# Patient Record
Sex: MALE | Race: WHITE | NOT HISPANIC OR LATINO
[De-identification: names, ages, dates, MRNs, and addresses within clinical notes are randomized per-mention and may not be internally consistent; named-entity substitution may affect disease eponyms.]

---

## 2017-01-12 ENCOUNTER — APPOINTMENT (OUTPATIENT)
Dept: ENDOCRINOLOGY | Facility: CLINIC | Age: 57
End: 2017-01-12

## 2017-02-06 ENCOUNTER — APPOINTMENT (OUTPATIENT)
Dept: PULMONOLOGY | Facility: CLINIC | Age: 57
End: 2017-02-06

## 2017-02-15 ENCOUNTER — APPOINTMENT (OUTPATIENT)
Dept: ENDOCRINOLOGY | Facility: CLINIC | Age: 57
End: 2017-02-15

## 2017-03-17 ENCOUNTER — APPOINTMENT (OUTPATIENT)
Dept: PULMONOLOGY | Facility: CLINIC | Age: 57
End: 2017-03-17

## 2017-03-30 ENCOUNTER — OUTPATIENT (OUTPATIENT)
Dept: OUTPATIENT SERVICES | Facility: HOSPITAL | Age: 57
LOS: 1 days | End: 2017-03-30
Payer: MEDICARE

## 2017-03-30 DIAGNOSIS — K08.9 DISORDER OF TEETH AND SUPPORTING STRUCTURES, UNSPECIFIED: ICD-10-CM

## 2017-03-30 PROCEDURE — D0273: CPT

## 2017-03-30 PROCEDURE — D1110: CPT

## 2017-03-30 PROCEDURE — D0120: CPT

## 2017-03-31 DIAGNOSIS — Z01.20 ENCOUNTER FOR DENTAL EXAMINATION AND CLEANING WITHOUT ABNORMAL FINDINGS: ICD-10-CM

## 2017-04-07 ENCOUNTER — APPOINTMENT (OUTPATIENT)
Dept: PULMONOLOGY | Facility: CLINIC | Age: 57
End: 2017-04-07

## 2017-04-07 VITALS
TEMPERATURE: 98.4 F | HEART RATE: 74 BPM | SYSTOLIC BLOOD PRESSURE: 120 MMHG | DIASTOLIC BLOOD PRESSURE: 84 MMHG | RESPIRATION RATE: 16 BRPM | HEIGHT: 71 IN | WEIGHT: 302 LBS | BODY MASS INDEX: 42.28 KG/M2

## 2017-04-14 ENCOUNTER — OUTPATIENT (OUTPATIENT)
Dept: OUTPATIENT SERVICES | Facility: HOSPITAL | Age: 57
LOS: 1 days | End: 2017-04-14
Payer: MEDICARE

## 2017-04-14 DIAGNOSIS — K08.9 DISORDER OF TEETH AND SUPPORTING STRUCTURES, UNSPECIFIED: ICD-10-CM

## 2017-04-14 PROCEDURE — D0140: CPT

## 2017-04-14 PROCEDURE — D0230: CPT

## 2017-04-14 PROCEDURE — D0220: CPT

## 2017-04-14 PROCEDURE — D0330: CPT

## 2017-04-18 DIAGNOSIS — Z01.20 ENCOUNTER FOR DENTAL EXAMINATION AND CLEANING WITHOUT ABNORMAL FINDINGS: ICD-10-CM

## 2017-04-19 ENCOUNTER — APPOINTMENT (OUTPATIENT)
Dept: CARDIOLOGY | Facility: CLINIC | Age: 57
End: 2017-04-19

## 2017-04-21 ENCOUNTER — APPOINTMENT (OUTPATIENT)
Dept: NEUROLOGY | Facility: CLINIC | Age: 57
End: 2017-04-21

## 2017-04-24 ENCOUNTER — APPOINTMENT (OUTPATIENT)
Dept: CARDIOLOGY | Facility: CLINIC | Age: 57
End: 2017-04-24

## 2017-05-03 ENCOUNTER — APPOINTMENT (OUTPATIENT)
Dept: PULMONOLOGY | Facility: CLINIC | Age: 57
End: 2017-05-03

## 2017-05-08 ENCOUNTER — APPOINTMENT (OUTPATIENT)
Dept: ENDOCRINOLOGY | Facility: CLINIC | Age: 57
End: 2017-05-08

## 2017-05-12 ENCOUNTER — APPOINTMENT (OUTPATIENT)
Dept: ENDOCRINOLOGY | Facility: CLINIC | Age: 57
End: 2017-05-12

## 2017-05-12 VITALS
BODY MASS INDEX: 42 KG/M2 | HEART RATE: 71 BPM | WEIGHT: 300 LBS | HEIGHT: 71 IN | OXYGEN SATURATION: 97 % | DIASTOLIC BLOOD PRESSURE: 84 MMHG | SYSTOLIC BLOOD PRESSURE: 120 MMHG

## 2017-05-12 LAB
GLUCOSE BLDC GLUCOMTR-MCNC: 123
HBA1C MFR BLD HPLC: 6.1

## 2017-05-16 LAB
ALBUMIN SERPL ELPH-MCNC: 4.6 G/DL
ALP BLD-CCNC: 73 U/L
ALT SERPL-CCNC: 32 U/L
ANION GAP SERPL CALC-SCNC: 15 MMOL/L
AST SERPL-CCNC: 21 U/L
BILIRUB SERPL-MCNC: 0.6 MG/DL
BUN SERPL-MCNC: 22 MG/DL
CALCIUM SERPL-MCNC: 10.1 MG/DL
CHLORIDE SERPL-SCNC: 104 MMOL/L
CHOLEST SERPL-MCNC: 132 MG/DL
CHOLEST/HDLC SERPL: 3 RATIO
CO2 SERPL-SCNC: 26 MMOL/L
CREAT SERPL-MCNC: 1.48 MG/DL
CREAT SPEC-SCNC: 15 MG/DL
GLUCOSE SERPL-MCNC: 43 MG/DL
HDLC SERPL-MCNC: 39 MG/DL
LDLC SERPL CALC-MCNC: 62 MG/DL
MICROALBUMIN 24H UR DL<=1MG/L-MCNC: 0.8 MG/DL
MICROALBUMIN/CREAT 24H UR-RTO: 53
POTASSIUM SERPL-SCNC: 4.2 MMOL/L
PROT SERPL-MCNC: 7.3 G/DL
SODIUM SERPL-SCNC: 145 MMOL/L
T4 FREE SERPL-MCNC: 1.3 NG/DL
TRIGL SERPL-MCNC: 157 MG/DL
TSH SERPL-ACNC: 2.11 UIU/ML

## 2017-05-22 ENCOUNTER — APPOINTMENT (OUTPATIENT)
Dept: CARDIOLOGY | Facility: CLINIC | Age: 57
End: 2017-05-22

## 2017-05-22 ENCOUNTER — NON-APPOINTMENT (OUTPATIENT)
Age: 57
End: 2017-05-22

## 2017-05-22 VITALS
SYSTOLIC BLOOD PRESSURE: 116 MMHG | WEIGHT: 303 LBS | DIASTOLIC BLOOD PRESSURE: 80 MMHG | HEART RATE: 76 BPM | BODY MASS INDEX: 42.42 KG/M2 | HEIGHT: 71 IN

## 2017-05-22 RX ORDER — GLIMEPIRIDE 1 MG/1
1 TABLET ORAL DAILY
Qty: 90 | Refills: 3 | Status: DISCONTINUED | COMMUNITY
Start: 2017-03-30 | End: 2017-05-22

## 2017-05-23 ENCOUNTER — APPOINTMENT (OUTPATIENT)
Dept: SLEEP CENTER | Facility: CLINIC | Age: 57
End: 2017-05-23

## 2017-05-23 ENCOUNTER — OUTPATIENT (OUTPATIENT)
Dept: OUTPATIENT SERVICES | Facility: HOSPITAL | Age: 57
LOS: 1 days | End: 2017-05-23
Payer: MEDICARE

## 2017-05-23 PROCEDURE — 95810 POLYSOM 6/> YRS 4/> PARAM: CPT

## 2017-05-24 DIAGNOSIS — G47.33 OBSTRUCTIVE SLEEP APNEA (ADULT) (PEDIATRIC): ICD-10-CM

## 2017-05-30 ENCOUNTER — RESULT REVIEW (OUTPATIENT)
Age: 57
End: 2017-05-30

## 2017-05-31 ENCOUNTER — OUTPATIENT (OUTPATIENT)
Dept: OUTPATIENT SERVICES | Facility: HOSPITAL | Age: 57
LOS: 1 days | End: 2017-05-31

## 2017-05-31 DIAGNOSIS — K08.9 DISORDER OF TEETH AND SUPPORTING STRUCTURES, UNSPECIFIED: ICD-10-CM

## 2017-06-07 ENCOUNTER — RESULT REVIEW (OUTPATIENT)
Age: 57
End: 2017-06-07

## 2017-06-15 ENCOUNTER — APPOINTMENT (OUTPATIENT)
Dept: NEUROLOGY | Facility: CLINIC | Age: 57
End: 2017-06-15

## 2017-06-15 VITALS
HEIGHT: 71 IN | WEIGHT: 303 LBS | OXYGEN SATURATION: 97 % | DIASTOLIC BLOOD PRESSURE: 80 MMHG | SYSTOLIC BLOOD PRESSURE: 140 MMHG | BODY MASS INDEX: 42.42 KG/M2 | HEART RATE: 77 BPM

## 2017-06-21 ENCOUNTER — CLINICAL ADVICE (OUTPATIENT)
Age: 57
End: 2017-06-21

## 2017-06-22 ENCOUNTER — APPOINTMENT (OUTPATIENT)
Dept: CARDIOLOGY | Facility: CLINIC | Age: 57
End: 2017-06-22
Payer: MEDICARE

## 2017-06-22 PROCEDURE — 93306 TTE W/DOPPLER COMPLETE: CPT

## 2017-06-23 ENCOUNTER — OUTPATIENT (OUTPATIENT)
Dept: OUTPATIENT SERVICES | Facility: HOSPITAL | Age: 57
LOS: 1 days | End: 2017-06-23
Payer: MEDICARE

## 2017-06-28 ENCOUNTER — RX RENEWAL (OUTPATIENT)
Age: 57
End: 2017-06-28

## 2017-06-28 ENCOUNTER — CLINICAL ADVICE (OUTPATIENT)
Age: 57
End: 2017-06-28

## 2017-07-11 ENCOUNTER — OUTPATIENT (OUTPATIENT)
Dept: OUTPATIENT SERVICES | Facility: HOSPITAL | Age: 57
LOS: 1 days | End: 2017-07-11
Payer: MEDICARE

## 2017-07-11 PROCEDURE — 93018 CV STRESS TEST I&R ONLY: CPT

## 2017-07-11 PROCEDURE — 78452 HT MUSCLE IMAGE SPECT MULT: CPT | Mod: 26

## 2017-07-11 PROCEDURE — 93016 CV STRESS TEST SUPVJ ONLY: CPT

## 2017-07-12 PROCEDURE — A9500: CPT

## 2017-07-12 PROCEDURE — 78452 HT MUSCLE IMAGE SPECT MULT: CPT

## 2017-07-12 PROCEDURE — 93017 CV STRESS TEST TRACING ONLY: CPT

## 2017-08-01 PROCEDURE — G8980: CPT | Mod: CJ

## 2017-08-01 PROCEDURE — G8979: CPT | Mod: CI

## 2017-08-01 PROCEDURE — G8978: CPT | Mod: CK

## 2017-08-01 PROCEDURE — 97140 MANUAL THERAPY 1/> REGIONS: CPT

## 2017-08-01 PROCEDURE — 97161 PT EVAL LOW COMPLEX 20 MIN: CPT

## 2017-08-01 PROCEDURE — 97110 THERAPEUTIC EXERCISES: CPT

## 2017-08-16 ENCOUNTER — RX RENEWAL (OUTPATIENT)
Age: 57
End: 2017-08-16

## 2017-08-29 LAB
AFP-TM SERPL-MCNC: 2.3 NG/ML
ALBUMIN SERPL ELPH-MCNC: 4 G/DL
ALP BLD-CCNC: 76 U/L
ALT SERPL-CCNC: 26 U/L
ANION GAP SERPL CALC-SCNC: 12 MMOL/L
AST SERPL-CCNC: 21 U/L
BASOPHILS # BLD AUTO: 0.02 K/UL
BASOPHILS NFR BLD AUTO: 0.3 %
BILIRUB SERPL-MCNC: 0.5 MG/DL
BUN SERPL-MCNC: 22 MG/DL
CALCIUM SERPL-MCNC: 9.5 MG/DL
CHLORIDE SERPL-SCNC: 102 MMOL/L
CO2 SERPL-SCNC: 24 MMOL/L
CREAT SERPL-MCNC: 1.33 MG/DL
EOSINOPHIL # BLD AUTO: 0.26 K/UL
EOSINOPHIL NFR BLD AUTO: 3.4 %
GLUCOSE SERPL-MCNC: 192 MG/DL
HCG SERPL-MCNC: <1 MIU/ML
HCT VFR BLD CALC: 45.6 %
HGB BLD-MCNC: 14.7 G/DL
IMM GRANULOCYTES NFR BLD AUTO: 0.8 %
LDH SERPL-CCNC: 170 U/L
LYMPHOCYTES # BLD AUTO: 2.13 K/UL
LYMPHOCYTES NFR BLD AUTO: 27.5 %
MAN DIFF?: NORMAL
MCHC RBC-ENTMCNC: 28.5 PG
MCHC RBC-ENTMCNC: 32.2 GM/DL
MCV RBC AUTO: 88.4 FL
MONOCYTES # BLD AUTO: 0.67 K/UL
MONOCYTES NFR BLD AUTO: 8.6 %
NEUTROPHILS # BLD AUTO: 4.61 K/UL
NEUTROPHILS NFR BLD AUTO: 59.4 %
PLATELET # BLD AUTO: 203 K/UL
POTASSIUM SERPL-SCNC: 3.9 MMOL/L
PROT SERPL-MCNC: 7.8 G/DL
RBC # BLD: 5.16 M/UL
RBC # FLD: 13.6 %
SODIUM SERPL-SCNC: 138 MMOL/L
WBC # FLD AUTO: 7.75 K/UL

## 2017-09-14 ENCOUNTER — APPOINTMENT (OUTPATIENT)
Dept: HEMATOLOGY ONCOLOGY | Facility: CLINIC | Age: 57
End: 2017-09-14
Payer: MEDICARE

## 2017-09-14 VITALS
WEIGHT: 302 LBS | RESPIRATION RATE: 16 BRPM | BODY MASS INDEX: 42.28 KG/M2 | HEIGHT: 71 IN | DIASTOLIC BLOOD PRESSURE: 64 MMHG | SYSTOLIC BLOOD PRESSURE: 120 MMHG | TEMPERATURE: 98.4 F | HEART RATE: 68 BPM

## 2017-09-14 PROCEDURE — 99214 OFFICE O/P EST MOD 30 MIN: CPT

## 2017-09-24 ENCOUNTER — RX RENEWAL (OUTPATIENT)
Age: 57
End: 2017-09-24

## 2017-10-06 ENCOUNTER — APPOINTMENT (OUTPATIENT)
Dept: UROLOGY | Facility: CLINIC | Age: 57
End: 2017-10-06

## 2017-10-16 ENCOUNTER — APPOINTMENT (OUTPATIENT)
Dept: ENDOCRINOLOGY | Facility: CLINIC | Age: 57
End: 2017-10-16

## 2017-11-01 ENCOUNTER — APPOINTMENT (OUTPATIENT)
Dept: ORTHOPEDIC SURGERY | Facility: CLINIC | Age: 57
End: 2017-11-01

## 2017-11-11 ENCOUNTER — MOBILE ON CALL (OUTPATIENT)
Age: 57
End: 2017-11-11

## 2017-11-30 ENCOUNTER — APPOINTMENT (OUTPATIENT)
Dept: ENDOCRINOLOGY | Facility: CLINIC | Age: 57
End: 2017-11-30
Payer: MEDICARE

## 2017-11-30 VITALS
DIASTOLIC BLOOD PRESSURE: 80 MMHG | HEIGHT: 69 IN | BODY MASS INDEX: 44.43 KG/M2 | WEIGHT: 300 LBS | SYSTOLIC BLOOD PRESSURE: 120 MMHG | OXYGEN SATURATION: 98 % | HEART RATE: 81 BPM

## 2017-11-30 LAB
GLUCOSE BLDC GLUCOMTR-MCNC: 68
GLUCOSE BLDC GLUCOMTR-MCNC: 87
HBA1C MFR BLD HPLC: 6.3

## 2017-11-30 PROCEDURE — 82962 GLUCOSE BLOOD TEST: CPT

## 2017-11-30 PROCEDURE — 83036 HEMOGLOBIN GLYCOSYLATED A1C: CPT | Mod: QW

## 2017-11-30 PROCEDURE — 99214 OFFICE O/P EST MOD 30 MIN: CPT

## 2017-12-01 LAB
CREAT SPEC-SCNC: 18 MG/DL
MICROALBUMIN 24H UR DL<=1MG/L-MCNC: 0.4 MG/DL
MICROALBUMIN/CREAT 24H UR-RTO: 22 MG/G

## 2017-12-06 ENCOUNTER — APPOINTMENT (OUTPATIENT)
Dept: ORTHOPEDIC SURGERY | Facility: CLINIC | Age: 57
End: 2017-12-06
Payer: MEDICARE

## 2017-12-06 VITALS — WEIGHT: 300 LBS | BODY MASS INDEX: 44.43 KG/M2 | HEIGHT: 69 IN

## 2017-12-06 DIAGNOSIS — M25.532 PAIN IN RIGHT WRIST: ICD-10-CM

## 2017-12-06 DIAGNOSIS — M25.531 PAIN IN RIGHT WRIST: ICD-10-CM

## 2017-12-06 PROCEDURE — 99215 OFFICE O/P EST HI 40 MIN: CPT

## 2017-12-06 PROCEDURE — 73100 X-RAY EXAM OF WRIST: CPT | Mod: 50

## 2017-12-06 PROCEDURE — 73562 X-RAY EXAM OF KNEE 3: CPT | Mod: LT

## 2017-12-07 PROBLEM — M25.531 ACUTE PAIN OF BOTH WRISTS: Status: ACTIVE | Noted: 2017-12-07

## 2017-12-08 ENCOUNTER — APPOINTMENT (OUTPATIENT)
Dept: UROLOGY | Facility: CLINIC | Age: 57
End: 2017-12-08
Payer: MEDICARE

## 2017-12-08 VITALS
HEIGHT: 69 IN | HEART RATE: 87 BPM | TEMPERATURE: 98.4 F | DIASTOLIC BLOOD PRESSURE: 84 MMHG | WEIGHT: 306 LBS | SYSTOLIC BLOOD PRESSURE: 129 MMHG | RESPIRATION RATE: 16 BRPM | BODY MASS INDEX: 45.32 KG/M2

## 2017-12-08 DIAGNOSIS — Z12.5 ENCOUNTER FOR SCREENING FOR MALIGNANT NEOPLASM OF PROSTATE: ICD-10-CM

## 2017-12-08 DIAGNOSIS — Z82.49 FAMILY HISTORY OF ISCHEMIC HEART DISEASE AND OTHER DISEASES OF THE CIRCULATORY SYSTEM: ICD-10-CM

## 2017-12-08 DIAGNOSIS — Z80.1 FAMILY HISTORY OF MALIGNANT NEOPLASM OF TRACHEA, BRONCHUS AND LUNG: ICD-10-CM

## 2017-12-08 PROCEDURE — 99203 OFFICE O/P NEW LOW 30 MIN: CPT

## 2017-12-12 ENCOUNTER — APPOINTMENT (OUTPATIENT)
Age: 57
End: 2017-12-12

## 2017-12-12 LAB
BILIRUB UR QL STRIP: NEGATIVE
CLARITY UR: CLEAR
COLLECTION METHOD: NORMAL
GLUCOSE UR-MCNC: NEGATIVE
HCG UR QL: 0.2 EU/DL
HGB UR QL STRIP.AUTO: NEGATIVE
KETONES UR-MCNC: NEGATIVE
LEUKOCYTE ESTERASE UR QL STRIP: NEGATIVE
NITRITE UR QL STRIP: NEGATIVE
PH UR STRIP: 6.5
PROT UR STRIP-MCNC: NEGATIVE
PSA SERPL-MCNC: 0.51 NG/ML
SP GR UR STRIP: 1.01

## 2017-12-22 ENCOUNTER — RX RENEWAL (OUTPATIENT)
Age: 57
End: 2017-12-22

## 2018-01-02 ENCOUNTER — RX RENEWAL (OUTPATIENT)
Age: 58
End: 2018-01-02

## 2018-01-16 ENCOUNTER — APPOINTMENT (OUTPATIENT)
Dept: PULMONOLOGY | Facility: CLINIC | Age: 58
End: 2018-01-16

## 2018-01-17 ENCOUNTER — CLINICAL ADVICE (OUTPATIENT)
Age: 58
End: 2018-01-17

## 2018-01-22 ENCOUNTER — RX RENEWAL (OUTPATIENT)
Age: 58
End: 2018-01-22

## 2018-01-24 ENCOUNTER — RX RENEWAL (OUTPATIENT)
Age: 58
End: 2018-01-24

## 2018-01-26 ENCOUNTER — EMERGENCY (EMERGENCY)
Facility: HOSPITAL | Age: 58
LOS: 1 days | Discharge: HOME | End: 2018-01-26
Admitting: EMERGENCY MEDICINE
Payer: MEDICARE

## 2018-01-26 PROCEDURE — 99282 EMERGENCY DEPT VISIT SF MDM: CPT | Mod: 25

## 2018-01-26 PROCEDURE — 99283 EMERGENCY DEPT VISIT LOW MDM: CPT

## 2018-01-26 PROCEDURE — 82962 GLUCOSE BLOOD TEST: CPT

## 2018-02-13 ENCOUNTER — APPOINTMENT (OUTPATIENT)
Dept: PULMONOLOGY | Facility: CLINIC | Age: 58
End: 2018-02-13

## 2018-02-21 ENCOUNTER — OUTPATIENT (OUTPATIENT)
Dept: OUTPATIENT SERVICES | Facility: HOSPITAL | Age: 58
LOS: 1 days | End: 2018-02-21
Payer: MEDICARE

## 2018-02-21 DIAGNOSIS — K08.9 DISORDER OF TEETH AND SUPPORTING STRUCTURES, UNSPECIFIED: ICD-10-CM

## 2018-02-21 PROCEDURE — D0120: CPT

## 2018-02-21 PROCEDURE — D1110: CPT

## 2018-02-25 ENCOUNTER — EMERGENCY (EMERGENCY)
Facility: HOSPITAL | Age: 58
LOS: 1 days | Discharge: HOME | End: 2018-02-25
Admitting: INTERNAL MEDICINE
Payer: MEDICARE

## 2018-02-25 PROCEDURE — 93005 ELECTROCARDIOGRAM TRACING: CPT

## 2018-02-25 PROCEDURE — 36415 COLL VENOUS BLD VENIPUNCTURE: CPT

## 2018-02-25 PROCEDURE — 80178 ASSAY OF LITHIUM: CPT

## 2018-02-25 PROCEDURE — 80048 BASIC METABOLIC PNL TOTAL CA: CPT

## 2018-02-25 PROCEDURE — 93010 ELECTROCARDIOGRAM REPORT: CPT

## 2018-02-25 PROCEDURE — 99283 EMERGENCY DEPT VISIT LOW MDM: CPT | Mod: 25

## 2018-02-25 PROCEDURE — 85027 COMPLETE CBC AUTOMATED: CPT

## 2018-02-25 PROCEDURE — 99284 EMERGENCY DEPT VISIT MOD MDM: CPT

## 2018-02-26 ENCOUNTER — APPOINTMENT (OUTPATIENT)
Dept: ENDOCRINOLOGY | Facility: CLINIC | Age: 58
End: 2018-02-26
Payer: MEDICARE

## 2018-02-26 VITALS
HEIGHT: 69 IN | WEIGHT: 300 LBS | DIASTOLIC BLOOD PRESSURE: 80 MMHG | HEART RATE: 83 BPM | BODY MASS INDEX: 44.43 KG/M2 | SYSTOLIC BLOOD PRESSURE: 124 MMHG | OXYGEN SATURATION: 97 %

## 2018-02-26 LAB
GLUCOSE BLDC GLUCOMTR-MCNC: 169
HBA1C MFR BLD HPLC: 6.9

## 2018-02-26 PROCEDURE — 82962 GLUCOSE BLOOD TEST: CPT

## 2018-02-26 PROCEDURE — 83036 HEMOGLOBIN GLYCOSYLATED A1C: CPT | Mod: QW

## 2018-02-26 PROCEDURE — 99214 OFFICE O/P EST MOD 30 MIN: CPT | Mod: 25,GC

## 2018-02-28 DIAGNOSIS — Z01.20 ENCOUNTER FOR DENTAL EXAMINATION AND CLEANING WITHOUT ABNORMAL FINDINGS: ICD-10-CM

## 2018-03-02 LAB
25(OH)D3 SERPL-MCNC: 44.3 NG/ML
ALBUMIN SERPL ELPH-MCNC: 4.5 G/DL
ALP BLD-CCNC: 80 U/L
ALT SERPL-CCNC: 26 U/L
ANION GAP SERPL CALC-SCNC: 13 MMOL/L
AST SERPL-CCNC: 17 U/L
BILIRUB SERPL-MCNC: 0.7 MG/DL
BUN SERPL-MCNC: 22 MG/DL
CALCIUM SERPL-MCNC: 10.2 MG/DL
CHLORIDE SERPL-SCNC: 100 MMOL/L
CHOLEST SERPL-MCNC: 143 MG/DL
CHOLEST/HDLC SERPL: 3.4 RATIO
CO2 SERPL-SCNC: 27 MMOL/L
CREAT SERPL-MCNC: 1.42 MG/DL
GLUCOSE SERPL-MCNC: 148 MG/DL
HDLC SERPL-MCNC: 42 MG/DL
LDLC SERPL CALC-MCNC: 59 MG/DL
POTASSIUM SERPL-SCNC: 4.1 MMOL/L
PROT SERPL-MCNC: 7.6 G/DL
SODIUM SERPL-SCNC: 140 MMOL/L
T4 FREE SERPL-MCNC: 1.4 NG/DL
TRIGL SERPL-MCNC: 208 MG/DL
TSH SERPL-ACNC: 1.37 UIU/ML

## 2018-03-22 ENCOUNTER — APPOINTMENT (OUTPATIENT)
Dept: PULMONOLOGY | Facility: CLINIC | Age: 58
End: 2018-03-22
Payer: MEDICARE

## 2018-03-22 VITALS
WEIGHT: 303 LBS | SYSTOLIC BLOOD PRESSURE: 130 MMHG | HEART RATE: 87 BPM | OXYGEN SATURATION: 96 % | RESPIRATION RATE: 18 BRPM | TEMPERATURE: 98.7 F | BODY MASS INDEX: 44.88 KG/M2 | HEIGHT: 69 IN | DIASTOLIC BLOOD PRESSURE: 70 MMHG

## 2018-03-22 PROCEDURE — 99215 OFFICE O/P EST HI 40 MIN: CPT | Mod: GC

## 2018-03-22 RX ORDER — IBUPROFEN 600 MG/1
600 TABLET, FILM COATED ORAL
Qty: 20 | Refills: 0 | Status: DISCONTINUED | COMMUNITY
Start: 2018-03-14

## 2018-03-22 RX ORDER — PREDNISONE 20 MG/1
20 TABLET ORAL
Qty: 3 | Refills: 0 | Status: DISCONTINUED | COMMUNITY
Start: 2017-11-01

## 2018-03-22 RX ORDER — POLYETHYLENE GLYCOL-3350 AND ELECTROLYTES WITH FLAVOR PACK 240; 5.84; 2.98; 6.72; 22.72 G/278.26G; G/278.26G; G/278.26G; G/278.26G; G/278.26G
240 POWDER, FOR SOLUTION ORAL
Qty: 4000 | Refills: 0 | Status: DISCONTINUED | COMMUNITY
Start: 2017-10-20

## 2018-03-22 RX ORDER — AZITHROMYCIN 250 MG/1
250 TABLET, FILM COATED ORAL
Qty: 6 | Refills: 0 | Status: DISCONTINUED | COMMUNITY
Start: 2017-11-01

## 2018-04-28 ENCOUNTER — OUTPATIENT (OUTPATIENT)
Dept: OUTPATIENT SERVICES | Facility: HOSPITAL | Age: 58
LOS: 1 days | End: 2018-04-28
Payer: MEDICARE

## 2018-04-28 ENCOUNTER — APPOINTMENT (OUTPATIENT)
Dept: SLEEP CENTER | Facility: CLINIC | Age: 58
End: 2018-04-28
Payer: MEDICARE

## 2018-04-28 PROCEDURE — 95811 POLYSOM 6/>YRS CPAP 4/> PARM: CPT | Mod: 26

## 2018-04-28 PROCEDURE — 95811 POLYSOM 6/>YRS CPAP 4/> PARM: CPT

## 2018-04-30 DIAGNOSIS — G47.33 OBSTRUCTIVE SLEEP APNEA (ADULT) (PEDIATRIC): ICD-10-CM

## 2018-05-21 ENCOUNTER — RESULT REVIEW (OUTPATIENT)
Age: 58
End: 2018-05-21

## 2018-05-22 ENCOUNTER — MESSAGE (OUTPATIENT)
Age: 58
End: 2018-05-22

## 2018-05-29 ENCOUNTER — APPOINTMENT (OUTPATIENT)
Dept: ENDOCRINOLOGY | Facility: CLINIC | Age: 58
End: 2018-05-29

## 2018-06-10 ENCOUNTER — MOBILE ON CALL (OUTPATIENT)
Age: 58
End: 2018-06-10

## 2018-06-16 ENCOUNTER — APPOINTMENT (OUTPATIENT)
Dept: NEUROLOGY | Facility: CLINIC | Age: 58
End: 2018-06-16

## 2018-06-21 ENCOUNTER — APPOINTMENT (OUTPATIENT)
Dept: ENDOCRINOLOGY | Facility: CLINIC | Age: 58
End: 2018-06-21

## 2018-06-22 ENCOUNTER — RX RENEWAL (OUTPATIENT)
Age: 58
End: 2018-06-22

## 2018-06-29 ENCOUNTER — APPOINTMENT (OUTPATIENT)
Dept: ENDOCRINOLOGY | Facility: CLINIC | Age: 58
End: 2018-06-29
Payer: MEDICARE

## 2018-06-29 VITALS — WEIGHT: 307 LBS | HEIGHT: 69 IN | BODY MASS INDEX: 45.47 KG/M2

## 2018-06-29 LAB
GLUCOSE BLDC GLUCOMTR-MCNC: 180
HBA1C MFR BLD HPLC: 6.6

## 2018-06-29 PROCEDURE — G0108 DIAB MANAGE TRN  PER INDIV: CPT

## 2018-06-29 PROCEDURE — 83036 HEMOGLOBIN GLYCOSYLATED A1C: CPT | Mod: QW

## 2018-06-29 PROCEDURE — 82962 GLUCOSE BLOOD TEST: CPT

## 2018-07-18 ENCOUNTER — APPOINTMENT (OUTPATIENT)
Dept: SLEEP CENTER | Facility: CLINIC | Age: 58
End: 2018-07-18

## 2018-07-26 ENCOUNTER — APPOINTMENT (OUTPATIENT)
Dept: ENDOCRINOLOGY | Facility: CLINIC | Age: 58
End: 2018-07-26
Payer: MEDICARE

## 2018-07-26 VITALS
WEIGHT: 303 LBS | DIASTOLIC BLOOD PRESSURE: 76 MMHG | RESPIRATION RATE: 16 BRPM | HEIGHT: 69 IN | SYSTOLIC BLOOD PRESSURE: 114 MMHG | BODY MASS INDEX: 44.88 KG/M2 | OXYGEN SATURATION: 97 % | HEART RATE: 87 BPM

## 2018-07-26 LAB — HBA1C MFR BLD HPLC: 6.7 %

## 2018-07-26 PROCEDURE — 99214 OFFICE O/P EST MOD 30 MIN: CPT

## 2018-07-26 RX ORDER — FEXOFENADINE HYDROCHLORIDE 180 MG/1
180 TABLET ORAL
Qty: 30 | Refills: 0 | Status: DISCONTINUED | COMMUNITY
Start: 2018-03-22 | End: 2018-07-26

## 2018-07-26 RX ORDER — SENNA 8.6 MG/1
8.6 TABLET, COATED ORAL
Qty: 28 | Refills: 0 | Status: DISCONTINUED | COMMUNITY
Start: 2018-02-25 | End: 2018-07-26

## 2018-07-27 LAB
ANION GAP SERPL CALC-SCNC: 13 MMOL/L
BUN SERPL-MCNC: 26 MG/DL
CALCIUM SERPL-MCNC: 9.9 MG/DL
CHLORIDE SERPL-SCNC: 102 MMOL/L
CHOLEST SERPL-MCNC: 155 MG/DL
CHOLEST/HDLC SERPL: 3.9 RATIO
CO2 SERPL-SCNC: 25 MMOL/L
CREAT SERPL-MCNC: 1.37 MG/DL
CREAT SPEC-SCNC: 18 MG/DL
GLUCOSE SERPL-MCNC: 146 MG/DL
HDLC SERPL-MCNC: 40 MG/DL
LDLC SERPL CALC-MCNC: 60 MG/DL
MICROALBUMIN 24H UR DL<=1MG/L-MCNC: <1.2 MG/DL
MICROALBUMIN/CREAT 24H UR-RTO: NORMAL
POTASSIUM SERPL-SCNC: 4.1 MMOL/L
SODIUM SERPL-SCNC: 140 MMOL/L
TRIGL SERPL-MCNC: 277 MG/DL

## 2018-08-29 ENCOUNTER — APPOINTMENT (OUTPATIENT)
Dept: PULMONOLOGY | Facility: CLINIC | Age: 58
End: 2018-08-29
Payer: MEDICARE

## 2018-08-29 VITALS
RESPIRATION RATE: 16 BRPM | DIASTOLIC BLOOD PRESSURE: 80 MMHG | HEART RATE: 79 BPM | SYSTOLIC BLOOD PRESSURE: 120 MMHG | OXYGEN SATURATION: 96 % | BODY MASS INDEX: 45.03 KG/M2 | WEIGHT: 304 LBS | HEIGHT: 69 IN | TEMPERATURE: 97.7 F

## 2018-08-29 DIAGNOSIS — R06.83 SNORING: ICD-10-CM

## 2018-08-29 PROCEDURE — 99214 OFFICE O/P EST MOD 30 MIN: CPT

## 2018-09-14 ENCOUNTER — APPOINTMENT (OUTPATIENT)
Dept: RADIOLOGY | Facility: HOSPITAL | Age: 58
End: 2018-09-14
Payer: MEDICARE

## 2018-09-14 ENCOUNTER — OUTPATIENT (OUTPATIENT)
Dept: OUTPATIENT SERVICES | Facility: HOSPITAL | Age: 58
LOS: 1 days | End: 2018-09-14
Payer: MEDICARE

## 2018-09-14 DIAGNOSIS — Z00.8 ENCOUNTER FOR OTHER GENERAL EXAMINATION: ICD-10-CM

## 2018-09-14 PROCEDURE — 73590 X-RAY EXAM OF LOWER LEG: CPT | Mod: 26,RT

## 2018-09-14 PROCEDURE — 73590 X-RAY EXAM OF LOWER LEG: CPT

## 2018-09-19 ENCOUNTER — INBOUND DOCUMENT (OUTPATIENT)
Age: 58
End: 2018-09-19

## 2018-09-27 ENCOUNTER — OUTPATIENT (OUTPATIENT)
Dept: OUTPATIENT SERVICES | Facility: HOSPITAL | Age: 58
LOS: 1 days | End: 2018-09-27
Payer: MEDICARE

## 2018-09-27 DIAGNOSIS — K08.9 DISORDER OF TEETH AND SUPPORTING STRUCTURES, UNSPECIFIED: ICD-10-CM

## 2018-09-27 PROCEDURE — D0274: CPT

## 2018-09-27 PROCEDURE — D1110: CPT

## 2018-09-27 PROCEDURE — D0120: CPT

## 2018-09-28 DIAGNOSIS — Z01.20 ENCOUNTER FOR DENTAL EXAMINATION AND CLEANING WITHOUT ABNORMAL FINDINGS: ICD-10-CM

## 2018-10-01 ENCOUNTER — RX RENEWAL (OUTPATIENT)
Age: 58
End: 2018-10-01

## 2018-10-04 ENCOUNTER — OUTPATIENT (OUTPATIENT)
Dept: OUTPATIENT SERVICES | Facility: HOSPITAL | Age: 58
LOS: 1 days | End: 2018-10-04

## 2018-10-04 DIAGNOSIS — K08.9 DISORDER OF TEETH AND SUPPORTING STRUCTURES, UNSPECIFIED: ICD-10-CM

## 2018-10-09 ENCOUNTER — APPOINTMENT (OUTPATIENT)
Dept: BARIATRICS/WEIGHT MGMT | Facility: CLINIC | Age: 58
End: 2018-10-09
Payer: MEDICARE

## 2018-10-09 VITALS — WEIGHT: 307 LBS | HEIGHT: 69 IN | BODY MASS INDEX: 45.47 KG/M2

## 2018-10-09 PROCEDURE — 97802 MEDICAL NUTRITION INDIV IN: CPT

## 2018-10-11 ENCOUNTER — EMERGENCY (EMERGENCY)
Facility: HOSPITAL | Age: 58
LOS: 1 days | Discharge: HOME | End: 2018-10-11
Attending: EMERGENCY MEDICINE | Admitting: EMERGENCY MEDICINE
Payer: MEDICARE

## 2018-10-11 ENCOUNTER — OUTPATIENT (OUTPATIENT)
Dept: OUTPATIENT SERVICES | Facility: HOSPITAL | Age: 58
LOS: 1 days | End: 2018-10-11

## 2018-10-11 VITALS
OXYGEN SATURATION: 94 % | DIASTOLIC BLOOD PRESSURE: 88 MMHG | HEART RATE: 98 BPM | RESPIRATION RATE: 17 BRPM | SYSTOLIC BLOOD PRESSURE: 136 MMHG

## 2018-10-11 VITALS — RESPIRATION RATE: 18 BRPM | HEART RATE: 88 BPM | OXYGEN SATURATION: 94 %

## 2018-10-11 DIAGNOSIS — K08.9 DISORDER OF TEETH AND SUPPORTING STRUCTURES, UNSPECIFIED: ICD-10-CM

## 2018-10-11 PROCEDURE — 99283 EMERGENCY DEPT VISIT LOW MDM: CPT

## 2018-10-11 PROCEDURE — 73630 X-RAY EXAM OF FOOT: CPT | Mod: 26,RT

## 2018-10-11 PROCEDURE — 73630 X-RAY EXAM OF FOOT: CPT

## 2018-10-11 PROCEDURE — 82962 GLUCOSE BLOOD TEST: CPT

## 2018-10-11 NOTE — ED PROVIDER NOTE - PLAN OF CARE
Rest. Apply cold pack for 20 minutes multiple times daily. Elevate.  Follow up with your Primary Care Physician within the next 2-3 days  Bring a copy of your test results with you to your appointment  Continue your current medication regimen  Return to the Emergency Room if you experience new or worsening symptoms  Take Motrin 400-600mg every 6 hrs as needed for pain. Take with food   Take Tylenol 650mg every 6 hrs as needed for pain.

## 2018-10-11 NOTE — ED ADULT NURSE NOTE - OBJECTIVE STATEMENT
58 y.o male brought in by ambulance for right great toe pain with no injury that began last night. hx of dm, htn, bipolar, took all of his daily medication today, no Si/HI. toe is warm, no pain on palpation no tenderness, no fevers/chills. patient was concerned about high sugar, finger stick repeated and within normal limits. vital signs stable, patient fully undressed and into gown, side rails up for safety. patient is a&Ox4. Patient undressed and placed into gown, call bell in hand and side rails up for safety. warm blanket provided, vital signs stable, pt in no acute distress.

## 2018-10-11 NOTE — ED PROVIDER NOTE - CARE PLAN
Principal Discharge DX:	Toe pain, right  Assessment and plan of treatment:	Rest. Apply cold pack for 20 minutes multiple times daily. Elevate.  Follow up with your Primary Care Physician within the next 2-3 days  Bring a copy of your test results with you to your appointment  Continue your current medication regimen  Return to the Emergency Room if you experience new or worsening symptoms  Take Motrin 400-600mg every 6 hrs as needed for pain. Take with food   Take Tylenol 650mg every 6 hrs as needed for pain.

## 2018-10-11 NOTE — ED PROVIDER NOTE - PMH
Bipolar 1 disorder    BPH (benign prostatic hyperplasia)    Diabetes    Hypertension    Kidney stone

## 2018-10-11 NOTE — ED ADULT NURSE NOTE - NSIMPLEMENTINTERV_GEN_ALL_ED
Implemented All Universal Safety Interventions:  [place] to call system. Call bell, personal items and telephone within reach. Instruct patient to call for assistance. Room bathroom lighting operational. Non-slip footwear when patient is off stretcher. Physically safe environment: no spills, clutter or unnecessary equipment. Stretcher in lowest position, wheels locked, appropriate side rails in place.

## 2018-10-11 NOTE — ED PROVIDER NOTE - MEDICAL DECISION MAKING DETAILS
Attending Neftali Saldivar DO: 57 yo male hx of bipolar, DM presents with right, 1st toe pain today. Pain improved now. Recently treated for cellulitis of this toe. Denies trauma. Follows with podiatrist and has follow up appointment. Also endorses elevated glucose 220 at home. No chest pain, sob, fevers, chills, ab pain. PE: well appearing, nad, MMM, Right toe, nontender, full rom, not red, no abrasions, ulcers or lacerations. A/P: unlikely infection or gout. unlikely fx. FS normal here. Will obtain xray and likely dc home.

## 2018-10-11 NOTE — ED PROVIDER NOTE - OBJECTIVE STATEMENT
59 y/o male hx of bipolar, htn. dm, recurrent cellulitis of the right leg who presents today for complaint of right great toe pain that he woke up with. states since waking the pain has resolved. no trauma or injury to the foot. no fevers. no redness extending up the foot. also notes his sugar was a bit higher than usual this morning at 220 and it is usually 190. has appt with endocrine in 2 weeks. takes oral agents only.

## 2018-10-12 ENCOUNTER — APPOINTMENT (OUTPATIENT)
Dept: NEUROLOGY | Facility: CLINIC | Age: 58
End: 2018-10-12
Payer: MEDICARE

## 2018-10-12 VITALS
BODY MASS INDEX: 45.47 KG/M2 | DIASTOLIC BLOOD PRESSURE: 69 MMHG | WEIGHT: 307 LBS | HEIGHT: 69 IN | HEART RATE: 73 BPM | SYSTOLIC BLOOD PRESSURE: 101 MMHG

## 2018-10-12 PROCEDURE — 99213 OFFICE O/P EST LOW 20 MIN: CPT

## 2018-10-15 ENCOUNTER — RX RENEWAL (OUTPATIENT)
Age: 58
End: 2018-10-15

## 2018-10-16 ENCOUNTER — OUTPATIENT (OUTPATIENT)
Dept: OUTPATIENT SERVICES | Facility: HOSPITAL | Age: 58
LOS: 1 days | End: 2018-10-16
Payer: MEDICARE

## 2018-10-16 PROCEDURE — D2394: CPT

## 2018-10-17 DIAGNOSIS — K02.9 DENTAL CARIES, UNSPECIFIED: ICD-10-CM

## 2018-10-19 ENCOUNTER — APPOINTMENT (OUTPATIENT)
Dept: SLEEP CENTER | Facility: CLINIC | Age: 58
End: 2018-10-19

## 2018-10-20 ENCOUNTER — MEDICATION RENEWAL (OUTPATIENT)
Age: 58
End: 2018-10-20

## 2018-10-21 ENCOUNTER — EMERGENCY (EMERGENCY)
Facility: HOSPITAL | Age: 58
LOS: 1 days | Discharge: HOME | End: 2018-10-21
Attending: EMERGENCY MEDICINE | Admitting: EMERGENCY MEDICINE
Payer: MEDICARE

## 2018-10-21 VITALS
TEMPERATURE: 99 F | WEIGHT: 307.1 LBS | SYSTOLIC BLOOD PRESSURE: 135 MMHG | DIASTOLIC BLOOD PRESSURE: 89 MMHG | RESPIRATION RATE: 18 BRPM | HEART RATE: 90 BPM | OXYGEN SATURATION: 100 %

## 2018-10-21 VITALS
OXYGEN SATURATION: 98 % | SYSTOLIC BLOOD PRESSURE: 141 MMHG | TEMPERATURE: 99 F | DIASTOLIC BLOOD PRESSURE: 85 MMHG | RESPIRATION RATE: 18 BRPM | HEART RATE: 86 BPM

## 2018-10-21 DIAGNOSIS — E11.65 TYPE 2 DIABETES MELLITUS WITH HYPERGLYCEMIA: ICD-10-CM

## 2018-10-21 LAB
ACETONE SERPL-MCNC: NEGATIVE — SIGNIFICANT CHANGE UP
ALBUMIN SERPL ELPH-MCNC: 3.4 G/DL — SIGNIFICANT CHANGE UP (ref 3.3–5)
ALP SERPL-CCNC: 88 U/L — SIGNIFICANT CHANGE UP (ref 40–120)
ALT FLD-CCNC: 40 U/L DA — SIGNIFICANT CHANGE UP (ref 10–45)
ANION GAP SERPL CALC-SCNC: 10 MMOL/L — SIGNIFICANT CHANGE UP (ref 5–17)
APPEARANCE UR: CLEAR — SIGNIFICANT CHANGE UP
AST SERPL-CCNC: 16 U/L — SIGNIFICANT CHANGE UP (ref 10–40)
B-OH-BUTYR SERPL-SCNC: 0.1 MMOL/L — SIGNIFICANT CHANGE UP
BACTERIA # UR AUTO: NEGATIVE /HPF — SIGNIFICANT CHANGE UP
BASOPHILS # BLD AUTO: 0 K/UL — SIGNIFICANT CHANGE UP (ref 0–0.2)
BASOPHILS NFR BLD AUTO: 0.6 % — SIGNIFICANT CHANGE UP (ref 0–2)
BILIRUB SERPL-MCNC: 0.2 MG/DL — SIGNIFICANT CHANGE UP (ref 0.2–1.2)
BILIRUB UR-MCNC: NEGATIVE — SIGNIFICANT CHANGE UP
BUN SERPL-MCNC: 25 MG/DL — HIGH (ref 7–23)
CALCIUM SERPL-MCNC: 8.9 MG/DL — SIGNIFICANT CHANGE UP (ref 8.4–10.5)
CHLORIDE SERPL-SCNC: 106 MMOL/L — SIGNIFICANT CHANGE UP (ref 96–108)
CO2 SERPL-SCNC: 24 MMOL/L — SIGNIFICANT CHANGE UP (ref 22–31)
COLOR SPEC: YELLOW — SIGNIFICANT CHANGE UP
CREAT SERPL-MCNC: 1.48 MG/DL — HIGH (ref 0.5–1.3)
DIFF PNL FLD: NEGATIVE — SIGNIFICANT CHANGE UP
EOSINOPHIL # BLD AUTO: 0.3 K/UL — SIGNIFICANT CHANGE UP (ref 0–0.5)
EOSINOPHIL NFR BLD AUTO: 3.7 % — SIGNIFICANT CHANGE UP (ref 0–6)
EPI CELLS # UR: SIGNIFICANT CHANGE UP
GLUCOSE BLDC GLUCOMTR-MCNC: 225 MG/DL — HIGH (ref 70–99)
GLUCOSE BLDC GLUCOMTR-MCNC: 267 MG/DL — HIGH (ref 70–99)
GLUCOSE BLDC GLUCOMTR-MCNC: 320 MG/DL — HIGH (ref 70–99)
GLUCOSE SERPL-MCNC: 341 MG/DL — HIGH (ref 70–99)
GLUCOSE UR QL: 1000 MG/DL
HCT VFR BLD CALC: 46.9 % — SIGNIFICANT CHANGE UP (ref 39–50)
HGB BLD-MCNC: 15 G/DL — SIGNIFICANT CHANGE UP (ref 13–17)
KETONES UR-MCNC: NEGATIVE — SIGNIFICANT CHANGE UP
LEUKOCYTE ESTERASE UR-ACNC: NEGATIVE — SIGNIFICANT CHANGE UP
LYMPHOCYTES # BLD AUTO: 2.1 K/UL — SIGNIFICANT CHANGE UP (ref 1–3.3)
LYMPHOCYTES # BLD AUTO: 28 % — SIGNIFICANT CHANGE UP (ref 13–44)
MCHC RBC-ENTMCNC: 28.8 PG — SIGNIFICANT CHANGE UP (ref 27–34)
MCHC RBC-ENTMCNC: 31.9 GM/DL — LOW (ref 32–36)
MCV RBC AUTO: 90.1 FL — SIGNIFICANT CHANGE UP (ref 80–100)
MONOCYTES # BLD AUTO: 0.7 K/UL — SIGNIFICANT CHANGE UP (ref 0–0.9)
MONOCYTES NFR BLD AUTO: 9.7 % — HIGH (ref 1–9)
NEUTROPHILS # BLD AUTO: 4.3 K/UL — SIGNIFICANT CHANGE UP (ref 1.8–7.4)
NEUTROPHILS NFR BLD AUTO: 57.9 % — SIGNIFICANT CHANGE UP (ref 43–77)
NITRITE UR-MCNC: NEGATIVE — SIGNIFICANT CHANGE UP
PH UR: 7 — SIGNIFICANT CHANGE UP (ref 5–8)
PLATELET # BLD AUTO: 172 K/UL — SIGNIFICANT CHANGE UP (ref 150–400)
POTASSIUM SERPL-MCNC: 4.2 MMOL/L — SIGNIFICANT CHANGE UP (ref 3.5–5.3)
POTASSIUM SERPL-SCNC: 4.2 MMOL/L — SIGNIFICANT CHANGE UP (ref 3.5–5.3)
PROT SERPL-MCNC: 7.2 G/DL — SIGNIFICANT CHANGE UP (ref 6–8.3)
PROT UR-MCNC: 15
RBC # BLD: 5.2 M/UL — SIGNIFICANT CHANGE UP (ref 4.2–5.8)
RBC # FLD: 13 % — SIGNIFICANT CHANGE UP (ref 10.3–14.5)
RBC CASTS # UR COMP ASSIST: NEGATIVE /HPF — SIGNIFICANT CHANGE UP (ref 0–4)
SODIUM SERPL-SCNC: 140 MMOL/L — SIGNIFICANT CHANGE UP (ref 135–145)
SP GR SPEC: 1 — LOW (ref 1.01–1.02)
UROBILINOGEN FLD QL: NEGATIVE — SIGNIFICANT CHANGE UP
WBC # BLD: 7.5 K/UL — SIGNIFICANT CHANGE UP (ref 3.8–10.5)
WBC # FLD AUTO: 7.5 K/UL — SIGNIFICANT CHANGE UP (ref 3.8–10.5)
WBC UR QL: NEGATIVE /HPF — SIGNIFICANT CHANGE UP (ref 0–5)

## 2018-10-21 PROCEDURE — 96360 HYDRATION IV INFUSION INIT: CPT

## 2018-10-21 PROCEDURE — 82009 KETONE BODYS QUAL: CPT

## 2018-10-21 PROCEDURE — 82962 GLUCOSE BLOOD TEST: CPT

## 2018-10-21 PROCEDURE — 80053 COMPREHEN METABOLIC PANEL: CPT

## 2018-10-21 PROCEDURE — 99283 EMERGENCY DEPT VISIT LOW MDM: CPT | Mod: 25

## 2018-10-21 PROCEDURE — 99284 EMERGENCY DEPT VISIT MOD MDM: CPT

## 2018-10-21 PROCEDURE — 82010 KETONE BODYS QUAN: CPT

## 2018-10-21 PROCEDURE — 85027 COMPLETE CBC AUTOMATED: CPT

## 2018-10-21 PROCEDURE — 81001 URINALYSIS AUTO W/SCOPE: CPT

## 2018-10-21 RX ORDER — SODIUM CHLORIDE 9 MG/ML
1000 INJECTION INTRAMUSCULAR; INTRAVENOUS; SUBCUTANEOUS ONCE
Qty: 0 | Refills: 0 | Status: COMPLETED | OUTPATIENT
Start: 2018-10-21 | End: 2018-10-21

## 2018-10-21 RX ORDER — KETOROLAC TROMETHAMINE 30 MG/ML
30 SYRINGE (ML) INJECTION ONCE
Qty: 0 | Refills: 0 | Status: DISCONTINUED | OUTPATIENT
Start: 2018-10-21 | End: 2018-10-21

## 2018-10-21 RX ADMIN — SODIUM CHLORIDE 1000 MILLILITER(S): 9 INJECTION INTRAMUSCULAR; INTRAVENOUS; SUBCUTANEOUS at 16:38

## 2018-10-21 RX ADMIN — SODIUM CHLORIDE 2000 MILLILITER(S): 9 INJECTION INTRAMUSCULAR; INTRAVENOUS; SUBCUTANEOUS at 16:38

## 2018-10-21 RX ADMIN — SODIUM CHLORIDE 1000 MILLILITER(S): 9 INJECTION INTRAMUSCULAR; INTRAVENOUS; SUBCUTANEOUS at 15:44

## 2018-10-21 NOTE — ED ADULT NURSE NOTE - NSIMPLEMENTINTERV_GEN_ALL_ED
Implemented All Universal Safety Interventions:  [place] to call system. Call bell, personal items and telephone within reach. Instruct patient to call for assistance. Room bathroom lighting operational. Non-slip footwear when patient is off stretcher. Physically safe environment: no spills, clutter or unnecessary equipment. Stretcher in lowest position, wheels locked, appropriate side rails in place.

## 2018-10-21 NOTE — ED PROVIDER NOTE - PROGRESS NOTE DETAILS
FS improved after 2liters of NS. Labs reviewed. Pt stable for discharge and to  medications at pharmacy and to follow up with pmd.

## 2018-10-21 NOTE — ED ADULT NURSE NOTE - OBJECTIVE STATEMENT
Patient reports his blood sugar was 350 so he came to the hospital. Patient reports no other symptoms. Patient can not afford diabetic medication at this time.

## 2018-10-21 NOTE — ED PROVIDER NOTE - NSFOLLOWUPINSTRUCTIONS_ED_ALL_ED_FT
Follow up with your pmd within 48 hours- show copies of ER results.   Drink plenty of fluids.   Continue taking previous prescribed medications.   Return to the ED if any worsening symptoms.

## 2018-10-21 NOTE — ED PROVIDER NOTE - OBJECTIVE STATEMENT
58 yr old male with hx of DM, HTN, bipolar presents c/o high blood sugar. Pt reports he has not been taking his DM medication ( Tragenta)  for 4 days because pt unable to afford it. Pt took FS at home which was 355. Pt reports increase urination  and thirst. Denies any abdominal pain, fever, chills, chest pain , sob, n/v/d or any other symptoms at this time.  Pt states medication is $3, pt unable to pay amount. Insurance ran out until 1st of the month.

## 2018-10-21 NOTE — ED PROVIDER NOTE - ATTENDING CONTRIBUTION TO CARE
Belinda with DESIREE Boyd. Patient with hyperglycemia; Exam normal. Checked feet. No findings. Ambulatory. States he cannot afford the Tradjenta (costs $3.70). He is accustomed to the pharmacy waiving it but they cannot do that this time. I called the Pharmacy [facility] on [address] and pharmacist said they are pending managerial/supervisor approval. However, they cannot provide free meds. He has been calling and doing this for years. Patient states his income is from Disability. He lives alone. He is eating well. He is able to get all of his other meds without issue. I performed a face to face bedside interview with patient regarding history of present illness, review of symptoms and past medical history. I completed an independent physical exam.  I have discussed the patient's plan of care with Physician Assistant (PA). I agree with note as stated above, having amended the EMR as needed to reflect my findings.   This includes History of Present Illness, HIV, Past Medical/Surgical/Family/Social History, Allergies and Home Medications, Review of Systems, Physical Exam, and any Progress Notes during the time I functioned as the attending physician for this patient.

## 2018-10-21 NOTE — ED ADULT NURSE REASSESSMENT NOTE - NS ED NURSE REASSESS COMMENT FT1
assumed pt care at this time from mechelle TORRES. pt resting comfortably in bed. made aware of plan. will continue to monitor.

## 2018-10-21 NOTE — ED PROVIDER NOTE - MEDICAL DECISION MAKING DETAILS
58 yr old male with hx of DM, HTN, bipolar presents c/o high blood sugar. Pt reports he has not been taking his DM medication ( Tragenta)  for 4 days because pt unable to afford it. Pt took FS at home which was 355. Pt reports increase urination  and thirst. Denies any abdominal pain, fever, chills, chest pain , sob, n/v/d or any other symptoms at this time.: will hydrate, check labs, call pharmacy- re jose eliasal

## 2018-10-21 NOTE — ED ADULT TRIAGE NOTE - CHIEF COMPLAINT QUOTE
Pt reports being a diabetic and without his medicine (tragenta) for 4 days because he cannot afford it. Pt c/o taking blood sugar at home and it was 355,  called 911 and now in ED.

## 2018-10-24 ENCOUNTER — APPOINTMENT (OUTPATIENT)
Dept: BARIATRICS/WEIGHT MGMT | Facility: CLINIC | Age: 58
End: 2018-10-24
Payer: MEDICARE

## 2018-10-24 VITALS
OXYGEN SATURATION: 97 % | DIASTOLIC BLOOD PRESSURE: 90 MMHG | SYSTOLIC BLOOD PRESSURE: 130 MMHG | HEART RATE: 97 BPM | BODY MASS INDEX: 45.47 KG/M2 | HEIGHT: 69 IN | WEIGHT: 307 LBS

## 2018-10-24 DIAGNOSIS — Z80.7 FAMILY HISTORY OF OTHER MALIGNANT NEOPLASMS OF LYMPHOID, HEMATOPOIETIC AND RELATED TISSUES: ICD-10-CM

## 2018-10-24 PROCEDURE — 94690 O2 UPTK REST INDIRECT: CPT

## 2018-10-24 PROCEDURE — 99205 OFFICE O/P NEW HI 60 MIN: CPT | Mod: 25

## 2018-10-26 ENCOUNTER — APPOINTMENT (OUTPATIENT)
Dept: ENDOCRINOLOGY | Facility: CLINIC | Age: 58
End: 2018-10-26
Payer: MEDICARE

## 2018-10-26 VITALS — WEIGHT: 307 LBS | HEIGHT: 69 IN | BODY MASS INDEX: 45.47 KG/M2

## 2018-10-26 VITALS — DIASTOLIC BLOOD PRESSURE: 80 MMHG | HEART RATE: 90 BPM | SYSTOLIC BLOOD PRESSURE: 130 MMHG | OXYGEN SATURATION: 95 %

## 2018-10-26 LAB
GLUCOSE BLDC GLUCOMTR-MCNC: 158
HBA1C MFR BLD HPLC: 6.7

## 2018-10-26 PROCEDURE — 99214 OFFICE O/P EST MOD 30 MIN: CPT | Mod: 25

## 2018-10-26 PROCEDURE — 82962 GLUCOSE BLOOD TEST: CPT

## 2018-10-26 PROCEDURE — 83036 HEMOGLOBIN GLYCOSYLATED A1C: CPT | Mod: QW

## 2018-10-29 ENCOUNTER — APPOINTMENT (OUTPATIENT)
Dept: ORTHOPEDIC SURGERY | Facility: CLINIC | Age: 58
End: 2018-10-29
Payer: MEDICARE

## 2018-10-29 VITALS — WEIGHT: 311 LBS | BODY MASS INDEX: 43.54 KG/M2 | HEIGHT: 71 IN

## 2018-10-29 PROCEDURE — 73562 X-RAY EXAM OF KNEE 3: CPT | Mod: LT

## 2018-10-29 PROCEDURE — 99214 OFFICE O/P EST MOD 30 MIN: CPT

## 2018-11-02 ENCOUNTER — NON-APPOINTMENT (OUTPATIENT)
Age: 58
End: 2018-11-02

## 2018-11-02 ENCOUNTER — APPOINTMENT (OUTPATIENT)
Dept: CARDIOLOGY | Facility: CLINIC | Age: 58
End: 2018-11-02
Payer: MEDICARE

## 2018-11-02 VITALS
BODY MASS INDEX: 42.98 KG/M2 | SYSTOLIC BLOOD PRESSURE: 124 MMHG | HEIGHT: 71 IN | HEART RATE: 95 BPM | DIASTOLIC BLOOD PRESSURE: 83 MMHG | OXYGEN SATURATION: 96 % | WEIGHT: 307 LBS

## 2018-11-02 DIAGNOSIS — I87.2 VENOUS INSUFFICIENCY (CHRONIC) (PERIPHERAL): ICD-10-CM

## 2018-11-02 PROCEDURE — 93000 ELECTROCARDIOGRAM COMPLETE: CPT

## 2018-11-02 PROCEDURE — 99214 OFFICE O/P EST MOD 30 MIN: CPT

## 2018-11-02 NOTE — HISTORY OF PRESENT ILLNESS
[FreeTextEntry1] : 58 year-old gentleman with known cardiovascular risk factors including hypertension, well controlled non-insulin dependent diabetes, dyslipidemia, obesity, CHAYITO, bipolar disorder on Lithium and Geodon, lives as a full time resident of the [facility]. He presents today in his usual state of health, having lost approximately 30 lbs over the past year. He had a repeat sleep study performed 8/12/2016 which showed severe CHAYITO.\par \par Psychiatrist at [facility] in [place] who manages his Lithium and his antipsychotic medication (second generation, Geodon).\par \par Sleep Medicine: Angelika Todd MD [phone]\par Endocrinologist: Rocío Bañuelos MD [phone]

## 2018-11-02 NOTE — DISCUSSION/SUMMARY
[Patient] : the patient [Risks] : risks [Benefits] : benefits [Alternatives] : alternatives [___ Month(s)] : [unfilled] month(s) [With Me] : with me [FreeTextEntry1] : Mr. Pruitt has multiple cardiac risk factors, as above, and presents today for follow-up. \par \par We discussed the importance of compliance with both psychiatric and cardiac medications, as well as compliance with the CPAP machine that he will be getting again after his recent sleep study demonstrated severe CHAYITO. Patient counseled regarding exercise, diet, and weight loss. No change in cardiac regimen today. Follow-up with PMD, endocrinologist, podiatrist, and sleep medicine. \par \par Issue of relationship between CHAYITO and hypertension, weight gain, and risk factors also reinforced and compliance in this regard discussed as well.\par \par As patient is hoping to start exercise regimen, will check echocardiogram to evaluate LV function and right heart pressures and will check exercise stress test to assess for ischemia.

## 2018-11-02 NOTE — REASON FOR VISIT
[Follow-Up - Clinic] : a clinic follow-up of [Medication Management] : Medication management [FreeTextEntry1] : He was formerly followed by a cardiologist in [place], Geovanny Elizabeth MD.

## 2018-11-02 NOTE — PHYSICAL EXAM
[General Appearance - Well Developed] : well developed [Normal Appearance] : normal appearance [Well Groomed] : well groomed [General Appearance - Well Nourished] : well nourished [No Deformities] : no deformities [General Appearance - In No Acute Distress] : no acute distress [Normal Oral Mucosa] : normal oral mucosa [No Oral Pallor] : no oral pallor [No Oral Cyanosis] : no oral cyanosis [Respiration, Rhythm And Depth] : normal respiratory rhythm and effort [Exaggerated Use Of Accessory Muscles For Inspiration] : no accessory muscle use [Auscultation Breath Sounds / Voice Sounds] : lungs were clear to auscultation bilaterally [Abdomen Soft] : soft [Abdomen Tenderness] : non-tender [Abdomen Mass (___ Cm)] : no abdominal mass palpated [FreeTextEntry1] : morbidly obese [Abnormal Walk] : normal gait [Gait - Sufficient For Exercise Testing] : the gait was sufficient for exercise testing [Nail Clubbing] : no clubbing of the fingernails [Cyanosis, Localized] : no localized cyanosis [Petechial Hemorrhages (___cm)] : no petechial hemorrhages [] : no ischemic changes [Skin Color & Pigmentation] : normal skin color and pigmentation [Oriented To Time, Place, And Person] : oriented to person, place, and time [Not Palpable] : not palpable [No Precordial Heave] : no precordial heave was noted [Normal Rate] : normal [Rhythm Regular] : regular [Normal S1] : normal S1 [Normal S2] : normal S2 [No Gallop] : no gallop heard [No Murmur] : no murmurs heard [2+] : left 2+ [Right Carotid Bruit] : no bruit heard over the right carotid [Left Carotid Bruit] : no bruit heard over the left carotid [1+] : left 1+ [No Pitting Edema] : no pitting edema present [Conjunctiva] : the conjunctiva were normal in both eyes [PERRL] : pupils were equal in size, round, and reactive to light [EOM Intact] : extraocular movements were intact [Strabismus] : strabismus was noted bilaterally [Yellow Sclera (Icteric)] : no scleral icterus was seen [Normal Mental Status] : the patient was oriented to person, place and time [Person] : oriented to person [Place] : oriented to place [Time] : oriented to time [Short Term Intact] : short term memory intact [Remote Intact] : remote memory intact [Span Intact] : the attention span was normal [Concentration Intact] : normal concentrating ability [Visual Intact] : visual attention was ~T not ~L decreased [Appropriate] : appropriate [Flat] : flat [Normal] : the cranial nerves were intact

## 2018-11-02 NOTE — REVIEW OF SYSTEMS
[Recent Weight Loss (___ Lbs)] : recent [unfilled] ~Ulb weight loss [Shortness Of Breath] : no shortness of breath [Dyspnea on exertion] : not dyspnea during exertion [Chest  Pressure] : no chest pressure [Chest Pain] : no chest pain [Lower Ext Edema] : lower extremity edema [Palpitations] : no palpitations [see HPI] : see HPI [Joint Pain] : no joint pain [Skin: A Rash] : rash: [Tremor] : a tremor was seen [Negative] : Heme/Lymph

## 2018-11-03 ENCOUNTER — RX RENEWAL (OUTPATIENT)
Age: 58
End: 2018-11-03

## 2018-11-05 ENCOUNTER — APPOINTMENT (OUTPATIENT)
Dept: PULMONOLOGY | Facility: CLINIC | Age: 58
End: 2018-11-05

## 2018-11-09 ENCOUNTER — OUTPATIENT (OUTPATIENT)
Dept: OUTPATIENT SERVICES | Facility: HOSPITAL | Age: 58
LOS: 1 days | End: 2018-11-09

## 2018-11-09 DIAGNOSIS — K08.9 DISORDER OF TEETH AND SUPPORTING STRUCTURES, UNSPECIFIED: ICD-10-CM

## 2018-11-11 ENCOUNTER — EMERGENCY (EMERGENCY)
Facility: HOSPITAL | Age: 58
LOS: 1 days | Discharge: HOME | End: 2018-11-11
Attending: EMERGENCY MEDICINE | Admitting: EMERGENCY MEDICINE
Payer: MEDICARE

## 2018-11-11 VITALS
DIASTOLIC BLOOD PRESSURE: 85 MMHG | HEIGHT: 71 IN | RESPIRATION RATE: 18 BRPM | WEIGHT: 311.07 LBS | SYSTOLIC BLOOD PRESSURE: 132 MMHG | HEART RATE: 85 BPM | OXYGEN SATURATION: 98 % | TEMPERATURE: 99 F

## 2018-11-11 VITALS
SYSTOLIC BLOOD PRESSURE: 120 MMHG | HEART RATE: 80 BPM | DIASTOLIC BLOOD PRESSURE: 75 MMHG | RESPIRATION RATE: 18 BRPM | TEMPERATURE: 98 F | OXYGEN SATURATION: 98 %

## 2018-11-11 PROCEDURE — 99283 EMERGENCY DEPT VISIT LOW MDM: CPT

## 2018-11-11 PROCEDURE — 82962 GLUCOSE BLOOD TEST: CPT

## 2018-11-11 PROCEDURE — 99285 EMERGENCY DEPT VISIT HI MDM: CPT

## 2018-11-11 NOTE — ED ADULT NURSE NOTE - NSIMPLEMENTINTERV_GEN_ALL_ED
Implemented All Universal Safety Interventions:  [place] to call system. Call bell, personal items and telephone within reach. Instruct patient to call for assistance. Room bathroom lighting operational. Non-slip footwear when patient is off stretcher. Physically safe environment: no spills, clutter or unnecessary equipment. Stretcher in lowest position, wheels locked, appropriate side rails in place.

## 2018-11-11 NOTE — ED PROVIDER NOTE - OBJECTIVE STATEMENT
57 yo male with ho bipolar and ho dm biba from home for nasal congestion and generally not feeling well for 1 week. no n/v/d, no chest pain or cough

## 2018-11-15 ENCOUNTER — EMERGENCY (EMERGENCY)
Facility: HOSPITAL | Age: 58
LOS: 1 days | Discharge: HOME | End: 2018-11-15
Attending: EMERGENCY MEDICINE | Admitting: EMERGENCY MEDICINE
Payer: MEDICARE

## 2018-11-15 VITALS
TEMPERATURE: 99 F | HEART RATE: 97 BPM | SYSTOLIC BLOOD PRESSURE: 160 MMHG | DIASTOLIC BLOOD PRESSURE: 90 MMHG | WEIGHT: 311.07 LBS | OXYGEN SATURATION: 99 % | RESPIRATION RATE: 18 BRPM | HEIGHT: 71 IN

## 2018-11-15 PROCEDURE — 99284 EMERGENCY DEPT VISIT MOD MDM: CPT

## 2018-11-15 RX ORDER — ZIPRASIDONE HYDROCHLORIDE 20 MG/1
20 CAPSULE ORAL ONCE
Qty: 0 | Refills: 0 | Status: COMPLETED | OUTPATIENT
Start: 2018-11-15 | End: 2018-11-15

## 2018-11-15 RX ORDER — ZIPRASIDONE HYDROCHLORIDE 20 MG/1
90 CAPSULE ORAL ONCE
Qty: 0 | Refills: 0 | Status: DISCONTINUED | OUTPATIENT
Start: 2018-11-15 | End: 2018-11-15

## 2018-11-15 NOTE — ED PROVIDER NOTE - OBJECTIVE STATEMENT
pt states that he realized tonight that he ran out of his Geodon 90mg that he takes daily.  he states that he can get his script refilled tomorrow but did not want to miss a dose because he has been doing so well and has not had any manic episodes for months now.  pt denies any other symptoms, here only for Geodon.

## 2018-11-16 PROCEDURE — 96372 THER/PROPH/DIAG INJ SC/IM: CPT

## 2018-11-16 PROCEDURE — 99283 EMERGENCY DEPT VISIT LOW MDM: CPT | Mod: 25

## 2018-11-16 RX ADMIN — ZIPRASIDONE HYDROCHLORIDE 20 MILLIGRAM(S): 20 CAPSULE ORAL at 00:13

## 2018-11-19 ENCOUNTER — APPOINTMENT (OUTPATIENT)
Dept: PULMONOLOGY | Facility: CLINIC | Age: 58
End: 2018-11-19
Payer: MEDICARE

## 2018-11-19 VITALS
BODY MASS INDEX: 43.26 KG/M2 | OXYGEN SATURATION: 96 % | TEMPERATURE: 98 F | RESPIRATION RATE: 16 BRPM | DIASTOLIC BLOOD PRESSURE: 80 MMHG | WEIGHT: 309 LBS | SYSTOLIC BLOOD PRESSURE: 130 MMHG | HEIGHT: 71 IN | HEART RATE: 96 BPM

## 2018-11-19 PROCEDURE — 99214 OFFICE O/P EST MOD 30 MIN: CPT

## 2018-11-19 RX ORDER — CHOLECALCIFEROL (VITAMIN D3) 125 MCG
TABLET ORAL
Refills: 0 | Status: ACTIVE | COMMUNITY

## 2018-11-19 RX ORDER — CHLORPHENIRAMINE MALEATE 4 MG/1
4 TABLET ORAL
Qty: 60 | Refills: 0 | Status: DISCONTINUED | COMMUNITY
Start: 2017-06-13 | End: 2018-11-19

## 2018-11-21 ENCOUNTER — OTHER (OUTPATIENT)
Age: 58
End: 2018-11-21

## 2018-11-21 ENCOUNTER — APPOINTMENT (OUTPATIENT)
Dept: BARIATRICS/WEIGHT MGMT | Facility: CLINIC | Age: 58
End: 2018-11-21

## 2018-11-26 ENCOUNTER — OUTPATIENT (OUTPATIENT)
Dept: OUTPATIENT SERVICES | Facility: HOSPITAL | Age: 58
LOS: 1 days | End: 2018-11-26
Payer: MEDICARE

## 2018-11-26 VITALS
HEIGHT: 71 IN | SYSTOLIC BLOOD PRESSURE: 122 MMHG | RESPIRATION RATE: 15 BRPM | TEMPERATURE: 98 F | OXYGEN SATURATION: 96 % | HEART RATE: 76 BPM | WEIGHT: 300.93 LBS | DIASTOLIC BLOOD PRESSURE: 83 MMHG

## 2018-11-26 DIAGNOSIS — I10 ESSENTIAL (PRIMARY) HYPERTENSION: ICD-10-CM

## 2018-11-26 DIAGNOSIS — Z98.890 OTHER SPECIFIED POSTPROCEDURAL STATES: Chronic | ICD-10-CM

## 2018-11-26 DIAGNOSIS — M17.12 UNILATERAL PRIMARY OSTEOARTHRITIS, LEFT KNEE: ICD-10-CM

## 2018-11-26 DIAGNOSIS — Z29.9 ENCOUNTER FOR PROPHYLACTIC MEASURES, UNSPECIFIED: ICD-10-CM

## 2018-11-26 DIAGNOSIS — G47.33 OBSTRUCTIVE SLEEP APNEA (ADULT) (PEDIATRIC): ICD-10-CM

## 2018-11-26 DIAGNOSIS — Z01.818 ENCOUNTER FOR OTHER PREPROCEDURAL EXAMINATION: ICD-10-CM

## 2018-11-26 DIAGNOSIS — E66.9 OBESITY, UNSPECIFIED: ICD-10-CM

## 2018-11-26 DIAGNOSIS — E11.9 TYPE 2 DIABETES MELLITUS WITHOUT COMPLICATIONS: ICD-10-CM

## 2018-11-26 LAB
ANION GAP SERPL CALC-SCNC: 13 MMOL/L — SIGNIFICANT CHANGE UP (ref 5–17)
BLD GP AB SCN SERPL QL: NEGATIVE — SIGNIFICANT CHANGE UP
BUN SERPL-MCNC: 27 MG/DL — HIGH (ref 7–23)
CALCIUM SERPL-MCNC: 9.7 MG/DL — SIGNIFICANT CHANGE UP (ref 8.4–10.5)
CHLORIDE SERPL-SCNC: 102 MMOL/L — SIGNIFICANT CHANGE UP (ref 96–108)
CO2 SERPL-SCNC: 21 MMOL/L — LOW (ref 22–31)
CREAT SERPL-MCNC: 1.28 MG/DL — SIGNIFICANT CHANGE UP (ref 0.5–1.3)
GLUCOSE SERPL-MCNC: 126 MG/DL — HIGH (ref 70–99)
HBA1C BLD-MCNC: 6.8 % — HIGH (ref 4–5.6)
HCT VFR BLD CALC: 44.2 % — SIGNIFICANT CHANGE UP (ref 39–50)
HGB BLD-MCNC: 15 G/DL — SIGNIFICANT CHANGE UP (ref 13–17)
MCHC RBC-ENTMCNC: 29.6 PG — SIGNIFICANT CHANGE UP (ref 27–34)
MCHC RBC-ENTMCNC: 33.9 GM/DL — SIGNIFICANT CHANGE UP (ref 32–36)
MCV RBC AUTO: 87.4 FL — SIGNIFICANT CHANGE UP (ref 80–100)
MRSA PCR RESULT.: SIGNIFICANT CHANGE UP
PLATELET # BLD AUTO: 184 K/UL — SIGNIFICANT CHANGE UP (ref 150–400)
POTASSIUM SERPL-MCNC: 3.9 MMOL/L — SIGNIFICANT CHANGE UP (ref 3.5–5.3)
POTASSIUM SERPL-SCNC: 3.9 MMOL/L — SIGNIFICANT CHANGE UP (ref 3.5–5.3)
RBC # BLD: 5.06 M/UL — SIGNIFICANT CHANGE UP (ref 4.2–5.8)
RBC # FLD: 13.9 % — SIGNIFICANT CHANGE UP (ref 10.3–14.5)
RH IG SCN BLD-IMP: POSITIVE — SIGNIFICANT CHANGE UP
S AUREUS DNA NOSE QL NAA+PROBE: SIGNIFICANT CHANGE UP
SODIUM SERPL-SCNC: 136 MMOL/L — SIGNIFICANT CHANGE UP (ref 135–145)
WBC # BLD: 8.98 K/UL — SIGNIFICANT CHANGE UP (ref 3.8–10.5)
WBC # FLD AUTO: 8.98 K/UL — SIGNIFICANT CHANGE UP (ref 3.8–10.5)

## 2018-11-26 PROCEDURE — 80048 BASIC METABOLIC PNL TOTAL CA: CPT

## 2018-11-26 PROCEDURE — 83036 HEMOGLOBIN GLYCOSYLATED A1C: CPT

## 2018-11-26 PROCEDURE — G0463: CPT

## 2018-11-26 PROCEDURE — 87640 STAPH A DNA AMP PROBE: CPT

## 2018-11-26 PROCEDURE — 85027 COMPLETE CBC AUTOMATED: CPT

## 2018-11-26 PROCEDURE — 86900 BLOOD TYPING SEROLOGIC ABO: CPT

## 2018-11-26 PROCEDURE — 86901 BLOOD TYPING SEROLOGIC RH(D): CPT

## 2018-11-26 PROCEDURE — 86850 RBC ANTIBODY SCREEN: CPT

## 2018-11-26 PROCEDURE — 87641 MR-STAPH DNA AMP PROBE: CPT

## 2018-11-26 RX ORDER — METOPROLOL TARTRATE 50 MG
1 TABLET ORAL
Qty: 0 | Refills: 0 | COMMUNITY

## 2018-11-26 NOTE — H&P PST ADULT - ACTIVITY
ADLs, climbs steps daily, grocery shopping, able to do moderate work around house, limited due to knee pain

## 2018-11-26 NOTE — H&P PST ADULT - CONTACT INFO FOR SLEEP STUDY
[facility] Hollie Ferguson

## 2018-11-26 NOTE — H&P PST ADULT - HISTORY OF PRESENT ILLNESS
57 y/o male with PMHx of HTN, HLD, CHAYITO (non-compliant with CPAP), T2DM, Bipolar disorder (on Lithium & Geodon) and obesity c/o worsening left knee pain. Reports trying physical therapy, medication and also had an injection with mild relief. s/p diagnostic imaging revealed advanced bone-on-bone arthritis of left knee. Evaluated by Dr. Kevin. Presents to [facility] for a scheduled LEFT total knee replacement on 12/11/2018.

## 2018-11-26 NOTE — H&P PST ADULT - PROBLEM SELECTOR PLAN 4
Instructed pt to hold Repaglinide and Tradjenta DOS, last dose 12/10 am.  HgA1c sent at [facility]  FS DOS

## 2018-11-26 NOTE — H&P PST ADULT - PROBLEM SELECTOR PLAN 6
The Caprini score indicates that this patient is at high risk for a VTE event (score = 6).    Surgical patients in this group will benefit from both pharmacologic prophylaxis and intermittent compression devices.  The surgical team will determine the balance between VTE risk and bleeding risk, and other clinical considerations.

## 2018-11-26 NOTE — H&P PST ADULT - PROBLEM SELECTOR PLAN 1
Scheduled LEFT total knee replacement on 12/11/2018  Lab work sent at PST  Pt to c/w low-dose aspirin  Pre-op instructions given to pt, including chlorhexidine soap

## 2018-11-26 NOTE — H&P PST ADULT - ASSESSMENT
CAPRINI SCORE [CLOT]    AGE RELATED RISK FACTORS                                                       MOBILITY RELATED FACTORS  [x] Age 41-60 years                                            (1 Point)                  [ ] Bed rest                                                        (1 Point)  [ ] Age: 61-74 years                                           (2 Points)                 [ ] Plaster cast                                                   (2 Points)  [ ] Age= 75 years                                              (3 Points)                 [ ] Bed bound for more than 72 hours                 (2 Points)    DISEASE RELATED RISK FACTORS                                               GENDER SPECIFIC FACTORS  [ ] Edema in the lower extremities                       (1 Point)                  [ ] Pregnancy                                                     (1 Point)  [ ] Varicose veins                                               (1 Point)                  [ ] Post-partum < 6 weeks                                   (1 Point)             [x] BMI > 25 Kg/m2                                            (1 Point)                  [ ] Hormonal therapy  or oral contraception          (1 Point)                 [ ] Sepsis (in the previous month)                        (1 Point)                  [ ] History of pregnancy complications                 (1 point)  [ ] Pneumonia or serious lung disease                                               [ ] Unexplained or recurrent                     (1 Point)           (in the previous month)                               (1 Point)  [ ] Abnormal pulmonary function test                     (1 Point)                 SURGERY RELATED RISK FACTORS  [ ] Acute myocardial infarction                              (1 Point)                 [ ]  Section                                             (1 Point)  [ ] Congestive heart failure (in the previous month)  (1 Point)               [ ] Minor surgery                                                  (1 Point)   [ ] Inflammatory bowel disease                             (1 Point)                 [ ] Arthroscopic surgery                                        (2 Points)  [ ] Central venous access                                      (2 Points)                [ ] General surgery lasting more than 45 minutes   (2 Points)       [ ] Stroke (in the previous month)                          (5 Points)               [x] Elective arthroplasty                                         (5 Points)                                                                                                                                               HEMATOLOGY RELATED FACTORS                                                 TRAUMA RELATED RISK FACTORS  [ ] Prior episodes of VTE                                     (3 Points)                 [ ] Fracture of the hip, pelvis, or leg                       (5 Points)  [ ] Positive family history for VTE                         (3 Points)                 [ ] Acute spinal cord injury (in the previous month)  (5 Points)  [ ] Prothrombin 70847 A                                     (3 Points)                 [ ] Paralysis  (less than 1 month)                             (5 Points)  [ ] Factor V Leiden                                             (3 Points)                  [ ] Multiple Trauma within 1 month                        (5 Points)  [ ] Lupus anticoagulants                                     (3 Points)                                                           [ ] Anticardiolipin antibodies                               (3 Points)                                                       [ ] High homocysteine in the blood                      (3 Points)                                             [ ] Other congenital or acquired thrombophilia      (3 Points)                                                [ ] Heparin induced thrombocytopenia                  (3 Points)                                          Total Score [    7      ]

## 2018-11-26 NOTE — H&P PST ADULT - PMH
Bipolar 1 disorder    BPH (benign prostatic hyperplasia)    Diabetic neuropathy  bilateral feet, ankles  HLD (hyperlipidemia)    Hypertension    CHAYITO (obstructive sleep apnea)  diagnosed >25 years ago, non-compliant with CPAP  T2DM (type 2 diabetes mellitus) Bipolar 1 disorder  on Lithium  Diabetic neuropathy  bilateral feet, ankles  HLD (hyperlipidemia)    Hypertension    Obesity    CHAYITO (obstructive sleep apnea)  diagnosed >25 years ago, non-compliant with CPAP  Primary osteoarthritis of left knee    T2DM (type 2 diabetes mellitus)

## 2018-11-28 PROBLEM — E11.40 TYPE 2 DIABETES MELLITUS WITH DIABETIC NEUROPATHY, UNSPECIFIED: Chronic | Status: ACTIVE | Noted: 2018-11-26

## 2018-11-28 PROBLEM — E78.5 HYPERLIPIDEMIA, UNSPECIFIED: Chronic | Status: ACTIVE | Noted: 2018-11-26

## 2018-11-28 PROBLEM — E66.9 OBESITY, UNSPECIFIED: Chronic | Status: ACTIVE | Noted: 2018-11-26

## 2018-11-28 PROBLEM — E11.9 TYPE 2 DIABETES MELLITUS WITHOUT COMPLICATIONS: Chronic | Status: ACTIVE | Noted: 2018-11-26

## 2018-11-28 PROBLEM — G47.33 OBSTRUCTIVE SLEEP APNEA (ADULT) (PEDIATRIC): Chronic | Status: ACTIVE | Noted: 2018-11-26

## 2018-12-10 ENCOUNTER — EMERGENCY (EMERGENCY)
Facility: HOSPITAL | Age: 58
LOS: 1 days | Discharge: HOME | End: 2018-12-10
Attending: EMERGENCY MEDICINE | Admitting: EMERGENCY MEDICINE
Payer: MEDICARE

## 2018-12-10 VITALS
OXYGEN SATURATION: 96 % | RESPIRATION RATE: 18 BRPM | SYSTOLIC BLOOD PRESSURE: 118 MMHG | HEART RATE: 98 BPM | DIASTOLIC BLOOD PRESSURE: 80 MMHG | TEMPERATURE: 100 F

## 2018-12-10 VITALS
SYSTOLIC BLOOD PRESSURE: 138 MMHG | HEART RATE: 111 BPM | RESPIRATION RATE: 18 BRPM | TEMPERATURE: 100 F | DIASTOLIC BLOOD PRESSURE: 96 MMHG | HEIGHT: 71 IN | OXYGEN SATURATION: 96 % | WEIGHT: 311.07 LBS

## 2018-12-10 DIAGNOSIS — Z98.890 OTHER SPECIFIED POSTPROCEDURAL STATES: Chronic | ICD-10-CM

## 2018-12-10 LAB
ALBUMIN SERPL ELPH-MCNC: 3.5 G/DL — SIGNIFICANT CHANGE UP (ref 3.3–5)
ALP SERPL-CCNC: 86 U/L — SIGNIFICANT CHANGE UP (ref 40–120)
ALT FLD-CCNC: 43 U/L DA — SIGNIFICANT CHANGE UP (ref 10–45)
ANION GAP SERPL CALC-SCNC: 12 MMOL/L — SIGNIFICANT CHANGE UP (ref 5–17)
APPEARANCE UR: CLEAR — SIGNIFICANT CHANGE UP
APTT BLD: 32.6 SEC — SIGNIFICANT CHANGE UP (ref 27.5–36.3)
AST SERPL-CCNC: 20 U/L — SIGNIFICANT CHANGE UP (ref 10–40)
BASOPHILS # BLD AUTO: 0 K/UL — SIGNIFICANT CHANGE UP (ref 0–0.2)
BASOPHILS NFR BLD AUTO: 0.5 % — SIGNIFICANT CHANGE UP (ref 0–2)
BILIRUB SERPL-MCNC: 0.5 MG/DL — SIGNIFICANT CHANGE UP (ref 0.2–1.2)
BILIRUB UR-MCNC: NEGATIVE — SIGNIFICANT CHANGE UP
BUN SERPL-MCNC: 31 MG/DL — HIGH (ref 7–23)
CALCIUM SERPL-MCNC: 9.2 MG/DL — SIGNIFICANT CHANGE UP (ref 8.4–10.5)
CHLORIDE SERPL-SCNC: 100 MMOL/L — SIGNIFICANT CHANGE UP (ref 96–108)
CO2 SERPL-SCNC: 23 MMOL/L — SIGNIFICANT CHANGE UP (ref 22–31)
COLOR SPEC: YELLOW — SIGNIFICANT CHANGE UP
CREAT SERPL-MCNC: 1.64 MG/DL — HIGH (ref 0.5–1.3)
DIFF PNL FLD: NEGATIVE — SIGNIFICANT CHANGE UP
EOSINOPHIL # BLD AUTO: 0.1 K/UL — SIGNIFICANT CHANGE UP (ref 0–0.5)
EOSINOPHIL NFR BLD AUTO: 1.6 % — SIGNIFICANT CHANGE UP (ref 0–6)
GLUCOSE SERPL-MCNC: 179 MG/DL — HIGH (ref 70–99)
GLUCOSE UR QL: NEGATIVE — SIGNIFICANT CHANGE UP
HCT VFR BLD CALC: 48.6 % — SIGNIFICANT CHANGE UP (ref 39–50)
HGB BLD-MCNC: 15.8 G/DL — SIGNIFICANT CHANGE UP (ref 13–17)
INR BLD: 1.16 RATIO — SIGNIFICANT CHANGE UP (ref 0.88–1.16)
KETONES UR-MCNC: NEGATIVE — SIGNIFICANT CHANGE UP
LACTATE SERPL-SCNC: 1.7 MMOL/L — SIGNIFICANT CHANGE UP (ref 0.7–2)
LEUKOCYTE ESTERASE UR-ACNC: NEGATIVE — SIGNIFICANT CHANGE UP
LYMPHOCYTES # BLD AUTO: 0.5 K/UL — LOW (ref 1–3.3)
LYMPHOCYTES # BLD AUTO: 6.8 % — LOW (ref 13–44)
MCHC RBC-ENTMCNC: 29 PG — SIGNIFICANT CHANGE UP (ref 27–34)
MCHC RBC-ENTMCNC: 32.5 GM/DL — SIGNIFICANT CHANGE UP (ref 32–36)
MCV RBC AUTO: 89.2 FL — SIGNIFICANT CHANGE UP (ref 80–100)
MONOCYTES # BLD AUTO: 0.6 K/UL — SIGNIFICANT CHANGE UP (ref 0–0.9)
MONOCYTES NFR BLD AUTO: 8.8 % — SIGNIFICANT CHANGE UP (ref 1–9)
NEUTROPHILS # BLD AUTO: 5.9 K/UL — SIGNIFICANT CHANGE UP (ref 1.8–7.4)
NEUTROPHILS NFR BLD AUTO: 82.2 % — HIGH (ref 43–77)
NITRITE UR-MCNC: NEGATIVE — SIGNIFICANT CHANGE UP
PH UR: 7 — SIGNIFICANT CHANGE UP (ref 5–8)
PLATELET # BLD AUTO: 160 K/UL — SIGNIFICANT CHANGE UP (ref 150–400)
POTASSIUM SERPL-MCNC: 4.1 MMOL/L — SIGNIFICANT CHANGE UP (ref 3.5–5.3)
POTASSIUM SERPL-SCNC: 4.1 MMOL/L — SIGNIFICANT CHANGE UP (ref 3.5–5.3)
PROT SERPL-MCNC: 7.7 G/DL — SIGNIFICANT CHANGE UP (ref 6–8.3)
PROT UR-MCNC: NEGATIVE — SIGNIFICANT CHANGE UP
PROTHROM AB SERPL-ACNC: 13.1 SEC — HIGH (ref 10–12.9)
RBC # BLD: 5.45 M/UL — SIGNIFICANT CHANGE UP (ref 4.2–5.8)
RBC # FLD: 12.5 % — SIGNIFICANT CHANGE UP (ref 10.3–14.5)
SODIUM SERPL-SCNC: 135 MMOL/L — SIGNIFICANT CHANGE UP (ref 135–145)
SP GR SPEC: 1 — LOW (ref 1.01–1.02)
UROBILINOGEN FLD QL: NEGATIVE — SIGNIFICANT CHANGE UP
WBC # BLD: 7.2 K/UL — SIGNIFICANT CHANGE UP (ref 3.8–10.5)
WBC # FLD AUTO: 7.2 K/UL — SIGNIFICANT CHANGE UP (ref 3.8–10.5)

## 2018-12-10 PROCEDURE — 87633 RESP VIRUS 12-25 TARGETS: CPT

## 2018-12-10 PROCEDURE — 99284 EMERGENCY DEPT VISIT MOD MDM: CPT | Mod: 25

## 2018-12-10 PROCEDURE — 96374 THER/PROPH/DIAG INJ IV PUSH: CPT

## 2018-12-10 PROCEDURE — 93010 ELECTROCARDIOGRAM REPORT: CPT

## 2018-12-10 PROCEDURE — 83605 ASSAY OF LACTIC ACID: CPT

## 2018-12-10 PROCEDURE — 87040 BLOOD CULTURE FOR BACTERIA: CPT

## 2018-12-10 PROCEDURE — 87581 M.PNEUMON DNA AMP PROBE: CPT

## 2018-12-10 PROCEDURE — 80053 COMPREHEN METABOLIC PANEL: CPT

## 2018-12-10 PROCEDURE — 85730 THROMBOPLASTIN TIME PARTIAL: CPT

## 2018-12-10 PROCEDURE — 93005 ELECTROCARDIOGRAM TRACING: CPT

## 2018-12-10 PROCEDURE — 85027 COMPLETE CBC AUTOMATED: CPT

## 2018-12-10 PROCEDURE — 87486 CHLMYD PNEUM DNA AMP PROBE: CPT

## 2018-12-10 PROCEDURE — 71045 X-RAY EXAM CHEST 1 VIEW: CPT | Mod: 26

## 2018-12-10 PROCEDURE — 87086 URINE CULTURE/COLONY COUNT: CPT

## 2018-12-10 PROCEDURE — 85610 PROTHROMBIN TIME: CPT

## 2018-12-10 PROCEDURE — 71045 X-RAY EXAM CHEST 1 VIEW: CPT

## 2018-12-10 PROCEDURE — 87400 INFLUENZA A/B EACH AG IA: CPT

## 2018-12-10 PROCEDURE — 99284 EMERGENCY DEPT VISIT MOD MDM: CPT

## 2018-12-10 RX ORDER — SODIUM CHLORIDE 9 MG/ML
2300 INJECTION INTRAMUSCULAR; INTRAVENOUS; SUBCUTANEOUS ONCE
Qty: 0 | Refills: 0 | Status: COMPLETED | OUTPATIENT
Start: 2018-12-10 | End: 2018-12-10

## 2018-12-10 RX ORDER — CIPROFLOXACIN LACTATE 400MG/40ML
1 VIAL (ML) INTRAVENOUS
Qty: 4 | Refills: 0
Start: 2018-12-10 | End: 2018-12-13

## 2018-12-10 RX ORDER — ACETAMINOPHEN 500 MG
650 TABLET ORAL ONCE
Qty: 0 | Refills: 0 | Status: COMPLETED | OUTPATIENT
Start: 2018-12-10 | End: 2018-12-10

## 2018-12-10 RX ADMIN — SODIUM CHLORIDE 2300 MILLILITER(S): 9 INJECTION INTRAMUSCULAR; INTRAVENOUS; SUBCUTANEOUS at 20:25

## 2018-12-10 RX ADMIN — Medication 650 MILLIGRAM(S): at 20:25

## 2018-12-10 NOTE — ED PROVIDER NOTE - PROGRESS NOTE DETAILS
pt appears well. nad.  w/u unremarkable. possible viral uri.  sxs persisting 10days. will give course of levaquin for possible bacterial bronchitis

## 2018-12-10 NOTE — ED PROVIDER NOTE - ATTENDING CONTRIBUTION TO CARE
pt c moderate cough for 10 days, nasal dc, chills, low grade fever today. also c myalgias/joint pains. had course of zpak last week. not better. no chest pain/sob. no n/v/d. no HA    exam: well appearing, NAD. HEENT: eyes perrl, nose normal, OP no erythema/exudate/swelling. cor: RRR, s1s2, 2+rad pulses. lungs: ctabl, no resp distress. abd: soft, ntnd. neuro: a&ox3, cn2-12 intact, PABON, 5/5 strength c nl sensation all extremities, nl coordination. extrem: no swelling. Skin: normal, no rash    AP: URI sxs c fever/chills. r/o pna, flu, sepsis. check labs, cxr. reassess

## 2018-12-10 NOTE — ED ADULT NURSE NOTE - NSIMPLEMENTINTERV_GEN_ALL_ED
Implemented All Universal Safety Interventions:  [place] to call system. Call bell, personal items and telephone within reach. Instruct patient to call for assistance. Room bathroom lighting operational. Non-slip footwear when patient is off stretcher. Physically safe environment: no spills, clutter or unnecessary equipment. Stretcher in lowest position, wheels locked, appropriate side rails in place.

## 2018-12-10 NOTE — ED ADULT NURSE NOTE - NS ED NOTE ABUSE SUSPICION NEGLECT YN
EXAM DESCRIPTION:

Abdomen radiography.



CLINICAL HISTORY:

Abdominal pain.



COMPARISON:

None.



TECHNIQUE:

Two views.



FINDINGS:

Bowel gas pattern is non-obstructed. There is no obvious free

intraperitoneal air. Visualized segments of the abdominal organs are

unremarkable. No suspicious bone lesion or fracture is seen.



IMPRESSION:

Mottled stool seen throughout the colon. This can be seen in setting of

constipation.



Electronically signed by: Arnoldo Robbins MD 01/18/2017 11:04 No

## 2018-12-10 NOTE — ED PROVIDER NOTE - OBJECTIVE STATEMENT
58 year old male, PMHx of bipolar d/o , DM, HTN, HLD; presents to the ED with fever. Patient states for the last ten days he has felt unwell. His symptoms began as nasal congestion, fatigue and malaise. He saw his PMD who put him on a Z pack without a diagnosis and told him to come back to be re-evaluated. He finished the Zpack Friday, but missed the appointment. Since then he states he has been feeling worse, was febrile to 102 today at home and lost urinary continence for the first time this afternoon. He endorses urinary frequency but no dysuria, hematuria. He endorses chronic non-productive cough but no worsening. He denies abdominal pain, chest pain, or other complaints.

## 2018-12-10 NOTE — ED ADULT NURSE REASSESSMENT NOTE - NS ED NURSE REASSESS COMMENT FT1
Received pt from MELISSA Braden. pt is A&Ox3 and is resting comfortably. Denies any pain at this time. Pt states that he is feeling better. Pending disposition.

## 2018-12-11 ENCOUNTER — APPOINTMENT (OUTPATIENT)
Dept: ORTHOPEDIC SURGERY | Facility: HOSPITAL | Age: 58
End: 2018-12-11

## 2018-12-12 LAB
CULTURE RESULTS: SIGNIFICANT CHANGE UP
SPECIMEN SOURCE: SIGNIFICANT CHANGE UP

## 2018-12-16 LAB
CULTURE RESULTS: SIGNIFICANT CHANGE UP
CULTURE RESULTS: SIGNIFICANT CHANGE UP
SPECIMEN SOURCE: SIGNIFICANT CHANGE UP
SPECIMEN SOURCE: SIGNIFICANT CHANGE UP

## 2018-12-30 ENCOUNTER — OUTPATIENT (OUTPATIENT)
Dept: OUTPATIENT SERVICES | Facility: HOSPITAL | Age: 58
LOS: 1 days | End: 2018-12-30
Payer: MEDICARE

## 2018-12-30 ENCOUNTER — APPOINTMENT (OUTPATIENT)
Dept: SLEEP CENTER | Facility: CLINIC | Age: 58
End: 2018-12-30
Payer: MEDICARE

## 2018-12-30 DIAGNOSIS — Z98.890 OTHER SPECIFIED POSTPROCEDURAL STATES: Chronic | ICD-10-CM

## 2018-12-30 PROCEDURE — 95811 POLYSOM 6/>YRS CPAP 4/> PARM: CPT

## 2018-12-30 PROCEDURE — 95811 POLYSOM 6/>YRS CPAP 4/> PARM: CPT | Mod: 26

## 2019-01-02 DIAGNOSIS — G47.33 OBSTRUCTIVE SLEEP APNEA (ADULT) (PEDIATRIC): ICD-10-CM

## 2019-01-04 ENCOUNTER — RX RENEWAL (OUTPATIENT)
Age: 59
End: 2019-01-04

## 2019-01-07 ENCOUNTER — RX RENEWAL (OUTPATIENT)
Age: 59
End: 2019-01-07

## 2019-01-11 ENCOUNTER — APPOINTMENT (OUTPATIENT)
Dept: UROLOGY | Facility: CLINIC | Age: 59
End: 2019-01-11
Payer: MEDICARE

## 2019-01-11 VITALS
BODY MASS INDEX: 42 KG/M2 | WEIGHT: 300 LBS | RESPIRATION RATE: 16 BRPM | HEIGHT: 71 IN | DIASTOLIC BLOOD PRESSURE: 83 MMHG | TEMPERATURE: 98.3 F | HEART RATE: 84 BPM | SYSTOLIC BLOOD PRESSURE: 130 MMHG

## 2019-01-11 PROCEDURE — 99214 OFFICE O/P EST MOD 30 MIN: CPT

## 2019-01-11 NOTE — ASSESSMENT
[FreeTextEntry1] : Patient is a 59 yo M who presents for BPH/LUTS.\par \par -Udip negative today\par -PVR today 0cc\par - Will give trial of flomax for symptoms. Counseled on proper use and SE profile, including LH, retrograde ejaculation. D/w pt that if he experiences LH to stop the medication and call.\par -F/u 2 mos\par -Plan for repeat PSA next year

## 2019-01-11 NOTE — PHYSICAL EXAM
[General Appearance - Well Developed] : well developed [General Appearance - Well Nourished] : well nourished [Normal Appearance] : normal appearance [Well Groomed] : well groomed [General Appearance - In No Acute Distress] : no acute distress [Urethral Meatus] : meatus normal [Urinary Bladder Findings] : the bladder was normal on palpation [Scrotum] : the scrotum was normal [Testes Tenderness] : no tenderness of the testes [Testes Mass (___cm)] : there were no testicular masses [Prostate Tenderness] : the prostate was not tender [No Prostate Nodules] : no prostate nodules [Prostate Size ___ gm] : prostate size [unfilled] gm [FreeTextEntry1] : L testis absent, R WNL.  MICHAEL difficult due to body habitus

## 2019-01-11 NOTE — HISTORY OF PRESENT ILLNESS
[FreeTextEntry1] : Patient is a 57 yo M who presents for urologic evaluation/check up.  He has a history of weak stream roughly ~12 yrs ago, was given flomax for 1 yr by a urologist.  He then self discontinued and has been voiding well with good stream since.  No sexual complaints.  No sexually active, but masturbates.\par \par He also had a history of testicular cancer s/p L orchiectomy in 2009.  He had surveillance of testis cancer for 5 yrs with PET, he gets annual testis markers with his Oncology.  He states he is AMANUEL.\par \par Fam hx of lung cancer in father, Hogdkins in mother.  No fam hx  malignancies.\par \par PSA 12/2017 was 0.51.\par \par Interval hx:\par He reports LUTS - spraying of urination, post void dribbling and weak stream for past 6 months.  He did have rare episodes UUI. [Urinary Urgency] : no urinary urgency [Urinary Frequency] : no urinary frequency [Dysuria] : no dysuria [Hematuria - Gross] : no gross hematuria

## 2019-01-14 ENCOUNTER — APPOINTMENT (OUTPATIENT)
Age: 59
End: 2019-01-14

## 2019-01-14 LAB
BILIRUB UR QL STRIP: NEGATIVE
CLARITY UR: CLEAR
COLLECTION METHOD: NORMAL
GLUCOSE UR-MCNC: NEGATIVE
HCG UR QL: 0.2 EU/DL
HGB UR QL STRIP.AUTO: NEGATIVE
KETONES UR-MCNC: NEGATIVE
LEUKOCYTE ESTERASE UR QL STRIP: NEGATIVE
NITRITE UR QL STRIP: NEGATIVE
PH UR STRIP: 7
PROT UR STRIP-MCNC: NEGATIVE
SP GR UR STRIP: 1.01

## 2019-01-15 RX ORDER — LANCETS
EACH MISCELLANEOUS
Qty: 2 | Refills: 3 | Status: DISCONTINUED | COMMUNITY
Start: 2019-01-07 | End: 2019-01-15

## 2019-01-17 ENCOUNTER — APPOINTMENT (OUTPATIENT)
Dept: PULMONOLOGY | Facility: CLINIC | Age: 59
End: 2019-01-17
Payer: MEDICARE

## 2019-01-17 VITALS
DIASTOLIC BLOOD PRESSURE: 87 MMHG | BODY MASS INDEX: 40.6 KG/M2 | TEMPERATURE: 98.2 F | WEIGHT: 290 LBS | RESPIRATION RATE: 15 BRPM | HEIGHT: 71 IN | HEART RATE: 77 BPM | SYSTOLIC BLOOD PRESSURE: 142 MMHG | OXYGEN SATURATION: 97 %

## 2019-01-17 PROCEDURE — 99214 OFFICE O/P EST MOD 30 MIN: CPT | Mod: GC

## 2019-01-17 NOTE — HISTORY OF PRESENT ILLNESS
[ESS: ___] : ESS score [unfilled] [AHI: ___ per hour] : Apnea-hypopnea index:  [unfilled] per hour [T90%: ___] : T90%: [unfilled]% [Rodney desatuation%: ___] : Rodney desaturation:  [unfilled]% [Date: ___] : the most recent therapeutic polysomnogram was completed [unfilled] [FreeTextEntry1] : This 58 year year old male with severe obstructive sleep apnea, currently untreated, presents for follow-up visit to restart CPAP therapy. He recently underwent an inlab CPAP titration study and did very well. His optimal pressure on the inlab titration was 6cm H20 while using a full face mask. He is encouraged and willing to start CPAP again. Has also lost some weight since his last visit. \par \par \par Of note: The patient states that he has been intermittently treated with CPAP therapy since he was diagnosed with severe CHAYITO approximately 20 years ago.  His CPAP was recently picked-up due to noncompliance.  He states that he suffers from chronic nasal congestion and was awakening with the mask off. \par \par He attends a mental health program 5-hours daily for 5-days weekly.  No tobacco, alcohol, or drug history.  No caffeinated beverages consumed.   [Nocturnal Oxygen] : The patient does not use nocturnal oxygen

## 2019-01-17 NOTE — PHYSICAL EXAM
[Normal Appearance] : normal appearance [Well Groomed] : well groomed [General Appearance - In No Acute Distress] : no acute distress [Normal Conjunctiva] : the conjunctiva exhibited no abnormalities [Low Lying Soft Palate] : low lying soft palate [Elongated Uvula] : elongated uvula [Enlarged Base of the Tongue] : enlargement of the base of the tongue [IV] : IV [Neck Appearance] : the appearance of the neck was normal [Heart Rate And Rhythm] : heart rate was normal and rhythm regular [Murmurs] : no murmurs [] : no respiratory distress [Auscultation Breath Sounds / Voice Sounds] : lungs were clear to auscultation bilaterally [Involuntary Movements] : no involuntary movements were seen [No Focal Deficits] : no focal deficits [Affect] : the affect was normal [Mood] : the mood was normal [FreeTextEntry2] : no edema

## 2019-01-17 NOTE — REVIEW OF SYSTEMS
[Fatigue] : fatigue [Nasal Congestion] : nasal congestion [Postnasal Drip] : postnasal drip [Obesity] : obesity [Diabetes] : diabetes  [Nocturia] : nocturia [Depression] : depression [Frequent Nocturnal Awakenings] : frequent nocturnal awakenings from sleep [Awakes Unrefreshed] : nonrestorative sleep [Difficulty Initiating Sleep] : difficulty falling asleep [Difficulty Maintaining Sleep] : difficulty maintaining sleep [Shortness Of Breath] : no shortness of breath [Orthopnea] : no orthopnea [Chest Pain] : no chest pain [Palpitations] : no palpitations [CHF] : no congestive heart failure [Thyroid Disease] : no thyroid disease [Anemia] : no anemia [History of Iron Deficiency] : no history of iron deficiency [Heartburn] : no heartburn [Daytime Somnolence: ESS=____] : no daytime somnolence [Awakes With Dry Mouth] : awakes without dry mouth [Lower Extremity Discomfort] : no lower extremity discomfort [Unusual Sleep Behavior] : no unusual sleep behavior [FreeTextEntry8] : Never smoked [de-identified] : peripheral neuropathy [de-identified] : Manic depression [de-identified] : since Dx with CHAYITO >=20 years ago.  No hypnotics history [FreeTextEntry1] : 9 PM [FreeTextEntry2] : 5 AM [FreeTextEntry3] : 1-hour watches TV in bed, or moves to recliner to sleep [FreeTextEntry4] : at least 4 for nocturia [FreeTextEntry5] : 2-4 hours [FreeTextEntry6] : 4-6 hours  [FreeTextEntry7] : 1-hour refreshing nap usually twice daily

## 2019-01-17 NOTE — ASSESSMENT
[FreeTextEntry1] : 58 year old MARGI ART with comorbid hypertension, hyperlipidemia, diabetes, neuropathy,  bipolar disorder, obesity, deviated nasal septum, allergic rhinitis; presents for follow-up to re-qualify for CPAP therapy to treat severe obstructive sleep apnea. His inlab sleep study showed an optimal pressure of 6cm H20 with a full face mask. He is ordered a new mask and machine and will follow up in clinic in 2-3 months for compliance data. The ramifications of obstructive sleep apnea and its potential therapeutic modalities were discussed with the patient.

## 2019-02-20 ENCOUNTER — INBOUND DOCUMENT (OUTPATIENT)
Age: 59
End: 2019-02-20

## 2019-02-25 ENCOUNTER — APPOINTMENT (OUTPATIENT)
Dept: ENDOCRINOLOGY | Facility: CLINIC | Age: 59
End: 2019-02-25
Payer: MEDICARE

## 2019-02-25 VITALS
SYSTOLIC BLOOD PRESSURE: 130 MMHG | WEIGHT: 296 LBS | OXYGEN SATURATION: 95 % | HEART RATE: 97 BPM | HEIGHT: 71 IN | BODY MASS INDEX: 41.44 KG/M2 | DIASTOLIC BLOOD PRESSURE: 80 MMHG

## 2019-02-25 PROCEDURE — 99214 OFFICE O/P EST MOD 30 MIN: CPT

## 2019-02-25 PROCEDURE — 83036 HEMOGLOBIN GLYCOSYLATED A1C: CPT | Mod: QW

## 2019-02-25 PROCEDURE — 82962 GLUCOSE BLOOD TEST: CPT

## 2019-02-27 LAB
25(OH)D3 SERPL-MCNC: 42.2 NG/ML
ALBUMIN SERPL ELPH-MCNC: 4.2 G/DL
ALP BLD-CCNC: 88 U/L
ALT SERPL-CCNC: 23 U/L
ANION GAP SERPL CALC-SCNC: 10 MMOL/L
AST SERPL-CCNC: 13 U/L
BASOPHILS # BLD AUTO: 0.04 K/UL
BASOPHILS NFR BLD AUTO: 0.4 %
BILIRUB SERPL-MCNC: 0.5 MG/DL
BUN SERPL-MCNC: 30 MG/DL
CALCIUM SERPL-MCNC: 10.3 MG/DL
CHLORIDE SERPL-SCNC: 104 MMOL/L
CO2 SERPL-SCNC: 25 MMOL/L
CREAT SERPL-MCNC: 1.49 MG/DL
EOSINOPHIL # BLD AUTO: 0.24 K/UL
EOSINOPHIL NFR BLD AUTO: 2.5 %
GLUCOSE BLDC GLUCOMTR-MCNC: 147
GLUCOSE SERPL-MCNC: 211 MG/DL
HBA1C MFR BLD HPLC: 6.7
HCT VFR BLD CALC: 47.9 %
HGB BLD-MCNC: 14.8 G/DL
IMM GRANULOCYTES NFR BLD AUTO: 1 %
LYMPHOCYTES # BLD AUTO: 2.3 K/UL
LYMPHOCYTES NFR BLD AUTO: 23.7 %
MAN DIFF?: NORMAL
MCHC RBC-ENTMCNC: 28.4 PG
MCHC RBC-ENTMCNC: 30.9 GM/DL
MCV RBC AUTO: 91.9 FL
MONOCYTES # BLD AUTO: 0.69 K/UL
MONOCYTES NFR BLD AUTO: 7.1 %
NEUTROPHILS # BLD AUTO: 6.33 K/UL
NEUTROPHILS NFR BLD AUTO: 65.3 %
PLATELET # BLD AUTO: 218 K/UL
POTASSIUM SERPL-SCNC: 4.7 MMOL/L
PROT SERPL-MCNC: 7.4 G/DL
RBC # BLD: 5.21 M/UL
RBC # FLD: 13.4 %
SODIUM SERPL-SCNC: 139 MMOL/L
T4 FREE SERPL-MCNC: 1.4 NG/DL
TSH SERPL-ACNC: 1.12 UIU/ML
WBC # FLD AUTO: 9.7 K/UL

## 2019-03-08 ENCOUNTER — MOBILE ON CALL (OUTPATIENT)
Age: 59
End: 2019-03-08

## 2019-03-11 ENCOUNTER — FORM ENCOUNTER (OUTPATIENT)
Age: 59
End: 2019-03-11

## 2019-03-15 ENCOUNTER — APPOINTMENT (OUTPATIENT)
Dept: UROLOGY | Facility: CLINIC | Age: 59
End: 2019-03-15

## 2019-03-18 ENCOUNTER — APPOINTMENT (OUTPATIENT)
Dept: PULMONOLOGY | Facility: CLINIC | Age: 59
End: 2019-03-18

## 2019-03-19 ENCOUNTER — APPOINTMENT (OUTPATIENT)
Age: 59
End: 2019-03-19
Payer: MEDICARE

## 2019-03-19 ENCOUNTER — OUTPATIENT (OUTPATIENT)
Dept: OUTPATIENT SERVICES | Facility: HOSPITAL | Age: 59
LOS: 1 days | End: 2019-03-19
Payer: MEDICARE

## 2019-03-19 DIAGNOSIS — Z00.8 ENCOUNTER FOR OTHER GENERAL EXAMINATION: ICD-10-CM

## 2019-03-19 DIAGNOSIS — Z98.890 OTHER SPECIFIED POSTPROCEDURAL STATES: Chronic | ICD-10-CM

## 2019-03-19 PROCEDURE — 73630 X-RAY EXAM OF FOOT: CPT | Mod: 26,50

## 2019-03-19 PROCEDURE — 73630 X-RAY EXAM OF FOOT: CPT

## 2019-03-27 ENCOUNTER — APPOINTMENT (OUTPATIENT)
Dept: PULMONOLOGY | Facility: CLINIC | Age: 59
End: 2019-03-27
Payer: MEDICARE

## 2019-03-27 VITALS
WEIGHT: 300 LBS | OXYGEN SATURATION: 95 % | HEIGHT: 71 IN | DIASTOLIC BLOOD PRESSURE: 73 MMHG | SYSTOLIC BLOOD PRESSURE: 118 MMHG | TEMPERATURE: 97.9 F | RESPIRATION RATE: 15 BRPM | BODY MASS INDEX: 42 KG/M2 | HEART RATE: 82 BPM

## 2019-03-27 DIAGNOSIS — R09.81 NASAL CONGESTION: ICD-10-CM

## 2019-03-27 DIAGNOSIS — R06.02 SHORTNESS OF BREATH: ICD-10-CM

## 2019-03-27 PROCEDURE — 99215 OFFICE O/P EST HI 40 MIN: CPT

## 2019-03-27 NOTE — HISTORY OF PRESENT ILLNESS
[FreeTextEntry1] : 58yo male patient with multiple comorbidities including atherosclerosis, HTN, HLD, DM2, BPH, CHAYITO (on CPAP), and bipolar d/o.  Patient presenting today for shortness of breath and questions regarding his CPAP machine.  Patient normally sees Dr. Angelika Todd with sleep clinic.\par \par Patient is having issues in regards to CPAP pressure.  It was recently adjusted on 3/13, as per Dr. Todd, to APAP 4 to [id number].  He states that the initial pressure when trying to sleep is "too much" and makes it uncomfortable for him.  However when he does fall asleep, the pressure is fine.  Patient also admits to chronic nasal congestion, which appears that he has been treated in the past with Flonase and Nasonex.  He states that once he stops these, the congestion comes back.  He has never had a sinus CT and is asking for recommendation for an ENT specialist.  Patient continues to lose weight through diet modification.  \par \par Today, he also admits to shortness of breath mainly at rest.  He states he is able to walk three miles before feeling winded, but when sitting down, he finds himself having trouble breathing.  He has not had lung functions.

## 2019-03-27 NOTE — DISCUSSION/SUMMARY
[FreeTextEntry1] : 60yo male patient with multiple comorbidities including atherosclerosis, HTN, HLD, DM2, BPH, CHAYITO (on CPAP), and bipolar d/o.  Patient presenting today for shortness of breath and questions regarding his CPAP machine.\par \par 1.  CHAYITO\par -Setting recently changed on 3/13.  APAP 4 to [id number].\par -Will d/w sleep team regarding modifying his current settings further for patient's comfort and to increase compliance.\par -C/w weight loss through diet and exercise as tolerated.\par \par 2.  Nasal congestion\par -Patient unsure if he was taking Nasonex or Flonase.  He will find out the name of the medication he was on and will call our office.\par -The nasal congestion may be r/t his CPAP mask.  However, because of the chronicity of his congestion unrelieved by nasal sprays, I have ordered a sinus CT.\par -Provided patient with the name of an ENT.\par \par 3.  Dyspnea\par -Patient walked 320 feet in office today and did not desaturate (sO2 maintained at 96% on room air).  Stopped d/t b/l knee joint pain.  Patient did appear breathless, post-walk.\par -I suspect that his dyspnea is r/t to his weight and body habitus, which I explained to the patient.  However there is no PFT on file.  Would opt to have spirometry done.\par \par Patient to f/u with Dr. Todd in 2 to 3 months, per last note.

## 2019-03-27 NOTE — REVIEW OF SYSTEMS
[Fatigue] : fatigue [Nasal Congestion] : nasal congestion [Sinus Problems] : sinus problems [Dyspnea] : dyspnea [Hypertension] : ~T hypertension [Arthralgias] : arthralgias [Diabetes] : diabetes mellitus [As Noted in HPI] : as noted in HPI [Fever] : no fever [Chills] : no chills [Postnasal Drip] : no postnasal drip [Cough] : no cough [Sputum] : not coughing up ~M sputum [Hemoptysis] : no hemoptysis [Chest Tightness] : no chest tightness [Pleuritic Pain] : no pleuritic pain [Wheezing] : no wheezing [Chest Discomfort] : no chest discomfort

## 2019-03-27 NOTE — PHYSICAL EXAM
[Normal Appearance] : normal appearance [General Appearance - In No Acute Distress] : no acute distress [Normal Conjunctiva] : the conjunctiva exhibited no abnormalities [Low Lying Soft Palate] : low lying soft palate [Elongated Uvula] : elongated uvula [Neck Appearance] : the appearance of the neck was normal [Heart Rate And Rhythm] : heart rate and rhythm were normal [Heart Sounds] : normal S1 and S2 [] : no respiratory distress [Respiration, Rhythm And Depth] : normal respiratory rhythm and effort [Auscultation Breath Sounds / Voice Sounds] : lungs were clear to auscultation bilaterally [Gait - Sufficient For Exercise Testing] : the gait was sufficient for exercise testing [Nail Clubbing] : no clubbing of the fingernails [Cyanosis, Localized] : no localized cyanosis [FreeTextEntry1] : flat affect

## 2019-04-09 ENCOUNTER — OUTPATIENT (OUTPATIENT)
Dept: OUTPATIENT SERVICES | Facility: HOSPITAL | Age: 59
LOS: 1 days | End: 2019-04-09
Payer: MEDICARE

## 2019-04-09 DIAGNOSIS — K08.9 DISORDER OF TEETH AND SUPPORTING STRUCTURES, UNSPECIFIED: ICD-10-CM

## 2019-04-09 DIAGNOSIS — Z01.20 ENCOUNTER FOR DENTAL EXAMINATION AND CLEANING WITHOUT ABNORMAL FINDINGS: ICD-10-CM

## 2019-04-09 DIAGNOSIS — Z98.890 OTHER SPECIFIED POSTPROCEDURAL STATES: Chronic | ICD-10-CM

## 2019-04-09 PROCEDURE — D1110: CPT

## 2019-04-09 PROCEDURE — D0120: CPT

## 2019-04-10 ENCOUNTER — EMERGENCY (EMERGENCY)
Facility: HOSPITAL | Age: 59
LOS: 1 days | Discharge: HOME | End: 2019-04-10
Attending: EMERGENCY MEDICINE | Admitting: EMERGENCY MEDICINE
Payer: MEDICARE

## 2019-04-10 VITALS
WEIGHT: 300.05 LBS | DIASTOLIC BLOOD PRESSURE: 92 MMHG | SYSTOLIC BLOOD PRESSURE: 173 MMHG | RESPIRATION RATE: 18 BRPM | HEIGHT: 71 IN | HEART RATE: 86 BPM | TEMPERATURE: 98 F

## 2019-04-10 VITALS
OXYGEN SATURATION: 97 % | DIASTOLIC BLOOD PRESSURE: 89 MMHG | RESPIRATION RATE: 18 BRPM | HEART RATE: 71 BPM | SYSTOLIC BLOOD PRESSURE: 126 MMHG

## 2019-04-10 DIAGNOSIS — R09.81 NASAL CONGESTION: ICD-10-CM

## 2019-04-10 DIAGNOSIS — Z98.890 OTHER SPECIFIED POSTPROCEDURAL STATES: Chronic | ICD-10-CM

## 2019-04-10 PROCEDURE — 99283 EMERGENCY DEPT VISIT LOW MDM: CPT | Mod: 25

## 2019-04-10 PROCEDURE — 93005 ELECTROCARDIOGRAM TRACING: CPT

## 2019-04-10 PROCEDURE — 93010 ELECTROCARDIOGRAM REPORT: CPT

## 2019-04-10 PROCEDURE — 99284 EMERGENCY DEPT VISIT MOD MDM: CPT

## 2019-04-10 PROCEDURE — 71046 X-RAY EXAM CHEST 2 VIEWS: CPT

## 2019-04-10 PROCEDURE — 71046 X-RAY EXAM CHEST 2 VIEWS: CPT | Mod: 26

## 2019-04-10 RX ORDER — CIPROFLOXACIN LACTATE 400MG/40ML
1 VIAL (ML) INTRAVENOUS
Qty: 5 | Refills: 0
Start: 2019-04-10 | End: 2019-04-14

## 2019-04-10 NOTE — ED ADULT NURSE NOTE - PAIN RATING/NUMBER SCALE (0-10): ACTIVITY
Received a call from mom after she spoke to April in our office, as per April mom was requesting an increase in medication but didn't really explain what was going on with Rebeka Bautista  When the call was transferred to this writer, I advised mom that Dr Signa Goldberg was not in the office to make any changes to medication but will be back on Monday 2/18/19 and asked if she was calling to report any side effects  Mom stated that she just needs an increase in his Guanfacine  I reviewed your last note regarding the conversation you had with mom  I started asking if she had given Melatonin, mom stated yes  When I asked her what was the dose she was giving she stated that she gave what the bottle indicated and couldn't give me an exact dosage  Mom then stated to forget it and that she rather not talk about it and will call back on Monday to speak to Dr Signa Goldberg  I asked mom if she was sure and that I could try and answer any questions she may have, mom stated she was sure and to forget about it 
0

## 2019-04-10 NOTE — ED PROVIDER NOTE - NSFOLLOWUPINSTRUCTIONS_ED_ALL_ED_FT
Sinusitis    AMBULATORY CARE:    Sinusitis is inflammation or infection of your sinuses. It is most often caused by a virus. Acute sinusitis may last up to 12 weeks. Chronic sinusitis lasts longer than 12 weeks. Recurrent sinusitis means you have 4 or more times in 1 year. Sinuses         Common symptoms include the following:     Fever      Pain, pressure, redness, or swelling around the forehead, cheeks, or eyes      Thick yellow or green discharge from your nose      Tenderness when you touch your face over your sinuses      Dry cough that happens mostly at night or when you lie down      Headache and face pain that is worse when you lean forward      Tooth pain, or pain when you chew    Seek care immediately if:     Your eye and eyelid are red, swollen, and painful.       You cannot open your eye.       You have vision changes, such as double vision.      Your eyeball bulges out or you cannot move your eye.       You are more sleepy than normal, or you notice changes in your ability to think, move, or talk.      You have a stiff neck, a fever, or a bad headache.       You have swelling of your forehead or scalp.    Contact your healthcare provider if:     Your symptoms do not improve after 3 days.      Your symptoms do not go away after 10 days.      You have nausea and are vomiting.      Your nose is bleeding.      You have questions or concerns about your condition or care.    Treatment for sinusitis: Your symptoms may go away on their own. Your healthcare provider may recommend watchful waiting for up to 10 days before starting antibiotics. You may need any of the following:     Acetaminophen decreases pain and fever. It is available without a doctor's order. Ask how much to take and how often to take it. Follow directions. Read the labels of all other medicines you are using to see if they also contain acetaminophen, or ask your doctor or pharmacist. Acetaminophen can cause liver damage if not taken correctly. Do not use more than 4 grams (4,000 milligrams) total of acetaminophen in one day.       NSAIDs, such as ibuprofen, help decrease swelling, pain, and fever. This medicine is available with or without a doctor's order. NSAIDs can cause stomach bleeding or kidney problems in certain people. If you take blood thinner medicine, always ask your healthcare provider if NSAIDs are safe for you. Always read the medicine label and follow directions.      Nasal steroid sprays may help decrease inflammation in your nose and sinuses.      Decongestants help reduce swelling and drain mucus in the nose and sinuses. They may help you breathe easier.       Antihistamines help dry mucus in the nose and relieve sneezing.       Antibiotics help treat or prevent a bacterial infection.      Take your medicine as directed. Contact your healthcare provider if you think your medicine is not helping or if you have side effects. Tell him or her if you are allergic to any medicine. Keep a list of the medicines, vitamins, and herbs you take. Include the amounts, and when and why you take them. Bring the list or the pill bottles to follow-up visits. Carry your medicine list with you in case of an emergency.    Self-care:     Rinse your sinuses. Use a sinus rinse device to rinse your nasal passages with a saline (salt water) solution or distilled water. Do not use tap water. This will help thin the mucus in your nose and rinse away pollen and dirt. It will also help reduce swelling so you can breathe normally. Ask your healthcare provider how often to do this.       Breathe in steam. Heat a bowl of water until you see steam. Lean over the bowl and make a tent over your head with a large towel. Breathe deeply for about 20 minutes. Be careful not to get too close to the steam or burn yourself. Do this 3 times a day. You can also breathe deeply when you take a hot shower.       Sleep with your head elevated. Place an extra pillow under your head before you go to sleep to help your sinuses drain.       Drink liquids as directed. Ask your healthcare provider how much liquid to drink each day and which liquids are best for you. Liquids will thin the mucus in your nose and help it drain. Avoid drinks that contain alcohol or caffeine.       Do not smoke, and avoid secondhand smoke. Nicotine and other chemicals in cigarettes and cigars can make your symptoms worse. Ask your healthcare provider for information if you currently smoke and need help to quit. E-cigarettes or smokeless tobacco still contain nicotine. Talk to your healthcare provider before you use these products.     Prevent the spread of germs that cause sinusitis: Wash your hands often with soap and water. Wash your hands after you use the bathroom, change a child's diaper, or sneeze. Wash your hands before you prepare or eat food.Handwashing         Follow up with your healthcare provider as directed: You may be referred to an ear, nose, and throat specialist. Write down your questions so you remember to ask them during your visits.

## 2019-04-10 NOTE — ED PROVIDER NOTE - OBJECTIVE STATEMENT
59M h/o DM, sinusitis tx'ed with levaquin in the past p/w 6 weeks of nasal congestion, 1 mo ago had fever Tm 102 none now, and dry cough x 4 days. Yesterday with nasal congestion felt transiently lightheaded but no chest pain or sob. No sick contacts. Allergic to PCN 59M h/o DM, sinusitis tx'ed with levaquin in the past p/w 6 weeks of nasal congestion, 1 mo ago had fever Tm 102 none now, and dry cough x 4 days. Yesterday with nasal congestion felt transiently lightheaded but no chest pain or sob. No sick contacts. Allergic to PCN  Has been using nasal spray with no relief.

## 2019-04-10 NOTE — ED PROVIDER NOTE - ENMT, MLM
Airway patent, Nasal mucosa clear. Mouth with normal mucosa. Throat has no vesicles, no oropharyngeal exudates and uvula is midline. Minimal maxillary sinus ttp

## 2019-04-10 NOTE — ED PROVIDER NOTE - PMH
Bipolar 1 disorder  on Lithium  Diabetic neuropathy  bilateral feet, ankles  HLD (hyperlipidemia)    Hypertension    Obesity    CHAYITO (obstructive sleep apnea)  diagnosed >25 years ago, non-compliant with CPAP  Primary osteoarthritis of left knee    T2DM (type 2 diabetes mellitus)

## 2019-05-01 ENCOUNTER — EMERGENCY (EMERGENCY)
Facility: HOSPITAL | Age: 59
LOS: 1 days | Discharge: HOME | End: 2019-05-01
Attending: EMERGENCY MEDICINE | Admitting: EMERGENCY MEDICINE
Payer: MEDICARE

## 2019-05-01 VITALS
WEIGHT: 300.05 LBS | HEIGHT: 71 IN | SYSTOLIC BLOOD PRESSURE: 152 MMHG | OXYGEN SATURATION: 95 % | TEMPERATURE: 99 F | RESPIRATION RATE: 18 BRPM | HEART RATE: 92 BPM | DIASTOLIC BLOOD PRESSURE: 94 MMHG

## 2019-05-01 DIAGNOSIS — G47.00 INSOMNIA, UNSPECIFIED: ICD-10-CM

## 2019-05-01 DIAGNOSIS — Z98.890 OTHER SPECIFIED POSTPROCEDURAL STATES: Chronic | ICD-10-CM

## 2019-05-01 PROCEDURE — 99283 EMERGENCY DEPT VISIT LOW MDM: CPT

## 2019-05-01 PROCEDURE — 99285 EMERGENCY DEPT VISIT HI MDM: CPT

## 2019-05-01 RX ORDER — ALPRAZOLAM 0.25 MG
0.5 TABLET ORAL ONCE
Qty: 0 | Refills: 0 | Status: DISCONTINUED | OUTPATIENT
Start: 2019-05-01 | End: 2019-05-01

## 2019-05-01 RX ORDER — ALPRAZOLAM 0.25 MG
1 TABLET ORAL
Qty: 7 | Refills: 0
Start: 2019-05-01 | End: 2019-05-07

## 2019-05-01 RX ADMIN — Medication 0.5 MILLIGRAM(S): at 02:21

## 2019-05-01 NOTE — ED ADULT NURSE NOTE - NSIMPLEMENTINTERV_GEN_ALL_ED
Implemented All Universal Safety Interventions:  [place] to call system. Call bell, personal items and telephone within reach. Instruct patient to call for assistance. Room bathroom lighting operational. Non-slip footwear when patient is off stretcher. Physically safe environment: no spills, clutter or unnecessary equipment. Stretcher in lowest position, wheels locked, appropriate side rails in place.

## 2019-05-01 NOTE — ED PROVIDER NOTE - OBJECTIVE STATEMENT
Pertinent PMH/PSH/FHx/SHx and Review of Systems contained within:  60 y/o male with h/o bipolar disorder presents to ed c/o he is unable sleep for past few days. he says because he has to use CPAP machine and no comfortable with it , he can not sleep.

## 2019-05-01 NOTE — ED PROVIDER NOTE - CLINICAL SUMMARY MEDICAL DECISION MAKING FREE TEXT BOX
pt has insomnia from anxiety about sleeping with CPAP , gave him xanax to be used at night, and asked to follow up with his psychiatrist.

## 2019-05-10 ENCOUNTER — NON-APPOINTMENT (OUTPATIENT)
Age: 59
End: 2019-05-10

## 2019-05-10 ENCOUNTER — APPOINTMENT (OUTPATIENT)
Dept: CARDIOLOGY | Facility: CLINIC | Age: 59
End: 2019-05-10
Payer: MEDICARE

## 2019-05-10 VITALS
WEIGHT: 292 LBS | OXYGEN SATURATION: 93 % | HEIGHT: 71 IN | SYSTOLIC BLOOD PRESSURE: 122 MMHG | BODY MASS INDEX: 40.88 KG/M2 | HEART RATE: 74 BPM | DIASTOLIC BLOOD PRESSURE: 84 MMHG

## 2019-05-10 PROCEDURE — 93000 ELECTROCARDIOGRAM COMPLETE: CPT

## 2019-05-10 PROCEDURE — 99214 OFFICE O/P EST MOD 30 MIN: CPT

## 2019-05-10 NOTE — PHYSICAL EXAM
[General Appearance - Well Developed] : well developed [Normal Appearance] : normal appearance [Well Groomed] : well groomed [General Appearance - Well Nourished] : well nourished [No Deformities] : no deformities [General Appearance - In No Acute Distress] : no acute distress [Normal Oral Mucosa] : normal oral mucosa [No Oral Pallor] : no oral pallor [No Oral Cyanosis] : no oral cyanosis [Respiration, Rhythm And Depth] : normal respiratory rhythm and effort [Exaggerated Use Of Accessory Muscles For Inspiration] : no accessory muscle use [Auscultation Breath Sounds / Voice Sounds] : lungs were clear to auscultation bilaterally [Abdomen Soft] : soft [Abdomen Tenderness] : non-tender [Abdomen Mass (___ Cm)] : no abdominal mass palpated [Abnormal Walk] : normal gait [Gait - Sufficient For Exercise Testing] : the gait was sufficient for exercise testing [Nail Clubbing] : no clubbing of the fingernails [Cyanosis, Localized] : no localized cyanosis [Petechial Hemorrhages (___cm)] : no petechial hemorrhages [] : no ischemic changes [Skin Color & Pigmentation] : normal skin color and pigmentation [Oriented To Time, Place, And Person] : oriented to person, place, and time [Not Palpable] : not palpable [No Precordial Heave] : no precordial heave was noted [Normal Rate] : normal [Rhythm Regular] : regular [Normal S1] : normal S1 [Normal S2] : normal S2 [No Gallop] : no gallop heard [No Murmur] : no murmurs heard [2+] : left 2+ [1+] : left 1+ [No Pitting Edema] : no pitting edema present [Conjunctiva] : the conjunctiva were normal in both eyes [PERRL] : pupils were equal in size, round, and reactive to light [EOM Intact] : extraocular movements were intact [Strabismus] : strabismus was noted bilaterally [Normal Mental Status] : the patient was oriented to person, place and time [Person] : oriented to person [Place] : oriented to place [Time] : oriented to time [Short Term Intact] : short term memory intact [Span Intact] : the attention span was normal [Remote Intact] : remote memory intact [Concentration Intact] : normal concentrating ability [Visual Intact] : visual attention was ~T not ~L decreased [Appropriate] : appropriate [Flat] : flat [Normal] : the cranial nerves were intact [FreeTextEntry1] : JVP normal. No carotid bruits. [Left Carotid Bruit] : no bruit heard over the left carotid [Right Carotid Bruit] : no bruit heard over the right carotid [Yellow Sclera (Icteric)] : no scleral icterus was seen

## 2019-05-10 NOTE — REASON FOR VISIT
[Follow-Up - Clinic] : a clinic follow-up of [Medication Management] : Medication management [FreeTextEntry1] : He was formerly followed by a cardiologist in [place], Geovanny Elizabeth MD.

## 2019-05-10 NOTE — HISTORY OF PRESENT ILLNESS
[FreeTextEntry1] : 59 year-old gentleman with known cardiovascular risk factors including hypertension, well controlled non-insulin dependent diabetes, dyslipidemia, obesity, CHAYITO, bipolar disorder on Lithium and Geodon, lives as a full time resident of the [facility]. He presents today in his usual state of health, having lost approximately 30 lbs over the past year. He had a repeat sleep study performed 8/12/2016 which showed severe CHAYITO.\par \par May 10, 2019: Patient presents with complain of sleep apnea. Has concerning sleep study two months ago. He has nasal congestion and cannot tolerate his CPAP. He is also concern about neuropathic pain in the insteps of his feet. He has lost several pounds through diet and exercise that are supervised through his program.\par \par Psychiatrist at [facility] in [place] who manages his Lithium and his antipsychotic medication (second generation, Geodon).\par \par PMD: Gwen Le MD [phone]\par Sleep Medicine: Angelika Todd MD [phone]\par Endocrinologist: Rocío Bañuelos MD [phone]

## 2019-05-10 NOTE — REVIEW OF SYSTEMS
[Recent Weight Loss (___ Lbs)] : recent [unfilled] ~Ulb weight loss [Lower Ext Edema] : lower extremity edema [see HPI] : see HPI [Skin: A Rash] : rash: [Tremor] : a tremor was seen [Negative] : Heme/Lymph [Shortness Of Breath] : no shortness of breath [Dyspnea on exertion] : not dyspnea during exertion [Chest Pain] : no chest pain [Chest  Pressure] : no chest pressure [Palpitations] : no palpitations [Joint Pain] : no joint pain

## 2019-05-14 ENCOUNTER — OUTPATIENT (OUTPATIENT)
Dept: OUTPATIENT SERVICES | Facility: HOSPITAL | Age: 59
LOS: 1 days | End: 2019-05-14
Payer: MEDICARE

## 2019-05-14 DIAGNOSIS — Z98.890 OTHER SPECIFIED POSTPROCEDURAL STATES: Chronic | ICD-10-CM

## 2019-05-14 DIAGNOSIS — K08.9 DISORDER OF TEETH AND SUPPORTING STRUCTURES, UNSPECIFIED: ICD-10-CM

## 2019-05-14 PROCEDURE — D2392: CPT

## 2019-05-16 DIAGNOSIS — K02.9 DENTAL CARIES, UNSPECIFIED: ICD-10-CM

## 2019-06-07 ENCOUNTER — CLINICAL ADVICE (OUTPATIENT)
Age: 59
End: 2019-06-07

## 2019-06-12 ENCOUNTER — MOBILE ON CALL (OUTPATIENT)
Age: 59
End: 2019-06-12

## 2019-06-20 ENCOUNTER — RX RENEWAL (OUTPATIENT)
Age: 59
End: 2019-06-20

## 2019-07-05 ENCOUNTER — APPOINTMENT (OUTPATIENT)
Dept: ENDOCRINOLOGY | Facility: CLINIC | Age: 59
End: 2019-07-05

## 2019-07-05 ENCOUNTER — APPOINTMENT (OUTPATIENT)
Dept: NEUROLOGY | Facility: CLINIC | Age: 59
End: 2019-07-05

## 2019-07-07 ENCOUNTER — RX RENEWAL (OUTPATIENT)
Age: 59
End: 2019-07-07

## 2019-07-14 ENCOUNTER — EMERGENCY (EMERGENCY)
Facility: HOSPITAL | Age: 59
LOS: 1 days | Discharge: HOME | End: 2019-07-14
Attending: EMERGENCY MEDICINE | Admitting: EMERGENCY MEDICINE
Payer: MEDICARE

## 2019-07-14 VITALS
OXYGEN SATURATION: 94 % | HEIGHT: 71 IN | RESPIRATION RATE: 20 BRPM | DIASTOLIC BLOOD PRESSURE: 95 MMHG | WEIGHT: 300.05 LBS | TEMPERATURE: 99 F | SYSTOLIC BLOOD PRESSURE: 151 MMHG | HEART RATE: 95 BPM

## 2019-07-14 DIAGNOSIS — G47.00 INSOMNIA, UNSPECIFIED: ICD-10-CM

## 2019-07-14 DIAGNOSIS — Z98.890 OTHER SPECIFIED POSTPROCEDURAL STATES: Chronic | ICD-10-CM

## 2019-07-14 LAB
APPEARANCE UR: CLEAR — SIGNIFICANT CHANGE UP
BILIRUB UR-MCNC: NEGATIVE — SIGNIFICANT CHANGE UP
COLOR SPEC: YELLOW — SIGNIFICANT CHANGE UP
DIFF PNL FLD: NEGATIVE — SIGNIFICANT CHANGE UP
GLUCOSE UR QL: NEGATIVE — SIGNIFICANT CHANGE UP
KETONES UR-MCNC: NEGATIVE — SIGNIFICANT CHANGE UP
LEUKOCYTE ESTERASE UR-ACNC: NEGATIVE — SIGNIFICANT CHANGE UP
NITRITE UR-MCNC: NEGATIVE — SIGNIFICANT CHANGE UP
PH UR: 6 — SIGNIFICANT CHANGE UP (ref 5–8)
PROT UR-MCNC: NEGATIVE — SIGNIFICANT CHANGE UP
SP GR SPEC: 1.01 — SIGNIFICANT CHANGE UP (ref 1.01–1.02)
UROBILINOGEN FLD QL: NEGATIVE — SIGNIFICANT CHANGE UP

## 2019-07-14 PROCEDURE — 82962 GLUCOSE BLOOD TEST: CPT

## 2019-07-14 PROCEDURE — 71046 X-RAY EXAM CHEST 2 VIEWS: CPT

## 2019-07-14 PROCEDURE — 71046 X-RAY EXAM CHEST 2 VIEWS: CPT | Mod: 26

## 2019-07-14 PROCEDURE — 99283 EMERGENCY DEPT VISIT LOW MDM: CPT

## 2019-07-14 PROCEDURE — 99283 EMERGENCY DEPT VISIT LOW MDM: CPT | Mod: 25

## 2019-07-14 NOTE — ED ADULT NURSE NOTE - CINV DISCH TEACH PARTICIP
H&P- Susie Glover 6/15/1921, 82 y o  female MRN: [id number]    Unit/Bed#: ED 09 Encounter: [id number]    Primary Care Provider: Fátima Asher MD   Date and time admitted to hospital: 1/18/2019 10:39 PM        * Acute cystitis with hematuria   Assessment & Plan    Will admit to Medicine, give Rocephin 1 g IV daily, cultures are currently pending as well as a lactate  Lightheadedness   Assessment & Plan    Likely secondary to urinary tract infection in a 82year-old will consult PT OT in a m  Obtain orthostatic vital signs Q shift and hydrate with normal saline at 75 cc/hour     Hyperlipidemia   Assessment & Plan    Substitute Pravachol 40 mg p o  Daily for pre-hospital Zocor     PVD (peripheral vascular disease) ([facility] [address] )   Assessment & Plan    Continue pre-hospital aspirin 81 mg p o  Daily       VTE Prophylaxis: Heparin  Code Status:  Level 1  POLST: There is no POLST form on file for this patient (pre-hospital)  Discussion with family:  None present at bedside at time of exam    Anticipated Length of Stay:  Patient will be admitted on an Inpatient basis with an anticipated length of stay of  > 2 midnights  Justification for Hospital Stay:  Acute cystitis with hematuria, generalized weakness, dizziness    Total Time for Visit, including Counseling / Coordination of Care: 45 minutes  Greater than 50% of this total time spent on direct patient counseling and coordination of care  Chief Complaint:   Lightheaded x2 days    History of Present Illness:    Susie Glover is a 82 y o  female who presents with lightheadedness x2 days  Patient is a somewhat limited historian but she states that over the past 2 days she has had multiple episodes of lightheadedness that seemed to resolved on their own after a short period of time  Patient states most recent episode was tonight while sitting watching television    Additionally patient complains of a cough which is nonproductive she states that several people live at the assisted living where she resides also have a cough with unknown diagnosis  Patient denies any fever chills she states that her cough is not productive she denies urinary complaints to include hematuria, dysuria or increased urinary frequency but denies history of recurrent urinary tract infections  Review of Systems:  Review of Systems   Constitutional: Positive for chills  Negative for fever  Respiratory: Positive for cough  Negative for chest tightness, shortness of breath and wheezing  Cardiovascular: Negative for chest pain, palpitations and leg swelling  Gastrointestinal: Negative for diarrhea, nausea and vomiting  Genitourinary: Negative for dysuria, frequency, hematuria and urgency  Neurological: Positive for weakness and light-headedness  Negative for dizziness, syncope and headaches  All other systems reviewed and are negative  Past Medical and Surgical History:   Past Medical History:   Diagnosis Date    Blind left eye     Edema     legs    Fx of fibula     Hypercholesterolemia     Hypertension     Hypotension        Past Surgical History:   Procedure Laterality Date    EYE SURGERY      HYSTERECTOMY      THYROID SURGERY         Meds/Allergies:  Prior to Admission medications    Medication Sig Start Date End Date Taking?  Authorizing Provider   Acetaminophen (APAP) 325 MG tablet Take 650 mg by mouth every 4 (four) hours as needed for mild pain    Historical Provider, MD   aspirin 81 MG tablet Take 81 mg by mouth daily    Historical Provider, MD   Coenzyme Q10 (CO Q 10) 100 MG CAPS Take by mouth    Historical Provider, MD   Difluprednate (DUREZOL) 0 05 % EMUL Apply to eye Install 1 drop into right eye every morning    Historical Provider, MD   docusate sodium (COLACE) 100 mg capsule Take 100 mg by mouth 2 (two) times a day    Historical Provider, MD   furosemide (LASIX) 40 mg tablet Take 40 mg by mouth daily    Historical Provider, MD   latanoprost (XALATAN) 0 005 % ophthalmic solution     Historical Provider, MD   Multiple Vitamin (MULTIVITAMINS PO) Take by mouth daily    Historical Provider, MD   Omega-3 Fatty Acids (OMEGA 3 PO) Take by mouth daily    Historical Provider, MD   simvastatin (ZOCOR) 20 mg tablet Take 20 mg by mouth daily    Historical Provider, MD   sodium chloride (IRISH 128) 5 % hypertonic ophthalmic ointment 1 drop daily    Historical Provider, MD     I have reveiwed home medications using records provided by [facility]  Allergies: No Known Allergies    Social History:  Marital Status:    Occupation:  Retired   Patient Pre-hospital Living Situation:  Resides at personal care home  Patient Pre-hospital Level of Mobility:  Ambulates with the assistance of a walker  Patient Pre-hospital Diet Restrictions:  None  Substance Use History:     History   Alcohol Use No     History   Smoking Status    Never Smoker   Smokeless Tobacco    Never Used     History   Drug Use No       Family History:  I have reviewed the patients family history    Physical Exam:   Vitals:   Blood Pressure: 106/58 (01/19/19 0229)  Pulse: 83 (01/19/19 0229)  Temperature: 97 8 °F (36 6 °C) (01/19/19 0229)  Temp Source: Temporal (01/19/19 0229)  Respirations: 22 (01/19/19 0229)  Height: 5' 4" (162 6 cm) (01/18/19 2248)  Weight - Scale: 77 1 kg (170 lb) (01/18/19 2248)  SpO2: 96 % (01/19/19 0229)    Physical Exam   Constitutional: She is oriented to person, place, and time  She appears well-developed and well-nourished  HENT:   Head: Normocephalic and atraumatic  Mouth/Throat: No oropharyngeal exudate  Eyes: Pupils are equal, round, and reactive to light  EOM are normal  No scleral icterus  Neck: Normal range of motion  Neck supple  No JVD present  Cardiovascular: Normal rate, regular rhythm and normal heart sounds  No murmur heard  Pulmonary/Chest: Effort normal and breath sounds normal  No respiratory distress  She has no wheezes  She has no rales     Abdominal: Soft  Bowel sounds are normal  There is no tenderness  There is no rebound and no guarding  Musculoskeletal: Normal range of motion  She exhibits no edema  Lymphadenopathy:     She has no cervical adenopathy  Neurological: She is alert and oriented to person, place, and time  Skin: Skin is warm and dry  No rash noted  No erythema  Psychiatric: She has a normal mood and affect  Her behavior is normal    Nursing note and vitals reviewed  Additional Data:   Lab Results: I have personally reviewed pertinent reports  Results from last 7 days  Lab Units 01/18/19  2348   WBC Thousand/uL 11 10*   HEMOGLOBIN g/dL 15 1   HEMATOCRIT % 46 4*   PLATELETS Thousands/uL 214   NEUTROS PCT % 82*   LYMPHS PCT % 8*   MONOS PCT % 10   EOS PCT % 0       Results from last 7 days  Lab Units 01/18/19  2347   POTASSIUM mmol/L 4 2   CHLORIDE mmol/L 100   CO2 mmol/L 31   BUN mg/dL 17   CREATININE mg/dL 0 85   CALCIUM mg/dL 9 5   ALK PHOS U/L 98   ALT U/L 9   AST U/L 14       Results from last 7 days  Lab Units 01/18/19  2347   INR  1 10       Results from last 7 days  Lab Units 01/18/19  2313   POC GLUCOSE mg/dl 91           Imaging: I have personally reviewed pertinent reports  XR chest 1 view portable    (Results Pending)       EKG, Pathology, and Other Studies Reviewed on Admission:   · EKG: N/A    NetAccess/Epic Records Reviewed: Yes     ** Please Note: This note has been constructed using a voice recognition system   ** Patient

## 2019-07-14 NOTE — ED PROVIDER NOTE - NSFOLLOWUPINSTRUCTIONS_ED_ALL_ED_FT
1. Follow up with your doctor in 1-2 days  2. Benadryl 25mg at night to help you sleep  3. Return to the ED for chest pain, shortness of breath, thoughts of hurting yourself or other people or any concerns  ***  Insomnia    WHAT YOU NEED TO KNOW:    Insomnia is a condition that makes it hard to fall or stay asleep. Lack of sleep can lead to attention or memory problems during the day. You may also be hidalgo, depressed, clumsy, or have headaches.    DISCHARGE INSTRUCTIONS:    Contact your healthcare provider if:     Your symptoms do not get better, or they get worse.      You begin to use drugs or alcohol to fall asleep.      You have questions or concerns about your condition or care.    Medicines:     Medicines may help you sleep more regularly or help you feel less anxious.      Take your medicine as directed. Contact your healthcare provider if you think your medicine is not helping or if you have side effects. Tell him or her if you are allergic to any medicine. Keep a list of the medicines, vitamins, and herbs you take. Include the amounts, and when and why you take them. Bring the list or the pill bottles to follow-up visits. Carry your medicine list with you in case of an emergency.    What you can do to improve your sleep:     Create a sleep schedule. Try to go to sleep and wake up at the same times every day. Keep a record of your sleep patterns, and any sleeping problems you have. Bring the record to follow-up visits with healthcare providers.      Do not take naps. Naps could make it hard for you to fall asleep at bedtime.      Keep your bedroom cool, quiet, and dark. Turn on white noise, such as a fan, to help you relax. Do not use your bed for any activity that will keep you awake. Do not read, exercise, eat, or watch TV in your bedroom.      Get up if you do not fall asleep within 20 minutes. Move to another room and do something relaxing until you become sleepy.      Limit caffeine, alcohol, and food to earlier in the day. Only drink caffeine in the morning. Do not drink alcohol within 6 hours of bedtime. Do not eat a heavy meal right before you go to bed.      Exercise regularly. Daily exercise may help you sleep better. Do not exercise within 4 hours of bedtime.    Follow up with your healthcare provider as directed: Your healthcare provider may refer you for cognitive behavioral therapy. A behavioral therapist may help you find ways to relax, decrease stress, and improve sleep. Write down your questions so you remember to ask them during your visits.

## 2019-07-14 NOTE — ED PROVIDER NOTE - CLINICAL SUMMARY MEDICAL DECISION MAKING FREE TEXT BOX
FS acceptable, CXR neg for PNA, UA neg, advised pt to take benadryl for sleep aid. Pt to f/u with PMD tomorrow and get machine fixed

## 2019-07-14 NOTE — ED PROVIDER NOTE - MUSCULOSKELETAL, MLM
Spine appears normal, range of motion is not limited, no muscle or joint tenderness. No clonus, rigidity, hyperreflexia or tremors.

## 2019-07-14 NOTE — ED PROVIDER NOTE - OBJECTIVE STATEMENT
59M h/o obesity, CHAYITO, uses CPAP at night, DM, HTN, bipolar d/o on lithium and ziprasidone, p/w inability to sleep x 1 day. Pt states that yesterday he felt that the CPAP mask wasn't fitting properly, he called the company and they stated that they will fix it tomorrow. Pt states he was told that sleep deprivation could exacerbate his bipolar d/o so came to get it checked out. Also c/o urinary frequency x 1 day and phlegm x 1 day. No chest pain or sob. No fevers, nausea, vomiting, abdo pain, dysuria, cough.  States due to lack of his sleep he feels weak all over. Has not tried any sleeping aids.   Denies racing thoughts, hallucinations, SI or HI.

## 2019-07-14 NOTE — ED ADULT TRIAGE NOTE - CHIEF COMPLAINT QUOTE
Pt BIB EMS from [facility] with c/o sleep deprivation x2 nights. Pt states his sleep apnea mask is ill fitting causing him to lose sleep. Pt states he is bipolar and is having no s/s of bipolar now. Pt denies SI/HI/hallucinations, or pain. Pt is compliant with all medications.

## 2019-07-15 ENCOUNTER — APPOINTMENT (OUTPATIENT)
Dept: PULMONOLOGY | Facility: CLINIC | Age: 59
End: 2019-07-15

## 2019-07-20 ENCOUNTER — EMERGENCY (EMERGENCY)
Facility: HOSPITAL | Age: 59
LOS: 1 days | Discharge: HOME | End: 2019-07-20
Attending: EMERGENCY MEDICINE | Admitting: EMERGENCY MEDICINE
Payer: MEDICARE

## 2019-07-20 VITALS
OXYGEN SATURATION: 98 % | DIASTOLIC BLOOD PRESSURE: 78 MMHG | HEART RATE: 80 BPM | RESPIRATION RATE: 18 BRPM | SYSTOLIC BLOOD PRESSURE: 140 MMHG

## 2019-07-20 VITALS
HEART RATE: 97 BPM | TEMPERATURE: 98 F | DIASTOLIC BLOOD PRESSURE: 95 MMHG | SYSTOLIC BLOOD PRESSURE: 153 MMHG | HEIGHT: 71 IN | WEIGHT: 300.05 LBS | RESPIRATION RATE: 20 BRPM | OXYGEN SATURATION: 96 %

## 2019-07-20 VITALS
TEMPERATURE: 99 F | DIASTOLIC BLOOD PRESSURE: 87 MMHG | SYSTOLIC BLOOD PRESSURE: 136 MMHG | OXYGEN SATURATION: 96 % | WEIGHT: 300.05 LBS | RESPIRATION RATE: 16 BRPM | HEART RATE: 95 BPM | HEIGHT: 71 IN

## 2019-07-20 VITALS
RESPIRATION RATE: 17 BRPM | SYSTOLIC BLOOD PRESSURE: 118 MMHG | TEMPERATURE: 98 F | OXYGEN SATURATION: 98 % | HEART RATE: 85 BPM | DIASTOLIC BLOOD PRESSURE: 75 MMHG

## 2019-07-20 DIAGNOSIS — F41.9 ANXIETY DISORDER, UNSPECIFIED: ICD-10-CM

## 2019-07-20 DIAGNOSIS — Z98.890 OTHER SPECIFIED POSTPROCEDURAL STATES: Chronic | ICD-10-CM

## 2019-07-20 DIAGNOSIS — F31.9 BIPOLAR DISORDER, UNSPECIFIED: ICD-10-CM

## 2019-07-20 LAB
ACETONE SERPL-MCNC: ABNORMAL
ANION GAP SERPL CALC-SCNC: 8 MMOL/L — SIGNIFICANT CHANGE UP (ref 5–17)
APPEARANCE UR: CLEAR — SIGNIFICANT CHANGE UP
BASOPHILS # BLD AUTO: 0.05 K/UL — SIGNIFICANT CHANGE UP (ref 0–0.2)
BASOPHILS NFR BLD AUTO: 0.5 % — SIGNIFICANT CHANGE UP (ref 0–2)
BILIRUB UR-MCNC: NEGATIVE — SIGNIFICANT CHANGE UP
BUN SERPL-MCNC: 31 MG/DL — HIGH (ref 7–23)
CALCIUM SERPL-MCNC: 9.9 MG/DL — SIGNIFICANT CHANGE UP (ref 8.4–10.5)
CHLORIDE SERPL-SCNC: 104 MMOL/L — SIGNIFICANT CHANGE UP (ref 96–108)
CO2 SERPL-SCNC: 24 MMOL/L — SIGNIFICANT CHANGE UP (ref 22–31)
COLOR SPEC: YELLOW — SIGNIFICANT CHANGE UP
CREAT SERPL-MCNC: 1.5 MG/DL — HIGH (ref 0.5–1.3)
DIFF PNL FLD: NEGATIVE — SIGNIFICANT CHANGE UP
EOSINOPHIL # BLD AUTO: 0.32 K/UL — SIGNIFICANT CHANGE UP (ref 0–0.5)
EOSINOPHIL NFR BLD AUTO: 3.5 % — SIGNIFICANT CHANGE UP (ref 0–6)
ETHANOL SERPL-MCNC: <3 MG/DL — SIGNIFICANT CHANGE UP (ref 0–3)
GLUCOSE BLDC GLUCOMTR-MCNC: 163 MG/DL — HIGH (ref 70–99)
GLUCOSE BLDC GLUCOMTR-MCNC: 251 MG/DL — HIGH (ref 70–99)
GLUCOSE BLDC GLUCOMTR-MCNC: 251 MG/DL — HIGH (ref 70–99)
GLUCOSE SERPL-MCNC: 292 MG/DL — HIGH (ref 70–99)
GLUCOSE UR QL: NEGATIVE — SIGNIFICANT CHANGE UP
HCT VFR BLD CALC: 47 % — SIGNIFICANT CHANGE UP (ref 39–50)
HGB BLD-MCNC: 15.2 G/DL — SIGNIFICANT CHANGE UP (ref 13–17)
IMM GRANULOCYTES NFR BLD AUTO: 0.8 % — SIGNIFICANT CHANGE UP (ref 0–1.5)
KETONES UR-MCNC: NEGATIVE — SIGNIFICANT CHANGE UP
LEUKOCYTE ESTERASE UR-ACNC: NEGATIVE — SIGNIFICANT CHANGE UP
LITHIUM SERPL-MCNC: 1.29 MMOL/L — HIGH (ref 0.6–1.2)
LYMPHOCYTES # BLD AUTO: 1.55 K/UL — SIGNIFICANT CHANGE UP (ref 1–3.3)
LYMPHOCYTES # BLD AUTO: 16.9 % — SIGNIFICANT CHANGE UP (ref 13–44)
MCHC RBC-ENTMCNC: 28.6 PG — SIGNIFICANT CHANGE UP (ref 27–34)
MCHC RBC-ENTMCNC: 32.3 GM/DL — SIGNIFICANT CHANGE UP (ref 32–36)
MCV RBC AUTO: 88.3 FL — SIGNIFICANT CHANGE UP (ref 80–100)
MONOCYTES # BLD AUTO: 0.58 K/UL — SIGNIFICANT CHANGE UP (ref 0–0.9)
MONOCYTES NFR BLD AUTO: 6.3 % — SIGNIFICANT CHANGE UP (ref 2–14)
NEUTROPHILS # BLD AUTO: 6.6 K/UL — SIGNIFICANT CHANGE UP (ref 1.8–7.4)
NEUTROPHILS NFR BLD AUTO: 72 % — SIGNIFICANT CHANGE UP (ref 43–77)
NITRITE UR-MCNC: NEGATIVE — SIGNIFICANT CHANGE UP
NRBC # BLD: 0 /100 WBCS — SIGNIFICANT CHANGE UP (ref 0–0)
PH UR: 6.5 — SIGNIFICANT CHANGE UP (ref 5–8)
PLATELET # BLD AUTO: 209 K/UL — SIGNIFICANT CHANGE UP (ref 150–400)
POTASSIUM SERPL-MCNC: 4 MMOL/L — SIGNIFICANT CHANGE UP (ref 3.5–5.3)
POTASSIUM SERPL-SCNC: 4 MMOL/L — SIGNIFICANT CHANGE UP (ref 3.5–5.3)
PROT UR-MCNC: NEGATIVE — SIGNIFICANT CHANGE UP
RBC # BLD: 5.32 M/UL — SIGNIFICANT CHANGE UP (ref 4.2–5.8)
RBC # FLD: 13.2 % — SIGNIFICANT CHANGE UP (ref 10.3–14.5)
SODIUM SERPL-SCNC: 136 MMOL/L — SIGNIFICANT CHANGE UP (ref 135–145)
SP GR SPEC: 1.01 — SIGNIFICANT CHANGE UP (ref 1.01–1.02)
UROBILINOGEN FLD QL: NEGATIVE — SIGNIFICANT CHANGE UP
WBC # BLD: 9.17 K/UL — SIGNIFICANT CHANGE UP (ref 3.8–10.5)
WBC # FLD AUTO: 9.17 K/UL — SIGNIFICANT CHANGE UP (ref 3.8–10.5)

## 2019-07-20 PROCEDURE — 96361 HYDRATE IV INFUSION ADD-ON: CPT

## 2019-07-20 PROCEDURE — 80178 ASSAY OF LITHIUM: CPT

## 2019-07-20 PROCEDURE — 93010 ELECTROCARDIOGRAM REPORT: CPT | Mod: 76

## 2019-07-20 PROCEDURE — 82009 KETONE BODYS QUAL: CPT

## 2019-07-20 PROCEDURE — 85027 COMPLETE CBC AUTOMATED: CPT

## 2019-07-20 PROCEDURE — 93010 ELECTROCARDIOGRAM REPORT: CPT

## 2019-07-20 PROCEDURE — 99284 EMERGENCY DEPT VISIT MOD MDM: CPT

## 2019-07-20 PROCEDURE — 82962 GLUCOSE BLOOD TEST: CPT

## 2019-07-20 PROCEDURE — 80307 DRUG TEST PRSMV CHEM ANLYZR: CPT

## 2019-07-20 PROCEDURE — 36415 COLL VENOUS BLD VENIPUNCTURE: CPT

## 2019-07-20 PROCEDURE — 90792 PSYCH DIAG EVAL W/MED SRVCS: CPT | Mod: GT

## 2019-07-20 PROCEDURE — 93005 ELECTROCARDIOGRAM TRACING: CPT

## 2019-07-20 PROCEDURE — 96360 HYDRATION IV INFUSION INIT: CPT

## 2019-07-20 PROCEDURE — 80048 BASIC METABOLIC PNL TOTAL CA: CPT

## 2019-07-20 PROCEDURE — 99285 EMERGENCY DEPT VISIT HI MDM: CPT

## 2019-07-20 PROCEDURE — 99283 EMERGENCY DEPT VISIT LOW MDM: CPT | Mod: 25

## 2019-07-20 RX ORDER — SODIUM CHLORIDE 9 MG/ML
1000 INJECTION INTRAMUSCULAR; INTRAVENOUS; SUBCUTANEOUS ONCE
Refills: 0 | Status: COMPLETED | OUTPATIENT
Start: 2019-07-20 | End: 2019-07-20

## 2019-07-20 RX ADMIN — SODIUM CHLORIDE 1000 MILLILITER(S): 9 INJECTION INTRAMUSCULAR; INTRAVENOUS; SUBCUTANEOUS at 08:30

## 2019-07-20 RX ADMIN — SODIUM CHLORIDE 1000 MILLILITER(S): 9 INJECTION INTRAMUSCULAR; INTRAVENOUS; SUBCUTANEOUS at 10:34

## 2019-07-20 RX ADMIN — SODIUM CHLORIDE 1000 MILLILITER(S): 9 INJECTION INTRAMUSCULAR; INTRAVENOUS; SUBCUTANEOUS at 09:23

## 2019-07-20 RX ADMIN — SODIUM CHLORIDE 2000 MILLILITER(S): 9 INJECTION INTRAMUSCULAR; INTRAVENOUS; SUBCUTANEOUS at 07:45

## 2019-07-20 NOTE — ED ADULT NURSE NOTE - NSIMPLEMENTINTERV_GEN_ALL_ED
Implemented All Universal Safety Interventions:  [place] to call system. Call bell, personal items and telephone within reach. Instruct patient to call for assistance. Room bathroom lighting operational. Non-slip footwear when patient is off stretcher. Physically safe environment: no spills, clutter or unnecessary equipment. Stretcher in lowest position, wheels locked, appropriate side rails in place.

## 2019-07-20 NOTE — ED BEHAVIORAL HEALTH ASSESSMENT NOTE - MEDICATIONS (PRESCRIPTIONS, DIRECTIONS)
No changes to medication. Of note, the patient's lithium this morning was elevated (1.29) because he takes lithium BID and his blood was drawn shortly after taking his morning dose of lithium.

## 2019-07-20 NOTE — ED ADULT TRIAGE NOTE - CHIEF COMPLAINT QUOTE
Pt presents to the ER requesting a psychiatry consult. Pt stated that he feel shaky and has a nervous stomach.

## 2019-07-20 NOTE — ED BEHAVIORAL HEALTH ASSESSMENT NOTE - REFERRAL / APPOINTMENT DETAILS
Return to day program at [facility] on July 22. Psychiatric NP YAJAIRA SAMUEL appointment on Wednesday July 24.

## 2019-07-20 NOTE — ED PROVIDER NOTE - ATTENDING CONTRIBUTION TO CARE
Dr. Murphy: I performed a face to face bedside interview with patient regarding history of present illness, review of symptoms and past medical history. I completed an independent physical exam.  I have discussed patient's plan of care with PA.   I agree with note as stated above, having amended the EMR as needed to reflect my findings.   This includes HISTORY OF PRESENT ILLNESS, HIV, PAST MEDICAL/SURGICAL/FAMILY/SOCIAL HISTORY, ALLERGIES AND HOME MEDICATIONS, REVIEW OF SYSTEMS, PHYSICAL EXAM, and any PROGRESS NOTES during the time I functioned as the attending physician for this patient.    dr murphy: pt with anxiety, will recheck lithium mandi  spoke with tele psych, they will evaluate patient   lithium level from earlier was 1.29, mild elevated-- will get EKG   reassess

## 2019-07-20 NOTE — ED PROVIDER NOTE - NS ED ROS FT
Except as otherwise indicated in HPI:  CONSTITUTIONAL: Neg  HEENT: neg  CV: neg  Resp: neg  GI: no nausea no emesis no diarrhea, no constipation  : Neg  MSK: Neg  SKIN: Neg  NEURO: Neg  PSYCHIATRIC: Neg  Heme/Onc: Neg

## 2019-07-20 NOTE — ED PROVIDER NOTE - PROGRESS NOTE DETAILS
DESIREE Godinez: Spoke with tele psych Dr. Yeyo Daniel, he reassured patient. Recommended he see his psychiatrist on Wednesday as scheduled and his therapist at the day program on Monday. He also recommended patient continue his lithium as directed, he believes the mild elevated lithium was due to patients blood being drawn shortly after he took  his lithium dose this morning. Patient verbalized understanding of these instructions. He is stable for d/c. will resend lithium level now

## 2019-07-20 NOTE — ED ADULT NURSE NOTE - DOES PATIENT HAVE ADVANCE DIRECTIVE
Pre-Procedure patient identification:  I am the primary operating surgeon/proceduralist and I have identified the patient on 08/10/18 at 10:11 AM Reymundo Pete MD  Phone Number: [phone]   
Pre-Procedure patient identification:  I am the primary operating surgeon/proceduralist and I have identified the patient on 08/10/18 at 9:25 AM Reymundo Pete MD  Phone Number: [phone]   
No

## 2019-07-20 NOTE — ED BEHAVIORAL HEALTH ASSESSMENT NOTE - SAFETY PLAN DETAILS
Safety planning was conducted. The patient is agreeing to return to the ED or call 911 in the event of SI/HI.

## 2019-07-20 NOTE — ED PROVIDER NOTE - PHYSICAL EXAMINATION
Gen:  alert, awake, no acute distress, overweight  Head:  atraumatic, normocephalic  HEENT: PERRLA, EOMI, normal nose, normal oropharynx, no tonsillar edema, erythema, or exudate  CV:  rrr, nl S1, S2, no m/r/g  Pulm:  lungs CTA b/l  Abd: s/nt/nd, +BS  MSK:  moving all extremities, no back midline ttp, no stepoffs, no cva TTP  Neuro:  grossly intact, no focal deficits  Skin:  clear, dry, intact  Psych: AOx3, normal affect, no apparent risk to self or others

## 2019-07-20 NOTE — ED BEHAVIORAL HEALTH NOTE - BEHAVIORAL HEALTH NOTE
Risk Stratification  Acute Suicide Risk  (  ) High   (  ) Moderate   (x  ) Low   (  ) Unable to determine   Rationale The patient's acute risk of harm to self or others is low because he is denying SI/HI. His chronic risk is moderate because of unmodifiable risks that include a diagnosis of bipolar disorder and a history of hospitalizations. These risks are being addressed through outpatient therapy and medication management. The patient's protective factors including no history of SI or suicide attempts or SIB, no substance abuse, being domiciled, being engaged in treatment, being future-oriented (about correcting CPAP). The patient is safe for outpatient care. He agrees to return to the ED or call 911 in the event of SI/HI.  Elevated Chronic Risk   ( x ) Yes ___________  Details Diagnosis of bipolar disorder, prior hospitalizations  (  ) No   ___________      Safety Plan   Details: ___________  [ x ] Safety plan discussed with patient  [  ] Education provided regarding environmental safety / lethal means restriction  [  ] Provision of National Suicide Prevention Lifeline 4-168-644-KUCB (1089)       C-SSRS Screener  1. Have you ever wished to be dead or wished you could go to sleep and not wake up?  [  ]Yes, [  x]No, [  ]Unable to Assess  Details _____________________________     2. Have you actually had any thoughts of killing yourself?   [  ]Yes, [ x ]No, [  ]Unable to Assess  Details _____________________________     If answer is “No” for 1 and 2, stop here. If answer is “Yes” to 1 or 2, proceed to 3.     3. Have you been thinking about how you might kill yourself?  [  ]Yes, [  ]No, [  ]Unable to Assess  Details _____________________________     4. Have you had these thoughts and had some intention of acting on them?  [  ]Yes, [  ]No, [  ]Unable to Assess  Details _____________________________     5. Have you started to work out or worked out the details of how to kill yourself? Do you intend to carry out this plan?  [  ]Yes, [  ]No, [  ]Unable to Assess  Details _____________________________     6. Have you ever done anything, started to do anything, or prepared to do anything to end your life? If so, was it in the past 3 months?  [  ]Yes, [  ]No, [  ]Unable to Assess  Details _____________________________      Additional Suicide Risk Factors (select all that apply)  [  ]Access to lethal means including firearms  [  ]Family history of suicide  [  ]Impulsivity  [  ] Current or past mood disorder  [  ] Current or past psychotic disorder  [  ] Current or past PTSD  [  ] Current or past ADHD  [  ] Current or past TBI  [ x ] Current or past cluster B personality disorder or traits  [  ] Current or past conduct problems  [  ] Recent onset of current or past psychiatric disorder  [ x ] Family history of psychiatric diagnoses requiring hospitalization  Additional Activating Events (select all that apply)  [  ]Perceived burden on family or others  [  ]Current sexual or physical abuse  [  ]Substance intoxication or withdrawal  [ x ]Inadequate social supports  [  ]Hopeless about or dissatisfied with current provider or treatment  Additional Protective Factors (select all that apply)  [ x ] Future plans  [  ] [employer] beliefs  [  ] Beloved pets

## 2019-07-20 NOTE — ED ADULT NURSE NOTE - CHPI ED NUR SYMPTOMS NEG
no dysuria/no fever/no hematuria/no nausea/no abdominal distension/no vomiting/no burning urination/nervous stomach/no blood in stool/no chills/no diarrhea

## 2019-07-20 NOTE — ED BEHAVIORAL HEALTH ASSESSMENT NOTE - DESCRIPTION
ICU Vital Signs Last 24 Hrs  T(C): 36.8 (20 Jul 2019 16:18), Max: 37.2 (20 Jul 2019 06:49)  T(F): 98.2 (20 Jul 2019 16:18), Max: 98.9 (20 Jul 2019 06:49)  HR: 97 (20 Jul 2019 16:18) (85 - 97)  BP: 153/95 (20 Jul 2019 16:18) (118/75 - 153/95)  BP(mean): --  ABP: --  ABP(mean): --  RR: 20 (20 Jul 2019 16:18) (16 - 20)  SpO2: 96% (20 Jul 2019 16:18) (96% - 98%)    Pt in ED for ~1 hrs prior to Telepsych consult at 16:16. Per Triage RN, provider (verbally conveyed to primary RN Katelin) that Pt arrived at ~ 17:22 dressed appropriately for the weather, with fair hygiene/grooming. Pt was compliant with good cooperation for entire process of wanding/search/ and gowning and was escorted to the  area with no issues. Nothing of note in pt’s belongings. RN reports patient provided both urine specimen and routine bloodwork willingly. Pt. has only requested water. As per RN, pt. has not been agitated or irritable during his stay.  Pt’s eye contact is good, his speech is normal, rate is normal, and articulation is clear. Pt’s affect is euthymic and his thought process is both linear and logical. Pt reports to RN/provider that he is feeling shaky and nervous and would like to speak with a psychiatrist.  Pt. has been in a private room and behaving calm and cooperative during his stay. Pt. denies SI/HI and any delusions. RN states he is A/Ox 4 and does not appear cognitively impaired. Per RN, pt. has not required psychiatric or medical medications or any security interventions. Pt has no visitors at bedside. Pt is placed on a 1:1 observation and in a private room ready for telepsychiatry evaluation. CKD, DM, HTN, obstructive sleep apnea Single, one adult son (estranged), unemployed, applying for jobs, lives alone Single, , one adult son (estranged), unemployed, applying for jobs, lives alone, used to work as a .

## 2019-07-20 NOTE — ED BEHAVIORAL HEALTH ASSESSMENT NOTE - OTHER PAST PSYCHIATRIC HISTORY (INCLUDE DETAILS REGARDING ONSET, COURSE OF ILLNESS, INPATIENT/OUTPATIENT TREATMENT)
Has had a total of four psychiatric hospitalizations, all for manic episodes. Most recent was in 1990 at [facility]. Had one other inpatient stay there before there (unsure when). Says he has had two psychiatric hospitalizations a [place]. Was diagnosed with bipolar disorder at age 20 and has been on lithium since then. No suicide attempts or SIB.

## 2019-07-20 NOTE — ED PROVIDER NOTE - CARE PROVIDERS DIRECT ADDRESSES
[email or web address]

## 2019-07-20 NOTE — ED PROVIDER NOTE - CARE PROVIDER_API CALL
Gwen Le)  Family Medicine  [address] [place] [address]  Phone: [phone]  Fax: [phone]  Follow Up Time:

## 2019-07-20 NOTE — ED BEHAVIORAL HEALTH ASSESSMENT NOTE - RISK ASSESSMENT
See  note for [place] suicide screener, risk stratification, risk factors. In sum,  the patient's acute risk of harm to self or others is low because he is denying SI/HI. His chronic risk is moderate because of unmodifiable risks that include a diagnosis of bipolar disorder, history of hospitalizations. These risks are being addressed through outpatient therapy and medication management. The patient's protective factors including no history of SI or suicide attempts or SIB, no substance abuse, being domiciled, being engaged in treatment, being future-oriented (about correcting CPAP). The patient is safe for outpatient care. He agrees to return to the ED or call 911 in the event of SI/HI.

## 2019-07-20 NOTE — ED BEHAVIORAL HEALTH ASSESSMENT NOTE - SUMMARY
59 year-old man with bipolar disorder, four psychiatric hospitalizations (most recently in 1990 at [facility]), no suicide attempts, no SIB, single, unemployed, no substance use, no legal issues, who walked into [facility] today with a complaint of poor sleep, worsening anxiety and a concern that these symptoms may be related to his bipolar disorder. The patient reports worsening sleep in the setting of a malfunctioning CPAP and worsening anxiety in the setting of increased psychosocial stressors related to his ex-girlfriend's declining health. Sleep hygiene was reviewed with the patient and he has a plan for fixing his CPAP. Coping skills were reviewed regarding the patient's anxiety, and he reported improvement in his anxiety after supportive counseling was provided and review of coping skills. The patient does not endorse persistently elevated or irritable mood or increased goal-directed activities, and on exam he does not have pressured speech or flight of ideas concerning for a manic episode. There is no evidence of acute psychosis. The patient denies SI or HI and denies safety concerns otherwise. The patient does not require inpatient admission, and he agrees with the plan of following up at his day program on Monday July 22. He reports his next appointment with his psychiatric NP is on Wednesday July 24.

## 2019-07-20 NOTE — ED BEHAVIORAL HEALTH ASSESSMENT NOTE - HPI (INCLUDE ILLNESS QUALITY, SEVERITY, DURATION, TIMING, CONTEXT, MODIFYING FACTORS, ASSOCIATED SIGNS AND SYMPTOMS)
59 year-old man with bipolar disorder, four psychiatric hospitalizations (most recently in 1990 at [facility]), no suicide attempts, no SIB, single, unemployed, no substance use, no legal issues, who walked into [facility] today with a complaint of poor sleep, worsening anxiety and a concern that these symptoms may be related to his bipolar disorder. On interview, the patient states that he is concerned that his CPAP has been on the langley for the past week and, as a result, he hasn't been sleeping as well for the past week. He says he typically sleeps soundly from 9 pm - 5 am. However, for the past week he has been going to sleep around 9 pm and then waking up around 1 am because of what he believes are variations in the positive pressure generated by the CPAP. He says he's having trouble falling back asleep and generally by 3 or 4 am he's back asleep and stays asleep until 6 am. Because of the interruptions in his sleep for the past week, he reports feeling fatigued. He denies when he gets up in the middle of the night that he engages in any activities. He reports being focused on trying to fall back asleep when he awakens in the middle of the night. Because of the trouble with his CPAP, he states he has made an appointment on July 24th with [facility] [place] to have his CPAP's calibration checked. He also reports having an orthodontic device being made by his dentist to help with his sleep apnea. Aside from poor sleep recently, the patient says his other complaint is worsening anxiety. He says his anxiety is related to struggles related to his former girlfriend. He reports she was admitted to a nursing home 14 years ago for "neglicting herself" and was recently diagnosed with dementia. He reports that in therapy he has recently been processing her illness and states that he and the therapist have decided that the patient should visit her less frequently. He says he's planning now on seeing her only once per month. The patient describes this a difficult for him and triggering his anxiety because there are very few others in his life. He says he's estranged from his 30 year-old son, his only child. He says his son told him several years ago, "You're not my real dad" and instead credited his step father as being his "real dad" since his step father was more involved in his care as a child than the patient was. He reports regrets about this now, too, which he says are contributing to his anxiety. The patient denies panic attacks. Despite his worsening anxiety recently, the patient reports that his mood for the past several weeks has been "even keeled." He denies anhedonia, saying he enjoys reading, shopping, going on walks, and going to the beach. He denies hopelessness or worthlessness. Reports he's taking care of his ADLs at home such as eating his meals and showering. He reports on his efforts to eat healthier and states he recently has lost 10 lbs, which he's very pleased about. The patient denies persistently irritable or elevated mood. He denies AH/VH, thought insertion, withdrawal, or broadcasting. He reports feeling safe. Denies SI/HI or concerns otherwise about his safety or that of others. When asked whether there was anyone in his life his treatment team could talk to to hear about how he's been doing recently, he stated the only people are at his day program. He states all of his current friends and social contacts are at the program he attends every Monday, Tuesday, and Wednesday at [facility]. 59 year-old man with bipolar disorder, four psychiatric hospitalizations (most recently in 1990 at [facility]), no suicide attempts, no SIB, single, unemployed, no substance use, no legal issues, in current outpatient treatment with psychiatric NP Aminata Berry, who walked into [facility] today with a complaint of poor sleep, worsening anxiety and a concern that these symptoms may be related to his bipolar disorder. On interview, the patient states that he is concerned that his CPAP has been on the langley for the past week and, as a result, he hasn't been sleeping as well for the past week. He says he typically sleeps soundly from 9 pm - 5 am. However, for the past week he has been going to sleep around 9 pm and then waking up around 1 am because of what he believes are variations in the positive pressure generated by the CPAP. He says he's having trouble falling back asleep and generally by 3 or 4 am he's back asleep and stays asleep until 6 am. Because of the interruptions in his sleep for the past week, he reports feeling fatigued. He denies when he gets up in the middle of the night that he engages in any activities. He reports being focused on trying to fall back asleep when he awakens in the middle of the night. Because of the trouble with his CPAP, he states he has made an appointment on July 24th with the [facility] in [place] to have his CPAP's calibration checked. He also reports having an orthodontic device being made by his dentist to help with his sleep apnea. Aside from poor sleep recently, the patient says his other complaint is worsening anxiety. He says his anxiety is related to struggles related to his former girlfriend. He reports she was admitted to a nursing home 14 years ago for "neglecting herself" and was recently diagnosed with dementia. He reports that in therapy he has recently been processing her illness and states that he and the therapist have decided that the patient should visit her less frequently. He says he's planning now on seeing her only once per month. The patient describes this a difficult for him and triggering his anxiety because there are very few others in his life. He says he's estranged from his 30 year-old son, his only child. He says his son told him several years ago, "You're not my real dad" and instead credited his step father as being his "real dad" since his step father was more involved in his care as a child than the patient was. He reports regrets about this now, too, which he says are contributing to his anxiety. The patient denies panic attacks. Despite his worsening anxiety recently, the patient reports that his mood for the past several weeks has been "even keeled." He denies anhedonia, saying he enjoys reading, shopping, going on walks, and going to the beach. He denies hopelessness or worthlessness. Reports he's taking care of his ADLs at home such as eating his meals and showering. He reports on his efforts to eat healthier and states he recently has lost 10 lbs, which he's very pleased about. The patient denies persistently irritable or elevated mood. He denies AH/VH, thought insertion, withdrawal, or broadcasting. He reports feeling safe. Denies SI/HI or concerns otherwise about his safety or that of others. When asked whether there was anyone in his life his treatment team could talk to to hear about how he's been doing recently, he stated the only people are at his day program. He states all of his current friends and social contacts are at the program he attends every Monday, Tuesday, and Wednesday at [facility].

## 2019-07-20 NOTE — ED PROVIDER NOTE - OBJECTIVE STATEMENT
Pt ate chicken soup over the past week and states it didn't feel right, ate multiple doses. C/o feeling "nervous" stomach, no emesis, no nausea. No diarrhea or constipation. Also states fs 200 past week which is high for him. No fever no chills. no cp, no sob. no back pain.

## 2019-07-20 NOTE — ED PROVIDER NOTE - NSFOLLOWUPINSTRUCTIONS_ED_ALL_ED_FT
Follow up with your therapist on Monday and your Psychiatrist on Wednesday as discussed     Continue taking your regular medications as directed. Read the additional information printed out for you.    Stay hydrated    Return to the ER if your symptoms worsen, high fevers, severe pain or for any other medical emergencies

## 2019-07-20 NOTE — ED BEHAVIORAL HEALTH ASSESSMENT NOTE - DETAILS
plan discussed reports that his mother had bipolar disorder and was on lithium and had at least one psychiatric hospitalization tremor

## 2019-07-20 NOTE — ED PROVIDER NOTE - OBJECTIVE STATEMENT
60 y/o M with PMH of DM, HTN, HLD, bipolar d/o on lithium and Geodon, presents to the ED requesting to speak with a psychiatrist. Patient seen in the ED earlier this morning for abd pain, he was discharged with instructions to f/u with his PCP. Reports when he got home he felt "nervous and anxious" reports he hasn't sleep in a few days, he is concerned his "bipolar is acting up". Reports he tried to call his psychiatrist Sarah from a day program at [facility]. He denies SI/HI/AH/VH, CP, SOB, n/v/d, abd pain or all other complaints. Denies ETOH or drug use

## 2019-07-20 NOTE — ED ADULT NURSE NOTE - OBJECTIVE STATEMENT
pt AO x3, ambulatory on his baseline, c/o nervous stomach for a week after taking chicken soup from his neighbor. Denies nausea at this time, but just feeling nervousness on his abdomen. Denies chest pain, dizziness, SOB, abdominal pain, or actual vomiting. FS 251mg/dl on arrival. As per pt, he ate breakfast around 730 AM this morning. PMH of HTN, DM and bipolar. Evaluated by provider. Blood obtained and sent to LAB. IV inserted on right hand 20G. Will continue to monitor.

## 2019-07-20 NOTE — ED PROVIDER NOTE - PHYSICAL EXAMINATION
Gen:  alert, awake, no acute distress  Head:  atraumatic, normocephalic  HEENT: PERRLA, EOMI, normal nose, normal oropharynx, no tonsillar edema, erythema, or exudate  CV:  rrr, nl S1, S2, no m/r/g  Pulm:  lungs CTA b/l  Abd: s/nt/nd, +BS  MSK:  moving all extremities, no back midline ttp, no stepoffs, no cva TTP  Neuro:  grossly intact, no focal deficits  Skin:  clear, dry, intact  Psych: AOx3, normal affect, no apparent risk to self or others

## 2019-07-20 NOTE — ED ADULT TRIAGE NOTE - CHIEF COMPLAINT QUOTE
Pt presents to the ED with complaints of nausea and a "nervous stomach" x 1 week. Pt states a week ago he ate chicken soup and it tasted "funny" and he has woken up nauseated every day since. PMHx of Bipolar disorder, HTN,  DM, BGM en route 238. Pt denies diarrhea, denies abdominal pain, denies vomiting.

## 2019-07-20 NOTE — ED PROVIDER NOTE - CLINICAL SUMMARY MEDICAL DECISION MAKING FREE TEXT BOX
spoke with tele psych, they will evaluate patient   lithium level from earlier was 1.29, mild elevated-- will get EKG   reassess

## 2019-07-20 NOTE — ED ADULT NURSE NOTE - HPI (INCLUDE ILLNESS QUALITY, SEVERITY, DURATION, TIMING, CONTEXT, MODIFYING FACTORS, ASSOCIATED SIGNS AND SYMPTOMS)
patient is 59 yr old male with h/o of bipolar, DM, HTN, high cholesterol, neuropathy presents with anxiety and nervousness and shaky. patient states that symptoms started couple days ago and sleep deprivation. sandro is on litium, geodan for the bipolar prescribed by DR. andrade in adult day care center. patient verbalises that his psychiatrist is away until Wednesday and he needs to see a psychiatrist to help him with his symptoms. patient denies non compliance to medications, denies alcohol or drugs use. patient denies suicidal ideation or homicidal ideation. Patient was seen in the ER earlier today for abdominal pain and discharged home without any complications. Patient back again for feeling anxious and nervous.

## 2019-07-20 NOTE — ED BEHAVIORAL HEALTH ASSESSMENT NOTE - CURRENT MEDICATION
Psychiatric medications include lithium (dose unknown, reports taking it BID), Geodon (dose unknown, reports taking it BID). Other meds per the chart: repaglinide 1 mg oral tablet: 1 tab(s) orally 3 times a day (before meals), Tradjenta 5 mg oral tablet: 1 tab(s) orally once a day, metoprolol tartrate 25 mg oral tablet: 1 tab(s) orally 2 times a day, Vascepa 1 g oral capsule: 2 cap(s) orally 2 times a day, atorvastatin 10 mg oral tablet: 1 tab(s) orally once a day

## 2019-07-21 LAB — LITHIUM SERPL-MCNC: 1.15 MMOL/L — SIGNIFICANT CHANGE UP (ref 0.6–1.2)

## 2019-07-24 ENCOUNTER — APPOINTMENT (OUTPATIENT)
Dept: PULMONOLOGY | Facility: CLINIC | Age: 59
End: 2019-07-24
Payer: MEDICARE

## 2019-07-24 VITALS
SYSTOLIC BLOOD PRESSURE: 124 MMHG | WEIGHT: 289 LBS | RESPIRATION RATE: 16 BRPM | OXYGEN SATURATION: 97 % | BODY MASS INDEX: 40.31 KG/M2 | HEART RATE: 85 BPM | DIASTOLIC BLOOD PRESSURE: 83 MMHG | TEMPERATURE: 97.5 F

## 2019-07-24 PROCEDURE — 99214 OFFICE O/P EST MOD 30 MIN: CPT | Mod: GC

## 2019-07-24 NOTE — PHYSICAL EXAM
[Normal Appearance] : normal appearance [General Appearance - Well Developed] : well developed [Well Groomed] : well groomed [General Appearance - Well Nourished] : well nourished [General Appearance - In No Acute Distress] : no acute distress [No Deformities] : no deformities [Normal Conjunctiva] : the conjunctiva exhibited no abnormalities [Eyelids - No Xanthelasma] : the eyelids demonstrated no xanthelasmas [IV] : IV [Neck Appearance] : the appearance of the neck was normal [Neck Cervical Mass (___cm)] : no neck mass was observed [Jugular Venous Distention Increased] : there was no jugular-venous distention [Thyroid Nodule] : there were no palpable thyroid nodules [Thyroid Diffuse Enlargement] : the thyroid was not enlarged [Heart Sounds] : normal S1 and S2 [Heart Rate And Rhythm] : heart rate was normal and rhythm regular [Heart Sounds Gallop] : no gallops [Murmurs] : no murmurs [Heart Sounds Pericardial Friction Rub] : no pericardial rub [Auscultation Breath Sounds / Voice Sounds] : lungs were clear to auscultation bilaterally [Bowel Sounds] : normal bowel sounds [Abdomen Soft] : soft [Abdomen Tenderness] : non-tender [Abdomen Mass (___ Cm)] : no abdominal mass palpated [Abnormal Walk] : normal gait [Musculoskeletal - Swelling] : no joint swelling seen [Motor Tone] : muscle strength and tone were normal [Nail Clubbing] : no clubbing of the fingernails [Cyanosis, Localized] : no localized cyanosis [Petechial Hemorrhages (___cm)] : no petechial hemorrhages [Skin Color & Pigmentation] : normal skin color and pigmentation [Skin Turgor] : normal skin turgor [] : no rash [Deep Tendon Reflexes (DTR)] : deep tendon reflexes were 2+ and symmetric [No Focal Deficits] : no focal deficits [Sensation] : the sensory exam was normal to light touch and pinprick [Oriented To Time, Place, And Person] : oriented to person, place, and time [Impaired Insight] : insight and judgment were intact [Affect] : the affect was normal

## 2019-07-25 NOTE — HISTORY OF PRESENT ILLNESS
[Obstructive Sleep Apnea] : obstructive sleep apnea [Snoring] : snoring [Witnessed Apneas] : witnessed sleep apnea [Awakes Unrefreshed] : awakening unrefreshed [Awakes with Headache] : headache upon awakening [Date: ___] : Date of most recent diagnostic polysomnogram: [unfilled] [AHI: ___ per hour] : Apnea-hypopnea index:  [unfilled] per hour [T90%: ___] : T90%: [unfilled]% [Rodney desatuation%: ___] : Rodney desaturation:  [unfilled]% [FreeTextEntry1] : 58 year year old male with severe obstructive sleep apnea, currently untreated, presents for follow-up.\par \par COMORBID: hypertension, hyperlipidemia, diabetes, neuropathy, manic depression, obesity, deviated nasal septum, allergic rhinitis. \par \par The patient states that he has been intermittently treated with CPAP therapy since he was diagnosed with severe CHAYITO approximately 20 years ago. His CPAP was recently picked-up due to noncompliance. He states that he suffers from chronic nasal congestion and was awakening with the mask off. He states he is being fitted for an nasal-mandibular device with Dr. Mcfarland this weekend and will update us on how he is tolerating it. \par \par He attends a mental health program 5-hours daily for 5-days weekly. No tobacco, alcohol, or drug history. No caffeinated beverages consumed.. \par

## 2019-07-25 NOTE — ASSESSMENT
[FreeTextEntry1] : 58yo male patient with multiple comorbidities including atherosclerosis, HTN, HLD, DM2, BPH, CHAYITO (on CPAP), and bipolar d/o.\par \par 1. CHAYITO-\par -pt not using cpap regularly due to intolerance  but still endorses symptoms of frequent nocturnal awakenings and EDS\par -Settings on RESMED machine are APAP 4-12\par -Compliance report  is poor with >/=4 hours of usage only taking place on 4 of 30 days\par -Patient being fitted for a hybrid nasal pillows mask -mandibular advancement device with Dr. Mcfarland and getting on Saturday- hybrid cpap- oral appliance may be more tolerable for pt with reduced pap requirements as well as less mandibular advancement\par -C/w weight loss through diet and exercise as tolerated\par he has declined referral to weight management and consideration of bariatric surgery\par -The patient was cautioned on the importance of avoiding drowsy driving.\par The ramifications of obstructive sleep apnea and its potential therapeutic modalities were discussed with the patient. \par \par Stanley Bird DO\par Sleep Fellow

## 2019-07-29 ENCOUNTER — RX RENEWAL (OUTPATIENT)
Age: 59
End: 2019-07-29

## 2019-08-02 ENCOUNTER — INBOUND DOCUMENT (OUTPATIENT)
Age: 59
End: 2019-08-02

## 2019-08-07 ENCOUNTER — CLINICAL ADVICE (OUTPATIENT)
Age: 59
End: 2019-08-07

## 2019-08-08 ENCOUNTER — APPOINTMENT (OUTPATIENT)
Dept: ENDOCRINOLOGY | Facility: CLINIC | Age: 59
End: 2019-08-08

## 2019-08-22 ENCOUNTER — APPOINTMENT (OUTPATIENT)
Dept: PULMONOLOGY | Facility: CLINIC | Age: 59
End: 2019-08-22
Payer: MEDICARE

## 2019-08-22 VITALS
HEART RATE: 88 BPM | WEIGHT: 291 LBS | DIASTOLIC BLOOD PRESSURE: 88 MMHG | OXYGEN SATURATION: 97 % | BODY MASS INDEX: 40.59 KG/M2 | TEMPERATURE: 98.1 F | RESPIRATION RATE: 17 BRPM | SYSTOLIC BLOOD PRESSURE: 113 MMHG

## 2019-08-22 PROCEDURE — 99214 OFFICE O/P EST MOD 30 MIN: CPT | Mod: GC

## 2019-08-22 NOTE — PHYSICAL EXAM
[Normal Appearance] : normal appearance [General Appearance - Well Developed] : well developed [General Appearance - Well Nourished] : well nourished [Well Groomed] : well groomed [General Appearance - In No Acute Distress] : no acute distress [No Deformities] : no deformities [Eyelids - No Xanthelasma] : the eyelids demonstrated no xanthelasmas [Normal Conjunctiva] : the conjunctiva exhibited no abnormalities [Low Lying Soft Palate] : low lying soft palate [Neck Appearance] : the appearance of the neck was normal [Neck Cervical Mass (___cm)] : no neck mass was observed [Jugular Venous Distention Increased] : there was no jugular-venous distention [Heart Rate And Rhythm] : heart rate was normal and rhythm regular [Thyroid Nodule] : there were no palpable thyroid nodules [Thyroid Diffuse Enlargement] : the thyroid was not enlarged [Heart Sounds] : normal S1 and S2 [Heart Sounds Gallop] : no gallops [Murmurs] : no murmurs [Heart Sounds Pericardial Friction Rub] : no pericardial rub [Auscultation Breath Sounds / Voice Sounds] : lungs were clear to auscultation bilaterally [Bowel Sounds] : normal bowel sounds [Abdomen Soft] : soft [Abdomen Tenderness] : non-tender [Abnormal Walk] : normal gait [Abdomen Mass (___ Cm)] : no abdominal mass palpated [Musculoskeletal - Swelling] : no joint swelling seen [Motor Tone] : muscle strength and tone were normal [Nail Clubbing] : no clubbing of the fingernails [Cyanosis, Localized] : no localized cyanosis [Petechial Hemorrhages (___cm)] : no petechial hemorrhages [Skin Turgor] : normal skin turgor [Skin Color & Pigmentation] : normal skin color and pigmentation [] : no rash [No Focal Deficits] : no focal deficits [Sensation] : the sensory exam was normal to light touch and pinprick [Deep Tendon Reflexes (DTR)] : deep tendon reflexes were 2+ and symmetric

## 2019-08-23 NOTE — HISTORY OF PRESENT ILLNESS
[FreeTextEntry1] : 59 year year old male with severe obstructive sleep apnea, currently untreated, presents for follow-up.\par \par COMORBID: hypertension, hyperlipidemia, diabetes, neuropathy, manic depression, obesity, deviated nasal septum, allergic rhinitis. \par \par He states he has received the nasal-mandibular device with Dr. Mcfarland this weekend and is still getting acclimated to it.  He notes that he at times can forget to put it back on in the night if he awakens to use the bathroom.\par \par He attends a mental health program 5-hours daily for 5-days weekly. No tobacco, alcohol, or drug history. No caffeinated beverages consumed..

## 2019-08-23 NOTE — ASSESSMENT
[FreeTextEntry1] : 59yo male patient with multiple comorbidities including atherosclerosis, HTN, HLD, DM2, BPH, CHAYITO (on CPAP), and bipolar d/o.\par \par 1. CHAYITO-\par -Settings on RESMED machine are APAP 4-12\par -Compliance report is poor with >/=4 hours of usage is 17%, but the AHI is 7.1 events/hr when he uses it\par -He will cont the nasal pillows mask -mandibular advancement device that he received from Dr. Mcfarland  - encouraged to increase use to the entirety of the night.\par -C/w weight loss through diet and exercise as tolerated  \par -He has declined referral to weight management and consideration of bariatric surgery\par -The patient was cautioned on the importance of avoiding drowsy driving.\par The ramifications of obstructive sleep apnea and its potential therapeutic modalities were discussed with the patient. \par \par Stanley Bird DO\par Sleep Fellow. \par

## 2019-08-29 ENCOUNTER — APPOINTMENT (OUTPATIENT)
Dept: BARIATRICS/WEIGHT MGMT | Facility: CLINIC | Age: 59
End: 2019-08-29
Payer: MEDICARE

## 2019-08-29 VITALS
OXYGEN SATURATION: 97 % | HEART RATE: 79 BPM | HEIGHT: 71 IN | SYSTOLIC BLOOD PRESSURE: 114 MMHG | WEIGHT: 286 LBS | DIASTOLIC BLOOD PRESSURE: 70 MMHG | BODY MASS INDEX: 40.04 KG/M2

## 2019-08-29 PROCEDURE — 99214 OFFICE O/P EST MOD 30 MIN: CPT

## 2019-09-04 NOTE — HISTORY OF PRESENT ILLNESS
[FreeTextEntry1] : This is a 59 year old male present in office today for weight loss\par \par He was here in Oct 2018 and has lost 25 pounds since then. He states he is more prepared now to lose weight\par \par Patient states he is unable to walk well and contributes to untreated sleep apnea. He has a dental mouth guard but seems to take it out of his mouth in the middle of the night. \par He lives on his own, with an independent living program \par He prepares his meals at home and will get 1 meal 3 days a week from an adult day care\par For breakfast he is eating oatmeal or cold cereal, eggs 1-2x/week. He makes himself chicken with vegetables, or brown rice and vegetables. Most of the summer he prepare hot dogs for lunch\par Stopped drinking soda recently, and does not snack \par \par He believes he walks 1 mile a day when he is at program, which he has been attending every day. \par \par He said he stopped prandin b/c he could not get to pharmacy. He started getting paranoid, having thoughts someone was going to hurt him, when he restarted meds, thoughts went away.

## 2019-09-04 NOTE — ASSESSMENT
[FreeTextEntry1] : Plan: \par \par Reviewed whole food concept. Avoid added fat, sugar, refined carbohydrates\par Goal is to focus on food, and increase mobility\par Continue CPAP and Wear mouth guard every night for CHAYITO. \par Although we discussed different medication options, patient is not interested in starting anything new. \par \par RTO 2-3 weeks

## 2019-09-11 ENCOUNTER — EMERGENCY (EMERGENCY)
Facility: HOSPITAL | Age: 59
LOS: 1 days | Discharge: HOME | End: 2019-09-11
Attending: EMERGENCY MEDICINE | Admitting: EMERGENCY MEDICINE
Payer: MEDICARE

## 2019-09-11 VITALS
DIASTOLIC BLOOD PRESSURE: 78 MMHG | SYSTOLIC BLOOD PRESSURE: 132 MMHG | HEART RATE: 83 BPM | TEMPERATURE: 98 F | OXYGEN SATURATION: 97 % | RESPIRATION RATE: 18 BRPM

## 2019-09-11 VITALS
HEIGHT: 71 IN | TEMPERATURE: 99 F | OXYGEN SATURATION: 96 % | DIASTOLIC BLOOD PRESSURE: 85 MMHG | WEIGHT: 291.01 LBS | SYSTOLIC BLOOD PRESSURE: 123 MMHG | RESPIRATION RATE: 18 BRPM | HEART RATE: 87 BPM

## 2019-09-11 DIAGNOSIS — Z98.890 OTHER SPECIFIED POSTPROCEDURAL STATES: Chronic | ICD-10-CM

## 2019-09-11 LAB
ALBUMIN SERPL ELPH-MCNC: 3.6 G/DL — SIGNIFICANT CHANGE UP (ref 3.3–5)
ALP SERPL-CCNC: 97 U/L — SIGNIFICANT CHANGE UP (ref 40–120)
ALT FLD-CCNC: 30 U/L — SIGNIFICANT CHANGE UP (ref 10–45)
ANION GAP SERPL CALC-SCNC: 11 MMOL/L — SIGNIFICANT CHANGE UP (ref 5–17)
APTT BLD: 31.3 SEC — SIGNIFICANT CHANGE UP (ref 27.5–36.3)
AST SERPL-CCNC: 23 U/L — SIGNIFICANT CHANGE UP (ref 10–40)
BILIRUB SERPL-MCNC: 1.1 MG/DL — SIGNIFICANT CHANGE UP (ref 0.2–1.2)
BUN SERPL-MCNC: 27 MG/DL — HIGH (ref 7–23)
CALCIUM SERPL-MCNC: 9.9 MG/DL — SIGNIFICANT CHANGE UP (ref 8.4–10.5)
CHLORIDE SERPL-SCNC: 100 MMOL/L — SIGNIFICANT CHANGE UP (ref 96–108)
CO2 SERPL-SCNC: 23 MMOL/L — SIGNIFICANT CHANGE UP (ref 22–31)
CREAT SERPL-MCNC: 1.46 MG/DL — HIGH (ref 0.5–1.3)
D DIMER BLD IA.RAPID-MCNC: <150 NG/ML DDU — SIGNIFICANT CHANGE UP
GLUCOSE SERPL-MCNC: 142 MG/DL — HIGH (ref 70–99)
HCT VFR BLD CALC: 44 % — SIGNIFICANT CHANGE UP (ref 39–50)
HGB BLD-MCNC: 14.6 G/DL — SIGNIFICANT CHANGE UP (ref 13–17)
INR BLD: 1.13 RATIO — SIGNIFICANT CHANGE UP (ref 0.88–1.16)
MCHC RBC-ENTMCNC: 29.1 PG — SIGNIFICANT CHANGE UP (ref 27–34)
MCHC RBC-ENTMCNC: 33.2 GM/DL — SIGNIFICANT CHANGE UP (ref 32–36)
MCV RBC AUTO: 87.8 FL — SIGNIFICANT CHANGE UP (ref 80–100)
NRBC # BLD: 0 /100 WBCS — SIGNIFICANT CHANGE UP (ref 0–0)
NT-PROBNP SERPL-SCNC: 43 PG/ML — SIGNIFICANT CHANGE UP (ref 0–300)
PLATELET # BLD AUTO: 203 K/UL — SIGNIFICANT CHANGE UP (ref 150–400)
POTASSIUM SERPL-MCNC: 4.4 MMOL/L — SIGNIFICANT CHANGE UP (ref 3.5–5.3)
POTASSIUM SERPL-SCNC: 4.4 MMOL/L — SIGNIFICANT CHANGE UP (ref 3.5–5.3)
PROT SERPL-MCNC: 7.6 G/DL — SIGNIFICANT CHANGE UP (ref 6–8.3)
PROTHROM AB SERPL-ACNC: 12.7 SEC — SIGNIFICANT CHANGE UP (ref 10–12.9)
RBC # BLD: 5.01 M/UL — SIGNIFICANT CHANGE UP (ref 4.2–5.8)
RBC # FLD: 13.4 % — SIGNIFICANT CHANGE UP (ref 10.3–14.5)
SODIUM SERPL-SCNC: 134 MMOL/L — LOW (ref 135–145)
TROPONIN I SERPL-MCNC: <.017 NG/ML — LOW (ref 0.02–0.06)
TROPONIN I SERPL-MCNC: <.017 NG/ML — LOW (ref 0.02–0.06)
WBC # BLD: 10.44 K/UL — SIGNIFICANT CHANGE UP (ref 3.8–10.5)
WBC # FLD AUTO: 10.44 K/UL — SIGNIFICANT CHANGE UP (ref 3.8–10.5)

## 2019-09-11 PROCEDURE — 71045 X-RAY EXAM CHEST 1 VIEW: CPT | Mod: 26

## 2019-09-11 PROCEDURE — 93010 ELECTROCARDIOGRAM REPORT: CPT | Mod: 76

## 2019-09-11 PROCEDURE — 36415 COLL VENOUS BLD VENIPUNCTURE: CPT

## 2019-09-11 PROCEDURE — 71045 X-RAY EXAM CHEST 1 VIEW: CPT

## 2019-09-11 PROCEDURE — 85379 FIBRIN DEGRADATION QUANT: CPT

## 2019-09-11 PROCEDURE — 99285 EMERGENCY DEPT VISIT HI MDM: CPT

## 2019-09-11 PROCEDURE — 83880 ASSAY OF NATRIURETIC PEPTIDE: CPT

## 2019-09-11 PROCEDURE — 85027 COMPLETE CBC AUTOMATED: CPT

## 2019-09-11 PROCEDURE — 80053 COMPREHEN METABOLIC PANEL: CPT

## 2019-09-11 PROCEDURE — 99284 EMERGENCY DEPT VISIT MOD MDM: CPT | Mod: 25

## 2019-09-11 PROCEDURE — 85730 THROMBOPLASTIN TIME PARTIAL: CPT

## 2019-09-11 PROCEDURE — 85610 PROTHROMBIN TIME: CPT

## 2019-09-11 PROCEDURE — 93005 ELECTROCARDIOGRAM TRACING: CPT

## 2019-09-11 PROCEDURE — 84484 ASSAY OF TROPONIN QUANT: CPT

## 2019-09-11 NOTE — ED ADULT NURSE NOTE - OBJECTIVE STATEMENT
Pt reported feeling short of breath this morning after walking about one mile and a half. On arrival to ED pt reports feeling improvement in breathing; denies chest pain or shortness of breath.

## 2019-09-11 NOTE — ED PROVIDER NOTE - PATIENT PORTAL LINK FT
You can access the FollowMyHealth Patient Portal offered by [facility] by registering at the following website: [email or web address]. By joining [facility]’s FollowMyHealth portal, you will also be able to view your health information using other applications (apps) compatible with our system.

## 2019-09-11 NOTE — ED PROVIDER NOTE - CLINICAL SUMMARY MEDICAL DECISION MAKING FREE TEXT BOX
59 yr old male with hx of DM,, neuropathy, HTN, bipolar disorder, sleep apnea, testicular cancer presents c/o shortness of breath. Pt reports he was walking to the grocery store and felt SOB. Pt went home and sat down, and sob resolved. Denies any chest pain, fever, chills. n/v/d, or dizziness.   positive productive cough for the last month   non smoker   Cardiologist- Dr. Mckenna- last seen 6 months ago, fu appt this friday   pmd- Dr. Fall 59 yr old male with hx of DM,, neuropathy, HTN, bipolar disorder, sleep apnea, testicular cancer presents c/o shortness of breath. Pt reports he was walking to the grocery store and felt SOB. Pt went home and sat down, and sob resolved. Denies any chest pain, fever, chills. n/v/d, or dizziness.   positive productive cough for the last month . no fever. no other episodes of sob. He has concerns about his cpap machine as he unconsciously removes it in the middle of the night. He will address this with his PCP tomorrow.   non smoker   Cardiologist- Dr. Mckenna- last seen 6 months ago, fu appt this friday   pmd- Dr. Fall  Labs, CXR, (ddimer and trop), EKG without concerning findings. Although he had no chest pain, consider poss anginal equivalent, will get 2nd trop at 4pm. If normal, okay to f/u with PCP tomorrow and cardio on Friday as scheduled. 59 yr old male with hx of DM,, neuropathy, HTN, bipolar disorder, sleep apnea, testicular cancer presents c/o shortness of breath. Pt reports he was walking to the grocery store and felt SOB. Pt went home and sat down, and sob resolved. Denies any chest pain, fever, chills. n/v/d, or dizziness.   positive productive cough for the last month . no fever. no other episodes of sob. He has concerns about his cpap machine as he unconsciously removes it in the middle of the night. He will address this with his PCP tomorrow.   non smoker   Cardiologist- Dr. Mckenna- last seen 6 months ago, fu appt this friday   pmd- Dr. Fall  Labs, CXR, (ddimer and trop), EKG without concerning findings. Although he had no chest pain, consider poss anginal equivalent, will get 2nd trop at 4pm. If normal, okay to f/u with PCP tomorrow and cardio on Friday as scheduled.  2nd trop neg, no change in EKG   pt stable for discharge

## 2019-09-11 NOTE — ED PROVIDER NOTE - NSFOLLOWUPINSTRUCTIONS_ED_ALL_ED_FT
Follow up with your pmd as scheduled for tomorrow.   Follow up with your cardiologist as scheduled for this Friday     Dyspnea    WHAT YOU NEED TO KNOW:    Dyspnea is breathing difficulty or discomfort. You may have labored, painful, or shallow breathing. You may feel breathless or short of breath. Dyspnea can occur during rest or with activity. You may have dyspnea for a short time, or it might become chronic. Dyspnea is often a symptom of a disease or condition.    DISCHARGE INSTRUCTIONS:    Return to the emergency department if:     Your signs and symptoms are the same or worse within 24 hours of treatment.       You have shaking chills or a fever over 102°F.       You have new pain, pressure, or tightness in your chest.       You have a new or worse cough or wheezing, or you cough up blood.      You feel like you cannot get enough air.      The skin over your ribs or on your neck sinks in when you breathe.       You have a severe headache with vomiting and abdominal pain.       You feel confused or dizzy.    Contact your healthcare provider or specialist if:     You have questions or concerns about your condition or care.        Medicines:     Medicines may be used to treat the cause of your dyspnea. Medicines may reduce swelling in your airway or decrease extra fluid from around your heart or lungs. Other medicines may be used to decrease anxiety and help you feel calm and relaxed.      Take your medicine as directed. Contact your healthcare provider if you think your medicine is not helping or if you have side effects. Tell him or her if you are allergic to any medicine. Keep a list of the medicines, vitamins, and herbs you take. Include the amounts, and when and why you take them. Bring the list or the pill bottles to follow-up visits. Carry your medicine list with you in case of an emergency.    Manage long-term dyspnea:     Create an action plan. You and your healthcare provider can work together to create a plan for how to handle episodes of dyspnea. The plan can include daily activities, treatment changes, and what to do if you have severe breathing problems.      Lean forward on your elbows when you sit. This helps your lungs expand and may make it easier to breathe.      Use pursed-lip breathing any time you feel short of breath. Breathe in through your nose and then slowly breathe out through your mouth with your lips slightly puckered. It should take you twice as long to breathe out as it did to breathe in.Breathe in Breathe out           Do not smoke. Nicotine and other chemicals in cigarettes and cigars can cause lung damage and make it harder to breathe. Ask your healthcare provider for information if you currently smoke and need help to quit. E-cigarettes or smokeless tobacco still contain nicotine. Talk to your healthcare provider before you use these products.       Reach or maintain a healthy weight. Your healthcare provider can help you create a safe weight loss plan if you are overweight.      Exercise as directed. Exercise can help your lungs work more easily. Exercise can also help you lose weight if needed. Try to get at least 30 minutes of exercise most days of the week. Your healthcare provider can help you create an exercise plan that is safe for you.    Follow up with your healthcare provider or specialist as directed: Write down your questions so you remember to ask them during your visits.

## 2019-09-11 NOTE — ED PROVIDER NOTE - OBJECTIVE STATEMENT
59 yr old male with hx of DM,, neuropathy, HTN, bipolar disorder, sleep apnea, testicular cancer presents c/o shortness of breath. Pt reports he was walking to the grocery store and felt SOB. Pt went home and sat down, and sob resolved. Denies any chest pain, fever, chills. n/v/d, or dizziness.   positive productive cough for the last month   non smoker   Cardiologist- Dr. Mckenna- last seen 6 months ago, fu appt this friday   pmd- Dr. Fall 59 yr old male with hx of DM,, neuropathy, HTN, bipolar disorder, sleep apnea, testicular cancer presents c/o shortness of breath. Pt reports he was walking to the grocery store and felt SOB. Pt went home and sat down, and sob resolved. Denies any chest pain, fever, chills. n/v/d, or dizziness.   positive productive cough for the last month   non smoker   Cardiologist- Dr. Mckenna- last seen 6 months ago, fu appt this friday for echo and stress  pmd- Dr. Fall

## 2019-09-11 NOTE — ED ADULT NURSE NOTE - NSIMPLEMENTINTERV_GEN_ALL_ED
Implemented All Universal Safety Interventions:  [place] to call system. Call bell, personal items and telephone within reach. Instruct patient to call for assistance. Room bathroom lighting operational. Non-slip footwear when patient is off stretcher. Physically safe environment: no spills, clutter or unnecessary equipment. Stretcher in lowest position, wheels locked, appropriate side rails in place.

## 2019-09-12 ENCOUNTER — RX RENEWAL (OUTPATIENT)
Age: 59
End: 2019-09-12

## 2019-09-13 ENCOUNTER — MEDICATION RENEWAL (OUTPATIENT)
Age: 59
End: 2019-09-13

## 2019-09-13 ENCOUNTER — APPOINTMENT (OUTPATIENT)
Dept: CARDIOLOGY | Facility: CLINIC | Age: 59
End: 2019-09-13
Payer: MEDICARE

## 2019-09-13 PROCEDURE — 93306 TTE W/DOPPLER COMPLETE: CPT

## 2019-09-17 ENCOUNTER — MEDICATION RENEWAL (OUTPATIENT)
Age: 59
End: 2019-09-17

## 2019-09-20 ENCOUNTER — APPOINTMENT (OUTPATIENT)
Dept: FAMILY MEDICINE | Facility: CLINIC | Age: 59
End: 2019-09-20

## 2019-09-24 ENCOUNTER — MOBILE ON CALL (OUTPATIENT)
Age: 59
End: 2019-09-24

## 2019-10-04 ENCOUNTER — RX RENEWAL (OUTPATIENT)
Age: 59
End: 2019-10-04

## 2019-10-07 ENCOUNTER — APPOINTMENT (OUTPATIENT)
Dept: GASTROENTEROLOGY | Facility: CLINIC | Age: 59
End: 2019-10-07

## 2019-10-10 ENCOUNTER — RX RENEWAL (OUTPATIENT)
Age: 59
End: 2019-10-10

## 2019-10-12 ENCOUNTER — EMERGENCY (EMERGENCY)
Facility: HOSPITAL | Age: 59
LOS: 1 days | Discharge: HOME | End: 2019-10-12
Attending: EMERGENCY MEDICINE | Admitting: EMERGENCY MEDICINE
Payer: MEDICARE

## 2019-10-12 VITALS
OXYGEN SATURATION: 99 % | WEIGHT: 300.05 LBS | HEART RATE: 59 BPM | TEMPERATURE: 98 F | RESPIRATION RATE: 17 BRPM | SYSTOLIC BLOOD PRESSURE: 123 MMHG | DIASTOLIC BLOOD PRESSURE: 86 MMHG | HEIGHT: 71 IN

## 2019-10-12 DIAGNOSIS — Z98.890 OTHER SPECIFIED POSTPROCEDURAL STATES: Chronic | ICD-10-CM

## 2019-10-12 PROCEDURE — 99283 EMERGENCY DEPT VISIT LOW MDM: CPT

## 2019-10-12 RX ORDER — HYDROCORTISONE 1 %
1 OINTMENT (GRAM) TOPICAL
Qty: 1 | Refills: 0
Start: 2019-10-12 | End: 2019-10-25

## 2019-10-12 RX ORDER — POLYETHYLENE GLYCOL 3350 17 G/17G
17 POWDER, FOR SOLUTION ORAL ONCE
Refills: 0 | Status: COMPLETED | OUTPATIENT
Start: 2019-10-12 | End: 2019-10-12

## 2019-10-12 RX ADMIN — POLYETHYLENE GLYCOL 3350 17 GRAM(S): 17 POWDER, FOR SOLUTION ORAL at 08:20

## 2019-10-12 NOTE — ED PROVIDER NOTE - CARE PROVIDER_API CALL
Gwen Le)  Family Medicine  [address] [place] [address]  Phone: [phone]  Fax: [phone]  Follow Up Time:

## 2019-10-12 NOTE — ED ADULT NURSE NOTE - CHPI ED NUR SYMPTOMS NEG
no vomiting/no blood in stool/no chills/no dysuria/no hematuria/no burning urination/no diarrhea/no fever/no nausea

## 2019-10-12 NOTE — ED PROVIDER NOTE - PHYSICAL EXAMINATION
Gen:  alert, awake, no acute distress, speaking in full sentences  Head:  atraumatic, normocephalic  HEENT: PERRLA, EOMI, normal nose, normal oropharynx, no tonsillar edema, erythema, or exudate; no pooling of secretions  CV:  rrr, nl S1, S2, no m/r/g  Pulm:  lungs CTA b/l  Abd: s/nt/nd, +BS  MSK:  moving all extremities, no back midline ttp, no stepoffs, no cva TTP  Neuro:  grossly intact, no focal deficits  Skin:  clear, dry, intact; erythematous rash on back no vesicles, no involvement of mucosal surfaces  Psych: AOx3, normal affect, no apparent risk to self or others

## 2019-10-12 NOTE — ED PROVIDER NOTE - PATIENT PORTAL LINK FT
You can access the FollowMyHealth Patient Portal offered by [facility] by registering at the following website: [email or web address]. By joining [facility]’s FollowMyHealth portal, you will also be able to view your health information using other applications (apps) compatible with our system.

## 2019-10-12 NOTE — ED ADULT NURSE NOTE - OBJECTIVE STATEMENT
Pt arrived to the ER c/o constipation. Pt states he was not able to have a BM x5 days. Pt states he usually takes Miralax, but was unable to afford it and stopped taking it. Pt followed up with MD Giraldo. Pt denies any abdominal pain, nausea, vomiting, or fever. Pt abdomen soft and nontender.

## 2019-10-12 NOTE — ED PROVIDER NOTE - CLINICAL SUMMARY MEDICAL DECISION MAKING FREE TEXT BOX
pt with constipation since he stopped taking miralax; disimpacted; will give miralax to take home, then pmd f/u; rash topical hydrocortisone

## 2019-10-12 NOTE — ED PROVIDER NOTE - NS ED ROS FT
Except as otherwise indicated in HPI:  CONSTITUTIONAL: Neg  HEENT: neg  CV: neg  Resp: neg  GI: no nausea no emesis, no abd pain, +constipation  : Neg  MSK: Neg  SKIN: rash  NEURO: Neg  PSYCHIATRIC: Neg  Heme/Onc: Neg

## 2019-10-12 NOTE — ED PROVIDER NOTE - NSFOLLOWUPINSTRUCTIONS_ED_ALL_ED_FT
Constipation    WHAT YOU NEED TO KNOW:    Constipation is when you have hard, dry bowel movements, or you go longer than usual between bowel movements.     DISCHARGE INSTRUCTIONS:    Call your doctor if:     You have blood in your bowel movements.      You have a fever and abdominal pain with the constipation.      Your constipation gets worse.       You start to vomit.      You have questions or concerns about your condition or care.    Medicines:     Medicine such as a laxative may help relax and loosen your intestines to help you have a bowel movement. Your provider may recommend you only use laxatives for a short time. Long-term use may make your bowels dependent on the medicine.      Take your medicine as directed. Contact your healthcare provider if you think your medicine is not helping or if you have side effects. Tell him of her if you are allergic to any medicine. Keep a list of the medicines, vitamins, and herbs you take. Include the amounts, and when and why you take them. Bring the list or the pill bottles to follow-up visits. Carry your medicine list with you in case of an emergency.    Relieve constipation:     A suppository may be used to help soften your bowel movements. This may make them easier to pass. A suppository is guided into your rectum through your anus.Suppository for Constipation           An enema is liquid medicine used to clear bowel movement from your rectum. The medicine is put into your rectum through your anus.Enemas         Prevent constipation:     Drink liquids as directed. You may need to drink extra liquids to help soften and move your bowels. Ask how much liquid to drink each day and which liquids are best for you.       Eat high-fiber foods. This may help decrease constipation by adding bulk to your bowel movements. High-fiber foods include fruit, vegetables, whole-grain breads and cereals, and beans. Your healthcare provider or dietitian can help you create a high-fiber meal plan. Your provider may also recommend a fiber supplement if you cannot get enough fiber from food.            Exercise regularly. Regular physical activity can help stimulate your intestines. Walking is a good exercise to prevent or relieve constipation. Ask which exercises are best for you.Walking for Exercise           Schedule a time each day to have a bowel movement. This may help train your body to have regular bowel movements. Bend forward while you are on the toilet to help move the bowel movement out. Sit on the toilet for at least 10 minutes, even if you do not have a bowel movement.       Talk to your healthcare provider about your medicines. Certain medicines, such as opioids, can cause constipation. Your provider may be able to make medicine changes. For example, he or she may change the kind of medicine, or change when you take it.    Follow up with your healthcare provider as directed: Write down your questions so you remember to ask them during your visits. Constipation    WHAT YOU NEED TO KNOW:    Constipation is when you have hard, dry bowel movements, or you go longer than usual between bowel movements.     DISCHARGE INSTRUCTIONS:    Call your doctor if:     You have blood in your bowel movements.      You have a fever and abdominal pain with the constipation.      Your constipation gets worse.       You start to vomit.      You have questions or concerns about your condition or care.    Medicines:     Medicine such as a laxative may help relax and loosen your intestines to help you have a bowel movement. Your provider may recommend you only use laxatives for a short time. Long-term use may make your bowels dependent on the medicine.      Take your medicine as directed. Contact your healthcare provider if you think your medicine is not helping or if you have side effects. Tell him of her if you are allergic to any medicine. Keep a list of the medicines, vitamins, and herbs you take. Include the amounts, and when and why you take them. Bring the list or the pill bottles to follow-up visits. Carry your medicine list with you in case of an emergency.    Relieve constipation:     A suppository may be used to help soften your bowel movements. This may make them easier to pass. A suppository is guided into your rectum through your anus.Suppository for Constipation           An enema is liquid medicine used to clear bowel movement from your rectum. The medicine is put into your rectum through your anus.Enemas         Prevent constipation:     Drink liquids as directed. You may need to drink extra liquids to help soften and move your bowels. Ask how much liquid to drink each day and which liquids are best for you.       Eat high-fiber foods. This may help decrease constipation by adding bulk to your bowel movements. High-fiber foods include fruit, vegetables, whole-grain breads and cereals, and beans. Your healthcare provider or dietitian can help you create a high-fiber meal plan. Your provider may also recommend a fiber supplement if you cannot get enough fiber from food.            Exercise regularly. Regular physical activity can help stimulate your intestines. Walking is a good exercise to prevent or relieve constipation. Ask which exercises are best for you.Walking for Exercise           Schedule a time each day to have a bowel movement. This may help train your body to have regular bowel movements. Bend forward while you are on the toilet to help move the bowel movement out. Sit on the toilet for at least 10 minutes, even if you do not have a bowel movement.       Talk to your healthcare provider about your medicines. Certain medicines, such as opioids, can cause constipation. Your provider may be able to make medicine changes. For example, he or she may change the kind of medicine, or change when you take it.    Follow up with your healthcare provider as directed: Write down your questions so you remember to ask them during your visits.    Acute Rash    WHAT YOU NEED TO KNOW:    A rash is irritation, redness, or itchiness in the skin or mucus membranes. Mucus membranes are areas such as the lining of your nose or throat. Acute means the rash starts suddenly, worsens quickly, and lasts a short time. An acute rash may be caused by a disease, such as hepatitis or vasculitis. The rash may be a reaction to something you are allergic to, such as certain foods, or latex. Certain medicines, including antibiotics, NSAIDs, prescription pain medicine, and aspirin can also cause a rash.     DISCHARGE INSTRUCTIONS:    Return to the emergency department if:     You have sudden trouble breathing or chest pain.      You are vomiting, have a headache or muscle aches, and your throat hurts.    Contact your healthcare provider if:     You have a fever.      You get open wounds from scratching your skin, or you have a wound that is red, swollen, or painful.       Your rash lasts longer than 3 months.      You have swelling or pain in your joints.       You have questions or concerns about your condition or care.    Medicines: If your rash does not go away on its own, you may need the following medicines:     Antihistamines may be given to help decrease itching.       Steroids may be given to decrease inflammation.      Antibiotics help fight or prevent a bacterial infection.       Take your medicine as directed. Contact your healthcare provider if you think your medicine is not helping or if you have side effects. Tell him of her if you are allergic to any medicine. Keep a list of the medicines, vitamins, and herbs you take. Include the amounts, and when and why you take them. Bring the list or the pill bottles to follow-up visits. Carry your medicine list with you in case of an emergency.    Prevent a rash or care for your skin when you have a rash: Dry skin can lead to more problems. Do not scratch your skin if it itches. You may cause a skin infection by scratching. The following may prevent dry skin, and help your skin look better:     Use thick cream lotions or petroleum jelly to help soothe your rash. These products work well on areas with thick skin, such as your feet. Cool compresses may also be used to soothe your skin. Apply a cool compress or a cool, wet towel, and then cover it with a dry towel.       Use lukewarm water when you bathe. Hot water may damage your skin more. Pat your skin dry. Do not rub your skin with a towel.       Use detergents, soaps, shampoos, and bubble baths made for sensitive skin. Wear clothes that are made of cotton instead of nylon or wool. Cotton is softer, so it will not hurt your skin as much.    Follow up with your healthcare provider as directed: You may need to see a dermatologist if healthcare providers do not know what is causing your rash. You may also need to see a dermatologist if your rash does not get better even with treatment. You may need to see a dietitian if you have allergies to foods. Write down your questions so you remember to ask them during your visits.    stop using this laundry service    Follow up with Dr Le in 1-2 days.  return to ER if trouble breathing, rash spreads, abdominal pain, no bowel movement or other symptoms

## 2019-10-12 NOTE — ED ADULT NURSE NOTE - NSIMPLEMENTINTERV_GEN_ALL_ED
Implemented All Universal Safety Interventions:  [place] to call system. Call bell, personal items and telephone within reach. Instruct patient to call for assistance. Room bathroom lighting operational. Non-slip footwear when patient is off stretcher. Physically safe environment: no spills, clutter or unnecessary equipment. Stretcher in lowest position, wheels locked, appropriate side rails in place.

## 2019-10-16 ENCOUNTER — MOBILE ON CALL (OUTPATIENT)
Age: 59
End: 2019-10-16

## 2019-10-18 ENCOUNTER — APPOINTMENT (OUTPATIENT)
Dept: UROLOGY | Facility: CLINIC | Age: 59
End: 2019-10-18
Payer: MEDICARE

## 2019-10-18 VITALS
DIASTOLIC BLOOD PRESSURE: 82 MMHG | SYSTOLIC BLOOD PRESSURE: 124 MMHG | TEMPERATURE: 98.4 F | RESPIRATION RATE: 14 BRPM | HEART RATE: 77 BPM

## 2019-10-18 VITALS
DIASTOLIC BLOOD PRESSURE: 82 MMHG | RESPIRATION RATE: 17 BRPM | TEMPERATURE: 98.4 F | HEART RATE: 77 BPM | SYSTOLIC BLOOD PRESSURE: 124 MMHG

## 2019-10-18 DIAGNOSIS — K59.00 CONSTIPATION, UNSPECIFIED: ICD-10-CM

## 2019-10-18 DIAGNOSIS — Z85.47 PERSONAL HISTORY OF MALIGNANT NEOPLASM OF TESTIS: ICD-10-CM

## 2019-10-18 PROCEDURE — 99214 OFFICE O/P EST MOD 30 MIN: CPT

## 2019-10-18 NOTE — HISTORY OF PRESENT ILLNESS
[FreeTextEntry1] : Patient is a 59 yo M who presents for urologic evaluation/check up.  He has a history of weak stream roughly ~12 yrs ago, was given flomax for 1 yr by a urologist.  He then self discontinued and has been voiding well with good stream since.  No sexual complaints.  No sexually active, but masturbates.\par \par He also had a history of testicular cancer s/p L orchiectomy in 2009.  He had surveillance of testis cancer for 5 yrs with PET, he gets annual testis markers with his Oncology.  He states he is AMANUEL.\par \par Fam hx of lung cancer in father, [place] in mother.  No fam hx  malignancies.\par \par PSA 12/2017 was 0.51.\par \par Interval hx:\par He was started on flomax for LUTS - spraying of urination, post void dribbling and weak stream for past 6 months.  He did have rare episodes UUI.  He was improved with flomax but noticed lately that his stream is slowed down more, he has to strain/push, stream overall is good but has to push to feel empty.  No dysuria, or hematuria.\par \par He also notes intermittently enlarging scrotum/testicular over past few months.  He does feel intermittent mild testicular pain, dull achy sensation.

## 2019-10-18 NOTE — PHYSICAL EXAM
[Well Groomed] : well groomed [Normal Appearance] : normal appearance [General Appearance - Well Developed] : well developed [General Appearance - Well Nourished] : well nourished [Urethral Meatus] : meatus normal [General Appearance - In No Acute Distress] : no acute distress [Urinary Bladder Findings] : the bladder was normal on palpation [Scrotum] : the scrotum was normal [Testes Mass (___cm)] : there were no testicular masses [Testes Tenderness] : no tenderness of the testes [Prostate Tenderness] : the prostate was not tender [No Prostate Nodules] : no prostate nodules [Prostate Size ___ gm] : prostate size [unfilled] gm [FreeTextEntry1] : L testis absent, R WNL.  No testicular masses.  ?small hydrocele

## 2019-10-18 NOTE — ASSESSMENT
[FreeTextEntry1] : Patient is a 60 yo M who presents for BPH/LUTS.  R testicular pain.\par \par -Udip negative\par - cc\par -Cont flomax\par -D/w pt that would further assess BPH/PAULA with urodynamics and cystoscopy\par -Repeat PSA\par -For scrotal pain, exam is benign, would obtain scrotal sono

## 2019-10-21 ENCOUNTER — CLINICAL ADVICE (OUTPATIENT)
Age: 59
End: 2019-10-21

## 2019-10-21 ENCOUNTER — APPOINTMENT (OUTPATIENT)
Age: 59
End: 2019-10-21

## 2019-10-21 LAB
BILIRUB UR QL STRIP: NEGATIVE
CLARITY UR: CLEAR
COLLECTION METHOD: NORMAL
GLUCOSE UR-MCNC: NEGATIVE
HCG UR QL: 0.2 EU/DL
HGB UR QL STRIP.AUTO: NEGATIVE
KETONES UR-MCNC: NEGATIVE
LEUKOCYTE ESTERASE UR QL STRIP: NEGATIVE
NITRITE UR QL STRIP: NEGATIVE
PH UR STRIP: 6.5
PROT UR STRIP-MCNC: NEGATIVE
PSA SERPL-MCNC: 0.45 NG/ML
SP GR UR STRIP: 1.01

## 2019-10-24 ENCOUNTER — FORM ENCOUNTER (OUTPATIENT)
Age: 59
End: 2019-10-24

## 2019-10-25 ENCOUNTER — OUTPATIENT (OUTPATIENT)
Dept: OUTPATIENT SERVICES | Facility: HOSPITAL | Age: 59
LOS: 1 days | End: 2019-10-25
Payer: MEDICARE

## 2019-10-25 ENCOUNTER — APPOINTMENT (OUTPATIENT)
Dept: ULTRASOUND IMAGING | Facility: HOSPITAL | Age: 59
End: 2019-10-25
Payer: MEDICARE

## 2019-10-25 DIAGNOSIS — Z85.47 PERSONAL HISTORY OF MALIGNANT NEOPLASM OF TESTIS: ICD-10-CM

## 2019-10-25 DIAGNOSIS — Z98.890 OTHER SPECIFIED POSTPROCEDURAL STATES: Chronic | ICD-10-CM

## 2019-10-25 PROCEDURE — 76870 US EXAM SCROTUM: CPT

## 2019-10-25 PROCEDURE — 76870 US EXAM SCROTUM: CPT | Mod: 26

## 2019-10-31 ENCOUNTER — APPOINTMENT (OUTPATIENT)
Dept: NEUROLOGY | Facility: CLINIC | Age: 59
End: 2019-10-31

## 2019-11-01 ENCOUNTER — APPOINTMENT (OUTPATIENT)
Dept: UROLOGY | Facility: CLINIC | Age: 59
End: 2019-11-01

## 2019-11-06 ENCOUNTER — APPOINTMENT (OUTPATIENT)
Dept: PULMONOLOGY | Facility: CLINIC | Age: 59
End: 2019-11-06
Payer: MEDICARE

## 2019-11-06 VITALS
WEIGHT: 292 LBS | TEMPERATURE: 98.1 F | DIASTOLIC BLOOD PRESSURE: 78 MMHG | SYSTOLIC BLOOD PRESSURE: 123 MMHG | OXYGEN SATURATION: 91 % | RESPIRATION RATE: 15 BRPM | HEART RATE: 93 BPM | HEIGHT: 71 IN | BODY MASS INDEX: 40.88 KG/M2

## 2019-11-06 PROCEDURE — 99214 OFFICE O/P EST MOD 30 MIN: CPT

## 2019-11-06 NOTE — HISTORY OF PRESENT ILLNESS
[FreeTextEntry1] : 59 year old male with severe obstructive sleep apnea here for a follow-up visit\par \aida Has difficulty using the nasal-pillows/mandibular advancement device. When he does use it, he feels improvement in his symptoms but he does not like having something in his mouth. He feels tired and weak due to poor sleep quality.\par \par \par He attends a mental health program 5-hours daily for 5-days weekly. No tobacco, alcohol, or drug history. No caffeinated beverages consumed.. \par \par Comorbid medical conditions include hypertension, hyperlipidemia, diabetes, neuropathy, bipolar disorder, obesity, deviated nasal septum, allergic rhinitis. \par

## 2019-11-06 NOTE — REVIEW OF SYSTEMS
[Fatigue] : fatigue [Snoring] : snoring [Negative] : Musculoskeletal [EDS: ESS=____] : daytime somnolence: ESS=[unfilled] [Obesity] : obesity [de-identified] : bipolard d/o

## 2019-11-06 NOTE — PHYSICAL EXAM
[General Appearance - Well Developed] : well developed [Normal Appearance] : normal appearance [General Appearance - Well Nourished] : well nourished [No Deformities] : no deformities [General Appearance - In No Acute Distress] : no acute distress [Normal Conjunctiva] : the conjunctiva exhibited no abnormalities [Eyelids - No Xanthelasma] : the eyelids demonstrated no xanthelasmas [Low Lying Soft Palate] : low lying soft palate [Neck Appearance] : the appearance of the neck was normal [Neck Cervical Mass (___cm)] : no neck mass was observed [Jugular Venous Distention Increased] : there was no jugular-venous distention [Thyroid Diffuse Enlargement] : the thyroid was not enlarged [Thyroid Nodule] : there were no palpable thyroid nodules [Heart Rate And Rhythm] : heart rate was normal and rhythm regular [Heart Sounds] : normal S1 and S2 [Heart Sounds Gallop] : no gallops [Murmurs] : no murmurs [Heart Sounds Pericardial Friction Rub] : no pericardial rub [Auscultation Breath Sounds / Voice Sounds] : lungs were clear to auscultation bilaterally [Bowel Sounds] : normal bowel sounds [Abdomen Soft] : soft [Abdomen Tenderness] : non-tender [Abdomen Mass (___ Cm)] : no abdominal mass palpated [Abnormal Walk] : normal gait [Musculoskeletal - Swelling] : no joint swelling seen [Motor Tone] : muscle strength and tone were normal [Nail Clubbing] : no clubbing of the fingernails [Cyanosis, Localized] : no localized cyanosis [Petechial Hemorrhages (___cm)] : no petechial hemorrhages [Skin Color & Pigmentation] : normal skin color and pigmentation [Skin Turgor] : normal skin turgor [] : no rash [Deep Tendon Reflexes (DTR)] : deep tendon reflexes were 2+ and symmetric [Sensation] : the sensory exam was normal to light touch and pinprick [No Focal Deficits] : no focal deficits [IV] : IV [Oriented To Time, Place, And Person] : oriented to person, place, and time

## 2019-11-06 NOTE — ASSESSMENT
[FreeTextEntry1] : This is a 59 year old male with severe CHAYITO on APAP here for a follow up visit. Therapeutic and compliance data download reveals usage 43% of days with an average use of 1 hour and 11 minutes. Therapy based AHI is 2.2/hr on 4 - 12 cm H2O. Had an extensive discussion regarding ramifications of untreated severe CHAYITO including risk of heart disease, stroke, difficulty in controlling diabetes and blood pressure, memory issues, among others. He is considering declining therapy and accepting the increased risks. Daytime mask acclimatization as well as CBT were offered which he declined. Also recommended following up with an ENT to correct deviated septum which may improve PAP tolerance which he will think about. Bariatric surgery was also discussed but states he does not want to undergo surgery. Tracheostomy may be an eventual option but given his underlying psychiatric condition, may not be a good candidate. He will consider therapeutic options and follow up. \par

## 2019-11-07 ENCOUNTER — NON-APPOINTMENT (OUTPATIENT)
Age: 59
End: 2019-11-07

## 2019-11-07 ENCOUNTER — APPOINTMENT (OUTPATIENT)
Dept: CARDIOLOGY | Facility: CLINIC | Age: 59
End: 2019-11-07
Payer: MEDICARE

## 2019-11-07 VITALS
DIASTOLIC BLOOD PRESSURE: 86 MMHG | HEIGHT: 71 IN | BODY MASS INDEX: 39.76 KG/M2 | OXYGEN SATURATION: 96 % | SYSTOLIC BLOOD PRESSURE: 130 MMHG | WEIGHT: 284 LBS | HEART RATE: 74 BPM

## 2019-11-07 PROCEDURE — 99215 OFFICE O/P EST HI 40 MIN: CPT

## 2019-11-07 PROCEDURE — 36415 COLL VENOUS BLD VENIPUNCTURE: CPT

## 2019-11-07 PROCEDURE — 93000 ELECTROCARDIOGRAM COMPLETE: CPT

## 2019-11-07 NOTE — DISCUSSION/SUMMARY
[Risks] : risks [Benefits] : benefits [Patient] : the patient [With Me] : with me [Alternatives] : alternatives [___ Month(s)] : [unfilled] month(s) [FreeTextEntry1] : Mr. Pruitt has multiple cardiac risk factors, as above, and presents today for follow-up. \par \par We discussed the importance of compliance with both psychiatric and cardiac medications, as well as compliance with the CPAP machine that he will be getting again after his recent sleep study demonstrated severe CHAYITO. Patient counseled regarding exercise, diet, and weight loss. No change in cardiac regimen today. Follow-up with PMD, endocrinologist, podiatrist, and sleep medicine. \par \par Issue of relationship between CHAYITO and hypertension, weight gain, and risk factors also reinforced and compliance in this regard discussed as well.\par \par As patient is hoping to start exercise regimen, but is noting dyspnea on exertion and lower extremity neuropathy, will check nuclear stress test.

## 2019-11-07 NOTE — HISTORY OF PRESENT ILLNESS
[FreeTextEntry1] : 59 year-old gentleman with known cardiovascular risk factors including hypertension, well controlled non-insulin dependent diabetes, dyslipidemia, obesity, CHAYITO, bipolar disorder on Lithium and Geodon, lives as a full time resident of the [facility]. He presents today in his usual state of health, having lost approximately 30 lbs over the past year. He had a repeat sleep study performed 8/12/2016 which showed severe CHAYITO.\par \par May 10, 2019: Patient presents with complain of sleep apnea. Has concerning sleep study two months ago. He has nasal congestion and cannot tolerate his CPAP. He is also concern about neuropathic pain in the insteps of his feet. He has lost several pounds through diet and exercise that are supervised through his program.\par \par Psychiatrist at [facility] in [place] who manages his Lithium and his antipsychotic medication (second generation, Geodon).\par \par November 2019 - Patient presents today in his usual state of health. He has modified his diet and is losing significant weight. From a peak of 349 lbs in 2012, patient is now 284 lbs. He reports having difficulty walking long distances, but that is because of leg weakness, not because of shortness of breath or chest pain. He is concerned about dyspnea on exertion, but he has not experienced this recently.\par \par PMD: Gwen Le MD [phone]\par Sleep Medicine: Angelika Todd MD [phone]\par Endocrinologist: Rocío Bañuelos MD [phone]

## 2019-11-07 NOTE — PHYSICAL EXAM
[Normal Appearance] : normal appearance [Well Groomed] : well groomed [General Appearance - Well Developed] : well developed [General Appearance - In No Acute Distress] : no acute distress [No Deformities] : no deformities [General Appearance - Well Nourished] : well nourished [Normal Oral Mucosa] : normal oral mucosa [No Oral Pallor] : no oral pallor [No Oral Cyanosis] : no oral cyanosis [Auscultation Breath Sounds / Voice Sounds] : lungs were clear to auscultation bilaterally [Exaggerated Use Of Accessory Muscles For Inspiration] : no accessory muscle use [Respiration, Rhythm And Depth] : normal respiratory rhythm and effort [Abdomen Tenderness] : non-tender [Abdomen Soft] : soft [Abdomen Mass (___ Cm)] : no abdominal mass palpated [Gait - Sufficient For Exercise Testing] : the gait was sufficient for exercise testing [Abnormal Walk] : normal gait [Nail Clubbing] : no clubbing of the fingernails [Cyanosis, Localized] : no localized cyanosis [Petechial Hemorrhages (___cm)] : no petechial hemorrhages [Oriented To Time, Place, And Person] : oriented to person, place, and time [Skin Color & Pigmentation] : normal skin color and pigmentation [] : no ischemic changes [Normal Rate] : normal [No Precordial Heave] : no precordial heave was noted [Not Palpable] : not palpable [Normal S2] : normal S2 [Rhythm Regular] : regular [Normal S1] : normal S1 [No Gallop] : no gallop heard [No Murmur] : no murmurs heard [2+] : left 2+ [1+] : left 1+ [No Pitting Edema] : no pitting edema present [EOM Intact] : extraocular movements were intact [PERRL] : pupils were equal in size, round, and reactive to light [Conjunctiva] : the conjunctiva were normal in both eyes [Strabismus] : strabismus was noted bilaterally [Person] : oriented to person [Normal Mental Status] : the patient was oriented to person, place and time [Time] : oriented to time [Place] : oriented to place [Short Term Intact] : short term memory intact [Span Intact] : the attention span was normal [Concentration Intact] : normal concentrating ability [Remote Intact] : remote memory intact [Visual Intact] : visual attention was ~T not ~L decreased [Flat] : flat [Appropriate] : appropriate [Normal] : the cranial nerves were intact [FreeTextEntry1] : morbidly obese [Right Carotid Bruit] : no bruit heard over the right carotid [Left Carotid Bruit] : no bruit heard over the left carotid [Yellow Sclera (Icteric)] : no scleral icterus was seen

## 2019-11-07 NOTE — REVIEW OF SYSTEMS
[Recent Weight Loss (___ Lbs)] : recent [unfilled] ~Ulb weight loss [Lower Ext Edema] : lower extremity edema [Skin: A Rash] : rash: [see HPI] : see HPI [Tremor] : a tremor was seen [Negative] : Heme/Lymph [Shortness Of Breath] : no shortness of breath [Dyspnea on exertion] : not dyspnea during exertion [Chest  Pressure] : no chest pressure [Chest Pain] : no chest pain [Joint Pain] : no joint pain [Palpitations] : no palpitations

## 2019-11-07 NOTE — REASON FOR VISIT
[Medication Management] : Medication management [Follow-Up - Clinic] : a clinic follow-up of [FreeTextEntry1] : He was formerly followed by a cardiologist in [place], Geovanny Elizabeth MD.

## 2019-11-08 LAB
25(OH)D3 SERPL-MCNC: 42.4 NG/ML
ALBUMIN SERPL ELPH-MCNC: 4.7 G/DL
ALP BLD-CCNC: 101 U/L
ALT SERPL-CCNC: 31 U/L
ANION GAP SERPL CALC-SCNC: 15 MMOL/L
AST SERPL-CCNC: 19 U/L
BASOPHILS # BLD AUTO: 0.05 K/UL
BASOPHILS NFR BLD AUTO: 0.5 %
BILIRUB SERPL-MCNC: 0.4 MG/DL
BUN SERPL-MCNC: 23 MG/DL
CALCIUM SERPL-MCNC: 10.9 MG/DL
CHLORIDE SERPL-SCNC: 101 MMOL/L
CHOLEST SERPL-MCNC: 144 MG/DL
CHOLEST/HDLC SERPL: 3.3 RATIO
CO2 SERPL-SCNC: 24 MMOL/L
CREAT SERPL-MCNC: 1.74 MG/DL
EOSINOPHIL # BLD AUTO: 0.35 K/UL
EOSINOPHIL NFR BLD AUTO: 3.6 %
ESTIMATED AVERAGE GLUCOSE: 128 MG/DL
GLUCOSE SERPL-MCNC: 87 MG/DL
HBA1C MFR BLD HPLC: 6.1 %
HCT VFR BLD CALC: 49.7 %
HDLC SERPL-MCNC: 44 MG/DL
HGB BLD-MCNC: 15.5 G/DL
IMM GRANULOCYTES NFR BLD AUTO: 0.8 %
LDLC SERPL CALC-MCNC: 39 MG/DL
LITHIUM SERPL-SCNC: 1.49 MMOL/L
LYMPHOCYTES # BLD AUTO: 2.38 K/UL
LYMPHOCYTES NFR BLD AUTO: 24.3 %
MAN DIFF?: NORMAL
MCHC RBC-ENTMCNC: 28.9 PG
MCHC RBC-ENTMCNC: 31.2 GM/DL
MCV RBC AUTO: 92.7 FL
MONOCYTES # BLD AUTO: 0.75 K/UL
MONOCYTES NFR BLD AUTO: 7.7 %
NEUTROPHILS # BLD AUTO: 6.17 K/UL
NEUTROPHILS NFR BLD AUTO: 63.1 %
PLATELET # BLD AUTO: 232 K/UL
POTASSIUM SERPL-SCNC: 4.2 MMOL/L
PROT SERPL-MCNC: 7.6 G/DL
RBC # BLD: 5.36 M/UL
RBC # FLD: 13.5 %
SODIUM SERPL-SCNC: 140 MMOL/L
TRIGL SERPL-MCNC: 305 MG/DL
TSH SERPL-ACNC: 1.67 UIU/ML
WBC # FLD AUTO: 9.78 K/UL

## 2019-11-21 ENCOUNTER — APPOINTMENT (OUTPATIENT)
Dept: CARDIOLOGY | Facility: CLINIC | Age: 59
End: 2019-11-21
Payer: MEDICARE

## 2019-11-21 PROCEDURE — 93015 CV STRESS TEST SUPVJ I&R: CPT

## 2019-11-21 PROCEDURE — 78452 HT MUSCLE IMAGE SPECT MULT: CPT

## 2019-11-21 PROCEDURE — A9500: CPT

## 2019-11-27 ENCOUNTER — APPOINTMENT (OUTPATIENT)
Dept: NEUROLOGY | Facility: CLINIC | Age: 59
End: 2019-11-27
Payer: MEDICARE

## 2019-11-27 VITALS
HEART RATE: 75 BPM | DIASTOLIC BLOOD PRESSURE: 70 MMHG | RESPIRATION RATE: 17 BRPM | TEMPERATURE: 98.2 F | BODY MASS INDEX: 39.06 KG/M2 | WEIGHT: 279 LBS | OXYGEN SATURATION: 98 % | HEIGHT: 71 IN | SYSTOLIC BLOOD PRESSURE: 120 MMHG

## 2019-11-27 PROCEDURE — 99214 OFFICE O/P EST MOD 30 MIN: CPT

## 2019-11-27 RX ORDER — HYDROCORTISONE 25 MG/G
2.5 CREAM TOPICAL
Qty: 28 | Refills: 0 | Status: DISCONTINUED | COMMUNITY
Start: 2018-09-10 | End: 2019-11-27

## 2019-11-27 RX ORDER — FLUTICASONE PROPIONATE 50 UG/1
50 SPRAY, METERED NASAL DAILY
Qty: 1 | Refills: 5 | Status: DISCONTINUED | COMMUNITY
Start: 2018-11-02 | End: 2019-11-27

## 2019-11-27 RX ORDER — CLOTRIMAZOLE 10 MG/G
1 CREAM TOPICAL
Qty: 30 | Refills: 0 | Status: DISCONTINUED | COMMUNITY
Start: 2018-09-10 | End: 2019-11-27

## 2019-11-28 NOTE — ASSESSMENT
[FreeTextEntry1] : Importance of weight loss with diet and appropriate exercise stressed. He would like additional psychiatric opinion for management of his bipolar disorder and I referred him to [facility] behavioral health group in [place].

## 2019-11-28 NOTE — HISTORY OF PRESENT ILLNESS
[FreeTextEntry1] : 59-year-old man with diabetic polyneuropathy, CHAYITO, obesity, HTN  and history of bipolar disorder returns for followup. He was last seen a year ago. He has lost 26 lbs in past year. He is poorly compliant in following recommendations of sleep specialist. Unwilling to consider bariatric surgery. He no longer goes to the [facility] and w sees an NP "Aminata" who prescribes his former psychotropic meds: ziprasidone 80 mg bid and lithium 900 mg bid.

## 2019-11-28 NOTE — PHYSICAL EXAM
[FreeTextEntry1] : Orientation: oriented to person, oriented to place and oriented to time. \par Attention: normal concentrating ability and visual attention was not decreased. \par Language: no difficulty naming common objects, no difficulty repeating a phrase, no difficulty writing a sentence, fluency intact, comprehension intact and reading intact. \par Fund of knowledge: displays adequate knowledge of personal past history. \par Cranial Nerves: visual acuity intact bilaterally, visual fields full to confrontation, pupils equal round and reactive to light, exotropia OS (congenital) facial sensation intact symmetrically, face symmetrical, hearing was intact bilaterally, tongue and palate midline, head turning and shoulder shrug symmetric and there was no tongue deviation with protrusion. \par Motor: muscle tone was normal in all four extremities, muscle strength was normal in all four extremities and normal bulk in all four extremities. \par Sensory exam: light touch was intact . graded distal sensory loss to pin in lower limbs. \par Coordination:. normal gait. balance was intact. there was no past-pointing. no tremor present. \par Deep tendon reflexes: \par Biceps right 2+. Biceps left 2+.  \par Triceps right 2+. Triceps left 2+.  \par Brachioradialis right 2+. Brachioradialis left 2+.  \par Patella right 2+. Patella left 2+.  \par Ankle jerk right 2+. Ankle jerk left 2+. \par Plantar responses normal on the right, normal on the left.  \par  \par

## 2019-12-05 ENCOUNTER — OUTPATIENT (OUTPATIENT)
Dept: OUTPATIENT SERVICES | Facility: HOSPITAL | Age: 59
LOS: 1 days | End: 2019-12-05
Payer: MEDICARE

## 2019-12-05 DIAGNOSIS — Z98.890 OTHER SPECIFIED POSTPROCEDURAL STATES: Chronic | ICD-10-CM

## 2019-12-05 PROCEDURE — D1110: CPT

## 2019-12-05 PROCEDURE — D0120: CPT

## 2019-12-05 PROCEDURE — D0274: CPT

## 2019-12-06 ENCOUNTER — APPOINTMENT (OUTPATIENT)
Dept: UROLOGY | Facility: CLINIC | Age: 59
End: 2019-12-06

## 2019-12-06 DIAGNOSIS — Z01.20 ENCOUNTER FOR DENTAL EXAMINATION AND CLEANING WITHOUT ABNORMAL FINDINGS: ICD-10-CM

## 2019-12-09 ENCOUNTER — RX RENEWAL (OUTPATIENT)
Age: 59
End: 2019-12-09

## 2019-12-11 ENCOUNTER — RX RENEWAL (OUTPATIENT)
Age: 59
End: 2019-12-11

## 2019-12-26 ENCOUNTER — APPOINTMENT (OUTPATIENT)
Dept: PSYCHIATRY | Facility: CLINIC | Age: 59
End: 2019-12-26

## 2020-01-03 ENCOUNTER — OUTPATIENT (OUTPATIENT)
Dept: OUTPATIENT SERVICES | Facility: HOSPITAL | Age: 60
LOS: 1 days | End: 2020-01-03
Payer: MEDICARE

## 2020-01-03 ENCOUNTER — APPOINTMENT (OUTPATIENT)
Dept: UROLOGY | Facility: CLINIC | Age: 60
End: 2020-01-03
Payer: MEDICARE

## 2020-01-03 VITALS — RESPIRATION RATE: 16 BRPM | DIASTOLIC BLOOD PRESSURE: 89 MMHG | HEART RATE: 103 BPM | SYSTOLIC BLOOD PRESSURE: 142 MMHG

## 2020-01-03 DIAGNOSIS — Z98.890 OTHER SPECIFIED POSTPROCEDURAL STATES: Chronic | ICD-10-CM

## 2020-01-03 DIAGNOSIS — R35.0 FREQUENCY OF MICTURITION: ICD-10-CM

## 2020-01-03 PROCEDURE — 51798 US URINE CAPACITY MEASURE: CPT | Mod: 59

## 2020-01-03 PROCEDURE — 51784 ANAL/URINARY MUSCLE STUDY: CPT

## 2020-01-03 PROCEDURE — 51797 INTRAABDOMINAL PRESSURE TEST: CPT

## 2020-01-03 PROCEDURE — 51741 ELECTRO-UROFLOWMETRY FIRST: CPT

## 2020-01-03 PROCEDURE — 52000 CYSTOURETHROSCOPY: CPT

## 2020-01-03 PROCEDURE — 51797 INTRAABDOMINAL PRESSURE TEST: CPT | Mod: 26

## 2020-01-03 PROCEDURE — 51741 ELECTRO-UROFLOWMETRY FIRST: CPT | Mod: 26

## 2020-01-03 PROCEDURE — 51728 CYSTOMETROGRAM W/VP: CPT

## 2020-01-03 PROCEDURE — 51784 ANAL/URINARY MUSCLE STUDY: CPT | Mod: 26

## 2020-01-03 PROCEDURE — 51728 CYSTOMETROGRAM W/VP: CPT | Mod: 26

## 2020-01-07 DIAGNOSIS — N40.1 BENIGN PROSTATIC HYPERPLASIA WITH LOWER URINARY TRACT SYMPTOMS: ICD-10-CM

## 2020-01-09 ENCOUNTER — RX RENEWAL (OUTPATIENT)
Age: 60
End: 2020-01-09

## 2020-01-13 ENCOUNTER — APPOINTMENT (OUTPATIENT)
Dept: GASTROENTEROLOGY | Facility: CLINIC | Age: 60
End: 2020-01-13

## 2020-01-20 ENCOUNTER — RX RENEWAL (OUTPATIENT)
Age: 60
End: 2020-01-20

## 2020-01-23 ENCOUNTER — APPOINTMENT (OUTPATIENT)
Dept: BARIATRICS/WEIGHT MGMT | Facility: CLINIC | Age: 60
End: 2020-01-23
Payer: MEDICARE

## 2020-01-23 VITALS — OXYGEN SATURATION: 96 % | HEART RATE: 73 BPM | HEIGHT: 71 IN | WEIGHT: 285.8 LBS | BODY MASS INDEX: 40.01 KG/M2

## 2020-01-23 DIAGNOSIS — R06.81 APNEA, NOT ELSEWHERE CLASSIFIED: ICD-10-CM

## 2020-01-23 PROCEDURE — 99214 OFFICE O/P EST MOD 30 MIN: CPT

## 2020-01-23 NOTE — REASON FOR VISIT
[Follow-Up Visit] : a follow-up visit for [Diabetes Mellitus] : diabetes mellitus [Hyperlipidemia] : hyperlipidemia [Hypertension] : hypertension [Metabolic Syndrome] : metabolic syndrome [Obesity] : obesity [Obstructive Sleep Apena] : obstructive sleep apena

## 2020-01-29 NOTE — HISTORY OF PRESENT ILLNESS
[FreeTextEntry1] : 59 yom, bipolar, CKD 3a, CHAYITO not treated (unable to tolerate devices)\par Metabolic syndrome, DM, HTN, HLD\par lithium, geodon, baby aspirin, lipitor, metoprolol vitamin D, trajenta, linzess, vasepda\par recent NST normal\par \par Diet is mainly lean protein, vegetables, fruits, low calorie\par He prepares food himself but also gets lunches at group home\par He lives alone\par not moving around at all, "too cold"\par "spend a lot of money on taxis"\par denies any falls, is steady on feet\par has tardive dyskinesia but does not have cane or walker, does not need one\par not interested in injection medications\par \par Due to comorbidities this patient requires medical treatment for obesity\par \par \par

## 2020-01-29 NOTE — ASSESSMENT
[FreeTextEntry1] : Class II obese, CHAYITO, DM, HTN\par -we talked at length about CHAYITO; he is unable to tolerate devices, and is not yet interested in new medicines for weight loss although we introduced the possibility of replacing repaglinide and trajenta w/ a GLP-1 to help with weight loss, to perhaps improve CHAYITO somewhat; he is receptive but not yet ready for this change\par -we encouraged more exercise as priority number 1 this visit\par -continue current diet as is; he is weight down over past 5+ years, 6 lbs weight loss since 8/2019\par -we asked him to follow up again soon\par \par Bariatric surgery history: none\par Obesity comorbidities: HTN, DM, CHAYITO\par Anti-obesity medications: none\par Obesity medication side effects: n/a\par

## 2020-02-01 ENCOUNTER — OUTPATIENT (OUTPATIENT)
Dept: OUTPATIENT SERVICES | Facility: HOSPITAL | Age: 60
LOS: 1 days | End: 2020-02-01
Payer: MEDICARE

## 2020-02-01 ENCOUNTER — EMERGENCY (EMERGENCY)
Facility: HOSPITAL | Age: 60
LOS: 1 days | Discharge: HOME | End: 2020-02-01
Attending: EMERGENCY MEDICINE | Admitting: EMERGENCY MEDICINE
Payer: MEDICARE

## 2020-02-01 VITALS
WEIGHT: 279.99 LBS | DIASTOLIC BLOOD PRESSURE: 78 MMHG | RESPIRATION RATE: 15 BRPM | TEMPERATURE: 98 F | HEART RATE: 78 BPM | OXYGEN SATURATION: 99 % | SYSTOLIC BLOOD PRESSURE: 121 MMHG | HEIGHT: 71 IN

## 2020-02-01 VITALS — TEMPERATURE: 99 F

## 2020-02-01 DIAGNOSIS — Z98.890 OTHER SPECIFIED POSTPROCEDURAL STATES: Chronic | ICD-10-CM

## 2020-02-01 DIAGNOSIS — R53.1 WEAKNESS: ICD-10-CM

## 2020-02-01 LAB
ALBUMIN SERPL ELPH-MCNC: 3.8 G/DL — SIGNIFICANT CHANGE UP (ref 3.3–5)
ALP SERPL-CCNC: 111 U/L — SIGNIFICANT CHANGE UP (ref 40–120)
ALT FLD-CCNC: 31 U/L — SIGNIFICANT CHANGE UP (ref 10–45)
ANION GAP SERPL CALC-SCNC: 7 MMOL/L — SIGNIFICANT CHANGE UP (ref 5–17)
AST SERPL-CCNC: 17 U/L — SIGNIFICANT CHANGE UP (ref 10–40)
BASOPHILS # BLD AUTO: 0.04 K/UL — SIGNIFICANT CHANGE UP (ref 0–0.2)
BASOPHILS NFR BLD AUTO: 0.5 % — SIGNIFICANT CHANGE UP (ref 0–2)
BILIRUB SERPL-MCNC: 0.6 MG/DL — SIGNIFICANT CHANGE UP (ref 0.2–1.2)
BUN SERPL-MCNC: 21 MG/DL — SIGNIFICANT CHANGE UP (ref 7–23)
CALCIUM SERPL-MCNC: 9.7 MG/DL — SIGNIFICANT CHANGE UP (ref 8.4–10.5)
CHLORIDE SERPL-SCNC: 105 MMOL/L — SIGNIFICANT CHANGE UP (ref 96–108)
CO2 SERPL-SCNC: 29 MMOL/L — SIGNIFICANT CHANGE UP (ref 22–31)
CREAT SERPL-MCNC: 1.89 MG/DL — HIGH (ref 0.5–1.3)
EOSINOPHIL # BLD AUTO: 0.29 K/UL — SIGNIFICANT CHANGE UP (ref 0–0.5)
EOSINOPHIL NFR BLD AUTO: 3.6 % — SIGNIFICANT CHANGE UP (ref 0–6)
GLUCOSE SERPL-MCNC: 62 MG/DL — LOW (ref 70–99)
HCT VFR BLD CALC: 45 % — SIGNIFICANT CHANGE UP (ref 39–50)
HGB BLD-MCNC: 14.4 G/DL — SIGNIFICANT CHANGE UP (ref 13–17)
IMM GRANULOCYTES NFR BLD AUTO: 0.6 % — SIGNIFICANT CHANGE UP (ref 0–1.5)
LYMPHOCYTES # BLD AUTO: 2.54 K/UL — SIGNIFICANT CHANGE UP (ref 1–3.3)
LYMPHOCYTES # BLD AUTO: 31.3 % — SIGNIFICANT CHANGE UP (ref 13–44)
MAGNESIUM SERPL-MCNC: 2.2 MG/DL — SIGNIFICANT CHANGE UP (ref 1.6–2.6)
MCHC RBC-ENTMCNC: 28.6 PG — SIGNIFICANT CHANGE UP (ref 27–34)
MCHC RBC-ENTMCNC: 32 GM/DL — SIGNIFICANT CHANGE UP (ref 32–36)
MCV RBC AUTO: 89.5 FL — SIGNIFICANT CHANGE UP (ref 80–100)
MONOCYTES # BLD AUTO: 0.77 K/UL — SIGNIFICANT CHANGE UP (ref 0–0.9)
MONOCYTES NFR BLD AUTO: 9.5 % — SIGNIFICANT CHANGE UP (ref 2–14)
NEUTROPHILS # BLD AUTO: 4.43 K/UL — SIGNIFICANT CHANGE UP (ref 1.8–7.4)
NEUTROPHILS NFR BLD AUTO: 54.5 % — SIGNIFICANT CHANGE UP (ref 43–77)
NRBC # BLD: 0 /100 WBCS — SIGNIFICANT CHANGE UP (ref 0–0)
PLATELET # BLD AUTO: 198 K/UL — SIGNIFICANT CHANGE UP (ref 150–400)
POTASSIUM SERPL-MCNC: 3.7 MMOL/L — SIGNIFICANT CHANGE UP (ref 3.5–5.3)
POTASSIUM SERPL-SCNC: 3.7 MMOL/L — SIGNIFICANT CHANGE UP (ref 3.5–5.3)
PROT SERPL-MCNC: 7.6 G/DL — SIGNIFICANT CHANGE UP (ref 6–8.3)
RBC # BLD: 5.03 M/UL — SIGNIFICANT CHANGE UP (ref 4.2–5.8)
RBC # FLD: 13.3 % — SIGNIFICANT CHANGE UP (ref 10.3–14.5)
SODIUM SERPL-SCNC: 141 MMOL/L — SIGNIFICANT CHANGE UP (ref 135–145)
TROPONIN I SERPL-MCNC: <.017 NG/ML — LOW (ref 0.02–0.06)
WBC # BLD: 8.12 K/UL — SIGNIFICANT CHANGE UP (ref 3.8–10.5)
WBC # FLD AUTO: 8.12 K/UL — SIGNIFICANT CHANGE UP (ref 3.8–10.5)

## 2020-02-01 PROCEDURE — G9001: CPT

## 2020-02-01 PROCEDURE — 99284 EMERGENCY DEPT VISIT MOD MDM: CPT

## 2020-02-01 PROCEDURE — 93010 ELECTROCARDIOGRAM REPORT: CPT

## 2020-02-01 PROCEDURE — 99283 EMERGENCY DEPT VISIT LOW MDM: CPT

## 2020-02-01 PROCEDURE — 84484 ASSAY OF TROPONIN QUANT: CPT

## 2020-02-01 PROCEDURE — 85027 COMPLETE CBC AUTOMATED: CPT

## 2020-02-01 PROCEDURE — 36415 COLL VENOUS BLD VENIPUNCTURE: CPT

## 2020-02-01 PROCEDURE — 93005 ELECTROCARDIOGRAM TRACING: CPT

## 2020-02-01 PROCEDURE — 80053 COMPREHEN METABOLIC PANEL: CPT

## 2020-02-01 PROCEDURE — 83735 ASSAY OF MAGNESIUM: CPT

## 2020-02-01 RX ORDER — ZIPRASIDONE HYDROCHLORIDE 20 MG/1
1 CAPSULE ORAL
Qty: 0 | Refills: 0 | DISCHARGE

## 2020-02-01 RX ORDER — POLYETHYLENE GLYCOL 3350 17 G/17G
1 POWDER, FOR SOLUTION ORAL
Qty: 0 | Refills: 0 | DISCHARGE

## 2020-02-01 NOTE — ED PROVIDER NOTE - PATIENT PORTAL LINK FT
You can access the FollowMyHealth Patient Portal offered by [facility] by registering at the following website: [email or web address]. By joining [facility]’s FollowMyHealth portal, you will also be able to view your health information using other applications (apps) compatible with our system.

## 2020-02-01 NOTE — ED ADULT TRIAGE NOTE - CHIEF COMPLAINT QUOTE
Patient presents from day program (lives @ home alone) for lethargy and general malaise x approx 2 weeks. Patient states he feels tired since he stopped using cpap machine 2 months ago

## 2020-02-01 NOTE — ED PROVIDER NOTE - ATTENDING CONTRIBUTION TO CARE
Dr. Mai: I performed a face to face bedside interview with patient regarding history of present illness, review of symptoms and past medical history. I completed an independent physical exam.  I have discussed patient's plan of care with PA.   I agree with note as stated above, having amended the EMR as needed to reflect my findings.   This includes HISTORY OF PRESENT ILLNESS, HIV, PAST MEDICAL/SURGICAL/FAMILY/SOCIAL HISTORY, ALLERGIES AND HOME MEDICATIONS, REVIEW OF SYSTEMS, PHYSICAL EXAM, and any PROGRESS NOTES during the time I functioned as the attending physician for this patient.    see mdm

## 2020-02-01 NOTE — ED ADULT NURSE NOTE - PAIN: BODY LOCATION
April 6, 2018     Eduardo Coleman MD  [place] [id number]    Patient: Javid Wisdom   YOB: 1977   Date of Visit: 4/6/2018       Dear Dr Ever Contreras:    Thank you for referring Ehsan Evans to me for evaluation  Below are my notes for this consultation  If you have questions, please do not hesitate to call me  I look forward to following your patient along with you  Sincerely,        Ness Moraes MD        CC: MD Chelly Colon PA-C  4/6/2018  2:47 PM  Sign at close encounter  Assessment/Plan:  Very pleasant 63-year-old female, accompanied by the tendon from her group home, returns to review MRI of her brain (3/28/18), the study was carefully reviewed in detail by Dr Cleve Robledo, and compared with prior study of 9/20/17, the 7 mm hypo enhancing suprasellar mass is again identified, and appears to be unchanged  There is some mild mass effect upon the optic chiasm, which also appears to be unchanged  In addition she does have a prior  shunt which appears to be appropriately placed and unchanged from prior study  Clinically the patient is asymptomatic of this lesion, without focal neurologic deficit on examination  She has been seen by her optometrist who reports normal vision, no direct visual field examination is available for review at this time, she does have fairly significant dysconjugate gaze which might make this study very difficult  Consultation has been made with ophthalmology Dr Atul Fuller to further assess for visual fields  She also has had pituitary laboratory panel performed 2/24/17 all which demonstrated normal function  Further follow-up is planned in approximately 1 year with repeat MRI of the brain with pituitary protocol and clinical visit      These findings, impressions and recommendations are reviewed in great detail with the patient, she expressed understanding and agreement, her questions were answered completely and to her satisfaction  Follow-up has been scheduled, MRI has been requested  As well as ophthalmology consultation  Diagnoses and all orders for this visit:    Pituitary neoplasm  -     MRI brain pituitary wo and w contrast; Future  -     Ambulatory referral to Ophthalmology; Future    Pituitary microadenoma ([facility] [address] )  -     MRI brain pituitary wo and w contrast; Future  -     Ambulatory referral to Ophthalmology; Future    Pituitary cyst ([facility] [address] )  -     MRI brain pituitary wo and w contrast; Future  -     Ambulatory referral to Ophthalmology; Future        Subjective:      Patient ID: Blanca Ortiz is a 46 y o  female  Very pleasant 80-year-old female, history of mild MR and CP, accompanied by her caregiver from her group home, returns for 1 year follow-up, pituitary microadenoma/pituitary cyst     She has had her updated MRI of the brain 3/28/18, under anesthesia  She reports no new symptoms or changes, she denies gait or balance disturbance other than her usual, motor sensory difficulties in the upper lower extremities, other than her usual, difficulty with memory or mentation, weight gain or weight loss, heat intolerance, visual changes or visual field difficulties  She has history of fall and as a consequence underwent CAT scan and MRI which revealed incidental finding of lesion overlying the pituitary gland  She has history of CP, in addition she has significant weakness to the right side, and has history of  shunt with the valve on the left  She has seen optometry in the past for spectacles, it is unclear as to whether she has had visual field examination is, which would be challenging as a result of her dysconjugate gaze  She has had laboratory pituitary studies performed through [facility] ordered by Dr Franc Youngblood, Sports Medicine  The studies revealed normal pituitary function relative to the 5 axis      She otherwise reports no interval changes in her medical or surgical history  The following portions of the patient's history were reviewed and updated as appropriate: allergies, current medications, past family history, past medical history, past social history and past surgical history  Review of Systems   Constitutional: Negative  HENT: Negative  Eyes: Negative  Respiratory: Positive for shortness of breath  Negative for apnea, cough, choking, chest tightness, wheezing and stridor  Cardiovascular: Negative  Gastrointestinal: Negative  Endocrine: Negative  Genitourinary: Negative  Musculoskeletal: Positive for gait problem  Negative for arthralgias, back pain, joint swelling, myalgias, neck pain and neck stiffness  Skin: Negative  Allergic/Immunologic: Negative  Neurological: Positive for dizziness  Negative for tremors, seizures, syncope, facial asymmetry, speech difficulty, weakness, light-headedness, numbness and headaches  Hematological: Negative  Psychiatric/Behavioral: Negative  Objective:    Physical Exam   Constitutional: She is oriented to person, place, and time  Cardiovascular: Normal rate, regular rhythm and normal heart sounds  Pulmonary/Chest: Effort normal and breath sounds normal    Neurological: She is alert and oriented to person, place, and time  Gait normal    Skin: Skin is warm and dry  Psychiatric: She has a normal mood and affect  Neurologic Exam     Mental Status   Oriented to person, place, and time  Level of consciousness: alert    Cranial Nerves     CN II   Visual fields full to confrontation  Visual acuity: (She has considerable dysconjugate gaze, as consequence visual field examination is grossly intact but incomplete)    CN V   Facial sensation intact       Gait, Coordination, and Reflexes     Gait  Gait: normal     MRI BRAIN AND SELLA  WITH AND WITHOUT CONTRAST  3/28/18     INDICATION: Pituitary abnormality, history of cerebral palsy     COMPARISON: MR 9/20/2017, CT 10/12/2017     TECHNIQUE:  Brain: Axial diffusion-weighted imaging  Axial FLAIR and axial T2  Axial gradient  Axial T1 postcontrast Axial bravo postcontrast with coronal reconstructions  Sella: Sagittal and coronal T1  Coronal T2  Sagittal and coronal T1 postcontrast with fat suppression  Coronal dynamic enhancement      Targeted images of the sella were performed requiring additional time at acquisition and interpretation of approximately 25%     IV Contrast:  7 mL of gadobutrol injection (MULTI-DOSE)      IMAGE QUALITY:  Diagnostic, however programmable shunt valve in the left parietal region does locally distorted image integrity      FINDINGS:     BRAIN PARENCHYMA:  No acute disease      Left hemisphere is markedly dysmorphic, resulting in marked irregularity of the contour of the left lateral ventricle  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  Close Lip   schizencephaly is not excluded  Significant left hemispheric volume loss  Postcontrast imaging is normal      VENTRICLES:  Left lateral ventricle is markedly dysmorphic, right lateral ventricle is slitlike in appearance  4th ventricle is attenuated, the aqueduct is stenotic  3rd ventricle attenuated in deviated superiorly toward the dysmorphic left hemisphere  Position of shunt catheter is stable  It extends to the posterior aspect of the dysmorphic body of the lateral ventricle axes into the ambient cistern and extends along the right paramedian midbrain            SELLA AND PITUITARY GLAND:  Appearance of the pituitary gland is stable  Volume gland is normal for gender and age  There is a hypoenhancing 7 mm structure in the suprasellar cistern, indistinguishable from the infundibulum and elevating the optic   chiasm  This extends to the upper surface of the pituitary gland/diaphragma sella    This could represent a small adenoma or cyst      ORBITS: Normal      PARANASAL SINUSES:  Retention cyst or polyp left maxillary sinus      VASCULATURE:  Evaluation of the major intracranial vasculature demonstrates appropriate flow voids      CALVARIUM AND SKULL BASE:  Scaphocephaly     EXTRACRANIAL SOFT TISSUES:  Normal      IMPRESSION:     Stable MR appearance of the brain, no acute disease  7 mm hypoenhancing suprasellar mass      Markedly dysmorphic left hemisphere with significant volume loss  Bilateral:/foot

## 2020-02-01 NOTE — ED PROVIDER NOTE - NSFOLLOWUPINSTRUCTIONS_ED_ALL_ED_FT
Follow up with your primary care physician within 2-3 days     Stay hydrated    Return to the ER if your symptoms worsen or for any other medical emergencies  **********    Fatigue    WHAT YOU NEED TO KNOW:    Fatigue is mental and physical exhaustion that does not get better with rest. Fatigue may make daily activities difficult or cause extreme sleepiness. It is normal to feel tired sometimes, but long-term fatigue may be a sign of serious illness.    DISCHARGE INSTRUCTIONS:    Return to the emergency department if:     You have chest pain.       You have difficulty breathing.     Contact your healthcare provider if:     You have a cough that gets worse, or does not go away.       You see blood in your urine or bowel movement.       You have numbness or tingling around your mouth or in an arm or leg.       You faint, feel dizzy, or have vision changes.       You have swelling in your lymph nodes.       You are a woman and have vaginal bleeding that is not normal for you, or is not expected.       You lose weight without trying, or you have trouble eating.       You feel weak or have muscle pain.       You have pain or swelling in your joints.      You have questions or concerns about your condition or care.     Follow up with your healthcare provider as directed: You may need more tests. Your healthcare provider may also refer you to a specialist. Write down your questions so you remember to ask them during your visits.    Manage fatigue:     Keep a fatigue diary. Include anything that makes you feel more tired or less tired. Bring the diary with you to follow-up visits with your provider.      Exercise as directed. Exercise can help you feel more alert. Exercise can also help you manage stress or relieve depression. Try to get at least 30 minutes of exercise most days of the week.      Keep a regular sleep schedule. Go to bed and wake up at the same times every day. Limit naps to 1 hour each day. A nap can improve fatigue, but a long nap may make it harder to go to sleep at night.      Plan and limit your activities. Limit the number of activities such as shopping and cleaning you do each day. If possible, try to spread out your trips throughout the week. Plan ahead so you are not rushing to get something done. Only do activities that you have the energy to complete. Take breaks between activities. Ask for help if you need it. Another person may be able to drive you or help with daily activities.      Eat a variety of healthy foods. Healthy foods include fruits, vegetables, whole-grain breads, low-fat dairy products, beans, lean meats, and fish. Good nutrition can help manage fatigue.      Limit caffeine and alcohol. These can make it difficult to fall or stay asleep. Women should limit alcohol to 1 drink a day. Men should limit alcohol to 2 drinks a day. A drink of alcohol is 12 ounces of beer, 5 ounces of wine, or 1½ ounces of liquor. Ask our healthcare provider how much caffeine is safe for you.      Do not smoke. Nicotine and other chemicals in cigarettes and cigars can cause lung damage and increase fatigue. Ask your healthcare provider for information if you currently smoke and need help to quit. E-cigarettes or smokeless tobacco still contain nicotine. Talk to your healthcare provider before you use these products.

## 2020-02-01 NOTE — ED PROVIDER NOTE - NEUROLOGICAL, MLM
Alert and oriented, no focal deficits, no motor or sensory deficits. Alert and oriented, no focal deficits, no motor or sensory deficits. LE Reflexes are normal. Strength and sensation intact in BLE

## 2020-02-01 NOTE — ED ADULT NURSE NOTE - OBJECTIVE STATEMENT
Pt presents to ED from  program with c/o generalized weakness and fatigue. Pt lives with [facility] and was at an adult  program at [facility] that advised him to come to the ED. He states he has a history of sleep apnea but has not been using his cpap machine because it's uncomfortable for him.

## 2020-02-01 NOTE — ED PROVIDER NOTE - CLINICAL SUMMARY MEDICAL DECISION MAKING FREE TEXT BOX
Dr. Mai: 59M h/o bipolar d/o on lithium, DM, RA, sleep apnea, HTN p/w several weeks of generalized weakness and fatigue, juan jose in BLE, no bowel or bladder incontinence or retention. No fevers or chills. Has intermittent back pain. No trauma or procedures on back. On exam pt is well appearing, nad, rrr, ctab, no midline spinal ttp, no erythema or warmth over back, 5/5 strength BLE, sensation intact BLE, chaperone MELISSA Collins, normal rectal tone, no saddle anesthesia. Normal reflexes bilateral patellar. No s/sx of cord compression on exam. Labs including electrolytes nml. Pt to f/u with pmd as outpt.

## 2020-02-01 NOTE — ED PROVIDER NOTE - OBJECTIVE STATEMENT
60 y/o M with PMH of HTN, DM, RA, sleep apnea,  presents to the ED with c/o b/l leg weakness x few weeks, worse with ambulation. Reports back pain at times with it. Denies trauma, paresthesias, bowel/bladder incontinence, CP, SOB, f/c or all other complaints

## 2020-02-05 ENCOUNTER — APPOINTMENT (OUTPATIENT)
Dept: GASTROENTEROLOGY | Facility: CLINIC | Age: 60
End: 2020-02-05
Payer: MEDICARE

## 2020-02-05 VITALS
TEMPERATURE: 99.1 F | RESPIRATION RATE: 18 BRPM | HEART RATE: 98 BPM | BODY MASS INDEX: 39.2 KG/M2 | HEIGHT: 71 IN | OXYGEN SATURATION: 97 % | SYSTOLIC BLOOD PRESSURE: 126 MMHG | WEIGHT: 280 LBS | DIASTOLIC BLOOD PRESSURE: 76 MMHG

## 2020-02-05 PROCEDURE — 99203 OFFICE O/P NEW LOW 30 MIN: CPT

## 2020-02-05 NOTE — REVIEW OF SYSTEMS
[As Noted in HPI] : as noted in HPI [Emotional Problems] : emotional problems [Fever] : no fever [Chills] : no chills [Eye Pain] : no eye pain [Red Eyes] : eyes not red [Nasal Discharge] : no nasal discharge [Nosebleeds] : no nosebleeds [Cough] : no cough [SOB on Exertion] : no shortness of breath during exertion [Itching] : no itching [Change In A Mole] : no change in a mole [Fainting] : no fainting [Dizziness] : no dizziness [Easy Bruising] : no tendency for easy bruising [Easy Bleeding] : no tendency for easy bleeding [Muscle Weakness] : no muscle weakness

## 2020-02-05 NOTE — PHYSICAL EXAM
[General Appearance - Alert] : alert [General Appearance - Well Nourished] : well nourished [General Appearance - In No Acute Distress] : in no acute distress [General Appearance - Well Developed] : well developed [Sclera] : the sclera and conjunctiva were normal [Hearing Threshold Finger Rub Not Isabella] : hearing was normal [Auscultation Breath Sounds / Voice Sounds] : lungs were clear to auscultation bilaterally [Heart Rate And Rhythm] : heart rate was normal and rhythm regular [Heart Sounds] : normal S1 and S2 [Bowel Sounds] : normal bowel sounds [Abdomen Soft] : soft [Abdomen Tenderness] : non-tender [Abnormal Walk] : normal gait [FreeTextEntry1] : obese [] : no rash [Nail Clubbing] : no clubbing  or cyanosis of the fingernails [Skin Lesions] : no skin lesions [Sensation] : the sensory exam was normal to light touch and pinprick [No Focal Deficits] : no focal deficits [Motor Exam] : the motor exam was normal [Oriented To Time, Place, And Person] : oriented to person, place, and time

## 2020-02-05 NOTE — ASSESSMENT
[FreeTextEntry1] : 1. chronic constipation, controlled on linzess\par \par plan : lcontinue linzess\par \par 2. average risk for colon cancer, recommend colonoscopy for screening. patient reports does not have \par \par plan ct colonography, pt understands that  this is less accurate  test than colonoscopy\par \par 3. obesity,pt has intentionally lost 65 lbs on current diet\par \par plan continuee present management.

## 2020-02-05 NOTE — HISTORY OF PRESENT ILLNESS
[FreeTextEntry1] : 60 yo male with  multiple comorbities,  with c/o constipation. patient takes linzess daily, with large soft bm. no brbpr, no melena. no abdominal pain.  no n/v. no gerd.\par \par patient reports diarrhea with greasy food.\par \par no family h/o colon cancer.\par

## 2020-02-13 ENCOUNTER — EMERGENCY (EMERGENCY)
Facility: HOSPITAL | Age: 60
LOS: 1 days | Discharge: HOME | End: 2020-02-13
Attending: EMERGENCY MEDICINE
Payer: MEDICARE

## 2020-02-13 VITALS
DIASTOLIC BLOOD PRESSURE: 87 MMHG | TEMPERATURE: 99 F | RESPIRATION RATE: 20 BRPM | OXYGEN SATURATION: 95 % | SYSTOLIC BLOOD PRESSURE: 134 MMHG | HEART RATE: 84 BPM

## 2020-02-13 VITALS
OXYGEN SATURATION: 97 % | TEMPERATURE: 99 F | SYSTOLIC BLOOD PRESSURE: 150 MMHG | RESPIRATION RATE: 18 BRPM | DIASTOLIC BLOOD PRESSURE: 89 MMHG | HEART RATE: 90 BPM

## 2020-02-13 DIAGNOSIS — Z79.899 OTHER LONG TERM (CURRENT) DRUG THERAPY: ICD-10-CM

## 2020-02-13 DIAGNOSIS — Z98.890 OTHER SPECIFIED POSTPROCEDURAL STATES: Chronic | ICD-10-CM

## 2020-02-13 LAB
ALBUMIN SERPL ELPH-MCNC: 4.3 G/DL — SIGNIFICANT CHANGE UP (ref 3.3–5)
ALP SERPL-CCNC: 99 U/L — SIGNIFICANT CHANGE UP (ref 40–120)
ALT FLD-CCNC: 31 U/L — SIGNIFICANT CHANGE UP (ref 10–45)
ANION GAP SERPL CALC-SCNC: 12 MMOL/L — SIGNIFICANT CHANGE UP (ref 5–17)
APAP SERPL-MCNC: <15 UG/ML — SIGNIFICANT CHANGE UP (ref 10–30)
AST SERPL-CCNC: 20 U/L — SIGNIFICANT CHANGE UP (ref 10–40)
BASOPHILS # BLD AUTO: 0.06 K/UL — SIGNIFICANT CHANGE UP (ref 0–0.2)
BASOPHILS NFR BLD AUTO: 0.6 % — SIGNIFICANT CHANGE UP (ref 0–2)
BILIRUB SERPL-MCNC: 1 MG/DL — SIGNIFICANT CHANGE UP (ref 0.2–1.2)
BUN SERPL-MCNC: 26 MG/DL — HIGH (ref 7–23)
CALCIUM SERPL-MCNC: 10.3 MG/DL — SIGNIFICANT CHANGE UP (ref 8.4–10.5)
CHLORIDE SERPL-SCNC: 99 MMOL/L — SIGNIFICANT CHANGE UP (ref 96–108)
CO2 SERPL-SCNC: 21 MMOL/L — LOW (ref 22–31)
CREAT SERPL-MCNC: 1.49 MG/DL — HIGH (ref 0.5–1.3)
EOSINOPHIL # BLD AUTO: 0.25 K/UL — SIGNIFICANT CHANGE UP (ref 0–0.5)
EOSINOPHIL NFR BLD AUTO: 2.5 % — SIGNIFICANT CHANGE UP (ref 0–6)
ETHANOL SERPL-MCNC: SIGNIFICANT CHANGE UP MG/DL (ref 0–10)
GLUCOSE SERPL-MCNC: 141 MG/DL — HIGH (ref 70–99)
HCT VFR BLD CALC: 44.1 % — SIGNIFICANT CHANGE UP (ref 39–50)
HGB BLD-MCNC: 14.3 G/DL — SIGNIFICANT CHANGE UP (ref 13–17)
IMM GRANULOCYTES NFR BLD AUTO: 0.8 % — SIGNIFICANT CHANGE UP (ref 0–1.5)
LITHIUM SERPL-MCNC: 1.3 MMOL/L — HIGH (ref 0.6–1.2)
LITHIUM SERPL-MCNC: 1.5 MMOL/L — HIGH (ref 0.6–1.2)
LITHIUM SERPL-MCNC: 1.8 MMOL/L — CRITICAL HIGH (ref 0.6–1.2)
LYMPHOCYTES # BLD AUTO: 1.82 K/UL — SIGNIFICANT CHANGE UP (ref 1–3.3)
LYMPHOCYTES # BLD AUTO: 18.2 % — SIGNIFICANT CHANGE UP (ref 13–44)
MCHC RBC-ENTMCNC: 28.6 PG — SIGNIFICANT CHANGE UP (ref 27–34)
MCHC RBC-ENTMCNC: 32.4 GM/DL — SIGNIFICANT CHANGE UP (ref 32–36)
MCV RBC AUTO: 88.2 FL — SIGNIFICANT CHANGE UP (ref 80–100)
MONOCYTES # BLD AUTO: 0.8 K/UL — SIGNIFICANT CHANGE UP (ref 0–0.9)
MONOCYTES NFR BLD AUTO: 8 % — SIGNIFICANT CHANGE UP (ref 2–14)
NEUTROPHILS # BLD AUTO: 6.97 K/UL — SIGNIFICANT CHANGE UP (ref 1.8–7.4)
NEUTROPHILS NFR BLD AUTO: 69.9 % — SIGNIFICANT CHANGE UP (ref 43–77)
NRBC # BLD: 0 /100 WBCS — SIGNIFICANT CHANGE UP (ref 0–0)
PLATELET # BLD AUTO: 202 K/UL — SIGNIFICANT CHANGE UP (ref 150–400)
POTASSIUM SERPL-MCNC: 3.5 MMOL/L — SIGNIFICANT CHANGE UP (ref 3.5–5.3)
POTASSIUM SERPL-SCNC: 3.5 MMOL/L — SIGNIFICANT CHANGE UP (ref 3.5–5.3)
PROT SERPL-MCNC: 7.9 G/DL — SIGNIFICANT CHANGE UP (ref 6–8.3)
RBC # BLD: 5 M/UL — SIGNIFICANT CHANGE UP (ref 4.2–5.8)
RBC # FLD: 13.3 % — SIGNIFICANT CHANGE UP (ref 10.3–14.5)
SALICYLATES SERPL-MCNC: <2 MG/DL — LOW (ref 15–30)
SODIUM SERPL-SCNC: 132 MMOL/L — LOW (ref 135–145)
WBC # BLD: 9.98 K/UL — SIGNIFICANT CHANGE UP (ref 3.8–10.5)
WBC # FLD AUTO: 9.98 K/UL — SIGNIFICANT CHANGE UP (ref 3.8–10.5)

## 2020-02-13 PROCEDURE — 93010 ELECTROCARDIOGRAM REPORT: CPT | Mod: GC

## 2020-02-13 PROCEDURE — 99284 EMERGENCY DEPT VISIT MOD MDM: CPT | Mod: GC

## 2020-02-13 PROCEDURE — 80053 COMPREHEN METABOLIC PANEL: CPT

## 2020-02-13 PROCEDURE — 80307 DRUG TEST PRSMV CHEM ANLYZR: CPT

## 2020-02-13 PROCEDURE — 85027 COMPLETE CBC AUTOMATED: CPT

## 2020-02-13 PROCEDURE — 84443 ASSAY THYROID STIM HORMONE: CPT

## 2020-02-13 PROCEDURE — 99283 EMERGENCY DEPT VISIT LOW MDM: CPT

## 2020-02-13 PROCEDURE — 93005 ELECTROCARDIOGRAM TRACING: CPT

## 2020-02-13 PROCEDURE — 80178 ASSAY OF LITHIUM: CPT

## 2020-02-13 RX ORDER — SODIUM CHLORIDE 9 MG/ML
1000 INJECTION INTRAMUSCULAR; INTRAVENOUS; SUBCUTANEOUS ONCE
Refills: 0 | Status: COMPLETED | OUTPATIENT
Start: 2020-02-13 | End: 2020-02-13

## 2020-02-13 RX ADMIN — SODIUM CHLORIDE 1000 MILLILITER(S): 9 INJECTION INTRAMUSCULAR; INTRAVENOUS; SUBCUTANEOUS at 13:29

## 2020-02-13 RX ADMIN — SODIUM CHLORIDE 1000 MILLILITER(S): 9 INJECTION INTRAMUSCULAR; INTRAVENOUS; SUBCUTANEOUS at 08:35

## 2020-02-13 NOTE — ED PROVIDER NOTE - PROGRESS NOTE DETAILS
Ivon THOMASON: Signed out pending labs and dispo. Found to have lithium level of 1.8. Patient asymptomatic, feels better. States he has had this in the past and usually lithium is held the day levels are checked. No nausea, vomiting. Plan for hydration, recheck level. Cr function better than previous baseline 1.89 (2/1/20)-> 1.49 today. Kuldeep THOMASON: Patient's lithium level down trending with IVF; original lithioum level not appropriate since taken 2.5 hours after home lithium dose.  Discussed with toxicology - patient presents with overt toxidrome of lithium.  Spoke with house Psychiatry - no acute changes in Lithium medication recommended at this time.  Rec. cont. outpatient follow-up. Kaushik Gonzalez DO, PGY-3: Pt asymptomatic at this time with Lithium level coming down appropriately. Will DC home.

## 2020-02-13 NOTE — ED ADULT TRIAGE NOTE - ARRIVAL FROM
Physical Therapy Treatment Note    Visit Count: 13 of 16                       Progress Note #7 3/11/19  Plan of Care: 2/19/2019 Through: 7/9/2019 20 weeks  Insurance Information: $0 used at the time of evaluation, has used 0 therapy visits prior this year  MEDICARE  AUTHORIZATION NEEDED: NO, FOLLOW MEDICARE GUIDELINES     THRESHOLD AMOUNT: $2040.00     PT AND ST COMBINED MET initial visit:$ 0               IF CONTINUED SERVICES ARE REASONABLE AND NECESSARY BEYOND $2040.00, A KX MODIFIER WILL BE NEEDED.  Referred by: Yves aMy PA-C; Next provider visit (if known/scheduled): unknown  Medical Diagnosis (from order): Right ankle pain, unspecified chronicity [M25.571]                                                            Closed fracture of right ankle with routine healing, subsequent encounter [H76.941R]  Treatment Diagnosis: R ankle fracture-distal fibular head     Date of onset/injury: 1/3/19  Diagnosis Precautions: none  Chart reviewed at time of initial evaluation (relevant co-morbidities, allergies, tests and medications listed): -      XR R ankle 2/18/19-fracture of the distal  fibular metaphysis is again identified with mild lateral and posterior  displacement of the distal fracture fragment seen. Fracture lines remain  visible. Arterial calcifications.  Navicular bone spurring and prominent dorsal calcaneal enthesophyte again  noted.  No other interval significant change.      IMPRESSION:  Incomplete healing of the previous noted distal fibular fracture.     SUBJECTIVE   Saw MD-mild lucency at fracture site so will see MD in 2 months-progress to [facility].  Notes pain with going down stairs or \"a lot of walking\" ie 2 1/2 hours on his feet. Feels tightness first toe-less. Dorsum foot-pain levels with these activities but not at other times.    Current Pain (0-10 scale): 2/10    Functional Change: normal sleep and going up stairs, driving is better and also could put on other shoe     OBJECTIVE   Swelling R LE  moderate pitting edema distal 1/3 lower leg  Ankle DF, IN 5/5; EV 4/5    Treatment     Manual Therapy-STM to R foot to decrease edema                                 -joint mobs gr 3-4 R ankle talocrural posterior/anterior glides, navicular glides, mid foot and MTP especially first MTP joint glides to improve joint mobility     Neuro re ed -duck walking forward, sideways                         -lateral braiding-VC for sequencing                         -backwards march                         -hop L LE flex        Therapeutic Exercise  Reps Sets Details HEP Not performed       Cue to slow  tid    Contrast baths    Reviewed to continue until no swelling 6-12 months 1x x               Stand R first MTP stretch 30' 5x R      2x     Stand soleus stretch 30' 5x L      2x     Heelraise over step single/double 8x /22x   Very difficult to do single leg-cannot progress past 8x 2x      doorslides with ball at thighs 30' 10x      1x                      TB EV     orange-manual cue motion   x   towel toe curls   4 min     2x  x    Storks on airex  45' 3x   Cue bend R knee  1-2x       1/2 lateral/medial wobbles  3 min    Cue weight shift    x    wobble disc flat top  3 min   circles                Squat on airex x10x2       Sidestepping on airex x2min       Tandem stance on airex [id number]                               High L knee on airex x20       clock squat                        *Verbal Cues/Manual Cues for form with exercises to correct posture, avoid compensation and improve muscle control to facilitate improved strength and stability. Ed pt in exercise purpose, benefits and risks. Ed pt to use ice/heat for any residual discomfort they may experience. Pt has agreed to exercises this visit.     Electrostimulation  Patient has been made aware of possible risks associated with electrostimulation and has agreed to this treatment.  Parameters: high low to decrease pain, decrease joint stiffness and decrease muscle stiffness  for 20 minutes  Additional Application of Thermal Agent: cold pack  Location of Treatment: R ankle and LE  Patient Position: supine with lower extremity supported  Outcome of Treatment: Treatment resulted in decrease in pain, decreased joint stiffness and decreased muscle stiffness   Patient reports no adverse reaction to treatment     Skilled input: re eval, TE above     Home Program:[id number]   above=instructed home program       Writer verbally educated the patient and received verbal consent from the patient on hand placement, positioning of patient, and techniques to be performed today including eval, palp and TE and how they are pertinent to the patient's plan of care.      Suggestions for next session as indicated: TE above, NR, MT-mobs for mid foot       ASSESSMENT   Pain levels continue decreasing in intensity, frequency and duration but swelling persists as patient doing more and with normal shoe on. MT joint mobs for ROM in midfoot and first>5th MTP's. Decreased PF strength but less substitutions to verbally correct today. Swelling persists-needs to wear normal shoe on R foot now.  Pain after treatment (patient reported, 0-10 scale): 2  Result of above outlined education: Verbalizes understanding and Needs reinforcement       Goals: To be obtained by end of this plan of care: #7 3/11/19  1. Patient will be independent with progressed and modified home exercise program   2. Decrease pain/symptoms to 2/10, intermittent improving  3. Improve involved strength to 4+/5 see above  4. Improve involved range of motion to WFL's better-needs to increase PF ROM from mid foot  through improvements listed above patient will:  5. Be able to ambulate community distances on even and uneven terrain independent level surfaces going well-increased endurance  6. Be able to ascend and descend 1 flight of stairs using reciprocal pattern with minimal pain/difficulty-better up-needs work decending  7. Be able to bend/squat with  minimal pain/difficulty to improve activities of independent daily living, activity tolerance, age appropriate activities, completion of household tasks  8    LEFS score from 19/80 to 60/80 or greater to improve function 3/11/19 #7-47%    Plan-decrease to 1x/wk POC    THERAPY DAILY BILLING   Insurance: MEDICARE 2. MEDICO    Evaluation Procedures:  No evaluation codes were used on this date of service    Timed Procedures:  Manual Therapy, 15 minutes  Neuromuscular Re-Education, 10 minutes  Therapeutic Exercise, 20 minutes    Untimed Procedures:  Electrostimulation Unattended (Medicare/Medicaid)    Total Treatment Time: 65 minutes   Home

## 2020-02-13 NOTE — CONSULT NOTE ADULT - ASSESSMENT
59M PMH bipolar disorder on lithium and ziprasidone, DM, HTN, CHAYITO presents with insomnia and fatigue found to have elevated lithium level.

## 2020-02-13 NOTE — ED PROVIDER NOTE - CLINICAL SUMMARY MEDICAL DECISION MAKING FREE TEXT BOX
Pt p/w insomnia for the past several days, pt appears well and has no acute psych red flags, trouble sleeping likely 2/2 noncompliance w/ CPAP for sleep apnea at home alongside anxiety/stress. Will medically clear pt and dc home w/ reassurance and recommendations for sleep hygiene and OTC sleep aids eg melatonin -Mena

## 2020-02-13 NOTE — ED PROVIDER NOTE - PATIENT PORTAL LINK FT
You can access the FollowMyHealth Patient Portal offered by [facility] by registering at the following website: [email or web address]. By joining [email or web address]’s FollowMyHealth portal, you will also be able to view your health information using other applications (apps) compatible with our system.

## 2020-02-13 NOTE — ED PROVIDER NOTE - ATTENDING CONTRIBUTION TO CARE
MD Reyes:  patient seen and evaluated personally.   I agree with the History & Physical,  Impression & Plan other than what was detailed in my note.  MD Reyes    Pt w/ cc of insomnia. called ems to bring him in. Has been having difficulty sleeping over past few night. increased stress. no other complaints. no si/hi, no hallucinations, afebrile vitals stable, non toxic, well appearing, no signs of emergent pathology. discussed sleep hygiene. pt stable for dc home

## 2020-02-13 NOTE — ED ADULT NURSE NOTE - OBJECTIVE STATEMENT
60 y/o male bib ems/police after he called 911 for c/o unable to sleep, insomnia feels listless and c/o fatigue, pt. w/ sleep apnea, htn, DM,and bipolar d/o, pt. denies any current s/hi, no hx of suicide attempts, reported that his last psych admission was about 14 years ago, takes geodon, lithium, is requesting to see a psychiatrist for medication adjustment.denies any etoh/drug abuse hx

## 2020-02-13 NOTE — ED ADULT NURSE REASSESSMENT NOTE - NS ED NURSE REASSESS COMMENT FT1
Received pt from MELISSA Zeng; pt had just arrived to ED at 0600, dressed in street clothes; as per report plan is to draw blood work, evaluate, then discharge, no psych evaluation and consequently no wanding to be done, as per night shift attending, under status at one point it read "BW then D/C"; pt is controlled and cooperative; writer informed attending on day shift that pt lithium level was elevated, IV bolus ordered and given; pt ate breakfast, VSS; continue to monitor.

## 2020-02-13 NOTE — ED PROVIDER NOTE - CARE PLAN
Problem: Mobility Impaired (Adult and Pediatric)  Goal: *Acute Goals and Plan of Care (Insert Text)  In 1-7 days pt will be able to perform:  ST. Bed mobility: Rolling L to R to L modified independent for positioning. 2. Supine to sit to supine S with HR for meals. 3. Sit to stand to sit S with RW in prep for ambulation. LT. Gait: Ambulate >150ft S with RW, WBAT, LSO for home/community mobility. 2. Activity tolerance: Tolerate up in chair 1-2 hours for ADLs. 3. Patient/Family Education: Patient/family to be independent with HEP for follow-up care and safe discharge. Pt refused PT due to:     [ ]  Nausea/vomiting  [X]  Eating lunch  [X]  Pain  [ ]  Pt lethargic  [ ]  Off Unit  Will f/u later as schedule allows. Thank you.      Paz Ferris PT, DPT Principal Discharge DX:	Insomnia

## 2020-02-13 NOTE — CHART NOTE - NSCHARTNOTEFT_GEN_A_CORE
EMERGENCY : SW consulted by medical team due to patient being cleared for discharge and needing assistance with transportation home. LMSW met with patient at bedside and introduced self and role to which patient verbalized understanding. Patient alert and oriented at this time. Patient states that he normally leaves doctors appointments and hospital visits via medicaid taxi and travels independently. Patient confirmed demographic information and home address. [facility] and arranged medicaid cab transportation home. Transportation arranged with [employer] PH:[phone]. Confirmation # [id number]. LMSW contacted [employer] to confirm trip. Trip confirmed for 6:45PM arrival time. LMSW communicated this information with patient and medical team. Patient with no further needs from LMSW at this time. LMSW provided patient with contact information and ensured ongoing SW availability.

## 2020-02-13 NOTE — ED ADULT NURSE NOTE - NSFALLRSKHARMRISK_ED_ALL_ED
Call to patient daughter Rama.  Advised of medication change, she has no concerns.  She does question if patient has reached his potential in terms of strength and ability.  Discussed that medication change made to decreased episodes of lightheadedness and hypotension.  Advised discussed patient long term prognosis with provider and she verbalized understanding.  Also reminded of patient appointment next week.   no

## 2020-02-13 NOTE — ED ADULT NURSE NOTE - SUICIDE PROTECTIVE FACTORS
Has future plans/Fear of death or the actual act of killing self/Responsibility to family and others/Identifies reasons for living

## 2020-02-13 NOTE — CONSULT NOTE ADULT - PROBLEM SELECTOR RECOMMENDATION 9
Lithium is a naturally occurring metal used to treat bipolar disorder.  Mechanism of action is not fully understood.  Acute toxicity after overdose presents with GI symptoms such as nausea, vomiting, abdominal pain, and diarrhea which patient does not have.  Chronic toxicity is more neurologic which consists of ataxia, agitation, confusion, cogwheel rigidity.  Mild toxicity can present with tremors.  Chronic toxicity often manifests with decreasing kidney function resulting in decreased ability to eliminate lithium but patient reports he is urinating without difficulty and creatinine is at baseline.    Patient does not have any severe signs of toxicity and tremor is mild currently.  Initial lithium level was drawn 3 hours post ingestion which is not a true post distribution level.  The repeat level of 1.5 is a more accurate representation of post distribution level.    I would continue hydrating with IV fluids and repeating a lithium level.  Please also get an acetaminophen, salicylate, and ETOH level as it was not drawn initially.  If repeat lithium level continues to downtrend I think patient can be cleared from lithium toxicity but would touch base with patient's psychiatrist or house psychiatry to discuss possibly decreasing patient's dose of lithium.    Case discussed with ED.  Please page toxicology with any questions at [phone].

## 2020-02-13 NOTE — ED PROVIDER NOTE - OBJECTIVE STATEMENT
59y m PMhx bipolar d/o on lithium and Geodon, sleep apnea (intermittently compliant w/ CPAP at night), p/w worsening ability to sleep and worsening fatigue. Pt states he cannot see his therapist until next week, and he felt "very crummy" this morning and so felt compelled to see medical staff. Pt denies SI/HI/hallucinations. Pt is fully A&O and cooperative in room.

## 2020-02-13 NOTE — CONSULT NOTE ADULT - SUBJECTIVE AND OBJECTIVE BOX
MEDICAL TOXICOLOGY CONSULT    HPI:  59M PMH bipolar disorder on lithium and ziprasidone, DM, HTN, CHAYITO presents with insomnia and fatigue.  Patient reports symptoms started 3 days ago and reports minimal sleep over that time period.  Patient denies overdosing on any of his medications and reports he takes lithium 900mg BID.  Patient reports tremor but denies chest pain, dyspnea, abdominal pain, nausea, vomiting, fevers, chills, diarrhea, melena, hematochezia, vision changes, weakness, numbness, paresthesias.    In the ED, patient noted to have elevated lithium level of 1.80 drawn at 7am.  Patient reports he took his morning dose at 4:45am today.  Patient denies SI, HI, auditory or visual hallucinations.  Patient reports current insomnia is not characteristic of prior manic episodes.      ONSET / TIME of exposure(s): denies overdose    QUANTITY of exposure(s): denies overdose but takes lithium 900mg BID.    ROUTE of exposure: ingestion    ASSOCIATED symptoms: fatigue and insomnia    PAST MEDICAL & SURGICAL HISTORY:  Primary osteoarthritis of left knee  Obesity  Diabetic neuropathy: bilateral feet, ankles  CHAYITO (obstructive sleep apnea): diagnosed &gt;25 years ago, non-compliant with CPAP  HLD (hyperlipidemia)  T2DM (type 2 diabetes mellitus)  Hypertension  Bipolar 1 disorder: on Lithium  History of excision of testicular mass: left, 2009        MEDICATION HISTORY: tradjenta, repaglinide, metoprolol, lithium, ziprasidone, atorvastatin, aspirin      RECREATIONAL / ETHANOL / SUPPLEMENT USE: denies smoking, reports quit ETOH when he was 22 years old, no illicit drug use      REVIEW OF SYSTEMS:     General:  yes fatigue and insomnia, no fever, chills, change in weight or fatigue  Eyes:  no blurry vision, double vision, or diminished acuity  ENT:  no tinnitus, decreased acuity, epistaxis, sore throat, dysphagia  Cardiac: no chest pain, syncope, or palpitations  Lung:  no cough, shortness of breath, stridor, or wheezing  GI:  no abdominal pain, nausea, vomiting, diarrhea, or constipation  Genitourinary: no dysuria, hematuria, or incontinence  Ortho: no joint pain, swelling, myalgias, atrophy, or spasm  Skin: no rash, lesions, or pruritis  Neuro:  no headache, weakness/numbness, ataxia, change in speech, dizziness, tremor, or seizure  Psych: no suicidal ideation, no homicidal ideation, no auditory/visual hallucinations  Endocrine: no polydypsia, polyuria, heat/cold intolerance  Hematologic: no bleeding, bruising, petechiae, or adenopathy  Immune:  no rhinitis, atopy, immunocompromise, HIV, or cancer    PHYSICAL EXAM  Vital Signs Last 24 Hrs  T(C): 36.3 (13 Feb 2020 09:13), Max: 37.1 (13 Feb 2020 06:41)  T(F): 97.3 (13 Feb 2020 09:13), Max: 98.7 (13 Feb 2020 06:41)  HR: 82 (13 Feb 2020 09:13) (82 - 84)  BP: 130/82 (13 Feb 2020 09:13) (130/82 - 134/87)  BP(mean): --  RR: 18 (13 Feb 2020 09:13) (18 - 20)  SpO2: 96% (13 Feb 2020 09:13) (95% - 96%)    General:  AAOx3, calm  Head:  normocephalic & atraumatic  Eyes:  extra-occular movement is intact  Pupils:  3 mm, symmetric, reactive to light  Ear, nose, throat:  mucosa is moist  Neck:  supple  Respiratory:  normal effort, clear to auscultation bilaterally, no rales/ronchi/wheezing  Cardiac:  rate is normal, normal rhythm, no rubs/murmurs/gallops  Abdomen:  Soft, nondistended, nontender, +bowel sounds, no organomegaly  :  deferred  Skin:  warm and dry  Neurologic:  3-4 beats clonus bilaterally, mild hand tremor, reflexes are normal, extremities are supple, cranial nerves II-XII intact    SIGNIFICANT LABORATORY STUDIES:                        14.3   9.98  )-----------( 202      ( 13 Feb 2020 07:02 )             44.1       02-13    132<L>  |  99  |  26<H>  ----------------------------<  141<H>  3.5   |  21<L>  |  1.49<H>    Ca    10.3      13 Feb 2020 07:02    TPro  7.9  /  Alb  4.3  /  TBili  1.0  /  DBili  x   /  AST  20  /  ALT  31  /  AlkPhos  99  02-13              Anion Gap: 12 02-13 @ 07:02  CK: -- 02-13 @ 07:02  Troponin:  --  02-13 @ 07:02  Pro-BNP:  --  02-13 @ 07:02  VBG:  --  02-13 @ 07:02  Carboxyhemoglobin %:  --  02-13 @ 07:02  Methemoglobin %:  --  02-13 @ 07:02  Osmolality Serum:  --  02-13 @ 07:02  Aspirin Level: --  02-13 @ 07:02  Acetaminophen Level:  --  02-13 @ 07:02  Ethanol Level:  --  02-13 @ 07:02  Digoxin Level:  --  02-13 @ 07:02  Phenytoin Level:  --  02-13 @ 07:02  Carbamazepine level:  --  02-13 @ 07:02  Lamotrigine level:  --  02-13 @ 07:02

## 2020-02-13 NOTE — ED ADULT NURSE NOTE - PATIENT'S CHIEF COMPLAINT
Rest the left knee and left ankle for 7-10 days.     Place ice onto the painful areas for 10 minutes at a time, every 2-3 hours while awake.      follow up with your primary care provider if not better in 10-14 days.     Continue the Ibuprofen.  You can also add Tylenol for additional pain relief.         I need to speak to someone about my sleep apnea

## 2020-02-27 ENCOUNTER — APPOINTMENT (OUTPATIENT)
Dept: BARIATRICS/WEIGHT MGMT | Facility: CLINIC | Age: 60
End: 2020-02-27
Payer: MEDICARE

## 2020-02-27 VITALS
OXYGEN SATURATION: 97 % | SYSTOLIC BLOOD PRESSURE: 138 MMHG | HEART RATE: 78 BPM | DIASTOLIC BLOOD PRESSURE: 90 MMHG | WEIGHT: 284 LBS | HEIGHT: 71 IN | BODY MASS INDEX: 39.76 KG/M2

## 2020-02-27 PROCEDURE — 99214 OFFICE O/P EST MOD 30 MIN: CPT

## 2020-03-02 ENCOUNTER — FORM ENCOUNTER (OUTPATIENT)
Age: 60
End: 2020-03-02

## 2020-03-02 NOTE — ASSESSMENT
[FreeTextEntry1] : Bariatric surgery history: none\par Obesity comorbidities: HTN, DM, CHAYITO\par Anti-obesity medications: none\par Obesity medication side effects: n/a\par \par Plan: \par \par continue whole foods and preparing food \par encouraged daily physical activity. Walk in a mall. \par discuss with caretakers regarding budget to prevent skipping meals \par Discussed use of GLP-1 agonist, but will hold off for now as he was just hospitalized for hypernatremia and high lithium levels. Concerned it will put him at risk if his appetite is too suppressed \par \par RTO 4 weeks

## 2020-03-02 NOTE — HISTORY OF PRESENT ILLNESS
[FreeTextEntry1] : Patient was unable to stand on biometric scale today, unable to stand still \par regular scale presented with a 2 pound weight loss since last visit. \par \par Patient states he has been getting most of his dinners from the nursing home because he ran out of money. He has been having breakfast and skipping lunch. He is not keeping a food journal \par He has not been exercising because the weather\par He sleeps 6-7 hours, untreated CHAYITO. He was unable to tolerate CPAP \par \par Patient states he was hospitalized a few weeks ago with high sodium and lithium levels, he states from too much soda, which have normalized. he followed up with pcp today

## 2020-03-03 DIAGNOSIS — Z71.89 OTHER SPECIFIED COUNSELING: ICD-10-CM

## 2020-03-09 ENCOUNTER — APPOINTMENT (OUTPATIENT)
Dept: RADIOLOGY | Facility: HOSPITAL | Age: 60
End: 2020-03-09
Payer: MEDICARE

## 2020-03-09 ENCOUNTER — OUTPATIENT (OUTPATIENT)
Dept: OUTPATIENT SERVICES | Facility: HOSPITAL | Age: 60
LOS: 1 days | End: 2020-03-09
Payer: MEDICARE

## 2020-03-09 ENCOUNTER — FORM ENCOUNTER (OUTPATIENT)
Age: 60
End: 2020-03-09

## 2020-03-09 DIAGNOSIS — Z98.890 OTHER SPECIFIED POSTPROCEDURAL STATES: Chronic | ICD-10-CM

## 2020-03-09 DIAGNOSIS — Z00.8 ENCOUNTER FOR OTHER GENERAL EXAMINATION: ICD-10-CM

## 2020-03-09 PROCEDURE — 73630 X-RAY EXAM OF FOOT: CPT | Mod: 26,50

## 2020-03-09 PROCEDURE — 73630 X-RAY EXAM OF FOOT: CPT

## 2020-03-13 ENCOUNTER — RX RENEWAL (OUTPATIENT)
Age: 60
End: 2020-03-13

## 2020-03-23 ENCOUNTER — RX RENEWAL (OUTPATIENT)
Age: 60
End: 2020-03-23

## 2020-03-23 ENCOUNTER — APPOINTMENT (OUTPATIENT)
Dept: PULMONOLOGY | Facility: CLINIC | Age: 60
End: 2020-03-23

## 2020-03-25 ENCOUNTER — APPOINTMENT (OUTPATIENT)
Dept: ENDOCRINOLOGY | Facility: CLINIC | Age: 60
End: 2020-03-25

## 2020-03-26 ENCOUNTER — APPOINTMENT (OUTPATIENT)
Dept: BARIATRICS/WEIGHT MGMT | Facility: CLINIC | Age: 60
End: 2020-03-26
Payer: MEDICARE

## 2020-03-26 PROCEDURE — 99441: CPT

## 2020-04-02 ENCOUNTER — RX RENEWAL (OUTPATIENT)
Age: 60
End: 2020-04-02

## 2020-04-16 ENCOUNTER — RX RENEWAL (OUTPATIENT)
Age: 60
End: 2020-04-16

## 2020-04-28 ENCOUNTER — APPOINTMENT (OUTPATIENT)
Dept: BARIATRICS/WEIGHT MGMT | Facility: CLINIC | Age: 60
End: 2020-04-28
Payer: MEDICARE

## 2020-04-28 PROCEDURE — 99442: CPT | Mod: CR

## 2020-05-06 ENCOUNTER — APPOINTMENT (OUTPATIENT)
Dept: PULMONOLOGY | Facility: CLINIC | Age: 60
End: 2020-05-06

## 2020-05-07 ENCOUNTER — APPOINTMENT (OUTPATIENT)
Dept: PULMONOLOGY | Facility: CLINIC | Age: 60
End: 2020-05-07

## 2020-06-01 ENCOUNTER — APPOINTMENT (OUTPATIENT)
Dept: PULMONOLOGY | Facility: CLINIC | Age: 60
End: 2020-06-01

## 2020-06-04 ENCOUNTER — APPOINTMENT (OUTPATIENT)
Dept: NEUROLOGY | Facility: CLINIC | Age: 60
End: 2020-06-04

## 2020-06-10 ENCOUNTER — EMERGENCY (EMERGENCY)
Facility: HOSPITAL | Age: 60
LOS: 1 days | Discharge: HOME | End: 2020-06-10
Admitting: INTERNAL MEDICINE
Payer: MEDICARE

## 2020-06-10 DIAGNOSIS — Z98.890 OTHER SPECIFIED POSTPROCEDURAL STATES: Chronic | ICD-10-CM

## 2020-06-10 RX ORDER — LITHIUM CARBONATE 300 MG/1
1 TABLET, EXTENDED RELEASE ORAL
Qty: 0 | Refills: 0 | DISCHARGE

## 2020-06-10 RX ORDER — ASCORBIC ACID 60 MG
1 TABLET,CHEWABLE ORAL
Qty: 0 | Refills: 0 | DISCHARGE

## 2020-06-11 ENCOUNTER — TRANSCRIPTION ENCOUNTER (OUTPATIENT)
Age: 60
End: 2020-06-11

## 2020-06-11 PROCEDURE — 80053 COMPREHEN METABOLIC PANEL: CPT

## 2020-06-11 PROCEDURE — 36415 COLL VENOUS BLD VENIPUNCTURE: CPT

## 2020-06-11 PROCEDURE — 85027 COMPLETE CBC AUTOMATED: CPT

## 2020-06-11 PROCEDURE — 86803 HEPATITIS C AB TEST: CPT

## 2020-06-11 PROCEDURE — U0003: CPT

## 2020-06-11 PROCEDURE — 93005 ELECTROCARDIOGRAM TRACING: CPT

## 2020-06-11 PROCEDURE — 83036 HEMOGLOBIN GLYCOSYLATED A1C: CPT

## 2020-06-11 PROCEDURE — 93880 EXTRACRANIAL BILAT STUDY: CPT

## 2020-06-11 PROCEDURE — 93970 EXTREMITY STUDY: CPT

## 2020-06-11 PROCEDURE — 71045 X-RAY EXAM CHEST 1 VIEW: CPT

## 2020-06-11 PROCEDURE — 84484 ASSAY OF TROPONIN QUANT: CPT

## 2020-06-11 PROCEDURE — 99285 EMERGENCY DEPT VISIT HI MDM: CPT | Mod: 25

## 2020-06-11 PROCEDURE — 36000 PLACE NEEDLE IN VEIN: CPT

## 2020-06-11 PROCEDURE — 82962 GLUCOSE BLOOD TEST: CPT

## 2020-06-11 PROCEDURE — 80061 LIPID PANEL: CPT

## 2020-06-11 PROCEDURE — 93306 TTE W/DOPPLER COMPLETE: CPT

## 2020-06-11 PROCEDURE — 80048 BASIC METABOLIC PNL TOTAL CA: CPT

## 2020-06-11 PROCEDURE — 80178 ASSAY OF LITHIUM: CPT

## 2020-06-11 PROCEDURE — 70450 CT HEAD/BRAIN W/O DYE: CPT

## 2020-06-11 RX ORDER — ATORVASTATIN CALCIUM 80 MG/1
1 TABLET, FILM COATED ORAL
Qty: 0 | Refills: 0 | DISCHARGE

## 2020-06-18 ENCOUNTER — APPOINTMENT (OUTPATIENT)
Dept: NEUROLOGY | Facility: CLINIC | Age: 60
End: 2020-06-18
Payer: MEDICARE

## 2020-06-18 VITALS
WEIGHT: 280 LBS | HEART RATE: 74 BPM | OXYGEN SATURATION: 98 % | TEMPERATURE: 97.4 F | HEIGHT: 71 IN | DIASTOLIC BLOOD PRESSURE: 70 MMHG | SYSTOLIC BLOOD PRESSURE: 126 MMHG | BODY MASS INDEX: 39.2 KG/M2 | RESPIRATION RATE: 17 BRPM

## 2020-06-18 DIAGNOSIS — G45.9 TRANSIENT CEREBRAL ISCHEMIC ATTACK, UNSPECIFIED: ICD-10-CM

## 2020-06-18 PROCEDURE — 99214 OFFICE O/P EST MOD 30 MIN: CPT

## 2020-06-18 NOTE — HISTORY OF PRESENT ILLNESS
[FreeTextEntry1] : 60-year-old man with history of bipolar disorder, diabetes mellitus, hypertension, rheumatoid arthritis, obesity and sleep apnea. He reports he ran out of his aspirin for 2 weeks and on June 11, 2020, one week ago, he had a brief episode of aphasia lasting one minute. He went to [facility] emergency room where CT scan of the head was performed and reported to be normal. He had carotid ultrasound which showed no significant carotid stenosis and he was admitted to the telemetry unit and seen by neurologist, Emily Bradley. \par \par Since last seen 7 months ago he has not lost weight. He has not been using his CPAP machine claiming it is very uncomfortable.\par \par He never obtained second psychiatric opinion regarding management of his bipolar disorder as previously recommended.

## 2020-06-18 NOTE — PHYSICAL EXAM
[FreeTextEntry1] : Orientation: oriented to person, oriented to place and oriented to time. \par Attention: normal concentrating ability and visual attention was not decreased. \par Language: no difficulty naming common objects, no difficulty repeating a phrase. Normal prosody.\par Fund of knowledge: displays adequate knowledge of personal past history. \par Cranial Nerves: visual acuity intact bilaterally, visual fields full to confrontation, pupils equal round and reactive to light, exotropia OS (congenital) facial sensation intact symmetrically, face symmetrical, hearing was intact bilaterally, tongue and palate midline, head turning and shoulder shrug symmetric and there was no tongue deviation with protrusion. \par Motor: muscle tone was normal in all four extremities, muscle strength was normal in all four extremities and normal bulk in all four extremities. \par Sensory exam: light touch was intact. graded distal sensory loss to pin in lower limbs. \par Coordination:. normal gait. balance was intact. there was no past-pointing. no tremor present. \par Deep tendon reflexes: \par Biceps right 2+. Biceps left 2+. \par Triceps right 2+. Triceps left 2+. \par Brachioradialis right 2+. Brachioradialis left 2+. \par Patella right 2+. Patella left 2+. \par Ankle jerk right 2+. Ankle jerk left 2+. \par Plantar responses normal on the right, normal on the left. \par  \par

## 2020-06-18 NOTE — CONSULT LETTER
[Dear  ___] : Dear  [unfilled], [Courtesy Letter:] : I had the pleasure of seeing your patient, [unfilled], in my office today. [Please see my note below.] : Please see my note below. [Sincerely,] : Sincerely, [FreeTextEntry2] : Gwen Le MD

## 2020-06-18 NOTE — ASSESSMENT
[FreeTextEntry1] : Urged to not stop aspirin in the future. He wishes again to obtain additional psychiatric opinion regarding management of his bipolar disorder. He understands that Geodon can aggravate his diabetes and inhibit weight loss.\par \par I will call him about the results of his MRI and he will return for followup in 6 months.

## 2020-06-22 ENCOUNTER — NON-APPOINTMENT (OUTPATIENT)
Age: 60
End: 2020-06-22

## 2020-06-22 ENCOUNTER — APPOINTMENT (OUTPATIENT)
Dept: CARDIOLOGY | Facility: CLINIC | Age: 60
End: 2020-06-22
Payer: MEDICARE

## 2020-06-22 VITALS
SYSTOLIC BLOOD PRESSURE: 120 MMHG | HEART RATE: 104 BPM | DIASTOLIC BLOOD PRESSURE: 80 MMHG | WEIGHT: 294 LBS | BODY MASS INDEX: 41 KG/M2 | OXYGEN SATURATION: 92 % | TEMPERATURE: 96.7 F

## 2020-06-22 PROCEDURE — 99215 OFFICE O/P EST HI 40 MIN: CPT

## 2020-06-22 PROCEDURE — 93000 ELECTROCARDIOGRAM COMPLETE: CPT

## 2020-06-22 PROCEDURE — 36415 COLL VENOUS BLD VENIPUNCTURE: CPT

## 2020-06-22 RX ORDER — ATORVASTATIN CALCIUM 40 MG/1
40 TABLET, FILM COATED ORAL
Qty: 90 | Refills: 2 | Status: ACTIVE | COMMUNITY
Start: 2020-03-25

## 2020-06-22 NOTE — REASON FOR VISIT
[Follow-Up - Clinic] : a clinic follow-up of [Medication Management] : Medication management [FreeTextEntry1] : June 2020 - Patient has lost 65 lbs by diet and exercise, but when he got the stimulus check, he broke his diet and started eating pizza, and heroes, and potato chips, and he believes he had a stroke that presented with pressured speech. He was scanned at [facility], and he was seen by neurology. They believe he had a TIA and discharged him with atorvastatin 40 mg daily (up from prior 20 mg daily). He continues to take ASA 81 mg daily and an oral diabetes regimen (repaglinide and Tradjenta).\par Unfortunately, his exercise class was shut down due to Covid pandemic.

## 2020-06-22 NOTE — PHYSICAL EXAM
[General Appearance - Well Developed] : well developed [Normal Appearance] : normal appearance [Well Groomed] : well groomed [General Appearance - Well Nourished] : well nourished [General Appearance - In No Acute Distress] : no acute distress [No Deformities] : no deformities [Normal Oral Mucosa] : normal oral mucosa [No Oral Cyanosis] : no oral cyanosis [No Oral Pallor] : no oral pallor [Exaggerated Use Of Accessory Muscles For Inspiration] : no accessory muscle use [Respiration, Rhythm And Depth] : normal respiratory rhythm and effort [Auscultation Breath Sounds / Voice Sounds] : lungs were clear to auscultation bilaterally [Abdomen Tenderness] : non-tender [Abdomen Soft] : soft [Abdomen Mass (___ Cm)] : no abdominal mass palpated [FreeTextEntry1] : morbidly obese [Abnormal Walk] : normal gait [Nail Clubbing] : no clubbing of the fingernails [Gait - Sufficient For Exercise Testing] : the gait was sufficient for exercise testing [] : no ischemic changes [Cyanosis, Localized] : no localized cyanosis [Petechial Hemorrhages (___cm)] : no petechial hemorrhages [Oriented To Time, Place, And Person] : oriented to person, place, and time [Not Palpable] : not palpable [Skin Color & Pigmentation] : normal skin color and pigmentation [No Precordial Heave] : no precordial heave was noted [Normal Rate] : normal [Rhythm Regular] : regular [Normal S2] : normal S2 [Normal S1] : normal S1 [No Murmur] : no murmurs heard [No Gallop] : no gallop heard [Right Carotid Bruit] : no bruit heard over the right carotid [2+] : left 2+ [Left Carotid Bruit] : no bruit heard over the left carotid [1+] : left 1+ [No Pitting Edema] : no pitting edema present [Conjunctiva] : the conjunctiva were normal in both eyes [PERRL] : pupils were equal in size, round, and reactive to light [EOM Intact] : extraocular movements were intact [Yellow Sclera (Icteric)] : no scleral icterus was seen [Strabismus] : strabismus was noted bilaterally [Person] : oriented to person [Normal Mental Status] : the patient was oriented to person, place and time [Place] : oriented to place [Time] : oriented to time [Short Term Intact] : short term memory intact [Span Intact] : the attention span was normal [Remote Intact] : remote memory intact [Visual Intact] : visual attention was ~T not ~L decreased [Appropriate] : appropriate [Concentration Intact] : normal concentrating ability [Normal] : the cranial nerves were intact [Flat] : flat

## 2020-06-22 NOTE — REVIEW OF SYSTEMS
[Recent Weight Loss (___ Lbs)] : recent [unfilled] ~Ulb weight loss [Dyspnea on exertion] : not dyspnea during exertion [Chest  Pressure] : no chest pressure [Shortness Of Breath] : no shortness of breath [Lower Ext Edema] : lower extremity edema [Chest Pain] : no chest pain [Joint Pain] : no joint pain [Palpitations] : no palpitations [see HPI] : see HPI [Tremor] : a tremor was seen [Skin: A Rash] : rash: [Negative] : Heme/Lymph

## 2020-06-22 NOTE — DISCUSSION/SUMMARY
[Patient] : the patient [Alternatives] : alternatives [Benefits] : benefits [Risks] : risks [With Me] : with me [___ Month(s)] : [unfilled] month(s) [FreeTextEntry1] : Mr. Pruitt has multiple cardiac risk factors, as above, and presents today for follow-up. \par \par We discussed the importance of compliance with both psychiatric and cardiac medications, as well as compliance with the CPAP machine that he will be getting again after his recent sleep study demonstrated severe CHAYITO. Patient counseled regarding exercise, diet, and weight loss. No change in cardiac regimen today. Follow-up with PMD, endocrinologist, podiatrist, and sleep medicine. \par \par Issue of relationship between CHAYITO and hypertension, weight gain, and risk factors also reinforced and compliance in this regard discussed as well.\par \par As patient is hoping to start exercise regimen, but is noting dyspnea on exertion and lower extremity neuropathy, will check nuclear stress test.

## 2020-06-25 LAB
25(OH)D3 SERPL-MCNC: 40.3 NG/ML
ALBUMIN SERPL ELPH-MCNC: 4.5 G/DL
ALP BLD-CCNC: 103 U/L
ALT SERPL-CCNC: 35 U/L
ANION GAP SERPL CALC-SCNC: 16 MMOL/L
AST SERPL-CCNC: 16 U/L
BASOPHILS # BLD AUTO: 0.06 K/UL
BASOPHILS NFR BLD AUTO: 0.6 %
BILIRUB SERPL-MCNC: 0.3 MG/DL
BUN SERPL-MCNC: 39 MG/DL
CALCIUM SERPL-MCNC: 10.1 MG/DL
CHLORIDE SERPL-SCNC: 104 MMOL/L
CHOLEST SERPL-MCNC: 137 MG/DL
CHOLEST/HDLC SERPL: 2.7 RATIO
CO2 SERPL-SCNC: 17 MMOL/L
CREAT SERPL-MCNC: 1.61 MG/DL
EOSINOPHIL # BLD AUTO: 0.31 K/UL
EOSINOPHIL NFR BLD AUTO: 3.3 %
ESTIMATED AVERAGE GLUCOSE: 157 MG/DL
GLUCOSE SERPL-MCNC: 261 MG/DL
HBA1C MFR BLD HPLC: 7.1 %
HCT VFR BLD CALC: 46.3 %
HDLC SERPL-MCNC: 50 MG/DL
HGB BLD-MCNC: 14.3 G/DL
IMM GRANULOCYTES NFR BLD AUTO: 1.8 %
LDLC SERPL CALC-MCNC: 47 MG/DL
LYMPHOCYTES # BLD AUTO: 2.24 K/UL
LYMPHOCYTES NFR BLD AUTO: 24.1 %
MAN DIFF?: NORMAL
MCHC RBC-ENTMCNC: 28.4 PG
MCHC RBC-ENTMCNC: 30.9 GM/DL
MCV RBC AUTO: 91.9 FL
MONOCYTES # BLD AUTO: 0.77 K/UL
MONOCYTES NFR BLD AUTO: 8.3 %
NEUTROPHILS # BLD AUTO: 5.75 K/UL
NEUTROPHILS NFR BLD AUTO: 61.9 %
PLATELET # BLD AUTO: 210 K/UL
POTASSIUM SERPL-SCNC: 4.4 MMOL/L
PROT SERPL-MCNC: 7.1 G/DL
RBC # BLD: 5.04 M/UL
RBC # FLD: 13.9 %
SARS-COV-2 IGG SERPL IA-ACNC: <0.1 INDEX
SARS-COV-2 IGG SERPL QL IA: NEGATIVE
SODIUM SERPL-SCNC: 137 MMOL/L
TRIGL SERPL-MCNC: 198 MG/DL
TSH SERPL-ACNC: 1.92 UIU/ML
WBC # FLD AUTO: 9.3 K/UL

## 2020-06-30 ENCOUNTER — APPOINTMENT (OUTPATIENT)
Dept: UROLOGY | Facility: CLINIC | Age: 60
End: 2020-06-30
Payer: MEDICARE

## 2020-06-30 VITALS — TEMPERATURE: 97.8 F

## 2020-06-30 PROCEDURE — 99214 OFFICE O/P EST MOD 30 MIN: CPT

## 2020-07-04 ENCOUNTER — EMERGENCY (EMERGENCY)
Facility: HOSPITAL | Age: 60
LOS: 1 days | Discharge: HOME | End: 2020-07-04
Attending: EMERGENCY MEDICINE | Admitting: EMERGENCY MEDICINE
Payer: MEDICARE

## 2020-07-04 VITALS
TEMPERATURE: 98 F | HEIGHT: 71 IN | OXYGEN SATURATION: 99 % | RESPIRATION RATE: 19 BRPM | SYSTOLIC BLOOD PRESSURE: 136 MMHG | DIASTOLIC BLOOD PRESSURE: 84 MMHG | WEIGHT: 279.99 LBS | HEART RATE: 94 BPM

## 2020-07-04 DIAGNOSIS — Z98.890 OTHER SPECIFIED POSTPROCEDURAL STATES: Chronic | ICD-10-CM

## 2020-07-04 PROCEDURE — 99283 EMERGENCY DEPT VISIT LOW MDM: CPT

## 2020-07-04 RX ORDER — DOCUSATE SODIUM 100 MG
1 CAPSULE ORAL
Qty: 14 | Refills: 0
Start: 2020-07-04 | End: 2020-07-10

## 2020-07-04 NOTE — ED ADULT TRIAGE NOTE - CHIEF COMPLAINT QUOTE
Pt c/o constipation intermittently for 2 weeks & abdominal distention. Pt has taken 2 fleet enemas over the 2 weeks, & X-lax this morning with small round stools.

## 2020-07-04 NOTE — ED PROVIDER NOTE - PATIENT PORTAL LINK FT
You can access the FollowMyHealth Patient Portal offered by [facility] by registering at the following website: [email or web address]. By joining [facility]’s FollowMyHealth portal, you will also be able to view your health information using other applications (apps) compatible with our system.

## 2020-07-04 NOTE — ED PROVIDER NOTE - CARE PROVIDER_API CALL
CHRIS HER  GASTROENTEROLOGY  [address] [place] [address]  Phone: [phone]  Fax: [phone]  Follow Up Time:

## 2020-07-04 NOTE — ED PROVIDER NOTE - OBJECTIVE STATEMENT
61 y/o male with h/o Bipolar disorder presents c/o constipation for few day, no abdominal pain, use fleet enema with put any relief.

## 2020-07-04 NOTE — ED PROVIDER NOTE - NSFOLLOWUPINSTRUCTIONS_ED_ALL_ED_FT
Constipation    Constipation is when a person has fewer than three bowel movements a week, has difficulty having a bowel movement, or has stools that are dry, hard, or larger than normal. Other symptoms can include abdominal pain or bloating. As people grow older, constipation is more common. A low-fiber diet, not taking in enough fluids, and taking certain medicines, including opioid painkillers, may make constipation worse. Treatment varies but may include dietary modifications (more fiber-rich foods), lifestyle modifications, and possible medications.     SEEK IMMEDIATE MEDICAL CARE IF YOU HAVE ANY OF THE FOLLOWING SYMPTOMS: bright red blood in your stool, constipation for longer than 4 days, abdominal or rectal pain, unexplained weight loss, or inability to pass gas. Constipation    Constipation is when a person has fewer than three bowel movements a week, has difficulty having a bowel movement, or has stools that are dry, hard, or larger than normal. Other symptoms can include abdominal pain or bloating. As people grow older, constipation is more common. A low-fiber diet, not taking in enough fluids, and taking certain medicines, including opioid painkillers, may make constipation worse. Treatment varies but may include dietary modifications (more fiber-rich foods), lifestyle modifications, and possible medications.     SEEK IMMEDIATE MEDICAL CARE IF YOU HAVE ANY OF THE FOLLOWING SYMPTOMS: bright red blood in your stool, constipation for longer than 4 days, abdominal or rectal pain, unexplained weight loss, or inability to pass gas.    Take colace 1-2 tablets by mouth daily  Follow up with gastroenterology and your primary care physician in 1-2 days  Return to ER if vomiting, fever, abdominal pain, or other symptoms.

## 2020-07-04 NOTE — ED ADULT NURSE NOTE - OBJECTIVE STATEMENT
Pt a&ox3 ambulatory to ED c/o constipation for the past 2 weeks. Pt took x-lax today and had a BM described as small round stools. Pt denies n/v/d, fever/chills.

## 2020-07-04 NOTE — ED PROVIDER NOTE - PROGRESS NOTE DETAILS
pt feeling much better, large bm in ed, normal consistency, abd soft nt nd  will f/u with pmd and gi; advised  high fiber diet, prune juice, colace

## 2020-07-10 ENCOUNTER — APPOINTMENT (OUTPATIENT)
Dept: CARDIOLOGY | Facility: CLINIC | Age: 60
End: 2020-07-10
Payer: MEDICARE

## 2020-07-10 VITALS
RESPIRATION RATE: 14 BRPM | WEIGHT: 302 LBS | DIASTOLIC BLOOD PRESSURE: 92 MMHG | HEIGHT: 71 IN | OXYGEN SATURATION: 98 % | TEMPERATURE: 97.6 F | SYSTOLIC BLOOD PRESSURE: 141 MMHG | BODY MASS INDEX: 42.28 KG/M2 | HEART RATE: 85 BPM

## 2020-07-10 DIAGNOSIS — G47.33 OBSTRUCTIVE SLEEP APNEA (ADULT) (PEDIATRIC): ICD-10-CM

## 2020-07-10 PROCEDURE — 99214 OFFICE O/P EST MOD 30 MIN: CPT

## 2020-07-10 NOTE — DISCUSSION/SUMMARY
[Patient] : the patient [Risks] : risks [Benefits] : benefits [Alternatives] : alternatives [___ Month(s)] : [unfilled] month(s) [With Me] : with me [FreeTextEntry1] : Mr. Pruitt has multiple cardiac risk factors, as above, and presents today for follow-up. \par \par We discussed the importance of compliance with both psychiatric and cardiac medications, as well as compliance with the CPAP machine that he will be getting again after his recent sleep study demonstrated severe CHAYITO. Patient counseled regarding exercise, diet, and weight loss. No change in cardiac regimen today. Follow-up with PMD, endocrinologist, podiatrist, and sleep medicine. \par \par Issue of relationship between CHAYITO and hypertension, weight gain, and risk factors also reinforced and compliance in this regard discussed as well.\par \par As patient is hoping to start exercise regimen, but is noting dyspnea on exertion and lower extremity neuropathy, will check nuclear stress test.

## 2020-07-10 NOTE — PHYSICAL EXAM
[Normal Appearance] : normal appearance [General Appearance - Well Developed] : well developed [Well Groomed] : well groomed [No Deformities] : no deformities [General Appearance - In No Acute Distress] : no acute distress [General Appearance - Well Nourished] : well nourished [Normal Oral Mucosa] : normal oral mucosa [No Oral Pallor] : no oral pallor [No Oral Cyanosis] : no oral cyanosis [Exaggerated Use Of Accessory Muscles For Inspiration] : no accessory muscle use [Respiration, Rhythm And Depth] : normal respiratory rhythm and effort [Auscultation Breath Sounds / Voice Sounds] : lungs were clear to auscultation bilaterally [Abdomen Soft] : soft [Abdomen Tenderness] : non-tender [Abdomen Mass (___ Cm)] : no abdominal mass palpated [Gait - Sufficient For Exercise Testing] : the gait was sufficient for exercise testing [Abnormal Walk] : normal gait [Nail Clubbing] : no clubbing of the fingernails [Petechial Hemorrhages (___cm)] : no petechial hemorrhages [Cyanosis, Localized] : no localized cyanosis [Oriented To Time, Place, And Person] : oriented to person, place, and time [Skin Color & Pigmentation] : normal skin color and pigmentation [] : no ischemic changes [Not Palpable] : not palpable [No Precordial Heave] : no precordial heave was noted [Rhythm Regular] : regular [Normal S1] : normal S1 [Normal Rate] : normal [Normal S2] : normal S2 [No Gallop] : no gallop heard [No Murmur] : no murmurs heard [2+] : Carotid: right 2+ [No Pitting Edema] : no pitting edema present [1+] : left 1+ [Conjunctiva] : the conjunctiva were normal in both eyes [PERRL] : pupils were equal in size, round, and reactive to light [Strabismus] : strabismus was noted bilaterally [EOM Intact] : extraocular movements were intact [Normal Mental Status] : the patient was oriented to person, place and time [Person] : oriented to person [Place] : oriented to place [Short Term Intact] : short term memory intact [Time] : oriented to time [Concentration Intact] : normal concentrating ability [Span Intact] : the attention span was normal [Remote Intact] : remote memory intact [Appropriate] : appropriate [Flat] : flat [Visual Intact] : visual attention was ~T not ~L decreased [Normal] : the cranial nerves were intact [FreeTextEntry1] : morbidly obese [Right Carotid Bruit] : no bruit heard over the right carotid [Left Carotid Bruit] : no bruit heard over the left carotid [Yellow Sclera (Icteric)] : no scleral icterus was seen

## 2020-07-10 NOTE — REVIEW OF SYSTEMS
[Recent Weight Loss (___ Lbs)] : recent [unfilled] ~Ulb weight loss [Lower Ext Edema] : lower extremity edema [see HPI] : see HPI [Skin: A Rash] : rash: [Tremor] : a tremor was seen [Negative] : Endocrine [Shortness Of Breath] : no shortness of breath [Dyspnea on exertion] : not dyspnea during exertion [Chest  Pressure] : no chest pressure [Chest Pain] : no chest pain [Palpitations] : no palpitations [Joint Pain] : no joint pain

## 2020-07-10 NOTE — REASON FOR VISIT
[Follow-Up - Clinic] : a clinic follow-up of [Medication Management] : Medication management [FreeTextEntry1] : June 2020 - Patient has lost 65 lbs by diet and exercise, but when he got the stimulus check, he broke his diet and started eating pizza, and heroes, and potato chips, and he believes he had a stroke that presented with pressured speech. He was scanned at [facility], and he was seen by neurology. They believe he had a TIA and discharged him with atorvastatin 40 mg daily (up from prior 20 mg daily). He continues to take ASA 81 mg daily and an oral diabetes regimen (repaglinide and Tradjenta).\par Unfortunately, his exercise class was shut down due to Covid pandemic.

## 2020-07-14 ENCOUNTER — APPOINTMENT (OUTPATIENT)
Dept: PSYCHIATRY | Facility: CLINIC | Age: 60
End: 2020-07-14
Payer: MEDICARE

## 2020-07-14 PROCEDURE — 99205 OFFICE O/P NEW HI 60 MIN: CPT

## 2020-07-14 RX ORDER — ZIPRASIDONE 20 MG/1
CAPSULE ORAL TWICE DAILY
Refills: 0 | Status: DISCONTINUED | COMMUNITY
End: 2020-07-14

## 2020-07-16 ENCOUNTER — APPOINTMENT (OUTPATIENT)
Dept: BARIATRICS/WEIGHT MGMT | Facility: CLINIC | Age: 60
End: 2020-07-16

## 2020-07-20 ENCOUNTER — APPOINTMENT (OUTPATIENT)
Dept: PSYCHIATRY | Facility: CLINIC | Age: 60
End: 2020-07-20
Payer: MEDICARE

## 2020-07-20 ENCOUNTER — APPOINTMENT (OUTPATIENT)
Dept: ENDOCRINOLOGY | Facility: CLINIC | Age: 60
End: 2020-07-20

## 2020-07-20 PROCEDURE — 90791 PSYCH DIAGNOSTIC EVALUATION: CPT

## 2020-07-22 ENCOUNTER — EMERGENCY (EMERGENCY)
Facility: HOSPITAL | Age: 60
LOS: 1 days | Discharge: HOME | End: 2020-07-22
Attending: EMERGENCY MEDICINE | Admitting: EMERGENCY MEDICINE
Payer: MEDICARE

## 2020-07-22 VITALS
SYSTOLIC BLOOD PRESSURE: 127 MMHG | OXYGEN SATURATION: 98 % | HEART RATE: 83 BPM | DIASTOLIC BLOOD PRESSURE: 81 MMHG | RESPIRATION RATE: 16 BRPM

## 2020-07-22 VITALS
WEIGHT: 300.05 LBS | HEART RATE: 80 BPM | TEMPERATURE: 98 F | RESPIRATION RATE: 17 BRPM | SYSTOLIC BLOOD PRESSURE: 122 MMHG | DIASTOLIC BLOOD PRESSURE: 86 MMHG | OXYGEN SATURATION: 96 % | HEIGHT: 71 IN

## 2020-07-22 DIAGNOSIS — Z98.890 OTHER SPECIFIED POSTPROCEDURAL STATES: Chronic | ICD-10-CM

## 2020-07-22 DIAGNOSIS — K59.00 CONSTIPATION, UNSPECIFIED: ICD-10-CM

## 2020-07-22 LAB
ALBUMIN SERPL ELPH-MCNC: 3.8 G/DL — SIGNIFICANT CHANGE UP (ref 3.3–5)
ALP SERPL-CCNC: 105 U/L — SIGNIFICANT CHANGE UP (ref 40–120)
ALT FLD-CCNC: 49 U/L — HIGH (ref 10–45)
ANION GAP SERPL CALC-SCNC: 8 MMOL/L — SIGNIFICANT CHANGE UP (ref 5–17)
AST SERPL-CCNC: 19 U/L — SIGNIFICANT CHANGE UP (ref 10–40)
BASOPHILS # BLD AUTO: 0.05 K/UL — SIGNIFICANT CHANGE UP (ref 0–0.2)
BASOPHILS NFR BLD AUTO: 0.5 % — SIGNIFICANT CHANGE UP (ref 0–2)
BILIRUB SERPL-MCNC: 0.6 MG/DL — SIGNIFICANT CHANGE UP (ref 0.2–1.2)
BUN SERPL-MCNC: 37 MG/DL — HIGH (ref 7–23)
CALCIUM SERPL-MCNC: 10.1 MG/DL — SIGNIFICANT CHANGE UP (ref 8.4–10.5)
CHLORIDE SERPL-SCNC: 104 MMOL/L — SIGNIFICANT CHANGE UP (ref 96–108)
CO2 SERPL-SCNC: 25 MMOL/L — SIGNIFICANT CHANGE UP (ref 22–31)
CREAT SERPL-MCNC: 1.8 MG/DL — HIGH (ref 0.5–1.3)
EOSINOPHIL # BLD AUTO: 0.33 K/UL — SIGNIFICANT CHANGE UP (ref 0–0.5)
EOSINOPHIL NFR BLD AUTO: 3.5 % — SIGNIFICANT CHANGE UP (ref 0–6)
GLUCOSE SERPL-MCNC: 171 MG/DL — HIGH (ref 70–99)
HCT VFR BLD CALC: 45.7 % — SIGNIFICANT CHANGE UP (ref 39–50)
HGB BLD-MCNC: 15 G/DL — SIGNIFICANT CHANGE UP (ref 13–17)
IMM GRANULOCYTES NFR BLD AUTO: 0.9 % — SIGNIFICANT CHANGE UP (ref 0–1.5)
LIDOCAIN IGE QN: 277 U/L — SIGNIFICANT CHANGE UP (ref 73–393)
LYMPHOCYTES # BLD AUTO: 1.77 K/UL — SIGNIFICANT CHANGE UP (ref 1–3.3)
LYMPHOCYTES # BLD AUTO: 19 % — SIGNIFICANT CHANGE UP (ref 13–44)
MCHC RBC-ENTMCNC: 28.7 PG — SIGNIFICANT CHANGE UP (ref 27–34)
MCHC RBC-ENTMCNC: 32.8 GM/DL — SIGNIFICANT CHANGE UP (ref 32–36)
MCV RBC AUTO: 87.5 FL — SIGNIFICANT CHANGE UP (ref 80–100)
MONOCYTES # BLD AUTO: 0.75 K/UL — SIGNIFICANT CHANGE UP (ref 0–0.9)
MONOCYTES NFR BLD AUTO: 8 % — SIGNIFICANT CHANGE UP (ref 2–14)
NEUTROPHILS # BLD AUTO: 6.34 K/UL — SIGNIFICANT CHANGE UP (ref 1.8–7.4)
NEUTROPHILS NFR BLD AUTO: 68.1 % — SIGNIFICANT CHANGE UP (ref 43–77)
NRBC # BLD: 0 /100 WBCS — SIGNIFICANT CHANGE UP (ref 0–0)
PLATELET # BLD AUTO: 205 K/UL — SIGNIFICANT CHANGE UP (ref 150–400)
POTASSIUM SERPL-MCNC: 4 MMOL/L — SIGNIFICANT CHANGE UP (ref 3.5–5.3)
POTASSIUM SERPL-SCNC: 4 MMOL/L — SIGNIFICANT CHANGE UP (ref 3.5–5.3)
PROT SERPL-MCNC: 8 G/DL — SIGNIFICANT CHANGE UP (ref 6–8.3)
RBC # BLD: 5.22 M/UL — SIGNIFICANT CHANGE UP (ref 4.2–5.8)
RBC # FLD: 13.1 % — SIGNIFICANT CHANGE UP (ref 10.3–14.5)
SODIUM SERPL-SCNC: 137 MMOL/L — SIGNIFICANT CHANGE UP (ref 135–145)
WBC # BLD: 9.32 K/UL — SIGNIFICANT CHANGE UP (ref 3.8–10.5)
WBC # FLD AUTO: 9.32 K/UL — SIGNIFICANT CHANGE UP (ref 3.8–10.5)

## 2020-07-22 PROCEDURE — 74176 CT ABD & PELVIS W/O CONTRAST: CPT

## 2020-07-22 PROCEDURE — 74176 CT ABD & PELVIS W/O CONTRAST: CPT | Mod: 26

## 2020-07-22 PROCEDURE — 36415 COLL VENOUS BLD VENIPUNCTURE: CPT

## 2020-07-22 PROCEDURE — 80053 COMPREHEN METABOLIC PANEL: CPT

## 2020-07-22 PROCEDURE — 99284 EMERGENCY DEPT VISIT MOD MDM: CPT | Mod: 25

## 2020-07-22 PROCEDURE — 83690 ASSAY OF LIPASE: CPT

## 2020-07-22 PROCEDURE — 96360 HYDRATION IV INFUSION INIT: CPT

## 2020-07-22 PROCEDURE — 99284 EMERGENCY DEPT VISIT MOD MDM: CPT

## 2020-07-22 PROCEDURE — 85027 COMPLETE CBC AUTOMATED: CPT

## 2020-07-22 RX ORDER — SODIUM CHLORIDE 9 MG/ML
1000 INJECTION INTRAMUSCULAR; INTRAVENOUS; SUBCUTANEOUS ONCE
Refills: 0 | Status: COMPLETED | OUTPATIENT
Start: 2020-07-22 | End: 2020-07-22

## 2020-07-22 RX ORDER — POLYETHYLENE GLYCOL 3350 17 G/17G
17 POWDER, FOR SOLUTION ORAL ONCE
Refills: 0 | Status: COMPLETED | OUTPATIENT
Start: 2020-07-22 | End: 2020-07-22

## 2020-07-22 RX ADMIN — Medication 1 ENEMA: at 10:19

## 2020-07-22 RX ADMIN — SODIUM CHLORIDE 1000 MILLILITER(S): 9 INJECTION INTRAMUSCULAR; INTRAVENOUS; SUBCUTANEOUS at 10:19

## 2020-07-22 RX ADMIN — POLYETHYLENE GLYCOL 3350 17 GRAM(S): 17 POWDER, FOR SOLUTION ORAL at 10:21

## 2020-07-22 RX ADMIN — SODIUM CHLORIDE 1000 MILLILITER(S): 9 INJECTION INTRAMUSCULAR; INTRAVENOUS; SUBCUTANEOUS at 10:56

## 2020-07-22 NOTE — ED PROVIDER NOTE - NSFOLLOWUPINSTRUCTIONS_ED_ALL_ED_FT
Drink plenty of fluids   Take Miralax as prescribed over the counter   Follow up with your pmd within 48 hours- show copies of ER results.       Constipation    WHAT YOU NEED TO KNOW:    Constipation is when you have hard, dry bowel movements, or you go longer than usual between bowel movements.     DISCHARGE INSTRUCTIONS:    Call your doctor if:     You have blood in your bowel movements.      You have a fever and abdominal pain with the constipation.      Your constipation gets worse.       You start to vomit.      You have questions or concerns about your condition or care.    Medicines:     Medicine such as a laxative may help relax and loosen your intestines to help you have a bowel movement. Your provider may recommend you only use laxatives for a short time. Long-term use may make your bowels dependent on the medicine.      Take your medicine as directed. Contact your healthcare provider if you think your medicine is not helping or if you have side effects. Tell him of her if you are allergic to any medicine. Keep a list of the medicines, vitamins, and herbs you take. Include the amounts, and when and why you take them. Bring the list or the pill bottles to follow-up visits. Carry your medicine list with you in case of an emergency.    Relieve constipation:     A suppository may be used to help soften your bowel movements. This may make them easier to pass. A suppository is guided into your rectum through your anus.Suppository for Constipation           An enema is liquid medicine used to clear bowel movement from your rectum. The medicine is put into your rectum through your anus.Enemas         Prevent constipation:     Drink liquids as directed. You may need to drink extra liquids to help soften and move your bowels. Ask how much liquid to drink each day and which liquids are best for you.       Eat high-fiber foods. This may help decrease constipation by adding bulk to your bowel movements. High-fiber foods include fruit, vegetables, whole-grain breads and cereals, and beans. Your healthcare provider or dietitian can help you create a high-fiber meal plan. Your provider may also recommend a fiber supplement if you cannot get enough fiber from food.            Exercise regularly. Regular physical activity can help stimulate your intestines. Walking is a good exercise to prevent or relieve constipation. Ask which exercises are best for you.Walking for Exercise           Schedule a time each day to have a bowel movement. This may help train your body to have regular bowel movements. Bend forward while you are on the toilet to help move the bowel movement out. Sit on the toilet for at least 10 minutes, even if you do not have a bowel movement.       Talk to your healthcare provider about your medicines. Certain medicines, such as opioids, can cause constipation. Your provider may be able to make medicine changes. For example, he or she may change the kind of medicine, or change when you take it.    Follow up with your healthcare provider as directed: Write down your questions so you remember to ask them during your visits.

## 2020-07-22 NOTE — ED PROVIDER NOTE - CARE PROVIDER_API CALL
CHRIS HER  GASTROENTEROLOGY  [address] [place] [address]  Phone: [phone]  Fax: [phone]  Follow Up Time:

## 2020-07-22 NOTE — ED PROVIDER NOTE - PATIENT PORTAL LINK FT
Subjective     Jennifer Fallon is a 65 year old female who presents to clinic today for the following health issues:    HPI     Presents with symptoms of ears plugging, pain in the lateral neck, decreased hearing.   For 2 weeks.   No fever , chills, sore throat. No dizziness, tinnitus.   No cough, SOB.   No chest pain.     Has taken Amoxicillin for 8 days with no improvement.     Patient Active Problem List   Diagnosis     Advanced directives, counseling/discussion     CARDIOVASCULAR SCREENING; LDL GOAL LESS THAN 70     Postmenopausal status     Bilateral ankle pain     Dermatophytosis of nail     Diabetes mellitus type 2 in obese (H)     Hypothyroidism, unspecified type     Iron deficiency anemia, unspecified iron deficiency anemia type     Vitamin D deficiency     Morbid obesity (H)     Deltoid tendinitis of right shoulder     Abnormal Pap smear of cervix     Past Surgical History:   Procedure Laterality Date     CHOLECYSTECTOMY  2004     COLONOSCOPY Left 6/23/2017    Procedure: COMBINED COLONOSCOPY, SINGLE OR MULTIPLE BIOPSY/POLYPECTOMY BY BIOPSY;  COLONOSCOPY with polypectomy of colon polyp by cold biopsy  ;  Surgeon: Chang Beasley MD;  Location:  GI     TUBAL LIGATION  1987       Social History     Tobacco Use     Smoking status: Never Smoker     Smokeless tobacco: Never Used   Substance Use Topics     Alcohol use: Yes     Alcohol/week: 0.0 standard drinks     Comment: Socially     Family History   Problem Relation Age of Onset     Diabetes Brother      Diabetes Brother      Coronary Artery Disease Mother      Breast Cancer No family hx of      Colon Cancer No family hx of      Prostate Cancer No family hx of      Other Cancer No family hx of          Current Outpatient Medications   Medication Sig Dispense Refill     acetaminophen (TYLENOL) 500 MG tablet Take 2 tablets (1,000 mg) by mouth every 8 hours as needed for mild pain 100 tablet 0     blood glucose monitoring (ACCU-CHEK ARGENIS PLUS) meter  device kit Use to test blood sugars 1 times daily or as directed. 1 kit 0     blood glucose monitoring (ACCU-CHEK ARGENIS) test strip Use to test blood sugars 1 times daily or as directed. 3 Box 1     blood glucose monitoring (ACCU-CHEK MULTICLIX) lancets Use to test blood sugar 1 times daily or as directed. 1 Box 0     doxycycline hyclate (VIBRA-TABS) 100 MG tablet Take 1 tablet (100 mg) by mouth 2 times daily 10 tablet 0     ferrous sulfate (FE TABS) 325 (65 Fe) MG EC tablet Take 1 tablet (325 mg) by mouth daily 180 tablet 3     levothyroxine (SYNTHROID/LEVOTHROID) 112 MCG tablet Take 1 tablet (112 mcg) by mouth daily 90 tablet 3     loratadine-pseudoePHEDrine (CLARITIN-D 12-HOUR) 5-120 MG 12 hr tablet Take 1 tablet by mouth 2 times daily 20 tablet 0     vitamin D3 (CHOLECALCIFEROL) 2000 units (50 mcg) tablet Take 1 tablet (2,000 Units) by mouth daily 100 tablet 3     ibuprofen (ADVIL/MOTRIN) 600 MG tablet Take 1 tablet (600 mg) by mouth 2 times daily as needed for moderate pain (Patient not taking: Reported on 6/2/2020) 60 tablet 1     naproxen (NAPROSYN) 500 MG tablet Take 1 tablet (500 mg) by mouth 2 times daily (with meals) (Patient not taking: Reported on 6/4/2020) 60 tablet 1     rosuvastatin (CRESTOR) 10 MG tablet Take 1 tablet (10 mg) by mouth daily (Patient not taking: Reported on 6/2/2020) 90 tablet 3     vitamin D2 (ERGOCALCIFEROL) 19588 units (1250 mcg) capsule Take 1 capsule (50,000 Units) by mouth once a week (Patient not taking: Reported on 6/4/2020) 8 capsule 0         Reviewed and updated as needed this visit by Provider         Review of Systems   Constitutional, HEENT, cardiovascular, pulmonary, gi and gu systems are negative, except as otherwise noted.      Objective    There were no vitals taken for this visit.  There is no height or weight on file to calculate BMI.  Physical Exam   GENERAL: obese, alert and no distress  EYES: Eyes grossly normal to inspection, PERRL and conjunctivae and sclerae  "normal  HENT: ear canals and TM's normal, nose and mouth without ulcers or lesions, mild TM erythema, pharyngeal wall erythema, edema , palpatory tenderness lateral upper neck, mild LA  NECK: no adenopathy, no asymmetry, masses, or scars and thyroid normal to palpation  RESP: lungs clear to auscultation - no rales, rhonchi or wheezes  MS: no gross musculoskeletal defects noted, no edema    Diagnostic Test Results:  Labs reviewed in Epic        Assessment & Plan   Problem List Items Addressed This Visit     None      Visit Diagnoses     Upper respiratory tract infection, unspecified type    -  Primary    Relevant Medications    loratadine-pseudoePHEDrine (CLARITIN-D 12-HOUR) 5-120 MG 12 hr tablet    doxycycline hyclate (VIBRA-TABS) 100 MG tablet    acetaminophen (TYLENOL) 500 MG tablet    Dysfunction of both eustachian tubes        Relevant Medications    loratadine-pseudoePHEDrine (CLARITIN-D 12-HOUR) 5-120 MG 12 hr tablet    doxycycline hyclate (VIBRA-TABS) 100 MG tablet    acetaminophen (TYLENOL) 500 MG tablet         Start on antibiotic, decongestant      BMI:   Estimated body mass index is 41.57 kg/m  as calculated from the following:    Height as of this encounter: 1.499 m (4' 11\").    Weight as of this encounter: 93.4 kg (205 lb 12.8 oz).           See Patient Instructions  Return in about 4 weeks (around 7/2/2020) for follow up on acute problem if persists.    Frederick Hilario MD  [facility]        " You can access the FollowMyHealth Patient Portal offered by [facility] by registering at the following website: [email or web address]. By joining [employer]’s FollowMyHealth portal, you will also be able to view your health information using other applications (apps) compatible with our system.

## 2020-07-22 NOTE — ED PROVIDER NOTE - CLINICAL SUMMARY MEDICAL DECISION MAKING FREE TEXT BOX
Dr. Mai: 70M h/o bipolar d/o, HTN, HLD, DM, testicular ca s/p surgery but declined XRT and chemo at that time (has not had PET scans in several years), p/w 1 week of constipation. Last BM 1 week ago, normal flatus, no abdo pain, no nausea or vomiting, no fevers or chills, no dysuria. States is taking colace with no help. On exam pt is well appearing, nad, rrr, ctab, abdo soft/nt/nd. Given repeat visit and h/o testicular ca will get CT. will also treat symptomatically.

## 2020-07-22 NOTE — ED ADULT NURSE NOTE - NSIMPLEMENTINTERV_GEN_ALL_ED
Implemented All Universal Safety Interventions:  [place] to call system. Call bell, personal items and telephone within reach. Instruct patient to call for assistance. Room bathroom lighting operational. Non-slip footwear when patient is off stretcher. Physically safe environment: no spills, clutter or unnecessary equipment. Stretcher in lowest position, wheels locked, appropriate side rails in place.

## 2020-07-22 NOTE — ED PROVIDER NOTE - OBJECTIVE STATEMENT
60 yr old male with hx of bipolar, HLD, HTN, DM, CHAYITO, testicular cancer presents with constipation for 1 week. Pt seen about 1 week ago, same complaint,  enema was given and had BM in ED. Pt reports given enema at home with no BM. Denies n/v/d, chest pain, sob, abdominal pain or any other symptoms. Pt passing gas.

## 2020-07-23 ENCOUNTER — EMERGENCY (EMERGENCY)
Facility: HOSPITAL | Age: 60
LOS: 1 days | Discharge: HOME | End: 2020-07-23
Attending: INTERNAL MEDICINE | Admitting: INTERNAL MEDICINE
Payer: MEDICARE

## 2020-07-23 VITALS
WEIGHT: 300.05 LBS | TEMPERATURE: 100 F | RESPIRATION RATE: 16 BRPM | HEART RATE: 95 BPM | SYSTOLIC BLOOD PRESSURE: 149 MMHG | HEIGHT: 71 IN | OXYGEN SATURATION: 95 % | DIASTOLIC BLOOD PRESSURE: 88 MMHG

## 2020-07-23 DIAGNOSIS — K59.00 CONSTIPATION, UNSPECIFIED: ICD-10-CM

## 2020-07-23 DIAGNOSIS — Z98.890 OTHER SPECIFIED POSTPROCEDURAL STATES: Chronic | ICD-10-CM

## 2020-07-23 PROCEDURE — 99283 EMERGENCY DEPT VISIT LOW MDM: CPT

## 2020-07-23 RX ORDER — LACTULOSE 10 G/15ML
60 SOLUTION ORAL ONCE
Refills: 0 | Status: COMPLETED | OUTPATIENT
Start: 2020-07-23 | End: 2020-07-23

## 2020-07-23 RX ORDER — MULTIVIT WITH MIN/MFOLATE/K2 340-15/3 G
1 POWDER (GRAM) ORAL ONCE
Refills: 0 | Status: COMPLETED | OUTPATIENT
Start: 2020-07-23 | End: 2020-07-23

## 2020-07-23 RX ADMIN — LACTULOSE 60 GRAM(S): 10 SOLUTION ORAL at 17:40

## 2020-07-23 RX ADMIN — Medication 1 BOTTLE: at 17:40

## 2020-07-23 NOTE — ED PROVIDER NOTE - OBJECTIVE STATEMENT
60 yr old male with hx of bipolar, HLD, HTN, DM, CHAYITO, testicular cancer presents with constipation, seen in ED 3 times the last week. Pt seen yesterday, ct was done and reviewed, enema was given, and + BM in ED and pt was discharged with GI FU. Pt presents by ambulance today due to same symptoms, reports he had BM, but small today.  Denies n/v/d, chest pain, sob, abdominal pain or any other symptoms. Pt passing gas. Pt has appointment with Dr. Sinclair on July 30.

## 2020-07-23 NOTE — ED ADULT NURSE NOTE - OBJECTIVE STATEMENT
Pt presents awake, alert, oriented to person, place, and time. Reports he was seen in the ED yesterday for constipation. Reports small BM yesterday. States feeling like he has to have a bowel movement but is still unable to. Denies presence of pain.

## 2020-07-23 NOTE — ED PROVIDER NOTE - NSFOLLOWUPINSTRUCTIONS_ED_ALL_ED_FT
Constipation    WHAT YOU NEED TO KNOW:    Constipation is when you have hard, dry bowel movements, or you go longer than usual between bowel movements.     DISCHARGE INSTRUCTIONS:    Call your doctor if:     You have blood in your bowel movements.      You have a fever and abdominal pain with the constipation.      Your constipation gets worse.       You start to vomit.      You have questions or concerns about your condition or care.    Medicines:     Medicine such as a laxative may help relax and loosen your intestines to help you have a bowel movement. Your provider may recommend you only use laxatives for a short time. Long-term use may make your bowels dependent on the medicine.      Take your medicine as directed. Contact your healthcare provider if you think your medicine is not helping or if you have side effects. Tell him of her if you are allergic to any medicine. Keep a list of the medicines, vitamins, and herbs you take. Include the amounts, and when and why you take them. Bring the list or the pill bottles to follow-up visits. Carry your medicine list with you in case of an emergency.    Relieve constipation:     A suppository may be used to help soften your bowel movements. This may make them easier to pass. A suppository is guided into your rectum through your anus.Suppository for Constipation           An enema is liquid medicine used to clear bowel movement from your rectum. The medicine is put into your rectum through your anus.Enemas         Prevent constipation:     Drink liquids as directed. You may need to drink extra liquids to help soften and move your bowels. Ask how much liquid to drink each day and which liquids are best for you.       Eat high-fiber foods. This may help decrease constipation by adding bulk to your bowel movements. High-fiber foods include fruit, vegetables, whole-grain breads and cereals, and beans. Your healthcare provider or dietitian can help you create a high-fiber meal plan. Your provider may also recommend a fiber supplement if you cannot get enough fiber from food.            Exercise regularly. Regular physical activity can help stimulate your intestines. Walking is a good exercise to prevent or relieve constipation. Ask which exercises are best for you.Walking for Exercise           Schedule a time each day to have a bowel movement. This may help train your body to have regular bowel movements. Bend forward while you are on the toilet to help move the bowel movement out. Sit on the toilet for at least 10 minutes, even if you do not have a bowel movement.       Talk to your healthcare provider about your medicines. Certain medicines, such as opioids, can cause constipation. Your provider may be able to make medicine changes. For example, he or she may change the kind of medicine, or change when you take it.    Follow up with your healthcare provider as directed: Write down your questions so you remember to ask them during your visits.

## 2020-07-23 NOTE — ED PROVIDER NOTE - ATTENDING CONTRIBUTION TO CARE
60 yr old male with hx of bipolar, HLD, HTN, DM, CHAYITO, testicular cancer presents with constipation for 1 week. Pt seen about 1 week ago, same complaint,  enema was given and had BM in ED. Pt reports given enema at home with no BM. Denies n/v/d, chest pain, sob, abdominal pain or any other symptoms. Pt passing gas.   abdomen soft non tender, lungs clear, RRR   given enema, lactulose, and mg citrate in ED. pt had BM in ED after enema. Pt stable for dc and fu with GI Dr Yahir Felix:  I have reviewed and discussed with the PA/ resident the case specifics, including the history, physical assessment, evaluation, conclusion, laboratory results, and medical plan. I agree with the contents, and conclusions. I have personally examined, and interviewed the patient.

## 2020-07-23 NOTE — ED PROVIDER NOTE - PATIENT PORTAL LINK FT
You can access the FollowMyHealth Patient Portal offered by [facility] by registering at the following website: [email or web address]. By joining [employer]’s FollowMyHealth portal, you will also be able to view your health information using other applications (apps) compatible with our system.

## 2020-07-23 NOTE — ED PROVIDER NOTE - CLINICAL SUMMARY MEDICAL DECISION MAKING FREE TEXT BOX
60 yr old male with hx of bipolar, HLD, HTN, DM, CHAYITO, testicular cancer presents with constipation for 1 week. Pt seen about 1 week ago, same complaint,  enema was given and had BM in ED. Pt reports given enema at home with no BM. Denies n/v/d, chest pain, sob, abdominal pain or any other symptoms. Pt passing gas.   abdomen soft non tender, lungs clear, RRR   given enema, lactulose, and mg citrate in ED. 60 yr old male with hx of bipolar, HLD, HTN, DM, CHAYITO, testicular cancer presents with constipation for 1 week. Pt seen about 1 week ago, same complaint,  enema was given and had BM in ED. Pt reports given enema at home with no BM. Denies n/v/d, chest pain, sob, abdominal pain or any other symptoms. Pt passing gas.   abdomen soft non tender, lungs clear, RRR   given enema, lactulose, and mg citrate in ED. pt had BM in ED after enema. Pt stable for dc and fu with Dr. Sinclair

## 2020-07-23 NOTE — ED ADULT TRIAGE NOTE - CHIEF COMPLAINT QUOTE
BIB EMS for constipation. Was here yesterday and given enema. Patient states he had a minimal bowel movement at home but still c/o no regular BM x 1 week.

## 2020-07-23 NOTE — ED ADULT NURSE NOTE - CADM POA CENTRAL LINE
-Rocephin 2 gr x 24 hr (day #9)  - BCx from 8/4 are negative to date   - Ultimate goal is to evaluate permacath as source of bacteriemia + for serratia mercences, previous BCs for 6/12 previous hospitalization were positive for the same Serratia;   -patient does not want to get PC out if it means that she will be getting a temp catheter in her groin.   -Consults feel patient currently not toxic, responding well to treatmetn, superficiliazing AVF is not off table, pending Vascular note;    - nephrology & id both recommend exchange permacath - however primary vascular surgeon does not think that the PC is the source and prefers to salvage the PC and tx with abx. No

## 2020-07-28 ENCOUNTER — APPOINTMENT (OUTPATIENT)
Dept: PSYCHIATRY | Facility: CLINIC | Age: 60
End: 2020-07-28
Payer: MEDICARE

## 2020-07-28 PROCEDURE — 90834 PSYTX W PT 45 MINUTES: CPT

## 2020-07-29 ENCOUNTER — EMERGENCY (EMERGENCY)
Facility: HOSPITAL | Age: 60
LOS: 1 days | Discharge: HOME | End: 2020-07-29
Attending: EMERGENCY MEDICINE | Admitting: EMERGENCY MEDICINE
Payer: MEDICARE

## 2020-07-29 VITALS
WEIGHT: 300.05 LBS | OXYGEN SATURATION: 96 % | RESPIRATION RATE: 17 BRPM | DIASTOLIC BLOOD PRESSURE: 82 MMHG | SYSTOLIC BLOOD PRESSURE: 134 MMHG | TEMPERATURE: 98 F | HEIGHT: 71 IN | HEART RATE: 78 BPM

## 2020-07-29 DIAGNOSIS — Z98.890 OTHER SPECIFIED POSTPROCEDURAL STATES: Chronic | ICD-10-CM

## 2020-07-29 DIAGNOSIS — K59.00 CONSTIPATION, UNSPECIFIED: ICD-10-CM

## 2020-07-29 PROCEDURE — 99283 EMERGENCY DEPT VISIT LOW MDM: CPT

## 2020-07-29 PROCEDURE — 99282 EMERGENCY DEPT VISIT SF MDM: CPT

## 2020-07-29 NOTE — ED ADULT NURSE NOTE - OBJECTIVE STATEMENT
Patient reports some constipation for a " few days ". Denies blood in stool, abdominal pain, fevers or vomiting. Hx of constipation.

## 2020-07-29 NOTE — ED PROVIDER NOTE - CARE PROVIDER_API CALL
CHRIS HER  GASTROENTEROLOGY  [address] [place] [address]  Phone: [phone]  Fax: [phone]  Follow Up Time:

## 2020-07-29 NOTE — ED PROVIDER NOTE - OBJECTIVE STATEMENT
Patient was seen here for constipation ( see visit - ct /labs negative) Still feels constipated . no fever /chills

## 2020-07-29 NOTE — ED PROVIDER NOTE - CHPI ED SYMPTOMS NEG
no diarrhea/no blood in stool/no chills/no vomiting/no dysuria/no abdominal distension/no fever/no nausea/no burning urination/no hematuria

## 2020-07-29 NOTE — ED PROVIDER NOTE - PATIENT PORTAL LINK FT
You can access the FollowMyHealth Patient Portal offered by [facility] by registering at the following website: [email or web address]. By joining [employer]’s FollowMyHealth portal, you will also be able to view your health information using other applications (apps) compatible with our system.

## 2020-07-29 NOTE — ED PROVIDER NOTE - CLINICAL SUMMARY MEDICAL DECISION MAKING FREE TEXT BOX
Patient had workup for constipation. CT/labs negative. To see Dr Sinclair in am . Will try citrate of mag today. Bottle given

## 2020-07-29 NOTE — ED PROVIDER NOTE - NSFOLLOWUPINSTRUCTIONS_ED_ALL_ED_FT
Constipation, Adult  Constipation is when a person has fewer bowel movements in a week than normal, has difficulty having a bowel movement, or has stools that are dry, hard, or larger than normal. Constipation may be caused by an underlying condition. It may become worse with age if a person takes certain medicines and does not take in enough fluids.  Follow these instructions at home:  Eating and drinking        Eat foods that have a lot of fiber, such as fresh fruits and vegetables, whole grains, and beans.Limit foods that are high in fat, low in fiber, or overly processed, such as french fries, hamburgers, cookies, candies, and soda.Drink enough fluid to keep your urine clear or pale yellow.General instructions     Exercise regularly or as told by your health care provider.Go to the restroom when you have the urge to go. Do not hold it in.Take over-the-counter and prescription medicines only as told by your health care provider. These include any fiber supplements.Practice pelvic floor retraining exercises, such as deep breathing while relaxing the lower abdomen and pelvic floor relaxation during bowel movements.Watch your condition for any changes.Keep all follow-up visits as told by your health care provider. This is important.Contact a health care provider if:  You have pain that gets worse.You have a fever.You do not have a bowel movement after 4 days.You vomit.You are not hungry.You lose weight.You are bleeding from the anus.You have thin, pencil-like stools.Get help right away if:  You have a fever and your symptoms suddenly get worse.You leak stool or have blood in your stool.Your abdomen is bloated.You have severe pain in your abdomen.You feel dizzy or you faint.This information is not intended to replace advice given to you by your health care provider. Make sure you discuss any questions you have with your health care provider.    Document Released: 09/15/2005 Document Revised: 11/30/2018 Document Reviewed: 06/07/2017  Elsevier Patient Education © 2020 [employer]

## 2020-08-05 ENCOUNTER — APPOINTMENT (OUTPATIENT)
Dept: BARIATRICS/WEIGHT MGMT | Facility: CLINIC | Age: 60
End: 2020-08-05

## 2020-08-07 ENCOUNTER — APPOINTMENT (OUTPATIENT)
Dept: ENDOCRINOLOGY | Facility: CLINIC | Age: 60
End: 2020-08-07

## 2020-08-11 ENCOUNTER — APPOINTMENT (OUTPATIENT)
Dept: BARIATRICS/WEIGHT MGMT | Facility: CLINIC | Age: 60
End: 2020-08-11
Payer: MEDICARE

## 2020-08-11 VITALS
WEIGHT: 295 LBS | BODY MASS INDEX: 41.3 KG/M2 | SYSTOLIC BLOOD PRESSURE: 128 MMHG | DIASTOLIC BLOOD PRESSURE: 80 MMHG | HEIGHT: 71 IN | HEART RATE: 87 BPM | OXYGEN SATURATION: 96 %

## 2020-08-11 PROCEDURE — 99214 OFFICE O/P EST MOD 30 MIN: CPT

## 2020-08-11 NOTE — REASON FOR VISIT
[Follow-Up Visit] : a follow-up visit for [Obstructive Sleep Apena] : obstructive sleep apena [Obesity] : obesity [Diabetes Mellitus] : diabetes mellitus

## 2020-08-11 NOTE — HISTORY OF PRESENT ILLNESS
[FreeTextEntry1] : This is a 60 year old male  present in office today for followup visit.  \par \par Patient is up 10 pounds since last in office visit in January. \par He has been in and out of the hospital for TIA and constipation. Medications were changed, and he states he no longer as BM issues. He recently saw cardio, and will be seeing GI next month \par His most recent HA1C has increased from 6.1 in Nov to 7.1\par Patient states he has been eating well, 3 meals a day. \par B: oatmeal with flax seeds\par L: tuna with lettuce tomato on whole wheat bread\par D: turkey burger or chicken breast with vegetables\par \par He also gets free food provided to him by a service which he states consists of cookies and cheese. \par According to previous practitioner's notes, he bought a large amount of junk food when he got his stimulus check. \par \par He has not been treating his CHAYITO. He is claustrophobic and is unable to wear the dental device. \par \par Patient states he has been walking 2-3 miles a day and would like to join a gym. \par \par At this time he no longer has a PCP, but states he has an appt set up for a new one next week

## 2020-08-11 NOTE — ASSESSMENT
[FreeTextEntry1] : Bariatric surgery history: none\par Obesity comorbidities: HTN, DM, CHAYITO\par Anti-obesity medications: none\par Obesity medication side effects: n/a\par \par Plan: \par Recommended he keep a food journal\par Discussed with patient, that based on his weight gain and blood work, it appears he is not eating as well as he did\par Continue prepping food for himself\par Avoid packaged foods. Cookies and cheese from food delivery service should be avoided\par Stay well hydrated. He states he only drinks 1 pitcher of water a day. Concerned he may alter effect of bipolar meds. \par Continue physical activity-if cleared by cardio \par Discussed it great detail the consequences of untreated sleep apnea. Patient is well aware. \par Will repeat blood work at end of sept to reassess HA1C\par Will not start any new medications at this time\par Recommend patient budget appropriately so he does not run out of food or meds \par \par f/u 3-4 weeks

## 2020-08-12 ENCOUNTER — APPOINTMENT (OUTPATIENT)
Dept: PSYCHIATRY | Facility: CLINIC | Age: 60
End: 2020-08-12

## 2020-08-13 ENCOUNTER — APPOINTMENT (OUTPATIENT)
Dept: PSYCHIATRY | Facility: CLINIC | Age: 60
End: 2020-08-13

## 2020-08-19 ENCOUNTER — APPOINTMENT (OUTPATIENT)
Dept: PSYCHIATRY | Facility: CLINIC | Age: 60
End: 2020-08-19

## 2020-09-03 RX ORDER — LANCING DEVICE/LANCETS
KIT MISCELLANEOUS
Qty: 1 | Refills: 0 | Status: ACTIVE | COMMUNITY
Start: 2020-08-26 | End: 1900-01-01

## 2020-09-08 ENCOUNTER — OUTPATIENT (OUTPATIENT)
Dept: OUTPATIENT SERVICES | Facility: HOSPITAL | Age: 60
LOS: 1 days | End: 2020-09-08
Payer: MEDICARE

## 2020-09-08 ENCOUNTER — APPOINTMENT (OUTPATIENT)
Dept: MRI IMAGING | Facility: HOSPITAL | Age: 60
End: 2020-09-08
Payer: MEDICARE

## 2020-09-08 DIAGNOSIS — Z98.890 OTHER SPECIFIED POSTPROCEDURAL STATES: Chronic | ICD-10-CM

## 2020-09-08 DIAGNOSIS — G45.9 TRANSIENT CEREBRAL ISCHEMIC ATTACK, UNSPECIFIED: ICD-10-CM

## 2020-09-08 PROCEDURE — 70551 MRI BRAIN STEM W/O DYE: CPT | Mod: 26

## 2020-09-08 PROCEDURE — 70551 MRI BRAIN STEM W/O DYE: CPT

## 2020-09-14 ENCOUNTER — APPOINTMENT (OUTPATIENT)
Dept: GASTROENTEROLOGY | Facility: CLINIC | Age: 60
End: 2020-09-14
Payer: MEDICARE

## 2020-09-14 VITALS
WEIGHT: 301 LBS | BODY MASS INDEX: 42.14 KG/M2 | HEART RATE: 78 BPM | RESPIRATION RATE: 15 BRPM | DIASTOLIC BLOOD PRESSURE: 80 MMHG | HEIGHT: 71 IN | OXYGEN SATURATION: 98 % | SYSTOLIC BLOOD PRESSURE: 118 MMHG | TEMPERATURE: 98 F

## 2020-09-14 PROCEDURE — 99214 OFFICE O/P EST MOD 30 MIN: CPT

## 2020-09-29 ENCOUNTER — APPOINTMENT (OUTPATIENT)
Dept: ORTHOPEDIC SURGERY | Facility: CLINIC | Age: 60
End: 2020-09-29

## 2020-10-01 ENCOUNTER — OUTPATIENT (OUTPATIENT)
Dept: OUTPATIENT SERVICES | Facility: HOSPITAL | Age: 60
LOS: 1 days | End: 2020-10-01
Payer: MEDICARE

## 2020-10-01 DIAGNOSIS — Z98.890 OTHER SPECIFIED POSTPROCEDURAL STATES: Chronic | ICD-10-CM

## 2020-10-01 DIAGNOSIS — K08.9 DISORDER OF TEETH AND SUPPORTING STRUCTURES, UNSPECIFIED: ICD-10-CM

## 2020-10-01 PROCEDURE — D1110: CPT

## 2020-10-01 PROCEDURE — D0120: CPT

## 2020-10-05 DIAGNOSIS — Z01.20 ENCOUNTER FOR DENTAL EXAMINATION AND CLEANING WITHOUT ABNORMAL FINDINGS: ICD-10-CM

## 2020-10-19 ENCOUNTER — NON-APPOINTMENT (OUTPATIENT)
Age: 60
End: 2020-10-19

## 2020-10-19 ENCOUNTER — APPOINTMENT (OUTPATIENT)
Dept: CARDIOLOGY | Facility: CLINIC | Age: 60
End: 2020-10-19
Payer: MEDICARE

## 2020-10-19 VITALS
SYSTOLIC BLOOD PRESSURE: 116 MMHG | DIASTOLIC BLOOD PRESSURE: 78 MMHG | BODY MASS INDEX: 41.7 KG/M2 | OXYGEN SATURATION: 97 % | HEART RATE: 76 BPM | TEMPERATURE: 97.3 F | WEIGHT: 299 LBS

## 2020-10-19 PROCEDURE — 93000 ELECTROCARDIOGRAM COMPLETE: CPT

## 2020-10-19 PROCEDURE — 99215 OFFICE O/P EST HI 40 MIN: CPT

## 2020-10-19 NOTE — PHYSICAL EXAM
[Normal Appearance] : normal appearance [General Appearance - Well Developed] : well developed [Well Groomed] : well groomed [No Deformities] : no deformities [General Appearance - Well Nourished] : well nourished [General Appearance - In No Acute Distress] : no acute distress [Normal Oral Mucosa] : normal oral mucosa [No Oral Pallor] : no oral pallor [No Oral Cyanosis] : no oral cyanosis [Respiration, Rhythm And Depth] : normal respiratory rhythm and effort [Exaggerated Use Of Accessory Muscles For Inspiration] : no accessory muscle use [Auscultation Breath Sounds / Voice Sounds] : lungs were clear to auscultation bilaterally [Abdomen Soft] : soft [Abdomen Tenderness] : non-tender [Abdomen Mass (___ Cm)] : no abdominal mass palpated [FreeTextEntry1] : morbidly obese [Abnormal Walk] : normal gait [Gait - Sufficient For Exercise Testing] : the gait was sufficient for exercise testing [Nail Clubbing] : no clubbing of the fingernails [Cyanosis, Localized] : no localized cyanosis [Petechial Hemorrhages (___cm)] : no petechial hemorrhages [] : no ischemic changes [Skin Color & Pigmentation] : normal skin color and pigmentation [Oriented To Time, Place, And Person] : oriented to person, place, and time [Not Palpable] : not palpable [No Precordial Heave] : no precordial heave was noted [Normal Rate] : normal [Rhythm Regular] : regular [Normal S1] : normal S1 [Normal S2] : normal S2 [No Gallop] : no gallop heard [No Murmur] : no murmurs heard [2+] : left 2+ [Right Carotid Bruit] : no bruit heard over the right carotid [Left Carotid Bruit] : no bruit heard over the left carotid [1+] : left 1+ [No Pitting Edema] : no pitting edema present [Conjunctiva] : the conjunctiva were normal in both eyes [Strabismus] : strabismus was noted bilaterally [EOM Intact] : extraocular movements were intact [Yellow Sclera (Icteric)] : no scleral icterus was seen [PERRL] : pupils were equal in size, round, and reactive to light [Normal Mental Status] : the patient was oriented to person, place and time [Person] : oriented to person [Place] : oriented to place [Short Term Intact] : short term memory intact [Time] : oriented to time [Remote Intact] : remote memory intact [Span Intact] : the attention span was normal [Visual Intact] : visual attention was ~T not ~L decreased [Concentration Intact] : normal concentrating ability [Normal] : the cranial nerves were intact [Appropriate] : appropriate [Flat] : flat

## 2020-10-19 NOTE — DISCUSSION/SUMMARY
[FreeTextEntry1] : Mr. Pruitt has multiple cardiac risk factors, as above, and presents today for follow-up. \par \par We discussed the importance of compliance with both psychiatric and cardiac medications, as well as compliance with the CPAP machine that he will be getting again after his recent sleep study demonstrated severe CHAYITO. Patient counseled regarding exercise, diet, and weight loss. No change in cardiac regimen today. Follow-up with PMD, endocrinologist, podiatrist, and sleep medicine. \par \par Issue of relationship between CHAYITO and hypertension, weight gain, and risk factors also reinforced and compliance in this regard discussed as well.

## 2020-10-19 NOTE — REASON FOR VISIT
[Follow-Up - Clinic] : a clinic follow-up of [Medication Management] : Medication management [FreeTextEntry1] : October 2020 - Patient has been having difficulty with over-eating. He reports several dietary indiscretions over the past month, including pizza and Chinese food.\par He has been depressed by his eating, and he is feeling socially isolated. He finds that he runs out of money for food and relies on a local [employer] for meals.\par He continues to take his medications without issues, but he has not been using his CPAP machine.\par

## 2020-10-19 NOTE — HISTORY OF PRESENT ILLNESS
[FreeTextEntry1] : 60 year-old gentleman with known cardiovascular risk factors including hypertension, well controlled non-insulin dependent diabetes, dyslipidemia, obesity, CHAYITO, bipolar disorder on Lithium and Geodon, lives as a full time resident of the [facility]. He presents today in his usual state of health, having lost approximately 65 lbs over the past year. He had a repeat sleep study performed 8/12/2016 which showed severe CHAYITO.\par He was formerly followed by a cardiologist in [place], Geovanny Elizabeth MD.\par \par May 10, 2019: Patient presents with complain of sleep apnea. Has concerning sleep study two months ago. He has nasal congestion and cannot tolerate his CPAP. He is also concern about neuropathic pain in the insteps of his feet. He has lost several pounds through diet and exercise that are supervised through his program.\par \par Psychiatrist at [facility] in [place] who manages his Lithium and his antipsychotic medication (second generation, Geodon).\par \par November 2019 - Patient presents today in his usual state of health. He has modified his diet and is losing significant weight. From a peak of 349 lbs in 2012, patient is now 284 lbs. He reports having difficulty walking long distances, but that is because of leg weakness, not because of shortness of breath or chest pain. He is concerned about dyspnea on exertion, but he has not experienced this recently.\par \par June 2020 - Patient has lost 65 lbs by diet and exercise, but when he got the stimulus check, he broke his diet and started eating pizza, and heroes, and potato chips, and he believes he had a stroke that presented with pressured speech. He was scanned at [facility], and he was seen by neurology. They believe he had a TIA and discharged him with atorvastatin 40 mg daily (up from prior 20 mg daily). He continues to take ASA 81 mg daily and an oral diabetes regimen (repaglinide and Tradjenta).\par Unfortunately, his exercise class was shut down due to Covid pandemic.\par \par PMD: Gwen Le MD [phone]\par Sleep Medicine: Angelika Todd MD [phone]\par Endocrinologist: Rocío Bañuelos MD [phone]\par Psychiatrist: Anthony Dc, Psychiatric Nurse Practitioner at [facility]

## 2020-10-23 ENCOUNTER — OUTPATIENT (OUTPATIENT)
Dept: OUTPATIENT SERVICES | Facility: HOSPITAL | Age: 60
LOS: 1 days | End: 2020-10-23

## 2020-10-23 DIAGNOSIS — Z98.890 OTHER SPECIFIED POSTPROCEDURAL STATES: Chronic | ICD-10-CM

## 2020-10-23 DIAGNOSIS — K08.9 DISORDER OF TEETH AND SUPPORTING STRUCTURES, UNSPECIFIED: ICD-10-CM

## 2020-10-27 ENCOUNTER — APPOINTMENT (OUTPATIENT)
Dept: ENDOCRINOLOGY | Facility: CLINIC | Age: 60
End: 2020-10-27
Payer: MEDICARE

## 2020-10-27 VITALS
TEMPERATURE: 98.2 F | WEIGHT: 298 LBS | HEIGHT: 71 IN | BODY MASS INDEX: 41.72 KG/M2 | SYSTOLIC BLOOD PRESSURE: 116 MMHG | HEART RATE: 78 BPM | DIASTOLIC BLOOD PRESSURE: 74 MMHG | OXYGEN SATURATION: 97 %

## 2020-10-27 LAB
GLUCOSE BLDC GLUCOMTR-MCNC: 139
HBA1C MFR BLD HPLC: 7.1

## 2020-10-27 PROCEDURE — 83036 HEMOGLOBIN GLYCOSYLATED A1C: CPT | Mod: QW

## 2020-10-27 PROCEDURE — 99214 OFFICE O/P EST MOD 30 MIN: CPT | Mod: 25

## 2020-10-27 PROCEDURE — 82962 GLUCOSE BLOOD TEST: CPT

## 2020-11-04 LAB
CREAT SPEC-SCNC: 18 MG/DL
MICROALBUMIN 24H UR DL<=1MG/L-MCNC: 2.2 MG/DL
MICROALBUMIN/CREAT 24H UR-RTO: 117 MG/G

## 2020-11-16 ENCOUNTER — EMERGENCY (EMERGENCY)
Facility: HOSPITAL | Age: 60
LOS: 1 days | Discharge: HOME | End: 2020-11-16
Attending: EMERGENCY MEDICINE | Admitting: EMERGENCY MEDICINE
Payer: MEDICARE

## 2020-11-16 VITALS
SYSTOLIC BLOOD PRESSURE: 137 MMHG | TEMPERATURE: 98 F | OXYGEN SATURATION: 96 % | DIASTOLIC BLOOD PRESSURE: 88 MMHG | WEIGHT: 300.05 LBS | HEART RATE: 95 BPM | RESPIRATION RATE: 17 BRPM | HEIGHT: 71 IN

## 2020-11-16 DIAGNOSIS — R09.89 OTHER SPECIFIED SYMPTOMS AND SIGNS INVOLVING THE CIRCULATORY AND RESPIRATORY SYSTEMS: ICD-10-CM

## 2020-11-16 DIAGNOSIS — Z98.890 OTHER SPECIFIED POSTPROCEDURAL STATES: Chronic | ICD-10-CM

## 2020-11-16 LAB — SARS-COV-2 RNA SPEC QL NAA+PROBE: SIGNIFICANT CHANGE UP

## 2020-11-16 PROCEDURE — U0003: CPT

## 2020-11-16 PROCEDURE — 99284 EMERGENCY DEPT VISIT MOD MDM: CPT | Mod: CS

## 2020-11-16 PROCEDURE — 99283 EMERGENCY DEPT VISIT LOW MDM: CPT | Mod: CS

## 2020-11-16 NOTE — ED PROVIDER NOTE - CHPI ED SYMPTOMS NEG
no fever/no chills/no decreased eating/drinking/no dizziness/no nausea/no numbness/no tingling/no weakness/no vomiting/no pain/SOB CP

## 2020-11-16 NOTE — ED ADULT NURSE NOTE - OBJECTIVE STATEMENT
Patient presents to ED requesting nasal swab for runny nose and sneezing for approx 2 weeks. Patient also verbalizes a cough. Walking O2 saturation within normal limits.

## 2020-11-16 NOTE — ED PROVIDER NOTE - NSFOLLOWUPINSTRUCTIONS_ED_ALL_ED_FT
Follow up with your PMD within 48-72 hours.  Rest, increase fluids. Take tylenol 650mg every 6 hours for pain or temp greater than 99.9. Worsening or continued fever, chills, weakness, nausea, vomiting, abdominal pain return to ER     Viral Illness, Adult  Viruses are tiny germs that can get into a person's body and cause illness. There are many different types of viruses, and they cause many types of illness. Viral illnesses can range from mild to severe. They can affect various parts of the body.  Common illnesses that are caused by a virus include colds and the flu. Viral illnesses also include serious conditions such as HIV/AIDS (human immunodeficiency virus/acquired immunodeficiency syndrome). A few viruses have been linked to certain cancers.  What are the causes?  Many types of viruses can cause illness. Viruses invade cells in your body, multiply, and cause the infected cells to malfunction or die. When the cell dies, it releases more of the virus. When this happens, you develop symptoms of the illness, and the virus continues to spread to other cells. If the virus takes over the function of the cell, it can cause the cell to divide and grow out of control, as is the case when a virus causes cancer.  Different viruses get into the body in different ways. You can get a virus by:  Swallowing food or water that is contaminated with the virus.Breathing in droplets that have been coughed or sneezed into the air by an infected person.Touching a surface that has been contaminated with the virus and then touching your eyes, nose, or mouth.Being bitten by an insect or animal that carries the virus.Having sexual contact with a person who is infected with the virus.Being exposed to blood or fluids that contain the virus, either through an open cut or during a transfusion.If a virus enters your body, your body's defense system (immune system) will try to fight the virus. You may be at higher risk for a viral illness if your immune system is weak.  What are the signs or symptoms?  Symptoms vary depending on the type of virus and the location of the cells that it invades. Common symptoms of the main types of viral illnesses include:  Cold and flu viruses     Fever.Headache.Sore throat.Muscle aches.Nasal congestion.Cough.Digestive system (gastrointestinal) viruses     Fever.Abdominal pain.Nausea.Diarrhea.Liver viruses (hepatitis)     Loss of appetite.Tiredness.Yellowing of the skin (jaundice).Brain and spinal cord viruses     Fever.Headache.Stiff neck.Nausea and vomiting.Confusion or sleepiness.Skin viruses     Warts.Itching.Rash.Sexually transmitted viruses     Discharge.Swelling.Redness.Rash.How is this treated?  Viruses can be difficult to treat because they live within cells. Antibiotic medicines do not treat viruses because these drugs do not get inside cells. Treatment for a viral illness may include:  Resting and drinking plenty of fluids.Medicines to relieve symptoms. These can include over-the-counter medicine for pain and fever, medicines for cough or congestion, and medicines to relieve diarrhea.Antiviral medicines. These drugs are available only for certain types of viruses. They may help reduce flu symptoms if taken early. There are also many antiviral medicines for hepatitis and HIV/AIDS.Some viral illnesses can be prevented with vaccinations. A common example is the flu shot.  Follow these instructions at home:  Medicines        Take over-the-counter and prescription medicines only as told by your health care provider.If you were prescribed an antiviral medicine, take it as told by your health care provider. Do not stop taking the medicine even if you start to feel better.Be aware of when antibiotics are needed and when they are not needed. Antibiotics do not treat viruses. If your health care provider thinks that you may have a bacterial infection as well as a viral infection, you may get an antibiotic.  Do not ask for an antibiotic prescription if you have been diagnosed with a viral illness. That will not make your illness go away faster.Frequently taking antibiotics when they are not needed can lead to antibiotic resistance. When this develops, the medicine no longer works against the bacteria that it normally fights.General instructions     Drink enough fluids to keep your urine clear or pale yellow.Rest as much as possible.Return to your normal activities as told by your health care provider. Ask your health care provider what activities are safe for you.Keep all follow-up visits as told by your health care provider. This is important.How is this prevented?  Take these actions to reduce your risk of viral infection:  Eat a healthy diet and get enough rest.Wash your hands often with soap and water. This is especially important when you are in public places. If soap and water are not available, use hand .Avoid close contact with friends and family who have a viral illness.If you travel to areas where viral gastrointestinal infection is common, avoid drinking water or eating raw food.Keep your immunizations up to date. Get a flu shot every year as told by your health care provider.Do not share toothbrushes, nail clippers, razors, or needles with other people.Always practice safe sex.Contact a health care provider if:  You have symptoms of a viral illness that do not go away.Your symptoms come back after going away.Your symptoms get worse.Get help right away if:  You have trouble breathing.You have a severe headache or a stiff neck.You have severe vomiting or abdominal pain.This information is not intended to replace advice given to you by your health care provider. Make sure you discuss any questions you have with your health care provider.         1. You were seen in the ED and underwent testing for the novel coronarvirus (COVID-19). The results are not back yet and you will be contacted with the result in 5-7 days, but may take longer. You were also tested for other common viruses such as the flu and cold viruses. You will be notified if you test positive for any of these.    2. Until your test results are back, YOU MUST SELF-QUARANTINE until you are told to other otherwise by [facility] or the [facility] department. This is extremely important to limit the spread of this virus. Please refer closely to the packet provided to you on the specifics of the process of self-quarantine.    3. If you end up testing positive for the virus, you will instructed as to when you can return to your usual activities. If you do not hear from anyone in 7 days, call [phone].     4. Return to the ED for difficulty breathing.    5. You may take over the counter acetaminophen (Tylenol) 650mg every 6 hours as needed for fever or pain. There is some concern in the medical community about using ibuprofen (Advil, Motrin) and other NSAIDs in people with COVID infections and until there is more research on this subject it may be best to avoid NSAIDs like ibuprofen, unless you have an allergy to acetaminophen (Tylenol).  Do NOT exceed 3500mg acetaminophen in 24 hours.  Please do not take these medications if you do not have pain or fever or if you have any history of liver disease.     -------------    What is a coronavirus?  Coronaviruses are a large family of viruses that cause illnesses ranging from the common cold to more severe diseases such as Middle East Respiratory Syndrome (MERS) and Severe Acute Respiratory Syndrome (SARS).    What is Novel Coronavirus (COVID-19)?  The Centers for Disease Control and Prevention (CDC) is closely monitoring the outbreak caused by COVID-19. For the latest information about COVID-19, visit the CDC website at CDC.gov/Coronavirus    How are coronaviruses spread?  Coronaviruses can be transmitted from person-toperson, usually after close contact with an infected  person (for example, in a household, workplace, or healthcare setting), via droplets that become airborne after a cough or sneeze. These droplets can then infect a nearby person. Transmission can also occur by touching recently contaminated surfaces.    Is there a treatment for a COVID-19?  There is no specific treatment for disease caused by COVID-19. However, many of the symptoms can be treated based on the patient’s clinical condition. Supportive care for infected persons can be highly effective.    What are the symptoms of coronavirus infection?  It depends on the virus, but common signs include fever and/or respiratory symptoms such as cough and shortness of breath. In more severe cases, infection can cause pneumonia, severe acute respiratory syndrome, kidney failure and even death. Fortunately, most cases of COVID-19 have an illness no different than the influenza (flu), with a majority of these patients having mild symptoms and overall mortality which appears to be not much different than the flu.    What can I do to protect myself?  The best precautionary measures:  – washing your hands  – covering your cough  – disinfecting surfaces  – it is also advisable to avoid close contact with anyone showing symptoms of respiratory illness such as coughing and sneezing  – those with symptoms should wear a surgical mask when around others    What can I do to protect those around me?  If you have been identified as someone who may be infected with COVID-19, we recommend you follow the self-isolation procedures outlined on the following page to protect those around you and to limit the spread of this virus.    We recommend the below precautionary steps from now until 14 days from when you returned from your travel or date of your last known possible contact:    — Do not go to work, school or public areas. Avoid using public transportation, ridesharing or taxis.  — As much as possible, separate yourself from other people in your home. If you can, you should stay in a room and away from other people. Also, you should use a separate bathroom if available.  — Wear the supplied mask whenever you are around other people.  — If you have a non-urgent medical appointment, please reschedule for a later date. If the appointment is urgent, please call the health care provider and tell them that you are on self-isolation for possible COVID-19. This will help the health care provider’s office take steps to keep other people from getting infected or exposed. If you can reschedule routine appointments, do so.  — Wash your hands often with soap and water for at least 15 to 20 seconds or clean your hands with an alcohol-based hand  that contains 60 to 95% alcohol, covering all surfaces of your hands and rubbing them together until they feel dry. Soap and water should be used preferentially if hands are visibly dirty.  — Cover your mouth and nose with a tissue when you cough or sneeze. Throw used tissues in a lined trash can. Immediately wash your hands.  — Avoid touching your eyes, nose, and mouth with your hands.  — Avoid sharing personal household items. You should not share dishes, drinking glasses, cups, eating utensils, towels, or bedding with other people or pets in your home. After using these items, they should be washed thoroughly with soap and water.  — Clean and disinfect all “high-touch” surfaces every day. High touch surfaces include counters, tabletops, doorknobs, light switches, remote controls, bathroom fixtures, toilets, phones, keyboards, tablets, and bedside tables. Also, clean any surfaces that may have blood, stool, or body fluids on them.    ------------------------------------------  Information for patients who have received a COVID-19 test.    The COVID-19 (novel coronavirus) test  Results may take up to 5-7 days to become available.      If your result is positive, you will receive a phone call from one of our coronavirus specialists. While we will do our best to also call patients with a negative test result, the sheer volume of tests being performed may make this difficult to do in a timely fashion. If you haven’t heard from us within 5 days and you’d like to check on your results, you can check our [employer] nikia or call one of our coronavirus specialists at [address] [place] [address] (available 24/7)    Please DO NOT call the site where you received the test to obtain your results.    If the test is positive -   You will continue home isolation until you are completely well, you have no fever, and it has been at least 14 days since your positive test. The health department in your city or county may also contact you with additional instructions.    If your test is negative -    You will be able to stop home isolation and resume standard precautions, similar to how you would manage the common cold or flu.  If you have any questions, you can reach out to one of our coronavirus specialists  at [address] [place] [address].    REMEMBER - a negative COVID test means you were negative AT THE TIME OF TESTING, and it is possible to have contracted COVID after being tested.        CORONAVIRUS DISCHARGE INSTRUCTIONS  COVID-19 (Coronavirus Disease 2019)    WHAT YOU NEED TO KNOW:    COVID-19 is the disease caused by the 2019 novel (new) coronavirus. It was first found in individuals in a part of [place] in late 2019. The virus is spreading to other countries as infected persons travel. Coronaviruses generally cause respiratory (nose, throat, and lung) infections, such as a cold. They can also cause serious infections such as Middle East respiratory syndrome (MERS) and severe acute respiratory syndrome (SARS). The new virus is related to the SARS coronavirus, so it is officially named SARS coronavirus 2 (SARS-CoV-2).    DISCHARGE INSTRUCTIONS:    If you think you or someone you know may be infected: It is important for anyone who may be infected to get tested right away. Do the following to protect others:     If emergency care is needed, tell the  about the possible infection, or call ahead and tell the emergency department.      Call a healthcare provider to be seen in the office. Anyone who may be infected should not arrive without calling first. The provider will need to protect staff members and other patients.      The person who may be infected needs to wear a medical mask before getting medical care. The mask needs to stay on until the provider says to remove it.    Call your local emergency number (911 in the [place]) or go to the emergency department if: You have signs or symptoms of COVID-19, and any of the following is true:     You traveled within the last 14 days to an area where the virus is active or had close contact with someone who did.      You had close contact within the last 14 days with someone who has a confirmed infection.    Call your doctor if: You do not have signs or symptoms of COVID-19, but any of the following is true:     You traveled within the last 14 days to an area where the virus is active or had close contact with someone who did.      You had close contact within the last 14 days with someone who has a confirmed infection.      You have questions or concerns about your condition or care.    Medicines: You may need any of the following for mild symptoms:     Decongestants help reduce nasal congestion and help you breathe more easily. If you take decongestant pills, they may make you feel restless or cause problems with your sleep. Do not use decongestant sprays for more than a few days.      Cough suppressants help reduce coughing. Ask your healthcare provider which type of cough medicine is best for you.      Acetaminophen decreases pain and fever. It is available without a doctor's order. Ask how much to take and how often to take it. Follow directions. Read the labels of all other medicines you are using to see if they also contain acetaminophen, or ask your doctor or pharmacist. Acetaminophen can cause liver damage if not taken correctly. Do not use more than 4 grams (4,000 milligrams) total of acetaminophen in one day.     Take your medicine as directed. Contact your healthcare provider if you think your medicine is not helping or if you have side effects. Tell him or her if you are allergic to any medicine. Keep a list of the medicines, vitamins, and herbs you take. Include the amounts, and when and why you take them. Bring the list or the pill bottles to follow-up visits. Carry your medicine list with you in case of an emergency.    How the 2019 coronavirus spreads: The virus appears to spread quickly and easily. The following are ways the virus is thought to spread, but more information may be coming:     Droplets are the most common way all coronaviruses spread. The virus can travel in droplets that form when a person talks, coughs, or sneezes. Anyone who breathes in the virus or gets it in his or her eyes can become infected.      An infected person may be able to leave the virus on objects and surfaces. Another person can get the virus on his or her hands by touching the object or surface. Infection happens if the person then touches his or her eyes or mouth with unwashed hands. It is not yet known how long the virus can stay on an object or surface. Evidence shows it may be as long as 9 days at room temperature.      Person-to-person contact may spread the virus. For example, an infected person can spread the virus by shaking hands with someone. At this time, it does not appear that the virus can be passed to a baby during pregnancy or delivery. The virus also does not appear to spread during breastfeeding. If you are pregnant or breastfeeding, talk to your healthcare provider or obstetrician about any concerns you have.      An infected animal may be able to infect a person who touches it. This may happen at live markets or on a farm.    Prevent a 2019 coronavirus infection: Everyone should do the following to prevent getting or spreading the virus:     Wash your hands often. Use soap and water every time you wash your hands. Rub your soapy hands together, lacing your fingers. Use the fingers of one hand to scrub under the nails of the other hand. Wash for at least 20 seconds. Rinse with warm, running water for several seconds. Then dry your hands. Use germ-killing gel if soap and water are not available. Do not touch your eyes or mouth without washing your hands first. Handwashing           Cover a sneeze or cough. Use a tissue that covers your mouth and nose. Throw the tissue away in a trash can right away. Use the bend of your arm if a tissue is not available. Wash your hands well with soap and water or use a hand . Do not stand close to anyone who is sneezing or coughing.      Be careful around others. The best way to prevent infection is to avoid anyone who is infected, but this may be difficult. An infected person may be able to spread the virus before signs or symptoms begin. Until more is known, you may not want to shake hands with others. If you do shake hands, wash your hands or use hand  as soon as possible. You do not need to wear a medical mask if you are well and not caring for an infected person.      Ask about vaccines you may need. No vaccine is available for the new coronavirus. But any infection can affect your immune system. A weakened immune system makes you more vulnerable to the new coronavirus. Until a vaccine against the new virus is developed, do the following:   Get a flu vaccine as soon as recommended each year. The flu vaccine is available starting in September or October. Flu viruses change, so it is important to get a flu vaccine every year.      Talk to your healthcare provider about your vaccine history. Tell him or her if you did not get certain vaccines as a child, or you did not get all recommended doses. Tell him or her if you do not know your vaccine history. He or she will tell you which vaccines you need, and when to get them.           If you have COVID-19: Healthcare providers will give you specific instructions to follow. The following are general guidelines to remind you of how to keep others safe until you are well:     Limit close contact with others. Your healthcare provider will tell you when it is okay to be around others. This may be when you do not have a fever, do not take fever medicine, and have no symptoms. Fluid from your respiratory tract will need to test negative for the virus 2 times at least 24 hours apart. Until then, do the following along with any instructions from your provider:   Only go out of the house for medical appointments. Always call the provider’s office first so he or she can prepare to keep others safe.      Stay at least 6 feet (2 meters) away from others.      Sleep in a different room from others in the house.      Do not shake hands with other people.      Wear a medical mask when others are near you. This can help prevent droplets from spreading the virus when you talk, sneeze, or cough.      Do not share items. Do not share dishes, towels, or other items with anyone. Items need to be washed after you use them.      Do not handle live animals. The virus may be spread to animals, including pets. Until more is known, it is best not to touch, play with, or handle live animals.    Self-care:     Drink more liquids as directed. Liquids will help thin and loosen mucus so you can cough it up. Liquids will also help prevent dehydration. Liquids that help prevent dehydration include water, fruit juice, and broth. Do not drink liquids that contain caffeine. Caffeine can increase your risk for dehydration. Ask your healthcare provider how much liquid to drink each day.      Soothe a sore throat. Gargle with warm salt water. This may help your sore throat feel better. Make salt water by dissolving ¼ teaspoon salt in 1 cup warm water. You may also suck on hard candy or throat lozenges. You may use a sore throat spray.      Use a humidifier or vaporizer. Use a cool mist humidifier or a vaporizer to increase air moisture in your home. This may make it easier for you to breathe and help decrease your cough.      Use saline nasal drops as directed. These help relieve congestion.      Apply petroleum-based jelly around the outside of your nostrils. This can decrease irritation from blowing your nose.      Do not smoke. Nicotine and other chemicals in cigarettes and cigars can make your symptoms worse. They can also cause infections such as bronchitis or pneumonia. Ask your healthcare provider for information if you currently smoke and need help to quit. E-cigarettes or smokeless tobacco still contain nicotine. Talk to your healthcare provider before you use these products.    If you take care of someone who has COVID-19:     Wear a medical mask when you are near the person. This can help protect you from droplets that carry the virus when the person talks, sneezes, or coughs.      Do not allow others to go near the person. No one should come to the person's home unless it is necessary. Keep the room's door shut unless you need to go in or out.      Make sure the person's room has good air flow. You may be able to open the window if the weather allows. An air conditioner can also be turned on to help air move.      Clean items the person uses or touches. Wear disposable gloves to handle the person's laundry. Place the laundry in a plastic bag. Use hot water and soap to wash the person's laundry. Wash eating utensils and other items after the person uses them. Throw your gloves away after you use them.      Clean surfaces often. Use bleach diluted with water or disinfecting wipes. Clean counters, doorknobs, toilet seats, and other surfaces.    Follow up with your doctor as directed: Write down your questions so you remember to ask them during your visits.    For more information:     [facility] Disease Control and Prevention  [address] [place], [place]  Phone: [phone]  Phone: [phone]  Web Address: http://www.cdc.gov        Pulse Oximetry    WHAT YOU NEED TO KNOW:    Pulse oximetry measures the percentage of oxygen in your blood. It gives the closest measurement without having to draw your blood. A pulse oximeter (pulse ox) can be used continuously or periodically.     DISCHARGE INSTRUCTIONS:    Importance of blood oxygen levels: Every cell in your body needs oxygen to work properly. You may need extra oxygen if your measurement is low. The pulse ox helps your healthcare provider decide if you need extra oxygen. It can also help show how much extra oxygen you need, and when you need to use it. You may only need extra oxygen when you are asleep. You may need more oxygen with activity.     How a pulse ox works:     The pulse ox may be placed on your finger, toe, or earlobe. Light is passed from the pulse ox through your blood. The pulse ox calculates the percentage of blood that is carrying oxygen. At least 89% of your blood should be carrying oxygen. It also measures your heart rate and gives you a reading of both the percentage and heart rate.      You will get the best measurements when your hand is warm, relaxed, and at the level of your heart. Make sure all nail polish is removed. Do not smoke, because your percentage will not be correct. The device does not know the difference between carbon monoxide from your smoke and oxygen. Ask your healthcare provider what your percentage should be if you smoke. If you smoke, it is never too late to quit.     Your own pulse ox: Your healthcare provider will prescribe a pulse ox if you need one. Ask him what percentage is good for you. The following are times you may need to monitor your levels:     You are prescribed oxygen.      You are exercising or have just finished.      You are flying or visiting high altitude places.

## 2020-11-16 NOTE — ED PROVIDER NOTE - PATIENT PORTAL LINK FT
You can access the FollowMyHealth Patient Portal offered by [facility] by registering at the following website: [email or web address]. By joining [employer]’s FollowMyHealth portal, you will also be able to view your health information using other applications (apps) compatible with our system.

## 2020-11-16 NOTE — ED PROVIDER NOTE - NS_EDPROVIDERDISPOUSERTYPE_ED_A_ED
Simone Cotter is a 26 year old male presenting with anxiety. Symptoms started this whole year but this last month, had some significant events that has pushed him to the edge. He has been stressed, he has having a baby, brother in legal trouble and also lost his job, wife lost job, lost some close family/friends. Denies any thoughts of harming himself or others around him.  Tried treatment: none  Denies  known Latex allergy or symptoms of Latex sensitivity.  Social History     Tobacco Use   Smoking Status Former Smoker   • Packs/day: 0.25   Smokeless Tobacco Never Used     All allergies and medications reviewed.  PCP verified:  none  [facility]    Patient would like communication of their results via:        Cell Phone:   Telephone Information:   Mobile [phone]     Okay to leave a message containing results? Yes     Attending Attestation (For Attendings USE Only)...

## 2020-11-16 NOTE — ED PROVIDER NOTE - CLINICAL SUMMARY MEDICAL DECISION MAKING FREE TEXT BOX
Dr. Mai: 60M p/w 3 days of runny nose and ry cough, no fevers or chills, no chest pain or sob, wants to get swabbed for covid. On exam pt is well appearing, nad, rrr, ctab, abdo soft/nt/nd, no pedal edema or calf ttp. Walking sat normal. Will swab for covid.

## 2020-11-17 ENCOUNTER — APPOINTMENT (OUTPATIENT)
Dept: ORTHOPEDIC SURGERY | Facility: CLINIC | Age: 60
End: 2020-11-17

## 2020-11-17 RX ORDER — LISINOPRIL 5 MG/1
5 TABLET ORAL DAILY
Qty: 30 | Refills: 3 | Status: DISCONTINUED | COMMUNITY
Start: 2020-11-04 | End: 2020-11-17

## 2020-11-19 ENCOUNTER — APPOINTMENT (OUTPATIENT)
Dept: BARIATRICS/WEIGHT MGMT | Facility: CLINIC | Age: 60
End: 2020-11-19
Payer: MEDICARE

## 2020-11-19 PROCEDURE — 99213 OFFICE O/P EST LOW 20 MIN: CPT

## 2020-11-24 ENCOUNTER — OUTPATIENT (OUTPATIENT)
Dept: OUTPATIENT SERVICES | Facility: HOSPITAL | Age: 60
LOS: 1 days | End: 2020-11-24
Payer: MEDICARE

## 2020-11-24 DIAGNOSIS — Z98.890 OTHER SPECIFIED POSTPROCEDURAL STATES: Chronic | ICD-10-CM

## 2020-11-24 DIAGNOSIS — K08.9 DISORDER OF TEETH AND SUPPORTING STRUCTURES, UNSPECIFIED: ICD-10-CM

## 2020-11-24 PROCEDURE — D2391: CPT

## 2020-11-27 ENCOUNTER — RX RENEWAL (OUTPATIENT)
Age: 60
End: 2020-11-27

## 2020-12-02 DIAGNOSIS — K02.9 DENTAL CARIES, UNSPECIFIED: ICD-10-CM

## 2020-12-03 ENCOUNTER — APPOINTMENT (OUTPATIENT)
Dept: PULMONOLOGY | Facility: CLINIC | Age: 60
End: 2020-12-03

## 2020-12-07 ENCOUNTER — NON-APPOINTMENT (OUTPATIENT)
Age: 60
End: 2020-12-07

## 2020-12-09 ENCOUNTER — EMERGENCY (EMERGENCY)
Facility: HOSPITAL | Age: 60
LOS: 1 days | Discharge: HOME | End: 2020-12-09
Attending: EMERGENCY MEDICINE | Admitting: EMERGENCY MEDICINE
Payer: MEDICARE

## 2020-12-09 VITALS
RESPIRATION RATE: 17 BRPM | DIASTOLIC BLOOD PRESSURE: 71 MMHG | HEART RATE: 85 BPM | HEIGHT: 71 IN | OXYGEN SATURATION: 96 % | WEIGHT: 300.05 LBS | SYSTOLIC BLOOD PRESSURE: 115 MMHG | TEMPERATURE: 98 F

## 2020-12-09 DIAGNOSIS — R05 COUGH: ICD-10-CM

## 2020-12-09 DIAGNOSIS — Z98.890 OTHER SPECIFIED POSTPROCEDURAL STATES: Chronic | ICD-10-CM

## 2020-12-09 LAB — SARS-COV-2 RNA SPEC QL NAA+PROBE: SIGNIFICANT CHANGE UP

## 2020-12-09 PROCEDURE — 99283 EMERGENCY DEPT VISIT LOW MDM: CPT | Mod: CS

## 2020-12-09 PROCEDURE — U0003: CPT

## 2020-12-09 PROCEDURE — 71045 X-RAY EXAM CHEST 1 VIEW: CPT

## 2020-12-09 PROCEDURE — 99283 EMERGENCY DEPT VISIT LOW MDM: CPT | Mod: 25

## 2020-12-09 PROCEDURE — 71045 X-RAY EXAM CHEST 1 VIEW: CPT | Mod: 26

## 2020-12-09 NOTE — ED PROVIDER NOTE - CARE PLAN
Principal Discharge DX:	Cough  Secondary Diagnosis:	Encounter for laboratory testing for COVID-19 virus

## 2020-12-09 NOTE — ED PROVIDER NOTE - NSFOLLOWUPINSTRUCTIONS_ED_ALL_ED_FT
Follow up with your primary care physician within 2-3 days     A Covid swab was sent, you will receive a phone call in 48 hours with the results     See attached for COVID-19 info / fact sheet.     Please stay home for 14 days. See work note and fact sheet.     Take Tylenol 650mg every 6 hours as needed for fever or pain.     Drink fluids, rest, and sanitize at home!    Home quarantine is recommended to monitor symptoms.     Isolate from others as much as possible.    Return to the ER if your symptoms worsen or for any other medical emergencies  ***************    Viral Respiratory Infection    A viral respiratory infection is an illness that affects parts of the body used for breathing, like the lungs, nose, and throat. It is caused by a germ called a virus. Symptoms can include runny nose, coughing, sneezing, fatigue, body aches, sore throat, fever, or headache. Over the counter medicine can be used to manage the symptoms but the infection typically goes away on its own in 5 to 10 days.     SEEK IMMEDIATE MEDICAL CARE IF YOU HAVE ANY OF THE FOLLOWING SYMPTOMS: shortness of breath, chest pain, fever over 10 days, or lightheadedness/dizziness.

## 2020-12-09 NOTE — ED PROVIDER NOTE - NS_EDPROVIDERDISPOUSERTYPE_ED_A_ED
Patient requesting refill on transplant meds, but no labs in the system since September 2018. Contacted patient to request labs be done monthly. Pt. Agreeable, states she has just been forgetful the past few months. Pt. States she will call immediately and get labs scheduled for tomorrow.    Attending Attestation (For Attendings USE Only)...

## 2020-12-09 NOTE — ED PROVIDER NOTE - PATIENT PORTAL LINK FT
You can access the FollowMyHealth Patient Portal offered by [facility] by registering at the following website: [email or web address]. By joining [employer]’s FollowMyHealth portal, you will also be able to view your health information using other applications (apps) compatible with our system.

## 2020-12-09 NOTE — ED PROVIDER NOTE - CLINICAL SUMMARY MEDICAL DECISION MAKING FREE TEXT BOX
Dr. Mai: 60M PMHx as above, sent by PMD for covid testing. Pt has had a productive cough and mild malaise x 1 week, no chest pain or sob. On exam pt is well appearing, chronically disheveled, RRR, CTAB, abdo soft/nt/nd, no pedal edema or calf ttp. CXR unremarkable, will covid swab and dc.

## 2020-12-09 NOTE — ED ADULT NURSE NOTE - OBJECTIVE STATEMENT
Patient presents to ED requesting a covid swab. Patient states he has had a cough producing clear mucous x approximately 1 week. Patient states he was tested for Covid 2 days ago at his gastroenterologist's office however has not received results. Patient denies fevers, denies body aches. Patient presents to ED requesting a covid swab. Patient states he has had a cough producing clear mucous x approximately 1 week. Patient states he was tested for Covid 2 days ago at his gastroenterologist's office however has not received results. Patient denies fevers, denies body aches. Patient consulted with his PMD who recommended he get another test.

## 2020-12-09 NOTE — ED PROVIDER NOTE - OBJECTIVE STATEMENT
61 y/o M presents to the ED for covid testing. Patient have a neg covid 11/16/20, reports he had a colonoscopy few days ago and had another negative COVID swab. Reports he called his PCP Dr. Mckeon today and was sent to the ED for another covid swab since he has productive cough x 1 week and malaise. He denies CP, exertional symptoms, f/c, n/v or other complaints

## 2020-12-14 ENCOUNTER — APPOINTMENT (OUTPATIENT)
Dept: GASTROENTEROLOGY | Facility: CLINIC | Age: 60
End: 2020-12-14

## 2020-12-14 ENCOUNTER — APPOINTMENT (OUTPATIENT)
Dept: UROLOGY | Facility: CLINIC | Age: 60
End: 2020-12-14
Payer: MEDICARE

## 2020-12-14 ENCOUNTER — APPOINTMENT (OUTPATIENT)
Dept: ORTHOPEDIC SURGERY | Facility: CLINIC | Age: 60
End: 2020-12-14

## 2020-12-14 VITALS
RESPIRATION RATE: 17 BRPM | SYSTOLIC BLOOD PRESSURE: 100 MMHG | DIASTOLIC BLOOD PRESSURE: 66 MMHG | HEART RATE: 56 BPM | BODY MASS INDEX: 42.42 KG/M2 | TEMPERATURE: 98 F | WEIGHT: 303 LBS | HEIGHT: 71 IN

## 2020-12-14 PROCEDURE — 99214 OFFICE O/P EST MOD 30 MIN: CPT

## 2020-12-14 RX ORDER — LINACLOTIDE 290 UG/1
290 CAPSULE, GELATIN COATED ORAL
Qty: 30 | Refills: 3 | Status: DISCONTINUED | COMMUNITY
Start: 2020-09-14 | End: 2020-12-14

## 2020-12-14 NOTE — ASSESSMENT
[FreeTextEntry1] : 60 y.o gentleman with BPH /LUTS on Flomax 0.4  mg and finasteride 5  mg . Voids with some LUTS  alternating stream  X 8 times , 4-5 times of nocturia. Fluids: 1/2 gallon water , decaf tea 1 cup, sodas . Constipation managed with linzess and Miralax.He reports weight gain  and has issues with food binging and seeing a weight  . FSBS in the high ranges as per pt. He was offered doubling up on Flomax , but he refused same, also refused trial of Cialis for his BPH and  low libido. \par Continue Tamsulosin 0.4  mg po daily and finasteride 5 mg daily.He is not using CPAP and was recommend to follow up on Pulmonologist for his sleep apnea management. Trial of Gummy fibers for constipation. \par 1.Life style behavior modifications- fluids management - increase water intake- refrain from drinking 3  hours close to bedtime. \par 2.Continue Tamsulosin 0.4  mg po daily.\par 3. Continue Finasteride 5 mg daily.\par 4.PSA to be drawn today. Call for results .\par 5..RTO in a year.

## 2020-12-14 NOTE — PHYSICAL EXAM
[General Appearance - Well Developed] : well developed [General Appearance - Well Nourished] : well nourished [Normal Appearance] : normal appearance [Well Groomed] : well groomed [General Appearance - In No Acute Distress] : no acute distress [Abdomen Soft] : soft [Abdomen Tenderness] : non-tender [Costovertebral Angle Tenderness] : no ~M costovertebral angle tenderness [FreeTextEntry1] : duong deferred [Edema] : no peripheral edema [] : no respiratory distress [Respiration, Rhythm And Depth] : normal respiratory rhythm and effort [Exaggerated Use Of Accessory Muscles For Inspiration] : no accessory muscle use [Oriented To Time, Place, And Person] : oriented to person, place, and time [Affect] : the affect was normal [Mood] : the mood was normal [Not Anxious] : not anxious [Normal Station and Gait] : the gait and station were normal for the patient's age

## 2020-12-14 NOTE — HISTORY OF PRESENT ILLNESS
[FreeTextEntry1] : 60 y.o gentleman with BPH /LUTS on Flomax 0.4  mg and finasteride 5  mg . Voids with some LUTS  alternating stream  X 8 times , 4-5 times of nocturia. Fluids: 1/2 gallon water , decaf tea 1 cup, sodas . Constipation managed with linzess and Miralax.He reports weight gain  and has issues with food binging and seeing a weight  . FSBS in the high ranges as per pt. He was offered doubling up on Flomax , but he refused same, also refused trial of Cialis for his BPH and  low libido. \par Continue Tamsulosin 0.4  mg po daily and finasteride 5 mg daily.He is not using CPAP and was recommend to follow up on Pulmonologist for his sleep apnea management. Trial of Gummy fibers for constipation. \par PSA to be drawn today. Call for results .\par RTO in a year.

## 2020-12-15 ENCOUNTER — NON-APPOINTMENT (OUTPATIENT)
Age: 60
End: 2020-12-15

## 2020-12-15 LAB
PSA FREE FLD-MCNC: 51 %
PSA FREE SERPL-MCNC: 0.09 NG/ML
PSA SERPL-MCNC: 0.17 NG/ML

## 2020-12-17 ENCOUNTER — APPOINTMENT (OUTPATIENT)
Dept: BARIATRICS/WEIGHT MGMT | Facility: CLINIC | Age: 60
End: 2020-12-17
Payer: MEDICARE

## 2020-12-17 PROCEDURE — 99442: CPT | Mod: 95

## 2020-12-18 ENCOUNTER — NON-APPOINTMENT (OUTPATIENT)
Age: 60
End: 2020-12-18

## 2020-12-18 ENCOUNTER — APPOINTMENT (OUTPATIENT)
Dept: NEUROLOGY | Facility: CLINIC | Age: 60
End: 2020-12-18
Payer: MEDICARE

## 2020-12-18 VITALS
DIASTOLIC BLOOD PRESSURE: 68 MMHG | BODY MASS INDEX: 42.7 KG/M2 | HEIGHT: 71 IN | WEIGHT: 305 LBS | SYSTOLIC BLOOD PRESSURE: 105 MMHG | HEART RATE: 89 BPM

## 2020-12-18 VITALS — SYSTOLIC BLOOD PRESSURE: 112 MMHG | HEART RATE: 92 BPM | DIASTOLIC BLOOD PRESSURE: 71 MMHG

## 2020-12-18 VITALS — TEMPERATURE: 97.1 F

## 2020-12-18 PROCEDURE — 99214 OFFICE O/P EST MOD 30 MIN: CPT

## 2020-12-18 NOTE — ASSESSMENT
[FreeTextEntry1] : Encourage weight loss. Not interested in bariatric surgery. He will see dietician next week.

## 2020-12-18 NOTE — PHYSICAL EXAM
[FreeTextEntry1] : Alert and oriented. Flat affect. No aphasia. CN intact, No focal weakness. Gait is steady, but slow. He has a graded distal sensory loss in lower limbs. No pathologic reflexes.

## 2020-12-18 NOTE — HISTORY OF PRESENT ILLNESS
[FreeTextEntry1] : 60-year-old man with history of bipolar disorder, diabetes mellitus, hypertension, rheumatoid arthritis, morbid obesity and sleep apnea. Since last seen 6 months ago he has gained 25 lbs. He refuses CPAP. Not willing to see a sleep specialist. Remains depressed. Denies suicidal ideation. Sees psychiatrist at [facility] in [place].\par \par He goes to sleep at 9 PM and wakes up at 1 AM and watches TV most of the day. \par

## 2020-12-18 NOTE — REVIEW OF SYSTEMS
[Feeling Poorly] : feeling poorly [Feeling Tired] : feeling tired [As Noted in HPI] : as noted in HPI [Arthralgias] : arthralgias [Negative] : Genitourinary

## 2020-12-20 ENCOUNTER — NON-APPOINTMENT (OUTPATIENT)
Age: 60
End: 2020-12-20

## 2020-12-22 ENCOUNTER — NON-APPOINTMENT (OUTPATIENT)
Age: 60
End: 2020-12-22

## 2020-12-30 ENCOUNTER — APPOINTMENT (OUTPATIENT)
Dept: NEPHROLOGY | Facility: CLINIC | Age: 60
End: 2020-12-30
Payer: MEDICARE

## 2020-12-30 ENCOUNTER — INPATIENT (INPATIENT)
Facility: HOSPITAL | Age: 60
LOS: 1 days | Discharge: HOME | DRG: 683 | End: 2021-01-01
Attending: INTERNAL MEDICINE | Admitting: HOSPITALIST
Payer: MEDICARE

## 2020-12-30 VITALS
SYSTOLIC BLOOD PRESSURE: 106 MMHG | HEIGHT: 71 IN | HEART RATE: 70 BPM | WEIGHT: 300.93 LBS | TEMPERATURE: 98 F | RESPIRATION RATE: 18 BRPM | DIASTOLIC BLOOD PRESSURE: 66 MMHG | OXYGEN SATURATION: 98 %

## 2020-12-30 VITALS
SYSTOLIC BLOOD PRESSURE: 98 MMHG | TEMPERATURE: 97.2 F | WEIGHT: 301.59 LBS | OXYGEN SATURATION: 97 % | HEIGHT: 71 IN | BODY MASS INDEX: 42.22 KG/M2 | DIASTOLIC BLOOD PRESSURE: 62 MMHG | HEART RATE: 80 BPM

## 2020-12-30 DIAGNOSIS — E11.9 TYPE 2 DIABETES MELLITUS WITHOUT COMPLICATIONS: ICD-10-CM

## 2020-12-30 DIAGNOSIS — G47.33 OBSTRUCTIVE SLEEP APNEA (ADULT) (PEDIATRIC): ICD-10-CM

## 2020-12-30 DIAGNOSIS — R79.89 OTHER SPECIFIED ABNORMAL FINDINGS OF BLOOD CHEMISTRY: ICD-10-CM

## 2020-12-30 DIAGNOSIS — Z98.890 OTHER SPECIFIED POSTPROCEDURAL STATES: Chronic | ICD-10-CM

## 2020-12-30 DIAGNOSIS — N18.9 CHRONIC KIDNEY DISEASE, UNSPECIFIED: ICD-10-CM

## 2020-12-30 DIAGNOSIS — E78.5 HYPERLIPIDEMIA, UNSPECIFIED: ICD-10-CM

## 2020-12-30 DIAGNOSIS — M79.602 PAIN IN LEFT ARM: ICD-10-CM

## 2020-12-30 DIAGNOSIS — I10 ESSENTIAL (PRIMARY) HYPERTENSION: ICD-10-CM

## 2020-12-30 DIAGNOSIS — R07.9 CHEST PAIN, UNSPECIFIED: ICD-10-CM

## 2020-12-30 DIAGNOSIS — E11.40 TYPE 2 DIABETES MELLITUS WITH DIABETIC NEUROPATHY, UNSPECIFIED: ICD-10-CM

## 2020-12-30 DIAGNOSIS — F31.9 BIPOLAR DISORDER, UNSPECIFIED: ICD-10-CM

## 2020-12-30 DIAGNOSIS — Z29.9 ENCOUNTER FOR PROPHYLACTIC MEASURES, UNSPECIFIED: ICD-10-CM

## 2020-12-30 LAB
ALBUMIN SERPL ELPH-MCNC: 4 G/DL — SIGNIFICANT CHANGE UP (ref 3.3–5)
ALP SERPL-CCNC: 113 U/L — SIGNIFICANT CHANGE UP (ref 40–120)
ALT FLD-CCNC: 25 U/L — SIGNIFICANT CHANGE UP (ref 10–45)
ANION GAP SERPL CALC-SCNC: 11 MMOL/L — SIGNIFICANT CHANGE UP (ref 5–17)
ANION GAP SERPL CALC-SCNC: 7 MMOL/L — SIGNIFICANT CHANGE UP (ref 5–17)
AST SERPL-CCNC: 26 U/L — SIGNIFICANT CHANGE UP (ref 10–40)
BASOPHILS # BLD AUTO: 0.03 K/UL — SIGNIFICANT CHANGE UP (ref 0–0.2)
BASOPHILS NFR BLD AUTO: 0.3 % — SIGNIFICANT CHANGE UP (ref 0–2)
BILIRUB SERPL-MCNC: 0.4 MG/DL — SIGNIFICANT CHANGE UP (ref 0.2–1.2)
BUN SERPL-MCNC: 25 MG/DL — HIGH (ref 7–23)
BUN SERPL-MCNC: 26 MG/DL — HIGH (ref 7–23)
CALCIUM SERPL-MCNC: 10.1 MG/DL — SIGNIFICANT CHANGE UP (ref 8.4–10.5)
CALCIUM SERPL-MCNC: 10.3 MG/DL — SIGNIFICANT CHANGE UP (ref 8.4–10.5)
CHLORIDE SERPL-SCNC: 107 MMOL/L — SIGNIFICANT CHANGE UP (ref 96–108)
CHLORIDE SERPL-SCNC: 108 MMOL/L — SIGNIFICANT CHANGE UP (ref 96–108)
CO2 SERPL-SCNC: 20 MMOL/L — LOW (ref 22–31)
CO2 SERPL-SCNC: 26 MMOL/L — SIGNIFICANT CHANGE UP (ref 22–31)
CREAT SERPL-MCNC: 1.73 MG/DL — HIGH (ref 0.5–1.3)
CREAT SERPL-MCNC: 1.89 MG/DL — HIGH (ref 0.5–1.3)
EOSINOPHIL # BLD AUTO: 0.23 K/UL — SIGNIFICANT CHANGE UP (ref 0–0.5)
EOSINOPHIL NFR BLD AUTO: 2.5 % — SIGNIFICANT CHANGE UP (ref 0–6)
GLUCOSE SERPL-MCNC: 122 MG/DL — HIGH (ref 70–99)
GLUCOSE SERPL-MCNC: 156 MG/DL — HIGH (ref 70–99)
HCT VFR BLD CALC: 41.1 % — SIGNIFICANT CHANGE UP (ref 39–50)
HGB BLD-MCNC: 13.1 G/DL — SIGNIFICANT CHANGE UP (ref 13–17)
IMM GRANULOCYTES NFR BLD AUTO: 0.5 % — SIGNIFICANT CHANGE UP (ref 0–1.5)
LIDOCAIN IGE QN: 72 U/L — HIGH (ref 7–60)
LITHIUM SERPL-MCNC: 1.8 MMOL/L — CRITICAL HIGH (ref 0.6–1.2)
LITHIUM SERPL-MCNC: 1.9 MMOL/L — CRITICAL HIGH (ref 0.6–1.2)
LYMPHOCYTES # BLD AUTO: 1.46 K/UL — SIGNIFICANT CHANGE UP (ref 1–3.3)
LYMPHOCYTES # BLD AUTO: 15.8 % — SIGNIFICANT CHANGE UP (ref 13–44)
MAGNESIUM SERPL-MCNC: 2.5 MG/DL — SIGNIFICANT CHANGE UP (ref 1.6–2.6)
MCHC RBC-ENTMCNC: 28.5 PG — SIGNIFICANT CHANGE UP (ref 27–34)
MCHC RBC-ENTMCNC: 31.9 GM/DL — LOW (ref 32–36)
MCV RBC AUTO: 89.3 FL — SIGNIFICANT CHANGE UP (ref 80–100)
MONOCYTES # BLD AUTO: 0.75 K/UL — SIGNIFICANT CHANGE UP (ref 0–0.9)
MONOCYTES NFR BLD AUTO: 8.1 % — SIGNIFICANT CHANGE UP (ref 2–14)
NEUTROPHILS # BLD AUTO: 6.72 K/UL — SIGNIFICANT CHANGE UP (ref 1.8–7.4)
NEUTROPHILS NFR BLD AUTO: 72.8 % — SIGNIFICANT CHANGE UP (ref 43–77)
NRBC # BLD: 0 /100 WBCS — SIGNIFICANT CHANGE UP (ref 0–0)
NT-PROBNP SERPL-SCNC: 84 PG/ML — SIGNIFICANT CHANGE UP (ref 0–300)
PLATELET # BLD AUTO: 158 K/UL — SIGNIFICANT CHANGE UP (ref 150–400)
POTASSIUM SERPL-MCNC: 4.3 MMOL/L — SIGNIFICANT CHANGE UP (ref 3.5–5.3)
POTASSIUM SERPL-MCNC: 4.5 MMOL/L — SIGNIFICANT CHANGE UP (ref 3.5–5.3)
POTASSIUM SERPL-SCNC: 4.3 MMOL/L — SIGNIFICANT CHANGE UP (ref 3.5–5.3)
POTASSIUM SERPL-SCNC: 4.5 MMOL/L — SIGNIFICANT CHANGE UP (ref 3.5–5.3)
PROT SERPL-MCNC: 7.4 G/DL — SIGNIFICANT CHANGE UP (ref 6–8.3)
RBC # BLD: 4.6 M/UL — SIGNIFICANT CHANGE UP (ref 4.2–5.8)
RBC # FLD: 13 % — SIGNIFICANT CHANGE UP (ref 10.3–14.5)
SARS-COV-2 RNA SPEC QL NAA+PROBE: SIGNIFICANT CHANGE UP
SODIUM SERPL-SCNC: 138 MMOL/L — SIGNIFICANT CHANGE UP (ref 135–145)
SODIUM SERPL-SCNC: 141 MMOL/L — SIGNIFICANT CHANGE UP (ref 135–145)
TROPONIN T, HIGH SENSITIVITY RESULT: 10 NG/L — SIGNIFICANT CHANGE UP (ref 0–51)
TROPONIN T, HIGH SENSITIVITY RESULT: 9 NG/L — SIGNIFICANT CHANGE UP (ref 0–51)
WBC # BLD: 9.24 K/UL — SIGNIFICANT CHANGE UP (ref 3.8–10.5)
WBC # FLD AUTO: 9.24 K/UL — SIGNIFICANT CHANGE UP (ref 3.8–10.5)

## 2020-12-30 PROCEDURE — 99205 OFFICE O/P NEW HI 60 MIN: CPT | Mod: PD

## 2020-12-30 PROCEDURE — 99285 EMERGENCY DEPT VISIT HI MDM: CPT

## 2020-12-30 PROCEDURE — 99223 1ST HOSP IP/OBS HIGH 75: CPT | Mod: GC,AI

## 2020-12-30 PROCEDURE — 93010 ELECTROCARDIOGRAM REPORT: CPT

## 2020-12-30 PROCEDURE — 71045 X-RAY EXAM CHEST 1 VIEW: CPT | Mod: 26

## 2020-12-30 RX ORDER — LINAGLIPTIN 5 MG/1
1 TABLET, FILM COATED ORAL
Qty: 0 | Refills: 0 | DISCHARGE

## 2020-12-30 RX ORDER — ZIPRASIDONE HYDROCHLORIDE 20 MG/1
1 CAPSULE ORAL
Qty: 0 | Refills: 0 | DISCHARGE

## 2020-12-30 RX ORDER — LOSARTAN POTASSIUM 100 MG/1
25 TABLET, FILM COATED ORAL DAILY
Refills: 0 | Status: DISCONTINUED | OUTPATIENT
Start: 2020-12-30 | End: 2021-01-01

## 2020-12-30 RX ORDER — REPAGLINIDE 1 MG/1
1 TABLET ORAL
Qty: 0 | Refills: 0 | DISCHARGE

## 2020-12-30 RX ORDER — SODIUM CHLORIDE 9 MG/ML
1000 INJECTION, SOLUTION INTRAVENOUS
Refills: 0 | Status: DISCONTINUED | OUTPATIENT
Start: 2020-12-30 | End: 2021-01-01

## 2020-12-30 RX ORDER — ASPIRIN/CALCIUM CARB/MAGNESIUM 324 MG
81 TABLET ORAL DAILY
Refills: 0 | Status: DISCONTINUED | OUTPATIENT
Start: 2020-12-30 | End: 2021-01-01

## 2020-12-30 RX ORDER — DEXTROSE 50 % IN WATER 50 %
25 SYRINGE (ML) INTRAVENOUS ONCE
Refills: 0 | Status: DISCONTINUED | OUTPATIENT
Start: 2020-12-30 | End: 2021-01-01

## 2020-12-30 RX ORDER — FINASTERIDE 5 MG/1
5 TABLET, FILM COATED ORAL DAILY
Refills: 0 | Status: DISCONTINUED | OUTPATIENT
Start: 2020-12-30 | End: 2021-01-01

## 2020-12-30 RX ORDER — HEPARIN SODIUM 5000 [USP'U]/ML
5000 INJECTION INTRAVENOUS; SUBCUTANEOUS EVERY 8 HOURS
Refills: 0 | Status: DISCONTINUED | OUTPATIENT
Start: 2020-12-30 | End: 2021-01-01

## 2020-12-30 RX ORDER — INSULIN LISPRO 100/ML
VIAL (ML) SUBCUTANEOUS
Refills: 0 | Status: DISCONTINUED | OUTPATIENT
Start: 2020-12-30 | End: 2021-01-01

## 2020-12-30 RX ORDER — ZIPRASIDONE HYDROCHLORIDE 20 MG/1
80 CAPSULE ORAL EVERY 12 HOURS
Refills: 0 | Status: DISCONTINUED | OUTPATIENT
Start: 2020-12-30 | End: 2021-01-01

## 2020-12-30 RX ORDER — POLYETHYLENE GLYCOL 3350 17 G/17G
17 POWDER, FOR SOLUTION ORAL DAILY
Refills: 0 | Status: DISCONTINUED | OUTPATIENT
Start: 2020-12-30 | End: 2021-01-01

## 2020-12-30 RX ORDER — LOSARTAN POTASSIUM 100 MG/1
25 TABLET, FILM COATED ORAL DAILY
Refills: 0 | Status: DISCONTINUED | OUTPATIENT
Start: 2020-12-30 | End: 2020-12-30

## 2020-12-30 RX ORDER — DEXTROSE 50 % IN WATER 50 %
15 SYRINGE (ML) INTRAVENOUS ONCE
Refills: 0 | Status: DISCONTINUED | OUTPATIENT
Start: 2020-12-30 | End: 2021-01-01

## 2020-12-30 RX ORDER — SODIUM CHLORIDE 9 MG/ML
1000 INJECTION INTRAMUSCULAR; INTRAVENOUS; SUBCUTANEOUS
Refills: 0 | Status: DISCONTINUED | OUTPATIENT
Start: 2020-12-30 | End: 2020-12-31

## 2020-12-30 RX ORDER — METOPROLOL TARTRATE 50 MG
25 TABLET ORAL
Refills: 0 | Status: DISCONTINUED | OUTPATIENT
Start: 2020-12-30 | End: 2021-01-01

## 2020-12-30 RX ORDER — SODIUM CHLORIDE 9 MG/ML
500 INJECTION INTRAMUSCULAR; INTRAVENOUS; SUBCUTANEOUS
Refills: 0 | Status: DISCONTINUED | OUTPATIENT
Start: 2020-12-30 | End: 2020-12-30

## 2020-12-30 RX ORDER — DEXTROSE 50 % IN WATER 50 %
12.5 SYRINGE (ML) INTRAVENOUS ONCE
Refills: 0 | Status: DISCONTINUED | OUTPATIENT
Start: 2020-12-30 | End: 2021-01-01

## 2020-12-30 RX ORDER — ATORVASTATIN CALCIUM 80 MG/1
40 TABLET, FILM COATED ORAL AT BEDTIME
Refills: 0 | Status: DISCONTINUED | OUTPATIENT
Start: 2020-12-30 | End: 2021-01-01

## 2020-12-30 RX ORDER — SODIUM CHLORIDE 9 MG/ML
1000 INJECTION INTRAMUSCULAR; INTRAVENOUS; SUBCUTANEOUS ONCE
Refills: 0 | Status: COMPLETED | OUTPATIENT
Start: 2020-12-30 | End: 2020-12-30

## 2020-12-30 RX ORDER — ASPIRIN/CALCIUM CARB/MAGNESIUM 324 MG
81 TABLET ORAL ONCE
Refills: 0 | Status: COMPLETED | OUTPATIENT
Start: 2020-12-30 | End: 2020-12-30

## 2020-12-30 RX ORDER — GLUCAGON INJECTION, SOLUTION 0.5 MG/.1ML
1 INJECTION, SOLUTION SUBCUTANEOUS ONCE
Refills: 0 | Status: DISCONTINUED | OUTPATIENT
Start: 2020-12-30 | End: 2021-01-01

## 2020-12-30 RX ADMIN — SODIUM CHLORIDE 100 MILLILITER(S): 9 INJECTION INTRAMUSCULAR; INTRAVENOUS; SUBCUTANEOUS at 21:41

## 2020-12-30 RX ADMIN — ZIPRASIDONE HYDROCHLORIDE 80 MILLIGRAM(S): 20 CAPSULE ORAL at 18:58

## 2020-12-30 RX ADMIN — Medication 25 MILLIGRAM(S): at 18:58

## 2020-12-30 RX ADMIN — Medication 81 MILLIGRAM(S): at 12:11

## 2020-12-30 RX ADMIN — SODIUM CHLORIDE 1000 MILLILITER(S): 9 INJECTION INTRAMUSCULAR; INTRAVENOUS; SUBCUTANEOUS at 14:47

## 2020-12-30 RX ADMIN — Medication 1: at 22:35

## 2020-12-30 RX ADMIN — HEPARIN SODIUM 5000 UNIT(S): 5000 INJECTION INTRAVENOUS; SUBCUTANEOUS at 22:35

## 2020-12-30 RX ADMIN — SODIUM CHLORIDE 100 MILLILITER(S): 9 INJECTION INTRAMUSCULAR; INTRAVENOUS; SUBCUTANEOUS at 18:58

## 2020-12-30 NOTE — H&P ADULT - NSHPPHYSICALEXAM_GEN_ALL_CORE
Vital Signs Last 24 Hrs  T(C): 36.9 (30 Dec 2020 11:52), Max: 36.9 (30 Dec 2020 11:22)  T(F): 98.5 (30 Dec 2020 11:52), Max: 98.5 (30 Dec 2020 11:22)  HR: 73 (30 Dec 2020 14:53) (70 - 73)  BP: 106/62 (30 Dec 2020 14:53) (101/71 - 106/66)  BP(mean): --  RR: 18 (30 Dec 2020 14:53) (17 - 18)  SpO2: 99% (30 Dec 2020 14:53) (98% - 100%)    PHYSICAL EXAM:  GENERAL: NAD, well-developed, obese male  HEAD:  Atraumatic, Normocephalic  EYES: EOMI, PERRLA, conjunctiva and sclera clear  NECK: Supple, No JVD  CHEST/LUNG: Clear to auscultation bilaterally; No wheeze  HEART: Regular rate and rhythm; No murmurs, rubs, or gallops  ABDOMEN: Soft, Nontender, Nondistended; Bowel sounds present  EXTREMITIES:  2+ Peripheral Pulses, No clubbing, cyanosis, or edema  PSYCH: AAOx3  NEUROLOGY: non-focal  SKIN: No rashes or lesions Vital Signs Last 24 Hrs  T(C): 36.9 (30 Dec 2020 11:52), Max: 36.9 (30 Dec 2020 11:22)  T(F): 98.5 (30 Dec 2020 11:52), Max: 98.5 (30 Dec 2020 11:22)  HR: 73 (30 Dec 2020 14:53) (70 - 73)  BP: 106/62 (30 Dec 2020 14:53) (101/71 - 106/66)  BP(mean): --  RR: 18 (30 Dec 2020 14:53) (17 - 18)  SpO2: 99% (30 Dec 2020 14:53) (98% - 100%)    PHYSICAL EXAM:  GENERAL: NAD, well-developed, obese male  HEAD:  Atraumatic, Normocephalic  EYES: EOMI, PERRLA, conjunctiva and sclera clear  NECK: Supple, No JVD  CHEST/LUNG: Clear to auscultation bilaterally; No wheeze  HEART: Regular rate and rhythm; No murmurs, rubs, or gallops  ABDOMEN: Soft, Nontender, Nondistended; Bowel sounds present  EXTREMITIES:  2+ Peripheral Pulses, 5/5 strength   PSYCH: AAOx3  NEUROLOGY: non-focal, nystagmus on R eye, L eye strabismus, CN II-XII grossly intact, intact sensation in b/l upper extremities, b/l difficulty 2 point discrimination   SKIN: Multiple patches of brown discoloration on the b/l anterior legs, Multiple discolored toe nails with 1 black ulceration on the L foot 2nd digit,

## 2020-12-30 NOTE — ED ADULT NURSE NOTE - NSIMPLEMENTINTERV_GEN_ALL_ED
Implemented All Fall Risk Interventions:  [place] to call system. Call bell, personal items and telephone within reach. Instruct patient to call for assistance. Room bathroom lighting operational. Non-slip footwear when patient is off stretcher. Physically safe environment: no spills, clutter or unnecessary equipment. Stretcher in lowest position, wheels locked, appropriate side rails in place. Provide visual cue, wrist band, yellow gown, etc. Monitor gait and stability. Monitor for mental status changes and reorient to person, place, and time. Review medications for side effects contributing to fall risk. Reinforce activity limits and safety measures with patient and family.

## 2020-12-30 NOTE — ED PROVIDER NOTE - OBJECTIVE STATEMENT
60y m PMHx DM2 w/peripheral neuropathy, HTN, CHAYITO, bipolar 1 on lithium p/w left arm pain. Pt reports 2 months of intermittent left shoulder pain radiating down his arm, associated with exercise and resolves with rest. was at his nephrologist today where the pain began (3hrs ago) and was sent to the ED for eval. follows with Dr Mckenna - last stress Nov 2019 reported normal, takes 81mg daily. Reports noncompliance with CPAP, reports high salt/cholesterol diet. Denies SOB, cough, fever, chills, neck pain, dizziness, LOC, abd pain, NVDC. Never smoker 60y m PMHx DM2 w/peripheral neuropathy, HTN, CHAYITO, bipolar 1 on lithium p/w left arm pain. Pt reports 2 months of intermittent left shoulder pain radiating down his arm, associated with exercise and resolves with rest. was at his nephrologist today where the pain began (3hrs ago) and was sent to the ED for eval. does not currently have pain. he also reports numbness/tingling in his left hand. follows with Dr Mckenna - last stress Nov 2019 reported normal, takes 81mg daily. Reports noncompliance with CPAP, reports high salt/cholesterol diet. Denies SOB, cough, fever, chills, neck pain, dizziness, LOC, abd pain, NVDC. Never smoker

## 2020-12-30 NOTE — ED PROVIDER NOTE - ATTENDING CONTRIBUTION TO CARE
RGUJRAL 59yo male hx listed presents with L arm pain x 2 months. States pain is on the left side of his neck radiates down to his left arm. Symptoms are at rest and with exertion and self limiting. Denies any chest pain, sob, palp, cough, uri. Pt went to his nephrologist today who sent him here for evaluation. Denies any HA, change in vision, trauma or falls. Pain is not reproducible with movement.   On exam, Patient is awake, alert and oriented x 3.  Patient is well appearing and in no acute distress.  NCAT, morbid obese  Neck is supple, No posterior midline tenderness.  L shoulder non tender, + ROM + radial pulse nml sensation.   Lungs are CTA B/L,+S1S2 no murmurs,  Abdomen:Soft nd/nt+bs no rebound or guarding.  Extremity no edema or calf tender.  Skin with no rash.  Check Labs, EKG. DDx ro ACS with exertional symptoms, radiculopathy.

## 2020-12-30 NOTE — PATIENT PROFILE ADULT - VISION (WITH CORRECTIVE LENSES IF THE PATIENT USUALLY WEARS THEM):
R eye: lazy eye./Normal vision: sees adequately in most situations; can see medication labels, newsprint

## 2020-12-30 NOTE — H&P ADULT - PROBLEM SELECTOR PLAN 2
Home regimen: Geodon, Linzess, lithium  - psych to be consulted, nikia recs  - c/w home Geodon and Linzess Home regimen: ziprasadone lithium  - psych to be consulted, nikia recs  - c/w home Geodon and Linzess  - Denies any SI/HI reports he would notify provider with any harming thoughts Home regimen: ziprasadone Hold lithium  - psych to be consulted, nikia recs  - c/w home ziprasadone   - Denies any SI/HI reports he would notify provider with any harming thoughts

## 2020-12-30 NOTE — CONSULT NOTE ADULT - PROBLEM SELECTOR RECOMMENDATION 9
-hold lithium  -would repeat bmp and Li now  -give crystalloid bolus  -coordinate with pyschiatry, but in light of CKD, consider holding lithium forever and transition to alternative treatment  -if the patient is on a sulfonylurea, consider discontinuing that as well (they are also renally excreted)  –avoid nephrotoxic medications  -otherwise supportive care  -rest of care as per primary team.

## 2020-12-30 NOTE — H&P ADULT - HISTORY OF PRESENT ILLNESS
60y m PMHx DM2 w/peripheral neuropathy, HTN, CHAYITO, bipolar 1 on lithium p/w left arm pain. Pt reports 2 months of intermittent left shoulder pain radiating down his arm, associated with exercise and resolves with rest. was at his nephrologist today where the pain began (3hrs ago) and was sent to the ED for eval. does not currently have pain. he also reports numbness/tingling in his left hand. follows with Dr Mckenna - last stress Nov 2019 reported normal, takes 81mg daily. Reports noncompliance with CPAP, reports high salt/cholesterol diet. Denies SOB, cough, fever, chills, neck pain, dizziness, LOC, abd pain, NVDC. Never smoker.    ED vitals stable. Labs with SCr- up to 1.73, and elevated lithium level of 1.9. Trop 10 --> 9. S/p 1L IVF, admitted to medicine for further management.  60y m PMHx DM2 w/peripheral neuropathy, HTN, CHAYITO (not compliant with CPAP at home), bipolar 1 on lithium p/w left arm pain. Pt reports 2 months history of intermittent left shoulder pain radiating down his arm with associated numbness in the hand, which resolves with rest. He went to see his nephrologist today, and was told to come to ED for further evaluation due to the arm pain. The pain has currently resolved. Denies trauma/triggering event.    Of note, patient had 1 episode of elevated lithium level many years ago. No recent changes to medications/new medication. Also, saw cardiologist Dr Mckenna regularly, last stress Nov 2019 reported normal. NO SOB/dyspnea on exertion. Ambulatory without assistance. Denies SOB, cough, fever, chills, neck pain, dizziness, LOC, abd pain, NVDC, nor urinary symptoms. Never smoker, no ETOH/ilicit drug use.     ED vitals stable. Labs with SCr- up to 1.73, and elevated lithium level of 1.9. Trop 10 --> 9. S/p 1L IVF, admitted to medicine for further management.  60y m PMHx DM2 w/peripheral neuropathy, HTN, CHAYITO (not compliant with CPAP at home), bipolar 1 on lithium p/w left arm pain. This pain ranges from mild to unbearable depending on the moment although it always is self limited. Patient reports 2 months history of intermittent left shoulder pain radiating down his arm with associated numbness in the hand, which resolves with rest. He went to see his nephrologist today, and was told to come to ED for further evaluation due to the arm pain. The pain has currently resolved. Denies trauma/triggering event.  Of note, patient had 1 episode of elevated lithium level many years ago. No recent changes to medications/new medication. Also, saw cardiologist Dr Mckenna regularly, last stress Nov 2019 reported normal. He also received colonoscopy w/ no remarkable findings in the last year. Patient also notes having b/l lower extremity pain. He has known neuropathy, reports his pain is in the feet and ankles and is worse with initiating walking and standing, but improves with exertion. He also reports occasional dyspnea on exertion or with standing, but is still able to walk several miles just requires intermittent breaks. This is in the setting of worsening congestion for the past 3 months. Ambulatory without assistance. Denies SOB, cough, fever, chills, neck pain, dizziness, LOC, abd pain, NVDC, nor urinary symptoms. Never smoker, no ETOH/ilicit drug use.     In the ED vitals stable. Labs with SCr- up to 1.73, and elevated lithium level of 1.9. Trop 10 --> 9. S/p 1L IVF, admitted to medicine for further management.  60y m PMHx DM2 w/peripheral neuropathy, HTN, RA (no treatment) CHAYITO (not compliant with CPAP at home), bipolar 1 on lithium p/w left arm pain. This pain ranges from mild to unbearable depending on the moment although it always is self limited. Patient reports 2 months history of intermittent left shoulder pain radiating down his arm with associated numbness in the hand, which resolves with rest. He went to see his nephrologist today, and was told to come to ED for further evaluation due to the arm pain. The pain has currently resolved. Denies trauma/triggering event.  Of note, patient had 1 episode of elevated lithium level many years ago. No recent changes to medications/new medication. Also, saw cardiologist Dr Mckenna regularly, last stress Nov 2019 reported normal. He also received colonoscopy w/ no remarkable findings in the last year. Patient also notes having b/l lower extremity pain. He has known neuropathy, reports his pain is in the feet and ankles and is worse with initiating walking and standing, but improves with exertion. He also reports occasional dyspnea on exertion or with standing, but is still able to walk several miles just requires intermittent breaks. This is in the setting of worsening congestion for the past 3 months. Ambulatory without assistance. Denies SOB, cough, fever, chills, neck pain, dizziness, LOC, abd pain, NVDC, nor urinary symptoms. Never smoker, no ETOH/ilicit drug use.     In the ED vitals stable. Labs with SCr- up to 1.73, and elevated lithium level of 1.9. Trop 10 --> 9. S/p 1L IVF, admitted to medicine for further management.

## 2020-12-30 NOTE — PHYSICAL EXAM
[General Appearance - Alert] : alert [General Appearance - In No Acute Distress] : in no acute distress [General Appearance - Well Nourished] : well nourished [Sclera] : the sclera and conjunctiva were normal [PERRL With Normal Accommodation] : pupils were equal in size, round, and reactive to light [Extraocular Movements] : extraocular movements were intact [Outer Ear] : the ears and nose were normal in appearance [Oropharynx] : the oropharynx was normal [Neck Appearance] : the appearance of the neck was normal [Neck Cervical Mass (___cm)] : no neck mass was observed [Jugular Venous Distention Increased] : there was no jugular-venous distention [Thyroid Diffuse Enlargement] : the thyroid was not enlarged [Thyroid Nodule] : there were no palpable thyroid nodules [Auscultation Breath Sounds / Voice Sounds] : lungs were clear to auscultation bilaterally [Heart Rate And Rhythm] : heart rate was normal and rhythm regular [Heart Sounds] : normal S1 and S2 [Heart Sounds Gallop] : no gallops [Murmurs] : no murmurs [Heart Sounds Pericardial Friction Rub] : no pericardial rub [Full Pulse] : the pedal pulses are present [Edema] : there was no peripheral edema [Bowel Sounds] : normal bowel sounds [Abdomen Soft] : soft [Abdomen Tenderness] : non-tender [Abdomen Mass (___ Cm)] : no abdominal mass palpated [No CVA Tenderness] : no ~M costovertebral angle tenderness [No Spinal Tenderness] : no spinal tenderness [Abnormal Walk] : normal gait [Nail Clubbing] : no clubbing  or cyanosis of the fingernails [Musculoskeletal - Swelling] : no joint swelling seen [Motor Tone] : muscle strength and tone were normal [Skin Color & Pigmentation] : normal skin color and pigmentation [Skin Turgor] : normal skin turgor [] : no rash [Deep Tendon Reflexes (DTR)] : deep tendon reflexes were 2+ and symmetric [Sensation] : the sensory exam was normal to light touch and pinprick [No Focal Deficits] : no focal deficits [Oriented To Time, Place, And Person] : oriented to person, place, and time [Impaired Insight] : insight and judgment were intact [Affect] : the affect was normal

## 2020-12-30 NOTE — H&P ADULT - ASSESSMENT
60y m PMHx DM2 w/peripheral neuropathy, HTN, CHAYITO, bipolar 1 on lithium p/w left arm pain. Noted with elevated lithium level, admitted for management of lithium toxicity.  60y m PMHx DM2 w/peripheral neuropathy, HTN, CHAYITO, bipolar 1, CKD on lithium p/w left arm pain. Noted with elevated lithium level, admitted for management of lithium toxicity in the setting of CKD.  60y m PMHx DM2 w/peripheral neuropathy, HTN, CHAYITO, bipolar 1, CKD on lithium presenting with left arm pain possible 2/2 lithium toxicity vs. diabetic neuropathy vs. MSK. Treated w/ fluids and will recheck levels, plan for psych evaluation to optimize patients regimen.

## 2020-12-30 NOTE — H&P ADULT - PROBLEM SELECTOR PLAN 3
Left arm pain, resolved currently, likely MSK in nature per physical exam.  - unlikely cardiology, as trop negative  - will monitor on tele for 1 day, and dc if no events  - treatment of neuropathy as below Left arm pain, resolved currently, neuropathic in origin DM neuropathy vs. Lithium toxicity vs. MSK.  - unlikely cardiology, as trop negative  - will monitor on tele for 1 day, and dc if no events  - treatment of neuropathy as below

## 2020-12-30 NOTE — H&P ADULT - PROBLEM SELECTOR PLAN 4
Scr 1.73, baseline ~1.6-1.8. s/p 1L Bolus in the ED. Likely 2/2 DMT2 & HTN  - daily BMP  - avoid nephrotoxic medications  - renally dose all medication

## 2020-12-30 NOTE — H&P ADULT - PROBLEM SELECTOR PLAN 8
- on home Losartan  - /71, will hold for now given lithium toxicity in the setting of CKD, restart PRN - c/w home Losartan

## 2020-12-30 NOTE — H&P ADULT - NSHPREVIEWOFSYSTEMS_GEN_ALL_CORE
REVIEW OF SYSTEMS:    CONSTITUTIONAL: No weakness, fevers or chills  EYES: No vertigo or throat pain  ENT: No visual changes, eye pain  MOUTH: moist maureen mucosal, no mouth ulcers  NECK: No pain or stiffness  RESPIRATORY: No cough, wheezing, hemoptysis; No shortness of breath  CARDIOVASCULAR: No chest pain or palpitations  GASTROINTESTINAL: No abdominal or epigastric pain. No nausea, vomiting, or hematemesis; No diarrhea or constipation. No melena or hematochezia.  GENITOURINARY: No dysuria, frequency or hematuria  NEUROLOGICAL: No numbness or weakness  SKIN: No itching, rashes REVIEW OF SYSTEMS:    CONSTITUTIONAL: No weakness, fevers or chills  EYES: No vertigo or throat pain  ENT: No visual changes, eye pain  MOUTH: moist maureen mucosal, no mouth ulcers  NECK: No pain or stiffness  RESPIRATORY: No cough, wheezing, hemoptysis; No shortness of breath  CARDIOVASCULAR: No chest pain or palpitations  GASTROINTESTINAL: No abdominal or epigastric pain. No nausea, vomiting, or hematemesis; No diarrhea or constipation. No melena or hematochezia.  MSK: + Left arm pain/numbness  GENITOURINARY: No dysuria, frequency or hematuria  NEUROLOGICAL: No numbness or weakness  SKIN: No itching, rashes REVIEW OF SYSTEMS:  CONSTITUTIONAL: No weakness, fevers or chills  EYES: No vertigo or throat pain  ENT: No visual changes, eye pain  MOUTH: moist maureen mucosal, no mouth ulcers  NECK: No pain or stiffness  RESPIRATORY: No cough, wheezing, hemoptysis; No shortness of breath  CARDIOVASCULAR: No chest pain or palpitations  GASTROINTESTINAL: No abdominal or epigastric pain. No nausea, vomiting, or hematemesis; No diarrhea or constipation. No melena or hematochezia.  MSK: + Left arm pain/numbness  GENITOURINARY: No dysuria, frequency or hematuria  NEUROLOGICAL: No numbness or weakness  SKIN: No itching, rashes REVIEW OF SYSTEMS:  CONSTITUTIONAL: No weakness, fevers or chills  EYES: No vertigo or throat pain  ENT: No visual changes, eye pain  MOUTH: moist maureen mucosal, no mouth ulcers  NECK: No pain or stiffness  RESPIRATORY: No cough, wheezing, hemoptysis; +KEE  CARDIOVASCULAR: No chest pain or palpitations  GASTROINTESTINAL: No abdominal or epigastric pain. No nausea, vomiting, or hematemesis; No diarrhea or constipation. No melena or hematochezia.  MSK: + Left arm pain/numbness  GENITOURINARY: No dysuria, frequency or hematuria  NEUROLOGICAL: No numbness or weakness, left arm pain, b/l lower extremity pain   SKIN: No itching, rashes

## 2020-12-30 NOTE — CONSULT NOTE ADULT - ATTENDING COMMENTS
MD Byers:  patient seen and evaluated with the fellow.  I was present for key portions of the history & physical, and I agree with the impression & plan.

## 2020-12-30 NOTE — CONSULT NOTE ADULT - ASSESSMENT
The patient does not have signs of Lithium toxicity, although his lithium level is elevated. The distribution time of Li is 6 hours so the level is slightly pre-distrbution. Nevertheless, Li is renally excreted and the patient has CKD, so his elimination is likely impaired. Therefore, we would recommend holding lithium until levels normalize

## 2020-12-30 NOTE — H&P ADULT - NSHPSOCIALHISTORY_GEN_ALL_CORE
Lives alone. Denies smoking, ETOH nor ilicit drug use.   Ambulatory without cane/walker.  Reports difficulty getting up at baseline Lives alone in government subsidized housing, Denies smoking, ETOH nor illicit drug use.   Ambulatory without cane/walker.  Reports difficulty getting up at baseline   w/ 1 son, both of whom he is estranged

## 2020-12-30 NOTE — H&P ADULT - ATTENDING COMMENTS
Patient presented with left arm pain possibly in the setting of progression of neuropathy and lithium toxicity in the setting of CKD- hold lithium, give IVF and repeat bmp now and in the am. toxicology following. psych consult in the am.    Sidra Mccray D.O.  Hospitalist Pager # [phone]

## 2020-12-30 NOTE — ED ADULT NURSE NOTE - OBJECTIVE STATEMENT
Patient is a 60 yr old male with a PMH of peripheral neuropathy/bipolar/HTL/HTN/DM2 who presents to the ED via EMS due to left arm pain. Patient states he has been having this left arm pain on and off for two months sometimes with exertion. Patient states he was at his nephrologists office today when the pain started again and was sent to the ED. Patient denies any current pain now and it never radiated anywhere. EMS gave 162 aspirin on the way here. Upon assessment, patient is AOx4, afebrile, breathing spontaneously on on RA, NSR on CM, abdomen soft/non tender to palpation, +pulses, skin intact, +pulses and denies n/v/d/f/chills/SOB/cough/headache/covid contact. Provider assessed at bedside, heplock placed with labs drawn/sent, meds ordered given, call bell at bedside and will continue to monitor.

## 2020-12-30 NOTE — H&P ADULT - NSICDXFAMILYHX_GEN_ALL_CORE_FT
FAMILY HISTORY:  Mother  Still living? Unknown  FH: CAD (coronary artery disease), Age at diagnosis: Age Unknown

## 2020-12-30 NOTE — H&P ADULT - NSHPLABSRESULTS_GEN_ALL_CORE
13.1   9.24  )-----------( 158      ( 30 Dec 2020 12:15 )             41.1       12-30    138  |  107  |  26<H>  ----------------------------<  122<H>  4.5   |  20<L>  |  1.73<H>    Ca    10.1      30 Dec 2020 12:15  Mg     2.5     12-30    TPro  7.4  /  Alb  4.0  /  TBili  0.4  /  DBili  x   /  AST  26  /  ALT  25  /  AlkPhos  113  12-30        Lactate Trend      CAPILLARY BLOOD GLUCOSE      rad< from: Xray Chest 1 View AP/PA (12.30.20 @ 12:53) >    Impression:    The heart is enlarged. The left costophrenic angle is not included in this study. Otherwise the lungs appear to be clear. No pleural effusion on the right is seen. No acute bony pathology could be identified.    < end of copied text >    EKG: 13.1   9.24  )-----------( 158      ( 30 Dec 2020 12:15 )             41.1       12-30    138  |  107  |  26<H>  ----------------------------<  122<H>  4.5   |  20<L>  |  1.73<H>    Ca    10.1      30 Dec 2020 12:15  Mg     2.5     12-30    TPro  7.4  /  Alb  4.0  /  TBili  0.4  /  DBili  x   /  AST  26  /  ALT  25  /  AlkPhos  113  12-30        Lactate Trend      CAPILLARY BLOOD GLUCOSE      rad< from: Xray Chest 1 View AP/PA (12.30.20 @ 12:53) >    Impression:    The heart is enlarged. The left costophrenic angle is not included in this study. Otherwise the lungs appear to be clear. No pleural effusion on the right is seen. No acute bony pathology could be identified.    < end of copied text >    EKG: NSR 71, no acute ST changes

## 2020-12-30 NOTE — ED PROVIDER NOTE - PROGRESS NOTE DETAILS
Patient's lithium level noted, spoke to hospitalist. Toxicology consult appreciated with IV fluid bolus and hold lithium at this time. Pt reassessed, oriented x 3, neuro intact. Continue to monitor. RGUJRAL

## 2020-12-30 NOTE — H&P ADULT - PROBLEM SELECTOR PLAN 9
DVT: HSQ  Diet: Consistent carb  Dispo: Return to facility - c/w home atorvastatin - c/w home atorvastatin  - c/w home Vascepa

## 2020-12-30 NOTE — CONSULT NOTE ADULT - SUBJECTIVE AND OBJECTIVE BOX
This is a remote toxicology consult note. Information was obtained from the chart and via telecommunication with physicians and/or other staff at the treating facility. If a physical exam is documented, the results of the exam was relayed to the consulting toxicology service and subsequently relevant to a toxicologic assessment and treatment recommendations.  Furthermore, the toxicologic assessment and recommendations were given in real time at the initial time of consult.     HPI:  The patient is a 60 -year-old male with past medical history of dm, htn, bpd, who presented to the emergency department left shoulder pain, other than that asymptomatic. The patient denies any intentional ingestion or overdose. Primary team is admitting patient for elevated Li and possible placement given a tenuous living situation.   NIRMALA: 0700  TOP: 1200  MEDS: lurasidone, metoprolol,   Wt:  VS: t98.5 106/71 p71 rr17 100RA  PE (as per hospital staff): pupils normal for room, no rigidity, neuro intact, but chronic tremor, reflexes intact, ambulatory without ataxia, mucous membranes normal, skin normal.   EKG: nsr [id number]   LABS: li-1.9 ; cbc wnl ; bmp 138/4.5/107/20/26/1.73<122 CA10.1 trop=10, ast26 alt25 mag2.5

## 2020-12-30 NOTE — H&P ADULT - NSICDXPASTMEDICALHX_GEN_ALL_CORE_FT
PAST MEDICAL HISTORY:  Bipolar 1 disorder on Lithium    Diabetic neuropathy bilateral feet, ankles    HLD (hyperlipidemia)     Hypertension     Obesity     CHAYITO (obstructive sleep apnea) diagnosed >25 years ago, non-compliant with CPAP    Primary osteoarthritis of left knee     T2DM (type 2 diabetes mellitus)

## 2020-12-30 NOTE — H&P ADULT - PROBLEM SELECTOR PLAN 7
Home regimen:   - A1C in AM  - ISS while inpatient Home regimen: Vascepa and Repaglinide  - A1C in AM  - ISS while inpatient Home regimen: Repaglinide, Trijenta   - A1C in AM  - ISS while inpatient Home regimen: Repaglinide, D/c Trijenta   - A1C in AM  - ISS while inpatient

## 2020-12-30 NOTE — H&P ADULT - PROBLEM SELECTOR PLAN 1
Li up to 1.9, s/p 1L bolus  - f/u repeat BMP and lithium level  - to consult psych for alternate medication given toxicity  - c/w mIVF PRN  - day BMP and lithium level Li up to 1.9, s/p 1L bolus  - f/u repeat BMP and lithium level  - plan to consult psych to follow inpatient as well as for alternate medication given toxicity  - c/w mIVF if repeat remains elevated  - day BMP and lithium level Li up to 1.9, s/p 1L bolus  - f/u repeat BMP and lithium level  - plan to consult psych to follow inpatient as well as for alternate medication given toxicity  - c/w mIVF if repeat remains elevated  - toxicology following, nikia recs  - day BMP and lithium level Li up to 1.9, s/p 1L bolus  - f/u repeat BMP and lithium level  - plan to consult psych to follow inpatient for alternate medication given toxicity  - c/w mIVF if repeat remains elevated  - toxicology following, nikia recs  - day BMP and lithium level Li up to 1.9, s/p 1L bolus  - f/u repeat BMP and lithium level  - plan to consult psych to follow inpatient for alternate medication given toxicity  - c/w IVF if repeat remains elevated  - toxicology following, nikia recs  - day BMP and lithium level

## 2020-12-31 ENCOUNTER — APPOINTMENT (OUTPATIENT)
Dept: UROLOGY | Facility: CLINIC | Age: 60
End: 2020-12-31

## 2020-12-31 ENCOUNTER — TRANSCRIPTION ENCOUNTER (OUTPATIENT)
Age: 60
End: 2020-12-31

## 2020-12-31 LAB
A1C WITH ESTIMATED AVERAGE GLUCOSE RESULT: 7.2 % — HIGH (ref 4–5.6)
ALBUMIN SERPL ELPH-MCNC: 4 G/DL — SIGNIFICANT CHANGE UP (ref 3.3–5)
ALP SERPL-CCNC: 114 U/L — SIGNIFICANT CHANGE UP (ref 40–120)
ALT FLD-CCNC: 27 U/L — SIGNIFICANT CHANGE UP (ref 10–45)
ANION GAP SERPL CALC-SCNC: 9 MMOL/L — SIGNIFICANT CHANGE UP (ref 5–17)
AST SERPL-CCNC: 20 U/L — SIGNIFICANT CHANGE UP (ref 10–40)
BILIRUB SERPL-MCNC: 0.4 MG/DL — SIGNIFICANT CHANGE UP (ref 0.2–1.2)
BUN SERPL-MCNC: 26 MG/DL — HIGH (ref 7–23)
CALCIUM SERPL-MCNC: 9.6 MG/DL — SIGNIFICANT CHANGE UP (ref 8.4–10.5)
CHLORIDE SERPL-SCNC: 107 MMOL/L — SIGNIFICANT CHANGE UP (ref 96–108)
CO2 SERPL-SCNC: 22 MMOL/L — SIGNIFICANT CHANGE UP (ref 22–31)
CREAT SERPL-MCNC: 1.74 MG/DL — HIGH (ref 0.5–1.3)
ESTIMATED AVERAGE GLUCOSE: 160 MG/DL — HIGH (ref 68–114)
GLUCOSE SERPL-MCNC: 145 MG/DL — HIGH (ref 70–99)
MAGNESIUM SERPL-MCNC: 2.2 MG/DL — SIGNIFICANT CHANGE UP (ref 1.6–2.6)
PHOSPHATE SERPL-MCNC: 3.1 MG/DL — SIGNIFICANT CHANGE UP (ref 2.5–4.5)
POTASSIUM SERPL-MCNC: 3.9 MMOL/L — SIGNIFICANT CHANGE UP (ref 3.5–5.3)
POTASSIUM SERPL-SCNC: 3.9 MMOL/L — SIGNIFICANT CHANGE UP (ref 3.5–5.3)
PROT SERPL-MCNC: 7 G/DL — SIGNIFICANT CHANGE UP (ref 6–8.3)
SODIUM SERPL-SCNC: 138 MMOL/L — SIGNIFICANT CHANGE UP (ref 135–145)

## 2020-12-31 PROCEDURE — 90792 PSYCH DIAG EVAL W/MED SRVCS: CPT

## 2020-12-31 PROCEDURE — 99233 SBSQ HOSP IP/OBS HIGH 50: CPT | Mod: GC

## 2020-12-31 RX ORDER — SODIUM CHLORIDE 9 MG/ML
1000 INJECTION INTRAMUSCULAR; INTRAVENOUS; SUBCUTANEOUS
Refills: 0 | Status: DISCONTINUED | OUTPATIENT
Start: 2020-12-31 | End: 2021-01-01

## 2020-12-31 RX ORDER — VALPROIC ACID (AS SODIUM SALT) 250 MG/5ML
250 SOLUTION, ORAL ORAL
Refills: 0 | Status: DISCONTINUED | OUTPATIENT
Start: 2020-12-31 | End: 2021-01-01

## 2020-12-31 RX ADMIN — FINASTERIDE 5 MILLIGRAM(S): 5 TABLET, FILM COATED ORAL at 11:14

## 2020-12-31 RX ADMIN — Medication 2: at 22:12

## 2020-12-31 RX ADMIN — Medication 250 MILLIGRAM(S): at 13:15

## 2020-12-31 RX ADMIN — ZIPRASIDONE HYDROCHLORIDE 80 MILLIGRAM(S): 20 CAPSULE ORAL at 18:34

## 2020-12-31 RX ADMIN — HEPARIN SODIUM 5000 UNIT(S): 5000 INJECTION INTRAVENOUS; SUBCUTANEOUS at 05:01

## 2020-12-31 RX ADMIN — Medication 25 MILLIGRAM(S): at 18:33

## 2020-12-31 RX ADMIN — Medication 1: at 08:58

## 2020-12-31 RX ADMIN — LOSARTAN POTASSIUM 25 MILLIGRAM(S): 100 TABLET, FILM COATED ORAL at 05:01

## 2020-12-31 RX ADMIN — POLYETHYLENE GLYCOL 3350 17 GRAM(S): 17 POWDER, FOR SOLUTION ORAL at 11:13

## 2020-12-31 RX ADMIN — ATORVASTATIN CALCIUM 40 MILLIGRAM(S): 80 TABLET, FILM COATED ORAL at 22:13

## 2020-12-31 RX ADMIN — Medication 250 MILLIGRAM(S): at 22:13

## 2020-12-31 RX ADMIN — HEPARIN SODIUM 5000 UNIT(S): 5000 INJECTION INTRAVENOUS; SUBCUTANEOUS at 22:13

## 2020-12-31 RX ADMIN — ZIPRASIDONE HYDROCHLORIDE 80 MILLIGRAM(S): 20 CAPSULE ORAL at 05:01

## 2020-12-31 RX ADMIN — HEPARIN SODIUM 5000 UNIT(S): 5000 INJECTION INTRAVENOUS; SUBCUTANEOUS at 13:15

## 2020-12-31 RX ADMIN — Medication 81 MILLIGRAM(S): at 11:13

## 2020-12-31 RX ADMIN — SODIUM CHLORIDE 100 MILLILITER(S): 9 INJECTION INTRAMUSCULAR; INTRAVENOUS; SUBCUTANEOUS at 13:16

## 2020-12-31 RX ADMIN — Medication 25 MILLIGRAM(S): at 05:01

## 2020-12-31 RX ADMIN — Medication 2: at 12:27

## 2020-12-31 NOTE — BEHAVIORAL HEALTH ASSESSMENT NOTE - CASE SUMMARY
Pt is a 59 y/o single man domiciled, with help from a supportive apartment program, past psychiatric hx of bipolar dx with four past psychiatric hospitalizations (most recently in 1990 at [facility]), no suicide attempts, no SIB, single, unemployed, no substance use, no legal issues, PMHx DM2 w/peripheral neuropathy, HTN, CHAYITO, bipolar 1 on lithium p/w left arm pain. Pt is domiciled in apartment through Independent living ([facility]). pt was sent in from nephrology appointment due to shoulder pain. Consult placed for med management in context of Lithium toxicity      pt was calm and cooperative. thought process was linear, denies psychiatric symptoms and is currently stable. pt is found to have elevated lithium at 1.8 and worsening renal function.   PT was able to discuss concerns about his elevated lithium level and its effect is kidney function. Patient and also talked about alternatives to lithium such as Depakote and agreed with this plan.  He currently has no acute psychiatric symptoms and does not meed criteria for transfer to an inpatient psychiatric unit.  He is motivated to continue his outpt psychiatric care after discharge.

## 2020-12-31 NOTE — PROGRESS NOTE ADULT - ASSESSMENT
60y m PMHx DM2 w/peripheral neuropathy, HTN, CHAYITO, bipolar 1, CKD on lithium presenting with left arm pain possible 2/2 lithium toxicity vs. diabetic neuropathy vs. MSK. Treated w/ fluids and will recheck levels, plan for psych evaluation to optimize patients regimen.

## 2020-12-31 NOTE — PROGRESS NOTE ADULT - PROBLEM SELECTOR PLAN 2
Home regimen: ziprasadone Hold lithium  - psych to be consulted, nikia recs  - c/w home ziprasadone   - Denies any SI/HI reports he would notify provider with any harming thoughts

## 2020-12-31 NOTE — PHYSICAL THERAPY INITIAL EVALUATION ADULT - PRECAUTIONS/LIMITATIONS, REHAB EVAL
Of note, patient had 1 episode of elevated lithium level many years ago. No recent changes to medications/new medication. He also received colonoscopy w/ no remarkable findings in the last year. Patient also notes having b/l lower extremity pain. He has known neuropathy, reports his pain is in the feet and ankles and is worse with initiating walking and standing, but improves with exertion. He also reports occasional dyspnea on exertion or with standing, but is still able to walk several miles just requires intermittent breaks. This is in the setting of worsening congestion for the past 3 months. Ambulatory without assistance. Of note, patient had 1 episode of elevated lithium level many years ago. No recent changes to medications/new medication. He also received colonoscopy w/ no remarkable findings in the last year. Patient also notes having b/l lower extremity pain. He has known neuropathy, reports his pain is in the feet and ankles and is worse with initiating walking and standing, but improves with exertion. He also reports occasional dyspnea on exertion or with standing, but is still able to walk several miles just requires intermittent breaks. This is in the setting of worsening congestion for the past 3 months. Ambulatory without assistance./no known precautions/limitations

## 2020-12-31 NOTE — PROGRESS NOTE ADULT - PROBLEM SELECTOR PLAN 1
Li up to 1.9, s/p 1L bolus  - f/u repeat BMP and lithium level  - plan to consult psych to follow inpatient for alternate medication given toxicity  - c/w IVF if repeat remains elevated  - toxicology following, nikia recs  - day BMP and lithium level Li up to 1.9, s/p 1L bolus and downtrended to 1.8.   - Behavioral health consult   - will start depako  - c/w maintenance fluids   - toxicology following, nikia recs  - day BMP and lithium level

## 2020-12-31 NOTE — DISCHARGE NOTE PROVIDER - NSDCCPCAREPLAN_GEN_ALL_CORE_FT
PRINCIPAL DISCHARGE DIAGNOSIS  Diagnosis: Left arm pain  Assessment and Plan of Treatment: You came to the hospital with left arm pain occuring for 2 months that had been getting more severe. You heart workup was negative for any acute cardiac causes. You were found to have elevated levels of your lithium likely due to your chronic kidney disease. Toxicology team was consulted. You were given fluids and your lithium levels normalized. You were seen by Psychiatry for alteranative second medication for your bipolar disorder. They recommended Valproate also known as Depakote. You were started on the medication in the hospital and had no side effects.   Please follow up with your outpatient Psychiatris within 2 weeks of discharge for lab work and the change of medication for your bipolar disease. If you develop chest pain, shortness of breath or any concerning symptoms immediately seek care from your healthcare provider.

## 2020-12-31 NOTE — BEHAVIORAL HEALTH ASSESSMENT NOTE - NSBHCHARTREVIEWINVESTIGATE_PSY_A_CORE FT
Ventricular Rate 73 BPM    Atrial Rate 73 BPM    P-R Interval 180 ms    QRS Duration 114 ms    Q-T Interval 442 ms    QTC Calculation(Bazett) 486 ms    P Axis 35 degrees    R Axis -3 degrees    T Axis 0 degrees    Diagnosis Line NORMAL SINUS RHYTHM  MINIMAL VOLTAGE CRITERIA FOR LVH, MAY BE NORMAL VARIANT  INFERIOR INFARCT (CITED ON OR BEFORE 08-OCT-2002)  POOR R WAVE PROGRESSION  ABNORMAL ECG  WHEN COMPARED WITH ECG OF 13-FEB-2020 11:24,  NO SIGNIFICANT CHANGE WAS FOUND  Confirmed by JESUS SOLER,NGUYEN ALBERTO ([id number]) on 12/31/2020 9:06:47 AM

## 2020-12-31 NOTE — PROGRESS NOTE ADULT - SUBJECTIVE AND OBJECTIVE BOX
Johnny Minaya, PGY-1  Internal Medicine  Pager: KQ - [phone] and [phone] - [phone]     PROGRESS NOTE:    ID #:     Patient is a 60y old  Male who presents with a chief complaint of LYNDSEY, elevated lithium level (30 Dec 2020 14:59)      MAJOR INTERVAL HOSPITAL EVENTS:     SUBJECTIVE / OVERNIGHT EVENTS: No acute overnight events.     REVIEW OF SYSTEMS:  CONSTITUTIONAL: No weakness, fevers or chills  EYES/ENT: No visual changes;  No vertigo or throat pain   NECK: No pain or stiffness  RESPIRATORY: No cough, wheezing, hemoptysis; No shortness of breath  CARDIOVASCULAR: No chest pain or palpitations  GASTROINTESTINAL: No abdominal or epigastric pain. No nausea, vomiting, or hematemesis; No diarrhea or constipation. No melena or hematochezia.  GENITOURINARY: No dysuria, frequency or hematuria  NEUROLOGICAL: No numbness or weakness  SKIN: No itching, burning, rashes, or lesions   All other review of systems is negative unless indicated above.    MEDICATIONS  (STANDING):  aspirin  chewable 81 milliGRAM(s) Oral daily  atorvastatin 40 milliGRAM(s) Oral at bedtime  dextrose 40% Gel 15 Gram(s) Oral once  dextrose 5%. 1000 milliLiter(s) (50 mL/Hr) IV Continuous <Continuous>  dextrose 5%. 1000 milliLiter(s) (100 mL/Hr) IV Continuous <Continuous>  dextrose 50% Injectable 25 Gram(s) IV Push once  dextrose 50% Injectable 12.5 Gram(s) IV Push once  dextrose 50% Injectable 25 Gram(s) IV Push once  finasteride 5 milliGRAM(s) Oral daily  glucagon  Injectable 1 milliGRAM(s) IntraMuscular once  heparin   Injectable 5000 Unit(s) SubCutaneous every 8 hours  insulin lispro (ADMELOG) corrective regimen sliding scale   SubCutaneous Before meals and at bedtime  losartan 25 milliGRAM(s) Oral daily  metoprolol tartrate 25 milliGRAM(s) Oral two times a day  polyethylene glycol 3350 17 Gram(s) Oral daily  sodium chloride 0.9%. 1000 milliLiter(s) (100 mL/Hr) IV Continuous <Continuous>  ziprasidone 80 milliGRAM(s) Oral every 12 hours    MEDICATIONS  (PRN):      CAPILLARY BLOOD GLUCOSE      POCT Blood Glucose.: 174 mg/dL (30 Dec 2020 22:03)    I&O's Summary    30 Dec 2020 07:01  -  31 Dec 2020 06:53  --------------------------------------------------------  IN: 2400 mL / OUT: 3950 mL / NET: -1550 mL        PHYSICAL EXAM:  Vital Signs Last 24 Hrs  T(C): 36.7 (31 Dec 2020 04:59), Max: 37.3 (30 Dec 2020 20:50)  T(F): 98 (31 Dec 2020 04:59), Max: 99.2 (30 Dec 2020 20:50)  HR: 69 (31 Dec 2020 04:59) (69 - 83)  BP: 126/79 (31 Dec 2020 04:59) (101/71 - 145/94)  BP(mean): --  RR: 18 (31 Dec 2020 04:59) (17 - 18)  SpO2: 97% (31 Dec 2020 04:59) (96% - 100%)    GENERAL: No acute distress, well-developed  HEAD:  Atraumatic, Normocephalic  EYES: EOMI, PERRLA, conjunctiva and sclera clear  NECK: Supple, no lymphadenopathy, no JVD  CHEST/LUNG: CTAB; No wheezes, rales, or rhonchi  HEART: Regular rate and rhythm; Normal s1 and s2, No murmurs, rubs, or gallops  ABDOMEN: Soft, non-tender, non-distended; normal bowel sounds, no organomegaly  EXTREMITIES:  2+ peripheral pulses b/l, No clubbing, cyanosis, or edema  NEUROLOGY: A&O x 3, no focal deficits  SKIN: No rashes or lesions          LABS:                        13.1   9.24  )-----------( 158      ( 30 Dec 2020 12:15 )             41.1     12-31    138  |  107  |  26<H>  ----------------------------<  145<H>  3.9   |  22  |  1.74<H>    Ca    9.6      31 Dec 2020 06:11  Phos  3.1     12-31  Mg     2.2     12-31    TPro  7.0  /  Alb  4.0  /  TBili  0.4  /  DBili  x   /  AST  20  /  ALT  27  /  AlkPhos  114  12-31                RADIOLOGY & ADDITIONAL TESTS:  Results Reviewed:   Imaging Personally Reviewed:  Electrocardiogram Personally Reviewed:    COORDINATION OF CARE:  Care Discussed with Consultants/Other Providers [Y/N]:  Prior or Outpatient Records Reviewed [Y/N]:   Johnny Minaya, PGY-1  Internal Medicine  Pager: ZI - [phone] and FZN - [phone]     PROGRESS NOTE:    ID #:     Patient is a 60y old  Male who presents with a chief complaint of LYNDSEY, elevated lithium level (30 Dec 2020 14:59)      MAJOR INTERVAL HOSPITAL EVENTS:     SUBJECTIVE / OVERNIGHT EVENTS: No acute overnight events. No acute physical complaints. Denies any arm or leg pain. No cp, palpitations or SOB.     REVIEW OF SYSTEMS:  CONSTITUTIONAL: No weakness, fevers or chills  EYES/ENT: No visual changes;  No vertigo or throat pain   NECK: No pain or stiffness  RESPIRATORY: No cough, wheezing, hemoptysis; No shortness of breath  CARDIOVASCULAR: No chest pain or palpitations  GASTROINTESTINAL: No abdominal or epigastric pain. No nausea, vomiting, or hematemesis; No diarrhea or constipation. No melena or hematochezia.  GENITOURINARY: No dysuria, frequency or hematuria  NEUROLOGICAL: No numbness or weakness  SKIN: No itching, burning, rashes, or lesions   All other review of systems is negative unless indicated above.    MEDICATIONS  (STANDING):  aspirin  chewable 81 milliGRAM(s) Oral daily  atorvastatin 40 milliGRAM(s) Oral at bedtime  dextrose 40% Gel 15 Gram(s) Oral once  dextrose 5%. 1000 milliLiter(s) (50 mL/Hr) IV Continuous <Continuous>  dextrose 5%. 1000 milliLiter(s) (100 mL/Hr) IV Continuous <Continuous>  dextrose 50% Injectable 25 Gram(s) IV Push once  dextrose 50% Injectable 12.5 Gram(s) IV Push once  dextrose 50% Injectable 25 Gram(s) IV Push once  finasteride 5 milliGRAM(s) Oral daily  glucagon  Injectable 1 milliGRAM(s) IntraMuscular once  heparin   Injectable 5000 Unit(s) SubCutaneous every 8 hours  insulin lispro (ADMELOG) corrective regimen sliding scale   SubCutaneous Before meals and at bedtime  losartan 25 milliGRAM(s) Oral daily  metoprolol tartrate 25 milliGRAM(s) Oral two times a day  polyethylene glycol 3350 17 Gram(s) Oral daily  sodium chloride 0.9%. 1000 milliLiter(s) (100 mL/Hr) IV Continuous <Continuous>  ziprasidone 80 milliGRAM(s) Oral every 12 hours    MEDICATIONS  (PRN):      CAPILLARY BLOOD GLUCOSE      POCT Blood Glucose.: 174 mg/dL (30 Dec 2020 22:03)    I&O's Summary    30 Dec 2020 07:01  -  31 Dec 2020 06:53  --------------------------------------------------------  IN: 2400 mL / OUT: 3950 mL / NET: -1550 mL        PHYSICAL EXAM:  Vital Signs Last 24 Hrs  T(C): 36.7 (31 Dec 2020 04:59), Max: 37.3 (30 Dec 2020 20:50)  T(F): 98 (31 Dec 2020 04:59), Max: 99.2 (30 Dec 2020 20:50)  HR: 69 (31 Dec 2020 04:59) (69 - 83)  BP: 126/79 (31 Dec 2020 04:59) (101/71 - 145/94)  BP(mean): --  RR: 18 (31 Dec 2020 04:59) (17 - 18)  SpO2: 97% (31 Dec 2020 04:59) (96% - 100%)    PHYSICAL EXAM:  GENERAL: NAD, well-developed, obese male  HEAD:  Atraumatic, Normocephalic  EYES: EOMI, PERRLA, conjunctiva and sclera clear  NECK: Supple, No JVD  CHEST/LUNG: Clear to auscultation bilaterally; No wheeze  HEART: Regular rate and rhythm; No murmurs, rubs, or gallops  ABDOMEN: Soft, Nontender, Nondistended; Bowel sounds present  EXTREMITIES:  2+ Peripheral Pulses, 5/5 strength   PSYCH: AAOx3  NEUROLOGY: non-focal, nystagmus on R eye, L eye strabismus, CN II-XII grossly intact, intact sensation in b/l upper extremities, b/l difficulty 2 point discrimination   SKIN: Multiple patches of brown discoloration on the b/l anterior legs, Multiple discolored toe nails with 1 black ulceration on the L foot 2nd digit,        LABS:                        13.1   9.24  )-----------( 158      ( 30 Dec 2020 12:15 )             41.1     12-31    138  |  107  |  26<H>  ----------------------------<  145<H>  3.9   |  22  |  1.74<H>    Ca    9.6      31 Dec 2020 06:11  Phos  3.1     12-31  Mg     2.2     12-31    TPro  7.0  /  Alb  4.0  /  TBili  0.4  /  DBili  x   /  AST  20  /  ALT  27  /  AlkPhos  114  12-31                RADIOLOGY & ADDITIONAL TESTS:  Results Reviewed:   Imaging Personally Reviewed:  Electrocardiogram Personally Reviewed:    COORDINATION OF CARE:  Care Discussed with Consultants/Other Providers [Y/N]:  Prior or Outpatient Records Reviewed [Y/N]:

## 2020-12-31 NOTE — PHYSICAL THERAPY INITIAL EVALUATION ADULT - ADDITIONAL COMMENTS
Pt reports living in alone in an apartment. There are +2 steps to enter, none inside. PTA, pt was independent with all functional mobility & ADL's without the use of an AD.

## 2020-12-31 NOTE — BEHAVIORAL HEALTH ASSESSMENT NOTE - NSBHCONSULTWORKUPDETAILS_PSY_A_CORE FT
repeat Lithium level in AM/tomorrow morning  continue to trend bun/cr   if pt experiences Neurological signs due to lithium toxicity consider Nephro consult

## 2020-12-31 NOTE — DISCHARGE NOTE PROVIDER - NSDCMRMEDTOKEN_GEN_ALL_CORE_FT
aspirin 81 mg oral tablet, chewable: 1 tab(s) orally once a day  finasteride 5 mg oral tablet: 1 tab(s) orally once a day  fluticasone propionate 55 mcg/inh inhalation powder: 1 puff(s) inhaled every 12 hours  Geodon: 90 milligram(s) orally 2 times a day  Linzess 145 mcg oral capsule: 1 cap(s) orally once a day  Lipitor 40 mg oral tablet: 1 tab(s) orally once a day (at bedtime)   lithium 450 mg oral tablet, extended release: 2 tab(s) orally 2 times a day  losartan 25 mg oral tablet: 1 tab(s) orally once a day  metoprolol tartrate 25 mg oral tablet: 1 tab(s) orally 2 times a day  MiraLax oral powder for reconstitution: 17 milligram(s) orally once a day  repaglinide 1 mg oral tablet: 2 tab(s) orally 3 times a day (before meals)  tamsulosin 0.4 mg oral capsule: 1 cap(s) orally once a day  Vascepa 1 g oral capsule: 2 cap(s) orally 2 times a day   aspirin 81 mg oral tablet, chewable: 1 tab(s) orally once a day  finasteride 5 mg oral tablet: 1 tab(s) orally once a day  fluticasone propionate 55 mcg/inh inhalation powder: 1 puff(s) inhaled every 12 hours  Geodon: 90 milligram(s) orally 2 times a day  Linzess 145 mcg oral capsule: 1 cap(s) orally once a day  Lipitor 40 mg oral tablet: 1 tab(s) orally once a day (at bedtime)   losartan 25 mg oral tablet: 1 tab(s) orally once a day  metoprolol tartrate 25 mg oral tablet: 1 tab(s) orally 2 times a day  MiraLax oral powder for reconstitution: 17 milligram(s) orally once a day  repaglinide 1 mg oral tablet: 2 tab(s) orally 3 times a day (before meals)  tamsulosin 0.4 mg oral capsule: 1 cap(s) orally once a day  valproic acid 250 mg oral capsule: 1 cap(s) orally 2 times a day   F31. 9 ICD 10 Bipolar disorder  Vascepa 1 g oral capsule: 2 cap(s) orally 2 times a day

## 2020-12-31 NOTE — PHYSICAL THERAPY INITIAL EVALUATION ADULT - PERTINENT HX OF CURRENT PROBLEM, REHAB EVAL
Pt is a 59 y/o M with PMH of DM2 w/ peripheral neuropathy, HTN, RA, CHAYITO, bipolar presents with L arm pain. The pain ranged from mild to unbearable. Patient reported 2 months history of intermittent left shoulder pain radiating down his arm with associated numbness in the hand, which resolves with rest. He went to see his nephrologist today, and was told to come to ED for further evaluation due to the arm pain. CONT....

## 2020-12-31 NOTE — DISCHARGE NOTE PROVIDER - PROVIDER TOKENS
FREE:[LAST:[Vanda],FIRST:[Anthony],PHONE:[(   )    -],FAX:[(   )    -],ADDRESS:[[address], [place] [address]  [phone]],FOLLOWUP:[2 weeks]]

## 2020-12-31 NOTE — BEHAVIORAL HEALTH ASSESSMENT NOTE - RISK ASSESSMENT
See  note for [facility] suicide screener, risk stratification, risk factors.    In sum,  the patient's acute risk of harm to self or others is low because he is denying SI/HI. His chronic risk is moderate because of unmodifiable risks that include a diagnosis of bipolar disorder, history of hospitalizations. These risks are being addressed through outpatient therapy and medication management. The patient's protective factors including no history of SI or suicide attempts or SIB, no substance abuse, being domiciled, being engaged in treatment, being future-oriented. The patient is safe for outpatient care. He agrees to return to the ED or call 911 in the event of SI/HI. Low Acute Suicide Risk

## 2020-12-31 NOTE — DISCHARGE NOTE PROVIDER - HOSPITAL COURSE
60y m PMHx DM2 w/peripheral neuropathy, HTN, RA (no treatment) CHAYITO (not compliant with CPAP at home), bipolar 1 on lithium who presented with left arm pain. This pain ranged from mild to unbearable depending on the moment although it always is self limited. Patient reports 2 month history of intermittent left shoulder pain radiating down his arm with associated numbness in the hand, which resolves with rest. He was sent to the ED by his Nephrologist. Cardiac causes were ruled out with negative troponins and no significant changes from prior EKG. In the ED, the patients labs were noted to have a supratherapeutic lithium level of 1.9. Toxicology was consulted and the patient was started on a bolus of IV fluids. Repeat levels were 1.8. The patient was treated with IV maintenance fluids and repeat Lithium levels were noted to be 0.8 on the day of discharge. The Psychiatry team was consulted and recommended starting valproate for bipolar disorder and continuing ziprasidone. The patient was started valproic acid 250mg BID. The patients arm pain self resolved and the patient reported feeling well.     The patient was hemodynamically stable and asymptomatic at the time of discharge. He was instructed to follow up with his Psychiatrist as soon as possible after discharge. 60y m PMHx DM2 w/peripheral neuropathy, HTN, RA (no treatment) CHAYITO (not compliant with CPAP at home), bipolar 1 on lithium who presented with left arm pain. This pain ranged from mild to unbearable depending on the moment although it always is self limited. Patient reports 2 month history of intermittent left shoulder pain radiating down his arm with associated numbness in the hand, which resolves with rest. He was sent to the ED by his Nephrologist. Cardiac causes were ruled out with negative troponins and no significant changes from prior EKG. In the ED, the patients labs were noted to have a supratherapeutic lithium level of 1.9. Toxicology was consulted and the patient was started on a bolus of IV fluids. Repeat levels were 1.8. The patient was treated with IV maintenance fluids and repeat Lithium levels were noted to be 0.8 on the day of discharge. The Psychiatry team was consulted and recommended starting valproate for bipolar disorder and continuing ziprasidone. The patient was started valproic acid 250mg BID. The patients arm pain self resolved and the patient reported feeling well.     The patient was hemodynamically stable and asymptomatic at the time of discharge. He was instructed to follow up with his Psychiatrist as soon as possible after discharge.     Attending Addendum  Discharge Diagnoses  1) Lithium toxicity  2) Bipolar Type 1  3) T2DM w  peripheral neuropathy  4) RA  5) CHAYITO  6) HTN

## 2020-12-31 NOTE — BEHAVIORAL HEALTH ASSESSMENT NOTE - NSBHCONSULTMEDS_PSY_A_CORE FT
will stop lithium at this time due to elevated level  will start Depakote 250mg bid  continue Geodon 90 mg bid

## 2020-12-31 NOTE — BEHAVIORAL HEALTH ASSESSMENT NOTE - NSBHCHARTREVIEWVS_PSY_A_CORE FT
Vital Signs Last 24 Hrs  T(C): 36.7 (31 Dec 2020 04:59), Max: 37.3 (30 Dec 2020 20:50)  T(F): 98 (31 Dec 2020 04:59), Max: 99.2 (30 Dec 2020 20:50)  HR: 69 (31 Dec 2020 04:59) (69 - 83)  BP: 126/79 (31 Dec 2020 04:59) (101/71 - 145/94)  BP(mean): --  RR: 18 (31 Dec 2020 04:59) (17 - 18)  SpO2: 97% (31 Dec 2020 04:59) (96% - 100%)

## 2020-12-31 NOTE — BEHAVIORAL HEALTH ASSESSMENT NOTE - SUICIDE PROTECTIVE FACTORS
Identifies reasons for living/Has future plans/Positive therapeutic relationships/Ability to cope with stress/Frustration tolerance/Other

## 2020-12-31 NOTE — BEHAVIORAL HEALTH ASSESSMENT NOTE - NSBHCONSULTFOLLOWAFTERCARE_PSY_A_CORE FT
pt to follow up with out[facility] provider [facility]

## 2020-12-31 NOTE — PROGRESS NOTE ADULT - PROBLEM SELECTOR PLAN 6
consider CPAP QHS    Bowel Regimen:  - c/w home linzess   - c/w home miralax consider CPAP QHS, Patient never wears CPAP and refuses     Bowel Regimen:  - c/w home linzess   - c/w home miralax

## 2020-12-31 NOTE — PROGRESS NOTE ADULT - PROBLEM SELECTOR PLAN 3
Left arm pain, resolved currently, neuropathic in origin DM neuropathy vs. Lithium toxicity vs. MSK.  - unlikely cardiology, as trop negative  - will monitor on tele for 1 day, and dc if no events  - treatment of neuropathy as below Resolved. Left arm pain, resolved currently, neuropathic in origin DM neuropathy vs. Lithium toxicity vs. MSK.   - treat sx's w/ tylenol   - unlikely cardiology, as trop negative  - tele d/c'd  - treatment of neuropathy as below

## 2020-12-31 NOTE — BEHAVIORAL HEALTH ASSESSMENT NOTE - NSBHCHARTREVIEWLAB_PSY_A_CORE FT
CBC Full  -  ( 30 Dec 2020 12:15 )  WBC Count : 9.24 K/uL  RBC Count : 4.60 M/uL  Hemoglobin : 13.1 g/dL  Hematocrit : 41.1 %  Platelet Count - Automated : 158 K/uL  Mean Cell Volume : 89.3 fl  Mean Cell Hemoglobin : 28.5 pg  Mean Cell Hemoglobin Concentration : 31.9 gm/dL  Auto Neutrophil # : 6.72 K/uL  Auto Lymphocyte # : 1.46 K/uL  Auto Monocyte # : 0.75 K/uL  Auto Eosinophil # : 0.23 K/uL  Auto Basophil # : 0.03 K/uL  Auto Neutrophil % : 72.8 %  Auto Lymphocyte % : 15.8 %  Auto Monocyte % : 8.1 %  Auto Eosinophil % : 2.5 %  Auto Basophil % : 0.3 %    12-31    138  |  107  |  26<H>  ----------------------------<  145<H>  3.9   |  22  |  1.74<H>    Ca    9.6      31 Dec 2020 06:11  Phos  3.1     12-31  Mg     2.2     12-31    TPro  7.0  /  Alb  4.0  /  TBili  0.4  /  DBili  x   /  AST  20  /  ALT  27  /  AlkPhos  114  12-31    LIVER FUNCTIONS - ( 31 Dec 2020 06:11 )  Alb: 4.0 g/dL / Pro: 7.0 g/dL / ALK PHOS: 114 U/L / ALT: 27 U/L / AST: 20 U/L / GGT: x

## 2020-12-31 NOTE — DISCHARGE NOTE PROVIDER - NSDCFUADDAPPT_GEN_ALL_CORE_FT
Follow up with Dr. Martin Dudley [phone] within 2 weeks of discharge for Psychiatry follow up on the recent changes to your medication. You were ill need to check your medication levels soon after discharge.

## 2020-12-31 NOTE — DISCHARGE NOTE PROVIDER - CARE PROVIDER_API CALL
Anthony Dc  [facility]  [address], [place] [address]  [phone]  Phone: (   )    -  Fax: (   )    -  Follow Up Time: 2 weeks

## 2020-12-31 NOTE — BEHAVIORAL HEALTH ASSESSMENT NOTE - OTHER PAST PSYCHIATRIC HISTORY (INCLUDE DETAILS REGARDING ONSET, COURSE OF ILLNESS, INPATIENT/OUTPATIENT TREATMENT)
see HPI Has had a total of four psychiatric hospitalizations, all for manic episodes. Most recent was in 1990 at [facility]. Had one other inpatient stay there before there (unsure when). Says he has had two psychiatric hospitalizations a [facility]. Was diagnosed with bipolar disorder at age 19 and has been on lithium since then. No suicide attempts or SIB.

## 2020-12-31 NOTE — BEHAVIORAL HEALTH ASSESSMENT NOTE - DETAILS
reports that his mother had bipolar disorder and was on lithium and had at least one psychiatric hospitalization n/a no previous hx or attempt

## 2021-01-01 ENCOUNTER — TRANSCRIPTION ENCOUNTER (OUTPATIENT)
Age: 61
End: 2021-01-01

## 2021-01-01 VITALS
OXYGEN SATURATION: 98 % | HEART RATE: 76 BPM | DIASTOLIC BLOOD PRESSURE: 77 MMHG | TEMPERATURE: 98 F | RESPIRATION RATE: 18 BRPM | SYSTOLIC BLOOD PRESSURE: 131 MMHG

## 2021-01-01 LAB
ANION GAP SERPL CALC-SCNC: 11 MMOL/L — SIGNIFICANT CHANGE UP (ref 5–17)
BUN SERPL-MCNC: 24 MG/DL — HIGH (ref 7–23)
CALCIUM SERPL-MCNC: 10.1 MG/DL — SIGNIFICANT CHANGE UP (ref 8.4–10.5)
CHLORIDE SERPL-SCNC: 108 MMOL/L — SIGNIFICANT CHANGE UP (ref 96–108)
CO2 SERPL-SCNC: 19 MMOL/L — LOW (ref 22–31)
CREAT SERPL-MCNC: 1.62 MG/DL — HIGH (ref 0.5–1.3)
GLUCOSE SERPL-MCNC: 169 MG/DL — HIGH (ref 70–99)
LITHIUM SERPL-MCNC: 0.8 MMOL/L — SIGNIFICANT CHANGE UP (ref 0.6–1.2)
POTASSIUM SERPL-MCNC: 5.1 MMOL/L — SIGNIFICANT CHANGE UP (ref 3.5–5.3)
POTASSIUM SERPL-SCNC: 5.1 MMOL/L — SIGNIFICANT CHANGE UP (ref 3.5–5.3)
SARS-COV-2 IGG SERPL QL IA: NEGATIVE — SIGNIFICANT CHANGE UP
SARS-COV-2 IGM SERPL IA-ACNC: <0.1 INDEX — SIGNIFICANT CHANGE UP
SODIUM SERPL-SCNC: 138 MMOL/L — SIGNIFICANT CHANGE UP (ref 135–145)

## 2021-01-01 PROCEDURE — 99239 HOSP IP/OBS DSCHRG MGMT >30: CPT

## 2021-01-01 RX ORDER — VALPROIC ACID (AS SODIUM SALT) 250 MG/5ML
1 SOLUTION, ORAL ORAL
Qty: 60 | Refills: 0
Start: 2021-01-01 | End: 2021-01-30

## 2021-01-01 RX ADMIN — FINASTERIDE 5 MILLIGRAM(S): 5 TABLET, FILM COATED ORAL at 12:25

## 2021-01-01 RX ADMIN — POLYETHYLENE GLYCOL 3350 17 GRAM(S): 17 POWDER, FOR SOLUTION ORAL at 12:25

## 2021-01-01 RX ADMIN — HEPARIN SODIUM 5000 UNIT(S): 5000 INJECTION INTRAVENOUS; SUBCUTANEOUS at 05:17

## 2021-01-01 RX ADMIN — Medication 25 MILLIGRAM(S): at 05:16

## 2021-01-01 RX ADMIN — Medication 1: at 12:25

## 2021-01-01 RX ADMIN — Medication 1: at 08:25

## 2021-01-01 RX ADMIN — HEPARIN SODIUM 5000 UNIT(S): 5000 INJECTION INTRAVENOUS; SUBCUTANEOUS at 12:26

## 2021-01-01 RX ADMIN — LOSARTAN POTASSIUM 25 MILLIGRAM(S): 100 TABLET, FILM COATED ORAL at 05:17

## 2021-01-01 RX ADMIN — ZIPRASIDONE HYDROCHLORIDE 80 MILLIGRAM(S): 20 CAPSULE ORAL at 05:17

## 2021-01-01 RX ADMIN — Medication 81 MILLIGRAM(S): at 12:25

## 2021-01-01 RX ADMIN — Medication 250 MILLIGRAM(S): at 05:17

## 2021-01-01 NOTE — PROGRESS NOTE ADULT - PROBLEM SELECTOR PLAN 9
- c/w home atorvastatin  - c/w home Vascepa
- c/w home atorvastatin  - Vascepa to be restarted at discharge

## 2021-01-01 NOTE — DISCHARGE NOTE NURSING/CASE MANAGEMENT/SOCIAL WORK - NSDCFUADDAPPT_GEN_ALL_CORE_FT
Follow up with Dr. Martin Dudley [phone] within 2 weeks of discharge for Psychiatry follow up on the recent changes to your medication. You were ill need to check your medication levels soon after discharge.

## 2021-01-01 NOTE — DISCHARGE NOTE NURSING/CASE MANAGEMENT/SOCIAL WORK - PATIENT PORTAL LINK FT
You can access the FollowMyHealth Patient Portal offered by [facility] by registering at the following website: [email or web address]. By joining [facility]’s FollowMyHealth portal, you will also be able to view your health information using other applications (apps) compatible with our system.

## 2021-01-01 NOTE — PROGRESS NOTE ADULT - PROBLEM SELECTOR PLAN 6
consider CPAP QHS, Patient never wears CPAP and refuses     Bowel Regimen:  - c/w home linzess   - c/w home miralax

## 2021-01-01 NOTE — PROGRESS NOTE ADULT - ASSESSMENT
60y m PMHx DM2 w/peripheral neuropathy, HTN, CHAYITO, bipolar 1, CKD on lithium presenting with left arm pain possible 2/2 lithium toxicity vs. diabetic neuropathy vs. MSK. Treated w/ fluids and continues to have lithium elevation, discharge pending significant downtrend of levels.  60y m PMHx DM2 w/peripheral neuropathy, HTN, CHAYITO, bipolar 1, CKD on lithium presenting with left arm pain possible 2/2 lithium toxicity vs. diabetic neuropathy vs. MSK. Lithium levels now normalized and the patient has been started on valproate on recommendations of Psychiatry. Planned for discharge today.

## 2021-01-01 NOTE — PROGRESS NOTE BEHAVIORAL HEALTH - NSBHFUPINTERVALHXFT_PSY_A_CORE
Pt is a 61 y/o male, past psychiatric hx of bipolar, four psychiatric hospitalizations (most recently in 1990 at [facility]), no suicide attempts, no SIB, single, unemployed, no substance use, no legal issues, PMHx DM2 w/peripheral neuropathy, HTN, CHAYITO, bipolar 1 on lithium p/w left arm pain. Pt is domiciled in apartment through Independent living ([facility]). pt was sent in from nephrology appointment due to shoulder pain. Consult placed for med management in context of Lithium toxicity      Pt was calm and cooperative. thought process was linear, pt is well spoken. oriented x3, mild tremor chronic, no ataxia reported.  pt relates good mood. He relates feeling glad he switched meds denies side affects or signs of decompensation. pt made aware of signs of decompensation and safety planing discussed. pt agreed, pt denies suicidal/homicidal ideations. Pt denies severe anxiety. Pt denies symptoms of nadiya such as (decreased need for sleep, elevated mood, rapid speech,) pt denies A/V hallucinations. pt denies paranoia, no delusions elicited. Pt's speech is goal directed and he does not appear internally preoccupied.  He reports feeling safe.  Pt denies alcohol use, and denies illicit substance use, pt denies nicotine use     Discussed case with Martin Walker NP- Made aware of changes

## 2021-01-01 NOTE — PROGRESS NOTE BEHAVIORAL HEALTH - NSBHCHARTREVIEWLAB_PSY_A_CORE FT
CBC Full  -  ( 30 Dec 2020 12:15 )  WBC Count : 9.24 K/uL  RBC Count : 4.60 M/uL  Hemoglobin : 13.1 g/dL  Hematocrit : 41.1 %  Platelet Count - Automated : 158 K/uL  Mean Cell Volume : 89.3 fl  Mean Cell Hemoglobin : 28.5 pg  Mean Cell Hemoglobin Concentration : 31.9 gm/dL  Auto Neutrophil # : 6.72 K/uL  Auto Lymphocyte # : 1.46 K/uL  Auto Monocyte # : 0.75 K/uL  Auto Eosinophil # : 0.23 K/uL  Auto Basophil # : 0.03 K/uL  Auto Neutrophil % : 72.8 %  Auto Lymphocyte % : 15.8 %  Auto Monocyte % : 8.1 %  Auto Eosinophil % : 2.5 %  Auto Basophil % : 0.3 %    01-01    138  |  108  |  24<H>  ----------------------------<  169<H>  5.1   |  19<L>  |  1.62<H>    Ca    10.1      01 Jan 2021 06:51  Phos  3.1     12-31  Mg     2.2     12-31    TPro  7.0  /  Alb  4.0  /  TBili  0.4  /  DBili  x   /  AST  20  /  ALT  27  /  AlkPhos  114  12-31    LIVER FUNCTIONS - ( 31 Dec 2020 06:11 )  Alb: 4.0 g/dL / Pro: 7.0 g/dL / ALK PHOS: 114 U/L / ALT: 27 U/L / AST: 20 U/L / GGT: x

## 2021-01-01 NOTE — PROGRESS NOTE ADULT - SUBJECTIVE AND OBJECTIVE BOX
Johnny Minaya, PGY-1  Internal Medicine  Pager: UE - [phone] and TJW - [phone]     PROGRESS NOTE:    ID #:     Patient is a 60y old  Male who presents with a chief complaint of LYNDSEY, elevated lithium level (31 Dec 2020 11:31)      MAJOR INTERVAL HOSPITAL EVENTS:     SUBJECTIVE / OVERNIGHT EVENTS: No acute overnight events.     REVIEW OF SYSTEMS:  CONSTITUTIONAL: No weakness, fevers or chills  EYES/ENT: No visual changes;  No vertigo or throat pain   NECK: No pain or stiffness  RESPIRATORY: No cough, wheezing, hemoptysis; No shortness of breath  CARDIOVASCULAR: No chest pain or palpitations  GASTROINTESTINAL: No abdominal or epigastric pain. No nausea, vomiting, or hematemesis; No diarrhea or constipation. No melena or hematochezia.  GENITOURINARY: No dysuria, frequency or hematuria  NEUROLOGICAL: No numbness or weakness  SKIN: No itching, burning, rashes, or lesions   All other review of systems is negative unless indicated above.    MEDICATIONS  (STANDING):  aspirin  chewable 81 milliGRAM(s) Oral daily  atorvastatin 40 milliGRAM(s) Oral at bedtime  dextrose 40% Gel 15 Gram(s) Oral once  dextrose 5%. 1000 milliLiter(s) (50 mL/Hr) IV Continuous <Continuous>  dextrose 5%. 1000 milliLiter(s) (100 mL/Hr) IV Continuous <Continuous>  dextrose 50% Injectable 25 Gram(s) IV Push once  dextrose 50% Injectable 12.5 Gram(s) IV Push once  dextrose 50% Injectable 25 Gram(s) IV Push once  finasteride 5 milliGRAM(s) Oral daily  glucagon  Injectable 1 milliGRAM(s) IntraMuscular once  heparin   Injectable 5000 Unit(s) SubCutaneous every 8 hours  insulin lispro (ADMELOG) corrective regimen sliding scale   SubCutaneous Before meals and at bedtime  losartan 25 milliGRAM(s) Oral daily  metoprolol tartrate 25 milliGRAM(s) Oral two times a day  polyethylene glycol 3350 17 Gram(s) Oral daily  sodium chloride 0.9%. 1000 milliLiter(s) (100 mL/Hr) IV Continuous <Continuous>  valproic acid 250 milliGRAM(s) Oral two times a day  ziprasidone 80 milliGRAM(s) Oral every 12 hours    MEDICATIONS  (PRN):      CAPILLARY BLOOD GLUCOSE      POCT Blood Glucose.: 229 mg/dL (31 Dec 2020 21:53)  POCT Blood Glucose.: 109 mg/dL (31 Dec 2020 17:54)  POCT Blood Glucose.: 235 mg/dL (31 Dec 2020 11:53)  POCT Blood Glucose.: 156 mg/dL (31 Dec 2020 08:04)    I&O's Summary    30 Dec 2020 07:01  -  31 Dec 2020 07:00  --------------------------------------------------------  IN: 2400 mL / OUT: 3950 mL / NET: -1550 mL    31 Dec 2020 07:01  -  01 Jan 2021 06:48  --------------------------------------------------------  IN: 3574 mL / OUT: 8750 mL / NET: -5176 mL        PHYSICAL EXAM:  Vital Signs Last 24 Hrs  T(C): 36.7 (01 Jan 2021 05:00), Max: 37.2 (31 Dec 2020 20:56)  T(F): 98 (01 Jan 2021 05:00), Max: 99 (31 Dec 2020 20:56)  HR: 72 (01 Jan 2021 05:00) (63 - 73)  BP: 142/85 (01 Jan 2021 05:00) (111/73 - 142/85)  BP(mean): --  RR: 18 (01 Jan 2021 05:00) (18 - 18)  SpO2: 99% (01 Jan 2021 05:00) (98% - 100%)    PHYSICAL EXAM:  GENERAL: NAD, well-developed, obese male  HEAD:  Atraumatic, Normocephalic  EYES: EOMI, PERRLA, conjunctiva and sclera clear  NECK: Supple, No JVD  CHEST/LUNG: Clear to auscultation bilaterally; No wheeze  HEART: Regular rate and rhythm; No murmurs, rubs, or gallops  ABDOMEN: Soft, Nontender, Nondistended; Bowel sounds present  EXTREMITIES:  2+ Peripheral Pulses, 5/5 strength   PSYCH: AAOx3  NEUROLOGY: non-focal, nystagmus on R eye, L eye strabismus, CN II-XII grossly intact, intact sensation in b/l upper extremities, b/l difficulty 2 point discrimination   SKIN: Multiple patches of brown discoloration on the b/l anterior legs, Multiple discolored toe nails with 1 black ulceration on the L foot 2nd digit,        LABS:                        13.1   9.24  )-----------( 158      ( 30 Dec 2020 12:15 )             41.1     12-31    138  |  107  |  26<H>  ----------------------------<  145<H>  3.9   |  22  |  1.74<H>    Ca    9.6      31 Dec 2020 06:11  Phos  3.1     12-31  Mg     2.2     12-31    TPro  7.0  /  Alb  4.0  /  TBili  0.4  /  DBili  x   /  AST  20  /  ALT  27  /  AlkPhos  114  12-31                RADIOLOGY & ADDITIONAL TESTS:  Results Reviewed:   Imaging Personally Reviewed:  Electrocardiogram Personally Reviewed:    COORDINATION OF CARE:  Care Discussed with Consultants/Other Providers [Y/N]:  Prior or Outpatient Records Reviewed [Y/N]:   Johnny Minaya, PGY-1  Internal Medicine  Pager: XF - [phone] and TJM - [phone]     PROGRESS NOTE:    ID #:     Patient is a 60y old  Male who presents with a chief complaint of LYNDSEY, elevated lithium level (31 Dec 2020 11:31)      MAJOR INTERVAL HOSPITAL EVENTS:     SUBJECTIVE / OVERNIGHT EVENTS: No acute overnight events. No acute physical complaints. Denies any arm pain, chest pain, or palpitations.     REVIEW OF SYSTEMS:  CONSTITUTIONAL: No weakness, fevers or chills  EYES/ENT: No visual changes;  No vertigo or throat pain   NECK: No pain or stiffness  RESPIRATORY: No cough, wheezing, hemoptysis; No shortness of breath  CARDIOVASCULAR: No chest pain or palpitations  GASTROINTESTINAL: No abdominal or epigastric pain. No nausea, vomiting, or hematemesis; No diarrhea or constipation. No melena or hematochezia.  GENITOURINARY: No dysuria, frequency or hematuria  NEUROLOGICAL: No numbness or weakness  SKIN: No itching, burning, rashes, or lesions   All other review of systems is negative unless indicated above.    MEDICATIONS  (STANDING):  aspirin  chewable 81 milliGRAM(s) Oral daily  atorvastatin 40 milliGRAM(s) Oral at bedtime  dextrose 40% Gel 15 Gram(s) Oral once  dextrose 5%. 1000 milliLiter(s) (50 mL/Hr) IV Continuous <Continuous>  dextrose 5%. 1000 milliLiter(s) (100 mL/Hr) IV Continuous <Continuous>  dextrose 50% Injectable 25 Gram(s) IV Push once  dextrose 50% Injectable 12.5 Gram(s) IV Push once  dextrose 50% Injectable 25 Gram(s) IV Push once  finasteride 5 milliGRAM(s) Oral daily  glucagon  Injectable 1 milliGRAM(s) IntraMuscular once  heparin   Injectable 5000 Unit(s) SubCutaneous every 8 hours  insulin lispro (ADMELOG) corrective regimen sliding scale   SubCutaneous Before meals and at bedtime  losartan 25 milliGRAM(s) Oral daily  metoprolol tartrate 25 milliGRAM(s) Oral two times a day  polyethylene glycol 3350 17 Gram(s) Oral daily  sodium chloride 0.9%. 1000 milliLiter(s) (100 mL/Hr) IV Continuous <Continuous>  valproic acid 250 milliGRAM(s) Oral two times a day  ziprasidone 80 milliGRAM(s) Oral every 12 hours    MEDICATIONS  (PRN):      CAPILLARY BLOOD GLUCOSE      POCT Blood Glucose.: 229 mg/dL (31 Dec 2020 21:53)  POCT Blood Glucose.: 109 mg/dL (31 Dec 2020 17:54)  POCT Blood Glucose.: 235 mg/dL (31 Dec 2020 11:53)  POCT Blood Glucose.: 156 mg/dL (31 Dec 2020 08:04)    I&O's Summary    30 Dec 2020 07:01  -  31 Dec 2020 07:00  --------------------------------------------------------  IN: 2400 mL / OUT: 3950 mL / NET: -1550 mL    31 Dec 2020 07:01  -  01 Jan 2021 06:48  --------------------------------------------------------  IN: 3574 mL / OUT: 8750 mL / NET: -5176 mL        PHYSICAL EXAM:  Vital Signs Last 24 Hrs  T(C): 36.7 (01 Jan 2021 05:00), Max: 37.2 (31 Dec 2020 20:56)  T(F): 98 (01 Jan 2021 05:00), Max: 99 (31 Dec 2020 20:56)  HR: 72 (01 Jan 2021 05:00) (63 - 73)  BP: 142/85 (01 Jan 2021 05:00) (111/73 - 142/85)  BP(mean): --  RR: 18 (01 Jan 2021 05:00) (18 - 18)  SpO2: 99% (01 Jan 2021 05:00) (98% - 100%)    PHYSICAL EXAM:  GENERAL: NAD, well-developed, obese male  HEAD:  Atraumatic, Normocephalic  EYES: EOMI, PERRLA, conjunctiva and sclera clear  NECK: Supple, No JVD  CHEST/LUNG: Clear to auscultation bilaterally; No wheeze  HEART: Regular rate and rhythm; No murmurs, rubs, or gallops  ABDOMEN: Soft, Nontender, Nondistended; Bowel sounds present  EXTREMITIES:  2+ Peripheral Pulses, 5/5 strength   PSYCH: AAOx3  NEUROLOGY: non-focal, nystagmus on R eye, L eye strabismus, CN II-XII grossly intact, intact sensation in b/l upper extremities, b/l difficulty 2 point discrimination   SKIN: Multiple patches of brown discoloration on the b/l anterior legs      LABS:                        13.1   9.24  )-----------( 158      ( 30 Dec 2020 12:15 )             41.1     12-31    138  |  107  |  26<H>  ----------------------------<  145<H>  3.9   |  22  |  1.74<H>    Ca    9.6      31 Dec 2020 06:11  Phos  3.1     12-31  Mg     2.2     12-31    TPro  7.0  /  Alb  4.0  /  TBili  0.4  /  DBili  x   /  AST  20  /  ALT  27  /  AlkPhos  114  12-31                RADIOLOGY & ADDITIONAL TESTS:  Results Reviewed:   Imaging Personally Reviewed:  Electrocardiogram Personally Reviewed:    COORDINATION OF CARE:  Care Discussed with Consultants/Other Providers [Y/N]:  Prior or Outpatient Records Reviewed [Y/N]:

## 2021-01-01 NOTE — PROGRESS NOTE BEHAVIORAL HEALTH - SUMMARY
Pt is a 61 y/o single man domiciled, with help from a supportive apartment program, past psychiatric hx of bipolar dx with four past psychiatric hospitalizations (most recently in 1990 at [facility]), no suicide attempts, no SIB, single, unemployed, no substance use, no legal issues, PMHx DM2 w/peripheral neuropathy, HTN, CHAYITO, bipolar 1 on lithium p/w left arm pain. Pt is domiciled in apartment through Independent living ([facility]). pt was sent in from nephrology appointment due to shoulder pain. Consult placed for med management in context of Lithium toxicity      pt was calm and cooperative. thought process was linear, denies psychiatric symptoms and is currently stable. pt is found to have elevated lithium at 1.8 which has now normalized to 0.8 and is not toxic. pt denies signs of toxicity. pt  comfortable with outpt followup. denies signs of decompensation

## 2021-01-01 NOTE — PROGRESS NOTE ADULT - PROBLEM SELECTOR PLAN 4
Scr 1.73, baseline ~1.6-1.8. s/p 1L Bolus in the ED. Likely 2/2 DMT2 & HTN  - daily BMP  - avoid nephrotoxic medications  - renally dose all medication
Scr 1.73, baseline ~1.6-1.8. s/p 1L Bolus in the ED. Likely 2/2 DMT2 & HTN  - daily BMP  - avoid nephrotoxic medications  - renally dose all medication

## 2021-01-01 NOTE — PROGRESS NOTE ADULT - ATTENDING COMMENTS
here with left arm pain noted to have lithium toxicity in the setting of CKD- hold lithium indefinitely, check levels daily until normal. will f/u toxicology fellow.   bipolar disorder- start depakote per psych    Sidra Mccray D.O.  Hospitalist Pager # [phone]
Agree with above note by R1 Dr. Minaya incl findings and plan, edited where appropriate  as above - lithium level is acceptable, medications have been changed and pt is stable for d/c on new regimen  outpatient psych f/u  CKD at baseline  35 minutes spent orchestrating discharge  Attending Physician Dr. Mikey Norton [phone]

## 2021-01-01 NOTE — PROGRESS NOTE ADULT - PROBLEM SELECTOR PLAN 1
Li up to 1.9, s/p 1L bolus and downtrended to 1.8.   - f/u repeat lithium level   - Behavioral health recommendations appreciated   - will start depako and f/u on levels   - c/w maintenance fluids   - toxicology following, nikia recs   - day BMP and lithium level Resolved. Li up to 1.9, s/p 1L bolus and downtrended to 1.8, now 0.8.   - Lithium 0.8 and no longer supratherapeutic   - Started on valproate yesterday   - Behavioral health recommendations appreciated   - c/w maintenance fluids

## 2021-01-01 NOTE — PROGRESS NOTE ADULT - PROBLEM SELECTOR PLAN 3
Resolved. Left arm pain, resolved currently, neuropathic in origin DM neuropathy vs. Lithium toxicity vs. MSK.   - treat sx's w/ tylenol   - unlikely cardiology, as trop negative  - tele d/c'd  - treatment of neuropathy as below

## 2021-01-01 NOTE — PROGRESS NOTE ADULT - PROBLEM SELECTOR PLAN 10
DVT: HSQ  Diet: Consistent carb  Dispo: PT evaluation
DVT: HSQ  Diet: Consistent carb  Dispo: PT evaluation

## 2021-01-01 NOTE — PROGRESS NOTE BEHAVIORAL HEALTH - NSBHCHARTREVIEWVS_PSY_A_CORE FT
Vital Signs Last 24 Hrs  T(C): 36.7 (01 Jan 2021 05:00), Max: 37.2 (31 Dec 2020 20:56)  T(F): 98 (01 Jan 2021 05:00), Max: 99 (31 Dec 2020 20:56)  HR: 72 (01 Jan 2021 05:00) (63 - 73)  BP: 142/85 (01 Jan 2021 05:00) (111/73 - 142/85)  BP(mean): --  RR: 18 (01 Jan 2021 08:15) (18 - 18)  SpO2: 99% (01 Jan 2021 08:15) (98% - 100%)

## 2021-01-01 NOTE — PROGRESS NOTE ADULT - PROBLEM SELECTOR PLAN 2
Home regimen: ziprasadone Hold lithium  - psych to be consulted, nikia recs  - c/w home ziprasadone   - Denies any SI/HI reports he would notify provider with any harming thoughts Home regimen: ziprasadone Hold lithium, now on valproate   - psych cleared patient for d/c   - c/w valproate   - c/w home ziprasadone   - Denies any SI/HI reports he would notify provider with any harming thoughts

## 2021-01-01 NOTE — PROGRESS NOTE ADULT - PROBLEM SELECTOR PLAN 7
Home regimen: Repaglinide, D/c Trijenta   - A1C in AM  - ISS while inpatient
Home regimen: Repaglinide, D/c Trijenta   - A1C in AM  - ISS while inpatient

## 2021-01-01 NOTE — PROGRESS NOTE BEHAVIORAL HEALTH - RISK ASSESSMENT
In sum,  the patient's acute risk of harm to self or others is low because he is denying SI/HI. His chronic risk is moderate because of unmodifiable risks that include a diagnosis of bipolar disorder, history of hospitalizations. These risks are being addressed through outpatient therapy and medication management.     The patient's protective factors including no history of SI or suicide attempts or SIB, no substance abuse, being domiciled, being engaged in treatment, being future-oriented. The patient is safe for outpatient care. He agrees to return to the ED or call 911 in the event of SI/HI.

## 2021-01-04 ENCOUNTER — OUTPATIENT (OUTPATIENT)
Dept: OUTPATIENT SERVICES | Facility: HOSPITAL | Age: 61
LOS: 1 days | End: 2021-01-04
Payer: MEDICARE

## 2021-01-04 DIAGNOSIS — K08.9 DISORDER OF TEETH AND SUPPORTING STRUCTURES, UNSPECIFIED: ICD-10-CM

## 2021-01-04 DIAGNOSIS — Z98.890 OTHER SPECIFIED POSTPROCEDURAL STATES: Chronic | ICD-10-CM

## 2021-01-04 PROCEDURE — D2392: CPT

## 2021-01-07 DIAGNOSIS — K02.9 DENTAL CARIES, UNSPECIFIED: ICD-10-CM

## 2021-01-08 RX ORDER — ORAL SEMAGLUTIDE 3 MG/1
3 TABLET ORAL
Qty: 1 | Refills: 0 | Status: DISCONTINUED | COMMUNITY
Start: 2021-01-08 | End: 2021-01-08

## 2021-01-11 ENCOUNTER — EMERGENCY (EMERGENCY)
Facility: HOSPITAL | Age: 61
LOS: 1 days | Discharge: HOME | End: 2021-01-11
Attending: EMERGENCY MEDICINE | Admitting: EMERGENCY MEDICINE
Payer: MEDICARE

## 2021-01-11 VITALS
RESPIRATION RATE: 18 BRPM | HEIGHT: 71 IN | SYSTOLIC BLOOD PRESSURE: 124 MMHG | DIASTOLIC BLOOD PRESSURE: 69 MMHG | HEART RATE: 82 BPM | OXYGEN SATURATION: 96 % | TEMPERATURE: 98 F | WEIGHT: 300.05 LBS

## 2021-01-11 DIAGNOSIS — Z98.890 OTHER SPECIFIED POSTPROCEDURAL STATES: Chronic | ICD-10-CM

## 2021-01-11 LAB
ALBUMIN SERPL ELPH-MCNC: 3.5 G/DL — SIGNIFICANT CHANGE UP (ref 3.3–5)
ALP SERPL-CCNC: 88 U/L — SIGNIFICANT CHANGE UP (ref 40–120)
ALT FLD-CCNC: 47 U/L — HIGH (ref 10–45)
ANION GAP SERPL CALC-SCNC: 10 MMOL/L — SIGNIFICANT CHANGE UP (ref 5–17)
AST SERPL-CCNC: 20 U/L — SIGNIFICANT CHANGE UP (ref 10–40)
BASOPHILS # BLD AUTO: 0.03 K/UL — SIGNIFICANT CHANGE UP (ref 0–0.2)
BASOPHILS NFR BLD AUTO: 0.4 % — SIGNIFICANT CHANGE UP (ref 0–2)
BILIRUB SERPL-MCNC: 0.2 MG/DL — SIGNIFICANT CHANGE UP (ref 0.2–1.2)
BUN SERPL-MCNC: 44 MG/DL — HIGH (ref 7–23)
CALCIUM SERPL-MCNC: 9 MG/DL — SIGNIFICANT CHANGE UP (ref 8.4–10.5)
CHLORIDE SERPL-SCNC: 104 MMOL/L — SIGNIFICANT CHANGE UP (ref 96–108)
CO2 SERPL-SCNC: 26 MMOL/L — SIGNIFICANT CHANGE UP (ref 22–31)
CREAT SERPL-MCNC: 1.95 MG/DL — HIGH (ref 0.5–1.3)
D DIMER BLD IA.RAPID-MCNC: 156 NG/ML DDU — SIGNIFICANT CHANGE UP
EOSINOPHIL # BLD AUTO: 0.18 K/UL — SIGNIFICANT CHANGE UP (ref 0–0.5)
EOSINOPHIL NFR BLD AUTO: 2.6 % — SIGNIFICANT CHANGE UP (ref 0–6)
GLUCOSE SERPL-MCNC: 243 MG/DL — HIGH (ref 70–99)
HCT VFR BLD CALC: 40.4 % — SIGNIFICANT CHANGE UP (ref 39–50)
HGB BLD-MCNC: 12.9 G/DL — LOW (ref 13–17)
IMM GRANULOCYTES NFR BLD AUTO: 1.7 % — HIGH (ref 0–1.5)
LYMPHOCYTES # BLD AUTO: 2.05 K/UL — SIGNIFICANT CHANGE UP (ref 1–3.3)
LYMPHOCYTES # BLD AUTO: 29.6 % — SIGNIFICANT CHANGE UP (ref 13–44)
MCHC RBC-ENTMCNC: 28.5 PG — SIGNIFICANT CHANGE UP (ref 27–34)
MCHC RBC-ENTMCNC: 31.9 GM/DL — LOW (ref 32–36)
MCV RBC AUTO: 89.4 FL — SIGNIFICANT CHANGE UP (ref 80–100)
MONOCYTES # BLD AUTO: 0.66 K/UL — SIGNIFICANT CHANGE UP (ref 0–0.9)
MONOCYTES NFR BLD AUTO: 9.5 % — SIGNIFICANT CHANGE UP (ref 2–14)
NEUTROPHILS # BLD AUTO: 3.89 K/UL — SIGNIFICANT CHANGE UP (ref 1.8–7.4)
NEUTROPHILS NFR BLD AUTO: 56.2 % — SIGNIFICANT CHANGE UP (ref 43–77)
NRBC # BLD: 0 /100 WBCS — SIGNIFICANT CHANGE UP (ref 0–0)
NT-PROBNP SERPL-SCNC: 50 PG/ML — SIGNIFICANT CHANGE UP (ref 0–300)
PLATELET # BLD AUTO: 176 K/UL — SIGNIFICANT CHANGE UP (ref 150–400)
POTASSIUM SERPL-MCNC: 4.3 MMOL/L — SIGNIFICANT CHANGE UP (ref 3.5–5.3)
POTASSIUM SERPL-SCNC: 4.3 MMOL/L — SIGNIFICANT CHANGE UP (ref 3.5–5.3)
PROT SERPL-MCNC: 7.4 G/DL — SIGNIFICANT CHANGE UP (ref 6–8.3)
RBC # BLD: 4.52 M/UL — SIGNIFICANT CHANGE UP (ref 4.2–5.8)
RBC # FLD: 13.2 % — SIGNIFICANT CHANGE UP (ref 10.3–14.5)
SODIUM SERPL-SCNC: 140 MMOL/L — SIGNIFICANT CHANGE UP (ref 135–145)
TROPONIN I SERPL-MCNC: <.017 NG/ML — LOW (ref 0.02–0.06)
WBC # BLD: 6.93 K/UL — SIGNIFICANT CHANGE UP (ref 3.8–10.5)
WBC # FLD AUTO: 6.93 K/UL — SIGNIFICANT CHANGE UP (ref 3.8–10.5)

## 2021-01-11 PROCEDURE — 93010 ELECTROCARDIOGRAM REPORT: CPT

## 2021-01-11 PROCEDURE — 93005 ELECTROCARDIOGRAM TRACING: CPT

## 2021-01-11 PROCEDURE — 85379 FIBRIN DEGRADATION QUANT: CPT

## 2021-01-11 PROCEDURE — 80053 COMPREHEN METABOLIC PANEL: CPT

## 2021-01-11 PROCEDURE — 85025 COMPLETE CBC W/AUTO DIFF WBC: CPT

## 2021-01-11 PROCEDURE — 71045 X-RAY EXAM CHEST 1 VIEW: CPT | Mod: 26

## 2021-01-11 PROCEDURE — 36415 COLL VENOUS BLD VENIPUNCTURE: CPT

## 2021-01-11 PROCEDURE — 71045 X-RAY EXAM CHEST 1 VIEW: CPT

## 2021-01-11 PROCEDURE — 99284 EMERGENCY DEPT VISIT MOD MDM: CPT | Mod: 25

## 2021-01-11 PROCEDURE — 99285 EMERGENCY DEPT VISIT HI MDM: CPT

## 2021-01-11 PROCEDURE — 84484 ASSAY OF TROPONIN QUANT: CPT

## 2021-01-11 PROCEDURE — 83880 ASSAY OF NATRIURETIC PEPTIDE: CPT

## 2021-01-11 RX ORDER — DIVALPROEX SODIUM 500 MG/1
0 TABLET, DELAYED RELEASE ORAL
Qty: 0 | Refills: 0 | DISCHARGE

## 2021-01-11 NOTE — ED PROVIDER NOTE - ATTENDING CONTRIBUTION TO CARE
I personally evaluated the patient. I reviewed the Resident’s or Physician Assistant’s note (as assigned above), and agree with the findings and plan except as documented in my note.  60y m PMHx DM2 w/peripheral neuropathy, HTN, RA (no treatment) CHAYITO (not compliant with CPAP at home), bipolar 1 pw generalized weakness to his B/L LE extremities and worsening shortness of breath. Recent admission to [facility]- treated for lithium toxicity and DC'ed with Lithium changed to Depakote. Denies fever, chills, cough, chest pain, abdominal pain, nausea, vomiting, numbness, tingling. No smoking hx  Plan: Will obtain basic labs with Trop, Pro-BNP, D-dimer, EKG, CXR and reassess  **Update: Trop, D-dimer and pro-BNP negative. CXR- atelectasis no infiltrates. O2 sat 97% on RA at bedside. Will DC home with PMD and Rheum follow up.

## 2021-01-11 NOTE — ED PROVIDER NOTE - CLINICAL SUMMARY MEDICAL DECISION MAKING FREE TEXT BOX
60y m PMHx DM2 w/peripheral neuropathy, HTN, RA (no treatment) CHAYITO (not compliant with CPAP at home), bipolar 1 pw generalized weakness to his B/L LE extremities and worsening shortness of breath. Recent admission to [facility]- treated for lithium toxicity and DC'e with Lithium changed to Depakote. Denies fever, chills, cough, chest pain, abdominal pain, nausea, vomiting, numbness, tingling. No smoking hx  Plan: Will obtain basic labs with Trop, Pro-BNP, D-dimer, EKG, CXR and reassess  **Update: Trop, D-dimer and pro-BNP negative. CXR- atelectasis no infiltrates. O2 sat 97% on RA at bedside. Will DC home with PMD and Rheum follow up. 60y m PMHx DM2 w/peripheral neuropathy, HTN, RA (no treatment) CHAYITO (not compliant with CPAP at home), bipolar 1 pw generalized weakness to his B/L LE extremities and worsening shortness of breath. Recent admission to [facility]- treated for lithium toxicity and DC'ed with Lithium changed to Depakote. Denies fever, chills, cough, chest pain, abdominal pain, nausea, vomiting, numbness, tingling. No smoking hx  Plan: Will obtain basic labs with Trop, Pro-BNP, D-dimer, EKG, CXR and reassess  **Update: Trop, D-dimer and pro-BNP negative. CXR- atelectasis no infiltrates. O2 sat 97% on RA at bedside. Will DC home with PMD and Rheum follow up.

## 2021-01-11 NOTE — ED PROVIDER NOTE - NSFOLLOWUPINSTRUCTIONS_ED_ALL_ED_FT
Follow up with your PMD within 48-72 hrs.  Show copies of your reports given to you.  Take all of your medications as previously prescribed.  Worsening, continued or ANY new concerning symptoms return to the Emergency Department.       Dyspnea    WHAT YOU NEED TO KNOW:    Dyspnea is breathing difficulty or discomfort. You may have labored, painful, or shallow breathing. You may feel breathless or short of breath. Dyspnea can occur during rest or with activity. You may have dyspnea for a short time, or it might become chronic. Dyspnea is often a symptom of a disease or condition.    DISCHARGE INSTRUCTIONS:    Seek care immediately if:   •Your signs and symptoms are the same or worse within 24 hours of treatment.       •You have shaking chills or a fever over 102°F.       •You have new pain, pressure, or tightness in your chest.       •You have a new or worse cough or wheezing, or you cough up blood.      •You feel like you cannot get enough air.      •The skin over your ribs or on your neck sinks in when you breathe.       •You have a severe headache with vomiting and abdominal pain.       •You feel confused or dizzy.      Contact your healthcare provider or specialist if:   •You have questions or concerns about your condition or care.          Medicines:    •Medicines may be used to treat the cause of your dyspnea. Medicines may reduce swelling in your airway or decrease extra fluid from around your heart or lungs. Other medicines may be used to decrease anxiety and help you feel calm and relaxed.      •Take your medicine as directed. Contact your healthcare provider if you think your medicine is not helping or if you have side effects. Tell him or her if you are allergic to any medicine. Keep a list of the medicines, vitamins, and herbs you take. Include the amounts, and when and why you take them. Bring the list or the pill bottles to follow-up visits. Carry your medicine list with you in case of an emergency.      Manage long-term dyspnea:   •Create an action plan. You and your healthcare provider can work together to create a plan for how to handle episodes of dyspnea. The plan can include daily activities, treatment changes, and what to do if you have severe breathing problems.      •Lean forward on your elbows when you sit. This helps your lungs expand and may make it easier to breathe.      •Use pursed-lip breathing any time you feel short of breath. Breathe in through your nose and then slowly breathe out through your mouth with your lips slightly puckered. It should take you twice as long to breathe out as it did to breathe in.  Breathe in Breathe out           •Do not smoke. Nicotine and other chemicals in cigarettes and cigars can cause lung damage and make it harder to breathe. Ask your healthcare provider for information if you currently smoke and need help to quit. E-cigarettes or smokeless tobacco still contain nicotine. Talk to your healthcare provider before you use these products.       •Reach or maintain a healthy weight. Your healthcare provider can help you create a safe weight loss plan if you are overweight.      •Exercise as directed. Exercise can help your lungs work more easily. Exercise can also help you lose weight if needed. Try to get at least 30 minutes of exercise most days of the week. Your healthcare provider can help you create an exercise plan that is safe for you.      Follow up with your healthcare provider or specialist as directed: Write down your questions

## 2021-01-11 NOTE — ED PROVIDER NOTE - OBJECTIVE STATEMENT
60y m PMHx DM2 w/peripheral neuropathy, HTN, RA (no treatment) CHAYITO (not compliant with CPAP at home), bipolar 1 pw generalized weakness to his B/L LE extremities and worsening shortness of breath. Recent admission to [facility]- treated for lithium toxicity and DC'e with Lithium changed to Depakote. Denies fever, chills, cough, chest pain, abdominal pain, nausea, vomiting, numbness, tingling.   PMD- Mckeon  No smoking hx 60y m PMHx DM2 w/peripheral neuropathy, HTN, RA (no treatment) CHAYITO (not compliant with CPAP at home), bipolar 1 pw generalized weakness to his B/L LE extremities and worsening shortness of breath. Recent admission to [facility]- treated for lithium toxicity and DC'ed with Lithium changed to Depakote. Denies fever, chills, cough, chest pain, abdominal pain, nausea, vomiting, numbness, tingling.   PMD- Mckeon  No smoking hx

## 2021-01-11 NOTE — ED PROVIDER NOTE - PROGRESS NOTE DETAILS
PA Tiberio- Trop, D-dimer and pro-BNP negative. EKG stable. CXR- atelectasis no infiltrates. O2 sat 97% on RA at bedside. Will DC home with PMD and Rheum follow up.

## 2021-01-11 NOTE — ED PROVIDER NOTE - PATIENT PORTAL LINK FT
You can access the FollowMyHealth Patient Portal offered by [facility] by registering at the following website: [email or web address]. By joining [employer]’s FollowMyHealth portal, you will also be able to view your health information using other applications (apps) compatible with our system.

## 2021-01-11 NOTE — ED ADULT NURSE NOTE - OBJECTIVE STATEMENT
60m arrives w/ multi complaints of weakness, unsteady gait, tired, hungry, sob, arm pain and leg discomfort. pt d/c from [facility] yesterday for high lithium level, no longer taking lithium and now is on depakote

## 2021-01-11 NOTE — ED PROVIDER NOTE - NS ED MD DISPO DISCHARGE
Discharge Summary


General


Date of Admission


Jul 21, 2020 at 06:18


Date of Discharge


7/21/20


Attending Physician:  MARILOU DUMONT DO





Discharge Summary


PROCEDURES PERFORMED DURING STAY:


1. Total Laparoscopic hysterectomy


2. Left ovarian cystectomy


3. Cystoscopy





ADMITTING DIAGNOSES: 


1. Abnormal uterine bleeding


2. Left ovarian cyst





DISCHARGE DIAGNOSES:


1. Post operative





COMPLICATIONS/CHIEF COMPLAINT: Abnormal Uterine Bleeding, Left Ovarian Cyst.





HOSPITAL COURSE:


Patient admitted to medical surgical unit for postoperative care.   Patient 

tolerating po, urinating and ambulating without problem.  Patient meets 

discharge criteria evening of post operative day.





DISCHARGE MEDICATIONS: Filled At [place]


 


ALLERGIES: Please see below.





PHYSICAL EXAMINATION ON DISCHARGE:


VITAL SIGNS: Please see below.


GENERAL: NAD


ABDOMINAL EXAMINATION: nd, soft, appropriately tender around incision site.


EXTREMITIES: no edema/erythema/tenderness


PROGNOSIS: good





ACTIVITY: As tolerated.  vaginal rest until cleared by provider.





DIET: regular





DISCHARGE PLAN: f/u with Dr. Dumont in 2 weeks post op.





DISPOSITION: stable





DISCHARGE INSTRUCTIONS:


1. Vaginal rest until cleared by provider


2. Do not scrub at incision site, dermabond will come off on its own.


3. No heavy lifting or strenuous activity for 6wks 





DISCHARGE CONDITION: Stable.





TIME SPENT ON DISCHARGE: Greater than 30 minutes.





Vital Signs/I&Os





Vital Signs








  Date Time  Temp Pulse Resp B/P (MAP) Pulse Ox O2 Delivery O2 Flow Rate FiO2


 


7/21/20 07:18 97.7 69 18 103/58 (73) 99 Room Air  











Laboratory Data


Labs 24H


Laboratory Tests 2


7/21/20 06:36: 


Nucleated Red Blood Cells % (auto) 0.0, Human Chorionic Gonadotropin, Quant < 

1.0


CBC/BMP


Laboratory Tests


7/21/20 06:36











Discharge Medications


Scheduled


Alprazolam (Xanax) 1 Mg Tablet, 1 MG PO TID, (Reported)


Ascorbic Acid (Vitamin C) 100 Mg Tablet, 500 MG PO BID, (Reported)


Celecoxib (Celebrex) 100 Mg Capsule, 100 MG PO BID, (Reported)


Eszopiclone (Lunesta) 3 Mg Tablet, 3 MG PO QHS, (Reported)


Lactobacillus Combo No.10 (Probiotic) 1 Each Capsule, 1 CAP PO DAILY, (Reported)


Multivitamin (Multivitamins) 1 Each Capsule, 1 CAP PO DAILY, (Reported)


Nifedipine (Nifedipine ER) 90 Mg Tablet.er, 60 MG PO QHS, (Reported)


Vilazodone HCl (Viibryd) 20 Mg Tablet, 20 MG PO DAILY, (Reported)





Allergies


Coded Allergies:  


     Penicillins (Verified  Allergy, Intermediate, hives, 7/21/20)


     amoxicillin (Verified  Allergy, Intermediate, hives, 7/21/20)











MARILOU DUMONT DO                  Jul 21, 2020 10:41
Home

## 2021-01-18 ENCOUNTER — NON-APPOINTMENT (OUTPATIENT)
Age: 61
End: 2021-01-18

## 2021-01-18 ENCOUNTER — APPOINTMENT (OUTPATIENT)
Dept: CARDIOLOGY | Facility: CLINIC | Age: 61
End: 2021-01-18

## 2021-01-20 PROCEDURE — 80053 COMPREHEN METABOLIC PANEL: CPT

## 2021-01-20 PROCEDURE — 71045 X-RAY EXAM CHEST 1 VIEW: CPT

## 2021-01-20 PROCEDURE — 84100 ASSAY OF PHOSPHORUS: CPT

## 2021-01-20 PROCEDURE — 80048 BASIC METABOLIC PNL TOTAL CA: CPT

## 2021-01-20 PROCEDURE — 80178 ASSAY OF LITHIUM: CPT

## 2021-01-20 PROCEDURE — 93005 ELECTROCARDIOGRAM TRACING: CPT

## 2021-01-20 PROCEDURE — 99285 EMERGENCY DEPT VISIT HI MDM: CPT | Mod: 25

## 2021-01-20 PROCEDURE — 36415 COLL VENOUS BLD VENIPUNCTURE: CPT

## 2021-01-20 PROCEDURE — 84484 ASSAY OF TROPONIN QUANT: CPT

## 2021-01-20 PROCEDURE — 83735 ASSAY OF MAGNESIUM: CPT

## 2021-01-20 PROCEDURE — 85025 COMPLETE CBC W/AUTO DIFF WBC: CPT

## 2021-01-20 PROCEDURE — 83880 ASSAY OF NATRIURETIC PEPTIDE: CPT

## 2021-01-20 PROCEDURE — 97161 PT EVAL LOW COMPLEX 20 MIN: CPT

## 2021-01-20 PROCEDURE — 83036 HEMOGLOBIN GLYCOSYLATED A1C: CPT

## 2021-01-20 PROCEDURE — 86769 SARS-COV-2 COVID-19 ANTIBODY: CPT

## 2021-01-20 PROCEDURE — 83690 ASSAY OF LIPASE: CPT

## 2021-01-20 PROCEDURE — 82962 GLUCOSE BLOOD TEST: CPT

## 2021-01-20 PROCEDURE — 87635 SARS-COV-2 COVID-19 AMP PRB: CPT

## 2021-01-21 ENCOUNTER — APPOINTMENT (OUTPATIENT)
Dept: ORTHOPEDIC SURGERY | Facility: CLINIC | Age: 61
End: 2021-01-21
Payer: MEDICARE

## 2021-01-21 PROCEDURE — 99203 OFFICE O/P NEW LOW 30 MIN: CPT

## 2021-01-21 PROCEDURE — 99213 OFFICE O/P EST LOW 20 MIN: CPT

## 2021-01-21 PROCEDURE — 73564 X-RAY EXAM KNEE 4 OR MORE: CPT | Mod: LT

## 2021-01-21 NOTE — HISTORY OF PRESENT ILLNESS
[de-identified] : MARGI ART is a 60 year male presenting to the office complaining of left knee pain. He  presents to the office ambulating  independently. Patient reports pain intermittently for years. Patient has been seen in the past and diagnosed with osteoarthritis.  Denies injury or trauma to the area.  The patient describes the pain as a dull aching, and occasionally sharp pain localized to the lateral aspect of his left knee that is intermittent in nature. His   symptoms are exacerbated with walking, stairs and standing.  Pain is alleviated with rest.  Patient denies instability, catching or locking of the knee. Patient is not taking anything for pain relief at this time.  Patient has tried corticosteroid injection and hyaluronic gel injections in the past with minimal relief in symptoms. Of note patient has PMHx of DMII, RA, HTN and vascular insufficiency. \par Patient denies any other complaints at this time.

## 2021-01-21 NOTE — PHYSICAL EXAM
[de-identified] : Right Lower Extremity\par o Knee :\par ¦ Inspection/Palpation : no tenderness to palpation, no swelling, varus deformity\par ¦ Range of Motion : 0 - 110 degrees, no crepitus\par ¦ Stability : no valgus or varus instability present on provocative testing, Lachman’s Test (-)\par ¦ Strength : flexion and extension 5/5\par o Muscle Bulk : normal muscle bulk present\par o Skin : no erythema, no ecchymosis\par o Sensation : sensation to pin intact\par o Vascular Exam : no edema, no cyanosis, dorsalis pedis artery pulse 2+, posterior tibial artery pulse 2+\par \par Left Lower Extremity\par o Knee :\par ¦ Inspection/Palpation : marked lateral joint line tenderness to palpation, mild distal quadriceps tenderness to palpation, no swelling, marked varus deformity\par ¦ Range of Motion : 0 -110 degrees, no crepitus\par ¦ Stability : no valgus or varus instability present on provocative testing, Lachman’s Test (-)\par ¦ Strength : flexion and extension 5-/5\par o Muscle Bulk : normal muscle bulk present\par o Skin : no erythema, no ecchymosis\par o Sensation : sensation to pin intact\par o Vascular Exam : no edema, no cyanosis, dorsalis pedis artery pulse 2+, posterior tibial artery pulse 2+\par  [de-identified] : o Left Knee : AP, lateral, sunrise, and Hernandez views of the knee were obtained, there are no soft tissue abnormalities, no fractures, alignment is normal, moderate to severe tricompartmental osteoarthritis with bone on bone apposition of the medial compartment, normal bone density, no bony lesions.\par \par

## 2021-01-21 NOTE — CONSULT LETTER
[Dear  ___] : Dear  [unfilled], [Consult Letter:] : I had the pleasure of evaluating your patient, [unfilled]. [Please see my note below.] : Please see my note below. [Consult Closing:] : Thank you very much for allowing me to participate in the care of this patient.  If you have any questions, please do not hesitate to contact me. [Sincerely,] : Sincerely, [FreeTextEntry3] : Dr. Chiki Taylor \par \par

## 2021-01-30 ENCOUNTER — EMERGENCY (EMERGENCY)
Facility: HOSPITAL | Age: 61
LOS: 1 days | Discharge: HOME | End: 2021-01-30
Attending: EMERGENCY MEDICINE | Admitting: EMERGENCY MEDICINE
Payer: MEDICARE

## 2021-01-30 VITALS
RESPIRATION RATE: 118 BRPM | TEMPERATURE: 100 F | HEIGHT: 71 IN | SYSTOLIC BLOOD PRESSURE: 127 MMHG | OXYGEN SATURATION: 97 % | HEART RATE: 118 BPM | DIASTOLIC BLOOD PRESSURE: 91 MMHG

## 2021-01-30 VITALS — HEART RATE: 97 BPM | OXYGEN SATURATION: 96 % | RESPIRATION RATE: 16 BRPM

## 2021-01-30 DIAGNOSIS — Z98.890 OTHER SPECIFIED POSTPROCEDURAL STATES: Chronic | ICD-10-CM

## 2021-01-30 LAB
ALBUMIN SERPL ELPH-MCNC: 3.9 G/DL — SIGNIFICANT CHANGE UP (ref 3.3–5)
ALP SERPL-CCNC: 86 U/L — SIGNIFICANT CHANGE UP (ref 40–120)
ALT FLD-CCNC: 51 U/L — HIGH (ref 10–45)
ANION GAP SERPL CALC-SCNC: 12 MMOL/L — SIGNIFICANT CHANGE UP (ref 5–17)
AST SERPL-CCNC: 55 U/L — HIGH (ref 10–40)
BASOPHILS # BLD AUTO: 0.04 K/UL — SIGNIFICANT CHANGE UP (ref 0–0.2)
BASOPHILS NFR BLD AUTO: 0.5 % — SIGNIFICANT CHANGE UP (ref 0–2)
BILIRUB SERPL-MCNC: 0.4 MG/DL — SIGNIFICANT CHANGE UP (ref 0.2–1.2)
BUN SERPL-MCNC: 35 MG/DL — HIGH (ref 7–23)
CALCIUM SERPL-MCNC: 10.3 MG/DL — SIGNIFICANT CHANGE UP (ref 8.4–10.5)
CHLORIDE SERPL-SCNC: 105 MMOL/L — SIGNIFICANT CHANGE UP (ref 96–108)
CO2 SERPL-SCNC: 23 MMOL/L — SIGNIFICANT CHANGE UP (ref 22–31)
CREAT SERPL-MCNC: 2.07 MG/DL — HIGH (ref 0.5–1.3)
EOSINOPHIL # BLD AUTO: 0.04 K/UL — SIGNIFICANT CHANGE UP (ref 0–0.5)
EOSINOPHIL NFR BLD AUTO: 0.5 % — SIGNIFICANT CHANGE UP (ref 0–6)
GLUCOSE SERPL-MCNC: 218 MG/DL — HIGH (ref 70–99)
HCT VFR BLD CALC: 43.7 % — SIGNIFICANT CHANGE UP (ref 39–50)
HGB BLD-MCNC: 14.8 G/DL — SIGNIFICANT CHANGE UP (ref 13–17)
IMM GRANULOCYTES NFR BLD AUTO: 0.9 % — SIGNIFICANT CHANGE UP (ref 0–1.5)
LYMPHOCYTES # BLD AUTO: 1.87 K/UL — SIGNIFICANT CHANGE UP (ref 1–3.3)
LYMPHOCYTES # BLD AUTO: 21.9 % — SIGNIFICANT CHANGE UP (ref 13–44)
MCHC RBC-ENTMCNC: 29.1 PG — SIGNIFICANT CHANGE UP (ref 27–34)
MCHC RBC-ENTMCNC: 33.9 GM/DL — SIGNIFICANT CHANGE UP (ref 32–36)
MCV RBC AUTO: 85.9 FL — SIGNIFICANT CHANGE UP (ref 80–100)
MONOCYTES # BLD AUTO: 0.84 K/UL — SIGNIFICANT CHANGE UP (ref 0–0.9)
MONOCYTES NFR BLD AUTO: 9.8 % — SIGNIFICANT CHANGE UP (ref 2–14)
NEUTROPHILS # BLD AUTO: 5.66 K/UL — SIGNIFICANT CHANGE UP (ref 1.8–7.4)
NEUTROPHILS NFR BLD AUTO: 66.4 % — SIGNIFICANT CHANGE UP (ref 43–77)
NRBC # BLD: 0 /100 WBCS — SIGNIFICANT CHANGE UP (ref 0–0)
PLATELET # BLD AUTO: 178 K/UL — SIGNIFICANT CHANGE UP (ref 150–400)
POTASSIUM SERPL-MCNC: 3.6 MMOL/L — SIGNIFICANT CHANGE UP (ref 3.5–5.3)
POTASSIUM SERPL-SCNC: 3.6 MMOL/L — SIGNIFICANT CHANGE UP (ref 3.5–5.3)
PROT SERPL-MCNC: 8.1 G/DL — SIGNIFICANT CHANGE UP (ref 6–8.3)
RBC # BLD: 5.09 M/UL — SIGNIFICANT CHANGE UP (ref 4.2–5.8)
RBC # FLD: 12.8 % — SIGNIFICANT CHANGE UP (ref 10.3–14.5)
SARS-COV-2 RNA SPEC QL NAA+PROBE: SIGNIFICANT CHANGE UP
SODIUM SERPL-SCNC: 140 MMOL/L — SIGNIFICANT CHANGE UP (ref 135–145)
WBC # BLD: 8.53 K/UL — SIGNIFICANT CHANGE UP (ref 3.8–10.5)
WBC # FLD AUTO: 8.53 K/UL — SIGNIFICANT CHANGE UP (ref 3.8–10.5)

## 2021-01-30 PROCEDURE — 99284 EMERGENCY DEPT VISIT MOD MDM: CPT

## 2021-01-30 PROCEDURE — 85025 COMPLETE CBC W/AUTO DIFF WBC: CPT

## 2021-01-30 PROCEDURE — 80164 ASSAY DIPROPYLACETIC ACD TOT: CPT

## 2021-01-30 PROCEDURE — U0003: CPT

## 2021-01-30 PROCEDURE — 36000 PLACE NEEDLE IN VEIN: CPT

## 2021-01-30 PROCEDURE — 99283 EMERGENCY DEPT VISIT LOW MDM: CPT

## 2021-01-30 PROCEDURE — U0005: CPT

## 2021-01-30 PROCEDURE — 36415 COLL VENOUS BLD VENIPUNCTURE: CPT

## 2021-01-30 PROCEDURE — 80053 COMPREHEN METABOLIC PANEL: CPT

## 2021-01-30 NOTE — ED ADULT TRIAGE NOTE - CHIEF COMPLAINT QUOTE
Pt BIBA ambulatory, pt c/o not feeling well for 2 weeks, stated he feels warm at times with dry mouth

## 2021-01-30 NOTE — ED PROVIDER NOTE - NSFOLLOWUPINSTRUCTIONS_ED_ALL_ED_FT
-- Follow up with your psychiatrist on Monday as discussed.    -- Return to the ER for worsening or persistent symptoms, and/or ANY NEW OR CONCERNING SYMPTOMS.    -- If you have difficulty following up, please call: [phone] to obtain a [facility] doctor or specialist who takes your insurance in your area.

## 2021-01-30 NOTE — ED PROVIDER NOTE - PATIENT PORTAL LINK FT
You can access the FollowMyHealth Patient Portal offered by [facility] by registering at the following website: [email or web address]. By joining [employer]’s FollowMyHealth portal, you will also be able to view your health information using other applications (apps) compatible with our system.

## 2021-01-30 NOTE — ED PROVIDER NOTE - CLINICAL SUMMARY MEDICAL DECISION MAKING FREE TEXT BOX
pt with non-specific complaints likely psych medication related, will follow up with his psychiatrist on Monday pt with non-specific complaints likely psych medication related, will follow up with his psychiatrist on Monday, valproic acid level and covid test are pending

## 2021-01-30 NOTE — ED PROVIDER NOTE - OBJECTIVE STATEMENT
pt with bipolar disease, recently changed from lithium to valproic acid, states that over the last 2-3 weeks has been feeling "off", not feeling like himself, more forgetful, dry mouth, was going to wait for his appt with his psychiatrist on Monday but thought he should get checked out earlier.

## 2021-01-30 NOTE — ED ADULT NURSE NOTE - OBJECTIVE STATEMENT
Patient presents to ED complaining of feeling warm with dry mouth for approximately 2 weeks. Patient also Patient presents to ED complaining of feeling warm with dry mouth for approximately 2 weeks. Patient states he has been "feeling off", patient was recently hospitalized due to lithium toxicity and had a change in his medication regimen.

## 2021-01-31 LAB — VALPROATE SERPL-MCNC: 58 UG/ML — SIGNIFICANT CHANGE UP (ref 50–100)

## 2021-02-08 ENCOUNTER — APPOINTMENT (OUTPATIENT)
Dept: ULTRASOUND IMAGING | Facility: HOSPITAL | Age: 61
End: 2021-02-08
Payer: MEDICARE

## 2021-02-08 ENCOUNTER — OUTPATIENT (OUTPATIENT)
Dept: OUTPATIENT SERVICES | Facility: HOSPITAL | Age: 61
LOS: 1 days | End: 2021-02-08
Payer: MEDICARE

## 2021-02-08 DIAGNOSIS — Z98.890 OTHER SPECIFIED POSTPROCEDURAL STATES: Chronic | ICD-10-CM

## 2021-02-08 DIAGNOSIS — N18.31 CHRONIC KIDNEY DISEASE, STAGE 3A: ICD-10-CM

## 2021-02-08 PROCEDURE — 76775 US EXAM ABDO BACK WALL LIM: CPT | Mod: 26

## 2021-02-08 PROCEDURE — 76775 US EXAM ABDO BACK WALL LIM: CPT

## 2021-02-10 ENCOUNTER — EMERGENCY (EMERGENCY)
Facility: HOSPITAL | Age: 61
LOS: 1 days | Discharge: HOME | End: 2021-02-10
Attending: EMERGENCY MEDICINE | Admitting: EMERGENCY MEDICINE
Payer: MEDICARE

## 2021-02-10 VITALS
TEMPERATURE: 98 F | OXYGEN SATURATION: 96 % | HEIGHT: 71 IN | DIASTOLIC BLOOD PRESSURE: 87 MMHG | HEART RATE: 93 BPM | SYSTOLIC BLOOD PRESSURE: 134 MMHG | WEIGHT: 300.05 LBS | RESPIRATION RATE: 15 BRPM

## 2021-02-10 VITALS
HEART RATE: 88 BPM | OXYGEN SATURATION: 98 % | RESPIRATION RATE: 16 BRPM | DIASTOLIC BLOOD PRESSURE: 82 MMHG | SYSTOLIC BLOOD PRESSURE: 128 MMHG

## 2021-02-10 DIAGNOSIS — Z98.890 OTHER SPECIFIED POSTPROCEDURAL STATES: Chronic | ICD-10-CM

## 2021-02-10 LAB
ANION GAP SERPL CALC-SCNC: 11 MMOL/L — SIGNIFICANT CHANGE UP (ref 5–17)
BASOPHILS # BLD AUTO: 0.03 K/UL — SIGNIFICANT CHANGE UP (ref 0–0.2)
BASOPHILS NFR BLD AUTO: 0.4 % — SIGNIFICANT CHANGE UP (ref 0–2)
BUN SERPL-MCNC: 27 MG/DL — HIGH (ref 7–23)
CALCIUM SERPL-MCNC: 9.3 MG/DL — SIGNIFICANT CHANGE UP (ref 8.4–10.5)
CHLORIDE SERPL-SCNC: 107 MMOL/L — SIGNIFICANT CHANGE UP (ref 96–108)
CO2 SERPL-SCNC: 23 MMOL/L — SIGNIFICANT CHANGE UP (ref 22–31)
CREAT SERPL-MCNC: 1.91 MG/DL — HIGH (ref 0.5–1.3)
EOSINOPHIL # BLD AUTO: 0.09 K/UL — SIGNIFICANT CHANGE UP (ref 0–0.5)
EOSINOPHIL NFR BLD AUTO: 1.2 % — SIGNIFICANT CHANGE UP (ref 0–6)
GLUCOSE SERPL-MCNC: 192 MG/DL — HIGH (ref 70–99)
HCT VFR BLD CALC: 42.4 % — SIGNIFICANT CHANGE UP (ref 39–50)
HGB BLD-MCNC: 13.7 G/DL — SIGNIFICANT CHANGE UP (ref 13–17)
IMM GRANULOCYTES NFR BLD AUTO: 0.6 % — SIGNIFICANT CHANGE UP (ref 0–1.5)
LYMPHOCYTES # BLD AUTO: 2.09 K/UL — SIGNIFICANT CHANGE UP (ref 1–3.3)
LYMPHOCYTES # BLD AUTO: 26.8 % — SIGNIFICANT CHANGE UP (ref 13–44)
MCHC RBC-ENTMCNC: 28.3 PG — SIGNIFICANT CHANGE UP (ref 27–34)
MCHC RBC-ENTMCNC: 32.3 GM/DL — SIGNIFICANT CHANGE UP (ref 32–36)
MCV RBC AUTO: 87.6 FL — SIGNIFICANT CHANGE UP (ref 80–100)
MONOCYTES # BLD AUTO: 0.78 K/UL — SIGNIFICANT CHANGE UP (ref 0–0.9)
MONOCYTES NFR BLD AUTO: 10 % — SIGNIFICANT CHANGE UP (ref 2–14)
NEUTROPHILS # BLD AUTO: 4.76 K/UL — SIGNIFICANT CHANGE UP (ref 1.8–7.4)
NEUTROPHILS NFR BLD AUTO: 61 % — SIGNIFICANT CHANGE UP (ref 43–77)
NRBC # BLD: 0 /100 WBCS — SIGNIFICANT CHANGE UP (ref 0–0)
PLATELET # BLD AUTO: 156 K/UL — SIGNIFICANT CHANGE UP (ref 150–400)
POTASSIUM SERPL-MCNC: 3.9 MMOL/L — SIGNIFICANT CHANGE UP (ref 3.5–5.3)
POTASSIUM SERPL-SCNC: 3.9 MMOL/L — SIGNIFICANT CHANGE UP (ref 3.5–5.3)
RBC # BLD: 4.84 M/UL — SIGNIFICANT CHANGE UP (ref 4.2–5.8)
RBC # FLD: 13 % — SIGNIFICANT CHANGE UP (ref 10.3–14.5)
SODIUM SERPL-SCNC: 141 MMOL/L — SIGNIFICANT CHANGE UP (ref 135–145)
WBC # BLD: 7.8 K/UL — SIGNIFICANT CHANGE UP (ref 3.8–10.5)
WBC # FLD AUTO: 7.8 K/UL — SIGNIFICANT CHANGE UP (ref 3.8–10.5)

## 2021-02-10 PROCEDURE — 36415 COLL VENOUS BLD VENIPUNCTURE: CPT

## 2021-02-10 PROCEDURE — 85025 COMPLETE CBC W/AUTO DIFF WBC: CPT

## 2021-02-10 PROCEDURE — 99284 EMERGENCY DEPT VISIT MOD MDM: CPT

## 2021-02-10 PROCEDURE — 99283 EMERGENCY DEPT VISIT LOW MDM: CPT

## 2021-02-10 PROCEDURE — 80048 BASIC METABOLIC PNL TOTAL CA: CPT

## 2021-02-10 NOTE — ED ADULT NURSE NOTE - OBJECTIVE STATEMENT
Pt is alert, brought to the ER from home by [facility] due to feeling depressed. Pt denies Homicidal or Suicidal Ideations or had history of previous attempts.   Pt is compliant with his Anti Depressant medications and regular Psychiatric follow up.

## 2021-02-10 NOTE — ED PROVIDER NOTE - CLINICAL SUMMARY MEDICAL DECISION MAKING FREE TEXT BOX
pt c h/o bipolar, sleep apnea c/o diff sleeping, which he is attributing to sleep apnea.  possible it could be related to psychiatric illness but no other sxs c/w acute nadiya.  pt relates he feels he is improving overall, functioning better. has no si/hi/hallucination. does express mild concern over multiple chronic medical issues such as his renal fx, callous on toe.  will check labs, renal fx. reasesss    update: labs unremarkable Cr baseline. pt calm, coherent. no acutely psychotic or manic. f/u psych, pmd, sleep medicine

## 2021-02-10 NOTE — ED PROVIDER NOTE - OBJECTIVE STATEMENT
pt c/o difficulty sleeping, moderate, for years, states he has been "tossing and turning" at night a lot this week. pt thinks it is related to his known h/o sleep apnea.  pt was prescribed CPAP but had not used it for many months, now recently using it more to help with sleep.  no sob, chest pain, fever, chills.  pt has h/o bipolar, switched from lithium to depakote in january, states overall he thinks he is doing better. he does admit to feeling depressed but states he has been "doing well with (his) ADLs".  He denies any si/hi. he denies any AH to me. pt states he has appt next week with psych Dr Lau and sleep specialist and cardiology .

## 2021-02-10 NOTE — ED PROVIDER NOTE - CHIEF COMPLAINT
The patient is a 60y Male complaining of altered mental status. The patient is a 60y Male complaining of difficult sleeping

## 2021-02-10 NOTE — ED PROVIDER NOTE - NSFOLLOWUPINSTRUCTIONS_ED_ALL_ED_FT
Follow Up in 1-3 Days with your own doctor or with  [facility]  [address] [place] [address]  Phone: [phone]    -follow up with your sleep doctor and your psychiatrist next week as scheduled  -use your CPAP regularly during sleep      Insomnia    WHAT YOU NEED TO KNOW:    Insomnia is a condition that makes it hard to fall or stay asleep. Lack of sleep can lead to attention or memory problems during the day. You may also be hidalgo, depressed, clumsy, or have headaches.    DISCHARGE INSTRUCTIONS:    Contact your healthcare provider if:   •Your symptoms do not get better, or they get worse.      •You begin to use drugs or alcohol to fall asleep.      •You have questions or concerns about your condition or care.      Medicines:   •Medicines may help you sleep more regularly or help you feel less anxious.      •Take your medicine as directed. Contact your healthcare provider if you think your medicine is not helping or if you have side effects. Tell him or her if you are allergic to any medicine. Keep a list of the medicines, vitamins, and herbs you take. Include the amounts, and when and why you take them. Bring the list or the pill bottles to follow-up visits. Carry your medicine list with you in case of an emergency.      What you can do to improve your sleep:   •Create a sleep schedule. Try to go to sleep and wake up at the same times every day. Keep a record of your sleep patterns, and any sleeping problems you have. Bring the record to follow-up visits with healthcare providers.      •Do not take naps. Naps could make it hard for you to fall asleep at bedtime.      •Keep your bedroom cool, quiet, and dark. Turn on white noise, such as a fan, to help you relax. Do not use your bed for any activity that will keep you awake. Do not read, exercise, eat, or watch TV in your bedroom.       •Get up if you do not fall asleep within 20 minutes. Move to another room and do something relaxing until you become sleepy.      •Limit caffeine, alcohol, and food to earlier in the day. Only drink caffeine in the morning. Do not drink alcohol within 6 hours of bedtime. Do not eat a heavy meal right before you go to bed.      •Exercise regularly. Daily exercise may help you sleep better. Do not exercise within 4 hours of bedtime.      Follow up with your healthcare provider as directed: Your healthcare provider may refer you for cognitive behavioral therapy. A behavioral therapist may help you find ways to relax, decrease stress, and improve sleep. Write down your questions so you remember to ask them during your visits.         Bipolar Disorder    WHAT YOU NEED TO KNOW:    Bipolar disorder is a long-term chemical imbalance that causes rapid changes in mood and behavior. High moods are called nadiya. Low moods are called depression. Sometimes you will feel manic and sometimes you will feel depressed. You can have alternating episodes of nadiya and depression. This is called a mixed bipolar state.     DISCHARGE INSTRUCTIONS:    Contact your healthcare provider or psychiatrist if:   •You are having trouble managing your bipolar disorder.       •You cannot sleep, or are sleeping all the time.       •You cannot eat, or are eating more than usual.      •You feel dizzy or your stomach is upset.       •You cannot make it to your next meeting with your healthcare provider.      •You have questions or concerns about your condition or care.      Medicines:   •Medicines may be given to help keep your mood stable, or to help you sleep. Changes in medicine are often needed as your bipolar disorder changes.      •Take your medicine as directed. Contact your healthcare provider if you think your medicine is not helping or if you have side effects. Tell him or her if you are allergic to any medicine. Keep a list of the medicines, vitamins, and herbs you take. Include the amounts, and when and why you take them. Bring the list or the pill bottles to follow-up visits. Carry your medicine list with you in case of an emergency.      Follow up with your healthcare provider or psychiatrist as directed: You may need to return for blood tests to monitor the levels of bipolar medicine in your blood.    Manage bipolar disorder: Watch for triggers of bipolar disorder symptoms, such as stress. Learn new ways to relax, such as deep breathing, to manage your stress. Tell someone if you feel a manic or depressive period might be coming on. Ask a friend or family member to help watch you for bipolar symptoms. Work to develop skills that will help you manage bipolar disorder. You may need to make lifestyle changes. Ask your healthcare provider or psychiatrist for resources.     For support and more information:   •National [place] of Mental [employer] ([facility]), Office of Science Policy, Planning, and Communications  [address], [address], [address]  [id number]   Phone: 1-465.395.3121  Phone: 1-727.413.8633  Web Address: http://www.[facility].nih.gov/      •[facility] [place] ([facility])  [address] [place] [address], [place] [id number]  Phone: 1-526.568.8030  Web Address: http://www.dbsalliance.[facility]

## 2021-02-10 NOTE — ED ADULT TRIAGE NOTE - CHIEF COMPLAINT QUOTE
BIB EMS, patient requesting to speak to a psychiatrist c/o auditory hallucinations. Patient denies SI/HI

## 2021-02-10 NOTE — ED PROVIDER NOTE - PATIENT PORTAL LINK FT
You can access the FollowMyHealth Patient Portal offered by [facility] by registering at the following website: [email or web address]. By joining [employer]’s FollowMyHealth portal, you will also be able to view your health information using other applications (apps) compatible with our system.

## 2021-02-10 NOTE — ED ADULT NURSE NOTE - HIV OFFER
Quality 265: Biopsy Follow-Up: Biopsy results reviewed, communicated, tracked, and documented
Quality 128: Preventive Care And Screening: Body Mass Index (Bmi) Screening And Follow-Up Plan: BMI is documented above normal parameters and a follow-up plan is documented
Quality 226: Preventive Care And Screening: Tobacco Use: Screening And Cessation Intervention: Patient screened for tobacco and never smoked
Additional Notes: Patient will follow up BMI with PCP
Detail Level: Generalized
Quality 431: Preventive Care And Screening: Unhealthy Alcohol Use - Screening: Patient screened for unhealthy alcohol use using a single question and scores less than 2 times per year
Quality 130: Documentation Of Current Medications In The Medical Record: Current Medications Documented
Opt out

## 2021-02-10 NOTE — ED PROVIDER NOTE - PSYCHIATRIC, MLM
Alert and oriented to person, place, time/situation. normal mood and affect. no apparent risk to self or others. no si/hi/hallucinations. converses appropriately

## 2021-02-13 ENCOUNTER — RX RENEWAL (OUTPATIENT)
Age: 61
End: 2021-02-13

## 2021-02-22 ENCOUNTER — APPOINTMENT (OUTPATIENT)
Dept: PULMONOLOGY | Facility: CLINIC | Age: 61
End: 2021-02-22

## 2021-02-23 ENCOUNTER — LABORATORY RESULT (OUTPATIENT)
Age: 61
End: 2021-02-23

## 2021-02-23 ENCOUNTER — APPOINTMENT (OUTPATIENT)
Dept: NEPHROLOGY | Facility: CLINIC | Age: 61
End: 2021-02-23
Payer: MEDICARE

## 2021-02-23 VITALS
TEMPERATURE: 98.1 F | DIASTOLIC BLOOD PRESSURE: 80 MMHG | HEIGHT: 71 IN | WEIGHT: 286 LBS | BODY MASS INDEX: 40.04 KG/M2 | SYSTOLIC BLOOD PRESSURE: 119 MMHG | HEART RATE: 90 BPM | OXYGEN SATURATION: 97 %

## 2021-02-23 PROCEDURE — 99214 OFFICE O/P EST MOD 30 MIN: CPT

## 2021-02-23 NOTE — PHYSICAL EXAM

## 2021-02-26 ENCOUNTER — NON-APPOINTMENT (OUTPATIENT)
Age: 61
End: 2021-02-26

## 2021-02-26 LAB
25(OH)D3 SERPL-MCNC: 50.1 NG/ML
ALBUMIN MFR SERPL ELPH: 55 %
ALBUMIN SERPL ELPH-MCNC: 4.3 G/DL
ALBUMIN SERPL-MCNC: 4.1 G/DL
ALBUMIN/GLOB SERPL: 1.2 RATIO
ALPHA1 GLOB MFR SERPL ELPH: 3.7 %
ALPHA1 GLOB SERPL ELPH-MCNC: 0.3 G/DL
ALPHA2 GLOB MFR SERPL ELPH: 10.4 %
ALPHA2 GLOB SERPL ELPH-MCNC: 0.8 G/DL
ANA SER IF-ACNC: NEGATIVE
ANION GAP SERPL CALC-SCNC: 14 MMOL/L
APPEARANCE: CLEAR
B-GLOBULIN MFR SERPL ELPH: 14.2 %
B-GLOBULIN SERPL ELPH-MCNC: 1.1 G/DL
BACTERIA: NEGATIVE
BASOPHILS # BLD AUTO: 0.02 K/UL
BASOPHILS NFR BLD AUTO: 0.3 %
BILIRUBIN URINE: NEGATIVE
BLOOD URINE: NEGATIVE
BUN SERPL-MCNC: 34 MG/DL
C3 SERPL-MCNC: 132 MG/DL
C4 SERPL-MCNC: 50 MG/DL
CALCIUM SERPL-MCNC: 9.7 MG/DL
CALCIUM SERPL-MCNC: 9.7 MG/DL
CHLORIDE SERPL-SCNC: 102 MMOL/L
CO2 SERPL-SCNC: 21 MMOL/L
COLOR: COLORLESS
CREAT SERPL-MCNC: 1.67 MG/DL
CREAT SPEC-SCNC: 10 MG/DL
CREAT/PROT UR: 4 RATIO
DEPRECATED KAPPA LC FREE/LAMBDA SER: 1.63 RATIO
DEPRECATED KAPPA LC FREE/LAMBDA SER: 1.63 RATIO
EOSINOPHIL # BLD AUTO: 0.1 K/UL
EOSINOPHIL NFR BLD AUTO: 1.3 %
ERYTHROCYTE [SEDIMENTATION RATE] IN BLOOD BY WESTERGREN METHOD: 17 MM/HR
FREE KAPPA URINE: 8.19 MG/L
FREE KAPPA/LAMDA RATIO: 8.53 RATIO
FREE LAMDA URINE: 0.96 MG/L
GAMMA GLOB FLD ELPH-MCNC: 1.2 G/DL
GAMMA GLOB MFR SERPL ELPH: 16.7 %
GLUCOSE QUALITATIVE U: NEGATIVE
GLUCOSE SERPL-MCNC: 148 MG/DL
HAV IGM SER QL: NONREACTIVE
HBV CORE IGM SER QL: NONREACTIVE
HBV SURFACE AG SER QL: NONREACTIVE
HCT VFR BLD CALC: 45.1 %
HCV AB SER QL: NONREACTIVE
HCV S/CO RATIO: 0.14 S/CO
HGB A MFR BLD: 97.7 %
HGB A2 MFR BLD: 2.3 %
HGB BLD-MCNC: 14.3 G/DL
HGB FRACT BLD-IMP: NORMAL
HIV1+2 AB SPEC QL IA.RAPID: NONREACTIVE
HYALINE CASTS: 0 /LPF
IGA SER QL IEP: 336 MG/DL
IGG SER QL IEP: 1098 MG/DL
IGM SER QL IEP: 51 MG/DL
IMM GRANULOCYTES NFR BLD AUTO: 0.7 %
INTERPRETATION SERPL IEP-IMP: NORMAL
KAPPA LC 24H UR QL: NORMAL
KAPPA LC CSF-MCNC: 1.77 MG/DL
KAPPA LC CSF-MCNC: 1.77 MG/DL
KAPPA LC SERPL-MCNC: 2.88 MG/DL
KAPPA LC SERPL-MCNC: 2.88 MG/DL
KETONES URINE: NEGATIVE
LEUKOCYTE ESTERASE URINE: NEGATIVE
LYMPHOCYTES # BLD AUTO: 2.33 K/UL
LYMPHOCYTES NFR BLD AUTO: 30.5 %
M PROTEIN SPEC IFE-MCNC: NORMAL
MAN DIFF?: NORMAL
MCHC RBC-ENTMCNC: 28.3 PG
MCHC RBC-ENTMCNC: 31.7 GM/DL
MCV RBC AUTO: 89.1 FL
MICROSCOPIC-UA: NORMAL
MONOCYTES # BLD AUTO: 0.79 K/UL
MONOCYTES NFR BLD AUTO: 10.3 %
MPO AB + PR3 PNL SER: NORMAL
NEUTROPHILS # BLD AUTO: 4.35 K/UL
NEUTROPHILS NFR BLD AUTO: 56.9 %
NITRITE URINE: NEGATIVE
PARATHYROID HORMONE INTACT: 70 PG/ML
PH URINE: 6
PHOSPHATE SERPL-MCNC: 3.4 MG/DL
PLATELET # BLD AUTO: 181 K/UL
POTASSIUM SERPL-SCNC: 4.3 MMOL/L
PROT SERPL-MCNC: 7.4 G/DL
PROT SERPL-MCNC: 7.4 G/DL
PROT UR-MCNC: 40 MG/DL
PROTEIN URINE: NEGATIVE
PROTEINASE3 AB SER IA-ACNC: 6.1 UNITS
PROTEINASE3 AB SER-ACNC: NEGATIVE
RBC # BLD: 5.06 M/UL
RBC # FLD: 13.2 %
RED BLOOD CELLS URINE: 0 /HPF
SODIUM SERPL-SCNC: 137 MMOL/L
SPECIFIC GRAVITY URINE: 1
SQUAMOUS EPITHELIAL CELLS: 0 /HPF
URATE SERPL-MCNC: 6.3 MG/DL
UROBILINOGEN URINE: NORMAL
WBC # FLD AUTO: 7.64 K/UL
WHITE BLOOD CELLS URINE: 0 /HPF

## 2021-03-03 LAB — PHOSPHOLIPASE A2 RECEPTOR ELISA: 2.1 RU/ML

## 2021-03-09 ENCOUNTER — APPOINTMENT (OUTPATIENT)
Dept: ENDOCRINOLOGY | Facility: CLINIC | Age: 61
End: 2021-03-09

## 2021-03-11 ENCOUNTER — APPOINTMENT (OUTPATIENT)
Dept: BARIATRICS/WEIGHT MGMT | Facility: CLINIC | Age: 61
End: 2021-03-11
Payer: MEDICARE

## 2021-03-11 PROCEDURE — 99442: CPT | Mod: 95

## 2021-03-19 ENCOUNTER — APPOINTMENT (OUTPATIENT)
Dept: NEUROLOGY | Facility: CLINIC | Age: 61
End: 2021-03-19
Payer: MEDICARE

## 2021-03-19 VITALS
HEART RATE: 82 BPM | BODY MASS INDEX: 40.04 KG/M2 | DIASTOLIC BLOOD PRESSURE: 67 MMHG | HEIGHT: 71 IN | WEIGHT: 286 LBS | SYSTOLIC BLOOD PRESSURE: 106 MMHG

## 2021-03-19 VITALS — TEMPERATURE: 97 F

## 2021-03-19 DIAGNOSIS — R26.0 ATAXIC GAIT: ICD-10-CM

## 2021-03-19 DIAGNOSIS — E11.42 TYPE 2 DIABETES MELLITUS WITH DIABETIC POLYNEUROPATHY: ICD-10-CM

## 2021-03-19 PROCEDURE — 99213 OFFICE O/P EST LOW 20 MIN: CPT

## 2021-03-20 PROBLEM — R26.0 TITUBATION: Status: ACTIVE | Noted: 2021-03-20

## 2021-03-20 NOTE — PHYSICAL EXAM
[FreeTextEntry1] : He is alert and oriented. Euthymic.  He has a mild lateral titubation of his head. No tremor of limbs. Distal sensory loss in lower limbs. His gait is steady.  There were no significant changes from last examination.

## 2021-03-20 NOTE — ASSESSMENT
[FreeTextEntry1] : Encouraged to lose weight with healthy diet and exercise and return for follow-up in 6 months

## 2021-03-20 NOTE — HISTORY OF PRESENT ILLNESS
[FreeTextEntry1] : 7-year-old man last seen 3 months ago with history of bipolar disorder, diabetes mellitus, diabetic polyneuropathy, hypertension, rheumatoid arthritis, morbid obesity and sleep apnea.  He returns today to report that he was hospitalized December 31 at [facility] with an elevated lithium level.  He was seen by psychiatry there and he was placed on Depakote for his bipolar disorder and continued on ziprasidone.  He was started on Rybelsus for his diabetes and has lost 14 pounds.  His mood has significantly improved and he is back on CPAP.

## 2021-03-24 ENCOUNTER — APPOINTMENT (OUTPATIENT)
Dept: CARDIOLOGY | Facility: CLINIC | Age: 61
End: 2021-03-24
Payer: MEDICARE

## 2021-03-24 ENCOUNTER — NON-APPOINTMENT (OUTPATIENT)
Age: 61
End: 2021-03-24

## 2021-03-24 VITALS
TEMPERATURE: 97.7 F | DIASTOLIC BLOOD PRESSURE: 73 MMHG | OXYGEN SATURATION: 97 % | WEIGHT: 287 LBS | HEART RATE: 87 BPM | HEIGHT: 71 IN | SYSTOLIC BLOOD PRESSURE: 115 MMHG | BODY MASS INDEX: 40.18 KG/M2

## 2021-03-24 DIAGNOSIS — Z92.89 PERSONAL HISTORY OF OTHER MEDICAL TREATMENT: ICD-10-CM

## 2021-03-24 DIAGNOSIS — R06.00 DYSPNEA, UNSPECIFIED: ICD-10-CM

## 2021-03-24 PROCEDURE — 99215 OFFICE O/P EST HI 40 MIN: CPT

## 2021-03-24 PROCEDURE — 93000 ELECTROCARDIOGRAM COMPLETE: CPT

## 2021-03-24 RX ORDER — ICOSAPENT ETHYL 1000 MG/1
1 CAPSULE ORAL
Refills: 0 | Status: ACTIVE | COMMUNITY

## 2021-03-24 NOTE — DISCUSSION/SUMMARY
[FreeTextEntry1] : Mr. Pruitt has multiple cardiac risk factors, as above, and presents today for follow-up. \par \par We discussed the importance of compliance with both psychiatric and cardiac medications, as well as compliance with the CPAP machine that he will be getting again after his recent sleep study demonstrated severe CHAYITO. Patient counseled regarding exercise, diet, and weight loss. No change in cardiac regimen today. \par Follow-up with PMD, endocrinologist, podiatrist, and sleep medicine. \par \par Issue of relationship between CHAYITO and hypertension, weight gain, and risk factors also reinforced and compliance in this regard discussed as well.\par \par 1. Type II diabetes - continue Rybelsus and repaglinide per endocrinology.\par 2. CKD - Off lithium now. Follow-up per Dr. Vallejo.\par 3. Obesity - Patient has lost weight and will continue diet and exercise.\par 4. Hypertension - well controlled today. Continue current regimen, including ARB, beta-blocker, \par 5. Bipolar disorder - continue care per psychiatry. Patient now off lithium and on Depakote.\par 6. BPH - Flomax and Proscar have improved urinary stream. Continue current regimen. Follow-up per PMD.\par 7. CHAYITO - Continue CPAP QHS.

## 2021-03-24 NOTE — HISTORY OF PRESENT ILLNESS
[FreeTextEntry1] : 60 year-old gentleman with known cardiovascular risk factors including hypertension, well controlled non-insulin dependent diabetes, dyslipidemia, obesity, CHAYITO, bipolar disorder on Lithium and Geodon, lives as a full time resident of the [facility]. He presents today in his usual state of health, having lost approximately 65 lbs over the past year. He had a repeat sleep study performed 8/12/2016 which showed severe CHAYITO.\par He was formerly followed by a cardiologist in [place], Geovanny Elizabeth MD.\par \par May 10, 2019: Patient presents with complain of sleep apnea. Has concerning sleep study two months ago. He has nasal congestion and cannot tolerate his CPAP. He is also concern about neuropathic pain in the insteps of his feet. He has lost several pounds through diet and exercise that are supervised through his program.\par \par Psychiatrist at [facility] in [place] who manages his Lithium and his antipsychotic medication (second generation, Geodon).\par \par November 2019 - Patient presents today in his usual state of health. He has modified his diet and is losing significant weight. From a peak of 349 lbs in 2012, patient is now 284 lbs. He reports having difficulty walking long distances, but that is because of leg weakness, not because of shortness of breath or chest pain. He is concerned about dyspnea on exertion, but he has not experienced this recently.\par \par June 2020 - Patient has lost 65 lbs by diet and exercise, but when he got the stimulus check, he broke his diet and started eating pizza, and heroes, and potato chips, and he believes he had a stroke that presented with pressured speech. He was scanned at [facility], and he was seen by neurology. They believe he had a TIA and discharged him with atorvastatin 40 mg daily (up from prior 20 mg daily). He continues to take ASA 81 mg daily and an oral diabetes regimen (repaglinide and Tradjenta).\par Unfortunately, his exercise class was shut down due to Covid pandemic.\par \par October 2020 - Patient has been having difficulty with over-eating. He reports several dietary indiscretions over the past month, including pizza and Chinese food.\par He has been depressed by his eating, and he is feeling socially isolated. He finds that he runs out of money for food and relies on a local [employer] for meals.\par He continues to take his medications without issues, but he has not been using his CPAP machine.\par \par PMD: Julius Mckeon MD [phone]\par Sleep Medicine: Angelika Todd MD [phone]\par Endocrinologist: Rocío Bañuelos MD [phone]\par Psychiatrist: Anthony Dc, Psychiatric Nurse Practitioner at [facility]

## 2021-03-24 NOTE — REASON FOR VISIT
[Follow-Up - From Hospitalization] : follow-up of a recent hospitalization for [Medication Management] : Medication management [Discharge Date: ___] : Discharge Date: [unfilled] [FreeTextEntry1] : March 2021 - Patient returns today for follow-up. He was admitted to [facility] in December 2020 for elevated lithium levels, and he was transitioned off lithium to Depakote. He feels much better now on Depakote. He is also on a new diabetes regimen that includes Rybelsus and it has helped him lose weight. \par These medication changes have also helped him financially. \par Flomax and finasteride are helping him with BPH.\par Linzess is improving his constipation. \par Arthritis continues to be a problem. \par He is sleeping well through the night. He is using his CPAP.\par He is walking for exercise.\par He has not yet received Covid-19 vaccine, but he is interested in getting it.

## 2021-03-29 ENCOUNTER — APPOINTMENT (OUTPATIENT)
Dept: PULMONOLOGY | Facility: CLINIC | Age: 61
End: 2021-03-29
Payer: MEDICARE

## 2021-03-29 VITALS
DIASTOLIC BLOOD PRESSURE: 75 MMHG | SYSTOLIC BLOOD PRESSURE: 113 MMHG | HEIGHT: 71 IN | RESPIRATION RATE: 15 BRPM | TEMPERATURE: 97 F | WEIGHT: 289 LBS | OXYGEN SATURATION: 95 % | HEART RATE: 93 BPM | BODY MASS INDEX: 40.46 KG/M2

## 2021-03-29 PROCEDURE — 99213 OFFICE O/P EST LOW 20 MIN: CPT | Mod: GC

## 2021-03-29 NOTE — PHYSICAL EXAM
[General Appearance - Well Developed] : well developed [General Appearance - Well Nourished] : well nourished [IV] : IV [Neck Appearance] : the appearance of the neck was normal [Heart Rate And Rhythm] : heart rate was normal and rhythm regular [Heart Sounds] : normal S1 and S2 [] : no respiratory distress [Auscultation Breath Sounds / Voice Sounds] : lungs were clear to auscultation bilaterally [Bowel Sounds] : normal bowel sounds [Abdomen Soft] : soft [Abnormal Walk] : normal gait [Nail Clubbing] : no clubbing of the fingernails [Cyanosis, Localized] : no localized cyanosis [Skin Color & Pigmentation] : normal skin color and pigmentation [Skin Lesions] : no skin lesions [Motor Exam] : the motor exam was normal [No Focal Deficits] : no focal deficits [Oriented To Time, Place, And Person] : oriented to person, place, and time [Mood] : the mood was normal

## 2021-03-29 NOTE — ASSESSMENT
[FreeTextEntry1] : 61M hx HTN, HLD, DM, bipolar DO, obesity (BMI 42), deviated nasal septum, ?renal disease (Crt 1.6) who comes for follow up of CHAYITO. Overall health is much better since switching from lithium to depakote. Exercising more and eating better. Has lost 14 lbs. continue to follow-up with weight management/obesity medicine.  Paying off bills and functioning better now. Has been more compliant with PAP therapy except for last few days, where he lost his oral appliance that was provided to him by Dr. Mcfarland. When he uses PAP therapy, he has adequate treatment of his sleep apnea (therapeutic AHI is 5.5/hr). Will provide new nasal pillows for patient, but will need to see dental again to be refitted for oral appliance. Provided names of dentists in the area who can help. Otherwise because he continues to derive benefit from PAP therapy, will cont APAP. \par \par Also provided pt with letter for COVID-19 vaccine eligibility.

## 2021-03-29 NOTE — HISTORY OF PRESENT ILLNESS
[FreeTextEntry1] : 61M hx HTN, HLD, DM, bipolar DO, obesity (BMI 42), deviated nasal septum, ?renal disease (crt 1.6) who comes for follow up of CHAYITO. Last seen in office on 11/6/2019. At that time, had lengthy discussions about the benefits and risks of treating severe sleep apnea. Offered daytime mask acclimatization, CBT, follow up with ENT and bariatric surgery – all of which pt declined. He has previously tried oral appliance, but found it uncomfortable. Seeing Dr. Dowd for weight management. CPAP was changed to APAP at that time.\par \par Since switching to depakote from lithium and starting rybesus, pt feels much better and feels that he has more energy. Has lost 14 lbs. Functioning well and has been paying his bills, exercising everyday, and planning on getting a computer/email. He has been using PAP therapy since January except for last few days because he has lost his oral appliance made by Dr. Mcfarland. He is sleeping well. \par \par Currently uses AirSense 10 AutoSet from 4-12 cmH2O. \par \par PAP compliance: Days > 4 hrs 40%, Avg use 4 hr 25 min, therapeutic AHI 5.5/hr\par \par CPAP titration 12/2018: CPAP 6 cmH2O \par Split 4/2018: AHI 45.4, T90 11.3%, optimal pressure 10 cmH2O \par PSG 5/23/2017: AHI 53.3, T90 28.3%\par

## 2021-03-30 ENCOUNTER — NON-APPOINTMENT (OUTPATIENT)
Age: 61
End: 2021-03-30

## 2021-04-09 NOTE — ED PROVIDER NOTE - NSFOLLOWUPINSTRUCTIONS_ED_ALL_ED_FT
FT Baby girl seen <24 hours of life s/p  birth. Baby LGA and euglycemic at all glucose checks. baby expectorated mucous after positional changes due to peds NP assessment. instructed parents on use of the bulb syringe and advised keeping baby elevated after feedings. mom described + latch maintained for 30 minutes. mom had requested alimentum formula due to the spit up. after assessment and education, mom will breastfeed and elevate baby 20 minutes after the feed and watch for spit up. explained to mom and dad that a cows milk or human milk protein allergy is incredibly rare and based on baby's assessment and <24 hours of life, spit up was more likely normal transition to extrauterine life. + colostrum bilaterally. reassurance provided all questions answered primary RN made aware./multiparous mom Fatigue    WHAT YOU NEED TO KNOW:    Fatigue is mental and physical exhaustion that does not get better with rest. Fatigue may make daily activities difficult or cause extreme sleepiness. It is normal to feel tired sometimes, but long-term fatigue may be a sign of serious illness.    DISCHARGE INSTRUCTIONS:    Return to the emergency department if:     You have chest pain.       You have difficulty breathing.     Contact your healthcare provider if:     You have a cough that gets worse, or does not go away.       You see blood in your urine or bowel movement.       You have numbness or tingling around your mouth or in an arm or leg.       You faint, feel dizzy, or have vision changes.       You have swelling in your lymph nodes.       You are a woman and have vaginal bleeding that is not normal for you, or is not expected.       You lose weight without trying, or you have trouble eating.       You feel weak or have muscle pain.       You have pain or swelling in your joints.      You have questions or concerns about your condition or care.     Follow up with your healthcare provider as directed: You may need more tests. Your healthcare provider may also refer you to a specialist. Write down your questions so you remember to ask them during your visits.    Manage fatigue:     Keep a fatigue diary. Include anything that makes you feel more tired or less tired. Bring the diary with you to follow-up visits with your provider.      Exercise as directed. Exercise can help you feel more alert. Exercise can also help you manage stress or relieve depression. Try to get at least 30 minutes of exercise most days of the week.      Keep a regular sleep schedule. Go to bed and wake up at the same times every day. Limit naps to 1 hour each day. A nap can improve fatigue, but a long nap may make it harder to go to sleep at night.      Plan and limit your activities. Limit the number of activities such as shopping and cleaning you do each day. If possible, try to spread out your trips throughout the week. Plan ahead so you are not rushing to get something done. Only do activities that you have the energy to complete. Take breaks between activities. Ask for help if you need it. Another person may be able to drive you or help with daily activities.      Eat a variety of healthy foods. Healthy foods include fruits, vegetables, whole-grain breads, low-fat dairy products, beans, lean meats, and fish. Good nutrition can help manage fatigue.      Limit caffeine and alcohol. These can make it difficult to fall or stay asleep. Women should limit alcohol to 1 drink a day. Men should limit alcohol to 2 drinks a day. A drink of alcohol is 12 ounces of beer, 5 ounces of wine, or 1½ ounces of liquor. Ask our healthcare provider how much caffeine is safe for you.      Do not smoke. Nicotine and other chemicals in cigarettes and cigars can cause lung damage and increase fatigue. Ask your healthcare provider for information if you currently smoke and need help to quit. E-cigarettes or smokeless tobacco still contain nicotine. Talk to your healthcare provider before you use these products.       Return to ER if your symptoms worsen or fever, nausea or vomiting or other symptons

## 2021-04-22 NOTE — ED ADULT TRIAGE NOTE - SPO2 (%)
PT placed pm HFNC 40 LPM 60%. Optifoam gentle behind ears and mepilex on cheeks.     RT will continue to follow and monitor.    Jayna De La Cruz, RT  4/22/2021 9:58 AM     96

## 2021-04-29 ENCOUNTER — APPOINTMENT (OUTPATIENT)
Dept: ORTHOPEDIC SURGERY | Facility: CLINIC | Age: 61
End: 2021-04-29

## 2021-05-11 ENCOUNTER — APPOINTMENT (OUTPATIENT)
Dept: ORTHOPEDIC SURGERY | Facility: CLINIC | Age: 61
End: 2021-05-11
Payer: MEDICARE

## 2021-05-11 DIAGNOSIS — M17.12 UNILATERAL PRIMARY OSTEOARTHRITIS, LEFT KNEE: ICD-10-CM

## 2021-05-11 PROCEDURE — 99214 OFFICE O/P EST MOD 30 MIN: CPT

## 2021-05-11 PROCEDURE — 73564 X-RAY EXAM KNEE 4 OR MORE: CPT | Mod: LT

## 2021-05-11 NOTE — HISTORY OF PRESENT ILLNESS
[de-identified] : This is very nice 61-year-old child experiencing chronic left knee pain, which is moderate in intensity. The pain moderately limits activities of daily living. Walking tolerance is somewhat reduced.  Not currently taking NSAIDs for this.  He cannot take NSAIDs because he stage II chronic kidney disease.  BMI is 40 newness of the knees lose weight.  He denies any brace.  He denies a cane or walker.  Not currently therapy.  He has bipolar depression and is working in his life together.

## 2021-05-11 NOTE — PHYSICAL EXAM
[de-identified] : Patient is well nourished, well-developed, in no acute distress, with appropriate mood and affect. The patient is oriented to time, place, and person. Respirations are even and unlabored. Gait evaluation does reveal a limp. There is no inguinal adenopathy. Examination of the contralateral knee shows normal range of motion, strength, no tenderness, and intact skin. The affected limb is well-perfused, without skin lesions, shows a grossly normal motor and sensory examination. Knee motion is significantly reduced and does cause significant pain. The knee moves from 5-100 degrees. The knee is stable within that range-of-motion to AP and ML stress. The alignment of the knee is 5 degrees varus. Muscle strength is normal. Pedal pulses are palpable. Hip examination was negative.\par  [de-identified] : Long standing knee, AP knee, lateral knee, and patellar views of the left knee were ordered and taken in the office and demonstrate degenerative joint disease of the knee with joint space narrowing, osteophyte formation, and subchondral sclerosis.

## 2021-05-11 NOTE — DISCUSSION/SUMMARY
[de-identified] : This patient has left knee osteoarthritis.  The patient is not an appropriate candidate for surgical intervention at this time. An extensive discussion was conducted on the natural history of the disease and the variety of surgical and non-surgical options available to the patient including, but not limited to non-steroidal anti-inflammatory medications, steroid injections, physical therapy, maintenance of ideal body weight, and reduction of activity.  Weight loss recommended.  Home exercise program recommended.  He will take Tylenol for pain.\par The patient will schedule an appointment as needed.\par \par The patient has been counseled regarding the elevated risks associated with surgical complications in patients with a BMI>35.  I explained to the patient the risk of obesity, which is well documented in the orthopedic literature as increasing risks of wound breakdown (dehiscence), drainage, periprosthetic joint infection, dissatisfaction, chronic pain, early failure and/or loosening of the prosthesis, and impaired overall outcome to the patient. The patient demonstrates a profound understanding of the increased risk. Nutritionist referral, methods of monitoring nutrition intake and calorie counting and bariatric surgery options were discussed with the patient. After a lengthy discussion, the patient agreed to make a coordinated effort at weight loss. The patient understands our BMI policy and if they are planning surgical intervention, then they will need to bring their BMI below 40 in order to proceed and they are agreeable to this.\par

## 2021-05-13 ENCOUNTER — APPOINTMENT (OUTPATIENT)
Dept: BARIATRICS/WEIGHT MGMT | Facility: CLINIC | Age: 61
End: 2021-05-13
Payer: MEDICARE

## 2021-05-13 PROCEDURE — 99442: CPT | Mod: 95

## 2021-05-26 ENCOUNTER — NON-APPOINTMENT (OUTPATIENT)
Age: 61
End: 2021-05-26

## 2021-05-27 ENCOUNTER — NON-APPOINTMENT (OUTPATIENT)
Age: 61
End: 2021-05-27

## 2021-06-08 ENCOUNTER — NON-APPOINTMENT (OUTPATIENT)
Age: 61
End: 2021-06-08

## 2021-06-10 ENCOUNTER — APPOINTMENT (OUTPATIENT)
Dept: ORTHOPEDIC SURGERY | Facility: CLINIC | Age: 61
End: 2021-06-10
Payer: MEDICARE

## 2021-06-10 DIAGNOSIS — M17.0 BILATERAL PRIMARY OSTEOARTHRITIS OF KNEE: ICD-10-CM

## 2021-06-10 PROCEDURE — 99214 OFFICE O/P EST MOD 30 MIN: CPT

## 2021-06-13 NOTE — PHYSICAL EXAM
[de-identified] : Right Lower Extremity\par o Knee :\par ¦ Inspection/Palpation : no tenderness to palpation, no swelling, varus deformity\par ¦ Range of Motion : 0 - 110 degrees, no crepitus\par ¦ Stability : no valgus or varus instability present on provocative testing, Lachman’s Test (-)\par ¦ Strength : flexion and extension 5/5\par o Muscle Bulk : normal muscle bulk present\par o Skin : no erythema, no ecchymosis\par o Sensation : sensation to pin intact\par o Vascular Exam : no edema, no cyanosis, dorsalis pedis artery pulse 2+, posterior tibial artery pulse 2+\par \par Left Lower Extremity\par o Knee :\par ¦ Inspection/Palpation : marked medial joint line tenderness to palpation, mild distal quadriceps tenderness to palpation, no swelling, marked varus deformity\par ¦ Range of Motion : 0 -110 degrees, no crepitus\par ¦ Stability : no valgus or varus instability present on provocative testing, Lachman’s Test (-)\par ¦ Strength : flexion and extension 5-/5\par o Muscle Bulk : normal muscle bulk present\par o Skin : no erythema, no ecchymosis\par o Sensation : sensation to pin intact\par o Vascular Exam : no edema, no cyanosis, dorsalis pedis artery pulse 2+, posterior tibial artery pulse 2+\par  [de-identified] : Patient comes to today's visit with outside imaging already performed. I reviewed the images in detail with the patient and discussed the findings as highlighted below.\par \par o Xrays of the left knee performed on 05/11/2021 by Dr. Ferrell in office: \par ¦  there are no soft tissue abnormalities, no fractures, alignment is normal, moderate to severe tricompartmental osteoarthritis with bone on bone apposition of the medial compartment, normal bone density, no bony lesions.\par \par

## 2021-06-13 NOTE — HISTORY OF PRESENT ILLNESS
[de-identified] : MARGI ART is a 61 year male presenting to the office complaining of left knee pain. He  presents to the office ambulating  independently. Patient reports pain intermittently for years. Patient has been seen in the past and diagnosed with advanced osteoarthritis.  Denies injury or trauma to the area. Patient states he is here today to discuss TKA because he cannot take the pain anymore.   The patient describes the pain as a dull aching, and occasionally sharp pain localized to the lateral aspect of his left knee that is intermittent in nature. His   symptoms are exacerbated with walking, stairs and standing.  Pain is alleviated with rest.  Patient denies instability, catching or locking of the knee. Patient is not taking anything for pain relief at this time.  Patient has tried corticosteroid injection and hyaluronic gel injections in the past with minimal relief in symptoms. He saw Dr. Ferrell in May 2021 who states he is not a candidate for surgery at this time. Patient is currently under the care of a podiatrist for a left foot ulcer.  Of note patient has PMHx of DMII, RA, HTN and vascular insufficiency. Patient denies any other complaints at this time.

## 2021-06-13 NOTE — DISCUSSION/SUMMARY
[de-identified] : The underlying pathophysiology was reviewed in great detail with the patient as well as the various treatment options, including ice, analgesics, NSAIDs, Physical therapy, steroid injections, hyaluronic gel injections, TKR\par \par Discussed at Total knee arthroplasty at great length today. Discussed that patient is currently not a candidate for a TKA due to his current weight, and  ulcer on the left foot. Discussed ulcer must be completely healed before further discussing surgical intervention. Patient was also advised to lose at least 30 pounds. \par \par Activity modifications and restrictions were discussed. I advised avoiding deep bending, squatting and high intensity activity.\par \par I discussed the importance of weight loss to alleviate his knee pain\par \par I have recommended utilizing a knee sleeve to provide added support and stability. \par \par FU 6 weeks or prn. \par \par All questions were answered, all alternatives discussed and the patient is in complete agreement with that plan. Follow-up appointment as instructed. Any issues and the patient will call or come in sooner.

## 2021-06-14 ENCOUNTER — EMERGENCY (EMERGENCY)
Facility: HOSPITAL | Age: 61
LOS: 1 days | Discharge: HOME | End: 2021-06-14
Attending: INTERNAL MEDICINE | Admitting: INTERNAL MEDICINE
Payer: MEDICARE

## 2021-06-14 VITALS
DIASTOLIC BLOOD PRESSURE: 89 MMHG | HEIGHT: 71 IN | OXYGEN SATURATION: 95 % | WEIGHT: 300.05 LBS | SYSTOLIC BLOOD PRESSURE: 166 MMHG | RESPIRATION RATE: 18 BRPM | HEART RATE: 99 BPM | TEMPERATURE: 99 F

## 2021-06-14 DIAGNOSIS — Z98.890 OTHER SPECIFIED POSTPROCEDURAL STATES: Chronic | ICD-10-CM

## 2021-06-14 LAB — SARS-COV-2 RNA SPEC QL NAA+PROBE: SIGNIFICANT CHANGE UP

## 2021-06-14 PROCEDURE — 87635 SARS-COV-2 COVID-19 AMP PRB: CPT

## 2021-06-14 PROCEDURE — 99283 EMERGENCY DEPT VISIT LOW MDM: CPT

## 2021-06-14 PROCEDURE — 99283 EMERGENCY DEPT VISIT LOW MDM: CPT | Mod: CS

## 2021-06-14 NOTE — ED PROVIDER NOTE - OBJECTIVE STATEMENT
62 yo M presents requesting COVID testing after confirmed exposure, pt is asymptomatic and fully vaccinated.

## 2021-06-14 NOTE — ED ADULT NURSE NOTE - NSIMPLEMENTINTERV_GEN_ALL_ED
[facility] INPATIENT ENCOUNTER  ORTHOPEDIC SURGERY DAILY PROGRESS NOTE  LOWER EXTREMITIES    ADMISSION DATE:  3/9/2017  DATE:  3/10/2017  CURRENT HOSPITAL DAY:  Hospital Day: 2  SURGEON: Surgeon(s) and Role:     * Nilson Smith MD - Primary    ATTENDING PHYSICIAN:  Nilson Smith MD  CODE STATUS:  Full Resuscitation    PROCEDURE PERFORMED:  Procedure(s) (LRB):  ARTHROPLASTY KNEE TOTAL (Right)  POSTOPERATIVE DAY:  1 Day Post-Op    INTERVAL HISTORY:    Pain controlled.  No chest pain, no shortness of breath. Denies fever or chills     MEDICATIONS:    The medication list was reviewed today.     OBJECTIVE:    Vital Signs  Vital Last Value 24 Hour Range   Temperature 97.8 °F (36.6 °C) Temp  Min: 97.3 °F (36.3 °C)  Max: 98.9 °F (37.2 °C)   Pulse 70 Pulse  Min: 60  Max: 90   Respiratory 20 Resp  Min: 16  Max: 20   Blood Pressure 122/56 BP  Min: 98/54  Max: 130/75   Pulse Oximetry 100 % SpO2  Min: 96 %  Max: 100 %     PHYSICAL EXAM:    Constitutional:  Awake and alert.  No acute distress.   Musculoskeletal: Positive extensor hallucis, flexor hallucis longus, tibialis anterior and Gastroc Soleus Complex . Positive dorsalis pedis, suprapatellar and talonavicular.  Positive capillary refill.  Dressing clean, dry and intact.     LABORATORY DATA:  No results found    Invalid input(s): GFR, GFRAA, CAPTH    Recent Labs  Lab 03/10/17  0535   WBC 19.8*   HGB 9.9*   HCT 31.4*          Recent Labs  Lab 03/10/17  0535 03/09/17  0544   INR 1.0 1.0        XR Knee Right AP and Lateral   Final Result   IMPRESSION:      Postoperative views of the right knee demonstrate interval right knee total   arthroplasty. Medial femoral condyle staples are unchanged. Mild   subcutaneous swelling.      I have reviewed the images and agree with the Resident interpretation.             ASSESSMENT:   CHRONIC PAIN OF RIGHT KNEE s/p ARTHROPLASTY KNEE TOTAL   Acute blood loss anemia    PLAN:  Physical Therapy/Occupational Therapy, pain meds,  anticoagulation/TEDs/sequential compression devices.  Ferrous sulfate for anemia.    Discharge home today or tomorrow with in home therapy  Deep venous thrombosis/Venous thromboembolism Prophylaxis:chronic coumadin with lovenox bridge  Venous thromboembolism Pharmacologic Prophylaxis: Yes   Venous thromboembolism Mechanical Prophylaxis: Yes       Implemented All Universal Safety Interventions:  [place] to call system. Call bell, personal items and telephone within reach. Instruct patient to call for assistance. Room bathroom lighting operational. Non-slip footwear when patient is off stretcher. Physically safe environment: no spills, clutter or unnecessary equipment. Stretcher in lowest position, wheels locked, appropriate side rails in place.

## 2021-06-14 NOTE — ED PROVIDER NOTE - PATIENT PORTAL LINK FT
You can access the FollowMyHealth Patient Portal offered by [facility] by registering at the following website: [email or web address]. By joining [employer]’s FollowMyHealth portal, you will also be able to view your health information using other applications (apps) compatible with our system.

## 2021-06-14 NOTE — ED ADULT NURSE NOTE - OBJECTIVE STATEMENT
Price (Do Not Change): 0.00 Detail Level: Simple Instructions: This plan will send the code FBSD to the PM system.  DO NOT or CHANGE the price. Pt. presents to ED for COVID swab. Pt. A&Ox4. Pt. asymptomatic and well-appearing. Pt. denies SOB, headache, fever/chills, abdominal pain, n/v/d, chest pain, weakness/fatigue, loss of smell/taste. VSS. COVID swab collected and sent to lab. Safety and comfort provided.

## 2021-06-16 NOTE — ED BEHAVIORAL HEALTH ASSESSMENT NOTE - NS ED BHA REVIEW OF ED CHART VITAL SIGNS REVIEWED
[FreeTextEntry1] : "she wants to get checked out"\par \par HPI for today: : complaint with meds. \par She walks with walker. no dizziness. no syncope. no chestp ain. no dyspnea. No palpitaitons.\par \par \par old note:   compliant with meds.  Not taking any meds.  nmo headaches. no dizziness. in summer she is swollen. and she takes water pills. \par \par old note:  echo shows. diastolilc dysfuntction and moderate pulm HTN\par patient was started on lasix prn. she has not needed it.  no dyspnea. no leg edema now. \par \par \par old note: This is a 89 year old woman with no significant past medical history here with evaluate for coronary artery disease \par She takes supplements. She feels ok. She denies any symptoms. no headaches. no dizziness. no dyspnea.  no syncope. no palpitaitons. \par No chest pain.  \par I spoke to daughter - in law. she says she went to the primary doctor , she has sweling of the feet and ankle, redness of the feet.  and also she takes medications for cholesterol. \par she had a possible stroke 2 years ago.  \par she was in hospital for coup[le of days in [place].  [facility]. 
Yes

## 2021-06-17 NOTE — PROGRESS NOTE BEHAVIORAL HEALTH - IMPULSE CONTROL
Detail Level: Simple Comment: Extensive verruca of feet > hands with very large mosaic verruca on right plantar foot - completely resolved on exam today, no need for further treatment at this time. \\n\\nPatient received first HPV vaccine 12/2020 and 2nd 4/2021 - has 3rd scheduled for August Normal

## 2021-06-21 ENCOUNTER — APPOINTMENT (OUTPATIENT)
Dept: ENDOCRINOLOGY | Facility: CLINIC | Age: 61
End: 2021-06-21

## 2021-06-21 NOTE — CONSULT NOTE ADULT - PROVIDER SPECIALTY LIST ADULT
I have reviewed discharge instructions with the patient. The patient verbalized understanding. Patient left ED via Discharge Method: ambulatory to Home with self. Opportunity for questions and clarification provided. Patient given 3 scripts. To continue your aftercare when you leave the hospital, you may receive an automated call from our care team to check in on how you are doing. This is a free service and part of our promise to provide the best care and service to meet your aftercare needs.  If you have questions, or wish to unsubscribe from this service please call [phone]. Thank you for Choosing our [facility] Emergency Department.
Toxicology

## 2021-06-23 NOTE — ED ADULT NURSE NOTE - CAS EDN DISCHARGE ASSESSMENT
NG tube flushed with 60ml sterile water per order then placed on LIWS. Patient tolerated well. Alert and oriented to person, place and time

## 2021-06-28 NOTE — ED PROVIDER NOTE - DISCHARGE REVIEW MATERIAL PRESENTED
Progress Notes by Dae Medina PA-C at 1/11/2020 10:20 AM     Author: Dae Medina PA-C Service: -- Author Type: Physician Assistant    Filed: 1/11/2020  2:31 PM Encounter Date: 1/11/2020 Status: Signed    : Dae Medina PA-C (Physician Assistant)       Subjective:      Patient ID: Stefanie Sinclair is a 22 y.o. female.    Chief Complaint:    HPI     Stefanie Sinclair is a 22 y.o. female who presents today complaining of two day acute onset of Influenza like illness symptoms to include fever, dry nonproductive cough, sore throat, odynophagia, rhinorrhea, myalgias, arthralgias, headache and fatigue.      Patient had acute onset of all the above symptoms.    Patient has not had a seasonal influenza immunization.    Last dose of antipyretic.  None.  Temperature in the office is currently 99.6.    Anorexia: NO    Patient is taking fluids and is micturating.        Past Medical History:   Diagnosis Date   ? Chicken pox    ? History of sprained elbow 2012    left   ? Viral Infection     Created by Conversion        Past Surgical History:   Procedure Laterality Date   ? TONSILLECTOMY AND ADENOIDECTOMY Bilateral 06/2017       Family History   Problem Relation Age of Onset   ? Hypertension Mother    ? Hypertension Maternal Grandmother    ? Diabetes Maternal Grandfather    ? Prostate cancer Maternal Grandfather    ? ADD / ADHD Father    ? Alcohol abuse Father    ? Anxiety disorder Father    ? ADD / ADHD Brother    ? Tuberculosis Paternal Grandfather    ? Diabetes Paternal Grandfather    ? Schizophrenia Paternal Grandmother    ? Anxiety disorder Paternal Grandmother        Social History     Tobacco Use   ? Smoking status: Current Some Day Smoker   ? Smokeless tobacco: Never Used   ? Tobacco comment: Vaping   Substance Use Topics   ? Alcohol use: Not on file   ? Drug use: Not on file       Review of Systems  As above in HPI, otherwise balance of Review of Systems are negative.    Objective:     /68   Pulse 94    Temp 99  F (37.2  C)   Resp 16   Wt 120 lb (54.4 kg)   LMP 12/10/2019   SpO2 96%   Breastfeeding No   BMI 21.09 kg/m      Physical Exam  General: Patient is resting comfortably no acute distress is afebrile  HEENT: Head is normocephalic atraumatic   eyes are PERRL EOMI sclera anicteric   TMs are erythematous on the right clear on the left  Throat is with mild pharyngeal wall erythema and no exudate  No cervical lymphadenopathy present  LUNGS: Clear to auscultation bilaterally  HEART: Regular rate and rhythm  Skin: Without rash non-diaphoretic    Lab:  Recent Results (from the past 24 hour(s))   Influenza A/B Rapid Test- Nasal Swab   Result Value Ref Range    Influenza  A, Rapid Antigen Influenza A antigen detected (!) No Influenza A antigen detected    Influenza B, Rapid Antigen No Influenza B antigen detected No Influenza B antigen detected   Rapid Strep A Screen-Throat   Result Value Ref Range    Rapid Strep A Antigen No Group A Strep detected, presumptive negative No Group A Strep detected, presumptive negative       Assessment:     Procedures    The primary encounter diagnosis was Influenza due to influenza virus, type A, human. Diagnoses of Cough and Other non-recurrent acute nonsuppurative otitis media of right ear were also pertinent to this visit.    Plan:     1. Influenza due to influenza virus, type A, human  oseltamivir (TAMIFLU) 75 MG capsule   2. Cough  Influenza A/B Rapid Test- Nasal Swab    CANCELED: Rapid Strep A Screen-Throat    CANCELED: Rapid Strep A Screen-Throat   3. Other non-recurrent acute nonsuppurative otitis media of right ear  amoxicillin (AMOXIL) 875 MG tablet    Rapid Strep A Screen-Throat    Group A Strep, RNA Direct Detection, Throat         Patient Instructions   Your rapid influenza test came back positive for flu. You are contagious until your fever is gone for 24 hours. Maintain good hand hygiene, cover your cough, and limit contact to prevent spreading the illness.  Symptoms typically last 1-2 weeks.    Symptom management:  - Drink plenty of fluids and allow for plenty of rest  - Use tylenol or ibuprofen every 4-6 hours for fever/discomfort    Reasons to be seen immediately for re-evaluation:  - Have trouble breathing or are short of breath  - Feel pain or pressure in your chest or belly  - Get suddenly dizzy  - Feel confused  - Have severe vomiting    If no symptom improvement in 1 week, follow-up with your primary care provider.      Patient Education     Influenza (Adult)    Influenza is also called the flu. It is a viral illness that affects the air passages of your lungs. It is different from the common cold. The flu can easily be passed from one to person to another. It may be spread through the air by coughing and sneezing. Or it can be spread by touching the sick person and then touching your own eyes, nose, or mouth.  The flu starts 1 to 3 days after you are exposed to the flu virus. It may last for 1 to 2 weeks but many people feel tired or fatigued for many weeks afterward. You usually dont need to take antibiotics unless you have a complication. This might be an ear or sinus infection or pneumonia.  Symptoms of the flu may be mild or severe. They can include extreme tiredness (wanting to stay in bed all day), chills, fevers, muscle aches, soreness with eye movement, headache, and a dry, hacking cough.  Home care  Follow these guidelines when caring for yourself at home:    Avoid being around cigarette smoke, whether yours or other peoples.    Acetaminophen or ibuprofen will help ease your fever, muscle aches, and headache. Dont give aspirin to anyone younger than 18 who has the flu. Aspirin can harm the liver.    Nausea and loss of appetite are common with the flu. Eat light meals. Drink 6 to 8 glasses of liquids every day. Good choices are water, sport drinks, soft drinks without caffeine, juices, tea, and soup. Extra fluids will also help loosen secretions in your  "nose and lungs.    Over-the-counter cold medicines will not make the flu go away faster. But the medicines may help with coughing, sore throat, and congestion in your nose and sinuses. Dont use a decongestant if you have high blood pressure.    Stay home until your fever has been gone for at least 24 hours without using medicine to reduce fever.  Follow-up care  Follow up with your healthcare provider, or as advised, if you are not getting better over the next week.  If you are age 65 or older, talk with your provider about getting a pneumococcal vaccine every 5 years. You should also get this vaccine if you have chronic asthma or COPD. All adults should get a flu vaccine every fall. Ask your provider about this.  When to seek medical advice  Call your healthcare provider right away if any of these occur:    Cough with lots of colored mucus (sputum) or blood in your mucus    Chest pain, shortness of breath, wheezing, or trouble breathing    Severe headache, or face, neck, or ear pain    New rash with fever    Fever of 100.4 F (38 C) or higher, or as directed by your healthcare provider    Confusion, behavior change, or seizure    Severe weakness or dizziness    You get a new fever or cough after getting better for a few days  Date Last Reviewed: 1/1/2017 2000-2017 [employer]. [address] [place] [address], [place] [address]. All rights reserved. This information is not intended as a substitute for professional medical care. Always follow your healthcare professional's instructions.            What You Should Know About Influenza (Flu) Antiviral Drugs  Can flu illness be treated?  Yes. There are prescription medications called \"antiviral drugs\" that can be used to treat flu illness.  What are antiviral drugs?  Influenza antiviral drugs are prescription medicines (pills, liquid, or an inhaled powder) that fight against flu in your body. Antiviral drugs are not sold over-the-counter. You can only get them if " you have a prescription from a health care provider. Antiviral drugs are different from antibiotics, which fight against bacterial infections.   What should I do if I think I have the flu?   If you get sick with flu, antiviral drugs are a treatment option. Check with your health care provider promptly if you are at high risk of serious flu complications (see the next page for full list of high risk factors) and you get flu symptoms. Flu symptoms can include fever, cough, sore throat, runny or stuffy nose, body aches, headache, chills, and fatigue. Your doctor may prescribe antiviral drugs to treat your flu illness.  Should I still get a flu vaccine?  Yes. Antiviral drugs are not a substitute for getting a flu vaccine. While flu vaccine can vary in how well it works, a flu vaccine is the first and best way to prevent influenza. Antiviral drugs are a second line of defense to treat the flu if you get sick.  What are the benefits of antiviral drugs?  Antiviral treatment works best when started within two days of getting symptoms. Antiviral drugs can lessen fever and other symptoms, and shorten the time you are sick by about one day. They also can prevent serious flu complications, like pneumonia.   For people at high risk of serious flu complications, treatment with an antiviral drug can mean the difference between having a milder illness versus a very serious illness that could result in a hospital stay. For adults hospitalized with flu illness, some studies have reported that early antiviral treatment can reduce the risk of death.  What antiviral drugs are recommended this flu season?  There are four FDA-approved antiviral drugs recommended by CDC this season: oseltamivir phosphate (available as a generic version or under the trade name Tamiflu ), zanamivir (trade name Relenza ), peramivir (trade name Rapivab ), and baloxavir marboxil (trade name Xofluza ).   Oseltamivir is available as a pill or liquid and zanamivir  is a powder that is inhaled. (Zanamivir is not recommended for people with breathing problems like asthma or COPD). Peramivir is given intravenously by a health care provider, and baloxavir is a pill given as a single dose by mouth.  What are the possible side effects of antiviral drugs?   Side effects vary for each medication. For example, the most common side effects for oseltamivir are nausea and vomiting, zanamivir can cause bronchospasm, and peramivir can cause diarrhea.  Other less common side effects also have been reported. Your health care provider can give you more information about these drugs or you can check the Food and Drug Administration (FDA) website for specific information about antiviral drugs, including the 's package insert.   For more information, visit:www.cdc.gov/flu  or call 7-[facility] HCVG-15-FLU-102 December 07, 2018   When should antiviral drugs be taken for treatment?  Studies show that flu antiviral drugs work best for treatment when they are started within two days of getting sick. However, starting them later can still be helpful, especially if the sick person is at high risk of serious flu complications or is very sick from the flu. Follow instructions for taking these drugs.  How long should antiviral drugs be taken?  To treat flu, oseltamvir and zanamivir are usually prescribed for 5 days, although people hospitalized with flu may need the medicine for longer than 5 days. Rapivab  is given intravenously for 15 to 30 minutes. Baloxavir is given in a single dose.  Can children take antiviral drugs?  Yes, though this varies by medication. Oseltamivir is recommended by CDC and the American Academy of Pediatrics (AAP) for early treatment of flu in people of any age, and for the prevention of flu in people 3 months and older. Zanamivir is recommended for early treatment of flu in people 7 years and older, and for the prevention of flu in people 5 years and older.  Peramivir is recommended for early treatment in people 2 years and older. Baloxavir is recommended for early treatment of flu in people 12 years and older.  Can pregnant and breastfeeding women take antiviral drugs?  Oral oseltamivir is recommended for treatment of pregnant women with flu because compared to other recommended antiviral medications, it has the most studies available to suggest that it is safe and beneficial during pregnancy. Baloxavir is not recommended for pregnant women or breastfeeding mothers.  Who should take antiviral drugs?  It's very important that antiviral drugs be used early to treat people who are very sick with flu (for example, people who are in the hospital) and people who are sick with flu who are at high risk of serious flu complications, either because of their age or because they have a high risk medical condition. Other people also may be treated with antiviral drugs by their health care provider this season. Most people who are otherwise healthy and get the flu, however, do not need to be treated with antiviral drugs.  The following is a list of all the health and age factors that are known to increase a person's risk of getting serious complications from the flu:   Asthma   Blood disorders (such as sickle cell disease)  Chronic lung disease (such as chronic obstructive pulmonary disease [COPD] and cystic fibrosis)  Endocrine disorders (such as diabetes mellitus)  People who are obese with a body mass index [BMI] of 40 or higher  Heart disease (such as congenital heart disease, congestive heart failure and coronary artery disease)   Kidney disorders  Liver disorders  Metabolic disorders (such as inherited metabolic disorders and mitochondrial disorders)  Neurologic and neurodevelopment conditions  People younger than 19 years old and on long-term aspirin or salicylate-containing medications  People with a weakened immune system due to disease (such as people with HIV or AIDS, or  some cancers such as leukemia) or medications (such as those receiving chemotherapy or radiation treatment for cancer, or persons with chronic conditions requiring chronic corticosteroids or other drugs that suppress the immune system)    Other people at high risk from the flu:  Adults 65 years and older  Children younger than 2 years old1  Pregnant women and women up to 2 weeks after the end of pregnancy  American Indians and [place] Natives  People who live in nursing homes and other long-term-care facilities    1 Although all children younger than 5 years old are considered at high risk for serious flu complications, the highest risk is for those younger than 2 years old, with the highest hospitalization and death rates among infants younger than 6 months old.  It is especially important that these people get a flu vaccine and seek medical treatment quickly if they get flu syptoms            Suggested increased rest increase fluids and bedside humidification with ultrasonic humidifier  Over-the-counter Tylenol dosed by weight.  Follow packaging directions  Elevate head for comfort at nighttime  Antibiotic as written.  Indication for return was reviewed.      You have an infection of the middle ear, also called otitis media.    Treatment:  - Use antibiotics as prescribed until completion, even if symptoms improve  - May give tylenol or ibuprofen for irritation and discomfort (see tables below for doses)  - Follow-up with their pediatrician in 10 days for another ear check  - Should notice symptom improvement in the next 36-48 hours    When to come back sooner for re-evaluation?  - If symptoms have not begun improving after 48 hours of taking antibiotics  - Develops a fever or current fever worsens  - Becomes short of breath  - Neck stiffness  - Difficulty swallowing   - Signs of dehydration including severe thirst, dark urine, dry skin, cracked lips      Patient Education     Otitis Media (Middle-Ear Infection) in  Adults  Otitis media is another name for a middle-ear infection. It means an infection behind your eardrum. This kind of ear infection can happen after any condition that keeps fluid from draining from the middle ear. These conditions include allergies, a cold, a sore throat, or a respiratory infection.  Middle-ear infections are common in children, but they can also happen in adults. An ear infection in an adult may mean a more serious problem than in a child. So you may need additional tests. If you have an ear infection, you should see your health care provider for treatment.  What are the types of middle-ear infections?  Infections can affect the middle ear in several ways. They are:    Acute otitis media. This middle-ear infection occurs suddenly. It causes swelling and redness. Fluid and mucus become trapped inside the ear. You can have a fever and ear pain.    Otitis media with effusion. Fluid (effusion) and mucus build up in the middle ear after the infection goes away. You may feel like your middle ear is full. This can continue for months and may affect your hearing.    Chronic otitis media with effusion. Fluid (effusion) remains in the middle ear for a long time. Or it builds up again and again, even though there is no infection. This type of middle-ear infection may be hard to treat. It may also affect your hearing.  Who is more likely to get a middle-ear infection?  You are more likely to get an ear infection if you:    Smoke or are around someone who smokes    Have seasonal or year-round allergy symptoms    Have a cold or other upper respiratory infection  What causes a middle-ear infection?  The middle ear connects to the throat by a canal called the eustachian tube. This tube helps even out the pressure between the outer ear and the inner ear. A cold or allergy can irritate the tube or cause the area around it to swell. This can keep fluid from draining from the middle ear. The fluid builds up behind  the eardrum. Bacteria and viruses can grow in this fluid. The bacteria and viruses cause the middle-ear infection.  What are the symptoms of a middle-ear infection?  Common symptoms of a middle-ear infection in adults are:    Pain in 1 or both ears    Drainage from the ear    Muffled hearing    Sore throat   You may also have a fever. Rarely, your balance can be affected.  These symptoms may be the same as for other conditions. Its important to talk with your health care provider if you think you have a middle-ear infection. If you have a high fever, severe pain behind your ear, or paralysis in your face, see your provider as soon as you can.  How is a middle-ear infection diagnosed?  Your health care provider will take a medical history and do a physical exam. He or she will look at the outer ear and eardrum with an otoscope. The otoscope is a lighted tool that lets your provider see inside the ear. A pneumatic otoscope blows a puff of air into the ear to check how well your eardrum moves. If you eardrum doesnt move well, it may mean you have fluid behind it.  Your provider may also do a test called tympanometry. This test tells how well the middle ear is working. It can find any changes in pressure in the middle ear. Your provider may test your hearing with a tuning fork.  How is a middle-ear infection treated?  A middle-ear infection may be treated with:    Antibiotics, taken by mouth or as ear drops    Medication for pain    Decongestants, antihistamines, or nasal steroids  Your health care provider may also have you try autoinsufflation. This helps adjust the air pressure in your ear. For this, you pinch your nose and gently exhale. This forces air back through the eustachian tube.  The exact treatment for your ear infection will depend on the type of infection you have. In general, if your symptoms dont get better in 48 to 72 hours, contact your health care provider.  Middle-ear infections can cause long-term  problems if not treated. They can lead to:    Infection in other parts of the head    Permanent hearing loss    Paralysis of a nerve in your face  If you have a middle-ear infection that doesnt get better, you may need to see an ear, nose, and throat specialist (otolaryngologist). You may need a CT scan or MRI to check for head and neck cancer.  Ear tubes  Sometimes fluid stays in the middle ear even after you take antibiotics and the infection goes away. In this case, your health care provider may suggest that a small tube be placed in your ear. The tube is put at the opening of the eardrum. The tube keeps fluid from building up and relieves pressure in the middle ear. It can also help you hear better. This surgery is called myringotomy. It is not often done in adults.  The tubes usually fall out on their own after 6 months to a year.    7091-4280 [employer]. [address] [place] [address], [place] [address]. All rights reserved. This information is not intended as a substitute for professional medical care. Always follow your healthcare professional's instructions.                        .

## 2021-06-29 ENCOUNTER — EMERGENCY (EMERGENCY)
Facility: HOSPITAL | Age: 61
LOS: 1 days | Discharge: HOME | End: 2021-06-29
Attending: EMERGENCY MEDICINE | Admitting: EMERGENCY MEDICINE
Payer: MEDICARE

## 2021-06-29 VITALS
DIASTOLIC BLOOD PRESSURE: 73 MMHG | WEIGHT: 300.05 LBS | HEART RATE: 89 BPM | SYSTOLIC BLOOD PRESSURE: 114 MMHG | TEMPERATURE: 98 F | HEIGHT: 71 IN | RESPIRATION RATE: 18 BRPM

## 2021-06-29 VITALS
DIASTOLIC BLOOD PRESSURE: 93 MMHG | HEART RATE: 75 BPM | RESPIRATION RATE: 18 BRPM | SYSTOLIC BLOOD PRESSURE: 118 MMHG | OXYGEN SATURATION: 97 %

## 2021-06-29 DIAGNOSIS — Z98.890 OTHER SPECIFIED POSTPROCEDURAL STATES: Chronic | ICD-10-CM

## 2021-06-29 LAB
ALBUMIN SERPL ELPH-MCNC: 3.2 G/DL — LOW (ref 3.3–5)
ALP SERPL-CCNC: 102 U/L — SIGNIFICANT CHANGE UP (ref 40–120)
ALT FLD-CCNC: 30 U/L — SIGNIFICANT CHANGE UP (ref 10–45)
ANION GAP SERPL CALC-SCNC: 11 MMOL/L — SIGNIFICANT CHANGE UP (ref 5–17)
AST SERPL-CCNC: 19 U/L — SIGNIFICANT CHANGE UP (ref 10–40)
BASOPHILS # BLD AUTO: 0.03 K/UL — SIGNIFICANT CHANGE UP (ref 0–0.2)
BASOPHILS NFR BLD AUTO: 0.4 % — SIGNIFICANT CHANGE UP (ref 0–2)
BILIRUB SERPL-MCNC: 0.4 MG/DL — SIGNIFICANT CHANGE UP (ref 0.2–1.2)
BUN SERPL-MCNC: 42 MG/DL — HIGH (ref 7–23)
CALCIUM SERPL-MCNC: 8.8 MG/DL — SIGNIFICANT CHANGE UP (ref 8.4–10.5)
CHLORIDE SERPL-SCNC: 99 MMOL/L — SIGNIFICANT CHANGE UP (ref 96–108)
CO2 SERPL-SCNC: 23 MMOL/L — SIGNIFICANT CHANGE UP (ref 22–31)
CREAT SERPL-MCNC: 1.89 MG/DL — HIGH (ref 0.5–1.3)
EOSINOPHIL # BLD AUTO: 0.25 K/UL — SIGNIFICANT CHANGE UP (ref 0–0.5)
EOSINOPHIL NFR BLD AUTO: 3.1 % — SIGNIFICANT CHANGE UP (ref 0–6)
GLUCOSE SERPL-MCNC: 140 MG/DL — HIGH (ref 70–99)
HCT VFR BLD CALC: 37.4 % — LOW (ref 39–50)
HGB BLD-MCNC: 12.4 G/DL — LOW (ref 13–17)
IMM GRANULOCYTES NFR BLD AUTO: 1.8 % — HIGH (ref 0–1.5)
LYMPHOCYTES # BLD AUTO: 2.46 K/UL — SIGNIFICANT CHANGE UP (ref 1–3.3)
LYMPHOCYTES # BLD AUTO: 30.1 % — SIGNIFICANT CHANGE UP (ref 13–44)
MCHC RBC-ENTMCNC: 28.5 PG — SIGNIFICANT CHANGE UP (ref 27–34)
MCHC RBC-ENTMCNC: 33.2 GM/DL — SIGNIFICANT CHANGE UP (ref 32–36)
MCV RBC AUTO: 86 FL — SIGNIFICANT CHANGE UP (ref 80–100)
MONOCYTES # BLD AUTO: 1.03 K/UL — HIGH (ref 0–0.9)
MONOCYTES NFR BLD AUTO: 12.6 % — SIGNIFICANT CHANGE UP (ref 2–14)
NEUTROPHILS # BLD AUTO: 4.26 K/UL — SIGNIFICANT CHANGE UP (ref 1.8–7.4)
NEUTROPHILS NFR BLD AUTO: 52 % — SIGNIFICANT CHANGE UP (ref 43–77)
NRBC # BLD: 0 /100 WBCS — SIGNIFICANT CHANGE UP (ref 0–0)
PLATELET # BLD AUTO: 164 K/UL — SIGNIFICANT CHANGE UP (ref 150–400)
POTASSIUM SERPL-MCNC: 3.5 MMOL/L — SIGNIFICANT CHANGE UP (ref 3.5–5.3)
POTASSIUM SERPL-SCNC: 3.5 MMOL/L — SIGNIFICANT CHANGE UP (ref 3.5–5.3)
PROT SERPL-MCNC: 7 G/DL — SIGNIFICANT CHANGE UP (ref 6–8.3)
RBC # BLD: 4.35 M/UL — SIGNIFICANT CHANGE UP (ref 4.2–5.8)
RBC # FLD: 13.8 % — SIGNIFICANT CHANGE UP (ref 10.3–14.5)
SARS-COV-2 RNA SPEC QL NAA+PROBE: SIGNIFICANT CHANGE UP
SODIUM SERPL-SCNC: 133 MMOL/L — LOW (ref 135–145)
TROPONIN I SERPL-MCNC: <.017 NG/ML — LOW (ref 0.02–0.06)
WBC # BLD: 8.18 K/UL — SIGNIFICANT CHANGE UP (ref 3.8–10.5)
WBC # FLD AUTO: 8.18 K/UL — SIGNIFICANT CHANGE UP (ref 3.8–10.5)

## 2021-06-29 PROCEDURE — 99284 EMERGENCY DEPT VISIT MOD MDM: CPT | Mod: 25

## 2021-06-29 PROCEDURE — 93010 ELECTROCARDIOGRAM REPORT: CPT

## 2021-06-29 PROCEDURE — 70450 CT HEAD/BRAIN W/O DYE: CPT | Mod: 26,MA

## 2021-06-29 PROCEDURE — 85025 COMPLETE CBC W/AUTO DIFF WBC: CPT

## 2021-06-29 PROCEDURE — 71045 X-RAY EXAM CHEST 1 VIEW: CPT

## 2021-06-29 PROCEDURE — 71045 X-RAY EXAM CHEST 1 VIEW: CPT | Mod: 26

## 2021-06-29 PROCEDURE — 70450 CT HEAD/BRAIN W/O DYE: CPT | Mod: MA

## 2021-06-29 PROCEDURE — 99285 EMERGENCY DEPT VISIT HI MDM: CPT

## 2021-06-29 PROCEDURE — 93005 ELECTROCARDIOGRAM TRACING: CPT

## 2021-06-29 PROCEDURE — 84484 ASSAY OF TROPONIN QUANT: CPT

## 2021-06-29 PROCEDURE — 82962 GLUCOSE BLOOD TEST: CPT

## 2021-06-29 PROCEDURE — 80053 COMPREHEN METABOLIC PANEL: CPT

## 2021-06-29 PROCEDURE — 87635 SARS-COV-2 COVID-19 AMP PRB: CPT

## 2021-06-29 PROCEDURE — 36415 COLL VENOUS BLD VENIPUNCTURE: CPT

## 2021-06-29 RX ORDER — SODIUM CHLORIDE 9 MG/ML
1000 INJECTION INTRAMUSCULAR; INTRAVENOUS; SUBCUTANEOUS ONCE
Refills: 0 | Status: COMPLETED | OUTPATIENT
Start: 2021-06-29 | End: 2021-06-29

## 2021-06-29 RX ORDER — SODIUM CHLORIDE 9 MG/ML
500 INJECTION INTRAMUSCULAR; INTRAVENOUS; SUBCUTANEOUS ONCE
Refills: 0 | Status: COMPLETED | OUTPATIENT
Start: 2021-06-29 | End: 2021-06-29

## 2021-06-29 RX ADMIN — SODIUM CHLORIDE 500 MILLILITER(S): 9 INJECTION INTRAMUSCULAR; INTRAVENOUS; SUBCUTANEOUS at 19:40

## 2021-06-29 RX ADMIN — SODIUM CHLORIDE 1000 MILLILITER(S): 9 INJECTION INTRAMUSCULAR; INTRAVENOUS; SUBCUTANEOUS at 20:30

## 2021-06-29 RX ADMIN — SODIUM CHLORIDE 1000 MILLILITER(S): 9 INJECTION INTRAMUSCULAR; INTRAVENOUS; SUBCUTANEOUS at 21:30

## 2021-06-29 RX ADMIN — SODIUM CHLORIDE 500 MILLILITER(S): 9 INJECTION INTRAMUSCULAR; INTRAVENOUS; SUBCUTANEOUS at 18:40

## 2021-06-29 NOTE — ED PROVIDER NOTE - ATTENDING CONTRIBUTION TO CARE
Dr. Mai: I performed a face to face bedside interview with patient regarding history of present illness, review of symptoms and past medical history. I completed an independent physical exam.  I have discussed patient's plan of care with PA.   I agree with note as stated above, having amended the EMR as needed to reflect my findings.   This includes HISTORY OF PRESENT ILLNESS, HIV, PAST MEDICAL/SURGICAL/FAMILY/SOCIAL HISTORY, ALLERGIES AND HOME MEDICATIONS, REVIEW OF SYSTEMS, PHYSICAL EXAM, and any PROGRESS NOTES during the time I functioned as the attending physician for this patient.    Dr. Mai: This H&P has been written by myself in its entirety

## 2021-06-29 NOTE — ED PROVIDER NOTE - PROGRESS NOTE DETAILS
PA Tiberio- CT head negative.  Patient reassessed. Running low initially, after 1.5 liters of fluid patient BP now up to 118/93. Rectal temp 97.8.  Patient states his cataracts have "gotten very bad" following optho Dr. Alvarez- surgery on hold with Dr. Mejia bc of uncontrolled BS. Patient states that the higher his BS runs, the more blurry his vision is. Here in ED s/p fluids and decrease in BS his vision improved. Currently denies headache, dizziness, weakness, numbness, tingling. States he feels much better. Will DC with PMD and optho follow up. Strict ED return precautions discussed. Patient understands and agrees.

## 2021-06-29 NOTE — ED PROVIDER NOTE - PATIENT PORTAL LINK FT
You can access the FollowMyHealth Patient Portal offered by [facility] by registering at the following website: [email or web address]. By joining [employer]’s FollowMyHealth portal, you will also be able to view your health information using other applications (apps) compatible with our system.

## 2021-06-29 NOTE — ED PROVIDER NOTE - CLINICAL SUMMARY MEDICAL DECISION MAKING FREE TEXT BOX
Pt p/w blurred vision x 2 weeks, will check labs, CT head, reassess Pt p/w blurred vision x 2 weeks, will check labs, CT head, reassess    **update: DESIREE Navarro- CT head negative.  Patient reassessed. Running low initially, after 1.5 liters of fluid patient BP now up to 118/93. Rectal temp 97.8.  Patient states his cataracts have "gotten very bad" following optho Dr. Alvarez- surgery on hold with Dr. Mejia bc of uncontrolled BS. Patient states that the higher his BS runs, the more blurry his vision is. Here in ED s/p fluids and decrease in BS his vision improved. Currently denies headache, dizziness, weakness, numbness, tingling. States he feels much better. Will DC with PMD and Optho follow up. Strict ED return precautions discussed. Patient understands and agrees.

## 2021-06-29 NOTE — ED PROVIDER NOTE - OBJECTIVE STATEMENT
Dr. Mai: 61M h/o DM, HLD, H TN, bipolar d/o p/w 2 weeks of blurred vision, dietary indiscretion. No chest pain or sob, no nausea or vomiting, no abdo pain.

## 2021-06-29 NOTE — ED PROVIDER NOTE - NSFOLLOWUPINSTRUCTIONS_ED_ALL_ED_FT
Follow up with your PMD within 48-72 hrs -show copies of your reports given to you.  Follow up with your Ophthalmologist within the week.   Take all of your other medications as previously prescribed.  Worsening, continued or ANY new concerning symptoms return to the Emergency Department.     Blurred Vision, Adult    Having blurred vision means that you cannot see things clearly. Your vision may seem fuzzy or out of focus. It can involve your vision for objects that are close or far away. It may affect one or both eyes. There are many causes of blurred vision, including cataracts, macular degeneration, eye inflammation (uveitis), and diabetic retinopathy.  In many cases, blurred vision has to do with the shape of your eye. An abnormal eye shape means you cannot focus well (refractive error). When this happens, it can cause:  •Faraway objects to look blurry (nearsightedness).      •Close objects to look blurry (farsightedness).      •Blurry vision at any distance (astigmatism).      Refractive errors are often corrected with glasses or contacts.    Blurred vision can be diagnosed based on your symptoms and a physical exam. Tell your health care provider about any other health problems you have, any recent eye injury, and any prior surgeries. You may need to see a health care provider who specializes in eye problems (ophthalmologist). Your treatment will depend on what is causing your blurred vision.      Follow these instructions at home:    •Keep all follow-up visits as told by your health care provider. This is important. These include any visits to your eye specialists.      • Do not drive or use heavy machinery if your vision is blurry.      •Use eye drops only as told by your health care provider.      •If you were prescribed glasses or contact lenses, wear the glasses or contacts as told by your health care provider.      •Schedule eye exams regularly.      •Pay attention to any changes in your symptoms.        Contact a health care provider if:    •Your symptoms do not improve or they get worse.    •You have:  •New symptoms.      •A headache.      •Trouble seeing at night.      •Trouble noticing the difference between colors.      •You notice:  •Drooping of your eyelids.      •Drainage coming from your eyes.      •A rash around your eyes.          Get help right away if:  •You have:  •Severe eye pain.      •A severe headache.      •A sudden change in vision.      •A sudden loss of vision.      •A vision change after an injury.        •You notice flashing lights in your field of vision. Your field of vision is the area that you can see without moving your eyes.        Summary    •Having blurred vision means that you cannot see things clearly. Your vision may seem fuzzy or out of focus.      •There are many causes of blurred vision. In many cases, blurred vision has to do with an abnormal eye shape (refractive error), and it can be corrected with glasses or contact lenses.      •Pay attention to any changes in your symptoms. Contact a health care provider if your symptoms do not improve or if you have any new symptoms.      This information is not intended to replace advice given to you by your health care provider. Make sure you discuss any questions you have with your health care provider.

## 2021-06-29 NOTE — ED ADULT NURSE NOTE - FINAL NURSING ELECTRONIC SIGNATURE
Thank you for choosing Elizabeth oSl MD at [place] [id number]  To Do: Sangeeta Mcmahan  1. Please take meds as directed. [facility] is located in [address]. Monday, Tuesday & Friday – 8 a.m. to 4 p.m.   Wednesday, Thursday – 7 a.m. to 3 outweigh those potential risks and we strive to make you healthier and to improve your quality of life.     Referrals, and Radiology Information:    If your insurance requires a referral to a specialist, please allow 5 business days to process your referral 29-Jun-2021 21:57

## 2021-06-29 NOTE — ED ADULT TRIAGE NOTE - CHIEF COMPLAINT QUOTE
Patient brought in via EMS for complaints of worsening blurry vision x 2 weeks patient states that today at 3pm he couldn't read his medication label.

## 2021-07-01 ENCOUNTER — OUTPATIENT (OUTPATIENT)
Dept: OUTPATIENT SERVICES | Facility: HOSPITAL | Age: 61
LOS: 1 days | End: 2021-07-01
Payer: MEDICARE

## 2021-07-01 ENCOUNTER — APPOINTMENT (OUTPATIENT)
Dept: ENDOCRINOLOGY | Facility: CLINIC | Age: 61
End: 2021-07-01

## 2021-07-01 DIAGNOSIS — Z98.890 OTHER SPECIFIED POSTPROCEDURAL STATES: Chronic | ICD-10-CM

## 2021-07-09 DIAGNOSIS — Z71.89 OTHER SPECIFIED COUNSELING: ICD-10-CM

## 2021-07-15 ENCOUNTER — APPOINTMENT (OUTPATIENT)
Dept: BARIATRICS/WEIGHT MGMT | Facility: CLINIC | Age: 61
End: 2021-07-15
Payer: MEDICARE

## 2021-07-15 PROCEDURE — 99442: CPT | Mod: 95

## 2021-07-19 ENCOUNTER — OUTPATIENT (OUTPATIENT)
Dept: OUTPATIENT SERVICES | Facility: HOSPITAL | Age: 61
LOS: 1 days | End: 2021-07-19
Payer: MEDICARE

## 2021-07-19 DIAGNOSIS — K08.9 DISORDER OF TEETH AND SUPPORTING STRUCTURES, UNSPECIFIED: ICD-10-CM

## 2021-07-19 DIAGNOSIS — Z98.890 OTHER SPECIFIED POSTPROCEDURAL STATES: Chronic | ICD-10-CM

## 2021-07-19 PROCEDURE — D0120: CPT

## 2021-07-19 PROCEDURE — D0330: CPT

## 2021-07-26 ENCOUNTER — APPOINTMENT (OUTPATIENT)
Dept: ENDOCRINOLOGY | Facility: CLINIC | Age: 61
End: 2021-07-26
Payer: MEDICARE

## 2021-07-26 VITALS
WEIGHT: 312 LBS | TEMPERATURE: 97.9 F | DIASTOLIC BLOOD PRESSURE: 80 MMHG | BODY MASS INDEX: 43.68 KG/M2 | SYSTOLIC BLOOD PRESSURE: 122 MMHG | OXYGEN SATURATION: 98 % | HEART RATE: 99 BPM | HEIGHT: 71 IN

## 2021-07-26 DIAGNOSIS — Z01.20 ENCOUNTER FOR DENTAL EXAMINATION AND CLEANING WITHOUT ABNORMAL FINDINGS: ICD-10-CM

## 2021-07-26 PROCEDURE — 99214 OFFICE O/P EST MOD 30 MIN: CPT | Mod: GC

## 2021-07-26 PROCEDURE — 83036 HEMOGLOBIN GLYCOSYLATED A1C: CPT | Mod: QW

## 2021-07-26 NOTE — PHYSICAL EXAM
[Alert] : alert [No Acute Distress] : no acute distress [Normal Sclera/Conjunctiva] : normal sclera/conjunctiva [No Proptosis] : no proptosis [Normal Outer Ear/Nose] : the ears and nose were normal in appearance [Normal Hearing] : hearing was normal [Supple] : the neck was supple [Thyroid Not Enlarged] : the thyroid was not enlarged [No Thyroid Nodules] : no palpable thyroid nodules [No Respiratory Distress] : no respiratory distress [No Accessory Muscle Use] : no accessory muscle use [Normal Rate and Effort] : normal respiratory rate and effort [Clear to Auscultation] : lungs were clear to auscultation bilaterally [Normal Rate] : heart rate was normal [Regular Rhythm] : with a regular rhythm [Normal Bowel Sounds] : normal bowel sounds [Not Tender] : non-tender [Soft] : abdomen soft [No Spinal Tenderness] : no spinal tenderness [No Joint Swelling] : no joint swelling seen [Normal Strength/Tone] : muscle strength and tone were normal [Foot Ulcers] : foot ulcers present [No Motor Deficits] : the motor exam was normal [Normal Reflexes] : deep tendon reflexes were 2+ and symmetric [No Tremors] : no tremors [Oriented x3] : oriented to person, place, and time [Normal Affect] : the affect was normal [Normal Insight/Judgement] : insight and judgment were intact [Normal Mood] : the mood was normal [de-identified] : morbidly obese male  [de-identified] : L. eye strabismus  [de-identified] : nontender  [de-identified] : trace LE edema b/l, +2 DP & +2 radial pulses  [de-identified] : distended, obese abdomen  [de-identified] : +healed R. foot 1st digit plantar ulcer noted; no active drainage, erythema, increase warmth or ttp

## 2021-07-26 NOTE — END OF VISIT
[] : Fellow [FreeTextEntry3] : Patient is a 61-year-old male with history of bipolar disorder, history of lithium-induced nephropathy, type 2 diabetes mellitus, neuropathy, hypertension, hyperlipidemia, obesity, obstructive sleep apnea, here for endocrinology follow-up.  Former patient of Dr. Rocío Bañuelos.  \par Regarding type 2 diabetes mellitus, A1c was 8.5% 7/26/2021, had multiple conversation with our office in the past, was recommended to increase Rybelsus from 3mg to 7 mg once daily which he was hesitant to do due to concerns for side effects.  Today he is agreeable to change the dosage.  Recommend to continue with well spouses 70 mg once weekly, repaglinide 4 mg 3 times daily before meals.  Continue to work on lifestyle and diet and exercise.  He reported that he has been eating a lot of Chinese and Italian food in the last few months but has cut it out significantly over the last 2 months.\par Regarding hypertension, continue with losartan 25 mg daily.  Continue to follow-up with nephrologist.\par Regarding hyperlipidemia, LDL was 47 mg/dL in January 2021, continue with Lipitor 40 mg once daily.\par Regarding obesity, continue to follow with Dr. Dowd.  Can consider even increasing the Rybelsus to 14 mg once daily

## 2021-07-26 NOTE — REVIEW OF SYSTEMS
[Recent Weight Gain (___ Lbs)] : recent weight gain: [unfilled] lbs [Lower Ext Edema] : lower extremity edema [Joint Pain] : joint pain [Blurred Vision] : blurred vision [Joint Stiffness] : joint stiffness [Difficulty Walking] : difficulty walking [Pain/Numbness of Digits] : pain/numbness of digits [Fatigue] : no fatigue [Decreased Appetite] : appetite not decreased [Recent Weight Loss (___ Lbs)] : no recent weight loss [Fever] : no fever [Chills] : no chills [Dry Eyes] : no dryness [Eye Pain] : no pain [Dysphagia] : no dysphagia [Neck Pain] : no neck pain [Dysphonia] : no dysphonia [Chest Pain] : no chest pain [Palpitations] : no palpitations [Shortness Of Breath] : no shortness of breath [Cough] : no cough [SOB on Exertion] : no shortness of breath on exertion [Nausea] : no nausea [Constipation] : no constipation [Abdominal Pain] : no abdominal pain [Vomiting] : no vomiting [Diarrhea] : no diarrhea [Gas/Bloating] : no gas/bloating [Polyuria] : no polyuria [Dysuria] : no dysuria [Muscle Weakness] : no muscle weakness [Myalgia] : no myalgia  [Headaches] : no headaches [Dizziness] : no dizziness [Tremors] : no tremors [Polydipsia] : no polydipsia [Cold Intolerance] : no cold intolerance [Heat Intolerance] : no heat intolerance [FreeTextEntry3] : cataracts  [FreeTextEntry9] : knee pain L>R  [de-identified] : +Bipolar disorder

## 2021-07-26 NOTE — HISTORY OF PRESENT ILLNESS
[FreeTextEntry1] : 61 year old man w/ T2 DM w/ neuropathy, HTN, HLD, CKD3, Morbid obesity, CHAYITO and  bipolar disease here for f/u visit for DM2. He was last seen in clinic 10/2020.\par He was dx w/ DM2 about 13 years ago. At first he managed it just by diet and exercise. He then was started on Metformin and  Januvia but states it caused kidney and liver damage. He was then switched to Tradjenta and Glipizide. Recently he has been on Rybelsus & Repaglinide. \par \par He is currently on Rybelsus 3mg daily & Repaglinide 4mg prior to meals TIDAC 30 mins before eating \par Taking Atorvastatin 40mg & Losartan 25mg as well. \par Following up with PMD, last seen 2 week prior and had blood work done at that time \par Last A1c here 7.1%\par Reports A1c 6mos prior was 8.4% and 2 weeks ago 8.2% with PMD Dr. Mckeon \par Attributes increase in A1c to a food binge that he was on (pizza, chinese food) several months ago causing him to regain back the weight he had lost when Rybelsus was initially started. Since then, patient endorses that he has changed up his meal plan/diet over the last couple of months ago but has not noticed any significant changes in his sugars. \par \par Breakfast: oatmeal/berries or special K cereal/berries \par Lunch: turkey/ & celery soup or omlette with veggies \par Snack/Drinks: celery juice \par Dinner: chicken/turkey/pork chops with salad or brown rice/veggies \par \par He checks FS's: every couple of days, morning/evening but not daily \par AM: 160s PM: 170s\par During the day he checks randomly. yesterday was 180 in the morning and 160 in the evening\par this morning FS was 190 at home \par \par Has continued to follow up with Dr. Dowd, obesity medicine, states he is trying to lose the weight, not currently interested in bariatric surgery at this moment as he wants to give himself a chance to do it himself. \par Trying to walk for exercise, admits has been difficult recently due to pain in his L. knee. Recommended TKR by Ortho but not until weight & sugars are better. \par \par Last saw Optho 2 weeks ago, needs cataract sugery per pt but was also told that his sugars need to be better controlled prior to the surgery. Denies hx of dm retinopathy. \par \par Has severe neuropathy and follows up w/  podiatry regularly. Last seen last week for follow up of R. foot 1st digit ulcer which he was informed is now healed, s/p course of abx (Bactrim). Pt says he got the ulcer from walking barefoot despite being aware that he needs to wear shoes, agrees to avoid this practice in the future to prevent further ulcers/injury. Also sees neurology. \par \par He has CHAYITO, admits to intermittent use of his CPAP machine due to discomfort related to the mask/use of machine. Says at most he will use it 3 hours per night and reports that he does feel better in the morning when he does use it. Agrees to try to use it more.

## 2021-07-26 NOTE — ASSESSMENT
[FreeTextEntry1] : 61 Y.O. man w/ T2 DM w/ neuropathy, HTN, HLD, Obesity, CHAYITO and  bipolar disease presenting for follow up of his DM2. \par \par 1. DM2: \par POC A1c today 8.5% \par Most recent A1c 8.2% weeks prior w/ PMD (from 8.4% 6 months prior) w/ PMD per pt; Last A1c here 7.1% \par - Increase Rybelsus to 7mg daily, first thing in AM 30 mins prior to eating\par - Continue Repaglinide to 4mg PO TIDAC\par - Cont. to monitor and record FS's. \par - Pt was counseled on lifestyle modification through diet and exercise.\par - Encouraged continued use of CPAP machine \par - UTD w/ optho and podiatry.\par - Last Ma/Cr 117 in 10/2020\par - Repeat CMP, A1c, Lipids, TSH, MicroAlb/Cr ratio, & Vit D level prior to next visit \par - RTC in 4 months \par \par 2. HTN: BP well controlled.\par - C/w Losartan 25mg daily \par \par 3. HLD: LDL w/in goal 1/2020 (47). C/w Lipitor 40mg daily \par \par 4. Obesity, BMI 43 \par - lifestyle changes including diet, exercise, weight loss encouraged \par - follows with obesity medicine, Dr. Dowd \par \par RTC 4 months\par Discussed w/ Dr. Light.

## 2021-07-27 LAB — HBA1C MFR BLD HPLC: 8.5

## 2021-08-02 ENCOUNTER — OUTPATIENT (OUTPATIENT)
Dept: OUTPATIENT SERVICES | Facility: HOSPITAL | Age: 61
LOS: 1 days | End: 2021-08-02

## 2021-08-02 DIAGNOSIS — Z98.890 OTHER SPECIFIED POSTPROCEDURAL STATES: Chronic | ICD-10-CM

## 2021-08-02 DIAGNOSIS — K08.9 DISORDER OF TEETH AND SUPPORTING STRUCTURES, UNSPECIFIED: ICD-10-CM

## 2021-08-03 DIAGNOSIS — K08.409 PARTIAL LOSS OF TEETH, UNSPECIFIED CAUSE, UNSPECIFIED CLASS: ICD-10-CM

## 2021-08-04 ENCOUNTER — NON-APPOINTMENT (OUTPATIENT)
Age: 61
End: 2021-08-04

## 2021-08-10 ENCOUNTER — APPOINTMENT (OUTPATIENT)
Dept: NEPHROLOGY | Facility: CLINIC | Age: 61
End: 2021-08-10
Payer: MEDICARE

## 2021-08-10 VITALS
WEIGHT: 315 LBS | OXYGEN SATURATION: 96 % | HEART RATE: 99 BPM | DIASTOLIC BLOOD PRESSURE: 74 MMHG | SYSTOLIC BLOOD PRESSURE: 107 MMHG | BODY MASS INDEX: 44.1 KG/M2 | HEIGHT: 71 IN | TEMPERATURE: 97.7 F

## 2021-08-10 DIAGNOSIS — D89.89 OTHER SPECIFIED DISORDERS INVOLVING THE IMMUNE MECHANISM, NOT ELSEWHERE CLASSIFIED: ICD-10-CM

## 2021-08-10 PROCEDURE — 99214 OFFICE O/P EST MOD 30 MIN: CPT

## 2021-08-12 LAB
25(OH)D3 SERPL-MCNC: 38.2 NG/ML
ALBUMIN SERPL ELPH-MCNC: 4 G/DL
ANION GAP SERPL CALC-SCNC: 15 MMOL/L
APPEARANCE: CLEAR
BACTERIA: NEGATIVE
BASOPHILS # BLD AUTO: 0.05 K/UL
BASOPHILS NFR BLD AUTO: 0.7 %
BILIRUBIN URINE: NEGATIVE
BLOOD URINE: NEGATIVE
BUN SERPL-MCNC: 40 MG/DL
CALCIUM SERPL-MCNC: 9.6 MG/DL
CALCIUM SERPL-MCNC: 9.6 MG/DL
CHLORIDE SERPL-SCNC: 103 MMOL/L
CO2 SERPL-SCNC: 21 MMOL/L
COLOR: COLORLESS
CREAT SERPL-MCNC: 1.89 MG/DL
CREAT SPEC-SCNC: 17 MG/DL
CREAT/PROT UR: NORMAL RATIO
EOSINOPHIL # BLD AUTO: 0.24 K/UL
EOSINOPHIL NFR BLD AUTO: 3.5 %
GLUCOSE QUALITATIVE U: ABNORMAL
GLUCOSE SERPL-MCNC: 282 MG/DL
HCT VFR BLD CALC: 39.8 %
HGB BLD-MCNC: 12.5 G/DL
HYALINE CASTS: 0 /LPF
IMM GRANULOCYTES NFR BLD AUTO: 1.3 %
KETONES URINE: NEGATIVE
LEUKOCYTE ESTERASE URINE: NEGATIVE
LYMPHOCYTES # BLD AUTO: 2.43 K/UL
LYMPHOCYTES NFR BLD AUTO: 35.4 %
MAN DIFF?: NORMAL
MCHC RBC-ENTMCNC: 28.3 PG
MCHC RBC-ENTMCNC: 31.4 GM/DL
MCV RBC AUTO: 90.2 FL
MICROSCOPIC-UA: NORMAL
MONOCYTES # BLD AUTO: 0.61 K/UL
MONOCYTES NFR BLD AUTO: 8.9 %
NEUTROPHILS # BLD AUTO: 3.45 K/UL
NEUTROPHILS NFR BLD AUTO: 50.2 %
NITRITE URINE: NEGATIVE
PARATHYROID HORMONE INTACT: 61 PG/ML
PH URINE: 6.5
PHOSPHATE SERPL-MCNC: 4.1 MG/DL
PLATELET # BLD AUTO: 153 K/UL
POTASSIUM SERPL-SCNC: 4.7 MMOL/L
PROT UR-MCNC: <4 MG/DL
PROTEIN URINE: NEGATIVE
RBC # BLD: 4.41 M/UL
RBC # FLD: 15.1 %
RED BLOOD CELLS URINE: 1 /HPF
SODIUM SERPL-SCNC: 139 MMOL/L
SPECIFIC GRAVITY URINE: 1
SQUAMOUS EPITHELIAL CELLS: 0 /HPF
UROBILINOGEN URINE: NORMAL
WBC # FLD AUTO: 6.87 K/UL
WHITE BLOOD CELLS URINE: 0 /HPF

## 2021-08-20 ENCOUNTER — APPOINTMENT (OUTPATIENT)
Dept: BARIATRICS/WEIGHT MGMT | Facility: CLINIC | Age: 61
End: 2021-08-20
Payer: MEDICARE

## 2021-08-20 PROCEDURE — 99442: CPT | Mod: 95

## 2021-08-30 ENCOUNTER — NON-APPOINTMENT (OUTPATIENT)
Age: 61
End: 2021-08-30

## 2021-08-30 ENCOUNTER — APPOINTMENT (OUTPATIENT)
Dept: CARDIOLOGY | Facility: CLINIC | Age: 61
End: 2021-08-30
Payer: MEDICARE

## 2021-08-30 VITALS
BODY MASS INDEX: 42.56 KG/M2 | HEIGHT: 71 IN | OXYGEN SATURATION: 98 % | DIASTOLIC BLOOD PRESSURE: 72 MMHG | HEART RATE: 94 BPM | WEIGHT: 304 LBS | RESPIRATION RATE: 17 BRPM | SYSTOLIC BLOOD PRESSURE: 105 MMHG

## 2021-08-30 PROCEDURE — 99215 OFFICE O/P EST HI 40 MIN: CPT

## 2021-08-30 PROCEDURE — 93000 ELECTROCARDIOGRAM COMPLETE: CPT

## 2021-08-30 PROCEDURE — 93306 TTE W/DOPPLER COMPLETE: CPT

## 2021-08-30 NOTE — PHYSICAL EXAM
[General Appearance - Well Developed] : well developed [Normal Appearance] : normal appearance [Well Groomed] : well groomed [General Appearance - Well Nourished] : well nourished [No Deformities] : no deformities [General Appearance - In No Acute Distress] : no acute distress [Normal Oral Mucosa] : normal oral mucosa [No Oral Pallor] : no oral pallor [No Oral Cyanosis] : no oral cyanosis [Respiration, Rhythm And Depth] : normal respiratory rhythm and effort [Exaggerated Use Of Accessory Muscles For Inspiration] : no accessory muscle use [Auscultation Breath Sounds / Voice Sounds] : lungs were clear to auscultation bilaterally [Abdomen Soft] : soft [Abdomen Tenderness] : non-tender [Abdomen Mass (___ Cm)] : no abdominal mass palpated [Abnormal Walk] : normal gait [Gait - Sufficient For Exercise Testing] : the gait was sufficient for exercise testing [Nail Clubbing] : no clubbing of the fingernails [Cyanosis, Localized] : no localized cyanosis [Petechial Hemorrhages (___cm)] : no petechial hemorrhages [] : no ischemic changes [Skin Color & Pigmentation] : normal skin color and pigmentation [Oriented To Time, Place, And Person] : oriented to person, place, and time [Not Palpable] : not palpable [No Precordial Heave] : no precordial heave was noted [Normal Rate] : normal [Rhythm Regular] : regular [Normal S1] : normal S1 [Normal S2] : normal S2 [No Gallop] : no gallop heard [No Murmur] : no murmurs heard [2+] : left 2+ [1+] : left 1+ [No Pitting Edema] : no pitting edema present [Conjunctiva] : the conjunctiva were normal in both eyes [PERRL] : pupils were equal in size, round, and reactive to light [EOM Intact] : extraocular movements were intact [Strabismus] : strabismus was noted bilaterally [Normal Mental Status] : the patient was oriented to person, place and time [Person] : oriented to person [Place] : oriented to place [Time] : oriented to time [Short Term Intact] : short term memory intact [Remote Intact] : remote memory intact [Span Intact] : the attention span was normal [Concentration Intact] : normal concentrating ability [Visual Intact] : visual attention was ~T not ~L decreased [Appropriate] : appropriate [Flat] : flat [Normal] : the cranial nerves were intact [FreeTextEntry1] : morbidly obese [Right Carotid Bruit] : no bruit heard over the right carotid [Left Carotid Bruit] : no bruit heard over the left carotid [Yellow Sclera (Icteric)] : no scleral icterus was seen

## 2021-08-30 NOTE — DISCUSSION/SUMMARY
Pre-Operative Diagnosis: carpal tunnel syndrome     Post-Operative Diagnosis: carpal tunnel syndrome      Procedure Performed:   Procedure(s):  left endoscopic carpal tunnel release    Surgeon(s) and Role:     * Uma Oliveros MD - Primary    Kelly [FreeTextEntry1] : Mr. Pruitt has multiple cardiac risk factors, as above, and presents today for follow-up. \par \par We discussed the importance of compliance with both psychiatric and cardiac medications, as well as compliance with the CPAP machine that he will be getting again after his recent sleep study demonstrated severe CHAYITO. Patient counseled regarding exercise, diet, and weight loss. No change in cardiac regimen today. \par Follow-up with PMD, endocrinologist, podiatrist, and sleep medicine. \par \par Issue of relationship between CHAYITO and hypertension, weight gain, and risk factors also reinforced and compliance in this regard discussed as well.\par \par 1. Type II diabetes - continue Rybelsus and repaglinide per endocrinology.\par 2. CKD - Off lithium now. Follow-up per Dr. Vallejo.\par 3. Obesity - Patient has lost weight and will continue diet and exercise. Continue to work with Dr. Estuardo Dowd.\par 4. Hypertension - well controlled today. Continue current regimen, including ARB, beta-blocker. \par 5. Bipolar disorder - continue care per psychiatry. Patient now off lithium and on Depakote.\par 6. BPH - Flomax and Proscar have improved urinary stream. Continue current regimen. Follow-up per PMD.\par 7. CHAYITO - Continue CPAP QHS.

## 2021-08-30 NOTE — HISTORY OF PRESENT ILLNESS
[FreeTextEntry1] : 60 year-old gentleman with known cardiovascular risk factors including hypertension, well controlled non-insulin dependent diabetes, dyslipidemia, obesity, CHAYITO, bipolar disorder on Lithium and Geodon, lives as a full time resident of the [facility]. He presents today in his usual state of health, having lost approximately 65 lbs over the past year. He had a repeat sleep study performed 8/12/2016 which showed severe CHAYITO.\par He was formerly followed by a cardiologist in [place], Geovanny Elizabeth MD.\par \par May 10, 2019: Patient presents with complain of sleep apnea. Has concerning sleep study two months ago. He has nasal congestion and cannot tolerate his CPAP. He is also concern about neuropathic pain in the insteps of his feet. He has lost several pounds through diet and exercise that are supervised through his program.\par \par Psychiatrist at [facility] in [place] who manages his Lithium and his antipsychotic medication (second generation, Geodon).\par \par November 2019 - Patient presents today in his usual state of health. He has modified his diet and is losing significant weight. From a peak of 349 lbs in 2012, patient is now 284 lbs. He reports having difficulty walking long distances, but that is because of leg weakness, not because of shortness of breath or chest pain. He is concerned about dyspnea on exertion, but he has not experienced this recently.\par \par June 2020 - Patient has lost 65 lbs by diet and exercise, but when he got the stimulus check, he broke his diet and started eating pizza, and heroes, and potato chips, and he believes he had a stroke that presented with pressured speech. He was scanned at [facility], and he was seen by neurology. They believe he had a TIA and discharged him with atorvastatin 40 mg daily (up from prior 20 mg daily). He continues to take ASA 81 mg daily and an oral diabetes regimen (repaglinide and Tradjenta).\par Unfortunately, his exercise class was shut down due to Covid pandemic.\par \par October 2020 - Patient has been having difficulty with over-eating. He reports several dietary indiscretions over the past month, including pizza and Chinese food.\par He has been depressed by his eating, and he is feeling socially isolated. He finds that he runs out of money for food and relies on a local [employer] for meals.\par He continues to take his medications without issues, but he has not been using his CPAP machine.\par \par March 2021 - Patient returns today for follow-up. He was [facility] to [facility] in December 2020 for elevated lithium levels, and he was transitioned off lithium to Depakote. He feels much better now on Depakote. He is also on a new diabetes regimen that includes Rybelsus and it has helped him lose weight. \par These medication changes have also helped him financially. \par Flomax and finasteride are helping him with BPH.\par Linzess is improving his constipation. \par Arthritis continues to be a problem. \par He is sleeping well through the night. He is using his CPAP.\par He is walking for exercise.\par He has not yet received Covid-19 vaccine, but he is interested in getting it. \par \par PMD: Julius Mckeon MD [phone]\par Sleep Medicine: Angelika Todd MD [phone]\par Endocrinologist: Rocío Bañuelos MD [phone]\par Psychiatrist: Anthony Dc, Psychiatric Nurse Practitioner at [facility]

## 2021-08-30 NOTE — REASON FOR VISIT
[CV Risk Factors and Non-Cardiac Disease] : CV risk factors and non-cardiac disease [Other: ____] : [unfilled] [FreeTextEntry1] : August 2021 - Patient returns today for follow-up and echocardiogram. \par He has been working with Dr. Estuardo Dowd on losing weight, and he has dramatically modified his diet. He has reduced bread, pizza, and Chinese food intake. \par He has increased eating oatmeal and eggs. \par He has received [employer] Covid-19 vaccine.

## 2021-08-30 NOTE — CARDIOLOGY SUMMARY
[de-identified] : 8/30/2021 [de-identified] : 2015, no Ischemia \par 11/21/2019, pharmacologic nuclear stress test, normal myocardial perfusion imaging, LVEF 65%, LVEDV 109 mL. [de-identified] : 3/2015, normal LV function\par 9/13/2019, concentric LVH, grossly normal LV systolic function, LVEF 55-60%\par 8/30/2021, concentric LVH, grossly normal LV sysotlic function, LVEF 60-65%\par

## 2021-09-10 ENCOUNTER — APPOINTMENT (OUTPATIENT)
Dept: BARIATRICS/WEIGHT MGMT | Facility: CLINIC | Age: 61
End: 2021-09-10
Payer: MEDICARE

## 2021-09-10 PROCEDURE — 99442: CPT | Mod: 95

## 2021-09-13 ENCOUNTER — OUTPATIENT (OUTPATIENT)
Dept: OUTPATIENT SERVICES | Facility: HOSPITAL | Age: 61
LOS: 1 days | End: 2021-09-13

## 2021-09-13 DIAGNOSIS — Z98.890 OTHER SPECIFIED POSTPROCEDURAL STATES: Chronic | ICD-10-CM

## 2021-09-13 DIAGNOSIS — K08.9 DISORDER OF TEETH AND SUPPORTING STRUCTURES, UNSPECIFIED: ICD-10-CM

## 2021-09-14 DIAGNOSIS — K08.409 PARTIAL LOSS OF TEETH, UNSPECIFIED CAUSE, UNSPECIFIED CLASS: ICD-10-CM

## 2021-09-15 ENCOUNTER — NON-APPOINTMENT (OUTPATIENT)
Age: 61
End: 2021-09-15

## 2021-09-18 ENCOUNTER — EMERGENCY (EMERGENCY)
Facility: HOSPITAL | Age: 61
LOS: 1 days | Discharge: HOME | End: 2021-09-18
Attending: INTERNAL MEDICINE | Admitting: INTERNAL MEDICINE
Payer: MEDICARE

## 2021-09-18 VITALS
DIASTOLIC BLOOD PRESSURE: 88 MMHG | HEIGHT: 71 IN | OXYGEN SATURATION: 95 % | RESPIRATION RATE: 18 BRPM | WEIGHT: 300.05 LBS | HEART RATE: 99 BPM | SYSTOLIC BLOOD PRESSURE: 133 MMHG | TEMPERATURE: 98 F

## 2021-09-18 DIAGNOSIS — Z98.890 OTHER SPECIFIED POSTPROCEDURAL STATES: Chronic | ICD-10-CM

## 2021-09-18 LAB
ANION GAP SERPL CALC-SCNC: 13 MMOL/L — SIGNIFICANT CHANGE UP (ref 5–17)
BASOPHILS # BLD AUTO: 0.03 K/UL — SIGNIFICANT CHANGE UP (ref 0–0.2)
BASOPHILS NFR BLD AUTO: 0.4 % — SIGNIFICANT CHANGE UP (ref 0–2)
BUN SERPL-MCNC: 37 MG/DL — HIGH (ref 7–23)
CALCIUM SERPL-MCNC: 9.4 MG/DL — SIGNIFICANT CHANGE UP (ref 8.4–10.5)
CHLORIDE SERPL-SCNC: 105 MMOL/L — SIGNIFICANT CHANGE UP (ref 96–108)
CO2 SERPL-SCNC: 24 MMOL/L — SIGNIFICANT CHANGE UP (ref 22–31)
CREAT SERPL-MCNC: 2.16 MG/DL — HIGH (ref 0.5–1.3)
EOSINOPHIL # BLD AUTO: 0.2 K/UL — SIGNIFICANT CHANGE UP (ref 0–0.5)
EOSINOPHIL NFR BLD AUTO: 2.4 % — SIGNIFICANT CHANGE UP (ref 0–6)
GLUCOSE SERPL-MCNC: 193 MG/DL — HIGH (ref 70–99)
HCT VFR BLD CALC: 42.2 % — SIGNIFICANT CHANGE UP (ref 39–50)
HGB BLD-MCNC: 13.6 G/DL — SIGNIFICANT CHANGE UP (ref 13–17)
IMM GRANULOCYTES NFR BLD AUTO: 1.3 % — SIGNIFICANT CHANGE UP (ref 0–1.5)
LYMPHOCYTES # BLD AUTO: 2.75 K/UL — SIGNIFICANT CHANGE UP (ref 1–3.3)
LYMPHOCYTES # BLD AUTO: 33.5 % — SIGNIFICANT CHANGE UP (ref 13–44)
MCHC RBC-ENTMCNC: 29.2 PG — SIGNIFICANT CHANGE UP (ref 27–34)
MCHC RBC-ENTMCNC: 32.2 GM/DL — SIGNIFICANT CHANGE UP (ref 32–36)
MCV RBC AUTO: 90.8 FL — SIGNIFICANT CHANGE UP (ref 80–100)
MONOCYTES # BLD AUTO: 0.78 K/UL — SIGNIFICANT CHANGE UP (ref 0–0.9)
MONOCYTES NFR BLD AUTO: 9.5 % — SIGNIFICANT CHANGE UP (ref 2–14)
NEUTROPHILS # BLD AUTO: 4.35 K/UL — SIGNIFICANT CHANGE UP (ref 1.8–7.4)
NEUTROPHILS NFR BLD AUTO: 52.9 % — SIGNIFICANT CHANGE UP (ref 43–77)
NRBC # BLD: 0 /100 WBCS — SIGNIFICANT CHANGE UP (ref 0–0)
PLATELET # BLD AUTO: 229 K/UL — SIGNIFICANT CHANGE UP (ref 150–400)
POTASSIUM SERPL-MCNC: 4.4 MMOL/L — SIGNIFICANT CHANGE UP (ref 3.5–5.3)
POTASSIUM SERPL-SCNC: 4.4 MMOL/L — SIGNIFICANT CHANGE UP (ref 3.5–5.3)
RBC # BLD: 4.65 M/UL — SIGNIFICANT CHANGE UP (ref 4.2–5.8)
RBC # FLD: 14.2 % — SIGNIFICANT CHANGE UP (ref 10.3–14.5)
SODIUM SERPL-SCNC: 142 MMOL/L — SIGNIFICANT CHANGE UP (ref 135–145)
WBC # BLD: 8.22 K/UL — SIGNIFICANT CHANGE UP (ref 3.8–10.5)
WBC # FLD AUTO: 8.22 K/UL — SIGNIFICANT CHANGE UP (ref 3.8–10.5)

## 2021-09-18 PROCEDURE — 99284 EMERGENCY DEPT VISIT MOD MDM: CPT

## 2021-09-18 RX ORDER — ZIPRASIDONE HYDROCHLORIDE 20 MG/1
90 CAPSULE ORAL
Qty: 0 | Refills: 0 | DISCHARGE

## 2021-09-18 RX ORDER — DIVALPROEX SODIUM 500 MG/1
500 TABLET, DELAYED RELEASE ORAL
Qty: 0 | Refills: 0 | DISCHARGE

## 2021-09-18 NOTE — ED ADULT NURSE NOTE - NSIMPLEMENTINTERV_GEN_ALL_ED
Implemented All Universal Safety Interventions:  [place] to call system. Call bell, personal items and telephone within reach. Instruct patient to call for assistance. Room bathroom lighting operational. Non-slip footwear when patient is off stretcher. Physically safe environment: no spills, clutter or unnecessary equipment. Stretcher in lowest position, wheels locked, appropriate side rails in place.

## 2021-09-19 VITALS
SYSTOLIC BLOOD PRESSURE: 141 MMHG | OXYGEN SATURATION: 96 % | TEMPERATURE: 98 F | HEART RATE: 94 BPM | RESPIRATION RATE: 18 BRPM | DIASTOLIC BLOOD PRESSURE: 84 MMHG

## 2021-09-19 LAB
AMPHET UR-MCNC: NEGATIVE — SIGNIFICANT CHANGE UP
APPEARANCE UR: CLEAR — SIGNIFICANT CHANGE UP
BARBITURATES UR SCN-MCNC: NEGATIVE — SIGNIFICANT CHANGE UP
BENZODIAZ UR-MCNC: NEGATIVE — SIGNIFICANT CHANGE UP
BILIRUB UR-MCNC: NEGATIVE — SIGNIFICANT CHANGE UP
COCAINE METAB.OTHER UR-MCNC: NEGATIVE — SIGNIFICANT CHANGE UP
COLOR SPEC: YELLOW — SIGNIFICANT CHANGE UP
DIFF PNL FLD: NEGATIVE — SIGNIFICANT CHANGE UP
ETHANOL SERPL-MCNC: <3 MG/DL — SIGNIFICANT CHANGE UP (ref 0–3)
GLUCOSE UR QL: NEGATIVE — SIGNIFICANT CHANGE UP
KETONES UR-MCNC: NEGATIVE — SIGNIFICANT CHANGE UP
LEUKOCYTE ESTERASE UR-ACNC: NEGATIVE — SIGNIFICANT CHANGE UP
METHADONE UR-MCNC: NEGATIVE — SIGNIFICANT CHANGE UP
NITRITE UR-MCNC: NEGATIVE — SIGNIFICANT CHANGE UP
OPIATES UR-MCNC: NEGATIVE — SIGNIFICANT CHANGE UP
PCP SPEC-MCNC: SIGNIFICANT CHANGE UP
PCP UR-MCNC: NEGATIVE — SIGNIFICANT CHANGE UP
PH UR: 6 — SIGNIFICANT CHANGE UP (ref 5–8)
PROT UR-MCNC: NEGATIVE — SIGNIFICANT CHANGE UP
SP GR SPEC: 1.01 — SIGNIFICANT CHANGE UP (ref 1.01–1.02)
THC UR QL: NEGATIVE — SIGNIFICANT CHANGE UP
UROBILINOGEN FLD QL: NEGATIVE — SIGNIFICANT CHANGE UP
VALPROATE SERPL-MCNC: 57 UG/ML — SIGNIFICANT CHANGE UP (ref 50–100)

## 2021-09-19 PROCEDURE — 80048 BASIC METABOLIC PNL TOTAL CA: CPT

## 2021-09-19 PROCEDURE — 94660 CPAP INITIATION&MGMT: CPT

## 2021-09-19 PROCEDURE — 80307 DRUG TEST PRSMV CHEM ANLYZR: CPT

## 2021-09-19 PROCEDURE — 80164 ASSAY DIPROPYLACETIC ACD TOT: CPT

## 2021-09-19 PROCEDURE — 85025 COMPLETE CBC W/AUTO DIFF WBC: CPT

## 2021-09-19 PROCEDURE — 90792 PSYCH DIAG EVAL W/MED SRVCS: CPT | Mod: 95

## 2021-09-19 PROCEDURE — 36415 COLL VENOUS BLD VENIPUNCTURE: CPT

## 2021-09-19 PROCEDURE — 99284 EMERGENCY DEPT VISIT MOD MDM: CPT

## 2021-09-19 NOTE — ED PROVIDER NOTE - OBJECTIVE STATEMENT
61 y m pmhx f oobstructive sleep apnea  , bipolar disorder, diabetes htn morbidly obese came to see a psychiatrist unable to sleep well on zipradone, and deakote for bipolar d/o stopped Lithium due to renal insufficiency   onset gradual   locations general   duration days   characteristics unable to sleep well   context hx of bipolar disorder, hx of sleep apnea  aggravating factors none   relieving factors none  timming off and on   severity moderate 61 y m pmhx  of obstructive sleep apnea  , bipolar disorder, diabetes htn morbidly obese came to see a psychiatrist unable to sleep well on zipradone, and deakote for bipolar d/o stopped Lithium due to renal insufficiency   onset gradual   locations general   duration days   characteristics unable to sleep well   context hx of bipolar disorder, hx of sleep apnea  aggravating factors none   relieving factors none  timming off and on   severity moderate

## 2021-09-19 NOTE — ED BEHAVIORAL HEALTH ASSESSMENT NOTE - NSSUICPROTFACT_PSY_ALL_CORE
Identifies reasons for living/Cultural, spiritual and/or moral attitudes against suicide/Positive therapeutic relationships/Latter day beliefs

## 2021-09-19 NOTE — ED BEHAVIORAL HEALTH ASSESSMENT NOTE - REFERRAL / APPOINTMENT DETAILS
Continue w/outpatient therapy/med management as noted in plan above. therapy appointment 9/22/21 and next psychiatrist apt 10/8/21

## 2021-09-19 NOTE — ED PROVIDER NOTE - PATIENT PORTAL LINK FT
You can access the FollowMyHealth Patient Portal offered by [facility] by registering at the following website: [email or web address]. By joining [facility]’s FollowMyHealth portal, you will also be able to view your health information using other applications (apps) compatible with our system.

## 2021-09-19 NOTE — ED BEHAVIORAL HEALTH ASSESSMENT NOTE - HPI (INCLUDE ILLNESS QUALITY, SEVERITY, DURATION, TIMING, CONTEXT, MODIFYING FACTORS, ASSOCIATED SIGNS AND SYMPTOMS)
Mr. Pruitt is a 61 year old man, , unemployed, w/PPHx of bipolar disorder, 4 prior psychiatric hospitalizations, last in 1990, no prior SA or NSSIB, living in [facility], in treatment w/Dr. Lau at the center, no substance use, no legal issues, and PMHx of HTN, HLD, DM, diabetic neuropathy, CHAYITO (nonadherent w/CPAP), arthritis, presented to ED w/complaints of insomnia and concerns that he was decompensating. On assessment, pt is concrete and overinclusive. He notes he is sleeping from 9PM to 5AM, although wakes up to urinate a couple of times throughout the night. Reports some daytime fatigue which has been going on for several months. States he believes his sleep issues are related to his CPAP, as he does not use it and feels the lack of REM sleep "doesn't allow his body/brain to heal," and is concerned this may precipitate psychiatric decompensation. He states he listens to the radio regularly, and in the past has had IOR related to certain programs sending him messages, however knows this is part of his illness. He states he intermittently has these experiences but is able to reality test. He denies persistence of IOR, denies other paranoid thoughts. Denies grandiosity. Denies decreased need for sleep, distractibility, or racing thoughts, although has felt somewhat more anxious due to fears of decompensating. He was supposed to have a telephone appointment w/his psychiatrist last week however missed this due to confusion with the appointment time. He rescheduled to see his psychiatrist in a couple of weeks but decided to come in tonight after missing his appointment. He notes he takes Geodon and depakote twice a day but does not remember the doses off hand (uses a pill box and states he takes his meds "religiously." Denies side effects. He denies anhedonia, saying he enjoys reading, shopping, going on walks, and going to the beach. He denies hopelessness or worthlessness. Reports he's taking care of his ADLs at home such as eating his meals and showering. He denies VAH. States he is estranged from his family and has no close friends or contacts to provide collateral

## 2021-09-19 NOTE — ED PROVIDER NOTE - CLINICAL SUMMARY MEDICAL DECISION MAKING FREE TEXT BOX
61 y m pmhx  of obstructive sleep apnea  , bipolar disorder, diabetes htn morbidly obese came to see a psychiatrist unable to sleep well on zipradone, and deakote for bipolar d/o stopped Lithium due to renal insufficiency   onset gradual   locations general   duration days   characteristics unable to sleep well   context hx of bipolar disorder, hx of sleep apnea  aggravating factors none   relieving factors none  timming off and on   severity moderate  pt evaluated by psych cleared for discharged

## 2021-09-19 NOTE — ED BEHAVIORAL HEALTH ASSESSMENT NOTE - OTHER PAST PSYCHIATRIC HISTORY (INCLUDE DETAILS REGARDING ONSET, COURSE OF ILLNESS, INPATIENT/OUTPATIENT TREATMENT)
Has had a total of four psychiatric hospitalizations, all for manic episodes. Most recent was in 1990 at [facility]. Had one other inpatient stay there before there (unsure when). Says he has had two psychiatric hospitalizations a [facility]. Was diagnosed with bipolar disorder at age 20 and was on lithium for many years but discontinued this due to renal issues in recent years. Has since been switched to depakote/geodon No suicide attempts or SIB.

## 2021-09-19 NOTE — ED BEHAVIORAL HEALTH ASSESSMENT NOTE - SUMMARY
Mr. Pruitt is a 61 year old man, , unemployed, w/PPHx of bipolar disorder, 4 prior psychiatric hospitalizations, last in 1990, no prior SA or NSSIB, living in [facility], in treatment w/Dr. Lau at the center, no substance use, no legal issues, and PMHx of HTN, HLD, DM, diabetic neuropathy, CHAYITO (nonadherent w/CPAP), arthritis, presented to ED w/complaints of insomnia and concerns that he was decompensating. On assessment, pt is concrete and overinclusive, however denies decreased need for sleep, euphoric or irritable mood, racing thoughts, grandiose/paranoid delusions, VAH, and states mood is largely stable. Denies SI/HI and has no safety concerns. He does not does not require inpatient admission on this assessment and will continue w/outpatient treatment (sees therapist weekly, next appointment in 3 days (9/22/21) and f/u w/psychiatrist 10/8/21.

## 2021-09-19 NOTE — ED BEHAVIORAL HEALTH ASSESSMENT NOTE - RISK ASSESSMENT
Low Acute Suicide Risk See  note for [place] suicide screener, risk stratification, risk factors. In sum,  the patient's acute risk of harm to self or others is low because he is denying SI/HI. His chronic risk is moderate because of unmodifiable risks that include a diagnosis of bipolar disorder, history of hospitalizations. These risks are being addressed through outpatient therapy and medication management. The patient's protective factors including no history of SI or suicide attempts or SIB, no substance abuse, being domiciled, being engaged in treatment, being future-oriented (about correcting CPAP). The patient is safe for outpatient care. He agrees to return to the ED or call 911 in the event of SI/HI.

## 2021-09-19 NOTE — ED BEHAVIORAL HEALTH ASSESSMENT NOTE - CURRENT MEDICATION
Geodon and depakote (dose unknown, reports taking it BID). Other meds per the chart: repaglinide 1 mg oral tablet: 1 tab(s) orally 3 times a day (before meals), Tradjenta 5 mg oral tablet: 1 tab(s) orally once a day, metoprolol tartrate 25 mg oral tablet: 1 tab(s) orally 2 times a day, Vascepa 1 g oral capsule: 2 cap(s) orally 2 times a day, atorvastatin 10 mg oral tablet: 1 tab(s) orally once a day

## 2021-09-19 NOTE — ED BEHAVIORAL HEALTH ASSESSMENT NOTE - DESCRIPTION
CKD, DM, HTN, obstructive sleep apnea Calm, cooperative, did not require PRNs or restraints     T(C): 36.9 (09-19-21 @ 00:49), Max: 36.9 (09-18-21 @ 22:37)  HR: 97 (09-19-21 @ 00:49) (97 - 99)  BP: 132/80 (09-19-21 @ 00:49) (132/80 - 133/88)  RR: 19 (09-19-21 @ 00:49) (18 - 19)  SpO2: 96% (09-19-21 @ 00:49) (95% - 96%)  Wt(kg): --    COVID-19 Screener:  -Pt received shanta and shanta vaccine 2-3 months ago  -Denies any known COVID-19+ contacts in the last 14 days  -no travel outside of [facility] in the last 14 days  -No positive COVID-19 tests in the last 90 days Single, , one adult son (estranged), unemployed, lives alone, used to work as a .

## 2021-09-20 ENCOUNTER — APPOINTMENT (OUTPATIENT)
Dept: ENDOCRINOLOGY | Facility: CLINIC | Age: 61
End: 2021-09-20

## 2021-09-21 ENCOUNTER — APPOINTMENT (OUTPATIENT)
Dept: NEUROLOGY | Facility: CLINIC | Age: 61
End: 2021-09-21

## 2021-09-21 ENCOUNTER — NON-APPOINTMENT (OUTPATIENT)
Age: 61
End: 2021-09-21

## 2021-09-30 NOTE — ED PROVIDER NOTE - CONSTITUTIONAL, MLM
normal... Well appearing, well nourished, awake, alert, oriented to person, place, time/situation and in no apparent distress. respirations non-labored/no rales/no rhonchi/no wheezes

## 2021-10-04 ENCOUNTER — APPOINTMENT (OUTPATIENT)
Dept: CARDIOLOGY | Facility: CLINIC | Age: 61
End: 2021-10-04

## 2021-10-05 ENCOUNTER — OUTPATIENT (OUTPATIENT)
Dept: OUTPATIENT SERVICES | Facility: HOSPITAL | Age: 61
LOS: 1 days | Discharge: HOME | End: 2021-10-05

## 2021-10-05 DIAGNOSIS — Z98.890 OTHER SPECIFIED POSTPROCEDURAL STATES: Chronic | ICD-10-CM

## 2021-10-07 ENCOUNTER — RESULT REVIEW (OUTPATIENT)
Age: 61
End: 2021-10-07

## 2021-10-07 ENCOUNTER — APPOINTMENT (OUTPATIENT)
Age: 61
End: 2021-10-07
Payer: MEDICARE

## 2021-10-07 ENCOUNTER — NON-APPOINTMENT (OUTPATIENT)
Age: 61
End: 2021-10-07

## 2021-10-07 VITALS
BODY MASS INDEX: 41.98 KG/M2 | TEMPERATURE: 97.2 F | OXYGEN SATURATION: 97 % | HEIGHT: 71 IN | RESPIRATION RATE: 17 BRPM | WEIGHT: 299.82 LBS | DIASTOLIC BLOOD PRESSURE: 77 MMHG | SYSTOLIC BLOOD PRESSURE: 117 MMHG | HEART RATE: 89 BPM

## 2021-10-07 DIAGNOSIS — E88.09 OTHER DISORDERS OF PLASMA-PROTEIN METABOLISM, NOT ELSEWHERE CLASSIFIED: ICD-10-CM

## 2021-10-07 LAB
B2 MICROGLOB SERPL-MCNC: 3.1 MG/L
BASOPHILS # BLD AUTO: 0.03 K/UL — SIGNIFICANT CHANGE UP (ref 0–0.2)
BASOPHILS NFR BLD AUTO: 0.5 % — SIGNIFICANT CHANGE UP (ref 0–2)
EOSINOPHIL # BLD AUTO: 0.16 K/UL — SIGNIFICANT CHANGE UP (ref 0–0.5)
EOSINOPHIL NFR BLD AUTO: 2.6 % — SIGNIFICANT CHANGE UP (ref 0–6)
ERYTHROCYTE [SEDIMENTATION RATE] IN BLOOD: 7 MM/HR — SIGNIFICANT CHANGE UP (ref 0–20)
FERRITIN SERPL-MCNC: 297 NG/ML
HCT VFR BLD CALC: 43.9 % — SIGNIFICANT CHANGE UP (ref 39–50)
HGB BLD-MCNC: 14 G/DL — SIGNIFICANT CHANGE UP (ref 13–17)
IMM GRANULOCYTES NFR BLD AUTO: 1.6 % — HIGH (ref 0–1.5)
LDH SERPL-CCNC: 176 U/L
LYMPHOCYTES # BLD AUTO: 2.13 K/UL — SIGNIFICANT CHANGE UP (ref 1–3.3)
LYMPHOCYTES # BLD AUTO: 35 % — SIGNIFICANT CHANGE UP (ref 13–44)
MCHC RBC-ENTMCNC: 29.4 PG — SIGNIFICANT CHANGE UP (ref 27–34)
MCHC RBC-ENTMCNC: 31.9 G/DL — LOW (ref 32–36)
MCV RBC AUTO: 92.2 FL — SIGNIFICANT CHANGE UP (ref 80–100)
MONOCYTES # BLD AUTO: 0.67 K/UL — SIGNIFICANT CHANGE UP (ref 0–0.9)
MONOCYTES NFR BLD AUTO: 11 % — SIGNIFICANT CHANGE UP (ref 2–14)
NEUTROPHILS # BLD AUTO: 2.99 K/UL — SIGNIFICANT CHANGE UP (ref 1.8–7.4)
NEUTROPHILS NFR BLD AUTO: 49.3 % — SIGNIFICANT CHANGE UP (ref 43–77)
NRBC # BLD: 0 /100 WBCS — SIGNIFICANT CHANGE UP (ref 0–0)
PLATELET # BLD AUTO: 153 K/UL — SIGNIFICANT CHANGE UP (ref 150–400)
RBC # BLD: 4.76 M/UL — SIGNIFICANT CHANGE UP (ref 4.2–5.8)
RBC # FLD: 13.6 % — SIGNIFICANT CHANGE UP (ref 10.3–14.5)
WBC # BLD: 6.08 K/UL — SIGNIFICANT CHANGE UP (ref 3.8–10.5)
WBC # FLD AUTO: 6.08 K/UL — SIGNIFICANT CHANGE UP (ref 3.8–10.5)

## 2021-10-07 PROCEDURE — 99205 OFFICE O/P NEW HI 60 MIN: CPT

## 2021-10-08 PROBLEM — E88.09 DYSPROTEINEMIA: Status: ACTIVE | Noted: 2021-10-08

## 2021-10-08 LAB
DEPRECATED KAPPA LC FREE/LAMBDA SER: 1.13 RATIO
KAPPA LC CSF-MCNC: 2.26 MG/DL
KAPPA LC SERPL-MCNC: 2.56 MG/DL

## 2021-10-08 RX ORDER — DIVALPROEX SODIUM 500 MG/1
500 TABLET, DELAYED RELEASE ORAL
Qty: 60 | Refills: 0 | Status: ACTIVE | COMMUNITY
Start: 2021-08-04

## 2021-10-08 NOTE — CONSULT LETTER
[Dear  ___] : Dear  [unfilled], [Consult Letter:] : I had the pleasure of evaluating your patient, [unfilled]. [Please see my note below.] : Please see my note below. [Consult Closing:] : Thank you very much for allowing me to participate in the care of this patient.  If you have any questions, please do not hesitate to contact me. [Sincerely,] : Sincerely, [FreeTextEntry2] : Mariela Vallejo M.D. [FreeTextEntry3] : MELINA PRIETO M.D.\par Hematology/ Oncology\par [facility] \par [facility]\par [address]\par [place], [place] [address]\par Telephone: [phone]\par Fax: [phone]\par \par \par \par \par \par

## 2021-10-08 NOTE — ASSESSMENT
[FreeTextEntry1] : Mr. ART 's questions were answered to his satisfaction. He  expressed his  understanding and willingness to comply with the above recommendations, and  will return to the office in 3 months.\par \par \par \par \par \par \par

## 2021-10-08 NOTE — HISTORY OF PRESENT ILLNESS
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  He is known to me since 2015, lost to follow-up in September 2017.  At more than 12 years since his oncologic diagnosis, patient has no evidence of recurrent disease reportedly.  His clinical status has not changed; hematologic profile in normal range.  Immunofixation shows no monoclonal band identified, and serum total protein is in normal range.  In February 2021 random total protein in the urine was 14 mg/dL, normalized on 8/10/2021 to <4 mg/dL. [FreeTextEntry1] : expectant surveillance\par \par

## 2021-10-08 NOTE — PHYSICAL EXAM
[Fully active, able to carry on all pre-disease performance without restriction] : Status 0 - Fully active, able to carry on all pre-disease performance without restriction [Normal] : affect appropriate [Obese] : obese [de-identified] : morbidly obese

## 2021-10-11 LAB
ALBUMIN MFR SERPL ELPH: 55.3 %
ALBUMIN SERPL-MCNC: 4 G/DL
ALBUMIN/GLOB SERPL: 1.2 RATIO
ALPHA1 GLOB MFR SERPL ELPH: 3.5 %
ALPHA1 GLOB SERPL ELPH-MCNC: 0.3 G/DL
ALPHA2 GLOB MFR SERPL ELPH: 9.6 %
ALPHA2 GLOB SERPL ELPH-MCNC: 0.7 G/DL
B-GLOBULIN MFR SERPL ELPH: 14.4 %
B-GLOBULIN SERPL ELPH-MCNC: 1.1 G/DL
GAMMA GLOB FLD ELPH-MCNC: 1.3 G/DL
GAMMA GLOB MFR SERPL ELPH: 17.2 %
INTERPRETATION SERPL IEP-IMP: NORMAL
M PROTEIN SPEC IFE-MCNC: NORMAL
PROT SERPL-MCNC: 7.3 G/DL
PROT SERPL-MCNC: 7.3 G/DL

## 2021-10-15 ENCOUNTER — APPOINTMENT (OUTPATIENT)
Dept: ENDOCRINOLOGY | Facility: CLINIC | Age: 61
End: 2021-10-15
Payer: MEDICARE

## 2021-10-15 VITALS — BODY MASS INDEX: 42.84 KG/M2 | WEIGHT: 306 LBS | HEIGHT: 71 IN

## 2021-10-15 PROCEDURE — G0108 DIAB MANAGE TRN  PER INDIV: CPT

## 2021-10-18 ENCOUNTER — APPOINTMENT (OUTPATIENT)
Dept: CARDIOLOGY | Facility: CLINIC | Age: 61
End: 2021-10-18

## 2021-10-22 ENCOUNTER — EMERGENCY (EMERGENCY)
Facility: HOSPITAL | Age: 61
LOS: 1 days | Discharge: HOME | End: 2021-10-22
Attending: EMERGENCY MEDICINE | Admitting: EMERGENCY MEDICINE
Payer: MEDICARE

## 2021-10-22 ENCOUNTER — APPOINTMENT (OUTPATIENT)
Dept: BARIATRICS/WEIGHT MGMT | Facility: CLINIC | Age: 61
End: 2021-10-22

## 2021-10-22 VITALS
DIASTOLIC BLOOD PRESSURE: 86 MMHG | HEIGHT: 71 IN | TEMPERATURE: 97 F | OXYGEN SATURATION: 97 % | WEIGHT: 306 LBS | RESPIRATION RATE: 18 BRPM | SYSTOLIC BLOOD PRESSURE: 129 MMHG | HEART RATE: 89 BPM

## 2021-10-22 DIAGNOSIS — Z98.890 OTHER SPECIFIED POSTPROCEDURAL STATES: Chronic | ICD-10-CM

## 2021-10-22 LAB
ALBUMIN SERPL ELPH-MCNC: 3.5 G/DL — SIGNIFICANT CHANGE UP (ref 3.3–5)
ALP SERPL-CCNC: 98 U/L — SIGNIFICANT CHANGE UP (ref 40–120)
ALT FLD-CCNC: 34 U/L — SIGNIFICANT CHANGE UP (ref 10–45)
AMPHET UR-MCNC: NEGATIVE — SIGNIFICANT CHANGE UP
ANION GAP SERPL CALC-SCNC: 11 MMOL/L — SIGNIFICANT CHANGE UP (ref 5–17)
APAP SERPL-MCNC: <1 UG/ML — LOW (ref 10–30)
APPEARANCE UR: CLEAR — SIGNIFICANT CHANGE UP
AST SERPL-CCNC: 21 U/L — SIGNIFICANT CHANGE UP (ref 10–40)
BARBITURATES UR SCN-MCNC: NEGATIVE — SIGNIFICANT CHANGE UP
BASOPHILS # BLD AUTO: 0.04 K/UL — SIGNIFICANT CHANGE UP (ref 0–0.2)
BASOPHILS NFR BLD AUTO: 0.6 % — SIGNIFICANT CHANGE UP (ref 0–2)
BENZODIAZ UR-MCNC: NEGATIVE — SIGNIFICANT CHANGE UP
BILIRUB SERPL-MCNC: 0.3 MG/DL — SIGNIFICANT CHANGE UP (ref 0.2–1.2)
BILIRUB UR-MCNC: NEGATIVE — SIGNIFICANT CHANGE UP
BUN SERPL-MCNC: 36 MG/DL — HIGH (ref 7–23)
CALCIUM SERPL-MCNC: 9 MG/DL — SIGNIFICANT CHANGE UP (ref 8.4–10.5)
CHLORIDE SERPL-SCNC: 104 MMOL/L — SIGNIFICANT CHANGE UP (ref 96–108)
CO2 SERPL-SCNC: 24 MMOL/L — SIGNIFICANT CHANGE UP (ref 22–31)
COCAINE METAB.OTHER UR-MCNC: NEGATIVE — SIGNIFICANT CHANGE UP
COLOR SPEC: YELLOW — SIGNIFICANT CHANGE UP
CREAT SERPL-MCNC: 2.01 MG/DL — HIGH (ref 0.5–1.3)
DIFF PNL FLD: NEGATIVE — SIGNIFICANT CHANGE UP
EOSINOPHIL # BLD AUTO: 0.2 K/UL — SIGNIFICANT CHANGE UP (ref 0–0.5)
EOSINOPHIL NFR BLD AUTO: 2.8 % — SIGNIFICANT CHANGE UP (ref 0–6)
ETHANOL SERPL-MCNC: 4 MG/DL — HIGH (ref 0–3)
GLUCOSE SERPL-MCNC: 158 MG/DL — HIGH (ref 70–99)
GLUCOSE UR QL: NEGATIVE — SIGNIFICANT CHANGE UP
HCT VFR BLD CALC: 40.3 % — SIGNIFICANT CHANGE UP (ref 39–50)
HGB BLD-MCNC: 13.2 G/DL — SIGNIFICANT CHANGE UP (ref 13–17)
IMM GRANULOCYTES NFR BLD AUTO: 1.3 % — SIGNIFICANT CHANGE UP (ref 0–1.5)
KETONES UR-MCNC: NEGATIVE — SIGNIFICANT CHANGE UP
LEUKOCYTE ESTERASE UR-ACNC: NEGATIVE — SIGNIFICANT CHANGE UP
LYMPHOCYTES # BLD AUTO: 2.73 K/UL — SIGNIFICANT CHANGE UP (ref 1–3.3)
LYMPHOCYTES # BLD AUTO: 37.9 % — SIGNIFICANT CHANGE UP (ref 13–44)
MCHC RBC-ENTMCNC: 29.7 PG — SIGNIFICANT CHANGE UP (ref 27–34)
MCHC RBC-ENTMCNC: 32.8 GM/DL — SIGNIFICANT CHANGE UP (ref 32–36)
MCV RBC AUTO: 90.6 FL — SIGNIFICANT CHANGE UP (ref 80–100)
METHADONE UR-MCNC: NEGATIVE — SIGNIFICANT CHANGE UP
MONOCYTES # BLD AUTO: 0.82 K/UL — SIGNIFICANT CHANGE UP (ref 0–0.9)
MONOCYTES NFR BLD AUTO: 11.4 % — SIGNIFICANT CHANGE UP (ref 2–14)
NEUTROPHILS # BLD AUTO: 3.32 K/UL — SIGNIFICANT CHANGE UP (ref 1.8–7.4)
NEUTROPHILS NFR BLD AUTO: 46 % — SIGNIFICANT CHANGE UP (ref 43–77)
NITRITE UR-MCNC: NEGATIVE — SIGNIFICANT CHANGE UP
NRBC # BLD: 0 /100 WBCS — SIGNIFICANT CHANGE UP (ref 0–0)
OPIATES UR-MCNC: NEGATIVE — SIGNIFICANT CHANGE UP
PCP SPEC-MCNC: SIGNIFICANT CHANGE UP
PCP UR-MCNC: NEGATIVE — SIGNIFICANT CHANGE UP
PH UR: 6.5 — SIGNIFICANT CHANGE UP (ref 5–8)
PLATELET # BLD AUTO: 183 K/UL — SIGNIFICANT CHANGE UP (ref 150–400)
POTASSIUM SERPL-MCNC: 4.1 MMOL/L — SIGNIFICANT CHANGE UP (ref 3.5–5.3)
POTASSIUM SERPL-SCNC: 4.1 MMOL/L — SIGNIFICANT CHANGE UP (ref 3.5–5.3)
PROT SERPL-MCNC: 7.1 G/DL — SIGNIFICANT CHANGE UP (ref 6–8.3)
PROT UR-MCNC: NEGATIVE — SIGNIFICANT CHANGE UP
RBC # BLD: 4.45 M/UL — SIGNIFICANT CHANGE UP (ref 4.2–5.8)
RBC # FLD: 13.3 % — SIGNIFICANT CHANGE UP (ref 10.3–14.5)
SALICYLATES SERPL-MCNC: 0.7 MG/DL — LOW (ref 3–30)
SODIUM SERPL-SCNC: 139 MMOL/L — SIGNIFICANT CHANGE UP (ref 135–145)
SP GR SPEC: 1.01 — SIGNIFICANT CHANGE UP (ref 1.01–1.02)
THC UR QL: NEGATIVE — SIGNIFICANT CHANGE UP
UROBILINOGEN FLD QL: NEGATIVE — SIGNIFICANT CHANGE UP
WBC # BLD: 7.2 K/UL — SIGNIFICANT CHANGE UP (ref 3.8–10.5)
WBC # FLD AUTO: 7.2 K/UL — SIGNIFICANT CHANGE UP (ref 3.8–10.5)

## 2021-10-22 PROCEDURE — 80053 COMPREHEN METABOLIC PANEL: CPT

## 2021-10-22 PROCEDURE — 80307 DRUG TEST PRSMV CHEM ANLYZR: CPT

## 2021-10-22 PROCEDURE — 99284 EMERGENCY DEPT VISIT MOD MDM: CPT

## 2021-10-22 PROCEDURE — 90792 PSYCH DIAG EVAL W/MED SRVCS: CPT | Mod: 95

## 2021-10-22 PROCEDURE — 36415 COLL VENOUS BLD VENIPUNCTURE: CPT

## 2021-10-22 PROCEDURE — 85025 COMPLETE CBC W/AUTO DIFF WBC: CPT

## 2021-10-22 RX ORDER — POLYETHYLENE GLYCOL 3350 17 G/17G
17 POWDER, FOR SOLUTION ORAL
Qty: 0 | Refills: 0 | DISCHARGE

## 2021-10-22 NOTE — ED PROVIDER NOTE - OBJECTIVE STATEMENT
62 y/o male with h/o Bipolar disorder, obesity , DM presents to ED c/o he is not able sleep for past few days, he talks to himself  a lot, mostly about his past. He also c/o frequent auditory hallucination , mostly his son's voice asking about his grand father. denies S/H ideation.

## 2021-10-22 NOTE — ED ADULT NURSE NOTE - OBJECTIVE STATEMENT
pt states he feels anxious at times he feels he is talking a lot  and cant stop. he states hes having bad thoughts about past experiances concerning his divorce and not seeing his son for the past 2 years . He states he lost contact with his son and feel s bad about that. he is requestng  an evaluation .

## 2021-10-22 NOTE — ED PROVIDER NOTE - CLINICAL SUMMARY MEDICAL DECISION MAKING FREE TEXT BOX
pt presented c/o he has problem sleeping and he keeps talking to himself. he was evaluated , and was cleared for discharged

## 2021-10-22 NOTE — ED PROVIDER NOTE - PHYSICAL EXAMINATION
General:     NAD, well-nourished, well-appearing  Head:     NC/AT, EOMI, oral mucosa moist  Neck:     supple  Lungs:     CTA b/l, no w/r/r  CVS:     S1S2, RRR, no m/g/r  Abd:     +BS, s/nt/nd, no organomegaly  Ext:    2+ radial and pedal pulses, no c/c/e  Neuro: grossly intact  psych: calm cooperative with good insight

## 2021-10-22 NOTE — ED ADULT TRIAGE NOTE - CHIEF COMPLAINT QUOTE
pt BIB [place] [employer] and [facility] from home for psych eval. pt reports hes been more depressed and anxious over the past week. Reports PMH of Bipolar disorder, been taking all meds as prescribed.  Denies SI/HI. reports he has been unable to get in touch with his psychiatrist so has come to ED so speak to a psychiatrist tonight. pt BIB [place] [facility] and EMS from home for psych eval. pt reports hes been more depressed and anxious over the past week. Reports PMH of Bipolar disorder, been taking all meds as prescribed.  Reports he hfeels more sad and has been "crying a lot" at home about past issues in his life. Denies SI/HI. reports he has been unable to get in touch with his psychiatrist so has come to ED so speak to a psychiatrist tonight.

## 2021-10-22 NOTE — ED ADULT NURSE NOTE - CHIEF COMPLAINT QUOTE
pt BIB [place] [employer] and [facility] from home for psych eval. pt reports hes been more depressed and anxious over the past week. Reports PMH of Bipolar disorder, been taking all meds as prescribed.  Denies SI/HI. reports he has been unable to get in touch with his psychiatrist so has come to ED so speak to a psychiatrist tonight.

## 2021-10-22 NOTE — ED PROVIDER NOTE - PATIENT PORTAL LINK FT
You can access the FollowMyHealth Patient Portal offered by [facility] by registering at the following website: [email or web address]. By joining [facility]’s FollowMyHealth portal, you will also be able to view your health information using other applications (apps) compatible with our system.

## 2021-10-22 NOTE — ED ADULT TRIAGE NOTE - NS_BH TRG QUESTION8_ED_ALL_ED
Depression (without Suicidality or Psychosis)/Anna (includes Bipolar Disorder)/Anxiety (includes Panic, OCD)

## 2021-10-23 VITALS
TEMPERATURE: 98 F | DIASTOLIC BLOOD PRESSURE: 88 MMHG | RESPIRATION RATE: 17 BRPM | HEART RATE: 88 BPM | SYSTOLIC BLOOD PRESSURE: 122 MMHG | OXYGEN SATURATION: 98 %

## 2021-10-23 NOTE — ED BEHAVIORAL HEALTH ASSESSMENT NOTE - DETAILS
CKD w/ lithium use Has previously denied yet spontaneously ruminates on emotionally distressing experiences from his past akin to trauma as above dry mouth has an adult son in his 30s urinary frequency self referred As per HPI; patient conveyed step by step strategy for managing his sx and seeking help if sx worsen

## 2021-10-23 NOTE — ED BEHAVIORAL HEALTH ASSESSMENT NOTE - AXIS III
HTN, HLD, Type II DM, diabetic neuropathy, CKD (Stage III), CHAYITO (nonadherent w/CPAP), BPH and osteoarthritis

## 2021-10-23 NOTE — ED BEHAVIORAL HEALTH ASSESSMENT NOTE - CURRENT MEDICATION
Per chart: Geodon 80 mg BID w/ meals and Depakote (dose unknown, reports taking it BID), repaglinide 1 mg oral tablet: 1 tab(s) orally 3 times a day (before meals), Tradjenta 5 mg oral tablet: 1 tab(s) orally once a day, metoprolol tartrate 25 mg oral tablet: 1 tab(s) orally 2 times a day, Vascepa 1 g oral capsule: 2 cap(s) orally 2 times a day, atorvastatin 10 mg oral tablet: 1 tab(s) orally once a day

## 2021-10-23 NOTE — ED BEHAVIORAL HEALTH ASSESSMENT NOTE - OTHER
verbose not observed fair with some observed limitations Records Checked: [facility], [facility] Inpatient, [facility] CL, [facility] ED, [facility] Inpatient, [facility] CL, [facility] Outpatient Medical, HIE Outpatient BH, HIE ED, CVM Inpatient, CVM Outpatient, Tier Inpatient, Tier E&A, Med[facility]ch Inpatient, Med[facility]ch ED, Quick Docs, Healthix, Psyckes, One Content Inpatient, One Content CL, [facility], Social Media (For example - Facebook, Healthwaysam, [employer]), Web search, [employer] [facility] obese some slight involuntary head movements appreciated concrete

## 2021-10-23 NOTE — ED BEHAVIORAL HEALTH ASSESSMENT NOTE - VIOLENCE PROTECTIVE FACTORS:
Residential stability/Sobriety/Engagement in treatment/Affective Stability/Insight into violence risk and need for management/treatment

## 2021-10-23 NOTE — ED BEHAVIORAL HEALTH ASSESSMENT NOTE - OTHER PAST PSYCHIATRIC HISTORY (INCLUDE DETAILS REGARDING ONSET, COURSE OF ILLNESS, INPATIENT/OUTPATIENT TREATMENT)
Per chart: Has had a total of four psychiatric hospitalizations, all for manic episodes. Most recent was in Per chart: 1990 at [facility]. Had one other inpatient stay there before there (unsure when). Says he has had two psychiatric hospitalizations a [facility]. Was diagnosed with bipolar disorder at age 20 and was on lithium for many years but discontinued this due to renal issues in recent years. Has since been switched to depakote/geodon No suicide attempts or SIB.

## 2021-10-23 NOTE — ED BEHAVIORAL HEALTH ASSESSMENT NOTE - DESCRIPTION
as per HPI ED course is as per BTCM (ED Behavioral health) note As per chart review: , one adult son (estranged), unemployed, lives alone, used to work as a .

## 2021-10-23 NOTE — ED BEHAVIORAL HEALTH ASSESSMENT NOTE - NSSUICPROTFACT_PSY_ALL_CORE
Identifies reasons for living/Cultural, spiritual and/or moral attitudes against suicide/Positive therapeutic relationships/Frustration tolerance/Episcopal beliefs

## 2021-10-23 NOTE — ED BEHAVIORAL HEALTH NOTE - BEHAVIORAL HEALTH NOTE
===================  PRE-HOSPITAL COURSE  ===================  SOURCE:  MELISSA Reynolds and secondhand ED documentation.  DETAILS:  Per chart, patient was BIB [facility] activated by self due to feeling more anxious and depressed for the past week, stating he has been crying more frequently regarding issues in the past.     ============  ED COURSE  ============  SOURCE:  MELISSA Reynolds and secondhand ED documentation.  ARRIVAL:  Per chart and RN, patient arrived via PD not under arrest. Per RN, patient was calm upon arrival, and compliant with triage process.  BELONGINGS:  Per RN, patient arrived with clothing. All belongings were provided to hospital security, and patient is currently in a gown with a 1:1 staff member.  BEHAVIOR: RN described patient to be currently speaking with telepsych, presenting with tangential thought process and a flight of ideas, AAOx3, presenting with depressed and anxious mood and appropriate affect, remains in good behavioral and impulse control, is not currently violent/aggressive. RN stated that the patient is denying SI/HI/A/VH. RN stated that there no visible visible marks, bruises, or lacerations on the body. RN stated that the patient appears to be fairly-groomed, maintains proper hygiene, and reports good ADLs, ambulates well and independently without assistance.  TREATMENT:  Per chart and RN, patient did not require PRN medications.  VISITORS:  None    COVID Exposure Screen- collateral (i.e. third-party, chart review, belongings, etc; include EMS and ED staff)  1.        *Has the patient had a COVID-19 test in the last 90 days?  (  ) Yes   (  ) No   ( x ) Unknown- Reason: Unable to assess.  IF YES PROCEED TO QUESTION #2. IF NO OR UNKNOWN, PLEASE SKIP TO QUESTION #3.  2.        Date of test(s) and result(s): ________  3.        *Has the patient tested positive for COVID-19 antibodies? (  ) Yes   (  ) No   (x  ) Unknown- Reason: _Unable to assess.  IF YES PROCEED TO QUESTION #4. IF NO or UNKNOWN, PLEASE SKIP TO QUESTION #5.  4.        Date of positive antibody test: ________  5.        *Has the patient received 2 doses of the COVID-19 vaccine? (  ) Yes   (  ) No   ( x ) Unknown- Reason: Unable to assess.  IF YES PROCEED TO QUESTION #6. IF NO or UNKNOWN, PLEASE SKIP TO QUESTION #7.  6.         Date of second dose: ________  7.        *In the past 10 days, has the patient been around anyone with a positive COVID-19 test?* (  ) Yes   (  ) No   ( x ) Unknown- Reason: Unable to assess.  IF YES PROCEED TO QUESTION #8. IF NO or UNKNOWN, PLEASE SKIP TO QUESTION #13.  8.        Was the patient within 6 feet of them for at least 15 minutes? (  ) Yes   (  ) No   (  ) Unknown- Reason: _____  9.        Did the patient provide care for them? (  ) Yes   (  ) No   (  ) Unknown- Reason: ______  10.     Did the patient have direct physical contact with them (touched, hugged, or kissed them)? (  ) Yes   (  ) No    (  ) Unknown- Reason: __  11.     Did the patient share eating or drinking utensils with them? (  ) Yes   (  ) No    (  ) Unknown- Reason: ____  12.     Did they sneeze, cough, or somehow get respiratory droplets on the patient? (  ) Yes   (  ) No    (  ) Unknown- Reason: ______  13.     *Has the patient been out of [place] within the past 10 days?* (  ) Yes   (  ) No   ( x ) Unknown- Reason: Unable to assess.  IF YES PLEASE ANSWER THE FOLLOWING QUESTIONS:  14.     Which state/country did they go to? ______  15.     Were they there over 24 hours? (  ) Yes   (  ) No    (  ) Unknown- Reason: ______  16.     Date of return to [facility]: ______

## 2021-10-23 NOTE — ED BEHAVIORAL HEALTH ASSESSMENT NOTE - HPI (INCLUDE ILLNESS QUALITY, SEVERITY, DURATION, TIMING, CONTEXT, MODIFYING FACTORS, ASSOCIATED SIGNS AND SYMPTOMS)
This is a 61 year old , unemployed male, disabled, non-caregiver, domiciled at [facility], with past psychiatric history of Bipolar (vs Schizoaffective) disorder, Insomnia and Unspecified Anxiety in outpatient treatment (Dr. Lau), 4 prior psychiatric hospitalizations (as per chart hx; last in 1990), no prior suicide attempts or non-suicidal self injury, no history of violence/aggression or legal issues, no substance use and past medical history of HTN, HLD, Type II DM, diabetic neuropathy, CKD (Stage III), CHAYITO (nonadherent w/CPAP), BPH and osteoarthritis who self presents to the ED endorsing insomnia with increased auditory hallucinations and ruminations on the past requesting to speak with a psychiatrist.     On assessment, patient relays that he couldn't wait until his next appointment and he just "wanted to talk to someone." He reports that he's been talking with himself about his past, crying in his room, screaming to himself in the middle of the night and "what I think about mostly is my son." He then tells me how he has a son in his 30s and after getting  from his son's mother, everything in their home was sold and his wife had the baby sent to her parent's home to be taken care of "without my permission." He relays "there was nothing I could do about it" though he did have visitation for some time. He relays "it was a very difficult divorce, a very difficult time in my life" elaborating how he didn't get any support from friends or family. This led to him being depressed and he began taking medications. His father eventually took him in for some time then one day drove him to , gave him $20 and "left me at the steps of [facility]." He reports living in shelters persistently thereafter conveying struggles with food access at that time and having to deal with his "mental problems." He concludes that the reason he came in is because he's been "rambling" in this manner "thinking about my past; thinking about my son." He then tells me a story about when his son was older and son told him that the step father was his "real father" and told patient "you're dead to me."     Patient tells me that for the past couple of weeks "I've been manic on and off." He describes changes in his mood, crying, isolating himself at times, and intermittently hearing AH of his son's voice wanting to speak with his grandfather. He denies any command hallucinations, denies death wishes or SI, denies any HI, VH or paranoia. He relays interrupted sleep chronically due to having to use the bathroom or due to "severely dry mouth" and "having to wet my whistle." He admits he also has not been using his CPAP machine for years and this impacts his sleep also. He denies any changes in his sleep generally, denies irritability, aggression, grandiosity, hyperactivity or impulsivity. He conveys difficulty reporting much of his symptoms directly and tells me how he has been told he has "the beginning stages of dementia" and generally has issues with "forgetfulness" and "chronic fatigue" some of which is related to his poorly controlled sleep apnea. He reports compliance with his medications but relays he no longer uses a pill box and rather double checks each pill one at a time and writes the day and time he takes each medication (reports having numerous logs of his medications documented at home).     Patient conveys a preference to return home at the conclusion of assessment, then check in with his doctor "when he gets in on Wednesday" and "see if he wants to adjust my medications." He relays "the last resort that I want to do is lose my freedom and be in the hospital." However, he conveys a willingness to be voluntarily admitted if it is recommended. He reports a plan to stay home through the weekend, resume care with his therapist and psychiatric providers next week and hopes to resume his day program when sessions resume in person. He also conveys other interests for his future relaying he would ideally like to rejoin the work force if he is approved to do so. Otherwise, he would like to volunteer and tells me about various volunteer experiences he has had in his past. He reports taking all of his medications prior to coming in and "made sure I had something eat." He relays needing assistance with a taxi to return home and concludes "I'd like to return to my residence." He relays that if he begins to get worse, he would try to get in touch with his doctor, go over to the [facility]'s office to speak with staff, call 911 if it is an emergency or simply return to the ED.    COVID Exposure Screen- Patient  Unable to assess due to patient's circumstantial, overinclusive manner of speaking

## 2021-10-25 DIAGNOSIS — D47.2 MONOCLONAL GAMMOPATHY: ICD-10-CM

## 2021-11-01 PROCEDURE — G9005: CPT

## 2021-11-03 ENCOUNTER — APPOINTMENT (OUTPATIENT)
Dept: PULMONOLOGY | Facility: CLINIC | Age: 61
End: 2021-11-03

## 2021-11-11 ENCOUNTER — APPOINTMENT (OUTPATIENT)
Dept: ORTHOPEDIC SURGERY | Facility: CLINIC | Age: 61
End: 2021-11-11

## 2021-11-19 ENCOUNTER — APPOINTMENT (OUTPATIENT)
Dept: ENDOCRINOLOGY | Facility: CLINIC | Age: 61
End: 2021-11-19

## 2021-11-23 LAB
ALBUMIN SERPL ELPH-MCNC: 4.1 G/DL
ALP BLD-CCNC: 102 U/L
ALT SERPL-CCNC: 33 U/L
ANION GAP SERPL CALC-SCNC: 14 MMOL/L
AST SERPL-CCNC: 14 U/L
BILIRUB SERPL-MCNC: 0.4 MG/DL
BUN SERPL-MCNC: 32 MG/DL
CALCIUM SERPL-MCNC: 9.2 MG/DL
CHLORIDE SERPL-SCNC: 100 MMOL/L
CO2 SERPL-SCNC: 23 MMOL/L
CREAT SERPL-MCNC: 1.64 MG/DL
ESTIMATED AVERAGE GLUCOSE: 192 MG/DL
GLUCOSE SERPL-MCNC: 311 MG/DL
HBA1C MFR BLD HPLC: 8.3 %
POTASSIUM SERPL-SCNC: 4.8 MMOL/L
PROT SERPL-MCNC: 6.6 G/DL
SODIUM SERPL-SCNC: 137 MMOL/L

## 2021-11-29 DIAGNOSIS — Z00.00 ENCOUNTER FOR GENERAL ADULT MEDICAL EXAMINATION W/OUT ABNORMAL FINDINGS: ICD-10-CM

## 2021-11-30 ENCOUNTER — APPOINTMENT (OUTPATIENT)
Dept: ORTHOPEDIC SURGERY | Facility: CLINIC | Age: 61
End: 2021-11-30
Payer: MEDICARE

## 2021-11-30 VITALS — WEIGHT: 300 LBS | BODY MASS INDEX: 41.84 KG/M2

## 2021-11-30 PROCEDURE — 20610 DRAIN/INJ JOINT/BURSA W/O US: CPT | Mod: 50

## 2021-11-30 PROCEDURE — 99214 OFFICE O/P EST MOD 30 MIN: CPT | Mod: 25

## 2021-11-30 PROCEDURE — 73564 X-RAY EXAM KNEE 4 OR MORE: CPT | Mod: 50

## 2021-11-30 NOTE — DISCUSSION/SUMMARY
[de-identified] : This patient has left knee osteoarthritis and right knee that appears to be normal radiographically.  It is possible that he has some mild arthritis that has not obvious on radiographs or an intra-articular injury.  The patient is not an appropriate candidate for surgical intervention at this time. An extensive discussion was conducted on the natural history of the disease and the variety of surgical and non-surgical options available to the patient including, but not limited to non-steroidal anti-inflammatory medications, steroid injections, physical therapy, maintenance of ideal body weight, and reduction of activity.  Weight loss recommended.  Home exercise program recommended.  He will take Tylenol for pain.  Today we performed bilateral knee intra-articular cortisone junctions.\par The patient will schedule an appointment as needed.\par \par Informed consent for bilat knee injections was obtained. All questions were answered. A time out was performed. The  knees were prepped and draped in sterile fashion. Using sterile technique, 1cc of Kenalog, 4cc of 1% lidocaine, 4cc of 0.25% marcaine using a 21-gauge needle. A sterile dressing was applied. Post injection instructions were reviewed. The patient tolerated the procedure well. \par \par The patient has been counseled regarding the elevated risks associated with surgical complications in patients with a BMI>35.  I explained to the patient the risk of obesity, which is well documented in the orthopedic literature as increasing risks of wound breakdown (dehiscence), drainage, periprosthetic joint infection, dissatisfaction, chronic pain, early failure and/or loosening of the prosthesis, and impaired overall outcome to the patient. The patient demonstrates a profound understanding of the increased risk. Nutritionist referral, methods of monitoring nutrition intake and calorie counting and bariatric surgery options were discussed with the patient. After a lengthy discussion, the patient agreed to make a coordinated effort at weight loss. The patient understands our BMI policy and if they are planning surgical intervention, then they will need to bring their BMI below 40 in order to proceed and they are agreeable to this.\par

## 2021-11-30 NOTE — HISTORY OF PRESENT ILLNESS
[de-identified] : This is very nice 61-year-old child experiencing chronic bilat knee pain, which is moderate in intensity. He does have mod-severe left knee OA and minimal arthritic changes to the right knee. The pain moderately limits activities of daily living. Walking tolerance is somewhat reduced.  Not currently taking NSAIDs for this.  He cannot take NSAIDs because he stage II chronic kidney disease.  BMI is 43 and he knws he needs to lose weight.  He denies any brace.  He denies a cane or walker.  Not currently therapy.  He has bipolar depression and is working in his life together.

## 2021-11-30 NOTE — PHYSICAL EXAM
[de-identified] : Patient is well nourished, well-developed, in no acute distress, with appropriate mood and affect. The patient is oriented to time, place, and person. Respirations are even and unlabored. Gait evaluation does reveal a limp. There is no inguinal adenopathy. Examination of the contralateral knee shows normal range of motion, strength, no tenderness, and intact skin. The affected limb is well-perfused, without skin lesions, shows a grossly normal motor and sensory examination. Knee motion is significantly reduced and does cause significant pain. The knee moves from 5-100 degrees. The knee is stable within that range-of-motion to AP and ML stress. The alignment of the knee is 5 degrees varus. Muscle strength is normal. Pedal pulses are palpable. Hip examination was negative.\par  [de-identified] : Long standing knee, AP knee, lateral knee, and patellar views of the left knee were ordered and taken in the office and demonstrate degenerative joint disease of the knee with joint space narrowing, osteophyte formation, and subchondral sclerosis.\par \par Long standing knee, AP knee, lateral knee, and patellar views of the right knee were ordered and taken in the office and demonstrate no evidence of degenerative joint disease of the knee, fractures, intra-articular pathology.

## 2021-12-16 ENCOUNTER — APPOINTMENT (OUTPATIENT)
Dept: UROLOGY | Facility: CLINIC | Age: 61
End: 2021-12-16
Payer: MEDICARE

## 2021-12-16 VITALS — DIASTOLIC BLOOD PRESSURE: 87 MMHG | SYSTOLIC BLOOD PRESSURE: 125 MMHG | HEART RATE: 88 BPM

## 2021-12-16 DIAGNOSIS — K59.09 OTHER CONSTIPATION: ICD-10-CM

## 2021-12-16 DIAGNOSIS — R39.14 FEELING OF INCOMPLETE BLADDER EMPTYING: ICD-10-CM

## 2021-12-16 DIAGNOSIS — N40.1 BENIGN PROSTATIC HYPERPLASIA WITH LOWER URINARY TRACT SYMPMS: ICD-10-CM

## 2021-12-16 DIAGNOSIS — N13.8 BENIGN PROSTATIC HYPERPLASIA WITH LOWER URINARY TRACT SYMPMS: ICD-10-CM

## 2021-12-16 PROCEDURE — 99214 OFFICE O/P EST MOD 30 MIN: CPT

## 2021-12-16 PROCEDURE — 51798 US URINE CAPACITY MEASURE: CPT

## 2021-12-16 RX ORDER — DIVALPROEX SODIUM 500 1/1
500 TABLET, EXTENDED RELEASE ORAL
Qty: 60 | Refills: 0 | Status: COMPLETED | COMMUNITY
Start: 2021-01-20 | End: 2021-12-16

## 2021-12-16 RX ORDER — FLUTICASONE PROPIONATE 50 UG/1
50 SPRAY, METERED NASAL DAILY
Qty: 1 | Refills: 5 | Status: COMPLETED | COMMUNITY
Start: 2020-07-10 | End: 2021-12-16

## 2021-12-16 NOTE — ASSESSMENT
[FreeTextEntry1] : Plan:\par \par - PVR: 27 cc\par \par - U/A and urine culture sent - will call if results positive\par \par - Pt follows up annual with his PCP for PSA monitoring \par \par - Counseled the patient on constipation and how it can affects the urinary tract. We discussed increasing water intake, daily fruits and vegetables, and sources of fiber. We recommends 4 servings of whole fruits per day, excluding dried fruit or juices. Also recommended supplementing soluble fiber intake with gummy fiber with 2 gummies per day\par \par - Con't finasteride 5mg PO daily\par \par - Con't Tamsulosin 0.4mg PO QHS \par \par - RTO in 1 yr

## 2021-12-16 NOTE — END OF VISIT
[Time Spent: ___ minutes] : I have spent [unfilled] minutes of time on the encounter. [FreeTextEntry3] : RTO 1 year\par PSA check by pcp, was normal last year, has scheduled appointment\par renewed meds\par \par \par I, Dr. Wen, personally performed the evaluation and management (E/M) services for this established patient who presents today with (a) new problem(s)/exacerbation of existing condition.  That E/M includes conducting the examination, assessing all new/exacerbated conditions, and establishing a new plan of care.  Today, my ACP, Danna Bentley NP, was here to observe my evaluation and management services for this new problem/exacerbated condition to be followed going forward.\par \par \par The total time spent with the patient includes face to face time as well for documentation, ordering medications/ labs/ procedures, and care coordination, but I acknowledge it does not include time spent on any procedures performed (eg PVR, UDS, Cystoscopy, Catheter changes, etc)\par

## 2021-12-16 NOTE — PHYSICAL EXAM
[General Appearance - Well Developed] : well developed [Normal Appearance] : normal appearance [Abdomen Soft] : soft [Edema] : no peripheral edema [] : no respiratory distress [Not Anxious] : not anxious [FreeTextEntry1] : ambulates with a walking cane

## 2021-12-17 LAB
APPEARANCE: CLEAR
BACTERIA UR CULT: NORMAL
BACTERIA: NEGATIVE
BILIRUBIN URINE: NEGATIVE
BLOOD URINE: NEGATIVE
COLOR: COLORLESS
GLUCOSE QUALITATIVE U: ABNORMAL
HYALINE CASTS: 0 /LPF
KETONES URINE: NEGATIVE
LEUKOCYTE ESTERASE URINE: NEGATIVE
MICROSCOPIC-UA: NORMAL
NITRITE URINE: NEGATIVE
PH URINE: 6
PROTEIN URINE: NEGATIVE
RED BLOOD CELLS URINE: 0 /HPF
SPECIFIC GRAVITY URINE: 1
SQUAMOUS EPITHELIAL CELLS: 0 /HPF
UROBILINOGEN URINE: NORMAL
WHITE BLOOD CELLS URINE: 0 /HPF

## 2021-12-21 ENCOUNTER — NON-APPOINTMENT (OUTPATIENT)
Age: 61
End: 2021-12-21

## 2021-12-21 ENCOUNTER — APPOINTMENT (OUTPATIENT)
Dept: CARDIOLOGY | Facility: CLINIC | Age: 61
End: 2021-12-21
Payer: MEDICARE

## 2021-12-21 VITALS
OXYGEN SATURATION: 95 % | BODY MASS INDEX: 42.84 KG/M2 | DIASTOLIC BLOOD PRESSURE: 74 MMHG | HEART RATE: 89 BPM | HEIGHT: 71 IN | WEIGHT: 306 LBS | SYSTOLIC BLOOD PRESSURE: 106 MMHG

## 2021-12-21 DIAGNOSIS — Z01.810 ENCOUNTER FOR PREPROCEDURAL CARDIOVASCULAR EXAMINATION: ICD-10-CM

## 2021-12-21 PROCEDURE — 93000 ELECTROCARDIOGRAM COMPLETE: CPT | Mod: NC

## 2021-12-21 PROCEDURE — 99215 OFFICE O/P EST HI 40 MIN: CPT

## 2021-12-21 NOTE — REASON FOR VISIT
[CV Risk Factors and Non-Cardiac Disease] : CV risk factors and non-cardiac disease [Other: ____] : [unfilled] [FreeTextEntry1] : December 2021 - Patient returns today for follow-up.\par He is scheduled to have cataract surgery with Dr. Govea in [place] on January 5, 2022.\par He has no complaints of chest pain or shortness of breath.\par He received both a [employer] Covid-19 vaccine and [employer] Marco booster.\par

## 2021-12-21 NOTE — HISTORY OF PRESENT ILLNESS
[FreeTextEntry1] : 60 year-old gentleman with known cardiovascular risk factors including hypertension, well controlled non-insulin dependent diabetes, dyslipidemia, obesity, CHAYITO, bipolar disorder on Lithium and Geodon, lives as a full time resident of the [facility]. He presents today in his usual state of health, having lost approximately 65 lbs over the past year. He had a repeat sleep study performed 8/12/2016 which showed severe CHAYITO.\par He was formerly followed by a cardiologist in [place], Geovanny Elizabeth MD.\par \par May 10, 2019: Patient presents with complain of sleep apnea. Has concerning sleep study two months ago. He has nasal congestion and cannot tolerate his CPAP. He is also concern about neuropathic pain in the insteps of his feet. He has lost several pounds through diet and exercise that are supervised through his program.\par \par Psychiatrist at [facility] in [place] who manages his Lithium and his antipsychotic medication (second generation, Geodon).\par \par November 2019 - Patient presents today in his usual state of health. He has modified his diet and is losing significant weight. From a peak of 349 lbs in 2012, patient is now 284 lbs. He reports having difficulty walking long distances, but that is because of leg weakness, not because of shortness of breath or chest pain. He is concerned about dyspnea on exertion, but he has not experienced this recently.\par \par June 2020 - Patient has lost 65 lbs by diet and exercise, but when he got the stimulus check, he broke his diet and started eating pizza, and heroes, and potato chips, and he believes he had a stroke that presented with pressured speech. He was scanned at [facility], and he was seen by neurology. They believe he had a TIA and discharged him with atorvastatin 40 mg daily (up from prior 20 mg daily). He continues to take ASA 81 mg daily and an oral diabetes regimen (repaglinide and Tradjenta).\par Unfortunately, his exercise class was shut down due to Covid pandemic.\par \par October 2020 - Patient has been having difficulty with over-eating. He reports several dietary indiscretions over the past month, including pizza and Chinese food.\par He has been depressed by his eating, and he is feeling socially isolated. He finds that he runs out of money for food and relies on a local [employer] for meals.\par He continues to take his medications without issues, but he has not been using his CPAP machine.\par \par March 2021 - Patient returns today for follow-up. He was admitted to [facility] in December 2020 for elevated lithium levels, and he was transitioned off lithium to Depakote. He feels much better now on Depakote. He is also on a new diabetes regimen that includes Rybelsus and it has helped him lose weight. \par These medication changes have also helped him financially. \par Flomax and finasteride are helping him with BPH.\par Linzess is improving his constipation. \par Arthritis continues to be a problem. \par He is sleeping well through the night. He is using his CPAP.\par He is walking for exercise.\par He has not yet received Covid-19 vaccine, but he is interested in getting it. \par \par August 2021 - Patient returns today for follow-up and echocardiogram. \par He has been working with Dr. Estuardo Dowd on losing weight, and he has dramatically modified his diet. He has reduced bread, pizza, and Chinese food intake. \par He has increased eating oatmeal and eggs. \par He has received [employer]'s Covid-19 vaccine.\par \par PMD: Julius Mckeon MD [phone]\par Ophthalmologist: Patel Govea MD [phone]\par Sleep Medicine: Angelika Todd MD [phone]\par Endocrinologist: Rocío Bañuelos MD [phone]\par Psychiatrist: Anthony Dc, Psychiatric Nurse Practitioner at [facility]

## 2021-12-21 NOTE — CARDIOLOGY SUMMARY
[de-identified] : 8/30/2021, sinus 91 bpm, left atrial enlargement, old inferior MI pattern, poor R-wave progression [de-identified] : 2015, no Ischemia \par 11/21/2019, pharmacologic nuclear stress test, normal myocardial perfusion imaging, LVEF 65%, LVEDV 109 mL. [de-identified] : 3/2015, normal LV function\par 9/13/2019, concentric LVH, grossly normal LV systolic function, LVEF 55-60%\par 8/30/2021, concentric LVH, grossly normal LV systolic function, LVEF 60-65%\par

## 2021-12-21 NOTE — DISCUSSION/SUMMARY
[FreeTextEntry1] : Mr. Pruitt has multiple cardiac risk factors, as above, and presents today for follow-up and preop cardiovascluar evaluation prior to cataract surgery. The surgery is low risk from a cardiovascular perspective. \par \par We discussed the importance of compliance with both psychiatric and cardiac medications, as well as compliance with the CPAP machine that he will be getting again after his recent sleep study demonstrated severe CHAYITO. Patient counseled regarding exercise, diet, and weight loss. No change in cardiac regimen today. \par Follow-up with PMD, endocrinologist, podiatrist, and sleep medicine. \par \par Issue of relationship between CHAYITO and hypertension, weight gain, and risk factors also reinforced and compliance in this regard discussed as well.\par \par 1. Type II diabetes - continue Rybelsus and repaglinide per endocrinology.\par 2. CKD - Off lithium now. Follow-up per Dr. Vallejo.\par 3. Obesity - Patient has lost weight and will continue diet and exercise. Continue to work with Dr. Estuardo Dowd.\par 4. Hypertension - well controlled today. Continue current regimen, including ARB, beta-blocker. \par 5. Bipolar disorder - continue care per psychiatry. Patient now off lithium and on Depakote.\par 6. BPH - Flomax and Proscar have improved urinary stream. Continue current regimen. Follow-up per PMD.\par 7. CHAYITO - Continue CPAP QHS. \par \par Patient is without acute coronary syndrome, decompensated heart failure, dangerous arrhythmia, or critical valvular stenosis. No further cardiovascular testing is necessary prior to proceeding with cataract surgery.

## 2021-12-23 ENCOUNTER — APPOINTMENT (OUTPATIENT)
Dept: NEUROLOGY | Facility: CLINIC | Age: 61
End: 2021-12-23

## 2022-01-03 ENCOUNTER — OUTPATIENT (OUTPATIENT)
Dept: OUTPATIENT SERVICES | Facility: HOSPITAL | Age: 62
LOS: 1 days | End: 2022-01-03
Payer: MEDICARE

## 2022-01-03 DIAGNOSIS — Z98.890 OTHER SPECIFIED POSTPROCEDURAL STATES: Chronic | ICD-10-CM

## 2022-01-03 DIAGNOSIS — Z20.828 CONTACT WITH AND (SUSPECTED) EXPOSURE TO OTHER VIRAL COMMUNICABLE DISEASES: ICD-10-CM

## 2022-01-03 LAB — SARS-COV-2 RNA SPEC QL NAA+PROBE: SIGNIFICANT CHANGE UP

## 2022-01-03 PROCEDURE — 87635 SARS-COV-2 COVID-19 AMP PRB: CPT

## 2022-01-08 ENCOUNTER — OUTPATIENT (OUTPATIENT)
Dept: OUTPATIENT SERVICES | Facility: HOSPITAL | Age: 62
LOS: 1 days | End: 2022-01-08
Payer: MEDICARE

## 2022-01-08 DIAGNOSIS — Z20.828 CONTACT WITH AND (SUSPECTED) EXPOSURE TO OTHER VIRAL COMMUNICABLE DISEASES: ICD-10-CM
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
## 2022-01-08 DIAGNOSIS — Z98.890 OTHER SPECIFIED POSTPROCEDURAL STATES: Chronic | ICD-10-CM

## 2022-01-08 LAB — SARS-COV-2 RNA SPEC QL NAA+PROBE: SIGNIFICANT CHANGE UP

## 2022-01-08 PROCEDURE — U0003: CPT

## 2022-01-08 PROCEDURE — U0005: CPT

## 2022-01-11 ENCOUNTER — OUTPATIENT (OUTPATIENT)
Dept: OUTPATIENT SERVICES | Facility: HOSPITAL | Age: 62
LOS: 1 days | End: 2022-01-11
Payer: MEDICARE

## 2022-01-11 DIAGNOSIS — Z98.890 OTHER SPECIFIED POSTPROCEDURAL STATES: Chronic | ICD-10-CM

## 2022-01-11 DIAGNOSIS — Z20.828 CONTACT WITH AND (SUSPECTED) EXPOSURE TO OTHER VIRAL COMMUNICABLE DISEASES: ICD-10-CM

## 2022-01-11 LAB — SARS-COV-2 RNA SPEC QL NAA+PROBE: SIGNIFICANT CHANGE UP

## 2022-01-11 PROCEDURE — U0005: CPT

## 2022-01-11 PROCEDURE — U0003: CPT

## 2022-01-11 NOTE — H&P PST ADULT - PROBLEM SELECTOR PROBLEM 4
[facility] AMBULATORY ENCOUNTER  [facility]    Heriberto Hoffmann is a 82 year old male who presents to the office for:  Chief Complaint   Patient presents with   • Office Visit     establish care     Patient Care Team:  Mekhi Enrique MD as PCP - General ([facility])  Porfirio Magallon MD as Cardiologist (Cardiovascular Disease)  Monty Dahl MD as Ophthalmology (Ophthalmology)    Current Outpatient Medications   Medication Sig Dispense Refill   • busPIRone (BUSPAR) 10 MG tablet Take 1 tablet by mouth 3 times daily. 270 tablet 2   • donepezil (ARICEPT) 5 MG tablet Take 1 tablet by mouth nightly. 90 tablet 2   • Multiple Vitamins-Minerals (PRESERVISION/LUTEIN PO) Take 2 tablets by mouth daily.     • VITAMIN D PO Take 1,000 Units by mouth daily. D3     • omeprazole (PrilOSEC) 20 MG capsule Take 1 capsule by mouth daily. 90 capsule 3   • nitroGLYcerin (NITROSTAT) 0.4 MG sublingual tablet Place 1 tablet under the tongue every 5 minutes as needed for Chest pain. 10 tablet 3   • rosuvastatin (CRESTOR) 20 MG tablet Take 1 tablet by mouth daily. 90 tablet 2   • metoPROLOL succinate (TOPROL-XL) 25 MG 24 hr tablet Take 1 tablet by mouth daily. 90 tablet 3   • KRILL OIL PO Take 1 capsule by mouth daily.     • Co-Enzyme Q-10 100 MG CAPS Take 100 mg by mouth.      • aspirin 81 MG tablet Take 81 mg by mouth daily.     • Dorzolamide HCl-Timolol Mal PF 22.3-6.8 MG/ML SOLN Place 1 drop into left eye 2 times daily.        No current facility-administered medications for this visit.     ALLERGIES:   Allergen Reactions   • Duloxetine Hcl DIZZINESS     Past Medical History:   Diagnosis Date   • Anxiety 1/8/2021   • Chronic kidney disease (CKD), active medical management without dialysis, stage 3 (moderate) (CMS/HCC) 1/8/2021   • Myocardial infarction (CMS/HCC)      Past Surgical History:   Procedure Laterality Date   • Appendectomy     • Cholecystectomy     • Colorec canc scrn,colonoscopy hi risk  05/03/2012    F/U 2022   • 
Coronary artery bypass graft     • Eye surgery      Cataracts   • Place cath in [facility] ivc  2009   • Stress test  03/2017   • Vasectomy       Family History   Problem Relation Age of Onset   • Cancer Father    • Heart disease Father    • Asthma Brother      Social History     Socioeconomic History   • Marital status: /Civil Union   Tobacco Use   • Smoking status: Never Smoker   • Smokeless tobacco: Never Used   Substance and Sexual Activity   • Alcohol use: No   • Drug use: No   Other Topics Concern   • Seat Belt Yes   Social History Narrative    Lives with wife Ruba, 3 sons in the area    Retired wood working, [employer]     Buddhist     Social Determinants of Health     Intimate Partner Violence: Not At Risk   • Social Determinants: Intimate Partner Violence Past Fear: No   • Social Determinants: Intimate Partner Violence Current Fear: No      Allergies, Medications, Medical history, Surgical history, Social history and Family history were reviewed and updated.  REVIEW OF SYSTEMS:    GENERAL: negative for  fever, chills, tiredness, malaise, weight loss, weight gain.  HEENT: negative for vision changes, hearing changes, epistaxis, sinus pressure and dysphagia  PULM: negative for  cough, shortness of breath, sputum, hemoptysis and wheezing  CV: negative for  chest pain, syncope, dyspnea on exertion, palpitation, orthopnea and edema.  GI: negative for   negative for nausea, vomiting, constipation, change in bowel habits, bleeding and reflux    PHYSICAL EXAM:    Vitals:    01/11/22 0946   BP: 132/76   Weight: 80.3 kg (177 lb)   Height: 5' 9\" (1.753 m)     General:   awake, alert and oriented and in no acute distress  HEENT:   normocephalic, atraumatic, pupils equal, round and reactive to light and accommodation and pharynx normal  Lymph nodes: No nodes palpated on the head, neck or supraclavicular  Neck:    no thyromegaly, no JVD and no neck masses  Lungs:    clear to auscultation, good air 
entry, no rales or wheezes and no rhonchi  Cardiovascular:   no JVD, S1 and S2 normal, no S3 or S4, no clicks, rubs or murmurs and regular rate and rhythm  Abdomen:   soft, non-tender, nondistended, normal active bowel sounds and no masses palpated     HEALTHCARE MAINTENANCE AND PREVENTIVE CARE:  Health Maintenance   Topic Date Due   • DM/CKD GFR  07/13/2022   • DM/CKD Microalbumin  07/22/2022   • Medicare Wellness Visit  07/22/2022   • Depression Screening  01/11/2023   • DTaP/Tdap/Td Vaccine (2 - Td or Tdap) 05/01/2027   • Influenza Vaccine  Completed   • Shingles Vaccine  Completed   • COVID-19 Vaccine  Completed   • Pneumococcal Vaccine 65+  Completed   • Hepatitis B Vaccine  Aged Out   • Meningococcal Vaccine  Aged Out   • HPV Vaccine  Aged Out     ASSESSMENT/PLAN:    Problem List Items Addressed This Visit        Cardiac and Vasculature    Pure hypercholesterolemia     crestor 20mg         Relevant Orders    COMPREHENSIVE METABOLIC PANEL    Coronary artery disease involving native coronary artery of native heart without angina pectoris     Metoprolol 25mg daily, ASA 81mg, crestor 20mg  Nitrostat 0.4mg SL PRN  Seeing cardiology, last appt was 3/30/21, annual f/u planned  Today patient denies any new symptoms.         Relevant Orders    COMPREHENSIVE METABOLIC PANEL    LIPID PANEL WITH REFLEX    CBC WITH DIFFERENTIAL    Aortic ectasia (CMS/HCC)     Mild, being followed by cardiology            ENT    Sensorineural hearing loss, bilateral       Gastrointestinal and Abdominal    Gastroesophageal reflux disease without esophagitis     Denies symptoms, will trial stopping PPI  F/u as needed if symptoms occur            Genitourinary and Reproductive    Elevated PSA    Relevant Orders    PSA    Chronic kidney disease (CKD), active medical management without dialysis, stage 3 (moderate) (CMS/HCC)     Labs stable.    Sodium (mmol/L)   Date Value   07/13/2021 144     Potassium (mmol/L)   Date Value   07/13/2021 4.3 
    Chloride (mmol/L)   Date Value   07/13/2021 106     Glucose (mg/dL)   Date Value   07/13/2021 105 (H)     Calcium (mg/dL)   Date Value   07/13/2021 8.8     Carbon Dioxide (mmol/L)   Date Value   07/13/2021 33 (H)     BUN (mg/dL)   Date Value   07/13/2021 20     Creatinine (mg/dL)   Date Value   07/13/2021 1.28 (H)              Relevant Orders    CBC WITH DIFFERENTIAL       Mental Health    Anxiety     Stable on buspirone 10mg TID         Relevant Medications    busPIRone (BUSPAR) 10 MG tablet    Other Relevant Orders    CBC WITH DIFFERENTIAL       Neuro    Dementia (CMS/HCC) - Primary     aricept 5mg daily  Evaluated by Dr Franklin in Jan 2019 per wife Ruba  POA paperwork set up summer 2021 in [place] per Ruba.  Patient has significant cognitive decline and is unable to answer any questions about his medical conditions or medications.  Will activate POA today.  POA is listed as patient's eldest son Jaspal, followed by other two sons, Alex and Ortiz. Wife Ruba had resigned her position as POA on 4/11/21 per documents.  Discussed DNR status and wife thinks he would not be ready for DNR at this time.  Will plan to discuss health care decisions with son Jaspal.           Relevant Medications    donepezil (ARICEPT) 5 MG tablet        Return in about 6 months (around 7/11/2022) for annual physical, med check, HTN, cholesterol, CKD, labs 3-7 days prior.    Total time I spent today on this appointment is 45 minutes.    [place] [address]  Phone: [phone]  
No
T2DM (type 2 diabetes mellitus)

## 2022-01-12 ENCOUNTER — TRANSCRIPTION ENCOUNTER (OUTPATIENT)
Age: 62
End: 2022-01-12

## 2022-01-12 NOTE — ASU PATIENT PROFILE, ADULT - HEALTH/HEALTHCARE ANXIETIES, PROFILE
NOTIFICATION RETURN TO WORK / SCHOOL 
 
10/6/2020 2:51 PM 
 
Mr. Rashawn Rolle [address] [place] [id number] To Whom It May Concern: 
 
Rashawn Rolle is currently under the care of [facility]. He will return to work on: 10/11/2020. If there are questions or concerns please have the patient contact our office. Sincerely, Carmen Barbour, DO 
 
                                
 

patient denied

## 2022-01-12 NOTE — ASU PATIENT PROFILE, ADULT - FALL HARM RISK - UNIVERSAL INTERVENTIONS
Bed in lowest position, wheels locked, appropriate side rails in place/Call bell, personal items and telephone in reach/Instruct patient to call for assistance before getting out of bed or chair/Non-slip footwear when patient is out of bed/[place] to call system/Physically safe environment - no spills, clutter or unnecessary equipment/Purposeful Proactive Rounding/Room/bathroom lighting operational, light cord in reach

## 2022-01-13 ENCOUNTER — OUTPATIENT (OUTPATIENT)
Dept: OUTPATIENT SERVICES | Facility: HOSPITAL | Age: 62
LOS: 1 days | End: 2022-01-13
Payer: MEDICARE

## 2022-01-13 VITALS
SYSTOLIC BLOOD PRESSURE: 110 MMHG | RESPIRATION RATE: 16 BRPM | OXYGEN SATURATION: 95 % | DIASTOLIC BLOOD PRESSURE: 77 MMHG | HEART RATE: 88 BPM | TEMPERATURE: 98 F

## 2022-01-13 VITALS
OXYGEN SATURATION: 95 % | HEART RATE: 94 BPM | RESPIRATION RATE: 16 BRPM | SYSTOLIC BLOOD PRESSURE: 113 MMHG | HEIGHT: 71 IN | WEIGHT: 304.02 LBS | TEMPERATURE: 98 F | DIASTOLIC BLOOD PRESSURE: 79 MMHG

## 2022-01-13 DIAGNOSIS — Z98.890 OTHER SPECIFIED POSTPROCEDURAL STATES: Chronic | ICD-10-CM

## 2022-01-13 DIAGNOSIS — H25.12 AGE-RELATED NUCLEAR CATARACT, LEFT EYE: ICD-10-CM

## 2022-01-13 LAB — GLUCOSE BLDC GLUCOMTR-MCNC: 147 MG/DL — HIGH (ref 70–99)

## 2022-01-13 PROCEDURE — 66984 XCAPSL CTRC RMVL W/O ECP: CPT | Mod: LT

## 2022-01-13 PROCEDURE — 82962 GLUCOSE BLOOD TEST: CPT

## 2022-01-13 PROCEDURE — V2632: CPT

## 2022-01-13 DEVICE — LENS IOL ACRYSOF SN60WF 21.5D
Type: IMPLANTABLE DEVICE | Site: LEFT | Status: NON-FUNCTIONAL
Removed: 2022-01-13

## 2022-01-13 RX ORDER — CYCLOPENTOLATE HYDROCHLORIDE 10 MG/ML
1 SOLUTION/ DROPS OPHTHALMIC
Refills: 0 | Status: COMPLETED | OUTPATIENT
Start: 2022-01-13 | End: 2022-01-13

## 2022-01-13 RX ORDER — SODIUM CHLORIDE 9 MG/ML
1000 INJECTION, SOLUTION INTRAVENOUS
Refills: 0 | Status: DISCONTINUED | OUTPATIENT
Start: 2022-01-13 | End: 2022-01-13

## 2022-01-13 RX ORDER — FLUTICASONE PROPIONATE 220 MCG
1 AEROSOL WITH ADAPTER (GRAM) INHALATION
Qty: 0 | Refills: 0 | DISCHARGE

## 2022-01-13 RX ORDER — PHENYLEPHRINE HCL 2.5 %
1 DROPS OPHTHALMIC (EYE)
Refills: 0 | Status: COMPLETED | OUTPATIENT
Start: 2022-01-13 | End: 2022-01-13

## 2022-01-13 RX ORDER — TROPICAMIDE 1 %
1 DROPS OPHTHALMIC (EYE)
Refills: 0 | Status: COMPLETED | OUTPATIENT
Start: 2022-01-13 | End: 2022-01-13

## 2022-01-13 RX ORDER — KETOROLAC TROMETHAMINE 0.5 %
1 DROPS OPHTHALMIC (EYE)
Refills: 0 | Status: COMPLETED | OUTPATIENT
Start: 2022-01-13 | End: 2022-01-13

## 2022-01-13 RX ORDER — ACETAMINOPHEN 500 MG
0 TABLET ORAL
Qty: 0 | Refills: 0 | DISCHARGE

## 2022-01-13 RX ADMIN — CYCLOPENTOLATE HYDROCHLORIDE 1 DROP(S): 10 SOLUTION/ DROPS OPHTHALMIC at 08:41

## 2022-01-13 RX ADMIN — Medication 1 DROP(S): at 08:41

## 2022-01-13 RX ADMIN — Medication 1 DROP(S): at 08:46

## 2022-01-13 RX ADMIN — Medication 1 DROP(S): at 08:51

## 2022-01-13 RX ADMIN — CYCLOPENTOLATE HYDROCHLORIDE 1 DROP(S): 10 SOLUTION/ DROPS OPHTHALMIC at 08:51

## 2022-01-13 RX ADMIN — CYCLOPENTOLATE HYDROCHLORIDE 1 DROP(S): 10 SOLUTION/ DROPS OPHTHALMIC at 08:46

## 2022-01-13 NOTE — ASU DISCHARGE PLAN (ADULT/PEDIATRIC) - ACTIVITY LEVEL
Discharged. Remained free from injury. Discharge instructions given. Verbalized understanding. IV removed. Cath tip intact. Ambulated off floor with family.   No excercise/No heavy lifting/No sports/gym

## 2022-01-13 NOTE — ASU PREOP CHECKLIST - ASSESSMENT, HISTORY & PHYSICAL COMPLETED AND ON MEDICAL RECORD
COLITIS  Colitis is inflammation of the colon. Colitis may last a short time (be acute), or it may last a long time (become chronic).    What are the causes?  This condition may be caused by:  •Viruses.  •Bacteria.  •Reaction to medicine.  •Certain autoimmune diseases such as Crohn's disease or ulcerative colitis.    What are the signs or symptoms?  Symptoms of this condition include:  •Watery diarrhea.  •Passing bloody or tarry stool.  •Pain.  •Fever.  •Vomiting.  •Tiredness (fatigue).  •Weight loss.  •Bloating.  •Abdominal pain.  •Having fewer bowel movements than usual.  •A strong and sudden urge to have a bowel movement.  •Feeling like the bowel is not empty after a bowel movement.    HOW IS THIS CONDITION DIAGNOSED AND TREATED?  This condition is diagnosed with a stool test or a blood test.  You may also have other tests, such as:  •CT scan.  •Colonoscopy.  •Endoscopy.  •Biopsy.    How is this treated?  Treatment for this condition depends on the cause. The condition may be treated by:  •Resting the bowel. This involves not eating or drinking for a period of time.  •Fluids that are given through an IV.  •Medicine for pain and diarrhea.  •Antibiotic medicines.  •Cortisone medicines.  •Surgery.    FOLLOW THESE INSTRUCTIONS AT HOME  Eating and drinking   •Follow instructions from your health care provider about eating or drinking restrictions.  •Drink enough fluid to keep your urine pale yellow.  •Work with a dietitian to determine which foods cause your condition to flare up.  •Avoid foods that cause flare-ups.  •Eat a well-balanced diet.    General instructions   •If you were prescribed an antibiotic medicine, take it as told by your health care provider. Do not stop taking the antibiotic even if you start to feel better.  •Take over-the-counter and prescription medicines only as told by your health care provider.  •Keep all follow-up visits as told by your health care provider. This is important.    Contact a health care provider if:  •Your symptoms do not go away.  •You develop new symptoms.    Get help right away if you:  •Have a fever that does not go away with treatment.  •Develop chills  •Have extreme weakness, fainting, or dehydration.  •Have repeated vomiting.  •Develop severe pain in your abdomen.  •Pass bloody or tarry stool.    Summary  •Colitis is inflammation of the colon. Colitis may last a short time (be acute), or it may last a long time (become chronic).  •Treatment for this condition depends on the cause and may include resting the bowel, taking medicines, or having surgery.  •If you were prescribed an antibiotic medicine, take it as told by your health care provider. Do not stop taking the antibiotic even if you start to feel better.  •Get help right away if you develop severe pain in your abdomen.  •Keep all follow-up visits as told by your health care provider. This is important.    Advance activity as tolerated.  Continue all previously prescribed medications as directed unless otherwise instructed.  Follow up with your primary care physician in 48-72 hours- bring copies of your results.  Return to the ER for worsening or persistent symptoms, and/or ANY NEW OR CONCERNING SYMPTOMS. If you have issues obtaining follow up, please call: [phone] to obtain a doctor or specialist who takes your insurance in your area.  You may call [phone] to make an appointment with the internal medicine clinic.    Take augmentin as prescribed for 14 days.     Take acetaminophen 650 mg orally every 6-8 hours for pain control as needed. Please do not exceed 4,000 mg of acetaminophen during a 24 hours period. Acetaminophen can be found in many over-the-counter cold medications as well as opioid medications that may be given for pain.    Take ibuprofen (also known as MOTRIN or ADVIL) 400 mg orally every 6-8 hours for pain control as needed with food to avoid an upset stomach. Ibuprofen can be found in many over-the-counter medications. Please do not take ibuprofen if you have a bleeding disorder, stomach or gastrointestinal ulcer, or liver disease.    If needed, you can alternate these medications so that you can take one medication every 3 hours. For example, at noon take ibuprofen, then at 3PM take acetaminophen, then at 6PM take ibuprofen.
done

## 2022-01-13 NOTE — BRIEF OPERATIVE NOTE - NSICDXBRIEFPROCEDURE_GEN_ALL_CORE_FT
PROCEDURES:  Single stage extracapsular removal of cataract with insertion of intraocular lens prosthesis by phacoemulsification 13-Jan-2022 10:25:09  Patel Govea

## 2022-01-13 NOTE — ASU DISCHARGE PLAN (ADULT/PEDIATRIC) - ASU DC SPECIAL INSTRUCTIONSFT
Keep shield on eye until office visit today at 2 pm  Any problems call office at [phone]    OFFICE VISIT TODAY AT 2 pm  Bring Drops

## 2022-01-13 NOTE — ASU DISCHARGE PLAN (ADULT/PEDIATRIC) - NS MD DC FALL RISK RISK
For information on Fall & Injury Prevention, visit: [email or web address] OR  [email or web address] OR  https://www.cdc.gov/steadi/patient.html

## 2022-01-13 NOTE — BRIEF OPERATIVE NOTE - NSICDXBRIEFPREOP_GEN_ALL_CORE_FT
PRE-OP DIAGNOSIS:  Posterior subcapsular age-related cataract of left eye 13-Jan-2022 10:25:54  Patel Govea

## 2022-01-13 NOTE — BRIEF OPERATIVE NOTE - NSICDXBRIEFPOSTOP_GEN_ALL_CORE_FT
POST-OP DIAGNOSIS:  Posterior subcapsular age-related cataract of left eye 13-Jan-2022 10:26:13  Patel Govea

## 2022-01-26 PROBLEM — M06.9 RHEUMATOID ARTHRITIS, UNSPECIFIED: Chronic | Status: ACTIVE | Noted: 2022-01-13

## 2022-01-27 ENCOUNTER — NON-APPOINTMENT (OUTPATIENT)
Age: 62
End: 2022-01-27

## 2022-01-30 ENCOUNTER — RX RENEWAL (OUTPATIENT)
Age: 62
End: 2022-01-30

## 2022-02-01 ENCOUNTER — APPOINTMENT (OUTPATIENT)
Dept: BARIATRICS/WEIGHT MGMT | Facility: CLINIC | Age: 62
End: 2022-02-01
Payer: MEDICARE

## 2022-02-01 PROCEDURE — 99442: CPT | Mod: 95

## 2022-02-02 LAB
25(OH)D3 SERPL-MCNC: 45.3 NG/ML
ALBUMIN SERPL ELPH-MCNC: 4.1 G/DL
ANION GAP SERPL CALC-SCNC: 15 MMOL/L
APPEARANCE: CLEAR
BACTERIA: NEGATIVE
BASOPHILS # BLD AUTO: 0.04 K/UL
BASOPHILS NFR BLD AUTO: 0.6 %
BILIRUBIN URINE: NEGATIVE
BLOOD URINE: NEGATIVE
BUN SERPL-MCNC: 31 MG/DL
CALCIUM SERPL-MCNC: 9.1 MG/DL
CALCIUM SERPL-MCNC: 9.1 MG/DL
CHLORIDE SERPL-SCNC: 109 MMOL/L
CO2 SERPL-SCNC: 16 MMOL/L
COLOR: COLORLESS
CREAT SERPL-MCNC: 1.72 MG/DL
CREAT SPEC-SCNC: 17 MG/DL
CREAT/PROT UR: NORMAL RATIO
EOSINOPHIL # BLD AUTO: 0.07 K/UL
EOSINOPHIL NFR BLD AUTO: 1 %
GLUCOSE QUALITATIVE U: ABNORMAL
GLUCOSE SERPL-MCNC: 246 MG/DL
HCT VFR BLD CALC: 44 %
HGB BLD-MCNC: 13.8 G/DL
HYALINE CASTS: 0 /LPF
IMM GRANULOCYTES NFR BLD AUTO: 3.1 %
KETONES URINE: NEGATIVE
LEUKOCYTE ESTERASE URINE: NEGATIVE
LYMPHOCYTES # BLD AUTO: 2.18 K/UL
LYMPHOCYTES NFR BLD AUTO: 31.7 %
MAN DIFF?: NORMAL
MCHC RBC-ENTMCNC: 28.6 PG
MCHC RBC-ENTMCNC: 31.4 GM/DL
MCV RBC AUTO: 91.1 FL
MICROSCOPIC-UA: NORMAL
MONOCYTES # BLD AUTO: 0.65 K/UL
MONOCYTES NFR BLD AUTO: 9.4 %
NEUTROPHILS # BLD AUTO: 3.73 K/UL
NEUTROPHILS NFR BLD AUTO: 54.2 %
NITRITE URINE: NEGATIVE
PARATHYROID HORMONE INTACT: 62 PG/ML
PH URINE: 6
PHOSPHATE SERPL-MCNC: 2.9 MG/DL
PLATELET # BLD AUTO: 158 K/UL
POTASSIUM SERPL-SCNC: 4.2 MMOL/L
PROT UR-MCNC: <4 MG/DL
PROTEIN URINE: NEGATIVE
RBC # BLD: 4.83 M/UL
RBC # FLD: 14.2 %
RED BLOOD CELLS URINE: 0 /HPF
SODIUM SERPL-SCNC: 140 MMOL/L
SPECIFIC GRAVITY URINE: 1
SQUAMOUS EPITHELIAL CELLS: 0 /HPF
UROBILINOGEN URINE: NORMAL
WBC # FLD AUTO: 6.88 K/UL
WHITE BLOOD CELLS URINE: 0 /HPF

## 2022-02-07 NOTE — ED PROVIDER NOTE - NS HIV RISK FACTOR YES
Donis Barraza is here for a consult for leg pain and being cold all the time. Also has growths on his back. Legs are restless at night    Questioned patient about current smoking habits.  Pt. has never smoked.  PULSE regular  My Chart: declines  CLASSIFICATION OF OVERWEIGHT AND OBESITY BY BMI                        Obesity Class           BMI(kg/m2)  Underweight                                    < 18.5  Normal                                         18.5-24.9  Overweight                                     25.0-29.9  OBESITY                     I                  30.0-34.9                             II                 35.0-39.9  EXTREME OBESITY             III                >40                            Patient's  BMI Body mass index is 35.52 kg/m .  http://hin.nhlbi.nih.gov/menuplanner/menu.cgi  Pre-visit planning  Immunizations - up to date  Colonoscopy - is due  Mammogram -   Asthma -   PHQ9 -    DORCAS-7 -    Hearing Test -     
Declined

## 2022-02-08 ENCOUNTER — APPOINTMENT (OUTPATIENT)
Dept: NEPHROLOGY | Facility: CLINIC | Age: 62
End: 2022-02-08
Payer: MEDICARE

## 2022-02-08 ENCOUNTER — LABORATORY RESULT (OUTPATIENT)
Age: 62
End: 2022-02-08

## 2022-02-08 ENCOUNTER — APPOINTMENT (OUTPATIENT)
Dept: NEPHROLOGY | Facility: CLINIC | Age: 62
End: 2022-02-08

## 2022-02-08 VITALS
HEIGHT: 71 IN | OXYGEN SATURATION: 94 % | SYSTOLIC BLOOD PRESSURE: 118 MMHG | HEART RATE: 101 BPM | TEMPERATURE: 97.4 F | DIASTOLIC BLOOD PRESSURE: 75 MMHG

## 2022-02-08 DIAGNOSIS — R32 UNSPECIFIED URINARY INCONTINENCE: ICD-10-CM

## 2022-02-08 PROCEDURE — 99215 OFFICE O/P EST HI 40 MIN: CPT

## 2022-02-17 ENCOUNTER — NON-APPOINTMENT (OUTPATIENT)
Age: 62
End: 2022-02-17

## 2022-02-22 ENCOUNTER — APPOINTMENT (OUTPATIENT)
Dept: ORTHOPEDIC SURGERY | Facility: CLINIC | Age: 62
End: 2022-02-22

## 2022-03-03 LAB
25(OH)D3 SERPL-MCNC: 42.3 NG/ML
ALBUMIN SERPL ELPH-MCNC: 3.9 G/DL
ANION GAP SERPL CALC-SCNC: 21 MMOL/L
APPEARANCE: CLEAR
BACTERIA UR CULT: NORMAL
BACTERIA: NEGATIVE
BASOPHILS # BLD AUTO: 0 K/UL
BASOPHILS NFR BLD AUTO: 0 %
BILIRUBIN URINE: NEGATIVE
BLOOD URINE: NEGATIVE
BUN SERPL-MCNC: 23 MG/DL
CALCIUM SERPL-MCNC: 9.6 MG/DL
CALCIUM SERPL-MCNC: 9.6 MG/DL
CHLORIDE SERPL-SCNC: 105 MMOL/L
CO2 SERPL-SCNC: 16 MMOL/L
COLOR: COLORLESS
CREAT SERPL-MCNC: 1.56 MG/DL
CREAT SPEC-SCNC: 16 MG/DL
CREAT/PROT UR: NORMAL RATIO
EOSINOPHIL # BLD AUTO: 0.16 K/UL
EOSINOPHIL NFR BLD AUTO: 2.6 %
GLUCOSE QUALITATIVE U: NEGATIVE
GLUCOSE SERPL-MCNC: 193 MG/DL
HCT VFR BLD CALC: 42.4 %
HGB BLD-MCNC: 13.3 G/DL
HYALINE CASTS: 0 /LPF
KETONES URINE: NEGATIVE
LEUKOCYTE ESTERASE URINE: NEGATIVE
LYMPHOCYTES # BLD AUTO: 1.92 K/UL
LYMPHOCYTES NFR BLD AUTO: 31.9 %
MAN DIFF?: NORMAL
MCHC RBC-ENTMCNC: 28.1 PG
MCHC RBC-ENTMCNC: 31.4 GM/DL
MCV RBC AUTO: 89.6 FL
MICROSCOPIC-UA: NORMAL
MONOCYTES # BLD AUTO: 0.67 K/UL
MONOCYTES NFR BLD AUTO: 11.2 %
NEUTROPHILS # BLD AUTO: 2.7 K/UL
NEUTROPHILS NFR BLD AUTO: 44.8 %
NITRITE URINE: NEGATIVE
PARATHYROID HORMONE INTACT: 44 PG/ML
PH URINE: 6.5
PHOSPHATE SERPL-MCNC: 3.9 MG/DL
PLATELET # BLD AUTO: 172 K/UL
POTASSIUM SERPL-SCNC: 4.3 MMOL/L
PROT UR-MCNC: <4 MG/DL
PROTEIN URINE: NEGATIVE
RBC # BLD: 4.73 M/UL
RBC # FLD: 14.6 %
RED BLOOD CELLS URINE: 0 /HPF
SODIUM SERPL-SCNC: 142 MMOL/L
SPECIFIC GRAVITY URINE: 1
SQUAMOUS EPITHELIAL CELLS: 0 /HPF
UROBILINOGEN URINE: NORMAL
WBC # FLD AUTO: 6.02 K/UL
WHITE BLOOD CELLS URINE: 0 /HPF

## 2022-03-10 ENCOUNTER — APPOINTMENT (OUTPATIENT)
Dept: UROLOGY | Facility: CLINIC | Age: 62
End: 2022-03-10

## 2022-03-21 ENCOUNTER — OUTPATIENT (OUTPATIENT)
Dept: OUTPATIENT SERVICES | Facility: HOSPITAL | Age: 62
LOS: 1 days | End: 2022-03-21
Payer: MEDICARE

## 2022-03-21 DIAGNOSIS — Z98.890 OTHER SPECIFIED POSTPROCEDURAL STATES: Chronic | ICD-10-CM

## 2022-03-21 DIAGNOSIS — Z20.828 CONTACT WITH AND (SUSPECTED) EXPOSURE TO OTHER VIRAL COMMUNICABLE DISEASES: ICD-10-CM

## 2022-03-21 LAB — SARS-COV-2 RNA SPEC QL NAA+PROBE: SIGNIFICANT CHANGE UP

## 2022-03-21 PROCEDURE — U0003: CPT

## 2022-03-21 PROCEDURE — U0005: CPT

## 2022-03-21 RX ORDER — SODIUM CHLORIDE 9 MG/ML
1000 INJECTION, SOLUTION INTRAVENOUS
Refills: 0 | Status: DISCONTINUED | OUTPATIENT
Start: 2022-03-23 | End: 2022-04-06

## 2022-03-22 ENCOUNTER — TRANSCRIPTION ENCOUNTER (OUTPATIENT)
Age: 62
End: 2022-03-22

## 2022-03-22 NOTE — ASU PATIENT PROFILE, ADULT - FALL HARM RISK - UNIVERSAL INTERVENTIONS
Bed in lowest position, wheels locked, appropriate side rails in place/Call bell, personal items and telephone in reach/Instruct patient to call for assistance before getting out of bed or chair/Non-slip footwear when patient is out of bed/[place] to call system/Physically safe environment - no spills, clutter or unnecessary equipment/Purposeful Proactive Rounding/Room/bathroom lighting operational, light cord in reach

## 2022-03-22 NOTE — ASU PATIENT PROFILE, ADULT - NSICDXPASTMEDICALHX_GEN_ALL_CORE_FT
PAST MEDICAL HISTORY:  Bipolar 1 disorder on Lithium    Diabetic neuropathy bilateral feet, ankles    HLD (hyperlipidemia)     Hypertension     Obesity     CHAYITO (obstructive sleep apnea) diagnosed >25 years ago, non-compliant with CPAP    Rheumatoid arthritis     T2DM (type 2 diabetes mellitus)

## 2022-03-22 NOTE — ASU PATIENT PROFILE, ADULT - NSICDXPASTSURGICALHX_GEN_ALL_CORE_FT
PAST SURGICAL HISTORY:  History of excision of testicular mass left, 2009     PAST SURGICAL HISTORY:  Cataract of left eye     History of excision of testicular mass left, 2009

## 2022-03-23 ENCOUNTER — OUTPATIENT (OUTPATIENT)
Dept: OUTPATIENT SERVICES | Facility: HOSPITAL | Age: 62
LOS: 1 days | End: 2022-03-23
Payer: MEDICARE

## 2022-03-23 VITALS
HEART RATE: 100 BPM | HEIGHT: 71 IN | SYSTOLIC BLOOD PRESSURE: 95 MMHG | OXYGEN SATURATION: 96 % | WEIGHT: 315 LBS | DIASTOLIC BLOOD PRESSURE: 65 MMHG | RESPIRATION RATE: 16 BRPM | TEMPERATURE: 98 F

## 2022-03-23 VITALS
DIASTOLIC BLOOD PRESSURE: 66 MMHG | TEMPERATURE: 98 F | RESPIRATION RATE: 16 BRPM | HEART RATE: 90 BPM | SYSTOLIC BLOOD PRESSURE: 103 MMHG | OXYGEN SATURATION: 97 %

## 2022-03-23 DIAGNOSIS — H25.11 AGE-RELATED NUCLEAR CATARACT, RIGHT EYE: ICD-10-CM

## 2022-03-23 DIAGNOSIS — Z98.890 OTHER SPECIFIED POSTPROCEDURAL STATES: Chronic | ICD-10-CM

## 2022-03-23 DIAGNOSIS — H26.9 UNSPECIFIED CATARACT: Chronic | ICD-10-CM

## 2022-03-23 LAB — GLUCOSE BLDC GLUCOMTR-MCNC: 199 MG/DL — HIGH (ref 70–99)

## 2022-03-23 PROCEDURE — 82962 GLUCOSE BLOOD TEST: CPT

## 2022-03-23 PROCEDURE — 66984 XCAPSL CTRC RMVL W/O ECP: CPT | Mod: RT

## 2022-03-23 PROCEDURE — V2632: CPT

## 2022-03-23 DEVICE — LENS IOL ACRYSOF SN60WF 21.5D
Type: IMPLANTABLE DEVICE | Site: RIGHT | Status: NON-FUNCTIONAL
Removed: 2022-03-23

## 2022-03-23 RX ORDER — TROPICAMIDE 1 %
1 DROPS OPHTHALMIC (EYE)
Refills: 0 | Status: COMPLETED | OUTPATIENT
Start: 2022-03-23 | End: 2022-03-23

## 2022-03-23 RX ORDER — ACETAMINOPHEN 500 MG
650 TABLET ORAL EVERY 6 HOURS
Refills: 0 | Status: DISCONTINUED | OUTPATIENT
Start: 2022-03-23 | End: 2022-04-06

## 2022-03-23 RX ORDER — PHENYLEPHRINE HCL 2.5 %
1 DROPS OPHTHALMIC (EYE)
Refills: 0 | Status: COMPLETED | OUTPATIENT
Start: 2022-03-23 | End: 2022-03-23

## 2022-03-23 RX ORDER — CYCLOPENTOLATE HYDROCHLORIDE 10 MG/ML
1 SOLUTION/ DROPS OPHTHALMIC
Refills: 0 | Status: COMPLETED | OUTPATIENT
Start: 2022-03-23 | End: 2022-03-23

## 2022-03-23 RX ORDER — KETOROLAC TROMETHAMINE 0.5 %
1 DROPS OPHTHALMIC (EYE)
Refills: 0 | Status: COMPLETED | OUTPATIENT
Start: 2022-03-23 | End: 2022-03-23

## 2022-03-23 RX ADMIN — Medication 1 DROP(S): at 06:39

## 2022-03-23 RX ADMIN — Medication 1 DROP(S): at 06:29

## 2022-03-23 RX ADMIN — Medication 1 DROP(S): at 06:34

## 2022-03-23 RX ADMIN — CYCLOPENTOLATE HYDROCHLORIDE 1 DROP(S): 10 SOLUTION/ DROPS OPHTHALMIC at 06:29

## 2022-03-23 RX ADMIN — CYCLOPENTOLATE HYDROCHLORIDE 1 DROP(S): 10 SOLUTION/ DROPS OPHTHALMIC at 06:34

## 2022-03-23 RX ADMIN — SODIUM CHLORIDE 40 MILLILITER(S): 9 INJECTION, SOLUTION INTRAVENOUS at 06:48

## 2022-03-23 RX ADMIN — CYCLOPENTOLATE HYDROCHLORIDE 1 DROP(S): 10 SOLUTION/ DROPS OPHTHALMIC at 06:39

## 2022-03-23 RX ADMIN — Medication 1 DROP(S): at 06:35

## 2022-03-23 NOTE — ASU DISCHARGE PLAN (ADULT/PEDIATRIC) - NS MD DC FALL RISK RISK
For information on Fall & Injury Prevention, visit: [email or web address] OR  [email or web address] OR  https://www.cdc.gov/steadi/patient.html

## 2022-03-23 NOTE — ASU DISCHARGE PLAN (ADULT/PEDIATRIC) - PATIENT EDUCATION MATERIALS PROVIED
tylenol/Provider pre-printed instructions given/Pre-printed instructions given for indwelling catheter/Other (specify)

## 2022-03-23 NOTE — ASU DISCHARGE PLAN (ADULT/PEDIATRIC) - ASU DC SPECIAL INSTRUCTIONSFT
Keep shield on eye until office visit today at 2 pm  Any problems call office at [phone]    OFFICE VISIT TODAY AT 2 pm  Bring Drops

## 2022-03-23 NOTE — ASU DISCHARGE PLAN (ADULT/PEDIATRIC) - CARE PROVIDER_API CALL
Patel Govea  OPHTHALMOLOGY  [address] [place] [phone]  Phone: [phone]  Fax: [phone]  Follow Up Time:

## 2022-03-23 NOTE — ASU PREOP CHECKLIST - TAMPON REMOVED
n/a Secondary Intention Text (Leave Blank If You Do Not Want): The defect will heal with secondary intention.

## 2022-03-23 NOTE — BRIEF OPERATIVE NOTE - NSICDXBRIEFPROCEDURE_GEN_ALL_CORE_FT
PROCEDURES:  Single stage extracapsular removal of cataract with insertion of intraocular lens prosthesis by phacoemulsification 23-Mar-2022 08:17:10  Patel Govea

## 2022-03-24 ENCOUNTER — RX RENEWAL (OUTPATIENT)
Age: 62
End: 2022-03-24

## 2022-04-09 NOTE — ED ADULT NURSE NOTE - NS_SISCREENINGSR_GEN_ALL_ED
Negative 75 y/o F PMHx PE on Eliquis, autoimmune pancreatitis, GERD, UC s/p ileostomy presents to ED with L sided flank pain radiating to LLQ since 3 am today, pain is constant and worsening. Associated w/ nausea. No vomiting. No fevers. No dysuria. No hematuria.

## 2022-04-13 ENCOUNTER — NON-APPOINTMENT (OUTPATIENT)
Age: 62
End: 2022-04-13

## 2022-04-15 ENCOUNTER — NON-APPOINTMENT (OUTPATIENT)
Age: 62
End: 2022-04-15

## 2022-04-20 ENCOUNTER — APPOINTMENT (OUTPATIENT)
Dept: PULMONOLOGY | Facility: CLINIC | Age: 62
End: 2022-04-20
Payer: MEDICARE

## 2022-04-20 VITALS
OXYGEN SATURATION: 96 % | WEIGHT: 306 LBS | DIASTOLIC BLOOD PRESSURE: 95 MMHG | TEMPERATURE: 98.1 F | HEART RATE: 106 BPM | SYSTOLIC BLOOD PRESSURE: 153 MMHG | HEIGHT: 71 IN | BODY MASS INDEX: 42.84 KG/M2

## 2022-04-20 DIAGNOSIS — G47.33 OBSTRUCTIVE SLEEP APNEA (ADULT) (PEDIATRIC): ICD-10-CM

## 2022-04-20 PROCEDURE — 99213 OFFICE O/P EST LOW 20 MIN: CPT | Mod: GC

## 2022-04-20 NOTE — HISTORY OF PRESENT ILLNESS
[FreeTextEntry1] : 62M hx HTN, HLD, DM, bipolar DO, obesity (BMI 42), deviated nasal septum, ?renal disease (crt 1.56) who comes for follow up of CHAYITO. Last was seen on 3/2021 and at that time, had lengthy discussions about the benefits and risks of treating severe sleep apnea. Offered daytime mask acclimatization, CBT, follow up with ENT and bariatric surgery – all of which pt declined. He has previously tried oral appliance, but found it uncomfortable. \par He has been experiencing nocturnal awakenings, daytime somnolence, and fatigue. He wants to start using the CPAP machine again and has been trying to receive supplies from his [employer] company ([employer]) but has not been successful. \par \[facility] Currently has AirSense 10 AutoSet from 4-12 cmH2O (set up 1/24/19) \par \par PAP compliance (up until 10/2021): Days > 4 hrs 40%, Avg use 4 hr 25 min, therapeutic AHI 3.9/hr\par \par CPAP titration 12/2018: CPAP 6 cmH2O \par Split 4/2018: AHI 45.4, T90 11.3%, optimal pressure 10 cmH2O \par PSG 5/23/2017: AHI 53.3, T90 28.3%

## 2022-04-20 NOTE — ASSESSMENT
[FreeTextEntry1] : 62M hx HTN, HLD, DM, bipolar DO, obesity (BMI 42), deviated nasal septum, ?renal disease (crt 1.56) who comes for follow up of CHAYITO.\par \par 1) CHAYITO - has been having difficulty adhering to APAP (currently set at [id number]) but is now willing to use it again. On therapy, his AHI is well-controlled (3.9) and he was deriving benefit from it. He has been trying to obtain supplies but has been unsuccessful. We will reach out to a different DME company so that he can resume his PAP therapy. the problem is likely insurance denial due to insufficient adherence to cpap therapy.  he uses [employer]  oral appliance /nasal pillows device (TAP PAP) for cpap delivery. he has co morbid bipolar disorder and resides in an assisted living facilty.

## 2022-04-21 RX ORDER — ISOPROPYL ALCOHOL 0.7 ML/ML
SWAB TOPICAL
Qty: 200 | Refills: 5 | Status: ACTIVE | COMMUNITY
Start: 2022-04-21 | End: 1900-01-01

## 2022-04-21 RX ORDER — LANCETS 30 GAUGE
EACH MISCELLANEOUS
Qty: 100 | Refills: 5 | Status: ACTIVE | COMMUNITY
Start: 2019-12-11 | End: 1900-01-01

## 2022-04-21 RX ORDER — LANCING DEVICE/LANCETS
KIT MISCELLANEOUS
Qty: 1 | Refills: 0 | Status: ACTIVE | COMMUNITY
Start: 2020-09-04 | End: 1900-01-01

## 2022-04-26 ENCOUNTER — OUTPATIENT (OUTPATIENT)
Dept: OUTPATIENT SERVICES | Facility: HOSPITAL | Age: 62
LOS: 1 days | End: 2022-04-26
Payer: MEDICARE

## 2022-04-26 DIAGNOSIS — K08.9 DISORDER OF TEETH AND SUPPORTING STRUCTURES, UNSPECIFIED: ICD-10-CM

## 2022-04-26 DIAGNOSIS — Z98.890 OTHER SPECIFIED POSTPROCEDURAL STATES: Chronic | ICD-10-CM

## 2022-04-26 DIAGNOSIS — H26.9 UNSPECIFIED CATARACT: Chronic | ICD-10-CM

## 2022-04-26 PROCEDURE — D1110: CPT

## 2022-04-26 PROCEDURE — D0120: CPT

## 2022-05-05 DIAGNOSIS — Z01.20 ENCOUNTER FOR DENTAL EXAMINATION AND CLEANING WITHOUT ABNORMAL FINDINGS: ICD-10-CM

## 2022-05-10 ENCOUNTER — APPOINTMENT (OUTPATIENT)
Dept: NEPHROLOGY | Facility: CLINIC | Age: 62
End: 2022-05-10
Payer: MEDICARE

## 2022-05-10 VITALS
SYSTOLIC BLOOD PRESSURE: 132 MMHG | OXYGEN SATURATION: 92 % | HEIGHT: 71 IN | TEMPERATURE: 97.3 F | DIASTOLIC BLOOD PRESSURE: 82 MMHG | HEART RATE: 98 BPM

## 2022-05-10 DIAGNOSIS — R30.0 DYSURIA: ICD-10-CM

## 2022-05-10 PROCEDURE — 99215 OFFICE O/P EST HI 40 MIN: CPT

## 2022-05-11 ENCOUNTER — APPOINTMENT (OUTPATIENT)
Dept: ENDOCRINOLOGY | Facility: CLINIC | Age: 62
End: 2022-05-11
Payer: MEDICARE

## 2022-05-11 VITALS
BODY MASS INDEX: 44.52 KG/M2 | TEMPERATURE: 97.1 F | DIASTOLIC BLOOD PRESSURE: 80 MMHG | HEART RATE: 106 BPM | SYSTOLIC BLOOD PRESSURE: 120 MMHG | WEIGHT: 315 LBS | OXYGEN SATURATION: 95 %

## 2022-05-11 DIAGNOSIS — G62.9 POLYNEUROPATHY, UNSPECIFIED: ICD-10-CM

## 2022-05-11 LAB
GLUCOSE BLDC GLUCOMTR-MCNC: 280
HBA1C MFR BLD HPLC: 9

## 2022-05-11 PROCEDURE — 82962 GLUCOSE BLOOD TEST: CPT

## 2022-05-11 PROCEDURE — 83036 HEMOGLOBIN GLYCOSYLATED A1C: CPT | Mod: QW

## 2022-05-11 PROCEDURE — 99214 OFFICE O/P EST MOD 30 MIN: CPT | Mod: 25

## 2022-05-11 NOTE — PHYSICAL EXAM
[Alert] : alert [No Acute Distress] : no acute distress [Normal Sclera/Conjunctiva] : normal sclera/conjunctiva [No Proptosis] : no proptosis [Normal Outer Ear/Nose] : the ears and nose were normal in appearance [Normal Hearing] : hearing was normal [Supple] : the neck was supple [Thyroid Not Enlarged] : the thyroid was not enlarged [No Thyroid Nodules] : no palpable thyroid nodules [No Respiratory Distress] : no respiratory distress [No Accessory Muscle Use] : no accessory muscle use [Normal Rate and Effort] : normal respiratory rate and effort [Clear to Auscultation] : lungs were clear to auscultation bilaterally [Normal Rate] : heart rate was normal [Regular Rhythm] : with a regular rhythm [Normal Bowel Sounds] : normal bowel sounds [Not Tender] : non-tender [Soft] : abdomen soft [No Spinal Tenderness] : no spinal tenderness [No Joint Swelling] : no joint swelling seen [Normal Strength/Tone] : muscle strength and tone were normal [No Motor Deficits] : the motor exam was normal [Normal Reflexes] : deep tendon reflexes were 2+ and symmetric [No Tremors] : no tremors [Oriented x3] : oriented to person, place, and time [Normal Affect] : the affect was normal [Normal Insight/Judgement] : insight and judgment were intact [Normal Mood] : the mood was normal [de-identified] : morbidly obese male  [de-identified] : L. eye strabismus  [de-identified] : nontender  [de-identified] : trace LE edema b/l, +2 DP & +2 radial pulses  [de-identified] : distended, obese abdomen  [de-identified] : declines foot exam

## 2022-05-11 NOTE — HISTORY OF PRESENT ILLNESS
[FreeTextEntry1] : 62 year old man w/ T2 DM w/ neuropathy, HTN, HLD, CKD3, Morbid obesity, CHAYITO and  bipolar disease here for f/u visit for DM2. He was last seen in clinic July 2021. \par \par Diabetes Disease Course:\par He was dx w/ DM2 about 13 years ago. At first he managed it just by diet and exercise. He then was started on Metformin and  Januvia but states it caused kidney and liver damage. He was then switched to Tradjenta and Glipizide. Recently he has been on Rybelsus & Repaglinide. \par \par Current DM Regimen:\par He is currently on Rybelsus 14 mg daily\par Repaglinide 4mg prior to meals TIDAC 30 mins before eating \par \par METER Readings:\par No meter\par FS today: 283\par \par HLD: Atorvastatin 40mg \par HTN: Losartan 25mg \par \par \par DIET:\par Describes bingeing and eating cheeseburgers. \par \par Breakfast: oatmeal/berries or special K cereal/berries , egg\par Lunch: turkey/ & celery soup or omlette with veggies \par Snack/Drinks: celery juice \par Dinner: chicken/turkey/pork chops with salad or brown rice/veggies \par \par Optho:\par Last saw Optho 2 weeks ago, needs cataract sugery per pt but was also told that his sugars need to be better controlled prior to the surgery. Denies hx of dm retinopathy. \par Last went 4/2022 cataract surgery.\par Dexcribes no diabetic retinopathy. \par \par Podiatry:\par Has severe neuropathy and follows up w/  podiatry regularly. Last seen last week for follow up of R. foot 1st digit ulcer which he was informed is now healed, s/p course of abx (Bactrim). Pt says he got the ulcer from walking barefoot despite being aware that he needs to wear shoes, agrees to avoid this practice in the future to prevent further ulcers/injury. Also sees neurology. \par Goes every 2 weeks with podiatry, declines foot exam today. \par \par Previous visit:\par Has continued to follow up with Dr. Dowd, obesity medicine, states he is trying to lose the weight, not currently interested in bariatric surgery at this moment as he wants to give himself a chance to do it himself. \par Trying to walk for exercise, admits has been difficult recently due to pain in his L. knee. Recommended TKR by Ortho but not until weight & sugars are better. \par He has CHAYITO, admits to intermittent use of his CPAP machine due to discomfort related to the mask/use of machine. Says at most he will use it 3 hours per night and reports that he does feel better in the morning when he does use it. Agrees to try to use it more. \par \par this visit:\par no meter\par \par A1c 9%\par Declines any injectables. \par On erythromycin for URI.

## 2022-05-11 NOTE — ASSESSMENT
[FreeTextEntry1] : 61 Y.O. man w/ T2 DM w/ neuropathy, HTN, HLD, Obesity, CHAYITO and  bipolar disease presenting for follow up of his DM2. \par \par 1. DM2: \par POC A1c today 9%\par \par  Rybelsus 14 mg daily\par Repaglinide 4mg prior to meals TIDAC 30 mins before eating \par \par -Recommend switch to Ozempic 1mg with upward titration to 2mg weekly when available. \par -Declines all injectables. \par -Declines all medication\par \par - Cont. to monitor and record FS and bring meter to Dr. Light visit in august. \par - Pt was counseled on lifestyle modification through diet and exercise.\par - Encouraged continued use of CPAP machine \par - UTD w/ optho and podiatry.\par - RTC in 3 months \par \par 2. HTN: BP well controlled.\par 120/80 today\par - C/w Losartan 25mg daily \par \par 3. HLD: LDL w/in goal 1/2020 (47). C/w Lipitor 40mg daily \par -vascepa 2 g BID\par \par 4. Obesity, BMI 43 \par - lifestyle changes including diet, exercise, weight loss encouraged \par - follows with obesity medicine, Dr. Dowd \par \par RTC 3 months. \par Check TFTs and Lipids.

## 2022-05-11 NOTE — REVIEW OF SYSTEMS
[Recent Weight Gain (___ Lbs)] : recent weight gain: [unfilled] lbs [Lower Ext Edema] : lower extremity edema [Joint Pain] : joint pain [Joint Stiffness] : joint stiffness [Difficulty Walking] : difficulty walking [Pain/Numbness of Digits] : pain/numbness of digits [Fatigue] : no fatigue [Decreased Appetite] : appetite not decreased [Recent Weight Loss (___ Lbs)] : no recent weight loss [Fever] : no fever [Chills] : no chills [Dry Eyes] : no dryness [Eye Pain] : no pain [Blurred Vision] : no blurred vision [Dysphagia] : no dysphagia [Neck Pain] : no neck pain [Dysphonia] : no dysphonia [Chest Pain] : no chest pain [Palpitations] : no palpitations [Shortness Of Breath] : no shortness of breath [Cough] : no cough [SOB on Exertion] : no shortness of breath on exertion [Nausea] : no nausea [Constipation] : no constipation [Abdominal Pain] : no abdominal pain [Vomiting] : no vomiting [Diarrhea] : no diarrhea [Gas/Bloating] : no gas/bloating [Polyuria] : no polyuria [Dysuria] : no dysuria [Muscle Weakness] : no muscle weakness [Myalgia] : no myalgia  [Headaches] : no headaches [Dizziness] : no dizziness [Tremors] : no tremors [Polydipsia] : no polydipsia [Cold Intolerance] : no cold intolerance [Heat Intolerance] : no heat intolerance [FreeTextEntry3] : cataracts  [FreeTextEntry9] : knee pain L>R  [de-identified] : +Bipolar disorder

## 2022-05-12 ENCOUNTER — APPOINTMENT (OUTPATIENT)
Dept: ORTHOPEDIC SURGERY | Facility: CLINIC | Age: 62
End: 2022-05-12
Payer: MEDICARE

## 2022-05-12 LAB
CHOLEST SERPL-MCNC: 138 MG/DL
HDLC SERPL-MCNC: 34 MG/DL
LDLC SERPL CALC-MCNC: 53 MG/DL
NONHDLC SERPL-MCNC: 104 MG/DL
TRIGL SERPL-MCNC: 254 MG/DL
TSH SERPL-ACNC: 0.5 UIU/ML

## 2022-05-12 PROCEDURE — 99215 OFFICE O/P EST HI 40 MIN: CPT | Mod: 25

## 2022-05-12 PROCEDURE — 20610 DRAIN/INJ JOINT/BURSA W/O US: CPT

## 2022-05-12 NOTE — HISTORY OF PRESENT ILLNESS
[de-identified] : This is very nice 62-year-old male experiencing chronic bilat knee pain, which is moderate in intensity. He does have mod-severe left knee OA and minimal arthritic changes to the right knee. The pain moderately limits activities of daily living. Walking tolerance is somewhat reduced.  Not currently taking NSAIDs for this.  He cannot take NSAIDs because he stage II chronic kidney disease.  BMI is 43 and he knows he needs to lose weight. He states that his BG is 400 on average.  He denies any brace.  He denies a cane or walker.  Not currently therapy.  He has bipolar depression and is working in his life together.

## 2022-05-12 NOTE — PHYSICAL EXAM
[de-identified] : Patient is well nourished, well-developed, in no acute distress, with appropriate mood and affect. The patient is oriented to time, place, and person. Respirations are even and unlabored. Gait evaluation does reveal a limp. There is no inguinal adenopathy. Examination of the contralateral knee shows normal range of motion, strength, no tenderness, and intact skin. The affected limb is well-perfused, without skin lesions, shows a grossly normal motor and sensory examination. Knee motion is significantly reduced and does cause significant pain. The knee moves from 5-100 degrees. The knee is stable within that range-of-motion to AP and ML stress. The alignment of the knee is 5 degrees varus. Muscle strength is normal. Pedal pulses are palpable. Hip examination was negative.\par

## 2022-05-12 NOTE — DISCUSSION/SUMMARY
[de-identified] : This patient has left knee osteoarthritis and right knee that appears to be normal radiographically.  It is possible that he has some mild arthritis that has not obvious on radiographs or an intra-articular injury.  The patient is not an appropriate candidate for surgical intervention at this time. An extensive discussion was conducted on the natural history of the disease and the variety of surgical and non-surgical options available to the patient including, but not limited to non-steroidal anti-inflammatory medications, steroid injections, physical therapy, maintenance of ideal body weight, and reduction of activity.  Weight loss recommended.  Home exercise program recommended.  He will take Tylenol for pain.  Today we performed left knee intra-articular cortisone junctions.\par The patient will schedule an appointment as needed.\par \par Informed consent for the left knee injection was obtained. All questions were answered. A time out was performed. The  knee was prepped and draped in sterile fashion. Using sterile technique, 40mg of Kenalog, 4cc of 1% lidocaine, 4cc of 0.25% marcaine using a 21-gauge needle. A sterile dressing was applied. Post injection instructions were reviewed. The patient tolerated the procedure well.  He was warned to follow his blood sugar levels the next 24 to 40 hours which can come elevated because of the injection.\par \par The patient has been counseled regarding the elevated risks associated with surgical complications in patients with a BMI>35.  I explained to the patient the risk of obesity, which is well documented in the orthopedic literature as increasing risks of wound breakdown (dehiscence), drainage, periprosthetic joint infection, dissatisfaction, chronic pain, early failure and/or loosening of the prosthesis, and impaired overall outcome to the patient. The patient demonstrates a profound understanding of the increased risk. Nutritionist referral, methods of monitoring nutrition intake and calorie counting and bariatric surgery options were discussed with the patient. After a lengthy discussion, the patient agreed to make a coordinated effort at weight loss. The patient understands our BMI policy and if they are planning surgical intervention, then they will need to bring their BMI below 40 in order to proceed and they are agreeable to this.\par

## 2022-05-13 LAB
25(OH)D3 SERPL-MCNC: 48.6 NG/ML
ALBUMIN SERPL ELPH-MCNC: 4.2 G/DL
ANION GAP SERPL CALC-SCNC: 16 MMOL/L
APPEARANCE: CLEAR
BACTERIA UR CULT: NORMAL
BACTERIA: NEGATIVE
BASOPHILS # BLD AUTO: 0.02 K/UL
BASOPHILS NFR BLD AUTO: 0.5 %
BILIRUBIN URINE: NEGATIVE
BLOOD URINE: NEGATIVE
BUN SERPL-MCNC: 32 MG/DL
CALCIUM SERPL-MCNC: 9.6 MG/DL
CALCIUM SERPL-MCNC: 9.6 MG/DL
CHLORIDE SERPL-SCNC: 102 MMOL/L
CO2 SERPL-SCNC: 23 MMOL/L
COLOR: COLORLESS
CREAT SERPL-MCNC: 1.81 MG/DL
CREAT SPEC-SCNC: 30 MG/DL
CREAT/PROT UR: 0.2 RATIO
EGFR: 42 ML/MIN/1.73M2
EOSINOPHIL # BLD AUTO: 0.05 K/UL
EOSINOPHIL NFR BLD AUTO: 1.2 %
GLUCOSE QUALITATIVE U: NORMAL
GLUCOSE SERPL-MCNC: 228 MG/DL
HCT VFR BLD CALC: 42.6 %
HGB BLD-MCNC: 13.4 G/DL
HYALINE CASTS: 0 /LPF
IMM GRANULOCYTES NFR BLD AUTO: 1 %
KETONES URINE: NEGATIVE
LEUKOCYTE ESTERASE URINE: NEGATIVE
LYMPHOCYTES # BLD AUTO: 1.61 K/UL
LYMPHOCYTES NFR BLD AUTO: 39.8 %
MAN DIFF?: NORMAL
MCHC RBC-ENTMCNC: 29.1 PG
MCHC RBC-ENTMCNC: 31.5 GM/DL
MCV RBC AUTO: 92.4 FL
MICROSCOPIC-UA: NORMAL
MONOCYTES # BLD AUTO: 0.59 K/UL
MONOCYTES NFR BLD AUTO: 14.6 %
NEUTROPHILS # BLD AUTO: 1.74 K/UL
NEUTROPHILS NFR BLD AUTO: 42.9 %
NITRITE URINE: NEGATIVE
PARATHYROID HORMONE INTACT: 78 PG/ML
PH URINE: 6.5
PHOSPHATE SERPL-MCNC: 3.6 MG/DL
PLATELET # BLD AUTO: 144 K/UL
POTASSIUM SERPL-SCNC: 4.5 MMOL/L
PROT UR-MCNC: 6 MG/DL
PROTEIN URINE: NEGATIVE
RBC # BLD: 4.61 M/UL
RBC # FLD: 14.3 %
RED BLOOD CELLS URINE: 0 /HPF
SODIUM SERPL-SCNC: 140 MMOL/L
SPECIFIC GRAVITY URINE: 1.01
SQUAMOUS EPITHELIAL CELLS: 0 /HPF
UROBILINOGEN URINE: NORMAL
WBC # FLD AUTO: 4.05 K/UL
WHITE BLOOD CELLS URINE: 0 /HPF

## 2022-05-16 ENCOUNTER — NON-APPOINTMENT (OUTPATIENT)
Age: 62
End: 2022-05-16

## 2022-05-16 NOTE — ED PROVIDER NOTE - GASTROINTESTINAL, MLM
FAMILY MEDICINE OFFICE VISIT    CHIEF COMPLAINT:    Chief Complaint   Patient presents with   • Office Visit   • Hypertension     medication followup       SUBJECTIVE:  Tesfaye Madrigal is a 67 year old male who presents requesting evaluation for:    Diabetes: hypoglycemic more often (40s to 60s) particularly when he is physically active. Unsure if glucometer accurate. Had 109 at home on day of labs - blood sugar was 141 at the lab. Cut back on glimepiride from 4 mg BID to once a day for 4-5 weeks due to hypoglycemic episodes, now back to taking it BID after seeing elevated A1c.  He continues to take Januvia 50 mg daily, metformin 1000 mg twice a day.  No diarrhea.  Eating more pasta lately.  Has been physically active.  Pushes lawnmower for 40 minutes at a time.  He reports numbness and tingling in the feet but is not too bothersome.    New macrocytic anemia since 2021. No alcohol for years.    Hyperlipidemia: He is taking Crestor 40 mg daily, fenofibrate 160 mg daily.  See diet above.    Hypertension: He is taking nifedipine, valsartan.  Does eat salty foods regularly.      REVIEW OF SYSTEMS:    GENERAL:  No fever  RESPIRATORY:  No cough  All other systems reviewed and negative.       PAST HISTORIES:  Past Medical History:   Diagnosis Date   • CAD (coronary artery disease) of bypass graft 2010    s/p CABG x 5   • CVA (cerebral vascular accident) (CMS/[facility]) 2007    went to [place] due to vertebral artery?   • DM2 (diabetes mellitus, type 2) (CMS/[facility])    • HTN (hypertension), benign    • Hypertriglyceridemia       Allergies, Medications, Medical history, Surgical history, Social history and Family history were reviewed and updated.    OBJECTIVE:  PHYSICAL EXAM:    Visit Vitals  /80 (BP Location: LUE - Left upper extremity, Patient Position: Sitting, Cuff Size: Regular)   Pulse 71   Temp 97.9 °F (36.6 °C) (Oral)   Resp 14   Ht 6' (1.829 m)   Wt 97.4 kg (214 lb 11.7 oz)   SpO2 97%   BMI 29.12 kg/m²      General:  Normal development.  Well nourished. Well groomed.  Respiratory:  Normal respiratory effort. Lungs clear to auscultation bilaterally.  Cardiovascular:  Regular rate and rhythm. Normal S1, S2 without murmurs.  Trace bilateral lower extremity edema.  Abdomen: Soft, not tender, not distended.  Neuro:  Cranial nerves II-XII are grossly intact without focal deficits.  Psychiatric:  Alert and oriented x 3. Appropriate mood and affect. Normal judgment and insight.  Diabetic foot exam:  Right: Skin integrity is normal, no rash or callus. Dorsalis pedis pulse is present.  Pressure sensation using the [place] monofilament is present in all 10 spots checked.  Left: Skin integrity is normal, no rash or callus. Dorsalis pedis pulse is present.  Pressure sensation using the [place] monofilament is present in all 10 spots checked.      ASSESSMENT:   1. Type 2 diabetes mellitus with hypoglycemia without coma, without long-term current use of insulin (CMS/[facility])    2. Type 2 diabetes mellitus with polyneuropathy (CMS/[facility])    3. Type 2 diabetes mellitus with microalbuminuria, without long-term current use of insulin (CMS/[facility])    4. Diabetic retinopathy screening    5. Macrocytic anemia    6. Coronary artery disease involving coronary bypass graft of native heart without angina pectoris    7. Hypertriglyceridemia    8. Need for vaccination against Streptococcus pneumoniae    9. HTN (hypertension), benign        PLAN:  Orders Placed This Encounter   • PNEUMOCOCCAL CONJUGATE 20 VALENT VACC (PREVNAR-20)   • Glycohemoglobin   • CBC with Automated Differential   • Lipid Panel With Reflex   • COMPREHENSIVE MET PNL (FAST)   • SERVICE TO OPHTHALMOLOGY   • empagliflozin (JARDIANCE) 10 MG tablet     Type 2 diabetes mellitus with hypoglycemia without coma, without long-term current use of insulin   Type 2 diabetes mellitus with polyneuropathy   Type 2 diabetes mellitus with microalbuminuria  Diabetes is uncontrolled with A1c  7.2%.  However, he was having hypoglycemic episodes.  Discussed lifestyle modification, namely some foods to modify (decrease pasta).   Check blood sugars 2 hours after eating.  Discussed goal blood sugar readings.  Switch from glimepiride to Jardiance.  Continue metformin and Januvia.  Refer to ophthalmology for yearly exam.  Reevaluate in 6 months.    Macrocytic anemia  Discussed possible etiologies.  Start B complex vitamin.  Recheck in 6 months.    Coronary artery disease involving coronary bypass graft of native heart without angina pectoris  Continue to see Dr. Simon.    Hypertriglyceridemia  Uncontrolled.  Discussed lifestyle modification.  Continue statin and fenofibrate.  Recheck lipid panel in 6 months.    HTN (hypertension), benign  Well controlled. Continue current management.     Need for vaccination against Streptococcus pneumoniae  Plan: PNEUMOCOCCAL CONJUGATE 20 VALENT VACC         (PREVNAR-20)    Counseled patient on other immunizations.  Recommend COVID-19 second booster shot, Shingrix.    I spent 40 minutes preparing to see patient (including chart review and preparation), obtaining and reviewing additional medical history, performing physical exam and evaluation, documenting clinical information in electronic medical record, independently interpreting results.       Return in about 6 months (around 11/16/2022) for Medicare Wellness Visit w/ fasting labs 1 wk ahead.       Abdomen soft, non-tender, no guarding.

## 2022-07-05 NOTE — ED ADULT TRIAGE NOTE - SPO2 (%)
Impression: Diabetes mellitus Type 2 with severe non-proliferative retinopathy with macular edema, bilateral: M03.7070. Plan: Exam, OCT, and FA reveal severe ischemia, exudates OU. Inject MAY 2022 Ozurdex  OU. Added SDM MicroPulse laser OD today. Schedule SDM MicroPulse laser today OS. Goal is DME reduction.
96

## 2022-07-06 ENCOUNTER — EMERGENCY (EMERGENCY)
Facility: HOSPITAL | Age: 62
LOS: 1 days | Discharge: HOME | End: 2022-07-06
Attending: EMERGENCY MEDICINE | Admitting: INTERNAL MEDICINE
Payer: MEDICARE

## 2022-07-06 VITALS
OXYGEN SATURATION: 95 % | DIASTOLIC BLOOD PRESSURE: 88 MMHG | RESPIRATION RATE: 19 BRPM | SYSTOLIC BLOOD PRESSURE: 140 MMHG | HEART RATE: 100 BPM | TEMPERATURE: 98 F

## 2022-07-06 VITALS
HEART RATE: 113 BPM | OXYGEN SATURATION: 94 % | HEIGHT: 71 IN | DIASTOLIC BLOOD PRESSURE: 90 MMHG | WEIGHT: 315 LBS | RESPIRATION RATE: 18 BRPM | TEMPERATURE: 100 F | SYSTOLIC BLOOD PRESSURE: 147 MMHG

## 2022-07-06 DIAGNOSIS — Z98.890 OTHER SPECIFIED POSTPROCEDURAL STATES: Chronic | ICD-10-CM

## 2022-07-06 DIAGNOSIS — H26.9 UNSPECIFIED CATARACT: Chronic | ICD-10-CM

## 2022-07-06 LAB
ALBUMIN SERPL ELPH-MCNC: 2.9 G/DL — LOW (ref 3.3–5)
ALP SERPL-CCNC: 82 U/L — SIGNIFICANT CHANGE UP (ref 40–120)
ALT FLD-CCNC: 35 U/L — SIGNIFICANT CHANGE UP (ref 10–45)
ANION GAP SERPL CALC-SCNC: 9 MMOL/L — SIGNIFICANT CHANGE UP (ref 5–17)
AST SERPL-CCNC: 22 U/L — SIGNIFICANT CHANGE UP (ref 10–40)
BASOPHILS # BLD AUTO: 0.04 K/UL — SIGNIFICANT CHANGE UP (ref 0–0.2)
BASOPHILS NFR BLD AUTO: 0.6 % — SIGNIFICANT CHANGE UP (ref 0–2)
BILIRUB SERPL-MCNC: 0.3 MG/DL — SIGNIFICANT CHANGE UP (ref 0.2–1.2)
BUN SERPL-MCNC: 29 MG/DL — HIGH (ref 7–23)
CALCIUM SERPL-MCNC: 8.7 MG/DL — SIGNIFICANT CHANGE UP (ref 8.4–10.5)
CHLORIDE SERPL-SCNC: 104 MMOL/L — SIGNIFICANT CHANGE UP (ref 96–108)
CO2 SERPL-SCNC: 26 MMOL/L — SIGNIFICANT CHANGE UP (ref 22–31)
CREAT SERPL-MCNC: 1.96 MG/DL — HIGH (ref 0.5–1.3)
EGFR: 39 ML/MIN/1.73M2 — LOW
EOSINOPHIL # BLD AUTO: 0.13 K/UL — SIGNIFICANT CHANGE UP (ref 0–0.5)
EOSINOPHIL NFR BLD AUTO: 2.1 % — SIGNIFICANT CHANGE UP (ref 0–6)
GLUCOSE SERPL-MCNC: 288 MG/DL — HIGH (ref 70–99)
HCT VFR BLD CALC: 40 % — SIGNIFICANT CHANGE UP (ref 39–50)
HGB BLD-MCNC: 13.3 G/DL — SIGNIFICANT CHANGE UP (ref 13–17)
IMM GRANULOCYTES NFR BLD AUTO: 2.4 % — HIGH (ref 0–1.5)
LYMPHOCYTES # BLD AUTO: 2.31 K/UL — SIGNIFICANT CHANGE UP (ref 1–3.3)
LYMPHOCYTES # BLD AUTO: 37.4 % — SIGNIFICANT CHANGE UP (ref 13–44)
MCHC RBC-ENTMCNC: 30.3 PG — SIGNIFICANT CHANGE UP (ref 27–34)
MCHC RBC-ENTMCNC: 33.3 GM/DL — SIGNIFICANT CHANGE UP (ref 32–36)
MCV RBC AUTO: 91.1 FL — SIGNIFICANT CHANGE UP (ref 80–100)
MONOCYTES # BLD AUTO: 0.6 K/UL — SIGNIFICANT CHANGE UP (ref 0–0.9)
MONOCYTES NFR BLD AUTO: 9.7 % — SIGNIFICANT CHANGE UP (ref 2–14)
NEUTROPHILS # BLD AUTO: 2.94 K/UL — SIGNIFICANT CHANGE UP (ref 1.8–7.4)
NEUTROPHILS NFR BLD AUTO: 47.8 % — SIGNIFICANT CHANGE UP (ref 43–77)
NRBC # BLD: 0 /100 WBCS — SIGNIFICANT CHANGE UP (ref 0–0)
NT-PROBNP SERPL-SCNC: 125 PG/ML — SIGNIFICANT CHANGE UP (ref 0–300)
PLATELET # BLD AUTO: 166 K/UL — SIGNIFICANT CHANGE UP (ref 150–400)
POTASSIUM SERPL-MCNC: 4.6 MMOL/L — SIGNIFICANT CHANGE UP (ref 3.5–5.3)
POTASSIUM SERPL-SCNC: 4.6 MMOL/L — SIGNIFICANT CHANGE UP (ref 3.5–5.3)
PROT SERPL-MCNC: 7 G/DL — SIGNIFICANT CHANGE UP (ref 6–8.3)
RBC # BLD: 4.39 M/UL — SIGNIFICANT CHANGE UP (ref 4.2–5.8)
RBC # FLD: 14 % — SIGNIFICANT CHANGE UP (ref 10.3–14.5)
SARS-COV-2 RNA SPEC QL NAA+PROBE: SIGNIFICANT CHANGE UP
SODIUM SERPL-SCNC: 139 MMOL/L — SIGNIFICANT CHANGE UP (ref 135–145)
TROPONIN I, HIGH SENSITIVITY RESULT: 7.2 NG/L — SIGNIFICANT CHANGE UP
TROPONIN I, HIGH SENSITIVITY RESULT: 7.6 NG/L — SIGNIFICANT CHANGE UP
WBC # BLD: 6.17 K/UL — SIGNIFICANT CHANGE UP (ref 3.8–10.5)
WBC # FLD AUTO: 6.17 K/UL — SIGNIFICANT CHANGE UP (ref 3.8–10.5)

## 2022-07-06 PROCEDURE — 85025 COMPLETE CBC W/AUTO DIFF WBC: CPT

## 2022-07-06 PROCEDURE — 93005 ELECTROCARDIOGRAM TRACING: CPT

## 2022-07-06 PROCEDURE — 84484 ASSAY OF TROPONIN QUANT: CPT

## 2022-07-06 PROCEDURE — 99285 EMERGENCY DEPT VISIT HI MDM: CPT | Mod: 25

## 2022-07-06 PROCEDURE — 93010 ELECTROCARDIOGRAM REPORT: CPT

## 2022-07-06 PROCEDURE — 71045 X-RAY EXAM CHEST 1 VIEW: CPT | Mod: 26

## 2022-07-06 PROCEDURE — 71045 X-RAY EXAM CHEST 1 VIEW: CPT

## 2022-07-06 PROCEDURE — 80053 COMPREHEN METABOLIC PANEL: CPT

## 2022-07-06 PROCEDURE — 36415 COLL VENOUS BLD VENIPUNCTURE: CPT

## 2022-07-06 PROCEDURE — 99285 EMERGENCY DEPT VISIT HI MDM: CPT

## 2022-07-06 PROCEDURE — 87635 SARS-COV-2 COVID-19 AMP PRB: CPT

## 2022-07-06 PROCEDURE — 83880 ASSAY OF NATRIURETIC PEPTIDE: CPT

## 2022-07-06 NOTE — ED PROVIDER NOTE - CLINICAL SUMMARY MEDICAL DECISION MAKING FREE TEXT BOX
pt is morbidly obese with h/o CHAYITO, non using his CPAP , was ref by his PMD to evaluate for cough for month, pt has normal exam, and normal cxr , labs are normal , pt was advised to F/u with PMD

## 2022-07-06 NOTE — ED ADULT NURSE NOTE - OBJECTIVE STATEMENT
Pt came from the [facility] due to cough with phlegm x 1 month. Pt BIB EMS from the [facility] due to cough with phlegm x 1 month. Pt states that the cough has worsened and the amount of phlegm has increased. Pt states that the phlegm is clear in appearance. Pt denies being around any sick relatives. Pt with PMH CHAYITO, HTN and DM2. Pt states that he contacted his PCP MD Mckeon today and was advised to come to the ED for further evaluation.

## 2022-07-06 NOTE — ED PROVIDER NOTE - PATIENT PORTAL LINK FT
You can access the FollowMyHealth Patient Portal offered by [facility] by registering at the following website: [email or web address]. By joining [employer]’s FollowMyHealth portal, you will also be able to view your health information using other applications (apps) compatible with our system.

## 2022-07-06 NOTE — ED ADULT NURSE NOTE - NSICDXPASTSURGICALHX_GEN_ALL_CORE_FT
PAST SURGICAL HISTORY:  Cataract of left eye     History of excision of testicular mass left, 2009

## 2022-07-06 NOTE — ED PROVIDER NOTE - OBJECTIVE STATEMENT
61 y/o male with h/o Obesity, sleep apnea, HTN< CKD, DM, referred by PMD c/o cough for past 3 weeks, mildly productive, unable to sleep at night , + KEE, c/o leg edema, not using his CPAP at night

## 2022-07-06 NOTE — ED PROVIDER NOTE - NSFOLLOWUPINSTRUCTIONS_ED_ALL_ED_FT
COUGH   your cough likely because of uncontrolled sleep apnea    you need to use CPAP machine   follow up with your pulmonary doctor in 2 days  return to Er if any concern

## 2022-07-06 NOTE — ED ADULT NURSE NOTE - NSIMPLEMENTINTERV_GEN_ALL_ED
Implemented All Fall with Harm Risk Interventions:  [place] to call system. Call bell, personal items and telephone within reach. Instruct patient to call for assistance. Room bathroom lighting operational. Non-slip footwear when patient is off stretcher. Physically safe environment: no spills, clutter or unnecessary equipment. Stretcher in lowest position, wheels locked, appropriate side rails in place. Provide visual cue, wrist band, yellow gown, etc. Monitor gait and stability. Monitor for mental status changes and reorient to person, place, and time. Review medications for side effects contributing to fall risk. Reinforce activity limits and safety measures with patient and family. Provide visual clues: red socks.

## 2022-07-10 ENCOUNTER — EMERGENCY (EMERGENCY)
Facility: HOSPITAL | Age: 62
LOS: 1 days | Discharge: HOME | End: 2022-07-10
Attending: EMERGENCY MEDICINE | Admitting: STUDENT IN AN ORGANIZED HEALTH CARE EDUCATION/TRAINING PROGRAM
Payer: MEDICARE

## 2022-07-10 VITALS
HEIGHT: 71 IN | TEMPERATURE: 99 F | OXYGEN SATURATION: 95 % | RESPIRATION RATE: 16 BRPM | HEART RATE: 112 BPM | DIASTOLIC BLOOD PRESSURE: 91 MMHG | WEIGHT: 315 LBS | SYSTOLIC BLOOD PRESSURE: 143 MMHG

## 2022-07-10 DIAGNOSIS — Z98.890 OTHER SPECIFIED POSTPROCEDURAL STATES: Chronic | ICD-10-CM

## 2022-07-10 DIAGNOSIS — H26.9 UNSPECIFIED CATARACT: Chronic | ICD-10-CM

## 2022-07-10 LAB
ALBUMIN SERPL ELPH-MCNC: 3.1 G/DL — LOW (ref 3.3–5)
ALP SERPL-CCNC: 83 U/L — SIGNIFICANT CHANGE UP (ref 40–120)
ALT FLD-CCNC: 46 U/L — HIGH (ref 10–45)
AMPHET UR-MCNC: NEGATIVE — SIGNIFICANT CHANGE UP
ANION GAP SERPL CALC-SCNC: 11 MMOL/L — SIGNIFICANT CHANGE UP (ref 5–17)
ANISOCYTOSIS BLD QL: SLIGHT — SIGNIFICANT CHANGE UP
APAP SERPL-MCNC: <1 UG/ML — LOW (ref 10–30)
AST SERPL-CCNC: 22 U/L — SIGNIFICANT CHANGE UP (ref 10–40)
BARBITURATES UR SCN-MCNC: NEGATIVE — SIGNIFICANT CHANGE UP
BASOPHILS # BLD AUTO: 0 K/UL — SIGNIFICANT CHANGE UP (ref 0–0.2)
BASOPHILS NFR BLD AUTO: 0 % — SIGNIFICANT CHANGE UP (ref 0–2)
BENZODIAZ UR-MCNC: NEGATIVE — SIGNIFICANT CHANGE UP
BILIRUB SERPL-MCNC: 0.3 MG/DL — SIGNIFICANT CHANGE UP (ref 0.2–1.2)
BUN SERPL-MCNC: 37 MG/DL — HIGH (ref 7–23)
CALCIUM SERPL-MCNC: 9.6 MG/DL — SIGNIFICANT CHANGE UP (ref 8.4–10.5)
CHLORIDE SERPL-SCNC: 104 MMOL/L — SIGNIFICANT CHANGE UP (ref 96–108)
CO2 SERPL-SCNC: 25 MMOL/L — SIGNIFICANT CHANGE UP (ref 22–31)
COCAINE METAB.OTHER UR-MCNC: NEGATIVE — SIGNIFICANT CHANGE UP
CREAT SERPL-MCNC: 2.26 MG/DL — HIGH (ref 0.5–1.3)
DACRYOCYTES BLD QL SMEAR: SLIGHT — SIGNIFICANT CHANGE UP
EGFR: 32 ML/MIN/1.73M2 — LOW
EOSINOPHIL # BLD AUTO: 0.25 K/UL — SIGNIFICANT CHANGE UP (ref 0–0.5)
EOSINOPHIL NFR BLD AUTO: 4 % — SIGNIFICANT CHANGE UP (ref 0–6)
ETHANOL SERPL-MCNC: <3 MG/DL — SIGNIFICANT CHANGE UP (ref 0–3)
GLUCOSE SERPL-MCNC: 197 MG/DL — HIGH (ref 70–99)
HCT VFR BLD CALC: 40.9 % — SIGNIFICANT CHANGE UP (ref 39–50)
HGB BLD-MCNC: 13.5 G/DL — SIGNIFICANT CHANGE UP (ref 13–17)
HYPOCHROMIA BLD QL: SIGNIFICANT CHANGE UP
LYMPHOCYTES # BLD AUTO: 2.21 K/UL — SIGNIFICANT CHANGE UP (ref 1–3.3)
LYMPHOCYTES # BLD AUTO: 35 % — SIGNIFICANT CHANGE UP (ref 13–44)
MANUAL SMEAR VERIFICATION: SIGNIFICANT CHANGE UP
MCHC RBC-ENTMCNC: 30.2 PG — SIGNIFICANT CHANGE UP (ref 27–34)
MCHC RBC-ENTMCNC: 33 GM/DL — SIGNIFICANT CHANGE UP (ref 32–36)
MCV RBC AUTO: 91.5 FL — SIGNIFICANT CHANGE UP (ref 80–100)
METHADONE UR-MCNC: NEGATIVE — SIGNIFICANT CHANGE UP
MONOCYTES # BLD AUTO: 0.7 K/UL — SIGNIFICANT CHANGE UP (ref 0–0.9)
MONOCYTES NFR BLD AUTO: 11 % — SIGNIFICANT CHANGE UP (ref 2–14)
NEUTROPHILS # BLD AUTO: 3.16 K/UL — SIGNIFICANT CHANGE UP (ref 1.8–7.4)
NEUTROPHILS NFR BLD AUTO: 50 % — SIGNIFICANT CHANGE UP (ref 43–77)
NRBC # BLD: 0 /100 — SIGNIFICANT CHANGE UP (ref 0–0)
OPIATES UR-MCNC: NEGATIVE — SIGNIFICANT CHANGE UP
PCP SPEC-MCNC: SIGNIFICANT CHANGE UP
PCP UR-MCNC: NEGATIVE — SIGNIFICANT CHANGE UP
PLAT MORPH BLD: NORMAL — SIGNIFICANT CHANGE UP
PLATELET # BLD AUTO: 195 K/UL — SIGNIFICANT CHANGE UP (ref 150–400)
POIKILOCYTOSIS BLD QL AUTO: SLIGHT — SIGNIFICANT CHANGE UP
POLYCHROMASIA BLD QL SMEAR: SLIGHT — SIGNIFICANT CHANGE UP
POTASSIUM SERPL-MCNC: 3.7 MMOL/L — SIGNIFICANT CHANGE UP (ref 3.5–5.3)
POTASSIUM SERPL-SCNC: 3.7 MMOL/L — SIGNIFICANT CHANGE UP (ref 3.5–5.3)
PROT SERPL-MCNC: 7.1 G/DL — SIGNIFICANT CHANGE UP (ref 6–8.3)
RBC # BLD: 4.47 M/UL — SIGNIFICANT CHANGE UP (ref 4.2–5.8)
RBC # FLD: 14.2 % — SIGNIFICANT CHANGE UP (ref 10.3–14.5)
RBC BLD AUTO: SIGNIFICANT CHANGE UP
SALICYLATES SERPL-MCNC: 0.9 MG/DL — LOW (ref 3–30)
SARS-COV-2 RNA SPEC QL NAA+PROBE: SIGNIFICANT CHANGE UP
SODIUM SERPL-SCNC: 140 MMOL/L — SIGNIFICANT CHANGE UP (ref 135–145)
THC UR QL: NEGATIVE — SIGNIFICANT CHANGE UP
WBC # BLD: 6.32 K/UL — SIGNIFICANT CHANGE UP (ref 3.8–10.5)
WBC # FLD AUTO: 6.32 K/UL — SIGNIFICANT CHANGE UP (ref 3.8–10.5)

## 2022-07-10 PROCEDURE — 99284 EMERGENCY DEPT VISIT MOD MDM: CPT

## 2022-07-10 PROCEDURE — 90792 PSYCH DIAG EVAL W/MED SRVCS: CPT | Mod: 95

## 2022-07-10 NOTE — ED PROVIDER NOTE - CLINICAL SUMMARY MEDICAL DECISION MAKING FREE TEXT BOX
Apple: pt w bipolar disorder Apple: pt w bipolar disorder    Maco: D/W Tele. Pt cleared for d/c to home. Medically cleared in ED by Dr Chiu. Psychiatrically cleared by . Pt on meds. Will order his doses prior to DC. Worsening, continued or ANY new concerning symptoms return to the emergency department.

## 2022-07-10 NOTE — ED PROVIDER NOTE - PHYSICAL EXAMINATION
Gen: alert, NAD, obese  HEENT:  NC/AT, PERR, no exophthalmos  CV:  well perfused, rrr   Pulm:  normal RR, I/E ratio and chest excursion  Abd: s/nt/nd  MSK: moving all extremities  Neuro:  non-focal  Skin:  visualized areas intact  Psych: AOx3, cooperative, flat affect

## 2022-07-10 NOTE — ED ADULT NURSE NOTE - HPI (INCLUDE ILLNESS QUALITY, SEVERITY, DURATION, TIMING, CONTEXT, MODIFYING FACTORS, ASSOCIATED SIGNS AND SYMPTOMS)
Pt has history of bipolar disorder and sleep apnea. Patient states he has been feeling manic lately more than normal including insomnia, spending more money, and depression. Pt denies suicidal ideation or homicidal ideation.
good minus

## 2022-07-10 NOTE — ED ADULT NURSE NOTE - OBJECTIVE STATEMENT
Patient presents to ED complaining of worsening bipolar symptoms. Pt states spending more money, not sleeping. Patient also states not using sleep apnea equipment due to not being able to get equipment/afford equipment. Pt denies SI/HI

## 2022-07-10 NOTE — ED PROVIDER NOTE - OBJECTIVE STATEMENT
pt states that he has bipolar disorder and recently has been having worsening symptoms mainly stating that he is spending a lot of money, states that he was here a few weeks ago and saw telepsych at that time and was told that if he continues to have symptoms to come back to the ER for evaluation for admission to the hospital.  pt denies SI/HI. denies hearing any voices.  Has been taking his medications and has a local psychiatrist at the [facility].

## 2022-07-10 NOTE — ED PROVIDER NOTE - PATIENT PORTAL LINK FT
You can access the FollowMyHealth Patient Portal offered by [facility] by registering at the following website: [email or web address]. By joining [employer]’s FollowMyHealth portal, you will also be able to view your health information using other applications (apps) compatible with our system.

## 2022-07-10 NOTE — ED ADULT TRIAGE NOTE - CHIEF COMPLAINT QUOTE
patient BIBA complaining of worsening bipolar symptoms. states he has been taking all meds as prescribed but he has been "spending a lot of money, feeling more tired, has b/l foot pain, and insomia."

## 2022-07-10 NOTE — ED PROVIDER NOTE - NSFOLLOWUPINSTRUCTIONS_ED_ALL_ED_FT
Follow up with your psychiatrist. Take your medicines as prescribed. Worsening, continued or ANY new concerning symptoms return to the emergency department.

## 2022-07-11 DIAGNOSIS — F31.70 BIPOLAR DISORDER, CURRENTLY IN REMISSION, MOST RECENT EPISODE UNSPECIFIED: ICD-10-CM

## 2022-07-11 LAB — VALPROATE SERPL-MCNC: 53 UG/ML — SIGNIFICANT CHANGE UP (ref 50–100)

## 2022-07-11 PROCEDURE — 99284 EMERGENCY DEPT VISIT MOD MDM: CPT

## 2022-07-11 PROCEDURE — 93005 ELECTROCARDIOGRAM TRACING: CPT

## 2022-07-11 PROCEDURE — 93010 ELECTROCARDIOGRAM REPORT: CPT

## 2022-07-11 PROCEDURE — 80307 DRUG TEST PRSMV CHEM ANLYZR: CPT

## 2022-07-11 PROCEDURE — 87635 SARS-COV-2 COVID-19 AMP PRB: CPT

## 2022-07-11 PROCEDURE — 85025 COMPLETE CBC W/AUTO DIFF WBC: CPT

## 2022-07-11 PROCEDURE — 36415 COLL VENOUS BLD VENIPUNCTURE: CPT

## 2022-07-11 PROCEDURE — 80164 ASSAY DIPROPYLACETIC ACD TOT: CPT

## 2022-07-11 PROCEDURE — 80053 COMPREHEN METABOLIC PANEL: CPT

## 2022-07-11 RX ORDER — DIVALPROEX SODIUM 500 MG/1
500 TABLET, DELAYED RELEASE ORAL ONCE
Refills: 0 | Status: COMPLETED | OUTPATIENT
Start: 2022-07-11 | End: 2022-07-11

## 2022-07-11 RX ORDER — ZIPRASIDONE HYDROCHLORIDE 20 MG/1
80 CAPSULE ORAL ONCE
Refills: 0 | Status: COMPLETED | OUTPATIENT
Start: 2022-07-11 | End: 2022-07-11
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
## 2022-07-11 NOTE — ED BEHAVIORAL HEALTH ASSESSMENT NOTE - OTHER
[facility]; [address] [place] [id number] [address] [facility] [place] and overinclusive

## 2022-07-11 NOTE — ED BEHAVIORAL HEALTH ASSESSMENT NOTE - RISK ASSESSMENT
Low Acute Suicide Risk Please see pertinent protective factors and risk factors in formulation above.

## 2022-07-11 NOTE — ED BEHAVIORAL HEALTH ASSESSMENT NOTE - REFERRAL / APPOINTMENT DETAILS
Follow up with outpatient psychiatrist, Dr. Lau, and therapist, Mr Pineda, at the [facility] [place] ([phone])

## 2022-07-11 NOTE — ED BEHAVIORAL HEALTH ASSESSMENT NOTE - DESCRIPTION
As per HPI Since arriving to the ED, the patient has remained in good behavioral control and denying any acute safety concerns, including no SI/HI. Lives alone, unemployed, used to work as a

## 2022-07-11 NOTE — ED BEHAVIORAL HEALTH ASSESSMENT NOTE - HPI (INCLUDE ILLNESS QUALITY, SEVERITY, DURATION, TIMING, CONTEXT, MODIFYING FACTORS, ASSOCIATED SIGNS AND SYMPTOMS)
Patient is a 61 yo M, , unemployed, domiciled at [facility], with past psychiatric history of Bipolar 1 disorder vs schizoaffective disorder and unspecified anxiety, multiple PPH (most recently in 1990), no prior suicide attempts or self-injurious behaviors, no h/o violence, legal issues, or substance use, with PMH significant for obesity, sleep apnea, CKD, HTN, HLD, RA, and T2DM, in outpatient treatment with Dr. Lau, BIB ambulance and requesting to speak with a psychiatrist about his worsening mood symptoms including increased urges to Patient is a 61 yo M, , unemployed, domiciled at [facility], with past psychiatric history of Bipolar 1 disorder vs schizoaffective disorder and unspecified anxiety, multiple PPH (most recently in 1990), no prior suicide attempts or self-injurious behaviors, no h/o violence, legal issues, or substance use, with PMH significant for obesity, sleep apnea, CKD, HTN, HLD, RA, and T2DM, in outpatient treatment with a psychiatrist, Dr. Lau, and therapist, Arielle Jose, at the [facility] ([phone]), on Geodon 80 mg BID and Depakote  mg BID (confirmed with [facility]; [phone]), BIB ambulance a/b self and requesting to speak with a psychiatrist about his worsening mood symptoms including insomnia and increased spending.     On assessment, patient presents as concrete, circumstantial, and overinclusive, but overall calm, pleasant, and future-oriented, with no e/o acute psychosis, or agitation. The patient reports coming to the ED last night because he was concerned about his recent increased spending, including watching 'Top Gun' in theaters, eating out with friends this past week, and buying a condolence card for his neighbor whose father passed away. He reports that his rent and other bills have been paid, but now has to wait until his next check comes to have more spending money. In addition, he reports having difficulty sleeping, which he attributes to his CHAYITO and not using his CPAP machine, but reports that he called his physician, who will be ordering the necessary supplies for his ?CPAP machine. The patient otherwise denies any recent mood or psychotic symptoms, including no SI, HI, depression, irritability, euphoria, flight of ideas, distractibility, grandiosity, anhedonia, A/VH, PI, or IOR, as well as denies any recent substance use.     During the assessment, the patient also discussed his relationships with his ex-wife, whom he  in 1990 after a 5 year marriage, and his adult son, from whom he's estranged. He described one encounter he had with his son years ago, in which the son told the patient that he viewed his stepfather as more of a father figure, since he had raised and supported him. However, the patient attributes this to his ex-wife barring communication between him and his son and she had even intercepted letters he had written his son. Despite his estranged relationship with his son, the patient denies feeling depressed, distressed, or hopeless, and says that he views life as "asia" and that "as long as there's life, there's hope". He reports enjoying his daily activities, including spending time with his friends, and looks forward to future activities of his day program.     The patient reports following with both a psychiatrist and therapist at the [facility] and has good therapeutic relationships with both of them. He reports that his next appointments with his therapist and psychiatrist are next week and next month, respectively, and he has been adherent with his Depakote and Geodon, both of which he took last night before coming to the ED. He reports feeling "better" after a "good night's rest" and speaking with this writer, and states he would like to return home so that he could take his morning doses of his medications. The patient reports he does not think he needs to be hospitalized at this time and agreed to return to the ED if he experiences worsening psychiatric symptoms or feels in acute risk of harm to self or others.     This writer confirmed the patient's doses of Depakote and Geodon with his pharmacy and the patient agreed to take his morning doses of these medications in the ED before returning home.

## 2022-07-11 NOTE — ED BEHAVIORAL HEALTH ASSESSMENT NOTE - SUMMARY
Patient is a 61 yo M, , unemployed, domiciled at [facility], with past psychiatric history of Bipolar 1 disorder vs schizoaffective disorder and unspecified anxiety, multiple PPH (most recently in 1990), no prior suicide attempts or self-injurious behaviors, no h/o violence, legal issues, or substance use, with PMH significant for obesity, sleep apnea, CKD, HTN, HLD, RA, and T2DM, in outpatient treatment with a psychiatrist, Dr. Lau, and therapist,  Garcia, at the [facility] ([phone]), on Geodon 80 mg BID and Depakote  mg BID (confirmed with [facility]; [phone]), BIB ambulance a/b self and requesting to speak with a psychiatrist about his worsening mood symptoms including insomnia and increased spending. On assessment, patient presents as concrete and over-inclusive, but is otherwise euthymic, calm, and future-oriented, with no e/o acute nadiya, psychosis, or agitation. His current presentation is similar to previous ones in which he presents to the ED requesting a psych eval, denies acute safety concerns, and feels better after meeting with psychiatry.     The patient has multiple risk factors that elevate his risk of harm including previous psych diagnoses/hospitalizations, history of nadiya, and multiple medical co-morbidities. His protective factors are no current SI, HI, depression, nadiya, A/VH, IOR, PI, or recent substance use, no known history of previous suicide attempts or violence, history of good medication adherence and positive therapeutic relationships with his outpatient psychiatrist and therapist. Given the above, this writer considers the patient's imminent level of risk to be low and hr does not meet criteria for involuntary admission.     The patient agreed to take his Depakote and Geodon while in the ED (the doses for both of which were confirmed with his pharmacy) and indicated he would follow up with his outpatient providers. In addition, he agreed to return to the ED if he experiences any of the above acute psychiatric symptoms or feels he is in acute risk of harm to self/others. Plan is to discharge to the care of his outpatient providers after receiving morning doses of psych meds while in the ED. Plan discussed with ED Attending Dr. Dewey.

## 2022-07-11 NOTE — ED BEHAVIORAL HEALTH ASSESSMENT NOTE - DETAILS
Has an adult son in his 30s CKD with lithium Denies Patient is not at elevated risk of harm Patient referred self

## 2022-07-11 NOTE — ED BEHAVIORAL HEALTH ASSESSMENT NOTE - OTHER PAST PSYCHIATRIC HISTORY (INCLUDE DETAILS REGARDING ONSET, COURSE OF ILLNESS, INPATIENT/OUTPATIENT TREATMENT)
Per chart, patient reports he had been diagnosed with BPAD at age 20 and had previously been on lithium 300 mg PO TID or lithium for many years, but that this was discontinued in recent years due to his renal issues. He has a h/o 4 psychiatric hospitalizations, all for manic episodes. Most recently in 1990 at [facility]. Had one other inpatient stay there before there (unsure when). Says he has had two psychiatric hospitalizations a [facility]. Patient has recently been stable on depakote/geodon and has no known history of suicide attempts or SIB.

## 2022-07-14 NOTE — ED ADULT NURSE NOTE - CINV DISCH EXIT CARE INSTR PROVIDE
Name: Sharath Tenorio ADMIT: 2022   : 1995  PCP: Brian Linares DO    MRN: [id number] LOS: 0 days   AGE/SEX: 27 y.o. male  ROOM: [facility]         Subjective  No new issues overnight.  Denies any pain with EOM or discomfort chewing. Vision stable.    Review of Systems   All other systems reviewed and are negative.     Objective:  Vital Signs  Temp:  [97.3 °F (36.3 °C)-99.1 °F (37.3 °C)] 98.4 °F (36.9 °C)  Heart Rate:  [75-98] 98  Resp:  [16-18] 18  BP: (109-131)/(66-79) 110/67  SpO2:  [93 %-99 %] 94 %  on   ;   Device (Oxygen Therapy): room air  There is no height or weight on file to calculate BMI.     Bedside eye exam:    Full EOM OU, no diplopia    Large abrasion of right side of face  Lids/Lashes: 2x2cm right lower eyelid/uppercheek laceration, missing tissue, normal OS  Conjunctiva: no chemosis, noSCH OU  AC: formed OU  Iris: normal OU  Lens: normal OU    Active Hospital Problems    Diagnosis  POA   • **Closed fracture of maxillary sinus (HCC) [S02.401A]  Yes   • Closed fracture of right orbital floor (HCC) [S02.31XA]  Yes   • Leukocytosis [D72.829]  Yes   • Open facial wound [S01.80XA]  Yes      Resolved Hospital Problems   No resolved problems to display.       27 y.o. male admitted with Closed fracture of maxillary sinus (HCC).    A/P:    1. Lower eyelid/cheek laceration 2x2cm  -unable to close due to missing tissue  -will allow to heal and re-evaluate in clinic  -Patient to do wet to dry dressing 3-4x per day until seen in clinic-->Wet a 2x2 or 4x4 and place on wound for 20 min. Then air dry for 20 min then place ointment.  -Erythromycin ointment ordered -can also place on facial abrasion    2. Right orbital floor fracture, right ZMC fracture  -observe for now, no concern for entrapment, EOM full, no diplopia, not having pain when chewing  -re-evaluate next week    Okay for discharge per oculoplastics standpoint. Please discharge with erythromycin ophthalmic ointment.    Follow up in  oculoplastics clinic next week. Patient to call and schedule clinic with Dr. Barragan next week. Clinic phone number: [phone].    MD Jonnie Parisi MD  07/14/22  14:25 EDT  '   yes

## 2022-07-21 ENCOUNTER — EMERGENCY (EMERGENCY)
Facility: HOSPITAL | Age: 62
LOS: 1 days | Discharge: HOME | End: 2022-07-21
Attending: EMERGENCY MEDICINE | Admitting: EMERGENCY MEDICINE
Payer: MEDICARE

## 2022-07-21 VITALS
WEIGHT: 315 LBS | RESPIRATION RATE: 17 BRPM | SYSTOLIC BLOOD PRESSURE: 139 MMHG | TEMPERATURE: 98 F | HEIGHT: 71 IN | DIASTOLIC BLOOD PRESSURE: 99 MMHG | OXYGEN SATURATION: 96 % | HEART RATE: 96 BPM

## 2022-07-21 DIAGNOSIS — Z98.890 OTHER SPECIFIED POSTPROCEDURAL STATES: Chronic | ICD-10-CM

## 2022-07-21 DIAGNOSIS — H26.9 UNSPECIFIED CATARACT: Chronic | ICD-10-CM

## 2022-07-21 PROCEDURE — 12001 RPR S/N/AX/GEN/TRNK 2.5CM/<: CPT

## 2022-07-21 PROCEDURE — 99283 EMERGENCY DEPT VISIT LOW MDM: CPT | Mod: 25

## 2022-07-21 PROCEDURE — 99282 EMERGENCY DEPT VISIT SF MDM: CPT

## 2022-07-21 NOTE — ED PROVIDER NOTE - OBJECTIVE STATEMENT
63 y/o male BIB ems c/o cut on left knee s/p fall on broken glass, His beer mug fell , and broke and he tripped and fell on it,

## 2022-07-21 NOTE — ED PROVIDER NOTE - NSFOLLOWUPINSTRUCTIONS_ED_ALL_ED_FT

## 2022-07-21 NOTE — ED PROVIDER NOTE - CLINICAL SUMMARY MEDICAL DECISION MAKING FREE TEXT BOX
pt p/w small lac on left knee by a broken glass, very superficial lac , no FB found, lac repaired with 3 staples

## 2022-07-21 NOTE — ED ADULT TRIAGE NOTE - CHIEF COMPLAINT QUOTE
Pt BIBA from home, tripped and fell on glass table, laceration left lower leg/ left hand, no head injury/no blood thinner/no LOC

## 2022-07-21 NOTE — ED PROVIDER NOTE - PATIENT PORTAL LINK FT
You can access the FollowMyHealth Patient Portal offered by [facility] by registering at the following website: [email or web address]. By joining [employer]’s FollowMyHealth portal, you will also be able to view your health information using other applications (apps) compatible with our system.

## 2022-07-21 NOTE — ED ADULT NURSE NOTE - NSICDXPASTMEDICALHX_GEN_ALL_CORE_FT
1-2 cups/cans per day
PAST MEDICAL HISTORY:  Bipolar 1 disorder on Lithium    Diabetic neuropathy bilateral feet, ankles    HLD (hyperlipidemia)     Hypertension     Obesity     CHAYITO (obstructive sleep apnea) diagnosed >25 years ago, non-compliant with CPAP    Rheumatoid arthritis     T2DM (type 2 diabetes mellitus)

## 2022-07-21 NOTE — ED PROVIDER NOTE - PHYSICAL EXAMINATION
General:     NAD, well-nourished, well-appearing  Head:     NC/AT, EOMI, oral mucosa moist  Neck:     supple  Lungs:     CTA b/l, no w/r/r  CVS:     S1S2, RRR, no m/g/r  Abd:     +BS, s/nt/nd, no organomegaly  Ext:    2+ radial and pedal pulses, no c/c/e  Neuro: grossly intact  skin : very superficial small lac on left knee

## 2022-07-21 NOTE — ED ADULT NURSE NOTE - OBJECTIVE STATEMENT
pt is A&Ox4, pt presented to the ER for laceration on the left knee, laceration prepared by the md, nad noted, will continue to monitor

## 2022-08-03 ENCOUNTER — EMERGENCY (EMERGENCY)
Facility: HOSPITAL | Age: 62
LOS: 1 days | Discharge: HOME | End: 2022-08-03
Attending: STUDENT IN AN ORGANIZED HEALTH CARE EDUCATION/TRAINING PROGRAM | Admitting: EMERGENCY MEDICINE
Payer: MEDICARE

## 2022-08-03 VITALS
WEIGHT: 311.95 LBS | HEIGHT: 71 IN | HEART RATE: 116 BPM | TEMPERATURE: 98 F | OXYGEN SATURATION: 97 % | DIASTOLIC BLOOD PRESSURE: 72 MMHG | SYSTOLIC BLOOD PRESSURE: 119 MMHG | RESPIRATION RATE: 20 BRPM

## 2022-08-03 DIAGNOSIS — H26.9 UNSPECIFIED CATARACT: Chronic | ICD-10-CM

## 2022-08-03 DIAGNOSIS — Z98.890 OTHER SPECIFIED POSTPROCEDURAL STATES: Chronic | ICD-10-CM

## 2022-08-03 PROCEDURE — L9995: CPT

## 2022-08-03 PROCEDURE — G0463: CPT

## 2022-08-03 NOTE — ED PROVIDER NOTE - PATIENT PORTAL LINK FT
You can access the FollowMyHealth Patient Portal offered by [facility] by registering at the following website: [email or web address]. By joining "[employer]’s FollowMyHealth portal, you will also be able to view your health information using other applications (apps) compatible with our system.

## 2022-08-03 NOTE — ED PROVIDER NOTE - OBJECTIVE STATEMENT
Francois THOMASON: 62M hx of bipolar presents with a cc of wound check suture removal, reports had injury to L knee a few days prior had staples placed here, wound healing well, no complaints, no redness warmth or discharge, no other complaints at this time. Took a cab to ED requesting assistance with a cab ride home. Denies n/v/f/c/cp/sob.

## 2022-08-03 NOTE — ED ADULT NURSE NOTE - OBJECTIVE STATEMENT
Patient came from home with complaint of staple removal to from left knee. Denies pain to the area. No signs of pus, redness or drainage to the area.

## 2022-08-03 NOTE — ED PROVIDER NOTE - PHYSICAL EXAMINATION
Francois THOMASON:  VITALS: Initial triage and subsequent vitals have been reviewed by me.  GEN APPEARANCE: WDWN, alert and cooperative, non-toxic appearing and in NAD  HEAD: Atraumatic, normocephalic   EYES: PERRLa, EOMI, vision grossly intact.   EARS: Gross hearing intact.   NOSE: No nasal discharge, no external evidence of epistaxis.   NECK: Supple  CV: RRR, S1S2, no c/r/m/g. No cyanosis. Extremities warm, well perfused. Cap refill <2 seconds. No bruits.   LUNGS: CTAB. No wheezing. No rales. No rhonchi. No diminished breath sounds.   ABDOMEN: Soft, NTND. No guarding or rebound. No masses.   MSK/EXT: Spine appears normal, no spine point tenderness. No CVA ttp. Normal muscular development. Pelvis stable. No obvious joint or bony deformity, no peripheral edema.   NEURO: Alert, follows commands. Weight bearing normal. Speech normal. Sensation and motor normal x4 extremities.   SKIN: L knee w well healing laceration w x3 staples in place. removed.   PSYCH: Normal mood and affect.

## 2022-08-03 NOTE — ED ADULT NURSE REASSESSMENT NOTE - NS ED NURSE REASSESS COMMENT FT1
Patient given voucher for taxi for discharge. [employer] called. Patient sent with discharge paperwork.

## 2022-08-03 NOTE — ED PROVIDER NOTE - CLINICAL SUMMARY MEDICAL DECISION MAKING FREE TEXT BOX
Francois THOMASON: 62M hx of bipolar presents with a cc of wound check suture removal, reports had injury to L knee a few days prior had staples placed here, wound healing well, no complaints, no redness warmth or discharge, no other complaints at this time. Took a cab to ED requesting assistance with a cab ride home. Denies n/v/f/c/cp/sob. exam vss non toxic PE as above wound to leg healing well sutures removed, stable for dc.

## 2022-08-03 NOTE — ED PROVIDER NOTE - NSFOLLOWUPINSTRUCTIONS_ED_ALL_ED_FT
Thank you for visiting our Emergency Department, it has been a pleasure taking part in your healthcare.    Your discharge diagnosis is: wound check / suture removal   Please take all discharge medications as indicated below:  Take Motrin/Tylenol for pain as needed, please follow instructions on manufacturers label. If you have any questions please consult a pharmacist or your PMD.  Please follow up with your PMD within x48 hours.  A copy of resulted labs, imaging, and findings have been provided to you.   You have had a detailed discussion with your provider regarding your diagnosis, care management and discharge planning including, but not limited to: return precautions, follow up visits with existing or new providers, new prescriptions and/or medication changes, wound and/or splint/cast care or other care   aspects specific to your diagnosis and treatment. You have been given the opportunity to have your questions answered. At this time you have been deemed stable and fit for discharge.  Return precautions to the Emergency Department include but are not limited to: unrelenting nausea, vomiting, fever, chills, chest pain, shortness of breath, dizziness, chest or abdominal pain, worsening back pain, syncope, blood in urine or stool, headache that doesn't resolve, numbness or tingling, loss of sensation, loss of motor function, or any other concerning symptoms.

## 2022-08-12 ENCOUNTER — APPOINTMENT (OUTPATIENT)
Dept: ORTHOPEDIC SURGERY | Facility: CLINIC | Age: 62
End: 2022-08-12

## 2022-08-16 ENCOUNTER — APPOINTMENT (OUTPATIENT)
Dept: ENDOCRINOLOGY | Facility: CLINIC | Age: 62
End: 2022-08-16

## 2022-08-16 VITALS
WEIGHT: 315 LBS | HEART RATE: 108 BPM | HEIGHT: 71 IN | BODY MASS INDEX: 44.1 KG/M2 | OXYGEN SATURATION: 95 % | DIASTOLIC BLOOD PRESSURE: 80 MMHG | TEMPERATURE: 97.6 F | SYSTOLIC BLOOD PRESSURE: 130 MMHG

## 2022-08-16 PROCEDURE — 99214 OFFICE O/P EST MOD 30 MIN: CPT

## 2022-08-16 PROCEDURE — 82962 GLUCOSE BLOOD TEST: CPT

## 2022-08-16 RX ORDER — ORAL SEMAGLUTIDE 14 MG/1
14 TABLET ORAL
Qty: 90 | Refills: 1 | Status: DISCONTINUED | COMMUNITY
Start: 2021-01-19 | End: 2022-08-16

## 2022-08-16 RX ORDER — PEN NEEDLE, DIABETIC 29 G X1/2"
32G X 4 MM NEEDLE, DISPOSABLE MISCELLANEOUS
Qty: 3 | Refills: 3 | Status: ACTIVE | COMMUNITY
Start: 2022-05-16 | End: 1900-01-01

## 2022-08-16 NOTE — HISTORY OF PRESENT ILLNESS
[FreeTextEntry1] : 64-year-old man with history of type 2 diabetes, morbid obesity, obstructive sleep apnea, bipolar disorder, attending day program, here for endocrinology follow-up for type 2 diabetes mellitus.\par \par He was dx w/ DM2 about 13 years ago. At first he managed it just by diet and exercise. He then was started on Metformin and Januvia but states it caused kidney and liver damage.  Prior medication including Tradjenta, glipizide, Rybelsus.\par \par Attributes increase in A1c due to poor diet.  Admits to eating a lot of Carbs.  He wants to do better with his diet.   \par \par Breakfast: Quicker oats. \par Lunch: Turkey sandwich with lettuce, tomatoes, onions and sometimes pepper. he has soups. \par Dinner: A vegetable, a starch and a piece of meat\par Fruits twice daily.  Rarely a slice of watermelon. \par \par Since two weeks ago, he had more Carbs. \par \par Fasting glucose is always in 200s. He lost 8 lbs since starting Ozempic.  \par \par Has continued to follow up with Dr. Dowd, obesity medicine, states he is trying to lose the weight, not currently interested in bariatric surgery at this moment as he wants to give himself a chance to do it himself. \par Trying to walk for exercise, admits has been difficult recently due to pain in his L. knee. Recommended TKR by Ortho but not until weight & sugars are better. \par \par He follows up closely with ophthalmology.  Needs cataract surgery per pt but was also told that his sugars need to be better controlled prior to the surgery. Denies hx of dm retinopathy. \par \par A1C today 08/16/2022 is 8.8% \par \par Has severe neuropathy and follows up w/ podiatry regularly. Last seen last week for follow up of R. foot 1st digit ulcer which he was informed is now healed, s/p course of abx (Bactrim). Pt says he got the ulcer from walking barefoot despite being aware that he needs to wear shoes, agrees to avoid this practice in the future to prevent further ulcers/injury. Also sees neurology. \par \par Patient with CKDStage IIIb, following up with nephrology.  Continued on losartan and metoprolol for now.\par \par He has CHAYITO, admits to intermittent use of his CPAP machine due to discomfort related to the mask/use of machine. Says at most he will use it 3 hours per night and reports that he does feel better in the morning when he does use it. Agrees to try to use it more.

## 2022-08-16 NOTE — ASSESSMENT
[FreeTextEntry1] : 62 Y.O. man w/ T2 DM w/ neuropathy, HTN, HLD, Obesity, CHAYITO and bipolar disease presenting for follow up of his DM2. \par \par 1. DM2: \par POC A1c today 8.8% 08/16/2022 \par Current regimen is repaglinide 4 mg 3 times daily with meals and Ozempic 1.0 mg once weekly.\par Reporting fasting glucose is greater than 200 mg/dL.\par I believe it is time to start patient on insulin.  He was not able to tolerate metformin due to declining EGFR.\par I am concerned about starting SGLT2 given his history of blisters on his lower extremities.\par Recommend to start Lantus 18 units at bedtime\par Patient comfortable with insulin injection therapy.\par Patient with somewhat poor insight on how to manage his glucose.  He is comfortable calling us for further instructions.\par Recommend patient to call us if his glucose is consistently above 150 mg/dL in the morning, at that time, can consider uptitrating Lantus to 20 units at bedtime.\par Recommend patient do because of his glucose consistently below 70 mg/dL in the morning, at that time, can down titrate Lantus to 15 units at bedtime.\par Patient to continue monitor glucose before every meal at bedtime.\par Patient to continue with Ozempic 1 mg once weekly\par Can consider increasing to 2.0 mg once weekly next visit.\par Continue with repaglinide 4 mg 3 times daily with meals.\par Follow-up in 3 months with NP for close follow up. \par \par 2. HTN: \par BP goal <130/80\par Continue to follow-up with nephrology.\par Continue with metoprolol and losartan at current dosage.\par \par 3.  Hyperlipidemia\par LDL goal <70 mg/dL LDL of 53 mg/dL in May 2022\par Continue with statin therapy.\par \par 4. Obesity, BMI 43 \par - lifestyle changes including diet, exercise, weight loss encouraged \par - follows with obesity medicine, Dr. Dowd  [Diabetes Foot Care] : diabetes foot care [Long Term Vascular Complications] : long term vascular complications of diabetes [Carbohydrate Consistent Diet] : carbohydrate consistent diet [Importance of Diet and Exercise] : importance of diet and exercise to improve glycemic control, achieve weight loss and improve cardiovascular health [Exercise/Effect on Glucose] : exercise/effect on glucose [Hypoglycemia Management] : hypoglycemia management [Glucagon Use] : glucagon use [Ketone Testing] : ketone testing [Action and use of Insulin] : action and use of short and long-acting insulin [Self Monitoring of Blood Glucose] : self monitoring of blood glucose [Insulin Self-Administration] : insulin self-administration [Injection Technique, Storage, Sharps Disposal] : injection technique, storage, and sharps disposal [Sick-Day Management] : sick-day management [Retinopathy Screening] : Patient was referred to ophthalmology for retinopathy screening

## 2022-08-16 NOTE — QUALITY MEASURES
[Nephrology Follow-Up] : patient is currently receiving treatment via nephrology follow-up [NoLowerExtremityNeuroExam] : Patient wearing heavy compression stocking.  Following up with podiatry on a regular basis

## 2022-08-17 LAB — GLUCOSE BLDC GLUCOMTR-MCNC: 341

## 2022-08-18 ENCOUNTER — NON-APPOINTMENT (OUTPATIENT)
Age: 62
End: 2022-08-18

## 2022-08-24 ENCOUNTER — NON-APPOINTMENT (OUTPATIENT)
Age: 62
End: 2022-08-24

## 2022-08-25 ENCOUNTER — NON-APPOINTMENT (OUTPATIENT)
Age: 62
End: 2022-08-25

## 2022-08-29 ENCOUNTER — EMERGENCY (EMERGENCY)
Facility: HOSPITAL | Age: 62
LOS: 1 days | Discharge: HOME | End: 2022-08-29
Attending: EMERGENCY MEDICINE | Admitting: EMERGENCY MEDICINE
Payer: MEDICARE

## 2022-08-29 VITALS
OXYGEN SATURATION: 95 % | HEART RATE: 92 BPM | RESPIRATION RATE: 16 BRPM | DIASTOLIC BLOOD PRESSURE: 87 MMHG | TEMPERATURE: 98 F | SYSTOLIC BLOOD PRESSURE: 123 MMHG

## 2022-08-29 VITALS
RESPIRATION RATE: 16 BRPM | SYSTOLIC BLOOD PRESSURE: 142 MMHG | OXYGEN SATURATION: 95 % | WEIGHT: 315 LBS | TEMPERATURE: 98 F | HEART RATE: 92 BPM | HEIGHT: 71 IN | DIASTOLIC BLOOD PRESSURE: 91 MMHG

## 2022-08-29 DIAGNOSIS — H26.9 UNSPECIFIED CATARACT: Chronic | ICD-10-CM

## 2022-08-29 DIAGNOSIS — Z98.890 OTHER SPECIFIED POSTPROCEDURAL STATES: Chronic | ICD-10-CM

## 2022-08-29 PROCEDURE — 73562 X-RAY EXAM OF KNEE 3: CPT

## 2022-08-29 PROCEDURE — 97162 PT EVAL MOD COMPLEX 30 MIN: CPT

## 2022-08-29 PROCEDURE — 73562 X-RAY EXAM OF KNEE 3: CPT | Mod: 26,50

## 2022-08-29 PROCEDURE — 99283 EMERGENCY DEPT VISIT LOW MDM: CPT

## 2022-08-29 RX ORDER — ACETAMINOPHEN 500 MG
975 TABLET ORAL ONCE
Refills: 0 | Status: COMPLETED | OUTPATIENT
Start: 2022-08-29 | End: 2022-08-29

## 2022-08-29 RX ORDER — TRAMADOL HYDROCHLORIDE 50 MG/1
50 TABLET ORAL ONCE
Refills: 0 | Status: DISCONTINUED | OUTPATIENT
Start: 2022-08-29 | End: 2022-08-29

## 2022-08-29 RX ORDER — DIVALPROEX SODIUM 500 MG/1
0 TABLET, DELAYED RELEASE ORAL
Qty: 0 | Refills: 0 | DISCHARGE

## 2022-08-29 RX ADMIN — TRAMADOL HYDROCHLORIDE 50 MILLIGRAM(S): 50 TABLET ORAL at 08:38

## 2022-08-29 RX ADMIN — Medication 975 MILLIGRAM(S): at 08:37

## 2022-08-29 NOTE — ED ADULT NURSE REASSESSMENT NOTE - NS ED NURSE REASSESS COMMENT FT1
patient sitting on side of bed eating breakfast  he tried to stand up to walk with cane but said t was too painful

## 2022-08-29 NOTE — ED PROVIDER NOTE - OBJECTIVE STATEMENT
62M presents to the ED c/o right knee pain. He says that he has chronic knee pain. He receives steroid injections to his right knee by ortho Dr Ferrell. He had an appointment last week and missed it because he had too many appointments that he could not make it. He says he was unable to reschedule his appointment however, those injections help him through his knee pain. He says that since he did not receive his knee steroid injection, he is having knee pain. He presents to the ED via EMS saying that he cannot walk and needs help scheduling his ortho appt. Also, he is awaiting 9AM to talk with his  at [facility] to see if they can place him in different housing.   No fever. No swelling. No trauma to the right knee but says he did fall and hit the left knee (the side without pain).

## 2022-08-29 NOTE — ED PROVIDER NOTE - MUSCULOSKELETAL MINIMAL EXAM
right knee pain. No swelling or effusion. No redness. Tender with local palpation to the medial aspect of knee joint.

## 2022-08-29 NOTE — ED PROVIDER NOTE - PATIENT PORTAL LINK FT
You can access the FollowMyHealth Patient Portal offered by [facility] by registering at the following website: [email or web address]. By joining [employer]’s FollowMyHealth portal, you will also be able to view your health information using other applications (apps) compatible with our system.

## 2022-08-29 NOTE — PHYSICAL THERAPY INITIAL EVALUATION ADULT - GENERAL OBSERVATIONS, REHAB EVAL
Pt rec'd seated in chair in ED in NAD, ace bandages b/l knees, compression socks with diabetic shoes

## 2022-08-29 NOTE — CHART NOTE - NSCHARTNOTEFT_GEN_A_CORE
SW consulted to meet with patient in regards to housing concerns.  SW met with patient at bedside, introduced self and role of SW. Patient is a 62M presents to the ED c/o right knee pain. He says that he has chronic knee pain. He receives steroid injections to his right knee by ortho Dr Ferrell. He had an appointment last week and missed it because he had too many appointments that he could not make it. He says he was unable to reschedule his appointment however, those injections help him through his knee pain.  Patient reports he has 2 steps to enter into apartment with.  Patient reports he is currently in outpatient PT 2x a week.  Patient seen by PT, recommendation to continue with outpatient PT and to use rolling walker.  Walker provided to patient in ED.  SW assisted with setting up transportation home through [employer], confirmation # [id number] spoke with Maurisio.  SW to remain available as needed.

## 2022-08-29 NOTE — PHYSICAL THERAPY INITIAL EVALUATION ADULT - ADDITIONAL COMMENTS
Pt reports living in alone in an apartment. There are +2 steps to enter, none inside. PTA, pt was independent with all functional mobility & ADL's without the use of an AD. Pt attends out-pt PT at [facility] 2 x week

## 2022-08-29 NOTE — ED ADULT TRIAGE NOTE - HEIGHT IN INCHES
Detail Level: Generalized Plan: Patient is not doing well with his cancer treatment plan right now. His oncologist did not approve MOHS surgery at this time. Patient had a painful experience with his new treatment. Discussed we don’t want to let this squamous cell carcinoma go too long on patients skin. At this moment we can hold off on treatment right now. We could scrap it off today but this would give patient an open wound which is not good for patient to have at this time. Plan to follow up in another 4 weeks to look at right cheek and left forearm. 11

## 2022-08-29 NOTE — ED PROVIDER NOTE - CLINICAL SUMMARY MEDICAL DECISION MAKING FREE TEXT BOX
62M presents to the ED c/o right knee pain. He says that he has chronic knee pain. He receives steroid injections to his right knee by ortho Dr Ferrell. He had an appointment last week and missed it because he had too many appointments that he could not make it. He says he was unable to reschedule his appointment however, those injections help him through his knee pain. He says that since he did not receive his knee steroid injection, he is having knee pain. He presents to the ED via EMS saying that he cannot walk and needs help scheduling his ortho appt. Also, he is awaiting 9AM to talk with his  at [facility] to see if they can place him in different housing.   No fever. No swelling.   Exam as stated. Will d/w SW to assist patient with disposition and appointment scheduling. 62M presents to the ED c/o right knee pain. He says that he has chronic knee pain. He receives steroid injections to his right knee by ortho Dr Ferrell. He had an appointment last week and missed it because he had too many appointments that he could not make it. He says he was unable to reschedule his appointment however, those injections help him through his knee pain. He says that since he did not receive his knee steroid injection, he is having knee pain. He presents to the ED via EMS saying that he cannot walk and needs help scheduling his ortho appt. Also, he is awaiting 9AM to talk with his  at [facility] to see if they can place him in different housing.   No fever. No swelling.   Exam as stated. Will d/w SW to assist patient with disposition and appointment scheduling.    SW and PT eval pt. Stable for d/c with walker. Worsening, continued or ANY new concerning symptoms return to the emergency department.

## 2022-08-29 NOTE — ED PROVIDER NOTE - NSFOLLOWUPINSTRUCTIONS_ED_ALL_ED_FT
Knee Pain    WHAT YOU NEED TO KNOW:    Knee pain may start suddenly, or it may be a long-term problem. You may have pain on the side, front, or back of your knee. You may have knee stiffness and swelling. You may hear popping sounds or feel like your knee is giving way or locking up as you walk. You may feel pain when you sit, stand, walk, or climb up and down stairs. Knee pain can be caused by conditions such as obesity, inflammation, or strains or tears in ligaments or tendons.     DISCHARGE INSTRUCTIONS:    Return to the emergency department if:   •Your pain is worse, even after treatment.       •You cannot bend or straighten your leg completely.       •The swelling around your knee does not go down even with treatment.      •Your knee is painful and hot to the touch.       Contact your healthcare provider if:   •You have questions or concerns about your condition or care.           Medicines: You may need any of the following:   •NSAIDs help decrease swelling and pain or fever. This medicine is available with or without a doctor's order. NSAIDs can cause stomach bleeding or kidney problems in certain people. If you take blood thinner medicine, always ask your healthcare provider if NSAIDs are safe for you. Always read the medicine label and follow directions.      •Acetaminophen decreases pain and fever. It is available without a doctor's order. Ask how much to take and how often to take it. Follow directions. Read the labels of all other medicines you are using to see if they also contain acetaminophen, or ask your doctor or pharmacist. Acetaminophen can cause liver damage if not taken correctly.      •Prescription pain medicine may be given. Ask your healthcare provider how to take this medicine safely. Some prescription pain medicines contain acetaminophen. Do not take other medicines that contain acetaminophen without talking to your healthcare provider. Too much acetaminophen may cause liver damage. Prescription pain medicine may cause constipation. Ask your healthcare provider how to prevent or treat constipation.       •Take your medicine as directed. Contact your healthcare provider if you think your medicine is not helping or if you have side effects. Tell him or her if you are allergic to any medicine. Keep a list of the medicines, vitamins, and herbs you take. Include the amounts, and when and why you take them. Bring the list or the pill bottles to follow-up visits. Carry your medicine list with you in case of an emergency.      What you can do to manage your symptoms:   •Rest your knee so it can heal. Limit activities that increase your pain. Do low-impact exercises, such as walking or swimming.       •Apply ice to help reduce swelling and pain. Use an ice pack, or put crushed ice in a plastic bag. Cover it with a towel before you apply it to your knee. Apply ice for 15 to 20 minutes every hour, or as directed.      •Apply compression to help reduce swelling. Use a brace or bandage only as directed.      •Elevate your knee to help decrease pain and swelling. Elevate your knee while you are sitting or lying down. Prop your leg on pillows to keep your knee above the level of your heart.      •Prevent your knee from moving as directed. Your healthcare provider may put on a cast or splint. You may need to wear a leg brace to stabilize your knee. A leg brace can be adjusted to increase your range of motion as your knee heals.  Hinged Knee Braces            What you can do to prevent knee pain:   •Maintain a healthy weight. Extra weight increases your risk for knee pain. Ask your healthcare provider how much you should weigh. He or she can help you create a safe weight loss plan if you need to lose weight.      •Exercise or train properly. Use the correct equipment for sports. Wear shoes that provide good support. Check your posture often as you exercise, play sports, or train for an event. This can help prevent stress and strain on your knees. Rest between sessions so you do not overwork your knees.      Follow up with your healthcare provider within 24 hours or as directed: You may need follow-up treatments, such as steroid injections to decrease pain. Write down your questions so you remember to ask them during your visits.

## 2022-08-29 NOTE — ED ADULT NURSE NOTE - OBJECTIVE STATEMENT
"              After Visit Summary   2/27/2017    Yusef Alvares    MRN: [id number]           Patient Information     Date Of Birth          1972        Visit Information        Provider Department      2/27/2017 2:00 PM Shay Cummins MD [facility]        Care Instructions    To help to try and entrain some sort of daytime / night time pattern, I would recommend we have you take a low dose of melatonin (usually 3 mg) and take it the same time each day, I recommend around 8pm.        Follow-ups after your visit        Your next 10 appointments already scheduled     Mar 03, 2017  8:30 AM CST   Evaluation with Kris Hoenk, PT   [facility] ([facility])    [address]  [address]  [facility] [address] [phone]              Who to contact     If you have questions or need follow up information about today's clinic visit or your schedule please contact [facility] directly at [phone].  Normal or non-critical lab and imaging results will be communicated to you by [employer], letter or phone within 4 business days after the clinic has received the results. If you do not hear from us within 7 days, please contact the clinic through [employer] or phone. If you have a critical or abnormal lab result, we will notify you by phone as soon as possible.  Submit refill requests through [facility] or call your pharmacy and they will forward the refill request to us. Please allow 3 business days for your refill to be completed.          Additional Information About Your Visit        [employer] Information     [facility] lets you send messages to your doctor, view your test results, renew your prescriptions, schedule appointments and more. To sign up, go to [email or web address] . Click on \"Log in\" on the left side of the screen, which will take you to the Welcome page. Then click on \"Sign up Now\" on the right side of the page.     You will be asked " "to enter the access code listed below, as well as some personal information. Please follow the directions to create your username and password.     Your access code is: [email or web address]  Expires: 2017  3:33 PM     Your access code will  in 90 days. If you need help or a new code, please call your [facility] clinic or [phone].        Care EveryWhere ID     This is your Care EveryWhere ID. This could be used by other organizations to access your [facility] medical records  [id number]        Your Vitals Were     Pulse Height Pulse Oximetry BMI (Body Mass Index)          82 1.651 m (5' 5\") 99% 32.12 kg/m2         Blood Pressure from Last 3 Encounters:   17 133/84   17 136/79   16 116/76    Weight from Last 3 Encounters:   17 87.5 kg (193 lb)   17 87.9 kg (193 lb 12.8 oz)   16 90.3 kg (199 lb)              Today, you had the following     No orders found for display       Primary Care Provider Office Phone # Fax #    Yusef Katz [phone]       [facility] [address] [facility] [address]        Thank you!     Thank you for choosing [facility]  for your care. Our goal is always to provide you with excellent care. Hearing back from our patients is one way we can continue to improve our services. Please take a few minutes to complete the written survey that you may receive in the mail after your visit with us. Thank you!             Your Updated Medication List - Protect others around you: Learn how to safely use, store and throw away your medicines at www.disposemymeds.org.          This list is accurate as of: 17  2:16 PM.  Always use your most recent med list.                   Brand Name Dispense Instructions for use    ABILIFY 15 MG tablet   Generic drug:  ARIPiprazole      Take 15 mg by mouth daily.       citalopram 40 MG tablet    celeXA     Take 40 mg by mouth daily.       DEPAKOTE  MG 24 hr tablet "   Generic drug:  divalproex      Take 500 mg by mouth daily. Taking 1500 mg at night       levothyroxine 50 MCG tablet    SYNTHROID/LEVOTHROID    90 tablet    Take 1 tablet (50 mcg) by mouth daily       naltrexone 50 MG tablet    DEPADE;REVIA     Take 50 mg by mouth daily       simvastatin 40 MG tablet    ZOCOR    135 tablet    Take 1.5 tablets (60 mg) by mouth At Bedtime       STRATTERA 80 MG capsule   Generic drug:  atomoxetine      Take 80 mg by mouth daily       triamcinolone 0.1 % paste    KENALOG    5 g    Take by mouth 2 times daily Until sore is healed       triamcinolone 0.5 % cream    KENALOG    30 g    Apply sparingly to affected area three times daily to elbows          c/o right knee pain and high BMI

## 2022-08-29 NOTE — ED PROVIDER NOTE - CONSTITUTIONAL, MLM
Well appearing, awake, alert, oriented to person, place, time/situation and in no apparent distress. Obese. normal...

## 2022-08-31 ENCOUNTER — APPOINTMENT (OUTPATIENT)
Dept: ORTHOPEDIC SURGERY | Facility: CLINIC | Age: 62
End: 2022-08-31

## 2022-08-31 DIAGNOSIS — M25.561 PAIN IN RIGHT KNEE: ICD-10-CM

## 2022-08-31 DIAGNOSIS — G89.29 PAIN IN RIGHT KNEE: ICD-10-CM

## 2022-08-31 DIAGNOSIS — M25.562 PAIN IN RIGHT KNEE: ICD-10-CM

## 2022-08-31 PROCEDURE — 20610 DRAIN/INJ JOINT/BURSA W/O US: CPT | Mod: LT

## 2022-08-31 PROCEDURE — 99214 OFFICE O/P EST MOD 30 MIN: CPT | Mod: 25

## 2022-08-31 NOTE — DISCUSSION/SUMMARY
[de-identified] : This patient has left knee osteoarthritis and right knee that appears to be normal radiographically.  It is possible that he has some mild arthritis that has not obvious on radiographs or an intra-articular injury.  The patient is not an appropriate candidate for surgical intervention at this time. An extensive discussion was conducted on the natural history of the disease and the variety of surgical and non-surgical options available to the patient including, but not limited to non-steroidal anti-inflammatory medications, steroid injections, physical therapy, maintenance of ideal body weight, and reduction of activity.  Weight loss recommended.  Home exercise program recommended.  He will take Tylenol for pain.  Today we performed left knee intra-articular cortisone junctions.\par The patient will schedule an appointment as needed.\par \par Informed consent for the left knee injection was obtained. All questions were answered. A time out was performed. The  knee was prepped and draped in sterile fashion. Using sterile technique, 40mg of Kenalog, 4cc of 1% lidocaine, 4cc of 0.25% marcaine using a 21-gauge needle. A sterile dressing was applied. Post injection instructions were reviewed. The patient tolerated the procedure well.  He was warned to follow his blood sugar levels the next 24 to 40 hours which can come elevated because of the injection.\par \par The patient has been counseled regarding the elevated risks associated with surgical complications in patients with a BMI>35.  I explained to the patient the risk of obesity, which is well documented in the orthopedic literature as increasing risks of wound breakdown (dehiscence), drainage, periprosthetic joint infection, dissatisfaction, chronic pain, early failure and/or loosening of the prosthesis, and impaired overall outcome to the patient. The patient demonstrates a profound understanding of the increased risk. Nutritionist referral, methods of monitoring nutrition intake and calorie counting and bariatric surgery options were discussed with the patient. After a lengthy discussion, the patient agreed to make a coordinated effort at weight loss. The patient understands our BMI policy and if they are planning surgical intervention, then they will need to bring their BMI below 40 in order to proceed and they are agreeable to this.\par

## 2022-08-31 NOTE — HISTORY OF PRESENT ILLNESS
[de-identified] : This is very nice 62-year-old male experiencing chronic bilat knee pain, which is moderate in intensity. He does have mod-severe left knee OA and minimal arthritic changes to the right knee. The pain moderately limits activities of daily living. Walking tolerance is somewhat reduced.  Not currently taking NSAIDs for this.  He cannot take NSAIDs because he stage II chronic kidney disease.  BMI is 47 and he knows he needs to lose weight.  Unfortunately he is gaining weight.  He states that his BG is 400 on average.  He denies any brace.  He denies a cane or walker.  Not currently therapy.  He has bipolar depression and is working in his life together.

## 2022-08-31 NOTE — PHYSICAL EXAM
[de-identified] : Patient is well nourished, well-developed, in no acute distress, with appropriate mood and affect. The patient is oriented to time, place, and person. Respirations are even and unlabored. Gait evaluation does reveal a limp. There is no inguinal adenopathy. Bilateral limbs are well-perfused, without skin lesions, shows a grossly normal motor and sensory examination. The right knee motion is significantly reduced and does cause significant pain. The right knee moves from 0 to 125 degrees. The knee is stable within that range-of-motion to AP and ML stress. The alignment of the knee is 5 degrees varus. Muscle strength is normal. Pedal pulses are palpable. Hip examination was negative. The left knee motion is significantly reduced and does cause significant pain. The left knee moves from 5-100 degrees. The knee is stable within that range-of-motion to AP and ML stress. The alignment of the knee is 5 degrees varus. Muscle strength is normal. Pedal pulses are palpable. Hip examination was negative.\par  [de-identified] : Long standing knee, AP knee, lateral knee, and patellar views of the bilateral knee were brought in from the emergency department by the patient which I reviewed and demonstrate mild right knee and severe left knee degenerative joint disease of the knee with joint space narrowing, osteophyte formation, and subchondral sclerosis.

## 2022-09-15 ENCOUNTER — OUTPATIENT (OUTPATIENT)
Dept: OUTPATIENT SERVICES | Facility: HOSPITAL | Age: 62
LOS: 1 days | End: 2022-09-15

## 2022-09-15 DIAGNOSIS — H26.9 UNSPECIFIED CATARACT: Chronic | ICD-10-CM

## 2022-09-15 DIAGNOSIS — K08.9 DISORDER OF TEETH AND SUPPORTING STRUCTURES, UNSPECIFIED: ICD-10-CM

## 2022-09-15 DIAGNOSIS — Z98.890 OTHER SPECIFIED POSTPROCEDURAL STATES: Chronic | ICD-10-CM

## 2022-09-16 ENCOUNTER — APPOINTMENT (OUTPATIENT)
Dept: CARDIOLOGY | Facility: CLINIC | Age: 62
End: 2022-09-16

## 2022-09-16 ENCOUNTER — NON-APPOINTMENT (OUTPATIENT)
Age: 62
End: 2022-09-16

## 2022-09-16 VITALS — SYSTOLIC BLOOD PRESSURE: 120 MMHG | DIASTOLIC BLOOD PRESSURE: 78 MMHG

## 2022-09-16 VITALS
HEART RATE: 96 BPM | OXYGEN SATURATION: 97 % | BODY MASS INDEX: 43.68 KG/M2 | WEIGHT: 312 LBS | HEIGHT: 71 IN | SYSTOLIC BLOOD PRESSURE: 151 MMHG | DIASTOLIC BLOOD PRESSURE: 94 MMHG

## 2022-09-16 DIAGNOSIS — I25.10 ATHEROSCLEROTIC HEART DISEASE OF NATIVE CORONARY ARTERY W/OUT ANGINA PECTORIS: ICD-10-CM

## 2022-09-16 PROCEDURE — 99215 OFFICE O/P EST HI 40 MIN: CPT

## 2022-09-16 PROCEDURE — 93000 ELECTROCARDIOGRAM COMPLETE: CPT

## 2022-09-16 NOTE — HISTORY OF PRESENT ILLNESS
[FreeTextEntry1] : 62 year-old gentleman with known cardiovascular risk factors including hypertension, well controlled non-insulin dependent diabetes, dyslipidemia, obesity, CHAYITO, bipolar disorder on Lithium and Geodon, lives as a full time resident of the [facility]. He presents today in his usual state of health, having lost approximately 65 lbs over the past year. He had a repeat sleep study performed 8/12/2016 which showed severe CHAYITO.\par He was formerly followed by a cardiologist in [place], Geovanny Elizabeth MD.\par \par May 10, 2019: Patient presents with complain of sleep apnea. Has concerning sleep study two months ago. He has nasal congestion and cannot tolerate his CPAP. He is also concern about neuropathic pain in the insteps of his feet. He has lost several pounds through diet and exercise that are supervised through his program.\par \par Psychiatrist at [facility] in [place] who manages his Lithium and his antipsychotic medication (second generation, Geodon).\par \par November 2019 - Patient presents today in his usual state of health. He has modified his diet and is losing significant weight. From a peak of 349 lbs in 2012, patient is now 284 lbs. He reports having difficulty walking long distances, but that is because of leg weakness, not because of shortness of breath or chest pain. He is concerned about dyspnea on exertion, but he has not experienced this recently.\par \par June 2020 - Patient has lost 65 lbs by diet and exercise, but when he got the stimulus check, he broke his diet and started eating pizza, and heroes, and potato chips, and he believes he had a stroke that presented with pressured speech. He was scanned at [facility], and he was seen by neurology. They believe he had a TIA and discharged him with atorvastatin 40 mg daily (up from prior 20 mg daily). He continues to take ASA 81 mg daily and an oral diabetes regimen (repaglinide and Tradjenta).\par Unfortunately, his exercise class was shut down due to Covid pandemic.\par \par October 2020 - Patient has been having difficulty with over-eating. He reports several dietary indiscretions over the past month, including pizza and Chinese food.\par He has been depressed by his eating, and he is feeling socially isolated. He finds that he runs out of money for food and relies on a local [employer] for meals.\par He continues to take his medications without issues, but he has not been using his CPAP machine.\par \par March 2021 - Patient returns today for follow-up. He was admitted to [facility] in December 2020 for elevated lithium levels, and he was transitioned off lithium to Depakote. He feels much better now on Depakote. He is also on a new diabetes regimen that includes Rybelsus and it has helped him lose weight. \par These medication changes have also helped him financially. \par Flomax and finasteride are helping him with BPH.\par Linzess is improving his constipation. \par Arthritis continues to be a problem. \par He is sleeping well through the night. He is using his CPAP.\par He is walking for exercise.\par He has not yet received Covid-19 vaccine, but he is interested in getting it. \par \par August 2021 - Patient returns today for follow-up and echocardiogram. \par He has been working with Dr. Estuardo Dowd on losing weight, and he has dramatically modified his diet. He has reduced bread, pizza, and Chinese food intake. \par He has increased eating oatmeal and eggs. \par He has received [employer] Covid-19 vaccine.\par \par December 2021 - Patient returns today for follow-up.\par He is scheduled to have cataract surgery with Dr. Govea in [place] on January 5, 2022.\par He has no complaints of chest pain or shortness of breath.\par He received both a [employer] Covid-19 vaccine and [employer] booster.\par \par \par PMD: Julius Mckeon MD [phone]\par Ophthalmologist: Patel Govea MD [phone]\par Sleep Medicine: Angelika Todd MD [phone]\par Endocrinologist: Rocío Bañuelos MD [phone]\par Psychiatrist: Anthony Dc, Psychiatric Nurse Practitioner at [facility]

## 2022-09-16 NOTE — REASON FOR VISIT
[CV Risk Factors and Non-Cardiac Disease] : CV risk factors and non-cardiac disease [Other: ____] : [unfilled] [FreeTextEntry1] : September 2022 - Patient returns today for follow-up in his usual state of health. He has started singing Tuesday nights at a restaurant in [place] ([employer] He is really enjoying it. \par His knees are his chief complaints. \par He has no chest pain or shortness of breath recently. He has lost some weight recently by sticking to his diet.

## 2022-09-16 NOTE — CARDIOLOGY SUMMARY
[de-identified] : 8/30/2021, sinus 91 bpm, left atrial enlargement, old inferior MI pattern, poor R-wave progression [de-identified] : 2015, no Ischemia \par 11/21/2019, pharmacologic nuclear stress test, normal myocardial perfusion imaging, LVEF 65%, LVEDV 109 mL. [de-identified] : 3/2015, normal LV function\par 9/13/2019, concentric LVH, grossly normal LV systolic function, LVEF 55-60%\par 8/30/2021, concentric LVH, grossly normal LV systolic function, LVEF 60-65%\par

## 2022-09-16 NOTE — DISCUSSION/SUMMARY
[EKG obtained to assist in diagnosis and management of assessed problem(s)] : EKG obtained to assist in diagnosis and management of assessed problem(s) [FreeTextEntry1] : Mr. Pruitt has multiple cardiac risk factors, as above, and presents today for follow-up\par \par We discussed the importance of compliance with both psychiatric and cardiac medications, as well as compliance with the CPAP machine that he will be getting again after his recent sleep study demonstrated severe CHAYITO. Patient counseled regarding exercise, diet, and weight loss. No change in cardiac regimen today. \par Follow-up with PMD, endocrinologist, nephrologist, orthopedic surgery, podiatrist, and sleep medicine. \par \par Issue of relationship between CHAYITO and hypertension, weight gain, and risk factors also reinforced and compliance in this regard discussed as well.\par \par 1. Type II diabetes - continue Rybelsus and repaglinide per endocrinology.\par 2. CKD - Off lithium now. Follow-up per Dr. Vallejo. Currently tolerating low dose ARB, sodium bicarb. \par 3. Obesity - Patient has lost weight and will continue diet and exercise. Continue to work with Dr. Estuardo Dowd.\par 4. Hypertension - well controlled today. Continue current regimen, including ARB, beta-blocker. \par 5. Bipolar disorder - continue care per psychiatry. Patient now off lithium and on Depakote.\par 6. BPH - Flomax and Proscar have improved urinary stream. Continue current regimen. Follow-up per PMD.\par 7. CHAYITO - Continue CPAP QHS.

## 2022-09-29 DIAGNOSIS — K08.409 PARTIAL LOSS OF TEETH, UNSPECIFIED CAUSE, UNSPECIFIED CLASS: ICD-10-CM

## 2022-10-06 ENCOUNTER — OUTPATIENT (OUTPATIENT)
Dept: OUTPATIENT SERVICES | Facility: HOSPITAL | Age: 62
LOS: 1 days | End: 2022-10-06
Payer: MEDICARE

## 2022-10-06 ENCOUNTER — RX RENEWAL (OUTPATIENT)
Age: 62
End: 2022-10-06

## 2022-10-06 DIAGNOSIS — H26.9 UNSPECIFIED CATARACT: Chronic | ICD-10-CM

## 2022-10-06 DIAGNOSIS — Z98.890 OTHER SPECIFIED POSTPROCEDURAL STATES: Chronic | ICD-10-CM

## 2022-10-06 DIAGNOSIS — K08.9 DISORDER OF TEETH AND SUPPORTING STRUCTURES, UNSPECIFIED: ICD-10-CM

## 2022-10-06 PROCEDURE — D5213: CPT

## 2022-10-07 ENCOUNTER — RX RENEWAL (OUTPATIENT)
Age: 62
End: 2022-10-07

## 2022-10-07 DIAGNOSIS — K08.409 PARTIAL LOSS OF TEETH, UNSPECIFIED CAUSE, UNSPECIFIED CLASS: ICD-10-CM

## 2022-10-11 ENCOUNTER — NON-APPOINTMENT (OUTPATIENT)
Age: 62
End: 2022-10-11

## 2022-10-20 ENCOUNTER — APPOINTMENT (OUTPATIENT)
Dept: PULMONOLOGY | Facility: CLINIC | Age: 62
End: 2022-10-20

## 2022-10-25 ENCOUNTER — APPOINTMENT (OUTPATIENT)
Dept: NEPHROLOGY | Facility: CLINIC | Age: 62
End: 2022-10-25

## 2022-11-15 ENCOUNTER — OUTPATIENT (OUTPATIENT)
Dept: OUTPATIENT SERVICES | Facility: HOSPITAL | Age: 62
LOS: 1 days | End: 2022-11-15
Payer: MEDICARE

## 2022-11-15 DIAGNOSIS — K08.9 DISORDER OF TEETH AND SUPPORTING STRUCTURES, UNSPECIFIED: ICD-10-CM

## 2022-11-15 DIAGNOSIS — Z98.890 OTHER SPECIFIED POSTPROCEDURAL STATES: Chronic | ICD-10-CM

## 2022-11-15 DIAGNOSIS — H26.9 UNSPECIFIED CATARACT: Chronic | ICD-10-CM

## 2022-11-15 PROCEDURE — D0120: CPT

## 2022-11-15 PROCEDURE — D0274: CPT

## 2022-11-15 PROCEDURE — D1110: CPT

## 2022-11-15 PROCEDURE — D0220: CPT

## 2022-11-17 ENCOUNTER — RX RENEWAL (OUTPATIENT)
Age: 62
End: 2022-11-17

## 2022-11-18 ENCOUNTER — EMERGENCY (EMERGENCY)
Facility: HOSPITAL | Age: 62
LOS: 1 days | Discharge: HOME | End: 2022-11-18
Attending: EMERGENCY MEDICINE | Admitting: EMERGENCY MEDICINE
Payer: MEDICARE

## 2022-11-18 ENCOUNTER — APPOINTMENT (OUTPATIENT)
Dept: ENDOCRINOLOGY | Facility: CLINIC | Age: 62
End: 2022-11-18

## 2022-11-18 ENCOUNTER — RX RENEWAL (OUTPATIENT)
Age: 62
End: 2022-11-18

## 2022-11-18 VITALS
DIASTOLIC BLOOD PRESSURE: 86 MMHG | SYSTOLIC BLOOD PRESSURE: 138 MMHG | HEART RATE: 101 BPM | BODY MASS INDEX: 44.1 KG/M2 | OXYGEN SATURATION: 96 % | WEIGHT: 315 LBS | TEMPERATURE: 98.2 F | HEIGHT: 71 IN

## 2022-11-18 VITALS
TEMPERATURE: 98 F | WEIGHT: 315 LBS | HEART RATE: 94 BPM | SYSTOLIC BLOOD PRESSURE: 164 MMHG | DIASTOLIC BLOOD PRESSURE: 88 MMHG | OXYGEN SATURATION: 96 % | HEIGHT: 69 IN | RESPIRATION RATE: 18 BRPM

## 2022-11-18 VITALS
DIASTOLIC BLOOD PRESSURE: 86 MMHG | HEART RATE: 102 BPM | OXYGEN SATURATION: 95 % | SYSTOLIC BLOOD PRESSURE: 136 MMHG | RESPIRATION RATE: 18 BRPM

## 2022-11-18 DIAGNOSIS — Z98.890 OTHER SPECIFIED POSTPROCEDURAL STATES: Chronic | ICD-10-CM

## 2022-11-18 DIAGNOSIS — H26.9 UNSPECIFIED CATARACT: Chronic | ICD-10-CM

## 2022-11-18 LAB
GLUCOSE BLDC GLUCOMTR-MCNC: 229
HBA1C MFR BLD HPLC: 9.2

## 2022-11-18 PROCEDURE — 83036 HEMOGLOBIN GLYCOSYLATED A1C: CPT | Mod: QW

## 2022-11-18 PROCEDURE — 99283 EMERGENCY DEPT VISIT LOW MDM: CPT

## 2022-11-18 PROCEDURE — 99283 EMERGENCY DEPT VISIT LOW MDM: CPT | Mod: FS

## 2022-11-18 PROCEDURE — 82962 GLUCOSE BLOOD TEST: CPT

## 2022-11-18 PROCEDURE — 99214 OFFICE O/P EST MOD 30 MIN: CPT | Mod: 25

## 2022-11-18 RX ORDER — REPAGLINIDE 2 MG/1
2 TABLET ORAL 3 TIMES DAILY
Qty: 180 | Refills: 5 | Status: DISCONTINUED | COMMUNITY
Start: 2017-06-28 | End: 2022-11-18

## 2022-11-18 NOTE — CHART NOTE - NSCHARTNOTEFT_GEN_A_CORE
SW met with patient at bedside, introduced self and role of SW.  Patient reports he was feeling overwhelmed after his Drs appointment today requiring him to take more daily medication.  SW contacted patient therapist, Scott Garcia [phone] who was able to speak with patient.  Patient reports after speaking with therapist he is feeling much better and is scheduled to see therapist on Tuesday.  Patient reports he feels safe and is in agreement with DC.  GERALD [email or web address].

## 2022-11-18 NOTE — HISTORY OF PRESENT ILLNESS
[FreeTextEntry1] : 62-year-old man with history of type 2 diabetes, morbid obesity, obstructive sleep apnea, bipolar disorder, attending day program previously, now he is home, here for endocrinology follow-up for type 2 diabetes mellitus.\par \par He was dx w/ DM2 about 13 years ago. At first he managed it just by diet and exercise. \par \par Breakfast: Quicker oats. \par Lunch: Turkey sandwich with lettuce, tomatoes, onions and sometimes pepper. he has soups. \par Dinner: A vegetable, a starch and a piece of meat\par Fruits twice daily.  Rarely a slice of watermelon. \par \par Has continued to follow up with Dr. Dowd, obesity medicine, states he is trying to lose the weight, not currently interested in bariatric surgery at this moment as he wants to give himself a chance to do it himself. \par Trying to walk for exercise, admits has been difficult recently due to pain in his L. knee. Recommended TKR by Ortho but not until weight & sugars are better. \par \par He follows up closely with ophthalmology.  Needs cataract surgery per pt but was also told that his sugars need to be better controlled prior to the surgery. Denies hx of dm retinopathy. \par \par A1C 08/16/2022 is 8.8%\par A1c 11/18/22: 9.2%\par \par Past Medications:\par Repaglinide 4 mg 3 times daily \par Metformin and Januvia but states it caused kidney and liver damage.  \par Prior medication including Tradjenta, glipizide, Rybelsus.\par \par Current Medications:\par Lantus 24 QHS\par Humalog 6 units pre-meals\par Ozempic 1 mg once weekly \par \par PAST: \par --HE IS STILL TAKING REPAGLINIDE-- advised to stop\par \par Meter Readings:\par No meter readings. \par He checks twice daily. \par AM fasting: 160-220\par 930 pm: after dinner, 230-240.\par \par A1c 11/18/22: 9.2% +polyuria +polydipsia\par \par Has severe neuropathy and follows up w/ podiatry regularly. Last seen last week for follow up of R. foot 1st digit ulcer which he was informed is now healed, s/p course of abx (Bactrim). Pt says he got the ulcer from walking barefoot despite being aware that he needs to wear shoes, agrees to avoid this practice in the future to prevent further ulcers/injury. Also sees neurology. \par \par Patient with CKDStage IIIb, following up with nephrology.  Continued on losartan and metoprolol for now.\par \par He has CHAYITO, admits to intermittent use of his CPAP machine due to discomfort related to the mask/use of machine. Says at most he will use it 3 hours per night and reports that he does feel better in the morning when he does use it. Agrees to try to use it more.

## 2022-11-18 NOTE — ED PROVIDER NOTE - CLINICAL SUMMARY MEDICAL DECISION MAKING FREE TEXT BOX
Pt BIB EMS from home for c/o "feeling overwhelmed." Pt with hx bipolar disorder, DM, HTN and HLD. Pt stating that he is stressed out because his PCP just recently changed his insulin dose. Pt reports reaching out to speak to his therapist, but he is not working today. Pt denies SI/HI. Pt requesting to see a psychiatrist. discussed at length with pt and SW will attempt to contact his therapist. pt in agreement  see SW note  Discussed with patient need to return to ED if symptoms don't continue to improve or recur or develops any new or worsening symptoms that are of concern.

## 2022-11-18 NOTE — ED PROVIDER NOTE - OBJECTIVE STATEMENT
Pt BIB EMS from home for c/o "feeling overwhelmed." Pt with hx bipolar disorder, DM, HTN and HLD. Pt stating that he is stressed out because his PCP just recently changed his insulin dose. Pt reports reaching out to speak to his therapist, but he is not working today. Pt denies SI/HI. Pt requesting to see a psychiatrist. discussed at length with pt and SW will attempt to contact his therapist. pt in agreement

## 2022-11-18 NOTE — ED PROVIDER NOTE - NSFOLLOWUPINSTRUCTIONS_ED_ALL_ED_FT
Follow-up with your therapist on Tuesday as scheduled.  Continue your medicines as previously prescribed, no changes are made.  Return to the emergency department as needed if there is any worsening or concern.

## 2022-11-18 NOTE — ASSESSMENT
[Diabetes Foot Care] : diabetes foot care [Long Term Vascular Complications] : long term vascular complications of diabetes [Carbohydrate Consistent Diet] : carbohydrate consistent diet [Importance of Diet and Exercise] : importance of diet and exercise to improve glycemic control, achieve weight loss and improve cardiovascular health [Exercise/Effect on Glucose] : exercise/effect on glucose [Hypoglycemia Management] : hypoglycemia management [Glucagon Use] : glucagon use [Ketone Testing] : ketone testing [Action and use of Insulin] : action and use of short and long-acting insulin [Self Monitoring of Blood Glucose] : self monitoring of blood glucose [Insulin Self-Administration] : insulin self-administration [Injection Technique, Storage, Sharps Disposal] : injection technique, storage, and sharps disposal [Sick-Day Management] : sick-day management [Retinopathy Screening] : Patient was referred to ophthalmology for retinopathy screening [FreeTextEntry1] : 62-year-old man with history of type 2 diabetes, morbid obesity, obstructive sleep apnea, bipolar disorder, attending day program previously, now he is home, here for endocrinology follow-up for type 2 diabetes mellitus.\par \par 1. DM2: \par POC A1c 8.8% 08/16/2022 \par A1c 11/18/22: 9.2%\par \par Plan:\par Lantus 24 QHS--> Increase to 28 units daily \par Humalog 6 units pre-meals--> increase to 8 units pre-meal\par Ozempic 1mg once weekly --> Ozempic 2 mg weekly. The  consider switch to Mounjaro. Strong recommendation for mounjaro at next visit to address weight loss. \par STOP REPAGLINIDE. \par Discussed with him I can send for 1mg two injections on same day if out of stock. \par \par Report fasting glucose is greater than 200 mg/dL.\par  He was not able to tolerate metformin due to declining EGFR.\par I am concerned about starting SGLT2 given his history of blisters on his lower extremities.\par Patient with somewhat poor insight on how to manage his glucose.  He is comfortable calling us for further instructions.\par Recommend patient to call us if his glucose is consistently above 150 mg/dL in the morning.\par Recommend patient do because of his glucose consistently below 70 mg/dL in the morning, call us. \par Patient to continue monitor glucose before every meal at bedtime.\par \par 2. HTN: \par BP goal <130/80, slightly above goal. \par Continue to follow-up with nephrology.\par Continue with metoprolol and losartan at current dosage.\par \par 3.  Hyperlipidemia\par LDL goal <70 mg/dL LDL of 53 mg/dL in May 2022\par Continue with statin therapy.\par \par 4. Obesity, BMI 43 \par - lifestyle changes including diet, exercise, weight loss encouraged \par - follows with obesity medicine, Dr. Dowd

## 2022-11-18 NOTE — ED ADULT NURSE NOTE - OBJECTIVE STATEMENT
Pt BIB EMS from home for c/o "feeling overwhelmed." Pt with hx bipolar disorder, DM, HTN and HLD. Pt stating that he is stressed out because his PCP just recently changed his insulin dose. Pt reports reaching out to speak to his therapist, but he is not working today. Pt denies SI/HI. Pt requesting to see a psychiatrist.

## 2022-11-18 NOTE — PHYSICAL EXAM
[Alert] : alert [Obese] : obese [No Lid Lag] : no lid lag [Supple] : the neck was supple [Thyroid Not Enlarged] : the thyroid was not enlarged [No Accessory Muscle Use] : no accessory muscle use [Normal Rate and Effort] : normal respiratory rate and effort [Normal PMI] : the apical impulse was normal [Normal S1, S2] : normal S1 and S2 [No Rash] : no rash [Oriented x3] : oriented to person, place, and time [Normal Insight/Judgement] : insight and judgment were intact [de-identified] : There is bilateral lower extremity swellings.  Wearing compression stockings. [de-identified] : Abdomen is obese.

## 2022-11-18 NOTE — ED PROVIDER NOTE - PATIENT PORTAL LINK FT
You can access the FollowMyHealth Patient Portal offered by [facility] by registering at the following website: [email or web address]. By joining [employer]’s FollowMyHealth portal, you will also be able to view your health information using other applications (apps) compatible with our system.

## 2022-11-18 NOTE — ED ADULT TRIAGE NOTE - CHIEF COMPLAINT QUOTE
Pt BIBA from home c/o feeling overwhelmed because the doctor increased his take of insulin. Pt wants to psychiatrist since his is not available at the moment. Denies SI/HI.

## 2022-11-18 NOTE — ED PROVIDER NOTE - NS ED ATTENDING STATEMENT MOD
This was a shared visit with the MAHENDRA. I reviewed and verified the documentation and independently performed the documented:

## 2022-11-18 NOTE — ED PROVIDER NOTE - ATTENDING APP SHARED VISIT CONTRIBUTION OF CARE
Belinda with [facility]. Pt BIB EMS from home for c/o "feeling overwhelmed." Pt with hx bipolar disorder, DM, HTN and HLD. Pt stating that he is stressed out because his PCP just recently changed his insulin dose. Pt reports reaching out to speak to his therapist, but he is not working today. Pt denies SI/HI. Pt requesting to see a psychiatrist. discussed at length with pt and SW will attempt to contact his therapist. pt in agreement  see SW note  Discussed with patient need to return to ED if symptoms don't continue to improve or recur or develops any new or worsening symptoms that are of concern.    I performed a face to face bedside interview with patient regarding history of present illness, review of symptoms and past medical history. I completed an independent physical exam.  I have discussed the patient's plan of care with Physician Assistant (PA). I agree with note as stated above, having amended the EMR as needed to reflect my findings.   This includes History of Present Illness, HIV, Past Medical/Surgical/Family/Social History, Allergies and Home Medications, Review of Systems, Physical Exam, and any Progress Notes during the time I functioned as the attending physician for this patient.

## 2022-11-22 DIAGNOSIS — Z01.20 ENCOUNTER FOR DENTAL EXAMINATION AND CLEANING WITHOUT ABNORMAL FINDINGS: ICD-10-CM

## 2022-11-22 LAB
ALBUMIN SERPL ELPH-MCNC: 4.3 G/DL
ALP BLD-CCNC: 72 U/L
ALT SERPL-CCNC: 33 U/L
ANION GAP SERPL CALC-SCNC: 13 MMOL/L
AST SERPL-CCNC: 22 U/L
BILIRUB SERPL-MCNC: 0.4 MG/DL
BUN SERPL-MCNC: 36 MG/DL
C PEPTIDE SERPL-MCNC: 5.4 NG/ML
CALCIUM SERPL-MCNC: 10.1 MG/DL
CHLORIDE SERPL-SCNC: 102 MMOL/L
CHOLEST SERPL-MCNC: 253 MG/DL
CO2 SERPL-SCNC: 24 MMOL/L
CREAT SERPL-MCNC: 1.87 MG/DL
CREAT SPEC-SCNC: 32 MG/DL
EGFR: 41 ML/MIN/1.73M2
GLUCOSE SERPL-MCNC: 217 MG/DL
HDLC SERPL-MCNC: 43 MG/DL
LDLC SERPL CALC-MCNC: NORMAL MG/DL
MICROALBUMIN 24H UR DL<=1MG/L-MCNC: 4.4 MG/DL
MICROALBUMIN/CREAT 24H UR-RTO: 140 MG/G
NONHDLC SERPL-MCNC: 209 MG/DL
POTASSIUM SERPL-SCNC: 4.8 MMOL/L
PROT SERPL-MCNC: 7.2 G/DL
SODIUM SERPL-SCNC: 139 MMOL/L
T4 FREE SERPL-MCNC: 1.2 NG/DL
TRIGL SERPL-MCNC: 428 MG/DL
TSH SERPL-ACNC: 0.44 UIU/ML

## 2022-11-23 ENCOUNTER — NON-APPOINTMENT (OUTPATIENT)
Age: 62
End: 2022-11-23

## 2022-11-28 ENCOUNTER — NON-APPOINTMENT (OUTPATIENT)
Age: 62
End: 2022-11-28

## 2022-12-01 NOTE — ED ADULT NURSE NOTE - NSSUSCREENINGQ1_ED_ALL_ED
[facility]  [address] [place] [address] [address] [address]  Phone: [phone]  Fax: [phone]    Omaira Bailey MD        December 1, 2022     Patient: Kezia Banda   YOB: 1976   Date of Visit: 12/1/2022       To Whom It May Concern: It is my medical opinion that Harmony Ashton may return to work on 12/1/2022 with the following restrictions:    2 days per week   8 hours per day   Restrictions in place until 12/31/2022   If these restrictions are unable to be accommodated, the patient will need to be off work. If you have any questions or concerns, please don't hesitate to call.     Sincerely,    Omaira Bailey MD
No

## 2022-12-05 ENCOUNTER — OUTPATIENT (OUTPATIENT)
Dept: OUTPATIENT SERVICES | Facility: HOSPITAL | Age: 62
LOS: 1 days | End: 2022-12-05
Payer: MEDICARE

## 2022-12-05 DIAGNOSIS — K08.9 DISORDER OF TEETH AND SUPPORTING STRUCTURES, UNSPECIFIED: ICD-10-CM

## 2022-12-05 DIAGNOSIS — K02.9 DENTAL CARIES, UNSPECIFIED: ICD-10-CM

## 2022-12-05 DIAGNOSIS — H26.9 UNSPECIFIED CATARACT: Chronic | ICD-10-CM

## 2022-12-05 DIAGNOSIS — Z98.890 OTHER SPECIFIED POSTPROCEDURAL STATES: Chronic | ICD-10-CM

## 2022-12-05 PROCEDURE — D2391: CPT

## 2022-12-05 PROCEDURE — D2330: CPT

## 2022-12-07 ENCOUNTER — EMERGENCY (EMERGENCY)
Facility: HOSPITAL | Age: 62
LOS: 1 days | Discharge: HOME | End: 2022-12-07
Attending: EMERGENCY MEDICINE | Admitting: INTERNAL MEDICINE
Payer: MEDICARE

## 2022-12-07 VITALS
HEIGHT: 69 IN | OXYGEN SATURATION: 93 % | HEART RATE: 115 BPM | DIASTOLIC BLOOD PRESSURE: 83 MMHG | TEMPERATURE: 99 F | SYSTOLIC BLOOD PRESSURE: 120 MMHG | RESPIRATION RATE: 18 BRPM | WEIGHT: 315 LBS

## 2022-12-07 DIAGNOSIS — Z98.890 OTHER SPECIFIED POSTPROCEDURAL STATES: Chronic | ICD-10-CM

## 2022-12-07 DIAGNOSIS — H26.9 UNSPECIFIED CATARACT: Chronic | ICD-10-CM

## 2022-12-07 LAB
ACETONE SERPL-MCNC: NEGATIVE — SIGNIFICANT CHANGE UP
ALBUMIN SERPL ELPH-MCNC: 3.6 G/DL — SIGNIFICANT CHANGE UP (ref 3.3–5)
ALP SERPL-CCNC: 78 U/L — SIGNIFICANT CHANGE UP (ref 40–120)
ALT FLD-CCNC: 36 U/L — SIGNIFICANT CHANGE UP (ref 10–45)
ANION GAP SERPL CALC-SCNC: 7 MMOL/L — SIGNIFICANT CHANGE UP (ref 5–17)
APPEARANCE UR: CLEAR — SIGNIFICANT CHANGE UP
AST SERPL-CCNC: 19 U/L — SIGNIFICANT CHANGE UP (ref 10–40)
BASE EXCESS BLDV CALC-SCNC: 2.8 MMOL/L — SIGNIFICANT CHANGE UP (ref -2–3)
BASOPHILS # BLD AUTO: 0.03 K/UL — SIGNIFICANT CHANGE UP (ref 0–0.2)
BASOPHILS NFR BLD AUTO: 0.4 % — SIGNIFICANT CHANGE UP (ref 0–2)
BILIRUB SERPL-MCNC: 0.4 MG/DL — SIGNIFICANT CHANGE UP (ref 0.2–1.2)
BILIRUB UR-MCNC: NEGATIVE — SIGNIFICANT CHANGE UP
BUN SERPL-MCNC: 48 MG/DL — HIGH (ref 7–23)
CALCIUM SERPL-MCNC: 9.2 MG/DL — SIGNIFICANT CHANGE UP (ref 8.4–10.5)
CHLORIDE SERPL-SCNC: 103 MMOL/L — SIGNIFICANT CHANGE UP (ref 96–108)
CO2 BLDV-SCNC: 30 MMOL/L — HIGH (ref 22–26)
CO2 SERPL-SCNC: 30 MMOL/L — SIGNIFICANT CHANGE UP (ref 22–31)
COLOR SPEC: YELLOW — SIGNIFICANT CHANGE UP
CREAT SERPL-MCNC: 2.6 MG/DL — HIGH (ref 0.5–1.3)
DIFF PNL FLD: NEGATIVE — SIGNIFICANT CHANGE UP
EGFR: 27 ML/MIN/1.73M2 — LOW
EOSINOPHIL # BLD AUTO: 0.16 K/UL — SIGNIFICANT CHANGE UP (ref 0–0.5)
EOSINOPHIL NFR BLD AUTO: 2.2 % — SIGNIFICANT CHANGE UP (ref 0–6)
GAS PNL BLDV: SIGNIFICANT CHANGE UP
GLUCOSE BLDC GLUCOMTR-MCNC: 310 MG/DL — HIGH (ref 70–99)
GLUCOSE SERPL-MCNC: 342 MG/DL — HIGH (ref 70–99)
GLUCOSE UR QL: 1000 MG/DL
HCO3 BLDV-SCNC: 28 MMOL/L — SIGNIFICANT CHANGE UP (ref 22–29)
HCT VFR BLD CALC: 43.7 % — SIGNIFICANT CHANGE UP (ref 39–50)
HGB BLD-MCNC: 14.2 G/DL — SIGNIFICANT CHANGE UP (ref 13–17)
IMM GRANULOCYTES NFR BLD AUTO: 2.3 % — HIGH (ref 0–0.9)
KETONES UR-MCNC: NEGATIVE — SIGNIFICANT CHANGE UP
LEUKOCYTE ESTERASE UR-ACNC: NEGATIVE — SIGNIFICANT CHANGE UP
LYMPHOCYTES # BLD AUTO: 2.16 K/UL — SIGNIFICANT CHANGE UP (ref 1–3.3)
LYMPHOCYTES # BLD AUTO: 29.8 % — SIGNIFICANT CHANGE UP (ref 13–44)
MCHC RBC-ENTMCNC: 30.1 PG — SIGNIFICANT CHANGE UP (ref 27–34)
MCHC RBC-ENTMCNC: 32.5 GM/DL — SIGNIFICANT CHANGE UP (ref 32–36)
MCV RBC AUTO: 92.6 FL — SIGNIFICANT CHANGE UP (ref 80–100)
MONOCYTES # BLD AUTO: 0.84 K/UL — SIGNIFICANT CHANGE UP (ref 0–0.9)
MONOCYTES NFR BLD AUTO: 11.6 % — SIGNIFICANT CHANGE UP (ref 2–14)
NEUTROPHILS # BLD AUTO: 3.89 K/UL — SIGNIFICANT CHANGE UP (ref 1.8–7.4)
NEUTROPHILS NFR BLD AUTO: 53.7 % — SIGNIFICANT CHANGE UP (ref 43–77)
NITRITE UR-MCNC: NEGATIVE — SIGNIFICANT CHANGE UP
NRBC # BLD: 0 /100 WBCS — SIGNIFICANT CHANGE UP (ref 0–0)
PCO2 BLDV: 50 MMHG — SIGNIFICANT CHANGE UP (ref 42–55)
PH BLDV: 7.36 — SIGNIFICANT CHANGE UP (ref 7.32–7.43)
PH UR: 6.5 — SIGNIFICANT CHANGE UP (ref 5–8)
PLATELET # BLD AUTO: 177 K/UL — SIGNIFICANT CHANGE UP (ref 150–400)
PO2 BLDV: <35 MMHG — SIGNIFICANT CHANGE UP (ref 25–45)
POTASSIUM SERPL-MCNC: 5.2 MMOL/L — SIGNIFICANT CHANGE UP (ref 3.5–5.3)
POTASSIUM SERPL-SCNC: 5.2 MMOL/L — SIGNIFICANT CHANGE UP (ref 3.5–5.3)
PROT SERPL-MCNC: 7.4 G/DL — SIGNIFICANT CHANGE UP (ref 6–8.3)
PROT UR-MCNC: NEGATIVE — SIGNIFICANT CHANGE UP
RBC # BLD: 4.72 M/UL — SIGNIFICANT CHANGE UP (ref 4.2–5.8)
RBC # FLD: 13.8 % — SIGNIFICANT CHANGE UP (ref 10.3–14.5)
SAO2 % BLDV: 53.1 % — LOW (ref 67–88)
SODIUM SERPL-SCNC: 140 MMOL/L — SIGNIFICANT CHANGE UP (ref 135–145)
SP GR SPEC: 1 — LOW (ref 1.01–1.02)
UROBILINOGEN FLD QL: NEGATIVE — SIGNIFICANT CHANGE UP
WBC # BLD: 7.25 K/UL — SIGNIFICANT CHANGE UP (ref 3.8–10.5)
WBC # FLD AUTO: 7.25 K/UL — SIGNIFICANT CHANGE UP (ref 3.8–10.5)

## 2022-12-07 PROCEDURE — 36415 COLL VENOUS BLD VENIPUNCTURE: CPT

## 2022-12-07 PROCEDURE — 82803 BLOOD GASES ANY COMBINATION: CPT

## 2022-12-07 PROCEDURE — 99284 EMERGENCY DEPT VISIT MOD MDM: CPT | Mod: FS

## 2022-12-07 PROCEDURE — 85025 COMPLETE CBC W/AUTO DIFF WBC: CPT

## 2022-12-07 PROCEDURE — 82009 KETONE BODYS QUAL: CPT

## 2022-12-07 PROCEDURE — 99283 EMERGENCY DEPT VISIT LOW MDM: CPT | Mod: 25

## 2022-12-07 PROCEDURE — 96360 HYDRATION IV INFUSION INIT: CPT

## 2022-12-07 PROCEDURE — 87086 URINE CULTURE/COLONY COUNT: CPT

## 2022-12-07 PROCEDURE — 82962 GLUCOSE BLOOD TEST: CPT

## 2022-12-07 PROCEDURE — 96361 HYDRATE IV INFUSION ADD-ON: CPT

## 2022-12-07 PROCEDURE — 80053 COMPREHEN METABOLIC PANEL: CPT

## 2022-12-07 RX ORDER — SODIUM CHLORIDE 9 MG/ML
1000 INJECTION INTRAMUSCULAR; INTRAVENOUS; SUBCUTANEOUS ONCE
Refills: 0 | Status: DISCONTINUED | OUTPATIENT
Start: 2022-12-07 | End: 2022-12-07

## 2022-12-07 RX ORDER — SODIUM CHLORIDE 9 MG/ML
1000 INJECTION INTRAMUSCULAR; INTRAVENOUS; SUBCUTANEOUS ONCE
Refills: 0 | Status: COMPLETED | OUTPATIENT
Start: 2022-12-07 | End: 2022-12-07

## 2022-12-07 RX ADMIN — SODIUM CHLORIDE 1000 MILLILITER(S): 9 INJECTION INTRAMUSCULAR; INTRAVENOUS; SUBCUTANEOUS at 20:46

## 2022-12-07 RX ADMIN — SODIUM CHLORIDE 1000 MILLILITER(S): 9 INJECTION INTRAMUSCULAR; INTRAVENOUS; SUBCUTANEOUS at 18:37

## 2022-12-07 NOTE — ED PROVIDER NOTE - IV ALTEPLASE ADMIN OUTSIDE HIDDEN
show Niacinamide Pregnancy And Lactation Text: These medications are considered safe during pregnancy.

## 2022-12-07 NOTE — ED PROVIDER NOTE - OBJECTIVE STATEMENT
63 y/o M  with hx bipolar disorder, DM, HTN and HLD presents to the ED with complaint of hyperglycemia.  Patient reports that his sugars have been elevated over the past few weeks.  Patient also admits to drinking more soda and eating Cheetos and drinking orange juice.  Patient reports his endocrinologist is following him for his uncontrolled diabetes.  Patient was concerned because this afternoon he his sugar read to be above 400s he took 8 units of his Humalog prior to arrival to the ER. Patient denies fever chills nausea vomiting abdominal pain urinary symptoms or URI symptoms

## 2022-12-07 NOTE — ED PROVIDER NOTE - NSFOLLOWUPINSTRUCTIONS_ED_ALL_ED_FT
Follow up with your endocrinologist within 2-3 days. Take the copy of your test results you were given in the emergency room for your doctor to review.       Stay hydrated    Return to the ER if your symptoms worsen or for any other medical emergencies  *************    Hyperglycemia    Hyperglycemia is when the sugar (glucose) level in your blood is too high. High blood sugar can happen to people who have or do not have diabetes. High blood sugar can happen quickly. It can be an emergency.      What are the causes?    If you have diabetes, high blood sugar may be caused by:  •Medicines that increase blood sugar or affect your control of diabetes.      •Getting less physical activity.      •Overeating.      •Being sick or injured or having an infection.      •Having surgery.      •Stress.       •Not giving yourself enough insulin (if you are taking it).      You may have high blood sugar because you have diabetes that has not been diagnosed yet.    If you do not have diabetes, high blood sugar may be caused by:  •Certain medicines.      •Stress.       •A bad illness.      •An infection.      •Having surgery.      •Diseases of the pancreas.        What increases the risk?    This condition is more likely to develop in people who have risk factors for diabetes, such as:  •Having a family member with diabetes.       •Certain conditions in which the body's defense system (immune system) attacks itself. These are called autoimmune disorders.      •Being overweight.      •Not being active.      •Having a condition called insulin resistance.    •Having a history of:   •Prediabetes.      •Diabetes when pregnant.      •Polycystic ovarian syndrome (PCOS).          What are the signs or symptoms?    This condition may not cause symptoms. If you do have symptoms, they may include:  •Feeling more thirsty than normal.      •Needing to pee (urinate) more often than normal.      •Hunger.      •Feeling very tired.      •Blurry eyesight (vision).      You may get other symptoms as the condition gets worse, such as:  •Dry mouth.      •Pain in your belly (abdomen).      •Not being hungry (loss of appetite).      •Breath that smells fruity.      •Weakness.      •Weight loss that is not planned.      •A tingling or numb feeling in your hands or feet.      •A headache.      •Cuts or bruises that heal slowly.        How is this treated?    Treatment depends on the cause of your condition. Treatment may include:  •Taking medicine to control your blood sugar levels.      •Changing your medicine or dosage if you take insulin or other diabetes medicines.    •Lifestyle changes. These may include:  •Exercising more.      •Eating healthier foods.      •Losing weight.        •Treating an illness or infection.       •Checking your blood sugar more often.      •Stopping or reducing steroid medicines.      If your condition gets very bad, you will need to be treated in the hospital.      Follow these instructions at home:    General instructions     •Take over-the-counter and prescription medicines only as told by your doctor.      • Do not smoke or use any products that contain nicotine or tobacco. If you need help quitting, ask your doctor.    •If you drink alcohol: •Limit how much you have to:  •0–1 drink a day for women who are not pregnant.      •0–2 drinks a day for men.         •Know how much alcohol is in a drink. In the [place] one drink equals one 12 oz bottle of beer (355 mL), one 5 oz glass of wine (148 mL), or one 1½ oz glass of hard liquor (44 mL).        •Manage stress. If you need help with this, ask your doctor.      •Do exercises as told by your doctor.      •Keep all follow-up visits.        Eating and drinking      •Stay at a healthy weight.    •Make sure you drink enough fluid when you:  •Exercise.      •Get sick.      •Are in hot temperatures.        •Drink enough fluid to keep your pee (urine) pale yellow.        If you have diabetes:      •Know the symptoms of high blood sugar.    •Follow your diabetes management plan as told by your doctor. Make sure you:  •Take insulin and medicines as told.      •Follow your exercise plan.      •Follow your meal plan. Eat on time. Do not skip meals.      •Check your blood sugar as often as told. Make sure you check before and after exercise. If you exercise longer or in a different way, check your blood sugar more often.      •Follow your sick day plan whenever you cannot eat or drink normally. Make this plan ahead of time with your doctor.        •Share your diabetes management plan with people in your workplace, school, and household.      •Check your pee for ketones when you are ill and as told by your doctor.      •Carry a card or wear jewelry that says that you have diabetes.        Where to find more information    American Diabetes Association: www.diabetes.org      Contact a doctor if:    •Your blood sugar level is at or above 240 mg/dL (13.3 mmol/L) for 2 days in a row.      •You have problems keeping your blood sugar in your target range.      •You have high blood pressure often.    •You have signs of illness, such as:  •Feeling like you may vomit (feeling nauseous).      •Vomiting.      •A fever.          Get help right away if:    •Your blood sugar monitor reads "high" even when you are taking insulin.      •You have trouble breathing.      •You have a change in how you think, feel, or act (mental status).      •You feel like you may vomit, and the feeling does not go away.      •You cannot stop vomiting.      These symptoms may be an emergency. Get medical help right away. Call your local emergency services (911 in the [place]    • Do not wait to see if the symptoms will go away.        • Do not drive yourself to the hospital.         Summary    •Hyperglycemia is when the sugar (glucose) level in your blood is too high.      •High blood sugar can happen to people who have or do not have diabetes.      •Make sure you drink enough fluids and follow your meal plan. Exercise as often as told by your doctor.      •Contact your doctor if you have problems keeping your blood sugar in your target range.      This information is not intended to replace advice given to you by your health care provider. Make sure you discuss any questions you have with your health care provider.

## 2022-12-07 NOTE — ED ADULT NURSE NOTE - NSIMPLEMENTINTERV_GEN_ALL_ED
Implemented All Universal Safety Interventions:  [place] to call system. Call bell, personal items and telephone within reach. Instruct patient to call for assistance. Room bathroom lighting operational. Non-slip footwear when patient is off stretcher. Physically safe environment: no spills, clutter or unnecessary equipment. Stretcher in lowest position, wheels locked, appropriate side rails in place.

## 2022-12-07 NOTE — ED PROVIDER NOTE - PHYSICAL EXAMINATION
Gen: Well appearing in NAD.  AAOx3  ENT: oral mucosa dry   Head: atraumatic  Heart: s1/s2, RRR  Lung: CTA b/l,   Abd: soft, NT/ND, no rebound or guarding, NCVAT  Msk: no pedal edema or calf pain,  Neuro: patient moving all extremity equally,   Skin: Normal for race.

## 2022-12-07 NOTE — ED PROVIDER NOTE - ATTENDING APP SHARED VISIT CONTRIBUTION OF CARE
This was shared visit with MAHENDRA. I have reviewed and verified the documentation and independently performed the documented history, exam and mdm  61 y/o M  with hx bipolar disorder, DM, HTN and HLD presents to the ED with complaint of hyperglycemia.  Patient reports that his sugars have been elevated over the past few weeks.  Patient also admits to drinking more soda and eating Cheetos and drinking orange juice.  Patient reports his endocrinologist is following him for his uncontrolled diabetes.  Patient was concerned because this afternoon he his sugar read to be above 400s he took 8 units of his Humalog prior to arrival to the ER. Patient denies fever chills nausea vomiting abdominal pain urinary symptoms or URI symptoms

## 2022-12-07 NOTE — ED ADULT NURSE NOTE - OBJECTIVE STATEMENT
Patient presents to ED complaining of high blood sugars for weeks. Patient states he has increasing blood sugars and has been to the doctor about them and they increased his insulin but today is blood sugar was high. On arrival to patient's room, blood sugar was 310. Patient denies dizziness, or feeling sick.

## 2022-12-07 NOTE — ED ADULT NURSE NOTE - ASSOCIATED SYMPTOMS
Patient axox4, well appearing, skin dry warm. Patient states frequent urination and thirst. Patient denies headache, dizziness, SOB, nausea.

## 2022-12-07 NOTE — ED PROVIDER NOTE - CLINICAL SUMMARY MEDICAL DECISION MAKING FREE TEXT BOX
63 y/o M  with hx bipolar disorder, DM, HTN and HLD presents to the ED with complaint of hyperglycemia.  Patient reports that his sugars have been elevated over the past few weeks.  Patient also admits to drinking more soda and eating Cheetos and drinking orange juice.  Patient reports his endocrinologist is following him for his uncontrolled diabetes.  Patient was concerned because this afternoon he his sugar read to be above 400s he took 8 units of his Humalog prior to arrival to the ER. Patient denies fever chills nausea vomiting abdominal pain urinary symptoms or URI symptoms. PE as noted above. labs reviewed. UA reviewed. Pt hydrated in the ED.  pt is not in DKA. He has noncompliant diet. will dc

## 2022-12-07 NOTE — ED PROVIDER NOTE - PATIENT PORTAL LINK FT
You can access the FollowMyHealth Patient Portal offered by [facility] by registering at the following website: [email or web address]. By joining [facility]’s FollowMyHealth portal, you will also be able to view your health information using other applications (apps) compatible with our system.

## 2022-12-09 LAB
CULTURE RESULTS: SIGNIFICANT CHANGE UP
SPECIMEN SOURCE: SIGNIFICANT CHANGE UP

## 2022-12-12 ENCOUNTER — NON-APPOINTMENT (OUTPATIENT)
Age: 62
End: 2022-12-12

## 2022-12-12 ENCOUNTER — RX RENEWAL (OUTPATIENT)
Age: 62
End: 2022-12-12

## 2022-12-13 ENCOUNTER — APPOINTMENT (OUTPATIENT)
Dept: ORTHOPEDIC SURGERY | Facility: CLINIC | Age: 62
End: 2022-12-13

## 2022-12-13 VITALS — WEIGHT: 315 LBS | HEIGHT: 71 IN | BODY MASS INDEX: 44.1 KG/M2

## 2022-12-13 PROCEDURE — 99214 OFFICE O/P EST MOD 30 MIN: CPT

## 2022-12-13 NOTE — PHYSICAL EXAM
[de-identified] : Patient is well nourished, well-developed, in no acute distress, with appropriate mood and affect. The patient is oriented to time, place, and person. Respirations are even and unlabored. Gait evaluation does reveal a limp. There is no inguinal adenopathy. Bilateral limbs are well-perfused, without skin lesions, shows a grossly normal motor and sensory examination. The right knee motion is significantly reduced and does cause significant pain. The right knee moves from 0 to 125 degrees. The knee is stable within that range-of-motion to AP and ML stress. The alignment of the knee is 5 degrees varus. Muscle strength is normal. Pedal pulses are palpable. Hip examination was negative. The left knee motion is significantly reduced and does cause significant pain. The left knee moves from 5-100 degrees. The knee is stable within that range-of-motion to AP and ML stress. The alignment of the knee is 5 degrees varus. Muscle strength is normal. Pedal pulses are palpable. Hip examination was negative.\par

## 2022-12-13 NOTE — DISCUSSION/SUMMARY
[de-identified] : This patient has left knee osteoarthritis and right knee that appears to be normal radiographically.  It is possible that he has some mild arthritis that has not obvious on radiographs or an intra-articular injury.  The patient is not an appropriate candidate for surgical intervention at this time. An extensive discussion was conducted on the natural history of the disease and the variety of surgical and non-surgical options available to the patient including, but not limited to non-steroidal anti-inflammatory medications, steroid injections, physical therapy, maintenance of ideal body weight, and reduction of activity.  Weight loss recommended.  Home exercise program recommended.  He will take Tylenol for pain.  He originally asked for L knee injection but then refused it last minute. I would normally rx mobic but cannot bc CKD. He can take tylenol fo rpain. Continue working on weight loss. The patient will schedule an appointment as needed.\par \par The patient has been counseled regarding the elevated risks associated with surgical complications in patients with a BMI>35.  I explained to the patient the risk of obesity, which is well documented in the orthopedic literature as increasing risks of wound breakdown (dehiscence), drainage, periprosthetic joint infection, dissatisfaction, chronic pain, early failure and/or loosening of the prosthesis, and impaired overall outcome to the patient. The patient demonstrates a profound understanding of the increased risk. Nutritionist referral, methods of monitoring nutrition intake and calorie counting and bariatric surgery options were discussed with the patient. After a lengthy discussion, the patient agreed to make a coordinated effort at weight loss. The patient understands our BMI policy and if they are planning surgical intervention, then they will need to bring their BMI below 40 in order to proceed and they are agreeable to this.\par

## 2022-12-13 NOTE — HISTORY OF PRESENT ILLNESS
[de-identified] : This is very nice 62-year-old male experiencing chronic bilat knee pain, which is severe in intensity. He does have known mod-severe left knee OA and minimal arthritic changes to the right knee. The pain severely limits activities of daily living. Walking tolerance is somewhat reduced.  Not currently taking NSAIDs for this.  He cannot take NSAIDs because he stage II chronic kidney disease.  BMI is 45 and he knows he needs to lose weight.  Unfortunately he is gaining weight.  He states that his BG is 400 on average.  He denies any brace.  He denies a cane or walker.  Not currently therapy.  He has bipolar depression and is working in his life together.

## 2022-12-19 ENCOUNTER — APPOINTMENT (OUTPATIENT)
Dept: UROLOGY | Facility: CLINIC | Age: 62
End: 2022-12-19

## 2022-12-19 ENCOUNTER — OUTPATIENT (OUTPATIENT)
Dept: OUTPATIENT SERVICES | Facility: HOSPITAL | Age: 62
LOS: 1 days | End: 2022-12-19
Payer: MEDICARE

## 2022-12-19 DIAGNOSIS — Z98.890 OTHER SPECIFIED POSTPROCEDURAL STATES: Chronic | ICD-10-CM

## 2022-12-19 DIAGNOSIS — K08.9 DISORDER OF TEETH AND SUPPORTING STRUCTURES, UNSPECIFIED: ICD-10-CM

## 2022-12-19 DIAGNOSIS — H26.9 UNSPECIFIED CATARACT: Chronic | ICD-10-CM

## 2022-12-19 PROCEDURE — D2391: CPT

## 2022-12-22 DIAGNOSIS — K02.9 DENTAL CARIES, UNSPECIFIED: ICD-10-CM

## 2022-12-28 ENCOUNTER — NON-APPOINTMENT (OUTPATIENT)
Age: 62
End: 2022-12-28

## 2023-01-11 NOTE — PROGRESS NOTE ADULT - PROBLEM/PLAN-6
DISPLAY PLAN FREE TEXT
DISPLAY PLAN FREE TEXT
Go for blood tests as directed. Your doctor will do lab tests at regular visits to monitor the effects of this medicine. Please follow up with your doctor and keep your health care provider appointments.

## 2023-01-17 NOTE — ED ADULT NURSE NOTE - CCCP TRG CHIEF CMPLNT
----- Message from Mayo Elizalde PA-C sent at 1/17/2023 12:45 PM EST -----  She said she can't get the omeprazole 20 mg BID approved through her insurance  She has failed protonix and nexium in the past  Can we see if theres anything we can do to get this approved for her? Omeprazole has worked the best for her 
Started a PA for Omeprazole 20mg 2x daily through covermymeds  Key: [id number]  awaitimg insurance determination 
psych eval

## 2023-01-17 NOTE — ED ADULT NURSE NOTE - CHIEF COMPLAINT QUOTE
toe pain Glycopyrrolate Counseling:  I discussed with the patient the risks of glycopyrrolate including but not limited to skin rash, drowsiness, dry mouth, difficulty urinating, and blurred vision.

## 2023-01-23 ENCOUNTER — EMERGENCY (EMERGENCY)
Facility: HOSPITAL | Age: 63
LOS: 1 days | Discharge: HOME | End: 2023-01-23
Attending: EMERGENCY MEDICINE | Admitting: EMERGENCY MEDICINE
Payer: MEDICARE

## 2023-01-23 VITALS
HEIGHT: 69 IN | WEIGHT: 315 LBS | HEART RATE: 98 BPM | SYSTOLIC BLOOD PRESSURE: 148 MMHG | OXYGEN SATURATION: 96 % | DIASTOLIC BLOOD PRESSURE: 95 MMHG | RESPIRATION RATE: 18 BRPM | TEMPERATURE: 97 F

## 2023-01-23 DIAGNOSIS — Z98.890 OTHER SPECIFIED POSTPROCEDURAL STATES: Chronic | ICD-10-CM

## 2023-01-23 DIAGNOSIS — H26.9 UNSPECIFIED CATARACT: Chronic | ICD-10-CM

## 2023-01-23 PROCEDURE — 99283 EMERGENCY DEPT VISIT LOW MDM: CPT

## 2023-01-23 PROCEDURE — 99284 EMERGENCY DEPT VISIT MOD MDM: CPT | Mod: FS

## 2023-01-23 RX ORDER — ACETAMINOPHEN 500 MG
650 TABLET ORAL ONCE
Refills: 0 | Status: COMPLETED | OUTPATIENT
Start: 2023-01-23 | End: 2023-01-23

## 2023-01-23 RX ORDER — TRAMADOL HYDROCHLORIDE 50 MG/1
50 TABLET ORAL ONCE
Refills: 0 | Status: DISCONTINUED | OUTPATIENT
Start: 2023-01-23 | End: 2023-01-23

## 2023-01-23 RX ORDER — IBUPROFEN 200 MG
400 TABLET ORAL ONCE
Refills: 0 | Status: COMPLETED | OUTPATIENT
Start: 2023-01-23 | End: 2023-01-23

## 2023-01-23 RX ORDER — LIDOCAINE 4 G/100G
1 CREAM TOPICAL
Qty: 5 | Refills: 0
Start: 2023-01-23 | End: 2023-01-27

## 2023-01-23 RX ORDER — ACETAMINOPHEN 500 MG
2 TABLET ORAL
Qty: 56 | Refills: 0
Start: 2023-01-23 | End: 2023-01-29

## 2023-01-23 RX ORDER — LIDOCAINE 4 G/100G
1 CREAM TOPICAL ONCE
Refills: 0 | Status: COMPLETED | OUTPATIENT
Start: 2023-01-23 | End: 2023-01-23

## 2023-01-23 RX ADMIN — Medication 400 MILLIGRAM(S): at 15:41

## 2023-01-23 RX ADMIN — Medication 650 MILLIGRAM(S): at 15:41

## 2023-01-23 RX ADMIN — LIDOCAINE 1 PATCH: 4 CREAM TOPICAL at 15:41

## 2023-01-23 NOTE — ED PROVIDER NOTE - PROGRESS NOTE DETAILS
Pt ambulating with walker in the ED without assistance. States pain improved. Will dc home with tylenol and lidoderm

## 2023-01-23 NOTE — ED ADULT NURSE NOTE - OBJECTIVE STATEMENT
Pt presents to ED from home for right knee pain. Pt states he has a history of arthritis in the knee and needs a knee replacement but needs to lose weight before surgery. He has been taking tylenol at home to manage the pain but ran out and doesn't have enough money to buy more. He states the pain hurts more with ambulation. Denies any recent injury. No obvious deformity or edema.

## 2023-01-23 NOTE — ED PROVIDER NOTE - NSFOLLOWUPINSTRUCTIONS_ED_ALL_ED_FT
Rest, keep extremity elevated whenever possible  Apply ice to affected area 15 min on/15 min off  Take Tylenol 650mg every 6-8 hours with food as needed for pain  Use lidoderm patch as described  Follow up with your PMD within 2-3 days  Follow up with orthopedist  Return to the ER for worsening

## 2023-01-23 NOTE — ED PROVIDER NOTE - OBJECTIVE STATEMENT
62 year old male, PMHx of Bipolar d/o, DM, HTN, HLD, neuropathy, arthritis in b/l knees; presents to the ED complaining of acute on chronic right knee pain. Patient states he needs bilateral knee replacements but the orthopedist won't do it until his DM/weight is controlled. He has chronic bilateral knee pain but this morning he woke up and his right knee was so painful he was unable to ambulate. He states this has happened before and tramadol and a walker helped. He has the walker at home. He took Tylenol at 8am without relief. He is concerned because he is in independent living and could not do his ADLs this morning. He denies injury/trauma, decreased range of motion, swelling, redness, warmth, calf pain, or other complaints.

## 2023-01-23 NOTE — ED ADULT TRIAGE NOTE - CHIEF COMPLAINT QUOTE
BIB EMS from home for atraumatic right knee pain worsening over last week. Denies any fall/injury. Last took tylenol at 8 am.

## 2023-01-23 NOTE — ED PROVIDER NOTE - CLINICAL SUMMARY MEDICAL DECISION MAKING FREE TEXT BOX
DT: I have personally performed a face to face diagnostic evaluation on this patient.  I have reviewed the PA's note and agree with the history, exam, and plan of care, except as noted.  History and Exam by me shows 62 year old male, PMHx of Bipolar d/o, DM, HTN, HLD, neuropathy, arthritis in b/l knees; presents to the ED complaining of acute on chronic right knee pain. Patient states he needs bilateral knee replacements but the orthopedist won't do it until his DM/weight is controlled. He has chronic bilateral knee pain but this morning he woke up and his right knee was so painful he was unable to ambulate. He states this has happened before and tramadol and a walker helped. He has the walker at home. He took Tylenol at 8am without relief. He is concerned because he is in independent living and could not do his ADLs this morning. He denies injury/trauma, decreased range of motion, swelling, redness, warmth, calf pain, or other complaints. .  Patient is NAD.  A n O x 3. Head NC/AT. BL knees- from, nt, no swelling.  pt ambulated c walker s any difficulty.

## 2023-01-23 NOTE — ED PROVIDER NOTE - LOWER EXTREMITY EXAM, RIGHT
Mild tenderness to anterior knee. FROM, NVI, No  instability, deformity, swelling, effusion, change in color, warmth./normal

## 2023-01-23 NOTE — ED ADULT TRIAGE NOTE - BMI (KG/M2)
"Duane Recio (68 y.o. Male)     Date of Birth Social Security Number Address Home Phone MRN    [id number]  [id number] [facility] [address] [phone] [id number]    L[employer]-day Marital Status          Restoration        Admission Date Admission Type Admitting Provider Attending Provider Department, Room/Bed    12/17/19 Elective Saurav Rasheed MD Villanueva, Wayne, MD [address], P589/1    Discharge Date Discharge Disposition Discharge Destination                       Attending Provider:  Saurav Rasheed MD    Allergies:  No Known Allergies    Isolation:  None   Infection:  None   Code Status:  CPR    Ht:  175.3 cm (69\")   Wt:  82.1 kg (181 lb)    Admission Cmt:  None   Principal Problem:  None                Active Insurance as of 12/17/2019     Primary Coverage     Payor Plan Insurance Group Employer/Plan Group    MEDICARE MEDICARE A & B      Payor Plan Address Payor Plan Phone Number Payor Plan Fax Number Effective Dates    [address] [phone]  11/1/2018 - None Entered    [facility] [id number]       Subscriber Name Subscriber Birth Date Member ID       DUANE RECIO 1951 [id number]           Secondary Coverage     Payor Plan Insurance Group Employer/Plan Group    [id number]     Payor Plan Address Payor Plan Phone Number Payor Plan Fax Number Effective Dates    [address]   11/1/2018 - None Entered    [facility] [id number]       Subscriber Name Subscriber Birth Date Member ID       DUANE RECIO 1951 [id number]                 Emergency Contacts      (Rel.) Home Phone Work Phone Mobile Phone    Billie Recio (Spouse) [phone] -- [phone]    Duane Recio (Son) -- -- --              " 48.4

## 2023-01-23 NOTE — ED PROVIDER NOTE - PATIENT PORTAL LINK FT
You can access the FollowMyHealth Patient Portal offered by [facility] by registering at the following website: [email or web address]. By joining [facility]’s FollowMyHealth portal, you will also be able to view your health information using other applications (apps) compatible with our system.

## 2023-01-26 ENCOUNTER — NON-APPOINTMENT (OUTPATIENT)
Age: 63
End: 2023-01-26

## 2023-02-06 ENCOUNTER — APPOINTMENT (OUTPATIENT)
Dept: ENDOCRINOLOGY | Facility: CLINIC | Age: 63
End: 2023-02-06

## 2023-02-23 ENCOUNTER — APPOINTMENT (OUTPATIENT)
Dept: ORTHOPEDIC SURGERY | Facility: CLINIC | Age: 63
End: 2023-02-23
Payer: MEDICARE

## 2023-02-23 VITALS — HEIGHT: 71 IN | WEIGHT: 315 LBS | BODY MASS INDEX: 44.1 KG/M2

## 2023-02-23 DIAGNOSIS — M25.561 PAIN IN RIGHT KNEE: ICD-10-CM

## 2023-02-23 DIAGNOSIS — G89.29 PAIN IN RIGHT KNEE: ICD-10-CM

## 2023-02-23 DIAGNOSIS — M25.562 PAIN IN RIGHT KNEE: ICD-10-CM

## 2023-02-23 DIAGNOSIS — M17.12 UNILATERAL PRIMARY OSTEOARTHRITIS, LEFT KNEE: ICD-10-CM

## 2023-02-23 PROCEDURE — 99214 OFFICE O/P EST MOD 30 MIN: CPT | Mod: 25

## 2023-02-23 PROCEDURE — 73564 X-RAY EXAM KNEE 4 OR MORE: CPT | Mod: 50

## 2023-02-23 PROCEDURE — 20610 DRAIN/INJ JOINT/BURSA W/O US: CPT | Mod: LT

## 2023-02-23 NOTE — HISTORY OF PRESENT ILLNESS
[de-identified] : This is very nice 62-year-old male experiencing chronic bilat knee pain, which is severe in intensity. He does have known mod-severe left knee OA and minimal arthritic changes to the right knee in the past. The pain severely limits activities of daily living. Walking tolerance is somewhat reduced.  Not currently taking NSAIDs for this.  He cannot take NSAIDs because he stage II chronic kidney disease.  BMI is 45 and he knows he needs to lose weight.  Unfortunately he is gaining weight.  He states that his BG is 200 on average.  He denies any brace.  He denies a cane or walker.  Not currently therapy.  He has bipolar depression and is working in his life together.

## 2023-02-23 NOTE — PHYSICAL EXAM
[de-identified] : Patient is well nourished, well-developed, in no acute distress, with appropriate mood and affect. The patient is oriented to time, place, and person. Respirations are even and unlabored. Gait evaluation does reveal a limp. There is no inguinal adenopathy. Bilateral limbs are well-perfused, without skin lesions, shows a grossly normal motor and sensory examination. The right knee motion is significantly reduced and does cause significant pain. The right knee moves from 0 to 125 degrees. The knee is stable within that range-of-motion to AP and ML stress. The alignment of the knee is 5 degrees varus. Muscle strength is normal. Pedal pulses are palpable. Hip examination was negative. The left knee motion is significantly reduced and does cause significant pain. The left knee moves from 5-100 degrees. The knee is stable within that range-of-motion to AP and ML stress. The alignment of the knee is 5 degrees varus. Muscle strength is normal. Pedal pulses are palpable. Hip examination was negative.\par  [de-identified] : Long standing knee, AP knee, lateral knee, and patellar views of the bilateral knee were ordered and taken in the office and demonstrate degenerative joint disease of the knee with joint space narrowing, osteophyte formation, and subchondral sclerosis.

## 2023-02-23 NOTE — DISCUSSION/SUMMARY
[de-identified] : This patient has bilateral knee osteoarthritis.  The patient is not an appropriate candidate for surgical intervention at this time. An extensive discussion was conducted on the natural history of the disease and the variety of surgical and non-surgical options available to the patient including, but not limited to non-steroidal anti-inflammatory medications, steroid injections, physical therapy, maintenance of ideal body weight, and reduction of activity.  Weight loss recommended.  Home exercise program recommended.  He will take Tylenol for pain.  He originally asked for L knee injection but given his uncontrolled diabetes it would be unwise to proceed with a bilateral knee injection at the same time.  Today we performed a left knee intra-articular cortisone injection..  He will monitor his blood sugar levels.  I would normally rx mobic but cannot bc CKD. He can take tylenol fo rpain. Continue working on weight loss. The patient will schedule an appointment as needed.  He can follow-up in 1 week for right knee intra-articular cortisone injection.\par \par Informed consent for the left knee injection was obtained. All questions were answered. A time out was performed. The left knee was prepped and draped in sterile fashion. Using sterile technique, the left knee was injected with 40 mg of Kenalog, 4cc of 1% lidocaine, 4cc of 0.25% marcaine using a 21-gauge needle. A sterile dressing was applied. Post injection instructions were reviewed. The patient tolerated the procedure well.\par \par The patient has been counseled regarding the elevated risks associated with surgical complications in patients with a BMI>35.  I explained to the patient the risk of obesity, which is well documented in the orthopedic literature as increasing risks of wound breakdown (dehiscence), drainage, periprosthetic joint infection, dissatisfaction, chronic pain, early failure and/or loosening of the prosthesis, and impaired overall outcome to the patient. The patient demonstrates a profound understanding of the increased risk. Nutritionist referral, methods of monitoring nutrition intake and calorie counting and bariatric surgery options were discussed with the patient. After a lengthy discussion, the patient agreed to make a coordinated effort at weight loss. The patient understands our BMI policy and if they are planning surgical intervention, then they will need to bring their BMI below 40 in order to proceed and they are agreeable to this.\par

## 2023-03-02 ENCOUNTER — APPOINTMENT (OUTPATIENT)
Dept: UROLOGY | Facility: CLINIC | Age: 63
End: 2023-03-02

## 2023-03-03 ENCOUNTER — APPOINTMENT (OUTPATIENT)
Dept: ORTHOPEDIC SURGERY | Facility: CLINIC | Age: 63
End: 2023-03-03
Payer: MEDICARE

## 2023-03-03 VITALS — BODY MASS INDEX: 44.1 KG/M2 | HEIGHT: 71 IN | WEIGHT: 315 LBS

## 2023-03-03 PROCEDURE — 20610 DRAIN/INJ JOINT/BURSA W/O US: CPT | Mod: RT

## 2023-03-03 PROCEDURE — 99214 OFFICE O/P EST MOD 30 MIN: CPT | Mod: 25

## 2023-03-03 NOTE — HISTORY OF PRESENT ILLNESS
[de-identified] : This is very nice 62-year-old male experiencing chronic bilat knee pain, which is severe in intensity. He does have known mod-severe left knee OA and minimal arthritic changes to the right knee in the past. The pain severely limits activities of daily living. Walking tolerance is somewhat reduced.  Not currently taking NSAIDs for this.  He cannot take NSAIDs because he stage II chronic kidney disease.  BMI is 45 and he knows he needs to lose weight.  Unfortunately he is gaining weight.  He states that his BG is 200 on average.  He denies any brace.  He denies a cane or walker.  Not currently therapy.  He has bipolar depression and is working in his life together.

## 2023-03-03 NOTE — PHYSICAL EXAM
[de-identified] : Patient is well nourished, well-developed, in no acute distress, with appropriate mood and affect. The patient is oriented to time, place, and person. Respirations are even and unlabored. Gait evaluation does reveal a limp. There is no inguinal adenopathy. Bilateral limbs are well-perfused, without skin lesions, shows a grossly normal motor and sensory examination. The right knee motion is significantly reduced and does cause significant pain. The right knee moves from 0 to 125 degrees. The knee is stable within that range-of-motion to AP and ML stress. The alignment of the knee is 5 degrees varus. Muscle strength is normal. Pedal pulses are palpable. Hip examination was negative. The left knee motion is significantly reduced and does cause significant pain. The left knee moves from 5-100 degrees. The knee is stable within that range-of-motion to AP and ML stress. The alignment of the knee is 5 degrees varus. Muscle strength is normal. Pedal pulses are palpable. Hip examination was negative.\par  [de-identified] : Long standing knee, AP knee, lateral knee, and patellar views of the bilateral knee were ordered and taken in the office and demonstrate degenerative joint disease of the knee with joint space narrowing, osteophyte formation, and subchondral sclerosis.

## 2023-03-03 NOTE — DISCUSSION/SUMMARY
[de-identified] : This patient has bilateral knee osteoarthritis.  The left knee was injected previously.  The patient is not an appropriate candidate for surgical intervention at this time. An extensive discussion was conducted on the natural history of the disease and the variety of surgical and non-surgical options available to the patient including, but not limited to non-steroidal anti-inflammatory medications, steroid injections, physical therapy, maintenance of ideal body weight, and reduction of activity.  Weight loss recommended.  Home exercise program recommended.  He will take Tylenol for pain.  He originally asked for L knee injection but given his uncontrolled diabetes it would be unwise to proceed with a bilateral knee injection at the same time.  Today we performed a left knee intra-articular cortisone injection..  He will monitor his blood sugar levels.  I would normally rx mobic but cannot bc CKD. He can take tylenol for pain. Continue working on weight loss. The patient will schedule an appointment as needed. \par \par Informed consent for the right knee injection was obtained. All questions were answered. A time out was performed. The right knee was prepped and draped in sterile fashion. Using sterile technique, the right knee was injected with 40 mg of Kenalog, 4cc of 1% lidocaine, 4cc of 0.25% marcaine using a 21-gauge needle. A sterile dressing was applied. Post injection instructions were reviewed. The patient tolerated the procedure well.\par \par The patient has been counseled regarding the elevated risks associated with surgical complications in patients with a BMI>35.  I explained to the patient the risk of obesity, which is well documented in the orthopedic literature as increasing risks of wound breakdown (dehiscence), drainage, periprosthetic joint infection, dissatisfaction, chronic pain, early failure and/or loosening of the prosthesis, and impaired overall outcome to the patient. The patient demonstrates a profound understanding of the increased risk. Nutritionist referral, methods of monitoring nutrition intake and calorie counting and bariatric surgery options were discussed with the patient. After a lengthy discussion, the patient agreed to make a coordinated effort at weight loss. The patient understands our BMI policy and if they are planning surgical intervention, then they will need to bring their BMI below 40 in order to proceed and they are agreeable to this.\par

## 2023-03-06 ENCOUNTER — APPOINTMENT (OUTPATIENT)
Dept: ENDOCRINOLOGY | Facility: CLINIC | Age: 63
End: 2023-03-06

## 2023-03-10 ENCOUNTER — NON-APPOINTMENT (OUTPATIENT)
Age: 63
End: 2023-03-10

## 2023-03-10 NOTE — ED PROVIDER NOTE - PMH
Bipolar 1 disorder  on Lithium  Diabetic neuropathy  bilateral feet, ankles  HLD (hyperlipidemia)    Hypertension    Obesity    CHAYITO (obstructive sleep apnea)  diagnosed >25 years ago, non-compliant with CPAP  Primary osteoarthritis of left knee    T2DM (type 2 diabetes mellitus) [de-identified] : Urgent CABG x 3 (LIMA-LAD, SVG-RPDA, SVG-OM2) right leg endoscopic vein harvest [de-identified] : 02/21/23 [de-identified] : 79y Male PMHx of HTN, HLD, DM presented to ED with c/o L arm numbness, abnormal movement, code stroke called in ED, CT head neg for acute infarct. Labs significant for elevated troponin 1.5. Pt endorsed slight constant chest pressure. States over past month have noticed the pressure more when walking dog. EKG concerning for anterior infarct. Pt given plavix 300mg given and asa, pt now s/p LHC and found to have Severe LM 95 %, LAD 90 %, pCir 70 %, pRCA 80 % mid right 98%, distal right 100% RPDA 95 % Ramus 80 %, Right groin IABP placed in the cath lab and patient started on Heparin gtt. Pt remained hemodynamically stable in CT ICU. Neurology Clearance obtained for cardiac surgery.\par \par He had an uneventful post operative course.

## 2023-03-11 NOTE — ED ADULT NURSE NOTE - ISOLATION TYPE:
RECORDS RECEIVED FROM:    REASON FOR VISIT: Migraines    Date of Appt: 5/26/2023   NOTES (FOR ALL VISITS) STATUS DETAILS   OFFICE NOTE from referring provider Charanjit Mckenzie-1/6/2023   OFFICE NOTE from other specialist     DISCHARGE SUMMARY from hospital     DISCHARGE REPORT from the ER     OPERATIVE REPORT     [facility] (MS ONLY)     EMG     EEG     MEDICATION LIST     IMAGING  (FOR ALL VISITS)     LUMBAR PUNCTURE     KATHIE SCAN     ULTRASOUND (CAROTID BILAT) *VASCULAR*     MRI (HEAD, NECK, SPINE) No Images     CT (HEAD, NECK, SPINE) No Images           None

## 2023-03-14 ENCOUNTER — EMERGENCY (EMERGENCY)
Facility: HOSPITAL | Age: 63
LOS: 1 days | Discharge: HOME | End: 2023-03-14
Attending: EMERGENCY MEDICINE | Admitting: EMERGENCY MEDICINE
Payer: MEDICARE

## 2023-03-14 VITALS
SYSTOLIC BLOOD PRESSURE: 162 MMHG | RESPIRATION RATE: 18 BRPM | OXYGEN SATURATION: 96 % | DIASTOLIC BLOOD PRESSURE: 98 MMHG | TEMPERATURE: 98 F | WEIGHT: 315 LBS | HEIGHT: 69 IN | HEART RATE: 111 BPM

## 2023-03-14 VITALS
SYSTOLIC BLOOD PRESSURE: 154 MMHG | DIASTOLIC BLOOD PRESSURE: 86 MMHG | OXYGEN SATURATION: 98 % | RESPIRATION RATE: 18 BRPM | HEART RATE: 106 BPM

## 2023-03-14 DIAGNOSIS — H26.9 UNSPECIFIED CATARACT: Chronic | ICD-10-CM

## 2023-03-14 DIAGNOSIS — Z98.890 OTHER SPECIFIED POSTPROCEDURAL STATES: Chronic | ICD-10-CM

## 2023-03-14 LAB — GLUCOSE BLDC GLUCOMTR-MCNC: 247 MG/DL — HIGH (ref 70–99)

## 2023-03-14 PROCEDURE — 73562 X-RAY EXAM OF KNEE 3: CPT | Mod: 26,RT

## 2023-03-14 PROCEDURE — 73562 X-RAY EXAM OF KNEE 3: CPT

## 2023-03-14 PROCEDURE — 82962 GLUCOSE BLOOD TEST: CPT

## 2023-03-14 PROCEDURE — 99283 EMERGENCY DEPT VISIT LOW MDM: CPT

## 2023-03-14 PROCEDURE — 99284 EMERGENCY DEPT VISIT MOD MDM: CPT

## 2023-03-14 RX ORDER — INSULIN HUMAN 100 [IU]/ML
12 INJECTION, SOLUTION SUBCUTANEOUS ONCE
Refills: 0 | Status: COMPLETED | OUTPATIENT
Start: 2023-03-14 | End: 2023-03-14

## 2023-03-14 NOTE — ED PROVIDER NOTE - NSFOLLOWUPINSTRUCTIONS_ED_ALL_ED_FT
[facility]                                                                                                      Acute Knee Pain, Adult      Acute knee pain is sudden and may be caused by damage, swelling, or irritation of the muscles and tissues that support the knee. Pain may result from:  •A fall.      •An injury to the knee from twisting motions.      •A hit to the knee.      •Infection.      Acute knee pain may go away on its own with time and rest. If it does not, your health care provider may order tests to find the cause of the pain. These may include:  •Imaging tests, such as an X-ray, MRI, CT scan, or ultrasound.      •Joint aspiration. In this test, fluid is removed from the knee and evaluated.      •Arthroscopy. In this test, a lighted tube is inserted into the knee and an image is projected onto a TV screen.      •Biopsy. In this test, a sample of tissue is removed from the body and studied under a microscope.        Follow these instructions at home:      If you have a knee sleeve or brace:      •Wear the knee sleeve or brace as told by your health care provider. Remove it only as told by your health care provider.      •Loosen it if your toes tingle, become numb, or turn cold and blue.      •Keep it clean.    •If the knee sleeve or brace is not waterproof:  •Do not let it get wet.      •Cover it with a watertight covering when you take a bath or shower.        Activity     •Rest your knee.      • Do not do things that cause pain or make pain worse.      •Avoid high-impact activities or exercises, such as running, jumping rope, or doing jumping jacks.      •Work with a physical therapist to make a safe exercise program, as recommended by your health care provider. Do exercises as told by your physical therapist.        Managing pain, stiffness, and swelling    •If directed, put ice on the affected knee. To do this:  •If you have a removable knee sleeve or brace, remove it as told by your health care provider.      •Put ice in a plastic bag.      •Place a towel between your skin and the bag.      •Leave the ice on for 20 minutes, 2–3 times a day.      •Remove the ice if your skin turns bright red. This is very important. If you cannot feel pain, heat, or cold, you have a greater risk of damage to the area.        •If directed, use an elastic bandage to put pressure (compression) on your injured knee. This may control swelling, give support, and help with discomfort.      •Raise (elevate) your knee above the level of your heart while you are sitting or lying down.      •Sleep with a pillow under your knee.      General instructions     •Take over-the-counter and prescription medicines only as told by your health care provider.      • Do not use any products that contain nicotine or tobacco, such as cigarettes, e-cigarettes, and chewing tobacco. If you need help quitting, ask your health care provider.      •If you are overweight, work with your health care provider and a dietitian to set a weight-loss goal that is healthy and reasonable for you. Extra weight can put pressure on your knee.      •Pay attention to any changes in your symptoms.      •Keep all follow-up visits. This is important.        Contact a health care provider if:    •Your knee pain continues, changes, or gets worse.      •You have a fever along with knee pain.      •Your knee feels warm to the touch or is red.      •Your knee radhika or locks up.        Get help right away if:    •Your knee swells, and the swelling becomes worse.      •You cannot move your knee.      •You have severe pain in your knee that cannot be managed with pain medicine.        Summary    •Acute knee pain can be caused by a fall, an injury, an infection, or damage, swelling, or irritation of the tissues that support your knee.      •Your health care provider may perform tests to find out the cause of the pain.      •Pay attention to any changes in your symptoms. Relieve your pain with rest, medicines, light activity, and the use of ice.      •Get help right away if your knee swells, you cannot move your knee, or you have severe pain that cannot be managed with medicine.      This information is not intended to replace advice given to you by your health care provider. Make sure you discuss any questions you have with your health care provider.

## 2023-03-14 NOTE — ED ADULT NURSE NOTE - OBJECTIVE STATEMENT
pt axo3, pt BIBA from home c/o right knee pain started this AM denies injury, h/o rheumatoid arthritis, DM, took Tylenol 6 am. pt denies chest pain or SOB. safety maintained

## 2023-03-14 NOTE — ED PROVIDER NOTE - CLINICAL SUMMARY MEDICAL DECISION MAKING FREE TEXT BOX
62yo male with nontraumatic knee pain, no swelling, xr, pain meds, knee immobilizer and ortho follow up

## 2023-03-14 NOTE — ED ADULT NURSE REASSESSMENT NOTE - NS ED NURSE REASSESS COMMENT FT1
Patient reports knee pain when walking back from the bathroom, wheelchair utilized, ice applied to knee. Dr Jodi rondon.

## 2023-03-14 NOTE — ED ADULT NURSE REASSESSMENT NOTE - NS ED NURSE REASSESS COMMENT FT1
pt axo3, VS stable. pt spoke to Dr. Zapata regarding dc plan. pt and Dr. Zapata agreed that the plan is for the pt to follow up with his own  tomorrow morning regarding picking up his medications and home assistance w/ ADLs as needed. pt provided with prescription slip for pain medication. pt passed ambulation test- visualized by MD. FS monitored, per MD pt does not require supplemental insulin at this time and can resume at home. safety maintained.

## 2023-03-14 NOTE — ED PROVIDER NOTE - CARE PROVIDER_API CALL
Chiki Taylor)  Orthopaedic Sports Medicine; Orthopaedic Surgery  [address] [address]  Phone: [phone]  Fax: [phone]  Follow Up Time:

## 2023-03-14 NOTE — ED PROVIDER NOTE - PROGRESS NOTE DETAILS
pt able to ambulate with ace wrap and walker, follow up with ortho, d/c with pain meds, return if any symptoms worsen

## 2023-03-14 NOTE — ED PROVIDER NOTE - PATIENT PORTAL LINK FT
You can access the FollowMyHealth Patient Portal offered by [facility] by registering at the following website: [email or web address]. By joining [facility]’s FollowMyHealth portal, you will also be able to view your health information using other applications (apps) compatible with our system.

## 2023-03-14 NOTE — ED PROVIDER NOTE - OBJECTIVE STATEMENT
62yo male bib ems with right knee pain. pt woke up with sudden onset of right knee pain, no trauma or fall, non radiating no tingling or weakness, pt was able to bear weight and has been taking tylenol but states now he cannot walk

## 2023-03-14 NOTE — ED ADULT NURSE REASSESSMENT NOTE - NS ED NURSE REASSESS COMMENT FT1
pt axo3, pt refused insulin and ambulance service. pt requesting taxi instead for discharge. MD aware and approved of new plan. pt will be discharged with walker. vs stable. safety maintained. pt axo3, pt refused insulin and ambulance service. pt requesting taxi instead for discharge. pt passed second walking test to assess gait, pt gait stable with walker. MD aware and approved of new plan. pt will be discharged with walker. vs stable. safety maintained.

## 2023-03-14 NOTE — ED ADULT NURSE NOTE - NSIMPLEMENTINTERV_GEN_ALL_ED
Implemented All Fall with Harm Risk Interventions:  [place] to call system. Call bell, personal items and telephone within reach. Instruct patient to call for assistance. Room bathroom lighting operational. Non-slip footwear when patient is off stretcher. Physically safe environment: no spills, clutter or unnecessary equipment. Stretcher in lowest position, wheels locked, appropriate side rails in place. Provide visual cue, wrist band, yellow gown, etc. Monitor gait and stability. Monitor for mental status changes and reorient to person, place, and time. Review medications for side effects contributing to fall risk. Reinforce activity limits and safety measures with patient and family. Provide visual clues: red socks.

## 2023-03-14 NOTE — ED ADULT NURSE REASSESSMENT NOTE - NS ED NURSE REASSESS COMMENT FT1
pt axo3, pt refused prescription for pain medication. pt alerted that cab for transportation arrived by RN. per pt, he insisted on finishing his sandwich and refused to walk to the taxi. per pt he will wait for the taxi to come back. pt refused escort to taxi or waiting room by RN. safety maintained.

## 2023-03-14 NOTE — ED ADULT TRIAGE NOTE - CHIEF COMPLAINT QUOTE
Pt BIBA from home c/o right knee pain started this AM denies injury, h/o rheumatoid arthritis, DM, took Tylenol 6 am

## 2023-03-17 ENCOUNTER — APPOINTMENT (OUTPATIENT)
Dept: CARDIOLOGY | Facility: CLINIC | Age: 63
End: 2023-03-17

## 2023-03-23 ENCOUNTER — APPOINTMENT (OUTPATIENT)
Dept: INTERNAL MEDICINE | Facility: CLINIC | Age: 63
End: 2023-03-23

## 2023-03-27 ENCOUNTER — RX RENEWAL (OUTPATIENT)
Age: 63
End: 2023-03-27

## 2023-03-27 ENCOUNTER — APPOINTMENT (OUTPATIENT)
Dept: UROLOGY | Facility: CLINIC | Age: 63
End: 2023-03-27

## 2023-03-27 LAB
ESTIMATED AVERAGE GLUCOSE: 226 MG/DL
HBA1C MFR BLD HPLC: 9.5 %

## 2023-03-29 ENCOUNTER — NON-APPOINTMENT (OUTPATIENT)
Age: 63
End: 2023-03-29

## 2023-03-29 ENCOUNTER — APPOINTMENT (OUTPATIENT)
Dept: FAMILY MEDICINE | Facility: CLINIC | Age: 63
End: 2023-03-29

## 2023-04-03 ENCOUNTER — APPOINTMENT (OUTPATIENT)
Dept: NEPHROLOGY | Facility: CLINIC | Age: 63
End: 2023-04-03

## 2023-04-03 ENCOUNTER — RX RENEWAL (OUTPATIENT)
Age: 63
End: 2023-04-03

## 2023-04-03 NOTE — ED PROVIDER NOTE - BREATH SOUNDS
PROVIDER:[TOKEN:[[id number]]],PROVIDER:[TOKEN:[[id number]]],PROVIDER:[TOKEN:[[id number]]] normal

## 2023-04-10 ENCOUNTER — APPOINTMENT (OUTPATIENT)
Dept: ENDOCRINOLOGY | Facility: CLINIC | Age: 63
End: 2023-04-10

## 2023-04-10 LAB
25(OH)D3 SERPL-MCNC: 60.7 NG/ML
ALBUMIN SERPL ELPH-MCNC: 4.1 G/DL
ANION GAP SERPL CALC-SCNC: 14 MMOL/L
APPEARANCE: CLEAR
BACTERIA: NEGATIVE
BASOPHILS # BLD AUTO: 0.04 K/UL
BASOPHILS NFR BLD AUTO: 0.5 %
BILIRUBIN URINE: NEGATIVE
BLOOD URINE: NEGATIVE
BUN SERPL-MCNC: 37 MG/DL
CALCIUM SERPL-MCNC: 9.7 MG/DL
CALCIUM SERPL-MCNC: 9.7 MG/DL
CHLORIDE SERPL-SCNC: 105 MMOL/L
CO2 SERPL-SCNC: 23 MMOL/L
COLOR: COLORLESS
CREAT SERPL-MCNC: 2.01 MG/DL
CREAT SPEC-SCNC: 21 MG/DL
CREAT/PROT UR: 0.3 RATIO
EGFR: 37 ML/MIN/1.73M2
EOSINOPHIL # BLD AUTO: 0.12 K/UL
EOSINOPHIL NFR BLD AUTO: 1.5 %
GLUCOSE QUALITATIVE U: NEGATIVE
GLUCOSE SERPL-MCNC: 140 MG/DL
HCT VFR BLD CALC: 44 %
HGB BLD-MCNC: 14.3 G/DL
HYALINE CASTS: 0 /LPF
IMM GRANULOCYTES NFR BLD AUTO: 1.7 %
KETONES URINE: NEGATIVE
LEUKOCYTE ESTERASE URINE: NEGATIVE
LYMPHOCYTES # BLD AUTO: 2.38 K/UL
LYMPHOCYTES NFR BLD AUTO: 29 %
MAN DIFF?: NORMAL
MCHC RBC-ENTMCNC: 29 PG
MCHC RBC-ENTMCNC: 32.5 GM/DL
MCV RBC AUTO: 89.2 FL
MICROSCOPIC-UA: NORMAL
MONOCYTES # BLD AUTO: 0.81 K/UL
MONOCYTES NFR BLD AUTO: 9.9 %
NEUTROPHILS # BLD AUTO: 4.73 K/UL
NEUTROPHILS NFR BLD AUTO: 57.4 %
NITRITE URINE: NEGATIVE
PARATHYROID HORMONE INTACT: 60 PG/ML
PH URINE: 6.5
PHOSPHATE SERPL-MCNC: 3.2 MG/DL
PLATELET # BLD AUTO: 165 K/UL
POTASSIUM SERPL-SCNC: 3.9 MMOL/L
PROT UR-MCNC: 6 MG/DL
PROTEIN URINE: NEGATIVE
RBC # BLD: 4.93 M/UL
RBC # FLD: 13.6 %
RED BLOOD CELLS URINE: 1 /HPF
SODIUM SERPL-SCNC: 141 MMOL/L
SPECIFIC GRAVITY URINE: 1.01
SQUAMOUS EPITHELIAL CELLS: 0 /HPF
UROBILINOGEN URINE: NORMAL
WBC # FLD AUTO: 8.22 K/UL
WHITE BLOOD CELLS URINE: 0 /HPF

## 2023-04-13 NOTE — ED PROVIDER NOTE - IV ALTEPLASE EXCL ABS HIDDEN
Flakita Randhawa was seen and treated in our emergency department on 4/13/2023.  She may return to work on 04/17/2023.       If you have any questions or concerns, please don't hesitate to call.      Madyson Olvera PA-C
show

## 2023-04-17 ENCOUNTER — RX RENEWAL (OUTPATIENT)
Age: 63
End: 2023-04-17

## 2023-04-17 RX ORDER — ALCOHOL ANTISEPTIC PADS
PADS, MEDICATED (EA) TOPICAL
Qty: 400 | Refills: 5 | Status: ACTIVE | COMMUNITY
Start: 2023-04-17 | End: 1900-01-01

## 2023-04-17 RX ORDER — CHOLECALCIFEROL (VITAMIN D3) 10(400)/ML
32G X 4 MM DROPS ORAL
Qty: 100 | Refills: 0 | Status: ACTIVE | COMMUNITY
Start: 2023-04-17 | End: 1900-01-01

## 2023-04-18 ENCOUNTER — NON-APPOINTMENT (OUTPATIENT)
Age: 63
End: 2023-04-18

## 2023-04-19 ENCOUNTER — APPOINTMENT (OUTPATIENT)
Dept: CARDIOLOGY | Facility: CLINIC | Age: 63
End: 2023-04-19
Payer: MEDICARE

## 2023-04-19 ENCOUNTER — NON-APPOINTMENT (OUTPATIENT)
Age: 63
End: 2023-04-19

## 2023-04-19 VITALS
DIASTOLIC BLOOD PRESSURE: 99 MMHG | RESPIRATION RATE: 17 BRPM | SYSTOLIC BLOOD PRESSURE: 148 MMHG | HEART RATE: 122 BPM | OXYGEN SATURATION: 94 % | HEIGHT: 71 IN | BODY MASS INDEX: 44.1 KG/M2 | WEIGHT: 315 LBS

## 2023-04-19 DIAGNOSIS — E66.01 MORBID (SEVERE) OBESITY DUE TO EXCESS CALORIES: ICD-10-CM

## 2023-04-19 PROCEDURE — 99215 OFFICE O/P EST HI 40 MIN: CPT

## 2023-04-19 PROCEDURE — 93000 ELECTROCARDIOGRAM COMPLETE: CPT

## 2023-04-19 RX ORDER — CARVEDILOL 12.5 MG/1
12.5 TABLET, FILM COATED ORAL TWICE DAILY
Qty: 180 | Refills: 2 | Status: ACTIVE | COMMUNITY
Start: 2023-04-19 | End: 1900-01-01

## 2023-04-19 NOTE — CARDIOLOGY SUMMARY
[de-identified] : 8/30/2021, sinus 91 bpm, left atrial enlargement, old inferior MI pattern, poor R-wave progression [de-identified] : 2015, no Ischemia \par 11/21/2019, pharmacologic nuclear stress test, normal myocardial perfusion imaging, LVEF 65%, LVEDV 109 mL. [de-identified] : 3/2015, normal LV function\par 9/13/2019, concentric LVH, grossly normal LV systolic function, LVEF 55-60%\par 8/30/2021, concentric LVH, grossly normal LV systolic function, LVEF 60-65%\par

## 2023-04-19 NOTE — HISTORY OF PRESENT ILLNESS
[FreeTextEntry1] : 63 year-old gentleman with known cardiovascular risk factors including hypertension, well controlled non-insulin dependent diabetes, dyslipidemia, obesity, CHAYITO, bipolar disorder on Lithium and Geodon, lives as a full time resident of the [facility]. He presents today in his usual state of health, having lost approximately 65 lbs over the past year. He had a repeat sleep study performed 8/12/2016 which showed severe CHAYITO.\par He was formerly followed by a cardiologist in [place], Geovanny Elizabeth MD.\par \par May 10, 2019: Patient presents with complain of sleep apnea. Has concerning sleep study two months ago. He has nasal congestion and cannot tolerate his CPAP. He is also concern about neuropathic pain in the insteps of his feet. He has lost several pounds through diet and exercise that are supervised through his program.\par \par Psychiatrist at [facility] in [place] who manages his Lithium and his antipsychotic medication (second generation, Geodon).\par \par November 2019 - Patient presents today in his usual state of health. He has modified his diet and is losing significant weight. From a peak of 349 lbs in 2012, patient is now 284 lbs. He reports having difficulty walking long distances, but that is because of leg weakness, not because of shortness of breath or chest pain. He is concerned about dyspnea on exertion, but he has not experienced this recently.\par \par June 2020 - Patient has lost 65 lbs by diet and exercise, but when he got the stimulus check, he broke his diet and started eating pizza, and heroes, and potato chips, and he believes he had a stroke that presented with pressured speech. He was scanned at [facility], and he was seen by neurology. They believe he had a TIA and discharged him with atorvastatin 40 mg daily (up from prior 20 mg daily). He continues to take ASA 81 mg daily and an oral diabetes regimen (repaglinide and Tradjenta).\par Unfortunately, his exercise class was shut down due to Covid pandemic.\par \par October 2020 - Patient has been having difficulty with over-eating. He reports several dietary indiscretions over the past month, including pizza and Chinese food.\par He has been depressed by his eating, and he is feeling socially isolated. He finds that he runs out of money for food and relies on a local [employer] for meals.\par He continues to take his medications without issues, but he has not been using his CPAP machine.\par \par March 2021 - Patient returns today for follow-up. He was admitted to [facility] in December 2020 for elevated lithium levels, and he was transitioned off lithium to Depakote. He feels much better now on Depakote. He is also on a new diabetes regimen that includes Rybelsus and it has helped him lose weight. \par These medication changes have also helped him financially. \par Flomax and finasteride are helping him with BPH.\par Linzess is improving his constipation. \par Arthritis continues to be a problem. \par He is sleeping well through the night. He is using his CPAP.\par He is walking for exercise.\par He has not yet received Covid-19 vaccine, but he is interested in getting it. \par \par August 2021 - Patient returns today for follow-up and echocardiogram. \par He has been working with Dr. Estuardo Dowd on losing weight, and he has dramatically modified his diet. He has reduced bread, pizza, and Chinese food intake. \par He has increased eating oatmeal and eggs. \par He has received [employer] Covid-19 vaccine.\par \par December 2021 - Patient returns today for follow-up.\par He is scheduled to have cataract surgery with Dr. Govea in [place] on January 5, 2022.\par He has no complaints of chest pain or shortness of breath.\par He received both a [employer] Covid-19 vaccine and [employer] booster.\par \par September 2022 - Patient returns today for follow-up in his usual state of health. He has started singing Tuesday nights at a restaurant in [place] ([employer]). He is really enjoying it. \par His knees are his chief complaints. \par He has no chest pain or shortness of breath recently. He has lost some weight recently by sticking to his diet. \par \par PMD: Julius Mckeon MD [phone]\par Ophthalmologist: Patel Govea MD [phone]\par Sleep Medicine: Angelika Todd MD [phone]\par Endocrinologist: Rocío Bañuelos MD [phone]\par Psychiatrist: Anthony Dc, Psychiatric Nurse Practitioner at [facility]

## 2023-04-19 NOTE — DISCUSSION/SUMMARY
[FreeTextEntry1] : Mr. Pruitt has multiple cardiac risk factors, as above, and presents today for follow-up\par \par We discussed the importance of compliance with both psychiatric and cardiac medications, as well as compliance with the CPAP machine that he will be getting again after his recent sleep study demonstrated severe CHAYITO. Patient counseled regarding exercise, diet, and weight loss. No change in cardiac regimen today. \par Follow-up with PMD, endocrinologist, nephrologist, orthopedic surgery, podiatrist, and sleep medicine. \par \par Issue of relationship between CHAYITO and hypertension, weight gain, and risk factors also reinforced and compliance in this regard discussed as well.\par \par 1. Type II diabetes - continue Ozempic and insulin per endocrinology.\par 2. CKD - Off lithium now. Follow-up per Dr. Vallejo. Currently tolerating low dose ARB, sodium bicarb. \par 3. Obesity - Patient has lost weight and will continue diet and exercise. Continue to work with Dr. Estuardo Dowd.\par 4. Hypertension - elevated today. Continue current regimen, including ARB, beta-blocker, but will change from metoprolol to carvedilol for better blood pressure control. \par 5. Bipolar disorder - continue care per psychiatry. Patient now off lithium and on Depakote.\par 6. BPH - Flomax and Proscar have improved urinary stream. Continue current regimen. Follow-up per PMD.\par 7. CHAYITO - Continue CPAP QHS.  [EKG obtained to assist in diagnosis and management of assessed problem(s)] : EKG obtained to assist in diagnosis and management of assessed problem(s)

## 2023-04-19 NOTE — REASON FOR VISIT
[CV Risk Factors and Non-Cardiac Disease] : CV risk factors and non-cardiac disease [Other: ____] : [unfilled] [FreeTextEntry1] : April 2023 - Patient returns today for follow-up in his usual state of health. \par He has a current viral illness, including coughing and sneezing.

## 2023-04-20 ENCOUNTER — INPATIENT (INPATIENT)
Facility: HOSPITAL | Age: 63
LOS: 1 days | Discharge: HOME | DRG: 300 | End: 2023-04-22
Attending: STUDENT IN AN ORGANIZED HEALTH CARE EDUCATION/TRAINING PROGRAM | Admitting: HOSPITALIST
Payer: MEDICARE

## 2023-04-20 VITALS
TEMPERATURE: 97 F | WEIGHT: 315 LBS | DIASTOLIC BLOOD PRESSURE: 75 MMHG | SYSTOLIC BLOOD PRESSURE: 123 MMHG | RESPIRATION RATE: 18 BRPM | HEART RATE: 114 BPM | HEIGHT: 71 IN | OXYGEN SATURATION: 96 %

## 2023-04-20 DIAGNOSIS — Z98.890 OTHER SPECIFIED POSTPROCEDURAL STATES: Chronic | ICD-10-CM

## 2023-04-20 DIAGNOSIS — F31.31 BIPOLAR DISORDER, CURRENT EPISODE DEPRESSED, MILD: ICD-10-CM

## 2023-04-20 DIAGNOSIS — F32.9 MAJOR DEPRESSIVE DISORDER, SINGLE EPISODE, UNSPECIFIED: ICD-10-CM

## 2023-04-20 DIAGNOSIS — H26.9 UNSPECIFIED CATARACT: Chronic | ICD-10-CM

## 2023-04-20 LAB
ALBUMIN SERPL ELPH-MCNC: 3.1 G/DL — LOW (ref 3.3–5)
ALP SERPL-CCNC: 68 U/L — SIGNIFICANT CHANGE UP (ref 40–120)
ALT FLD-CCNC: 37 U/L — SIGNIFICANT CHANGE UP (ref 10–45)
AMPHET UR-MCNC: NEGATIVE — SIGNIFICANT CHANGE UP
ANION GAP SERPL CALC-SCNC: 13 MMOL/L — SIGNIFICANT CHANGE UP (ref 5–17)
ANISOCYTOSIS BLD QL: SLIGHT — SIGNIFICANT CHANGE UP
APAP SERPL-MCNC: <1 UG/ML — LOW (ref 10–30)
APTT BLD: 126.9 SEC — CRITICAL HIGH (ref 27.5–35.5)
APTT BLD: 29.6 SEC — SIGNIFICANT CHANGE UP (ref 27.5–35.5)
AST SERPL-CCNC: 31 U/L — SIGNIFICANT CHANGE UP (ref 10–40)
B-OH-BUTYR SERPL-SCNC: 0.1 MMOL/L — SIGNIFICANT CHANGE UP
BARBITURATES UR SCN-MCNC: NEGATIVE — SIGNIFICANT CHANGE UP
BASE EXCESS BLDV CALC-SCNC: -0.6 MMOL/L — SIGNIFICANT CHANGE UP (ref -2–3)
BASOPHILS # BLD AUTO: 0 K/UL — SIGNIFICANT CHANGE UP (ref 0–0.2)
BASOPHILS NFR BLD AUTO: 0 % — SIGNIFICANT CHANGE UP (ref 0–2)
BENZODIAZ UR-MCNC: NEGATIVE — SIGNIFICANT CHANGE UP
BILIRUB SERPL-MCNC: 0.6 MG/DL — SIGNIFICANT CHANGE UP (ref 0.2–1.2)
BUN SERPL-MCNC: 36 MG/DL — HIGH (ref 7–23)
CALCIUM SERPL-MCNC: 9.1 MG/DL — SIGNIFICANT CHANGE UP (ref 8.4–10.5)
CHLORIDE SERPL-SCNC: 99 MMOL/L — SIGNIFICANT CHANGE UP (ref 96–108)
CK SERPL-CCNC: 642 U/L — HIGH (ref 30–200)
CO2 BLDV-SCNC: 25 MMOL/L — SIGNIFICANT CHANGE UP (ref 22–26)
CO2 SERPL-SCNC: 25 MMOL/L — SIGNIFICANT CHANGE UP (ref 22–31)
COCAINE METAB.OTHER UR-MCNC: NEGATIVE — SIGNIFICANT CHANGE UP
CREAT SERPL-MCNC: 2.26 MG/DL — HIGH (ref 0.5–1.3)
D DIMER BLD IA.RAPID-MCNC: 1114 NG/ML DDU — HIGH
EGFR: 32 ML/MIN/1.73M2 — LOW
EOSINOPHIL # BLD AUTO: 0 K/UL — SIGNIFICANT CHANGE UP (ref 0–0.5)
EOSINOPHIL NFR BLD AUTO: 0 % — SIGNIFICANT CHANGE UP (ref 0–6)
ETHANOL SERPL-MCNC: <3 MG/DL — SIGNIFICANT CHANGE UP (ref 0–3)
GAS PNL BLDV: SIGNIFICANT CHANGE UP
GLUCOSE BLDC GLUCOMTR-MCNC: 104 MG/DL — HIGH (ref 70–99)
GLUCOSE BLDC GLUCOMTR-MCNC: 112 MG/DL — HIGH (ref 70–99)
GLUCOSE BLDC GLUCOMTR-MCNC: 168 MG/DL — HIGH (ref 70–99)
GLUCOSE SERPL-MCNC: 178 MG/DL — HIGH (ref 70–99)
HCO3 BLDV-SCNC: 24 MMOL/L — SIGNIFICANT CHANGE UP (ref 22–29)
HCT VFR BLD CALC: 43.8 % — SIGNIFICANT CHANGE UP (ref 39–50)
HGB BLD-MCNC: 14.1 G/DL — SIGNIFICANT CHANGE UP (ref 13–17)
HPIV3 RNA SPEC QL NAA+PROBE: DETECTED — SIGNIFICANT CHANGE UP
INR BLD: 1.18 RATIO — HIGH (ref 0.88–1.16)
LACTATE SERPL-SCNC: 1.9 MMOL/L — SIGNIFICANT CHANGE UP (ref 0.7–2)
LIDOCAIN IGE QN: 173 U/L — SIGNIFICANT CHANGE UP (ref 73–393)
LYMPHOCYTES # BLD AUTO: 1.19 K/UL — SIGNIFICANT CHANGE UP (ref 1–3.3)
LYMPHOCYTES # BLD AUTO: 22 % — SIGNIFICANT CHANGE UP (ref 13–44)
MAGNESIUM SERPL-MCNC: 1.9 MG/DL — SIGNIFICANT CHANGE UP (ref 1.6–2.6)
MANUAL SMEAR VERIFICATION: SIGNIFICANT CHANGE UP
MCHC RBC-ENTMCNC: 29 PG — SIGNIFICANT CHANGE UP (ref 27–34)
MCHC RBC-ENTMCNC: 32.2 GM/DL — SIGNIFICANT CHANGE UP (ref 32–36)
MCV RBC AUTO: 89.9 FL — SIGNIFICANT CHANGE UP (ref 80–100)
METAMYELOCYTES # FLD: 1 % — HIGH (ref 0–0)
METHADONE UR-MCNC: NEGATIVE — SIGNIFICANT CHANGE UP
MONOCYTES # BLD AUTO: 1.08 K/UL — HIGH (ref 0–0.9)
MONOCYTES NFR BLD AUTO: 20 % — HIGH (ref 2–14)
MYELOCYTES NFR BLD: 1 % — HIGH (ref 0–0)
NEUTROPHILS # BLD AUTO: 3.02 K/UL — SIGNIFICANT CHANGE UP (ref 1.8–7.4)
NEUTROPHILS NFR BLD AUTO: 54 % — SIGNIFICANT CHANGE UP (ref 43–77)
NEUTS BAND # BLD: 2 % — SIGNIFICANT CHANGE UP (ref 0–8)
NRBC # BLD: 0 /100 — SIGNIFICANT CHANGE UP (ref 0–0)
NT-PROBNP SERPL-SCNC: 139 PG/ML — SIGNIFICANT CHANGE UP (ref 0–300)
OPIATES UR-MCNC: NEGATIVE — SIGNIFICANT CHANGE UP
PCO2 BLDV: 39 MMHG — LOW (ref 42–55)
PCP SPEC-MCNC: SIGNIFICANT CHANGE UP
PCP UR-MCNC: NEGATIVE — SIGNIFICANT CHANGE UP
PH BLDV: 7.4 — SIGNIFICANT CHANGE UP (ref 7.32–7.43)
PLAT MORPH BLD: NORMAL — SIGNIFICANT CHANGE UP
PLATELET # BLD AUTO: 167 K/UL — SIGNIFICANT CHANGE UP (ref 150–400)
PO2 BLDV: 45 MMHG — SIGNIFICANT CHANGE UP (ref 25–45)
POTASSIUM SERPL-MCNC: 4 MMOL/L — SIGNIFICANT CHANGE UP (ref 3.5–5.3)
POTASSIUM SERPL-SCNC: 4 MMOL/L — SIGNIFICANT CHANGE UP (ref 3.5–5.3)
PROT SERPL-MCNC: 7.5 G/DL — SIGNIFICANT CHANGE UP (ref 6–8.3)
PROTHROM AB SERPL-ACNC: 13.7 SEC — HIGH (ref 10.5–13.4)
RAPID RVP RESULT: DETECTED
RBC # BLD: 4.87 M/UL — SIGNIFICANT CHANGE UP (ref 4.2–5.8)
RBC # FLD: 14.6 % — HIGH (ref 10.3–14.5)
RBC BLD AUTO: ABNORMAL
SALICYLATES SERPL-MCNC: 0.6 MG/DL — LOW (ref 3–30)
SAO2 % BLDV: 77.7 % — SIGNIFICANT CHANGE UP (ref 67–88)
SARS-COV-2 RNA SPEC QL NAA+PROBE: SIGNIFICANT CHANGE UP
SODIUM SERPL-SCNC: 137 MMOL/L — SIGNIFICANT CHANGE UP (ref 135–145)
T4 AB SER-ACNC: 12.6 UG/DL — HIGH (ref 4.6–12)
THC UR QL: NEGATIVE — SIGNIFICANT CHANGE UP
TROPONIN I, HIGH SENSITIVITY RESULT: 15.6 NG/L — SIGNIFICANT CHANGE UP
TSH SERPL-MCNC: 0.3 UIU/ML — LOW (ref 0.36–3.74)
VALPROATE SERPL-MCNC: 48 UG/ML — LOW (ref 50–100)
WBC # BLD: 5.4 K/UL — SIGNIFICANT CHANGE UP (ref 3.8–10.5)
WBC # FLD AUTO: 5.4 K/UL — SIGNIFICANT CHANGE UP (ref 3.8–10.5)

## 2023-04-20 PROCEDURE — 99285 EMERGENCY DEPT VISIT HI MDM: CPT

## 2023-04-20 PROCEDURE — 90792 PSYCH DIAG EVAL W/MED SRVCS: CPT | Mod: 95

## 2023-04-20 PROCEDURE — 99223 1ST HOSP IP/OBS HIGH 75: CPT

## 2023-04-20 PROCEDURE — 93970 EXTREMITY STUDY: CPT | Mod: 26

## 2023-04-20 PROCEDURE — 71045 X-RAY EXAM CHEST 1 VIEW: CPT | Mod: 26

## 2023-04-20 RX ORDER — TAMSULOSIN HYDROCHLORIDE 0.4 MG/1
1 CAPSULE ORAL
Qty: 0 | Refills: 0 | DISCHARGE

## 2023-04-20 RX ORDER — SODIUM CHLORIDE 9 MG/ML
1000 INJECTION, SOLUTION INTRAVENOUS
Refills: 0 | Status: DISCONTINUED | OUTPATIENT
Start: 2023-04-20 | End: 2023-04-22

## 2023-04-20 RX ORDER — SEMAGLUTIDE 0.68 MG/ML
0 INJECTION, SOLUTION SUBCUTANEOUS
Qty: 0 | Refills: 0 | DISCHARGE

## 2023-04-20 RX ORDER — SODIUM BICARBONATE 1 MEQ/ML
325 SYRINGE (ML) INTRAVENOUS
Refills: 0 | Status: DISCONTINUED | OUTPATIENT
Start: 2023-04-20 | End: 2023-04-20

## 2023-04-20 RX ORDER — DIVALPROEX SODIUM 500 MG/1
1 TABLET, DELAYED RELEASE ORAL
Qty: 0 | Refills: 0 | DISCHARGE

## 2023-04-20 RX ORDER — DEXTROSE 50 % IN WATER 50 %
12.5 SYRINGE (ML) INTRAVENOUS ONCE
Refills: 0 | Status: DISCONTINUED | OUTPATIENT
Start: 2023-04-20 | End: 2023-04-22

## 2023-04-20 RX ORDER — LOSARTAN POTASSIUM 100 MG/1
25 TABLET, FILM COATED ORAL DAILY
Refills: 0 | Status: DISCONTINUED | OUTPATIENT
Start: 2023-04-20 | End: 2023-04-20

## 2023-04-20 RX ORDER — ENOXAPARIN SODIUM 100 MG/ML
40 INJECTION SUBCUTANEOUS EVERY 24 HOURS
Refills: 0 | Status: DISCONTINUED | OUTPATIENT
Start: 2023-04-20 | End: 2023-04-20

## 2023-04-20 RX ORDER — CHOLECALCIFEROL (VITAMIN D3) 125 MCG
1 CAPSULE ORAL
Qty: 0 | Refills: 0 | DISCHARGE

## 2023-04-20 RX ORDER — LANOLIN ALCOHOL/MO/W.PET/CERES
3 CREAM (GRAM) TOPICAL AT BEDTIME
Refills: 0 | Status: DISCONTINUED | OUTPATIENT
Start: 2023-04-20 | End: 2023-04-22

## 2023-04-20 RX ORDER — INSULIN LISPRO 100/ML
12 VIAL (ML) SUBCUTANEOUS
Refills: 0 | Status: DISCONTINUED | OUTPATIENT
Start: 2023-04-20 | End: 2023-04-22

## 2023-04-20 RX ORDER — HEPARIN SODIUM 5000 [USP'U]/ML
10000 INJECTION INTRAVENOUS; SUBCUTANEOUS ONCE
Refills: 0 | Status: COMPLETED | OUTPATIENT
Start: 2023-04-20 | End: 2023-04-20

## 2023-04-20 RX ORDER — DIVALPROEX SODIUM 500 MG/1
500 TABLET, DELAYED RELEASE ORAL
Refills: 0 | Status: DISCONTINUED | OUTPATIENT
Start: 2023-04-20 | End: 2023-04-22

## 2023-04-20 RX ORDER — HEPARIN SODIUM 5000 [USP'U]/ML
INJECTION INTRAVENOUS; SUBCUTANEOUS
Qty: 25000 | Refills: 0 | Status: DISCONTINUED | OUTPATIENT
Start: 2023-04-20 | End: 2023-04-21

## 2023-04-20 RX ORDER — ATORVASTATIN CALCIUM 80 MG/1
1 TABLET, FILM COATED ORAL
Qty: 0 | Refills: 0 | DISCHARGE

## 2023-04-20 RX ORDER — REPAGLINIDE 1 MG/1
2 TABLET ORAL
Refills: 0 | Status: DISCONTINUED | OUTPATIENT
Start: 2023-04-20 | End: 2023-04-20

## 2023-04-20 RX ORDER — ONDANSETRON 8 MG/1
4 TABLET, FILM COATED ORAL EVERY 8 HOURS
Refills: 0 | Status: DISCONTINUED | OUTPATIENT
Start: 2023-04-20 | End: 2023-04-22

## 2023-04-20 RX ORDER — ATORVASTATIN CALCIUM 80 MG/1
40 TABLET, FILM COATED ORAL AT BEDTIME
Refills: 0 | Status: DISCONTINUED | OUTPATIENT
Start: 2023-04-20 | End: 2023-04-22

## 2023-04-20 RX ORDER — LINACLOTIDE 145 UG/1
145 CAPSULE, GELATIN COATED ORAL
Refills: 0 | Status: DISCONTINUED | OUTPATIENT
Start: 2023-04-21 | End: 2023-04-22

## 2023-04-20 RX ORDER — TAMSULOSIN HYDROCHLORIDE 0.4 MG/1
0.4 CAPSULE ORAL AT BEDTIME
Refills: 0 | Status: DISCONTINUED | OUTPATIENT
Start: 2023-04-20 | End: 2023-04-22

## 2023-04-20 RX ORDER — INSULIN LISPRO 100/ML
VIAL (ML) SUBCUTANEOUS AT BEDTIME
Refills: 0 | Status: DISCONTINUED | OUTPATIENT
Start: 2023-04-20 | End: 2023-04-22

## 2023-04-20 RX ORDER — SODIUM CHLORIDE 9 MG/ML
1000 INJECTION INTRAMUSCULAR; INTRAVENOUS; SUBCUTANEOUS
Refills: 0 | Status: DISCONTINUED | OUTPATIENT
Start: 2023-04-20 | End: 2023-04-21

## 2023-04-20 RX ORDER — REPAGLINIDE 1 MG/1
2 TABLET ORAL
Qty: 0 | Refills: 0 | DISCHARGE

## 2023-04-20 RX ORDER — ACETAMINOPHEN 500 MG
650 TABLET ORAL EVERY 6 HOURS
Refills: 0 | Status: DISCONTINUED | OUTPATIENT
Start: 2023-04-20 | End: 2023-04-22

## 2023-04-20 RX ORDER — HEPARIN SODIUM 5000 [USP'U]/ML
5000 INJECTION INTRAVENOUS; SUBCUTANEOUS EVERY 6 HOURS
Refills: 0 | Status: DISCONTINUED | OUTPATIENT
Start: 2023-04-20 | End: 2023-04-21

## 2023-04-20 RX ORDER — ICOSAPENT ETHYL 500 MG/1
2 CAPSULE, LIQUID FILLED ORAL
Qty: 0 | Refills: 0 | DISCHARGE

## 2023-04-20 RX ORDER — ZIPRASIDONE HYDROCHLORIDE 20 MG/1
80 CAPSULE ORAL
Refills: 0 | Status: DISCONTINUED | OUTPATIENT
Start: 2023-04-20 | End: 2023-04-22

## 2023-04-20 RX ORDER — GLUCAGON INJECTION, SOLUTION 0.5 MG/.1ML
1 INJECTION, SOLUTION SUBCUTANEOUS ONCE
Refills: 0 | Status: DISCONTINUED | OUTPATIENT
Start: 2023-04-20 | End: 2023-04-22

## 2023-04-20 RX ORDER — LOSARTAN POTASSIUM 100 MG/1
1 TABLET, FILM COATED ORAL
Qty: 0 | Refills: 0 | DISCHARGE

## 2023-04-20 RX ORDER — INSULIN LISPRO 100/ML
VIAL (ML) SUBCUTANEOUS
Refills: 0 | Status: DISCONTINUED | OUTPATIENT
Start: 2023-04-20 | End: 2023-04-22

## 2023-04-20 RX ORDER — LINACLOTIDE 145 UG/1
1 CAPSULE, GELATIN COATED ORAL
Qty: 0 | Refills: 0 | DISCHARGE

## 2023-04-20 RX ORDER — METOPROLOL TARTRATE 50 MG
25 TABLET ORAL
Refills: 0 | Status: DISCONTINUED | OUTPATIENT
Start: 2023-04-20 | End: 2023-04-21

## 2023-04-20 RX ORDER — HEPARIN SODIUM 5000 [USP'U]/ML
10000 INJECTION INTRAVENOUS; SUBCUTANEOUS EVERY 6 HOURS
Refills: 0 | Status: DISCONTINUED | OUTPATIENT
Start: 2023-04-20 | End: 2023-04-21

## 2023-04-20 RX ORDER — ZIPRASIDONE HYDROCHLORIDE 20 MG/1
1 CAPSULE ORAL
Qty: 0 | Refills: 0 | DISCHARGE

## 2023-04-20 RX ORDER — ERGOCALCIFEROL 1.25 MG/1
0 CAPSULE ORAL
Qty: 0 | Refills: 0 | DISCHARGE

## 2023-04-20 RX ORDER — FINASTERIDE 5 MG/1
5 TABLET, FILM COATED ORAL DAILY
Refills: 0 | Status: DISCONTINUED | OUTPATIENT
Start: 2023-04-20 | End: 2023-04-22

## 2023-04-20 RX ORDER — DEXTROSE 50 % IN WATER 50 %
25 SYRINGE (ML) INTRAVENOUS ONCE
Refills: 0 | Status: DISCONTINUED | OUTPATIENT
Start: 2023-04-20 | End: 2023-04-22

## 2023-04-20 RX ORDER — INSULIN GLARGINE 100 [IU]/ML
40 INJECTION, SOLUTION SUBCUTANEOUS AT BEDTIME
Refills: 0 | Status: DISCONTINUED | OUTPATIENT
Start: 2023-04-20 | End: 2023-04-22

## 2023-04-20 RX ORDER — SODIUM CHLORIDE 9 MG/ML
1000 INJECTION, SOLUTION INTRAVENOUS ONCE
Refills: 0 | Status: COMPLETED | OUTPATIENT
Start: 2023-04-20 | End: 2023-04-20

## 2023-04-20 RX ORDER — ASPIRIN/CALCIUM CARB/MAGNESIUM 324 MG
1 TABLET ORAL
Qty: 0 | Refills: 0 | DISCHARGE

## 2023-04-20 RX ORDER — DEXTROSE 50 % IN WATER 50 %
15 SYRINGE (ML) INTRAVENOUS ONCE
Refills: 0 | Status: DISCONTINUED | OUTPATIENT
Start: 2023-04-20 | End: 2023-04-22

## 2023-04-20 RX ORDER — ASPIRIN/CALCIUM CARB/MAGNESIUM 324 MG
81 TABLET ORAL DAILY
Refills: 0 | Status: DISCONTINUED | OUTPATIENT
Start: 2023-04-20 | End: 2023-04-22

## 2023-04-20 RX ORDER — SEMAGLUTIDE 0.68 MG/ML
1 INJECTION, SOLUTION SUBCUTANEOUS
Qty: 0 | Refills: 0 | DISCHARGE

## 2023-04-20 RX ADMIN — HEPARIN SODIUM 10000 UNIT(S): 5000 INJECTION INTRAVENOUS; SUBCUTANEOUS at 14:36

## 2023-04-20 RX ADMIN — HEPARIN SODIUM 2400 UNIT(S)/HR: 5000 INJECTION INTRAVENOUS; SUBCUTANEOUS at 14:38

## 2023-04-20 RX ADMIN — SODIUM CHLORIDE 1000 MILLILITER(S): 9 INJECTION, SOLUTION INTRAVENOUS at 11:00

## 2023-04-20 RX ADMIN — SODIUM CHLORIDE 75 MILLILITER(S): 9 INJECTION INTRAMUSCULAR; INTRAVENOUS; SUBCUTANEOUS at 22:57

## 2023-04-20 RX ADMIN — Medication 12 UNIT(S): at 18:37

## 2023-04-20 RX ADMIN — ZIPRASIDONE HYDROCHLORIDE 80 MILLIGRAM(S): 20 CAPSULE ORAL at 18:36

## 2023-04-20 RX ADMIN — ATORVASTATIN CALCIUM 40 MILLIGRAM(S): 80 TABLET, FILM COATED ORAL at 22:58

## 2023-04-20 RX ADMIN — HEPARIN SODIUM 2100 UNIT(S)/HR: 5000 INJECTION INTRAVENOUS; SUBCUTANEOUS at 21:39

## 2023-04-20 RX ADMIN — DIVALPROEX SODIUM 500 MILLIGRAM(S): 500 TABLET, DELAYED RELEASE ORAL at 18:36

## 2023-04-20 RX ADMIN — SODIUM CHLORIDE 75 MILLILITER(S): 9 INJECTION INTRAMUSCULAR; INTRAVENOUS; SUBCUTANEOUS at 20:38

## 2023-04-20 RX ADMIN — Medication 1: at 18:37

## 2023-04-20 RX ADMIN — SODIUM CHLORIDE 1000 MILLILITER(S): 9 INJECTION, SOLUTION INTRAVENOUS at 09:55

## 2023-04-20 RX ADMIN — TAMSULOSIN HYDROCHLORIDE 0.4 MILLIGRAM(S): 0.4 CAPSULE ORAL at 22:58

## 2023-04-20 NOTE — ED PROVIDER NOTE - PROGRESS NOTE DETAILS
Admitted for decreased po intake, dehydration, low grade tachycardia. TTE and bilateral duplexes ordered. No need for psychiatric hospitalization.

## 2023-04-20 NOTE — ED BEHAVIORAL HEALTH ASSESSMENT NOTE - OTHER PAST PSYCHIATRIC HISTORY (INCLUDE DETAILS REGARDING ONSET, COURSE OF ILLNESS, INPATIENT/OUTPATIENT TREATMENT)
Has had a total of four psychiatric hospitalizations, all for manic episodes. Most recent was in 1990 at [facility]. Had one other inpatient stay there before there (unsure when). Says he has had two psychiatric hospitalizations a [facility]. Was diagnosed with bipolar disorder at age 20 and was on lithium for many years but discontinued this due to renal issues in recent years. Has since been switched to depakote/geodon No suicide attempts or SIB.

## 2023-04-20 NOTE — ED BEHAVIORAL HEALTH ASSESSMENT NOTE - DETAILS
Tegretol - caused "coma", PCN - rash denies active SI Pt informed if he feels he is a danger to self or others to return to ED or call 911. Pt's psychiatrist/therapist will also call patient with earlier appointment and check in with patient after this visit sister - heroin use disorder,  s/p heroin OD knee pain, no acute leg pain, no SOB, no chest pain self referred

## 2023-04-20 NOTE — ED BEHAVIORAL HEALTH ASSESSMENT NOTE - HPI (INCLUDE ILLNESS QUALITY, SEVERITY, DURATION, TIMING, CONTEXT, MODIFYING FACTORS, ASSOCIATED SIGNS AND SYMPTOMS)
Independent liivng for 17 years at [facility], Apartment in [place], no roommates, usually does, SSD, Bipolar 1, brought in by EMS because called nurse at program at [facility] ([facility], five days a week), called the nurse at the program, feeling very depressed, don't want to take medicatiosn anymore, but took the medications last night, but realized its just too overwhelming, takes too many pills, 13 pills a day, an 4 differnet types of insulin     Past 2 weeks haven't been able to sleep, up at night watching TV, sleep apnea, not using CPAP, yesterday at the doctors office saw cardiologist and in waiting room discussing intimate family secrets discussing sister with people he didn' tknow, saying sister was a heroin addict (she  from overdose of heroin), telling people in waiting room, I have no one to talk to at home, no friends and no family, very discouragd by taking 13 pills a day, and insulin, 4 types of insulin per week.     Don't have much of an appetite 2 weeks, no triggers except no one to talk to, not really different, just really depressed, nothing triggered, before 2 weeks ago wasn't in a good mood, wasn't happy, not talking much on program, no issues at program. always has trouble sleeping, past 2 weeks, can't fall asleep, 4-5 hours a day, energy level low, sedentary, doesn't read, watches TV/listens to music.     No history history of suicide attempts, I realize how important my life is. Have had thoughts of wanting to die for 2-3 weeks, "asking God to end my life, to let me pass away" No plan or intent. No thoughts about wanting to hurt self. No HI/VI. No arrests. Arrested for setalin ga wallet at age 20. Hospiatlied several times for not taking medication. Last psych hospital a long time ago, no hospital years. I'm very serious about not taking my medications.     If you don't put me in the hospital today, and year-old udon't get me in the hospital, i'm goingt o back here, the same day because I'm not going to fariba kathleen medicaitons, I won't take my insulin, I won't fariba kathleen pills. If they dishcage me, i'll be back in the hopsital again and again. If admitted will only leave hospital to work with doctors and we decide that i'm able to take care of myself again. I don't want to. Last time saw jose crowderloreta was over the phone a week ago, at that time wasn't thiking about not taking medications. Didn't help me at all. Taking all meds supposd to take, this morning and las tnight,a nd insulin. "I want to make it very clear to anjelica nd the hospital here, if you discharge me today and send me home, I will not fariba kathleen  medicine on own."     What do year-old uwant to get out of a psych hospitaliation, if they can get me to fariba kathleen medciation on my phone, that's fine, if not keep me hter eand don't let me ever go.     AH: No, VH: No, Paranoia: No, Guns: No   Psychiatrist: Dr. Gardner,  [phone]    not in contact with son because when he was 19, he told him he doesn't wanan see him or ex anymore.    Wants admission because i'm telling Dr. Gardner and my therapist and people at my center I refuse to take my medication on my own, if you give itt ome ill take it, but I refuse to take it on my phone. If i'm given the responsibility on my own anymore , don't have to go to a pharmacy, don't have to take it with food, don't have to go food shopping, don't have to do anything at all, everything I need is in the hospital. I don't want my freedom anymore to come and go as I please, I want to be locked up. Mr. 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 Pineda at [facility], brought in by ambulance activated by self for insomnia and due to a desire to speak to someone.     Pt seen and chart reviewed. The patient is largely calm on evaluation though concrete and focused on being admitted to the hospital. He states that he called 911 today because he was feeling depressed and spoke to the nurse at his adult day care ([facility] - 5 days a week), and he stated that he was depressed, didn't want to take his medications anymore, so the nurse told him to get evaluated. The patient states that he feels overwhelmed by his medical problems  - he is on 4 types of insulin and 13 pills a day (his medication was confirmed by writer via pharmacy [phone] - see list below).  He states that he does not want to take his medications anymore (including insulin, Geodon 80mg BID, VPA DR 500mg BID, anti HTN medications), though he states that he IS compliant with ALL of his medications and he took them last night as well as this morning. His prescriptions are mailed to him. He also had an appointment with his cardiologist yesterday. The patient states that he has difficulty falling asleep, is sleeping 5 hours a day, and he is not using his CPAP. He states that he feels his energy is low, and he doesn't like leaving the house. He states that he has no one to talk to at home, has no friends and he is estranged from his son. He also states that when he was at the cardiologist yesterday he was telling people personal details (that his sister was a heroin addict who passed away). He also reports decreased appetite, stated that he ran out of money so his center had to provide him with food. He denied that there are any triggers to his worsening depression. He states that he was not in a parcitular good mood two weeks ago, and he had a similar presetnation to the ED in July, but he is stating that he feels his depression is worse. He states that he only watches TV/listens to music and isn't reading. He denies history of suicide attempts. Denies current SI/HI. He states that he asked God to end his life, but he also stated that he has no thoughts about ending his life or dying. He stated "I realize how important my life is." He denied suicidal intent or plan and no active suicidal ideation. Denies homicidal ideation. Denies history of violence. Denies auditory/visual hallucinations. Denies paranoia. Denies access to guns. Denies substance use.         No history history of suicide attempts, I realize how important my life is. Have had thoughts of wanting to die for 2-3 weeks, "asking God to end my life, to let me pass away" No plan or intent. No thoughts about wanting to hurt self. No HI/VI. No arrests. Arrested for setalin jamie lopez at age 20. Hospiatlied several times for not taking medication. Last psych hospital a long time ago, no hospital years. I'm very serious about not taking my medications.     If you don't put me in the hospital today, and year-old doraon't get me in the hospital, i'm goingt o back here, the same day because I'm not going to fariba kathleen medicaitons, I won't take my insulin, I won't fariba kathleen pills. If they dishcage me, i'll be back in the hopsital again and again. If admitted will only leave hospital to work with doctors and we decide that i'm able to take care of myself again. I don't want to. Last time saw jose barney was over the phone a week ago, at that time wasn't thiking about not taking medications. Didn't help me at all. Taking all meds supposd to take, this morning and las tnight,a nd insulin. "I want to make it very clear to anjelica nd the hospital here, if you discharge me today and send me home, I will not fariba kathleen  medicine on own."     What do year-old ant to get out of a psych hospitaliation, if they can get me to [facility] kathleen [facility] on my phone, that's fine, if not keep me hter eand don't let me ever go.     AH: No, VH: No, Paranoia: No, Guns: No   Psychiatrist: Dr. Gardner,  [phone]    not in contact with son because when he was 19, he told him he doesn't wanan see him or ex anymore.    Wants admission because i'm telling Dr. Gardner and my therapist and people at my center I refuse to take my medication on my own, if you give itt ome ill take it, but I refuse to take it on my phone. If i'm given the responsibility on my own anymore , don't have to go to a pharmacy, don't have to take it with food, don't have to go food shopping, don't have to do anything at all, everything I need is in the hospital. I don't want my freedom anymore to come and go as I please, I want to be locked up.    Patient gave me permisison to call [facility] (psychaitrist and therapist). I called and left a message for the psychiatric NP, and MSW called and left message for therapist. Mr. 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 Pineda at [facility], brought in by ambulance activated by self for depression/insomnia and due to a desire to speak to someone.     Pt seen and chart reviewed. The patient is largely calm on evaluation though concrete and focused initially on his very remote past (though was able to be redirected) and then on being admitted to the hospital. He states that he called 911 today because he was feeling depressed and spoke to the nurse at his adult day care ([facility] which he attends 5 d/wk), and he stated that he was depressed, didn't want to take his medications anymore, so the nurse told him to get evaluated by psych. The pt states that he feels overwhelmed by his medical problems and medications - he is on 4 insulin shots and 13 pills a day (his medication was confirmed by writer via pharmacy [phone] - see list in current medications section below).  He states that he does not want to take his medications anymore (including insulin, Geodon 80mg BID, VPA DR 500mg BID, anti HTN medications), though he states that he IS compliant with ALL of his medications and he took them last night as well as this morning. He also insinuated that he has refused to take his medications in the past, but has always been compliant, but noted that this time he will not take them.  He had an appointment with his cardiologist yesterday. The patient states that he has difficulty falling asleep, is sleeping 5 hours a day, and he is not using his CPAP; insomnia is chronic. He states that he feels his energy is low, and he doesn't like leaving the house, though he goes to adult day care five days a week, and enjoys watching TV/ listening to music. He states that he has no one to talk to at home, has no friends and he is estranged from his adult son for many years. He also states that when he was at the cardiologist yesterday he was telling people personal details (that his sister was a heroin addict who passed away). He also reports decreased appetite (though his NP states that he is eating and has difficulty losing weight which frustrates the patient), stated that he ran out of money so his center had to provide him with food this month.  He denied that there are any triggers to his worsening depression. He states that his symptoms have worsened over last 2-3 weeks, but he was depressed prior to this as well and had a similar presentation to the ED in July. He denies history of suicide attempts. Denies current SI/HI. He states that he has asked God to end his life in the past 2-3 weeks, but he also stated that he has no thoughts about ending his life or dying and he denies active suicidal ideation and no plan or intent . He stated "I realize how important my life is." Denies homicidal ideation. Denies history of violence. Denies auditory/visual hallucinations. Denies paranoia. Denies access to guns. Denies substance use. No decreased need for sleep or persistently elevated/expansive/irritable/euphoric mood.     The patient was very focused on admission. He stated "If you don't put me in the hospital today, then I'm going to be back here the same day because I'm not going to take my  medications." He stated that he is tired of having to  his medications (though his medications are delivered which was confirmed by his NP and his pharmacy), tired of going food shopping, having no one to speak to, having to take his medications with meals. He stated that he would take his medications if he were in the hospital because they give it to him, but he doesn't want to take it on his own. When asked why he would take his medications in a hospital, but not at home, he was unable to elaborate why, just stating that at the hospital they administer the medications to him. He stated "everything I need is in the hospital." He then  stated, "I want to make it very clear to me and the hospital here, if you discharge me today and send me home, I will not take my medicine on own" and he stated that he will continue to come back to the hospital. He also states that if he was hospitalized psychiatrically and was discharged in a week or two, he would likely continue to come back to the hospital.  He stated that if he decides while in the psychiatric unit that he doesn't want to take his medication on his own he stated "they can keep me there and never let me go." Pt states he does not want to transition to a group home (where he will have a higher level of care/have his medications/food administered to him) because he had a bad experience at a  20 years ago.     Patient gave me permission to call [facility] (psychiatrist and therapist). Per Arielle Cameron (psychiatric NP), he has been seeing Mr. Pruitt for 8-9 years. Pt is a little more frustrated than usually recently because he has a lot of knee pain, doesn't have a lot of spending money, and doesn't have people he likes talking to  (he does go to five days a week adult day care and weekly therapy, but the people at day care are not necessarily the "type" of people that Mr. Flores wants to socialize with). Mr. Barnes said that Mr. Pruitt does not require psychiatric hospitalization and that if he presented to him today to his office, he would not psychiatrically hospitalize him. They spoke last week, he was slightly below his baseline in regards to his frustration regarding his health, but stated that he was psychiatrically stable, not manic, psychotic, or acutely depressed and he did not need inpatient admission. He states that the patient just desires more contact with people and more social interactions and that a hospitalization WILL NOT help him. He states that Mr. Barnes has no history of suicide attempts, he is not the type of person who will stop taking his medications. He may just be saying this to get admitted. He is always VERY compliant with medications and he does not think patient would ever stop any of his medications. He did not have acute safety concerns. He recommended continued outpt treatment. He has appointment on 4/26/23 with his therapist (who was called and MSW left message for him), and they will try to move up appointment.

## 2023-04-20 NOTE — ED ADULT NURSE NOTE - NSFALLRSKASSESSDT_ED_ALL_ED
20-Apr-2023 10:30
 '95 ; hx placental abruption at 23w ; hx LEEP ; hx chronic hypertension on labetalol ; hx umbilical hernia repair ; hx appy ; hx glaucoma

## 2023-04-20 NOTE — CONSULT NOTE ADULT - SUBJECTIVE AND OBJECTIVE BOX
NEPHROLOGY CONSULTATION     CHIEF COMPLAINT:     HPI:  Pt is 64 yo M with PMH of bipolar disorder, DM, HTN, HLD, CHAYITO, CKD presenting to ER with c/o feeling depressed for two weeks.   Reports poor appetite, noted to have elevated BUN/Cr. No chest pain, SOB, fever, N/V, abd pain, diarrhea.     ROS:  as above    Allergies:  Tegretol (Hypotension; Anaphylaxis)  penicillin (Hives)    PAST MEDICAL & SURGICAL HISTORY:  Bipolar 1 disorder  on Lithium  Hypertension  T2DM (type 2 diabetes mellitus)  HLD (hyperlipidemia)  CHAYITO (obstructive sleep apnea)  diagnosed >25 years ago, non-compliant with CPAP  Diabetic neuropathy  bilateral feet, ankles  Obesity  Rheumatoid arthritis  History of excision of testicular mass  left, 2009  Cataract of left eye    SOCIAL HISTORY:  negative    FAMILY HISTORY:  FH: CAD (coronary artery disease) (Mother)    MEDICATIONS  (STANDING):  aspirin  chewable 81 milliGRAM(s) Oral daily  atorvastatin 40 milliGRAM(s) Oral at bedtime  dextrose 5%. 1000 milliLiter(s) (50 mL/Hr) IV Continuous <Continuous>  dextrose 5%. 1000 milliLiter(s) (100 mL/Hr) IV Continuous <Continuous>  dextrose 50% Injectable 25 Gram(s) IV Push once  dextrose 50% Injectable 12.5 Gram(s) IV Push once  dextrose 50% Injectable 25 Gram(s) IV Push once  divalproex  milliGRAM(s) Oral two times a day  finasteride 5 milliGRAM(s) Oral daily  glucagon  Injectable 1 milliGRAM(s) IntraMuscular once  heparin   Injectable 28365 Unit(s) IV Push once  heparin  Infusion.  Unit(s)/Hr (24 mL/Hr) IV Continuous <Continuous>  insulin glargine Injectable (LANTUS) 40 Unit(s) SubCutaneous at bedtime  insulin lispro (ADMELOG) corrective regimen sliding scale   SubCutaneous three times a day before meals  insulin lispro (ADMELOG) corrective regimen sliding scale   SubCutaneous at bedtime  insulin lispro Injectable (ADMELOG) 12 Unit(s) SubCutaneous three times a day before meals  losartan 25 milliGRAM(s) Oral daily  metoprolol tartrate 25 milliGRAM(s) Oral two times a day  repaglinide 2 milliGRAM(s) Oral three times a day before meals  sodium bicarbonate 325 milliGRAM(s) Oral two times a day  sodium chloride 0.9%. 1000 milliLiter(s) (75 mL/Hr) IV Continuous <Continuous>  tamsulosin 0.4 milliGRAM(s) Oral at bedtime  ziprasidone 80 milliGRAM(s) Oral two times a day    Home Medications:  aspirin 81 mg oral tablet, chewable: 1 tab(s) orally once a day (20 Apr 2023 10:57)  atorvastatin 40 mg oral tablet: 1 tab(s) orally once a day (20 Apr 2023 10:57)  Calcium 500+D oral tablet, chewable: orally 3 times a day (20 Apr 2023 10:57)  divalproex sodium: 1 tab(s) orally 2 times a day (20 Apr 2023 10:57)  ergocalciferol 1.25 mg (50,000 intl units) oral tablet: orally once a week (20 Apr 2023 10:57)  finasteride 5 mg oral tablet: 1 tab(s) orally once a day (20 Apr 2023 10:57)  Lantus 100 units/mL subcutaneous solution: 20 microcurie subcutaneous (20 Apr 2023 10:57)  Linzess 145 mcg oral capsule: 1 cap(s) orally once a day (20 Apr 2023 10:57)  losartan 25 mg oral tablet: 1 tab(s) orally once a day (20 Apr 2023 10:57)  metoprolol tartrate 25 mg oral tablet: 1 tab(s) orally 2 times a day (20 Apr 2023 10:57)  Ozempic 2 mg/1.5 mL (1 mg dose) subcutaneous solution:  (20 Apr 2023 10:57)  repaglinide 1 mg oral tablet: 2 tab(s) orally 3 times a day (before meals) (20 Apr 2023 10:57)  sodium bicarbonate 325 mg oral tablet:  (20 Apr 2023 10:57)  tamsulosin 0.4 mg oral capsule: 1 orally (20 Apr 2023 12:45)  Vascepa 1 g oral capsule: 2 cap(s) orally 2 times a day (20 Apr 2023 10:57)  Vitamin D3 1250 mcg (50,000 intl units) oral capsule: 1 cap(s) orally once a week (20 Apr 2023 10:57)  ziprasidone 80 mg oral capsule: 1 cap(s) orally 2 times a day (20 Apr 2023 10:57)    Vital Signs Last 24 Hrs  T(C): 36 (04-20-23 @ 08:55), Max: 36 (04-20-23 @ 08:55)  T(F): 96.8 (04-20-23 @ 08:55), Max: 96.8 (04-20-23 @ 08:55)  HR: 104 (04-20-23 @ 11:21) (104 - 114)  BP: 100/68 (04-20-23 @ 11:21) (100/68 - 123/75)  RR: 22 (04-20-23 @ 11:21) (18 - 22)  SpO2: 93% (04-20-23 @ 11:21) (93% - 96%)    LABS:                        14.1   5.40  )-----------( 167      ( 20 Apr 2023 10:02 )             43.8     04-20    137  |  99  |  36<H>  ----------------------------<  178<H>  4.0   |  25  |  2.26<H>    Ca    9.1      20 Apr 2023 10:02  Mg     1.9     04-20    TPro  7.5  /  Alb  3.1<L>  /  TBili  0.6  /  DBili  x   /  AST  31  /  ALT  37  /  AlkPhos  68  04-20    LIVER FUNCTIONS - ( 20 Apr 2023 10:02 )  Alb: 3.1 g/dL / Pro: 7.5 g/dL / ALK PHOS: 68 U/L / ALT: 37 U/L / AST: 31 U/L / GGT: x           PT/INR - ( 20 Apr 2023 10:02 )   PT: 13.7 sec;   INR: 1.18 ratio       PTT - ( 20 Apr 2023 10:02 )  PTT:29.6 sec    A/P:    full consult to follow    [phone] NEPHROLOGY CONSULTATION     CHIEF COMPLAINT: not feeling well    HPI:  Pt is 64 yo M with PMH of bipolar disorder, DM, HTN, HLD, CHAYITO, CKD presenting to ER with c/o feeling depressed for two weeks.   Reports poor appetite, noted to have elevated BUN/Cr. No chest pain, SOB, fever, N/V, abd pain, diarrhea. Dx w/parainfluenza.    ROS:  as above    Allergies:  Tegretol (Hypotension; Anaphylaxis)  penicillin (Hives)    PAST MEDICAL & SURGICAL HISTORY:  Bipolar 1 disorder  on Lithium  Hypertension  T2DM (type 2 diabetes mellitus)  HLD (hyperlipidemia)  CHAYITO (obstructive sleep apnea)  diagnosed >25 years ago, non-compliant with CPAP  Diabetic neuropathy  bilateral feet, ankles  Obesity  Rheumatoid arthritis  History of excision of testicular mass  left, 2009  Cataract of left eye    SOCIAL HISTORY:  negative    FAMILY HISTORY:  FH: CAD (coronary artery disease) (Mother)    MEDICATIONS  (STANDING):  aspirin  chewable 81 milliGRAM(s) Oral daily  atorvastatin 40 milliGRAM(s) Oral at bedtime  dextrose 5%. 1000 milliLiter(s) (50 mL/Hr) IV Continuous <Continuous>  dextrose 5%. 1000 milliLiter(s) (100 mL/Hr) IV Continuous <Continuous>  dextrose 50% Injectable 25 Gram(s) IV Push once  dextrose 50% Injectable 12.5 Gram(s) IV Push once  dextrose 50% Injectable 25 Gram(s) IV Push once  divalproex  milliGRAM(s) Oral two times a day  finasteride 5 milliGRAM(s) Oral daily  glucagon  Injectable 1 milliGRAM(s) IntraMuscular once  heparin   Injectable 20665 Unit(s) IV Push once  heparin  Infusion.  Unit(s)/Hr (24 mL/Hr) IV Continuous <Continuous>  insulin glargine Injectable (LANTUS) 40 Unit(s) SubCutaneous at bedtime  insulin lispro (ADMELOG) corrective regimen sliding scale   SubCutaneous three times a day before meals  insulin lispro (ADMELOG) corrective regimen sliding scale   SubCutaneous at bedtime  insulin lispro Injectable (ADMELOG) 12 Unit(s) SubCutaneous three times a day before meals  losartan 25 milliGRAM(s) Oral daily  metoprolol tartrate 25 milliGRAM(s) Oral two times a day  repaglinide 2 milliGRAM(s) Oral three times a day before meals  sodium bicarbonate 325 milliGRAM(s) Oral two times a day  sodium chloride 0.9%. 1000 milliLiter(s) (75 mL/Hr) IV Continuous <Continuous>  tamsulosin 0.4 milliGRAM(s) Oral at bedtime  ziprasidone 80 milliGRAM(s) Oral two times a day    Home Medications:  aspirin 81 mg oral tablet, chewable: 1 tab(s) orally once a day (20 Apr 2023 10:57)  atorvastatin 40 mg oral tablet: 1 tab(s) orally once a day (20 Apr 2023 10:57)  Calcium 500+D oral tablet, chewable: orally 3 times a day (20 Apr 2023 10:57)  divalproex sodium: 1 tab(s) orally 2 times a day (20 Apr 2023 10:57)  ergocalciferol 1.25 mg (50,000 intl units) oral tablet: orally once a week (20 Apr 2023 10:57)  finasteride 5 mg oral tablet: 1 tab(s) orally once a day (20 Apr 2023 10:57)  Lantus 100 units/mL subcutaneous solution: 20 microcurie subcutaneous (20 Apr 2023 10:57)  Linzess 145 mcg oral capsule: 1 cap(s) orally once a day (20 Apr 2023 10:57)  losartan 25 mg oral tablet: 1 tab(s) orally once a day (20 Apr 2023 10:57)  metoprolol tartrate 25 mg oral tablet: 1 tab(s) orally 2 times a day (20 Apr 2023 10:57)  Ozempic 2 mg/1.5 mL (1 mg dose) subcutaneous solution:  (20 Apr 2023 10:57)  repaglinide 1 mg oral tablet: 2 tab(s) orally 3 times a day (before meals) (20 Apr 2023 10:57)  sodium bicarbonate 325 mg oral tablet:  (20 Apr 2023 10:57)  tamsulosin 0.4 mg oral capsule: 1 orally (20 Apr 2023 12:45)  Vascepa 1 g oral capsule: 2 cap(s) orally 2 times a day (20 Apr 2023 10:57)  Vitamin D3 1250 mcg (50,000 intl units) oral capsule: 1 cap(s) orally once a week (20 Apr 2023 10:57)  ziprasidone 80 mg oral capsule: 1 cap(s) orally 2 times a day (20 Apr 2023 10:57)    Vital Signs Last 24 Hrs  T(C): 36 (04-20-23 @ 08:55), Max: 36 (04-20-23 @ 08:55)  T(F): 96.8 (04-20-23 @ 08:55), Max: 96.8 (04-20-23 @ 08:55)  HR: 104 (04-20-23 @ 11:21) (104 - 114)  BP: 100/68 (04-20-23 @ 11:21) (100/68 - 123/75)  RR: 22 (04-20-23 @ 11:21) (18 - 22)  SpO2: 93% (04-20-23 @ 11:21) (93% - 96%)    s1s2  b/l air entry  soft, ND  + edema    LABS:                        14.1   5.40  )-----------( 167      ( 20 Apr 2023 10:02 )             43.8     04-20    137  |  99  |  36<H>  ----------------------------<  178<H>  4.0   |  25  |  2.26<H>    Ca    9.1      20 Apr 2023 10:02  Mg     1.9     04-20    TPro  7.5  /  Alb  3.1<L>  /  TBili  0.6  /  DBili  x   /  AST  31  /  ALT  37  /  AlkPhos  68  04-20    LIVER FUNCTIONS - ( 20 Apr 2023 10:02 )  Alb: 3.1 g/dL / Pro: 7.5 g/dL / ALK PHOS: 68 U/L / ALT: 37 U/L / AST: 31 U/L / GGT: x           PT/INR - ( 20 Apr 2023 10:02 )   PT: 13.7 sec;   INR: 1.18 ratio       PTT - ( 20 Apr 2023 10:02 )  PTT:29.6 sec    A/P:    Adm w/parainfluenza  New L DVT, AC  Hx CKD 3 w/baseline Cr ~ 2  Higher Cr noted  Hold ARB  BMP in am, check UA  Avoid nephrotoxins  Supportive care    [phone]

## 2023-04-20 NOTE — ED ADULT NURSE NOTE - CADM POA PRESS ULCER
pt came in from triage, as per daughter, pt had dark tarry stool x1 at home co weakness. on arrival pt had massive bm brbpr and became confused, lethargic, ams. No

## 2023-04-20 NOTE — ED BEHAVIORAL HEALTH NOTE - BEHAVIORAL HEALTH NOTE
========================  FOR EACH COLLATERAL  ========================  Collateral below has requested that the information provided remain confidential: Yes [  ] No [ X ]  Collateral below has provided information that patient is/may be unaware of: Yes [  ] No [ X ]  Patient gives permission to obtain collateral from _____:  (  ) Yes  ( X )  No  Rationale for overriding objection            (  ) Lack of capacity. Details: ________            (  X) Assessing risk of danger to self/others. Details: ________  Rationale for selecting specific collateral source            (  ) Potential to impact risk of danger to self/others and no alternative equivalent. Details: _____  NAME: Cole Garcia.  NUMBER:  [phone].  RELATIONSHIP: [facility] therapist.   COMMENTS: Attempted to contact without success. Left voicemail requesting a call back.

## 2023-04-20 NOTE — ED BEHAVIORAL HEALTH ASSESSMENT NOTE - REFERRAL / APPOINTMENT DETAILS
Follow up with Therapist 4/26/23 at [facility], and psychiatrist per routine, continue to attend adult  daily

## 2023-04-20 NOTE — H&P ADULT - HISTORY OF PRESENT ILLNESS
62 yo M from home with PMH of bipolar disorder, DM, HTN, HLD, CHAYITO, CKD presenting to ER with cc of feeling depressed for two weeks.   Patient denies HI/SI, substance use, hallucinations. States "I have no one to talk to" and "I have no friends or family".     Denies chest pain, SOB, Fever, cough, N/V, abd pain, diarrhea    IN ER, noted to be tachycardic; checked  d-dimer elevated  Telepsych called 64 yo M from home with PMH of bipolar disorder, DM, HTN, HLD, CHAYITO, CKD presenting to ER with cc of feeling depressed for two weeks.   Patient denies HI/SI, substance use, hallucinations. States "I have no one to talk to" and "I have no friends or family".   HE has been feeling this way for two weeks.  HE has not been eating normally or sleeping.  He had a phone conversation with his psychiatrist who gave him strategies to help him feel better.    This morning he woke up and just did not want to take his medications anymore.  HE also reports having a wet cough, body aches and pain, denies chest pain, shest pain, SOB, Fever, N/V, abd pain, diarrhea.  He does not use his CPAP machine at night    IN ER, noted to be tachycardic; checked  d-dimer elevated  Telepsych called

## 2023-04-20 NOTE — H&P ADULT - NSHPLABSRESULTS_GEN_ALL_CORE
14.1   5.40  )-----------( 167      ( 20 Apr 2023 10:02 )             43.8     Lactate, Blood: 1.9 mmol/L (04-20 @ 10:02)    04-20    137  |  99  |  36  ----------------------------<  178  4.0   |  25  |  2.26    Ca    9.1      20 Apr 2023 10:02  Mg     1.9     04-20    TPro  7.5  /  Alb  3.1  /  TBili  0.6  /  DBili  x   /  AST  31  /  ALT  37  /  AlkPhos  68  04-20    PT/INR - ( 20 Apr 2023 10:02 )   PT: 13.7 sec;   INR: 1.18 ratio         PTT - ( 20 Apr 2023 10:02 )  PTT:29.6 sec  CARDIAC MARKERS ( 20 Apr 2023 10:02 )  x     / 15.6 ng/L / 642 U/L / x     / x        10:02 - VBG - pH: 7.40  | pCO2: 39    | pO2: 45    | Lactate:          Lipase, Serum: 173 U/L (04-20-23 @ 10:02)      COVID-19 PCR: NotDetec (07-10-22 @ 21:25)  COVID-19 PCR: NotDetec (07-06-22 @ 19:30)  COVID-19 PCR: NotDetec (03-21-22 @ 14:10)  COVID-19 PCR: NotDetec (01-11-22 @ 15:00)  COVID-19 PCR: NotDetec (01-08-22 @ 09:18)  COVID-19 PCR: NotDetec (01-03-22 @ 14:25)  COVID-19 PCR: NotDetec (06-29-21 @ 21:15)  COVID-19 PCR: NotDetec (06-14-21 @ 10:20)    SARS-CoV-2: NotDetec (20 Apr 2023 10:02)    12 Lead ECG:   Ventricular Rate 107 BPM    Atrial Rate 107 BPM    P-R Interval 152 ms    QRS Duration 90 ms    Q-T Interval 342 ms    QTC Calculation(Bazett) 456 ms    P Axis 54 degrees    R Axis -24 degrees    T Axis 52 degrees    Diagnosis Line Sinus tachycardia  Cannot rule out Anterior infarct , age undetermined    When compared with ECG of 11-JUL-2022 00:44,  Criteria for Inferior infarct are no longer present  Confirmed by ZOE INIGUEZ MD ([id number]) on 4/20/2023 9:51:30 AM (04-20-23 @ 09:25)    RADIOLOGY  NO new imaging    Consultant(s) Notes Reveiwed [ ] Yes   Psychiatry  Care Discussed with [ ] Consultants  [x ] Patient  [ ] Family  [ ] /   [x ] Other; RN 14.1   5.40  )-----------( 167      ( 20 Apr 2023 10:02 )             43.8     Lactate, Blood: 1.9 mmol/L (04-20 @ 10:02)    04-20    137  |  99  |  36  ----------------------------<  178  4.0   |  25  |  2.26    Ca    9.1      20 Apr 2023 10:02  Mg     1.9     04-20    TPro  7.5  /  Alb  3.1  /  TBili  0.6  /  DBili  x   /  AST  31  /  ALT  37  /  AlkPhos  68  04-20    PT/INR - ( 20 Apr 2023 10:02 )   PT: 13.7 sec;   INR: 1.18 ratio         PTT - ( 20 Apr 2023 10:02 )  PTT:29.6 sec  CARDIAC MARKERS ( 20 Apr 2023 10:02 )  x     / 15.6 ng/L / 642 U/L / x     / x        10:02 - VBG - pH: 7.40  | pCO2: 39    | pO2: 45    | Lactate:          Lipase, Serum: 173 U/L (04-20-23 @ 10:02)      COVID-19 PCR: NotDetec (07-10-22 @ 21:25)  COVID-19 PCR: NotDetec (07-06-22 @ 19:30)  COVID-19 PCR: NotDetec (03-21-22 @ 14:10)  COVID-19 PCR: NotDetec (01-11-22 @ 15:00)  COVID-19 PCR: NotDetec (01-08-22 @ 09:18)  COVID-19 PCR: NotDetec (01-03-22 @ 14:25)  COVID-19 PCR: NotDetec (06-29-21 @ 21:15)  COVID-19 PCR: NotDetec (06-14-21 @ 10:20)    SARS-CoV-2: NotDetec (20 Apr 2023 10:02)    12 Lead ECG:   Ventricular Rate 107 BPM    Atrial Rate 107 BPM    P-R Interval 152 ms    QRS Duration 90 ms    Q-T Interval 342 ms    QTC Calculation(Bazett) 456 ms    P Axis 54 degrees    R Axis -24 degrees    T Axis 52 degrees    Diagnosis Line Sinus tachycardia  Cannot rule out Anterior infarct , age undetermined    When compared with ECG of 11-JUL-2022 00:44,  Criteria for Inferior infarct are no longer present  Confirmed by ZOE INIGUEZ MD ([id number]) on 4/20/2023 9:51:30 AM (04-20-23 @ 09:25)    RADIOLOGY:  US Duplex Venous Lower Ext Complete, Bilateral (04.20.23 @ 13:02) >    IMPRESSION:  Acute DVT left tibioperoneal trunk and left gastrocnemius vein.  Findings were discussed with Dr. Felix 4/20/2023 1:18 PM by Dr. Kilpatrick   with read back confirmation.    Consultant(s) Notes Reveiwed [ ] Yes   Psychiatry  Care Discussed with [x ] Consultants  [x ] Patient  [ ] Family  [ ] /   [x ] Other; RN

## 2023-04-20 NOTE — H&P ADULT - NSHPSOCIALHISTORY_GEN_ALL_CORE
Social History:    Marital Status: (  ) , (  ) Single, (  ) , (  ) , (  )   # of Children:   Lives with: (  ) alone, (  ) children, (  ) spouse, (  ) parents, (  ) siblings, (  ) friends, (  ) other:   Occupation:     Substance Use/Illicit Drugs: (  ) never used vs other:   Tobacco Usage: (  ) never smoked, (  ) former smoker, (  ) current smoker and Total Pack-Years:   Last Alcohol Usage/Frequency/Amount/Withdrawal/Hx of Abuse:    Foreign travel:   Animal exposure: Social History:        Substance Use/Illicit Drugs: (  x) never used vs other:   Tobacco Usage: (  x) never smoked, (  ) former smoker, (  ) current smoker and Total Pack-Years:   Last Alcohol Usage/Frequency/Amount/Withdrawal/Hx of Abuse:  denies  Foreign travel: Denies  Animal exposure:Denies

## 2023-04-20 NOTE — ED BEHAVIORAL HEALTH ASSESSMENT NOTE - NSTXRELFACTOR_PSY_ALL_CORE
dissatisfied about medical issues/medication but no specifically hopeless ABT provider/Hopeless about or dissatisfied with provider or treatment

## 2023-04-20 NOTE — ED PROVIDER NOTE - CLINICAL SUMMARY MEDICAL DECISION MAKING FREE TEXT BOX
History and physical as documented above.  Tachycardia is unexplained but may related to decreased po intake. Denies chest pain/dyspnea/palpitations. Lower concern for PE. Will check labs, UA, CXR, RVP for infection, psych labs and consult, likely TBA.

## 2023-04-20 NOTE — H&P ADULT - NSHPPHYSICALEXAM_GEN_ALL_CORE
Vital Signs Last 24 Hrs  T(F): 96.8 (20 Apr 2023 08:55), Max: 96.8 (20 Apr 2023 08:55)  HR: 104 (20 Apr 2023 11:21) (104 - 114)  BP: 100/68 (20 Apr 2023 11:21) (100/68 - 123/75)  RR: 22 (20 Apr 2023 11:21) (18 - 22)  SpO2: 93% (20 Apr 2023 11:21) (93% - 96%)    PHYSICAL EXAM:  GENERAL: NAD, well-groomed, well-developed  HEAD:  Atraumatic, Normocephalic  EYES: EOMI, conjunctiva and sclera clear  ENMT: Moist mucous membranes, Good dentition, no thrush  NECK: Supple, No JVD  CHEST/LUNG: Clear to auscultation bilaterally, good air entry, non-labored breathing  HEART: RRR; S1/S2, No murmur  ABDOMEN: Soft, Nontender, Nondistended; Bowel sounds present  VASCULAR: Normal pulses, Normal capillary refill  EXTREMITIES: No calf tenderness, No cyanosis, No edema  LYMPH: Normal; No lymphadenopathy noted  SKIN: Warm, Intact  PSYCH: Normal mood, Normal affect  NERVOUS SYSTEM:  A/O x3, Good concentration; CN 2-12 intact, No focal deficits Vital Signs Last 24 Hrs  T(F): 96.8 (20 Apr 2023 08:55), Max: 96.8 (20 Apr 2023 08:55)  HR: 104 (20 Apr 2023 11:21) (104 - 114)  BP: 100/68 (20 Apr 2023 11:21) (100/68 - 123/75)  RR: 22 (20 Apr 2023 11:21) (18 - 22)  SpO2: 93% (20 Apr 2023 11:21) (93% - 96%)    PHYSICAL EXAM:  AAOx3, follows all commands  HEAD:  Atraumatic, Normocephalic  EYES: EOMI, conjunctiva and sclera clear  ENMT: Moist mucous membranes, Good dentition, no thrush  NECK: Supple, No JVD  CHEST/LUNG: Clear to auscultation bilaterally, good air entry, non-labored breathing  HEART: RRR; S1/S2, No murmur  ABDOMEN: Soft, Nontender, Nondistended; Bowel sounds present  VASCULAR: Normal pulses, Normal capillary refill  EXTREMITIES: No calf tenderness, No cyanosis, No edema  LYMPH: Normal; No lymphadenopathy noted  SKIN: Warm, Intact  PSYCH: Normal mood, Normal affect  NERVOUS SYSTEM:  A/O x3, Good concentration; CN 2-12 intact, No focal deficits

## 2023-04-20 NOTE — ED BEHAVIORAL HEALTH ASSESSMENT NOTE - CURRENT MEDICATION
Medications as of March 2023 confirmed by pharmacy [phone]  Geodon 80mg BID, Depakote DR 500mg BID, LInzess 145mg daily, Atorvastatin 40mg daily (new RX), Tylenol 500mg po q6 hours, Vascepta, Carvedilol 12.5mg BID, Losartan 25mg daily, ASA 81mg daily, Ozempic, Humalog Injection 10U TID, Sodium bicarbonate 650mg BID

## 2023-04-20 NOTE — ED ADULT TRIAGE NOTE - CHIEF COMPLAINT QUOTE
Pt BIB EMS from home, pt states " I want to talk to someone." Pt was supposed to see his psychiatrist today but did not want to wait. He feels he wants to stop taking all of his meds. Pt denies SI/HI. Denies AH/VH.

## 2023-04-20 NOTE — ED BEHAVIORAL HEALTH ASSESSMENT NOTE - NSSUICPROTFACT_PSY_ALL_CORE
Identifies reasons for living/Supportive social network of family or friends/Fear of death or the actual act of killing self/Positive therapeutic relationships/Ability to cope with stress

## 2023-04-20 NOTE — ED ADULT NURSE NOTE - OBJECTIVE STATEMENT
Pt TESHA from [facility] stating that he does not want to take his daily medications anymore.  Reports compliance with daily medication.  States that he does not want to take his medications because "it's too much and I don't have the support of my family or friends."  Reports feelings depressed "because I don't have anyone to talk to." Visits adult day care center daily.  Denies any SI/HI or A/V Hallucinations.  Reports productive cough with clear sputum.  Denies any other physical complaints at present.

## 2023-04-20 NOTE — ED BEHAVIORAL HEALTH ASSESSMENT NOTE - SUMMARY
Mr. 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 Pineda at [facility], brought in by ambulance activated by self for insomnia and due to a desire to speak to someone.    On evaluation, the patient is calm though he is focused on admission. He states that he has no one to speak to at home and he is frustrated with his multiple medical conditions that are chronic and require him to take many types of insulin and pills. He is compliant with all of his medications, including today, and he is caring for his ADLs, he is not acutely psychotic, depressed, or manic. His psychiatric provider who knows him very well stated that Mr. Pruitt is always compliant with medications, he is not the type of patient who will stop taking his medications and he has no history of suicide attempts. His NP stated that he does not think patient requires admission, he saw him a week ago, he was a little bit below his baseline bc of his medical conditions, but he feels that patient was psychiatrically stable and appropriate for outpatient care. He does not feel psychiatric hospitalization will help the patient, he states pt is stable on his medications and he really just requires more contact with people. He stated that patient has follow up with his therapist on the 26th and they can move up the appointment and someone will check in on him and ensure he is taking his medications. The importance of compliance with medications was discussed with patient. His statement that he will not take his medical and psych medications is conditional on admission; he states that he would take them in the hospital, because they are administered to him, and he was requesting admission because he could be taken care of in the hospital (doesn't have to shop, go to pharmacy, administer his own medications); though this is not an appropriate reason for inpatient admission. He has been routinely compliant with medication, including today. Given the above and patient's protective factors listed below, pt at this time does not require inpatient admission, he will follow up with his psychiatrist and therapist in the community. Disposition discussed with psychiatric NP and ED attending.

## 2023-04-20 NOTE — ED PROVIDER NOTE - OBJECTIVE STATEMENT
63M hx of bipolar on Geodon and divalproex, DM, HLD, HTN, CHAYITO, CKD presenting with 2 weeks of depression, a few days of decreased po intake, dry cough and URI symptoms. Denies fever, chills, chest pain, dyspnea, palpitations, dizziness. No abdominal pain, n/v/d. Denies HI/SI. Denies substance use. Denies hallucinations. States "I have no one to talk to" and "I have no friends or family". Requesting to speak with a psychiatrist.

## 2023-04-20 NOTE — ED PROVIDER NOTE - PHYSICAL EXAMINATION
GENERAL: non-toxic appearing, in NAD  HEAD: atraumatic, normocephalic  EYES: vision grossly intact, no conjunctivitis or discharge  EARS: hearing grossly intact  NOSE: no nasal discharge, epistaxis   CARDIAC: ST, normotensive, normal S1S2,  no appreciable murmurs, no cyanosis, cap refill < 2 seconds  PULM: no respiratory distress, oxygen saturation on RA wnl, CTAB, no crackles, rales, rhonchi, or wheezing  GI: abdomen nondistended, soft, nontender, no guarding or rebound tenderness, no palpable masses  NEURO: awake and alert, follows commands, normal speech, PERRLA, EOMI, no focal motor or sensory deficits, normal gait  MSK: spine appears normal, no joint swelling or erythema, no gross deformities of extremities  EXT: no peripheral edema, calf tenderness, redness or swelling  SKIN: warm, dry, and intact, no rashes  PSYCH: appropriate mood and affect

## 2023-04-20 NOTE — H&P ADULT - ASSESSMENT
64 yo M from home with PMH of bipolar disorder, DM, HTN, HLD, CHAYITO, CKD presenting to ER with cc of feeling depressed for two weeks    #Asymptomatic Parainfluenza infection  - Will place on contact isolation  - IVF x 24hrs, monitor    #Tachycardia, sinus  - Will admit to telemetry  - Noted elevation in D-dimer, ABG WNL  - Will check TSH/FT4  - Will f/u LE dopplers to r/o DVT, TTE   - Will give IVF x 24hrs  - Likely tachycardia might be due to parainfluenza, decreased oral intake    #Bipolar disorder  - Telepsych consulted by ER  - Will f/u psychiatry team recommendations; Spoke with [facility] Adan; will see patient    #DM-II    #HTN, HLD    #CHAYITO    #CKD stable    DVT PPX: Lovenox  Am labs, full code  DISP: Pending course 62 yo M from home with PMH of bipolar disorder, DM, HTN, HLD, CHAYITO, CKD presenting to ER with cc of feeling depressed for two weeks      #Acute DVT left tibioperoneal trunk and left gastrocnemius vein, r/o PE  #Tachycardia, sinus  - Will admit to telemetry with pulse oximetry  - CONSULT Hematology, nephrology  - Noted elevation in D-dimer, ABG WNL  - Will perfomr V/Q scan to r/o PE; cannot do CTA due to CKD  - Will start heparin drip for now, GOAL PTT as per nomogram  - Will check TSH/FT4  - TTE pending  - Will give IVF x 24hrs    #Asymptomatic Parainfluenza infection  - Will place on contact isolation  - IVF x 24hrs, monitor    #CKD  - Creatinine noted to be around his baseline  - Will consult Dr Hernandez given his new acute DVT and need for AC  - C/w NaHCO3 BID  - IVF x24hrs    #Bipolar disorder  - Telepsych consulted by ER  - Will f/u psychiatry team recommendations; Spoke with [facility] Adan; will see patient  - C/w depakote, geodon    #DM-II  - WIll c/w home dose of lantus 40units qhs, admelog 12units premeal  - FS q ac and hs, ISS  - A1c pending    #HTN, HLD  - C/w metoprolol 25mg BID, losartan 25mg daily, lipitor 40mg qhs    #CHAYITO/OHS/Obesity  - DOes not wear his CPAP at night and refusing to use hospital CPAP  - WIll c/w continuous pulse oximetry, oxygen prn    DVT PPX: Full dose AC with heparin drip  Am labs, full code  DISP: Pending course

## 2023-04-20 NOTE — ED BEHAVIORAL HEALTH ASSESSMENT NOTE - DESCRIPTION
Calm and cooperative    T(C): 36 (04-20-23 @ 08:55)  T(F): 96.8 (04-20-23 @ 08:55), Max: 96.8 (04-20-23 @ 08:55)  HR: 104 (04-20-23 @ 11:21) (104 - 114)  BP: 100/68 (04-20-23 @ 11:21) (100/68 - 123/75)  RR:  (18 - 22)  SpO2:  (93% - 96%)  Wt(kg): -- Calm and cooperative    RVP +parainfluenza (asymptomatic?)   elevated Cr, D-Dimer, CK, spoke with ED attending, pt under care of cardiologist, D-Dimer elevate likely 2/2 CKD, NTD acutely at this time     T(C): 36 (04-20-23 @ 08:55)  T(F): 96.8 (04-20-23 @ 08:55), Max: 96.8 (04-20-23 @ 08:55)  HR: 104 (04-20-23 @ 11:21) (104 - 114)  BP: 100/68 (04-20-23 @ 11:21) (100/68 - 123/75)  RR:  (18 - 22)  SpO2:  (93% - 96%)  Wt(kg): -- Single, , one adult son (estranged), unemployed, lives alone, used to work as a . CKD 2/2 Lithium Use per records, HLD, HTN, DM2, Obesity, arthritis, knee pain

## 2023-04-20 NOTE — ED ADULT NURSE NOTE - NSIMPLEMENTINTERV_GEN_ALL_ED
Implemented All Fall Risk Interventions:  [place] to call system. Call bell, personal items and telephone within reach. Instruct patient to call for assistance. Room bathroom lighting operational. Non-slip footwear when patient is off stretcher. Physically safe environment: no spills, clutter or unnecessary equipment. Stretcher in lowest position, wheels locked, appropriate side rails in place. Provide visual cue, wrist band, yellow gown, etc. Monitor gait and stability. Monitor for mental status changes and reorient to person, place, and time. Review medications for side effects contributing to fall risk. Reinforce activity limits and safety measures with patient and family.

## 2023-04-20 NOTE — ED PROVIDER NOTE - NS ED ROS FT
----- Message from Mary Rodriguez sent at 3/31/2017  2:08 PM CDT -----  Patient states her antibiotic isn't at the pharmacy, please call to advise [phone] (home)   Patient is at pharmacy [address] [facility] - [address]  [address]  [place] [address]  Phone: [phone] Fax: [phone]       GENERAL: no fever, chills, fatigue, weight loss, night sweats  HEENT: no eye pain, discharge, conjunctivitis, ear pain, hearing loss, rhinorrhea, congestion, throat pain  CARDIAC: no chest pain, palpitations, lightheadedness, syncope  PULM: no dyspnea, wheezing  GI: no abdominal pain, nausea, vomiting, diarrhea, constipation, melena, hematochezia  : no urinary dysuria, frequency, incontinence, hematuria  NEURO: no headache, changes in vision, motor weakness, sensory changes  MSK: no joint pain, joint swelling, myalgias  SKIN: no rashes  HEME: no active bleeding, excessive bruising  PSYCH: + depression

## 2023-04-20 NOTE — ED BEHAVIORAL HEALTH NOTE - BEHAVIORAL HEALTH NOTE
ED Course completed by Chance Ring, note entered by Behavioral Health Supervisor, Nidhi Montes     ===================      PRE-HOSPITAL COURSE      ==================      SOURCE:  Yudith TORRES    DETAILS:  Pt bib EMS, complaining of being alone, no SI/HI.     ============      ED COURSE      ============      SOURCE: Yudith TORRES    ARRIVAL: Per RN patient bibEMS, to ED. Patient was cooperative with triage process.    BELONGINGS: Per RN, pt arrived with extra belongings including a wallet but nothing confiscated or placed with security. All belongings were searched, no contraband found. Patient does not have a 1:1.     BEHAVIOR:  RN reported that the patient appears to be well groomed, has fair hygiene. RN described patient to be in a low mood with a flat affect, eye contact is poor, but is calm and cooperative. Rn reported that the pt uses clear communication and has a linear thought process.  Per RN, the pt is not displaying aggression towards staff or others. Furthermore, the pt has no current SI/HI A/V.H. There are no visible marks, bruises, or lacerations on the body. Pt reported that he attends adult care every day and sees a therapist weekly. Pt reports that he is depressed and would like to stop his meds.     TREATMENT: No medication administered in ED.  No intervention required.     VISITORS: No visitors at bedside.     -----------------------------------------------     COVID Exposure Screen- collateral (i.e. third-party, chart review, belongings, etc; include EMS and ED staff)     ---------------------------------------------------     1. Has the patient had a COVID-19 test in the last 90 days? Unknown.     2. Has the patient tested positive for COVID-19 antibodies? Unknown.     3.Has the patient received 2 doses of the COVID-19 vaccine?  Unknown.     4. In the past 10 days, has the patient been around anyone with a positive COVID-19 test?* Unknown.     5.Has the patient been out of [place] within the past 10 days? Unknown.

## 2023-04-20 NOTE — ED BEHAVIORAL HEALTH ASSESSMENT NOTE - RISK ASSESSMENT
Pt is at low imminent risk of harm to self or others, though he has elevated chronic risk of harm to self due to unmodifiable risk factors including patient's chronic health issues, history of bipolar disorder, history of psychiatric hospitalizations, male gender, domiciled alone, and his age. These risks are being addressed in weekly therapy, outpatient treatment with a psychiatric NP, and daily adult day care. He has other risk factors including transient passive suicidal ideation (in context of acute medical issues) without intent or plan, and insomnia, though this is chronic and may be related to not using CPAP coupled with other medical issues. Pt is also stating that he will stop all his medications (though he has no history of actually stopping his medications and per patient he has a history of threatening to stop them before). His psychiatrist also states patient is very compliant with medication and that he is not concerned that patient will stop all of his medications. Pt has many protective factors that mitigate these risk factors, he is help seeking and self presented, he has close psychiatric follow up with providers who are familiar with him, he is currently taking care of himself and he is compliant with his medications, he is attending his day care and doctors appointments, he is not acutely manic, psychotic, or depressed, he is future oriented, and he identifies reasons to live, he has no history of suicide attempts, no access to guns or lethal means, and no history of substance use.

## 2023-04-20 NOTE — H&P ADULT - NSHPREVIEWOFSYSTEMS_GEN_ALL_CORE
REVIEW OF SYSTEMS:  CONSTITUTIONAL: No fever, weight loss, or fatigue  EYES: No eye pain, visual disturbances, or discharge  ENMT:  No difficulty hearing, tinnitus, vertigo; No sinus or throat pain  NECK: No pain or stiffness  BREASTS: No pain, masses, or nipple discharge  RESPIRATORY: No cough, wheezing, chills or hemoptysis; No shortness of breath  CARDIOVASCULAR: No chest pain, palpitations, dizziness, or leg swelling  GASTROINTESTINAL: No abdominal or epigastric pain. No nausea, vomiting, or hematemesis; No diarrhea or constipation. No melena or hematochezia.  GENITOURINARY: No dysuria, frequency, hematuria, or incontinence  NEUROLOGICAL: No headaches, memory loss, loss of strength, numbness, or tremors  SKIN: No itching, burning, rashes, or lesions   LYMPH NODES: No enlarged glands  ENDOCRINE: No heat or cold intolerance; No hair loss  MUSCULOSKELETAL: No joint pain or swelling; No muscle, back, or extremity pain  PSYCHIATRIC: No depression, anxiety, mood swings, or difficulty sleeping  HEME/LYMPH: No easy bruising, or bleeding gums  ALLERY AND IMMUNOLOGIC: No hives or eczema    ALL ROS REVIEWED AND NORMAL EXCEPT AS STATED ABOVE REVIEW OF SYSTEMS:  CONSTITUTIONAL: No fever, weight loss, +++ fatigue  EYES: No eye pain, visual disturbances, or discharge  ENMT:  No difficulty hearing, tinnitus, vertigo; No sinus or throat pain  NECK: No pain or stiffness  BREASTS: No pain, masses, or nipple discharge  RESPIRATORY: No cough, wheezing, chills or hemoptysis; No shortness of breath  CARDIOVASCULAR: No chest pain, palpitations, dizziness, or leg swelling  GASTROINTESTINAL: No abdominal or epigastric pain. No nausea, vomiting, or hematemesis; No diarrhea or constipation. No melena or hematochezia.  GENITOURINARY: No dysuria, frequency, hematuria, or incontinence  NEUROLOGICAL: No headaches, memory loss, loss of strength, numbness, or tremors  SKIN: No itching, burning, rashes, or lesions   LYMPH NODES: No enlarged glands  ENDOCRINE: No heat or cold intolerance; No hair loss  MUSCULOSKELETAL: No joint pain or swelling; No muscle, back, or extremity pain  PSYCHIATRIC: +++ depression, ++ difficulty sleeping  HEME/LYMPH: No easy bruising, or bleeding gums  ALLERY AND IMMUNOLOGIC: No hives or eczema    ALL ROS REVIEWED AND NORMAL EXCEPT AS STATED ABOVE

## 2023-04-21 DIAGNOSIS — I82.409 ACUTE EMBOLISM AND THROMBOSIS OF UNSPECIFIED DEEP VEINS OF UNSPECIFIED LOWER EXTREMITY: ICD-10-CM

## 2023-04-21 LAB
A1C WITH ESTIMATED AVERAGE GLUCOSE RESULT: 9.4 % — HIGH (ref 4–5.6)
ANION GAP SERPL CALC-SCNC: 14 MMOL/L — SIGNIFICANT CHANGE UP (ref 5–17)
APTT BLD: 137.8 SEC — CRITICAL HIGH (ref 27.5–35.5)
APTT BLD: 31 SEC — SIGNIFICANT CHANGE UP (ref 27.5–35.5)
APTT BLD: > 200 SEC (ref 27.5–35.5)
BUN SERPL-MCNC: 38 MG/DL — HIGH (ref 7–23)
CALCIUM SERPL-MCNC: 9 MG/DL — SIGNIFICANT CHANGE UP (ref 8.4–10.5)
CHLORIDE SERPL-SCNC: 104 MMOL/L — SIGNIFICANT CHANGE UP (ref 96–108)
CHOLEST SERPL-MCNC: 139 MG/DL — SIGNIFICANT CHANGE UP
CO2 SERPL-SCNC: 24 MMOL/L — SIGNIFICANT CHANGE UP (ref 22–31)
CREAT SERPL-MCNC: 2.28 MG/DL — HIGH (ref 0.5–1.3)
EGFR: 32 ML/MIN/1.73M2 — LOW
ESTIMATED AVERAGE GLUCOSE: 223 MG/DL — HIGH (ref 68–114)
FOLATE SERPL-MCNC: 15.4 NG/ML — SIGNIFICANT CHANGE UP
GLUCOSE BLDC GLUCOMTR-MCNC: 122 MG/DL — HIGH (ref 70–99)
GLUCOSE BLDC GLUCOMTR-MCNC: 134 MG/DL — HIGH (ref 70–99)
GLUCOSE BLDC GLUCOMTR-MCNC: 159 MG/DL — HIGH (ref 70–99)
GLUCOSE BLDC GLUCOMTR-MCNC: 193 MG/DL — HIGH (ref 70–99)
GLUCOSE SERPL-MCNC: 140 MG/DL — HIGH (ref 70–99)
HCT VFR BLD CALC: 44.1 % — SIGNIFICANT CHANGE UP (ref 39–50)
HDLC SERPL-MCNC: 38 MG/DL — LOW
HGB BLD-MCNC: 14.2 G/DL — SIGNIFICANT CHANGE UP (ref 13–17)
INR BLD: 1.25 RATIO — HIGH (ref 0.88–1.16)
LIPID PNL WITH DIRECT LDL SERPL: 81 MG/DL — SIGNIFICANT CHANGE UP
MAGNESIUM SERPL-MCNC: 2.1 MG/DL — SIGNIFICANT CHANGE UP (ref 1.6–2.6)
MCHC RBC-ENTMCNC: 29.2 PG — SIGNIFICANT CHANGE UP (ref 27–34)
MCHC RBC-ENTMCNC: 32.2 GM/DL — SIGNIFICANT CHANGE UP (ref 32–36)
MCV RBC AUTO: 90.6 FL — SIGNIFICANT CHANGE UP (ref 80–100)
NON HDL CHOLESTEROL: 101 MG/DL — SIGNIFICANT CHANGE UP
NRBC # BLD: 0 /100 WBCS — SIGNIFICANT CHANGE UP (ref 0–0)
PLATELET # BLD AUTO: 136 K/UL — LOW (ref 150–400)
POTASSIUM SERPL-MCNC: 3.9 MMOL/L — SIGNIFICANT CHANGE UP (ref 3.5–5.3)
POTASSIUM SERPL-SCNC: 3.9 MMOL/L — SIGNIFICANT CHANGE UP (ref 3.5–5.3)
PROTHROM AB SERPL-ACNC: 14.5 SEC — HIGH (ref 10.5–13.4)
RBC # BLD: 4.87 M/UL — SIGNIFICANT CHANGE UP (ref 4.2–5.8)
RBC # FLD: 14.7 % — HIGH (ref 10.3–14.5)
SODIUM SERPL-SCNC: 142 MMOL/L — SIGNIFICANT CHANGE UP (ref 135–145)
T4 FREE SERPL-MCNC: 1.9 NG/DL — HIGH (ref 0.9–1.8)
TRIGL SERPL-MCNC: 102 MG/DL — SIGNIFICANT CHANGE UP
TSH SERPL-MCNC: 0.34 UIU/ML — LOW (ref 0.36–3.74)
VIT B12 SERPL-MCNC: 628 PG/ML — SIGNIFICANT CHANGE UP (ref 232–1245)
WBC # BLD: 5.04 K/UL — SIGNIFICANT CHANGE UP (ref 3.8–10.5)
WBC # FLD AUTO: 5.04 K/UL — SIGNIFICANT CHANGE UP (ref 3.8–10.5)

## 2023-04-21 PROCEDURE — 99223 1ST HOSP IP/OBS HIGH 75: CPT

## 2023-04-21 PROCEDURE — 99232 SBSQ HOSP IP/OBS MODERATE 35: CPT | Mod: FS

## 2023-04-21 PROCEDURE — 99233 SBSQ HOSP IP/OBS HIGH 50: CPT | Mod: FS

## 2023-04-21 PROCEDURE — 78582 LUNG VENTILAT&PERFUS IMAGING: CPT | Mod: 26

## 2023-04-21 RX ORDER — HEPARIN SODIUM 5000 [USP'U]/ML
5000 INJECTION INTRAVENOUS; SUBCUTANEOUS ONCE
Refills: 0 | Status: COMPLETED | OUTPATIENT
Start: 2023-04-21 | End: 2023-04-21

## 2023-04-21 RX ORDER — METOPROLOL TARTRATE 50 MG
1 TABLET ORAL
Qty: 0 | Refills: 0 | DISCHARGE

## 2023-04-21 RX ORDER — CARVEDILOL PHOSPHATE 80 MG/1
1 CAPSULE, EXTENDED RELEASE ORAL
Refills: 0 | DISCHARGE

## 2023-04-21 RX ORDER — APIXABAN 2.5 MG/1
2.5 TABLET, FILM COATED ORAL
Refills: 0 | Status: DISCONTINUED | OUTPATIENT
Start: 2023-04-21 | End: 2023-04-22

## 2023-04-21 RX ORDER — HEPARIN SODIUM 5000 [USP'U]/ML
INJECTION INTRAVENOUS; SUBCUTANEOUS
Qty: 25000 | Refills: 0 | Status: DISCONTINUED | OUTPATIENT
Start: 2023-04-21 | End: 2023-04-21

## 2023-04-21 RX ORDER — CARVEDILOL PHOSPHATE 80 MG/1
12.5 CAPSULE, EXTENDED RELEASE ORAL EVERY 12 HOURS
Refills: 0 | Status: DISCONTINUED | OUTPATIENT
Start: 2023-04-21 | End: 2023-04-22

## 2023-04-21 RX ADMIN — Medication 3 MILLIGRAM(S): at 21:38

## 2023-04-21 RX ADMIN — HEPARIN SODIUM 2600 UNIT(S)/HR: 5000 INJECTION INTRAVENOUS; SUBCUTANEOUS at 04:08

## 2023-04-21 RX ADMIN — Medication 1: at 12:53

## 2023-04-21 RX ADMIN — DIVALPROEX SODIUM 500 MILLIGRAM(S): 500 TABLET, DELAYED RELEASE ORAL at 17:42

## 2023-04-21 RX ADMIN — Medication 1: at 17:41

## 2023-04-21 RX ADMIN — HEPARIN SODIUM 10000 UNIT(S): 5000 INJECTION INTRAVENOUS; SUBCUTANEOUS at 03:31

## 2023-04-21 RX ADMIN — HEPARIN SODIUM 2600 UNIT(S)/HR: 5000 INJECTION INTRAVENOUS; SUBCUTANEOUS at 08:10

## 2023-04-21 RX ADMIN — INSULIN GLARGINE 40 UNIT(S): 100 INJECTION, SOLUTION SUBCUTANEOUS at 21:39

## 2023-04-21 RX ADMIN — Medication 81 MILLIGRAM(S): at 13:13

## 2023-04-21 RX ADMIN — ZIPRASIDONE HYDROCHLORIDE 80 MILLIGRAM(S): 20 CAPSULE ORAL at 17:52

## 2023-04-21 RX ADMIN — ATORVASTATIN CALCIUM 40 MILLIGRAM(S): 80 TABLET, FILM COATED ORAL at 21:38

## 2023-04-21 RX ADMIN — LINACLOTIDE 145 MICROGRAM(S): 145 CAPSULE, GELATIN COATED ORAL at 06:06

## 2023-04-21 RX ADMIN — Medication 100 MILLIGRAM(S): at 02:32

## 2023-04-21 RX ADMIN — APIXABAN 2.5 MILLIGRAM(S): 2.5 TABLET, FILM COATED ORAL at 21:38

## 2023-04-21 RX ADMIN — Medication 100 MILLIGRAM(S): at 17:41

## 2023-04-21 RX ADMIN — DIVALPROEX SODIUM 500 MILLIGRAM(S): 500 TABLET, DELAYED RELEASE ORAL at 05:18

## 2023-04-21 RX ADMIN — HEPARIN SODIUM 0 UNIT(S)/HR: 5000 INJECTION INTRAVENOUS; SUBCUTANEOUS at 10:43

## 2023-04-21 RX ADMIN — CARVEDILOL PHOSPHATE 12.5 MILLIGRAM(S): 80 CAPSULE, EXTENDED RELEASE ORAL at 19:11

## 2023-04-21 RX ADMIN — TAMSULOSIN HYDROCHLORIDE 0.4 MILLIGRAM(S): 0.4 CAPSULE ORAL at 21:38

## 2023-04-21 RX ADMIN — FINASTERIDE 5 MILLIGRAM(S): 5 TABLET, FILM COATED ORAL at 13:13

## 2023-04-21 RX ADMIN — Medication 12 UNIT(S): at 12:54

## 2023-04-21 RX ADMIN — Medication 25 MILLIGRAM(S): at 06:06

## 2023-04-21 RX ADMIN — Medication 12 UNIT(S): at 17:41

## 2023-04-21 NOTE — BH CONSULTATION LIAISON PROGRESS NOTE - NSBHASSESSMENTFT_PSY_ALL_CORE
Pt is a 63 year old male with long standing hx of Bipolar d/o who presents to [facility] for evaluation of depression, seen by ED Tele psych team and cleared to be DC to home when medically stable. Pt is admitted for DVT and on heparin at this time.   Psychiatry following for depression.

## 2023-04-21 NOTE — CONSULT NOTE ADULT - SUBJECTIVE AND OBJECTIVE BOX
MARGI ART  63y  Male  Admitting: BI [facility]    HPI:  64 yo M from home with PMH of bipolar disorder, DM, HTN, HLD, CHAYITO, CKD presenting to ER with cc of feeling depressed for two weeks.   Patient denied substance use, hallucinations. HE has not been eating normally or sleeping.  He had a phone conversation with his psychiatrist who gave him strategies to help him feel better.  Denied chest pain, shest pain, SOB, Fever, N/V, abd pain, diarrhea.  He does not use his CPAP machine at night    IN ER, noted to be tachycardic; checked  d-dimer elevated  Hematology consulted for DVT.      PAST MEDICAL & SURGICAL HISTORY:  Bipolar 1 disorder  on Lithium      Hypertension      T2DM (type 2 diabetes mellitus)      HLD (hyperlipidemia)      CHAYITO (obstructive sleep apnea)  diagnosed >25 years ago, non-compliant with CPAP      Diabetic neuropathy  bilateral feet, ankles      Obesity      Rheumatoid arthritis      History of excision of testicular mass  left, 2009      Cataract of left eye        HEALTH ISSUES - PROBLEM Dx:  Bipolar 1 disorder, depressed, mild      MEDICATIONS  (STANDING):  aspirin  chewable 81 milliGRAM(s) Oral daily  atorvastatin 40 milliGRAM(s) Oral at bedtime  dextrose 5%. 1000 milliLiter(s) (50 mL/Hr) IV Continuous <Continuous>  dextrose 5%. 1000 milliLiter(s) (100 mL/Hr) IV Continuous <Continuous>  dextrose 50% Injectable 25 Gram(s) IV Push once  dextrose 50% Injectable 12.5 Gram(s) IV Push once  dextrose 50% Injectable 25 Gram(s) IV Push once  divalproex  milliGRAM(s) Oral two times a day  finasteride 5 milliGRAM(s) Oral daily  glucagon  Injectable 1 milliGRAM(s) IntraMuscular once  heparin  Infusion.  Unit(s)/Hr (24 mL/Hr) IV Continuous <Continuous>  insulin glargine Injectable (LANTUS) 40 Unit(s) SubCutaneous at bedtime  insulin lispro (ADMELOG) corrective regimen sliding scale   SubCutaneous three times a day before meals  insulin lispro (ADMELOG) corrective regimen sliding scale   SubCutaneous at bedtime  insulin lispro Injectable (ADMELOG) 12 Unit(s) SubCutaneous three times a day before meals  linaclotide 145 MICROGram(s) Oral before breakfast  metoprolol tartrate 25 milliGRAM(s) Oral two times a day  sodium chloride 0.9%. 1000 milliLiter(s) (75 mL/Hr) IV Continuous <Continuous>  tamsulosin 0.4 milliGRAM(s) Oral at bedtime  ziprasidone 80 milliGRAM(s) Oral two times a day    MEDICATIONS  (PRN):  acetaminophen     Tablet .. 650 milliGRAM(s) Oral every 6 hours PRN Temp greater or equal to 38C (100.4F), Mild Pain (1 - 3)  aluminum hydroxide/magnesium hydroxide/simethicone Suspension 30 milliLiter(s) Oral every 4 hours PRN Dyspepsia  benzonatate 100 milliGRAM(s) Oral three times a day PRN Cough  dextrose Oral Gel 15 Gram(s) Oral once PRN Blood Glucose LESS THAN 70 milliGRAM(s)/deciliter  heparin   Injectable 04321 Unit(s) IV Push every 6 hours PRN For aPTT less than 40  heparin   Injectable 5000 Unit(s) IV Push every 6 hours PRN For aPTT between 40 - 57  melatonin 3 milliGRAM(s) Oral at bedtime PRN Insomnia  ondansetron Injectable 4 milliGRAM(s) IV Push every 8 hours PRN Nausea and/or Vomiting    Allergies    Tegretol (Hypotension; Anaphylaxis)  penicillin (Hives)    FAMILY HISTORY:  FH: CAD (coronary artery disease) (Mother); no FH of DVT    SOCIAL HISTORY: No EtOH, no tobacco    REVIEW OF SYSTEMS:    CONSTITUTIONAL: No fevers or chills  EYES/ENT: No visual changes;  No vertigo or throat pain   NECK: No pain or stiffness  RESPIRATORY: No hemoptysis; No shortness of breath  CARDIOVASCULAR: No chest pain or palpitations  GASTROINTESTINAL: No hematemesis; No melena or hematochezia.  GENITOURINARY: No hematuria  NEUROLOGICAL: No numbness  SKIN: No itching  All other review of systems is negative unless indicated above.    Height (cm): 180.3 (04-20 @ 22:31)  Weight (kg): 153 (04-20 @ 22:31)  BMI (kg/m2): 47.1 (04-20 @ 22:31)  BSA (m2): 2.63 (04-20 @ 22:31)    T(F): 97.6 (04-21-23 @ 05:24), Max: 100.4 (04-20-23 @ 22:31)  HR: 109 (04-21-23 @ 05:24)  BP: 125/84 (04-21-23 @ 05:24)  RR: 16 (04-21-23 @ 05:24)  SpO2: 97% (04-21-23 @ 05:24)      GENERAL: NAD, well-developed  HEAD:  Atraumatic, Normocephalic  EYES: EOMI, PERRLA, conjunctiva and sclera clear  NECK: Supple, No JVD  CHEST/LUNG: Clear to auscultation ant.; No wheeze  HEART: Regular rate and rhythm; No murmurs, rubs, or gallops  ABDOMEN: Soft, Nontender, Nondistended; Bowel sounds present  EXTREMITIES: NT  NEUROLOGY: awake, alert  SKIN: No rashes       Labs:             14.2   5.04  )-----------( 136      ( 04-21 @ 06:35 )             44.1                14.1   5.40  )-----------( 167      ( 04-20 @ 10:02 )             43.8     D-Dimer Assay, Quantitative: 1114    142  |  104  |  38<H>  ----------------------------<  140<H>  3.9   |  24  |  2.28<H>    Ca    9.0      21 Apr 2023 06:35  Mg     2.1     04-21    TPro  7.5  /  Alb  3.1<L>  /  TBili  0.6  /  DBili  x   /  AST  31  /  ALT  37  /  AlkPhos  68  04-20    Magnesium, Serum: 2.1 mg/dL [1.6 - 2.6] (04-21 @ 06:35)  Magnesium, Serum: 1.9 mg/dL [1.6 - 2.6] (04-20 @ 10:02)    PT/INR - ( 20 Apr 2023 10:02 )   PT: 13.7 sec;   INR: 1.18 ratio         PTT - ( 21 Apr 2023 02:40 )  PTT:31.0 sec    Consultant notes reviewed : YES [x ] ; NO [ ]    Radiology and additional tests:  < from: US Duplex Venous Lower Ext Complete, Bilateral (04.20.23 @ 13:02) >    IMPRESSION:  Acute DVT left tibioperoneal trunk and left gastrocnemius vein.      < end of copied text >

## 2023-04-21 NOTE — PROGRESS NOTE ADULT - ASSESSMENT
64 yo M from home with PMH of bipolar disorder, DM, HTN, HLD, CHAYITO, CKD presenting to ER with cc of feeling depressed for two weeks      #Acute DVT left tibioperoneal trunk and left gastrocnemius vein, r/o PE  #Tachycardia, sinus  - Will admit to telemetry with pulse oximetry  - CONSULT Hematology, nephrology  - Noted elevation in D-dimer, ABG WNL  - Will perform V/Q scan to r/o PE; cannot do CTA due to CKD  - Continue heparin drip for now, GOAL PTT as per nomogram  - TSH/FT4 reviewed  - TTE pending  - S/P IVF x 24hrs    #Asymptomatic Parainfluenza infection  - Will place on contact isolation  - IVF x 24hrs, monitor    #CKD  - Creatinine noted to be around his baseline  - Will consult Dr Hernandez given his new acute DVT and need for AC  - C/w NaHCO3 BID  - IVF x24hrs    #Bipolar disorder  - Telepsych consulted by ER  - psychiatry consult appreciate recs  - C/w depakote, geodon    #DM-II  - WIll c/w home dose of lantus 40units qhs, admelog 12units premeal  - FS q ac and hs, ISS  - A1c 9.4    #HTN, HLD  - C/w coreg, losartan 25mg daily, lipitor 40mg qhs    #CHAYITO/OHS/Obesity  - Does not wear his CPAP at night and refusing to use hospital CPAP  - Will c/w continuous pulse oximetry, oxygen prn    DVT PPX: Full dose AC with heparin drip  full code  DISP: Pending course 64 yo M from home with PMH of bipolar disorder, DM, HTN, HLD, CHAYITO, CKD presenting to ER with cc of feeling depressed for two weeks.    #Acute DVT left tibioperoneal trunk and left gastrocnemius vein, r/o PE  #Tachycardia, sinus  - CONSULT Hematology, nephrology  - Noted elevation in D-dimer, ABG WNL  - Will perform V/Q scan to r/o PE; cannot do CTA due to CKD  - Continue heparin drip for now, GOAL PTT as per nomogram  - TSH/FT4 reviewed  - TTE pending  - S/P IVF x 24hrs    #Asymptomatic Parainfluenza infection  - Will place on contact isolation  - IVF x 24hrs, monitor    #CKD  - Creatinine noted to be around his baseline  - Nephro Dr Hernandez consulted given his new acute DVT and need for AC - recs holding ARB  - C/w NaHCO3 BID  - s/p IVF x24hrs    #Bipolar disorder  - Telepsych consulted by ER  - psychiatry consult appreciate recs  - C/w depakote, geodon    #DM-II  - WIll c/w home dose of lantus 40units qhs, admelog 12units premeal  - FS q ac and hs, ISS  - A1c 9.4    #HTN, HLD  - C/w coreg, losartan 25mg daily, lipitor 40mg qhs    #CHAYITO/OHS/Obesity  - Does not wear his CPAP at night and refusing to use hospital CPAP  - Will c/w continuous pulse oximetry, oxygen prn    DVT PPX: Full dose AC with heparin drip  full code  DISP: Pending course

## 2023-04-21 NOTE — PROGRESS NOTE ADULT - NS ATTEND AMEND GEN_ALL_CORE FT
For acute DVT, on heparin gtt. VQ scan and TTE pending. Heme consulted.  Psych saw patient for depressive thoughts, cleared for discharge with no concern for suicidal ideation.  Rest of management as above.

## 2023-04-21 NOTE — BH CONSULTATION LIAISON PROGRESS NOTE - NSBHCONSULTRECOMMENDOTHER_PSY_A_CORE FT
Continue both Depakote and Geodon at current dosage.   Obtain VPA level, monitor platelet count, if severely low, would have discussion with pts primary prescriber Martni Burkett at [facility] for alternative mood stabilizer.  Continue both Depakote and Geodon at current dosage.   Obtain VPA level, monitor platelet count, if severely low, would have discussion with pts primary prescriber Martin Burkett at [facility] for alternative mood stabilizer.   SW or case coordinator for [facility] vs home with home health services.   Pt requesting  services- consult placed by writer.

## 2023-04-21 NOTE — BH CONSULTATION LIAISON PROGRESS NOTE - CURRENT MEDICATION
MEDICATIONS  (STANDING):  aspirin  chewable 81 milliGRAM(s) Oral daily  atorvastatin 40 milliGRAM(s) Oral at bedtime  dextrose 5%. 1000 milliLiter(s) (50 mL/Hr) IV Continuous <Continuous>  dextrose 5%. 1000 milliLiter(s) (100 mL/Hr) IV Continuous <Continuous>  dextrose 50% Injectable 25 Gram(s) IV Push once  dextrose 50% Injectable 12.5 Gram(s) IV Push once  dextrose 50% Injectable 25 Gram(s) IV Push once  divalproex  milliGRAM(s) Oral two times a day  finasteride 5 milliGRAM(s) Oral daily  glucagon  Injectable 1 milliGRAM(s) IntraMuscular once  heparin  Infusion.  Unit(s)/Hr (24 mL/Hr) IV Continuous <Continuous>  insulin glargine Injectable (LANTUS) 40 Unit(s) SubCutaneous at bedtime  insulin lispro (ADMELOG) corrective regimen sliding scale   SubCutaneous three times a day before meals  insulin lispro (ADMELOG) corrective regimen sliding scale   SubCutaneous at bedtime  insulin lispro Injectable (ADMELOG) 12 Unit(s) SubCutaneous three times a day before meals  linaclotide 145 MICROGram(s) Oral before breakfast  metoprolol tartrate 25 milliGRAM(s) Oral two times a day  tamsulosin 0.4 milliGRAM(s) Oral at bedtime  ziprasidone 80 milliGRAM(s) Oral two times a day    MEDICATIONS  (PRN):  acetaminophen     Tablet .. 650 milliGRAM(s) Oral every 6 hours PRN Temp greater or equal to 38C (100.4F), Mild Pain (1 - 3)  aluminum hydroxide/magnesium hydroxide/simethicone Suspension 30 milliLiter(s) Oral every 4 hours PRN Dyspepsia  benzonatate 100 milliGRAM(s) Oral three times a day PRN Cough  dextrose Oral Gel 15 Gram(s) Oral once PRN Blood Glucose LESS THAN 70 milliGRAM(s)/deciliter  heparin   Injectable 36503 Unit(s) IV Push every 6 hours PRN For aPTT less than 40  heparin   Injectable 5000 Unit(s) IV Push every 6 hours PRN For aPTT between 40 - 57  melatonin 3 milliGRAM(s) Oral at bedtime PRN Insomnia  ondansetron Injectable 4 milliGRAM(s) IV Push every 8 hours PRN Nausea and/or Vomiting

## 2023-04-21 NOTE — BH CONSULTATION LIAISON PROGRESS NOTE - NSBHCHARTREVIEWVS_PSY_A_CORE FT
Vital Signs Last 24 Hrs  T(C): 36.4 (21 Apr 2023 05:24), Max: 38 (20 Apr 2023 22:31)  T(F): 97.6 (21 Apr 2023 05:24), Max: 100.4 (20 Apr 2023 22:31)  HR: 109 (21 Apr 2023 05:24) (109 - 114)  BP: 125/84 (21 Apr 2023 05:24) (122/82 - 137/89)  BP(mean): 93 (20 Apr 2023 18:30) (93 - 93)  RR: 16 (21 Apr 2023 05:24) (16 - 25)  SpO2: 97% (21 Apr 2023 05:24) (91% - 97%)    Parameters below as of 21 Apr 2023 05:24  Patient On (Oxygen Delivery Method): room air

## 2023-04-21 NOTE — BH CONSULTATION LIAISON PROGRESS NOTE - NSBHCHARTREVIEWINVESTIGATE_PSY_A_CORE FT
< from: 12 Lead ECG (04.20.23 @ 09:25) >      Ventricular Rate 107 BPM    Atrial Rate 107 BPM    P-R Interval 152 ms    QRS Duration 90 ms    Q-T Interval 342 ms    QTC Calculation(Bazett) 456 ms    P Axis 54 degrees    R Axis -24 degrees    T Axis 52 degrees    Diagnosis Line Sinus tachycardia  Cannot rule out Anterior infarct , age undetermined    When compared with ECG of 11-JUL-2022 00:44,  Criteria for Inferior infarct are no longer present  Confirmed by HIRA THOMASON, ZOE ([id number]) on 4/20/2023 9:51:30 AM    < end of copied text >

## 2023-04-21 NOTE — BH CONSULTATION LIAISON PROGRESS NOTE - NSBHFUPINTERVALHXFT_PSY_A_CORE
64 yo M from home with PMH of bipolar disorder, DM, HTN, HLD, CHAYITO, CKD presenting to ER with cc of feeling depressed for two weeks.   Patient denies HI/SI, substance use, hallucinations. States "I have no one to talk to" and "I have no friends or family".   HE has been feeling this way for two weeks.  HE has not been eating normally or sleeping.  He had a phone conversation with his psychiatrist who gave him strategies to help him feel better.  This morning he woke up and just did not want to take his medications anymore.  HE also reports having a wet cough, body aches and pain, denies chest pain, SOB, Fever, N/V, abd pain, diarrhea.  He does not use his CPAP machine at night  IN ER, noted to be tachycardic; checked  d-dimer elevated.   Telepsych called.    Psychiatry F/U 4/21: See initial evaluation from 4/20. Pt has a long standing hx of Bipolar d/o currently on Geodon and Depakote. He is being followed for depression in the context of various medical issues and psychosocial issues. Pt seen and evaluated, he is pleasant and cooperative, polite with writer. Pt reports he has been depressed the past couple of years, triggers are - girlfriend dying two years ago, feeling lonely and isolated. He states he has several social supports, some friends from his group home. Pt adds that he has become debilitated, has difficulty ambulating and misses walking "several miles a day".   Sleep- poor  Appetite- WNL  Energy- fine  Concentration- WNL  He states wants physical therapy, wants to establish a home health aide to "help clean up" his living space.   Pt is future oriented, engaged in his treatment and is praising the staff at [facility].   He has been adherent with taking his medications and per primary RN, has not been a behavioral health issue.  Denied suicidal/homicidal ideations, intent or plan.  Pt is very talkative, has pressured speech, largely in part due to his Bipolar d/o, but can be interrupted.   See rest of MSE below. See recs below.  62 yo M from home with PMH of bipolar disorder, DM, HTN, HLD, CHAYITO, CKD presenting to ER with cc of feeling depressed for two weeks.   Patient denies HI/SI, substance use, hallucinations. States "I have no one to talk to" and "I have no friends or family".   HE has been feeling this way for two weeks.  HE has not been eating normally or sleeping.  He had a phone conversation with his psychiatrist who gave him strategies to help him feel better.  This morning he woke up and just did not want to take his medications anymore.  HE also reports having a wet cough, body aches and pain, denies chest pain, SOB, Fever, N/V, abd pain, diarrhea.  He does not use his CPAP machine at night  IN ER, noted to be tachycardic; checked  d-dimer elevated.   Telepsych called.    Psychiatry F/U 4/21: See initial evaluation from 4/20. Pt has a long standing hx of Bipolar d/o currently on Geodon and Depakote. He is being followed for depression in the context of various medical and psychosocial issues. Pt seen and evaluated, he is pleasant and cooperative, polite with writer. Pt reports he has been depressed the past couple of years, triggers are - girlfriend dying two years ago, feeling lonely and isolated. He states he has several social supports, some friends from his group home. Pt adds that he has become debilitated, has difficulty ambulating and misses walking "several miles a day".   Sleep- poor  Appetite- WNL  Energy- fine  Concentration- WNL  Pt reports he wants physical therapy to regain his strenght, wants to establish a home health aide to "help clean up" his living space.   Pt is future oriented, engaged in his treatment and is praising the staff at [facility].   He has been adherent with taking his medications and per primary RN, has not been a behavioral health issue.  Denied suicidal/homicidal ideations, intent or plan.  Pt is very talkative, has pressured speech, largely in part due to his Bipolar d/o, but can be interrupted.   He asked for a  to get "communion".   See rest of MSE below. See recs below.  62 yo M from home with PMH of bipolar disorder, DM, HTN, HLD, CHAYITO, CKD presenting to ER with cc of feeling depressed for two weeks.   Patient denies HI/SI, substance use, hallucinations. States "I have no one to talk to" and "I have no friends or family".   HE has been feeling this way for two weeks.  HE has not been eating normally or sleeping.  He had a phone conversation with his psychiatrist who gave him strategies to help him feel better.  This morning he woke up and just did not want to take his medications anymore.  HE also reports having a wet cough, body aches and pain, denies chest pain, SOB, Fever, N/V, abd pain, diarrhea.  He does not use his CPAP machine at night  IN ER, noted to be tachycardic; checked  d-dimer elevated.   Telepsych called.    Psychiatry F/U 4/21: See initial evaluation from 4/20. Pt has a long standing hx of Bipolar d/o currently on Geodon and Depakote. He is being followed for depression in the context of various medical and psychosocial issues. Pt seen and evaluated, he is pleasant and cooperative, polite with writer. Pt reports he has been depressed the past couple of years, triggers are - girlfriend dying two years ago, feeling lonely and isolated. He states he has several social supports, some friends from his group home. Pt adds that he has become debilitated, has difficulty ambulating and misses walking "several miles a day".   Sleep- poor  Appetite- WNL  Energy- fine  Concentration- WNL  Pt reports he wants physical therapy to regain his strength, wants to establish a home health aide to "help clean up" his living space.   Pt is future oriented, engaged in his treatment [facility]   He has been adherent with taking his medications and per primary RN, has not been a behavioral health issue.  Denied suicidal/homicidal ideations, intent or plan.  Pt is very talkative, has pressured speech, largely in part due to his Bipolar d/o, but can be interrupted.   He asked for a  to get "communion".   See rest of MSE below. See recs below.

## 2023-04-21 NOTE — BH CONSULTATION LIAISON PROGRESS NOTE - NSBHCHARTREVIEWLAB_PSY_A_CORE FT
14.2   5.04  )-----------( 136      ( 21 Apr 2023 06:35 )             44.1   04-21    142  |  104  |  38<H>  ----------------------------<  140<H>  3.9   |  24  |  2.28<H>    Ca    9.0      21 Apr 2023 06:35  Mg     2.1     04-21    TPro  7.5  /  Alb  3.1<L>  /  TBili  0.6  /  DBili  x   /  AST  31  /  ALT  37  /  AlkPhos  68  04-20

## 2023-04-21 NOTE — CONSULT NOTE ADULT - PROBLEM SELECTOR RECOMMENDATION 9
Acute DVT left tibioperoneal trunk and left gastrocnemius vein-no prior personal h/o VTE, nor FH of VTE. Body habitus may contribute. though d/w patient obtaining hypercoagulable screening evaluation-suggest following the acute event, in ambulatory. Patient expressed his understanding-he stated he will f/u with PCP Dr. JORDY Mckeon post discharge and obtain hematology referral from her. At this time, heparin with transition to oral agent prior to discharge.

## 2023-04-21 NOTE — CONSULT NOTE ADULT - ASSESSMENT
62 yo M from home with PMH of bipolar disorder, DM, HTN, HLD, CHAYITO, CKD presenting to ER with cc of feeling depressed for two weeks.   Patient denied substance use, hallucinations. HE has not been eating normally or sleeping.  He had a phone conversation with his psychiatrist who gave him strategies to help him feel better.  Denied chest pain, shest pain, SOB, Fever, N/V, abd pain, diarrhea.  He does not use his CPAP machine at night    IN ER, noted to be tachycardic; checked  d-dimer elevated  Hematology consulted for DVT.

## 2023-04-22 ENCOUNTER — TRANSCRIPTION ENCOUNTER (OUTPATIENT)
Age: 63
End: 2023-04-22

## 2023-04-22 VITALS
DIASTOLIC BLOOD PRESSURE: 91 MMHG | OXYGEN SATURATION: 96 % | SYSTOLIC BLOOD PRESSURE: 140 MMHG | RESPIRATION RATE: 19 BRPM | TEMPERATURE: 98 F | HEART RATE: 95 BPM

## 2023-04-22 LAB
ANION GAP SERPL CALC-SCNC: 11 MMOL/L — SIGNIFICANT CHANGE UP (ref 5–17)
APPEARANCE UR: CLEAR — SIGNIFICANT CHANGE UP
BACTERIA # UR AUTO: NEGATIVE /HPF — SIGNIFICANT CHANGE UP
BILIRUB UR-MCNC: NEGATIVE — SIGNIFICANT CHANGE UP
BUN SERPL-MCNC: 36 MG/DL — HIGH (ref 7–23)
CALCIUM SERPL-MCNC: 9 MG/DL — SIGNIFICANT CHANGE UP (ref 8.4–10.5)
CHLORIDE SERPL-SCNC: 103 MMOL/L — SIGNIFICANT CHANGE UP (ref 96–108)
CO2 SERPL-SCNC: 26 MMOL/L — SIGNIFICANT CHANGE UP (ref 22–31)
COLOR SPEC: YELLOW — SIGNIFICANT CHANGE UP
CREAT SERPL-MCNC: 2.2 MG/DL — HIGH (ref 0.5–1.3)
DIFF PNL FLD: NEGATIVE — SIGNIFICANT CHANGE UP
EGFR: 33 ML/MIN/1.73M2 — LOW
EPI CELLS # UR: SIGNIFICANT CHANGE UP
GLUCOSE BLDC GLUCOMTR-MCNC: 130 MG/DL — HIGH (ref 70–99)
GLUCOSE BLDC GLUCOMTR-MCNC: 130 MG/DL — HIGH (ref 70–99)
GLUCOSE BLDC GLUCOMTR-MCNC: 153 MG/DL — HIGH (ref 70–99)
GLUCOSE SERPL-MCNC: 157 MG/DL — HIGH (ref 70–99)
GLUCOSE UR QL: NEGATIVE — SIGNIFICANT CHANGE UP
HCT VFR BLD CALC: 42 % — SIGNIFICANT CHANGE UP (ref 39–50)
HGB BLD-MCNC: 13.3 G/DL — SIGNIFICANT CHANGE UP (ref 13–17)
KETONES UR-MCNC: NEGATIVE — SIGNIFICANT CHANGE UP
LEUKOCYTE ESTERASE UR-ACNC: NEGATIVE — SIGNIFICANT CHANGE UP
MCHC RBC-ENTMCNC: 29 PG — SIGNIFICANT CHANGE UP (ref 27–34)
MCHC RBC-ENTMCNC: 31.7 GM/DL — LOW (ref 32–36)
MCV RBC AUTO: 91.7 FL — SIGNIFICANT CHANGE UP (ref 80–100)
NITRITE UR-MCNC: NEGATIVE — SIGNIFICANT CHANGE UP
NRBC # BLD: 0 /100 WBCS — SIGNIFICANT CHANGE UP (ref 0–0)
PH UR: 6.5 — SIGNIFICANT CHANGE UP (ref 5–8)
PLATELET # BLD AUTO: 137 K/UL — LOW (ref 150–400)
POTASSIUM SERPL-MCNC: 4 MMOL/L — SIGNIFICANT CHANGE UP (ref 3.5–5.3)
POTASSIUM SERPL-SCNC: 4 MMOL/L — SIGNIFICANT CHANGE UP (ref 3.5–5.3)
PROT UR-MCNC: 15
RBC # BLD: 4.58 M/UL — SIGNIFICANT CHANGE UP (ref 4.2–5.8)
RBC # FLD: 14.6 % — HIGH (ref 10.3–14.5)
RBC CASTS # UR COMP ASSIST: SIGNIFICANT CHANGE UP /HPF (ref 0–4)
SODIUM SERPL-SCNC: 140 MMOL/L — SIGNIFICANT CHANGE UP (ref 135–145)
SP GR SPEC: 1.01 — SIGNIFICANT CHANGE UP (ref 1.01–1.02)
UROBILINOGEN FLD QL: NEGATIVE — SIGNIFICANT CHANGE UP
WBC # BLD: 5.27 K/UL — SIGNIFICANT CHANGE UP (ref 3.8–10.5)
WBC # FLD AUTO: 5.27 K/UL — SIGNIFICANT CHANGE UP (ref 3.8–10.5)
WBC UR QL: SIGNIFICANT CHANGE UP /HPF (ref 0–5)

## 2023-04-22 PROCEDURE — 82746 ASSAY OF FOLIC ACID SERUM: CPT

## 2023-04-22 PROCEDURE — 78582 LUNG VENTILAT&PERFUS IMAGING: CPT

## 2023-04-22 PROCEDURE — 99239 HOSP IP/OBS DSCHRG MGMT >30: CPT

## 2023-04-22 PROCEDURE — 85379 FIBRIN DEGRADATION QUANT: CPT

## 2023-04-22 PROCEDURE — 0225U NFCT DS DNA&RNA 21 SARSCOV2: CPT

## 2023-04-22 PROCEDURE — 85027 COMPLETE CBC AUTOMATED: CPT

## 2023-04-22 PROCEDURE — 85610 PROTHROMBIN TIME: CPT

## 2023-04-22 PROCEDURE — 83036 HEMOGLOBIN GLYCOSYLATED A1C: CPT

## 2023-04-22 PROCEDURE — A9540: CPT

## 2023-04-22 PROCEDURE — 80307 DRUG TEST PRSMV CHEM ANLYZR: CPT

## 2023-04-22 PROCEDURE — 82962 GLUCOSE BLOOD TEST: CPT

## 2023-04-22 PROCEDURE — 99285 EMERGENCY DEPT VISIT HI MDM: CPT

## 2023-04-22 PROCEDURE — 93970 EXTREMITY STUDY: CPT

## 2023-04-22 PROCEDURE — 93005 ELECTROCARDIOGRAM TRACING: CPT

## 2023-04-22 PROCEDURE — 82607 VITAMIN B-12: CPT

## 2023-04-22 PROCEDURE — 36415 COLL VENOUS BLD VENIPUNCTURE: CPT

## 2023-04-22 PROCEDURE — 83735 ASSAY OF MAGNESIUM: CPT

## 2023-04-22 PROCEDURE — 82803 BLOOD GASES ANY COMBINATION: CPT

## 2023-04-22 PROCEDURE — 80061 LIPID PANEL: CPT

## 2023-04-22 PROCEDURE — 84443 ASSAY THYROID STIM HORMONE: CPT

## 2023-04-22 PROCEDURE — 85025 COMPLETE CBC W/AUTO DIFF WBC: CPT

## 2023-04-22 PROCEDURE — 80164 ASSAY DIPROPYLACETIC ACD TOT: CPT

## 2023-04-22 PROCEDURE — 83690 ASSAY OF LIPASE: CPT

## 2023-04-22 PROCEDURE — 84436 ASSAY OF TOTAL THYROXINE: CPT

## 2023-04-22 PROCEDURE — A9567: CPT

## 2023-04-22 PROCEDURE — 85730 THROMBOPLASTIN TIME PARTIAL: CPT

## 2023-04-22 PROCEDURE — 84439 ASSAY OF FREE THYROXINE: CPT

## 2023-04-22 PROCEDURE — 84484 ASSAY OF TROPONIN QUANT: CPT

## 2023-04-22 PROCEDURE — 82550 ASSAY OF CK (CPK): CPT

## 2023-04-22 PROCEDURE — 71045 X-RAY EXAM CHEST 1 VIEW: CPT

## 2023-04-22 PROCEDURE — 83880 ASSAY OF NATRIURETIC PEPTIDE: CPT

## 2023-04-22 PROCEDURE — 81001 URINALYSIS AUTO W/SCOPE: CPT

## 2023-04-22 PROCEDURE — 83605 ASSAY OF LACTIC ACID: CPT

## 2023-04-22 PROCEDURE — 80053 COMPREHEN METABOLIC PANEL: CPT

## 2023-04-22 PROCEDURE — 82010 KETONE BODYS QUAN: CPT

## 2023-04-22 PROCEDURE — 80048 BASIC METABOLIC PNL TOTAL CA: CPT

## 2023-04-22 RX ORDER — INSULIN GLARGINE 100 [IU]/ML
20 INJECTION, SOLUTION SUBCUTANEOUS
Qty: 0 | Refills: 0 | DISCHARGE

## 2023-04-22 RX ORDER — APIXABAN 2.5 MG/1
1 TABLET, FILM COATED ORAL
Qty: 60 | Refills: 0
Start: 2023-04-22 | End: 2023-05-21

## 2023-04-22 RX ORDER — TAMSULOSIN HYDROCHLORIDE 0.4 MG/1
1 CAPSULE ORAL
Qty: 0 | Refills: 0 | DISCHARGE

## 2023-04-22 RX ORDER — INSULIN GLARGINE 100 [IU]/ML
40 INJECTION, SOLUTION SUBCUTANEOUS
Qty: 0 | Refills: 0 | DISCHARGE
Start: 2023-04-22

## 2023-04-22 RX ADMIN — Medication 12 UNIT(S): at 08:21

## 2023-04-22 RX ADMIN — Medication 81 MILLIGRAM(S): at 12:50

## 2023-04-22 RX ADMIN — Medication 1: at 08:22

## 2023-04-22 RX ADMIN — LINACLOTIDE 145 MICROGRAM(S): 145 CAPSULE, GELATIN COATED ORAL at 05:40

## 2023-04-22 RX ADMIN — DIVALPROEX SODIUM 500 MILLIGRAM(S): 500 TABLET, DELAYED RELEASE ORAL at 05:40

## 2023-04-22 RX ADMIN — APIXABAN 2.5 MILLIGRAM(S): 2.5 TABLET, FILM COATED ORAL at 05:40

## 2023-04-22 RX ADMIN — Medication 12 UNIT(S): at 16:51

## 2023-04-22 RX ADMIN — FINASTERIDE 5 MILLIGRAM(S): 5 TABLET, FILM COATED ORAL at 12:50

## 2023-04-22 RX ADMIN — CARVEDILOL PHOSPHATE 12.5 MILLIGRAM(S): 80 CAPSULE, EXTENDED RELEASE ORAL at 05:41

## 2023-04-22 RX ADMIN — ZIPRASIDONE HYDROCHLORIDE 80 MILLIGRAM(S): 20 CAPSULE ORAL at 05:40

## 2023-04-22 RX ADMIN — Medication 12 UNIT(S): at 12:38

## 2023-04-22 NOTE — PROVIDER CONTACT NOTE (OTHER) - BACKGROUND
Pt is a 64 y/o admit dx Major depressive disorder with single episode. PMHx bipolar, T2Dm, HTN, Obesity

## 2023-04-22 NOTE — DISCHARGE NOTE PROVIDER - CARE PROVIDER_API CALL
KALEIGH LI  [address]  [place] [address]  Phone: [phone]  Fax: [phone]  Follow Up Time:

## 2023-04-22 NOTE — PROVIDER CONTACT NOTE (MEDICATION) - ACTION/TREATMENT ORDERED:
DESIREE Templeton dcd heaprin and ordered eliquis instead
Consent: The patient's consent was obtained including but not limited to risks of crusting, scabbing, blistering, scarring, darker or lighter pigmentary change, recurrence, incomplete removal and infection.
Medical Necessity Information: It is in your best interest to select a reason for this procedure from the list below. All of these items fulfill various CMS LCD requirements except the new and changing color options.
Add 52 Modifier (Optional): no
Detail Level: Detailed
Medical Necessity Clause: This procedure was medically necessary because the lesions that were treated were:
Post-Care Instructions: I reviewed with the patient in detail post-care instructions. Patient is to wear sunprotection, and avoid picking at any of the treated lesions. Pt may apply Vaseline to crusted or scabbing areas.

## 2023-04-22 NOTE — DISCHARGE NOTE PROVIDER - HOSPITAL COURSE
Hospital Course  HPI:  62 yo M from home with PMH of bipolar disorder, DM, HTN, HLD, CHAYITO, CKD presenting to ER with cc of feeling depressed for two weeks.   Patient denies HI/SI, substance use, hallucinations. States "I have no one to talk to" and "I have no friends or family".   HE has been feeling this way for two weeks.  HE has not been eating normally or sleeping.  He had a phone conversation with his psychiatrist who gave him strategies to help him feel better.    This morning he woke up and just did not want to take his medications anymore.  HE also reports having a wet cough, body aches and pain, denies chest pain, shest pain, SOB, Fever, N/V, abd pain, diarrhea.  He does not use his CPAP machine at night    IN ER, noted to be tachycardic; checked  d-dimer elevated  Telepsych called (20 Apr 2023 12:39)    Patient admitted for not feeling well and was found to have DVT of LLE. He was placed on heparin gtt due to renal function and VQ scan was ordered to rule out PE which was low probability. Patient had echo ordered but stated he wanted it done as outpatient. He was seen by vivek and said ok to start DOAC with Eliquis which he was started on, however on discharge day patient refused to take blood thinners. Risks and benefits explained to patient and stated he understood- prescription was still sent to the pharmacy. Patient stated he wanted to discuss with PCP. He was also found to be parainfluenza positive, resp status stable and to maintain isolation precaution for 5 days. He was seen by nephro for CKD and stable. Patient to have outpatient hypercoag workup with vivek. He was also seen by psych for depressive thoughts and cleared for discharge home- no SI. Stable for dc home    You were admitted for weakness  You were diagnosed with DVT in left leg  You were treated with blood thinners  You were prescribed the following new medications: Eliquis 2.5mg twice daily    You will need to follow up with your primary care physician.    Discharging Provider:  Gisela Fuller D.O.  Contact Info: Cell [phone] - Please call with any questions or concerns.    Outpatient Provider: Dr. Julius Mckeon (informed)

## 2023-04-22 NOTE — PROVIDER CONTACT NOTE (OTHER) - ACTION/TREATMENT ORDERED:
Made MD Amy aware of the same. MD instructed this RN to hold Lantus 40 units at bedtime. Lantus was not given as per MD order. Will continue to monitor

## 2023-04-22 NOTE — PROGRESS NOTE ADULT - ASSESSMENT
62 yo M from home with PMH of bipolar disorder, DM, HTN, HLD, CHAYITO, CKD presenting to ER with cc of feeling depressed for two weeks.    #Acute DVT left tibioperoneal trunk and left gastrocnemius vein, r/o PE  #Tachycardia, sinus  - Noted elevation in D-dimer, ABG WNL  - found to have DVT of LLE  - VQ scan with low probability for PE  - transitioned from heparin gtt to Eliquis 2.5mg BID  - patient refusing echo, states he will do it outpatient  - discussed with patient's pharmacy- since it is delivery service, it cannot be delivered until Monday night. Informed patient of this and asked if there are other pharmacies we can send Eliquis to but patient states he has no money to go and  from different pharmacies. He is also at this time refusing blood thinner- risks and benefits explained to patient and he states he does not want to take blood thinners, wants to speak to his primary doctor regarding this and then decide. We discussed at length his decision but he states he does not want to, offered to still send RX to pharmacy and patient is agreeable.    #Asymptomatic Parainfluenza infection  - Will place on contact isolation  - resp status stable    #CKD  - Creatinine noted to be around his baseline  - C/w NaHCO3 BID  - s/p IVF x24hrs  - discussed with nephpebbles, Dr. Hernandez- stable for dc    #Bipolar disorder  - Telepsych consulted by ER  - psychiatry consulted appreciated recs  - C/w depakote, geodon    #DM-II  - WIll c/w home dose of lantus 40units qhs, admelog 12units premeal  - FS q ac and hs, ISS  - A1c 9.4    #HTN, HLD  - C/w coreg, lipitor 40mg qhs  - stop home losartan as per nephro    #CHAYITO/OHS/Obesity  - Does not wear his CPAP at night and refusing to use hospital CPAP  - Will c/w continuous pulse oximetry, oxygen prn    dc home today.

## 2023-04-22 NOTE — PROVIDER CONTACT NOTE (MEDICATION) - ASSESSMENT
Patient in room with mom, Patient alert, appropriate for age, skin warm, moist mucus, respiration non labored, follow commands per age. Mom update on plan of care, call light with in reach. AOx4, VSS, no signs and symptoms od distress, SOB, HA or chest pain

## 2023-04-22 NOTE — PROGRESS NOTE ADULT - REASON FOR ADMISSION
Came to ER stating I feel alone

## 2023-04-22 NOTE — PROGRESS NOTE ADULT - SUBJECTIVE AND OBJECTIVE BOX
Patient is a 63y old  Male who presents with a chief complaint of Came to ER stating I feel alone (2023 22:25)      Patient seen and examined at bedside. Patient seen and examined at bedside. He has no acute complaints at this time, denies SOB, chest pain, palpitations, abd pain. Wants to go home. States he finally got some sleep last night.    ALLERGIES:  Tegretol (Hypotension; Anaphylaxis)  penicillin (Hives)    MEDICATIONS  (STANDING):  apixaban 2.5 milliGRAM(s) Oral two times a day  aspirin  chewable 81 milliGRAM(s) Oral daily  atorvastatin 40 milliGRAM(s) Oral at bedtime  carvedilol 12.5 milliGRAM(s) Oral every 12 hours  dextrose 5%. 1000 milliLiter(s) (50 mL/Hr) IV Continuous <Continuous>  dextrose 5%. 1000 milliLiter(s) (100 mL/Hr) IV Continuous <Continuous>  dextrose 50% Injectable 25 Gram(s) IV Push once  dextrose 50% Injectable 12.5 Gram(s) IV Push once  dextrose 50% Injectable 25 Gram(s) IV Push once  divalproex  milliGRAM(s) Oral two times a day  finasteride 5 milliGRAM(s) Oral daily  glucagon  Injectable 1 milliGRAM(s) IntraMuscular once  insulin glargine Injectable (LANTUS) 40 Unit(s) SubCutaneous at bedtime  insulin lispro (ADMELOG) corrective regimen sliding scale   SubCutaneous three times a day before meals  insulin lispro (ADMELOG) corrective regimen sliding scale   SubCutaneous at bedtime  insulin lispro Injectable (ADMELOG) 12 Unit(s) SubCutaneous three times a day before meals  linaclotide 145 MICROGram(s) Oral before breakfast  tamsulosin 0.4 milliGRAM(s) Oral at bedtime  ziprasidone 80 milliGRAM(s) Oral two times a day    MEDICATIONS  (PRN):  acetaminophen     Tablet .. 650 milliGRAM(s) Oral every 6 hours PRN Temp greater or equal to 38C (100.4F), Mild Pain (1 - 3)  aluminum hydroxide/magnesium hydroxide/simethicone Suspension 30 milliLiter(s) Oral every 4 hours PRN Dyspepsia  benzonatate 100 milliGRAM(s) Oral three times a day PRN Cough  dextrose Oral Gel 15 Gram(s) Oral once PRN Blood Glucose LESS THAN 70 milliGRAM(s)/deciliter  melatonin 3 milliGRAM(s) Oral at bedtime PRN Insomnia  ondansetron Injectable 4 milliGRAM(s) IV Push every 8 hours PRN Nausea and/or Vomiting    Vital Signs Last 24 Hrs  T(F): 97.5 (2023 05:56), Max: 98.4 (2023 20:55)  HR: 86 (2023 05:56) (86 - 107)  BP: 134/90 (2023 05:56) (128/86 - 148/88)  RR: 16 (2023 05:56) (16 - 17)  SpO2: 95% (2023 05:56) (95% - 96%)  I&O's Summary    2023 07:01  -  2023 07:00  --------------------------------------------------------  IN: 1400 mL / OUT: 0 mL / NET: 1400 mL        PHYSICAL EXAM:  General: NAD, flat affect, obese, sitting in chair  ENT: No gross hearing impairment, Moist mucous membranes, no thrush  Neck: Supple, No JVD  Lungs: Clear to auscultation bilaterally, good air entry, non-labored breathing  Cardio: RRR, S1/S2, No murmur  Abdomen: Soft, Nontender, Nondistended; Bowel sounds present  Extremities: No calf tenderness, No cyanosis, No pitting edema    LABS:                        13.3   5.27  )-----------( 137      ( 2023 07:00 )             42.0     04-22    140  |  103  |  36  ----------------------------<  157  4.0   |  26  |  2.20    Ca    9.0      2023 07:00  Mg     2.1     04-21    TPro  7.5  /  Alb  3.1  /  TBili  0.6  /  DBili  x   /  AST  31  /  ALT  37  /  AlkPhos  68  04-20      PT/INR - ( 2023 06:35 )   PT: 14.5 sec;   INR: 1.25 ratio         PTT - ( 2023 11:56 )  PTT:137.8 sec  Lactate, Blood: 1.9 mmol/L (-20 @ 10:02)    CARDIAC MARKERS ( 2023 10:02 )  x     / 15.6 ng/L / 642 U/L / x     / x          04- Chol 139 mg/dL LDL -- HDL 38 mg/dL Trig 102 mg/dL  TSH 0.339   TSH with FT4 reflex --  Total T3 --    10:02 - VBG - pH: 7.40  | pCO2: 39    | pO2: 45    | Lactate:                  POCT Blood Glucose.: 130 mg/dL (2023 12:36)  POCT Blood Glucose.: 153 mg/dL (2023 08:05)  POCT Blood Glucose.: 122 mg/dL (2023 21:34)  POCT Blood Glucose.: 159 mg/dL (2023 17:36)      Urinalysis Basic - ( 2023 06:45 )    Color: Yellow / Appearance: Clear / S.010 / pH: x  Gluc: x / Ketone: Negative  / Bili: Negative / Urobili: Negative   Blood: x / Protein: 15 / Nitrite: Negative   Leuk Esterase: Negative / RBC: 0-4 /HPF / WBC 0-2 /HPF   Sq Epi: x / Non Sq Epi: x / Bacteria: Negative /HPF            RADIOLOGY & ADDITIONAL TESTS:    Care Discussed with Consultants/Other Providers:   
Patient is a 63y old  Male who presents with a chief complaint of Came to ER stating I feel alone (21 Apr 2023 08:30)      Patient seen and examined at bedside. No overnight events reported. Denies SI/HI, sob, chest pain, nausea, vomiting.     ALLERGIES:  Tegretol (Hypotension; Anaphylaxis)  penicillin (Hives)    MEDICATIONS  (STANDING):  aspirin  chewable 81 milliGRAM(s) Oral daily  atorvastatin 40 milliGRAM(s) Oral at bedtime  dextrose 5%. 1000 milliLiter(s) (50 mL/Hr) IV Continuous <Continuous>  dextrose 5%. 1000 milliLiter(s) (100 mL/Hr) IV Continuous <Continuous>  dextrose 50% Injectable 25 Gram(s) IV Push once  dextrose 50% Injectable 12.5 Gram(s) IV Push once  dextrose 50% Injectable 25 Gram(s) IV Push once  divalproex  milliGRAM(s) Oral two times a day  finasteride 5 milliGRAM(s) Oral daily  glucagon  Injectable 1 milliGRAM(s) IntraMuscular once  heparin  Infusion.  Unit(s)/Hr (24 mL/Hr) IV Continuous <Continuous>  insulin glargine Injectable (LANTUS) 40 Unit(s) SubCutaneous at bedtime  insulin lispro (ADMELOG) corrective regimen sliding scale   SubCutaneous three times a day before meals  insulin lispro (ADMELOG) corrective regimen sliding scale   SubCutaneous at bedtime  insulin lispro Injectable (ADMELOG) 12 Unit(s) SubCutaneous three times a day before meals  linaclotide 145 MICROGram(s) Oral before breakfast  metoprolol tartrate 25 milliGRAM(s) Oral two times a day  tamsulosin 0.4 milliGRAM(s) Oral at bedtime  ziprasidone 80 milliGRAM(s) Oral two times a day    MEDICATIONS  (PRN):  acetaminophen     Tablet .. 650 milliGRAM(s) Oral every 6 hours PRN Temp greater or equal to 38C (100.4F), Mild Pain (1 - 3)  aluminum hydroxide/magnesium hydroxide/simethicone Suspension 30 milliLiter(s) Oral every 4 hours PRN Dyspepsia  benzonatate 100 milliGRAM(s) Oral three times a day PRN Cough  dextrose Oral Gel 15 Gram(s) Oral once PRN Blood Glucose LESS THAN 70 milliGRAM(s)/deciliter  heparin   Injectable 94005 Unit(s) IV Push every 6 hours PRN For aPTT less than 40  heparin   Injectable 5000 Unit(s) IV Push every 6 hours PRN For aPTT between 40 - 57  melatonin 3 milliGRAM(s) Oral at bedtime PRN Insomnia  ondansetron Injectable 4 milliGRAM(s) IV Push every 8 hours PRN Nausea and/or Vomiting    Vital Signs Last 24 Hrs  T(F): 98.1 (21 Apr 2023 15:27), Max: 100.4 (20 Apr 2023 22:31)  HR: 106 (21 Apr 2023 15:27) (106 - 114)  BP: 136/94 (21 Apr 2023 15:27) (122/82 - 137/89)  RR: 17 (21 Apr 2023 15:27) (16 - 25)  SpO2: 96% (21 Apr 2023 15:27) (91% - 97%)  I&O's Summary    20 Apr 2023 07:01  -  21 Apr 2023 07:00  --------------------------------------------------------  IN: 1045 mL / OUT: 4150 mL / NET: -3105 mL    21 Apr 2023 07:01  -  21 Apr 2023 15:42  --------------------------------------------------------  IN: 700 mL / OUT: 0 mL / NET: 700 mL      PHYSICAL EXAM:  General: NAD, A/O x 3, flat affect, obese  ENT: No gross hearing impairment, Moist mucous membranes, no thrush  Neck: Supple, No JVD  Lungs: Clear to auscultation bilaterally, good air entry, non-labored breathing  Cardio: RRR, S1/S2, No murmur  Abdomen: Soft, Nontender, Nondistended; Bowel sounds present  Extremities: No calf tenderness, No cyanosis, No pitting edema    LABS:                        14.2   5.04  )-----------( 136      ( 21 Apr 2023 06:35 )             44.1     04-21    142  |  104  |  38  ----------------------------<  140  3.9   |  24  |  2.28    Ca    9.0      21 Apr 2023 06:35  Mg     2.1     04-21    TPro  7.5  /  Alb  3.1  /  TBili  0.6  /  DBili  x   /  AST  31  /  ALT  37  /  AlkPhos  68  04-20      Lipase, Serum: 173 U/L (04-20-23 @ 10:02)      PT/INR - ( 21 Apr 2023 06:35 )   PT: 14.5 sec;   INR: 1.25 ratio         PTT - ( 21 Apr 2023 11:56 )  PTT:137.8 sec  Lactate, Blood: 1.9 mmol/L (04-20 @ 10:02)      CARDIAC MARKERS ( 20 Apr 2023 10:02 )  x     / 15.6 ng/L / 642 U/L / x     / x          04-21 Chol 139 mg/dL LDL -- HDL 38 mg/dL Trig 102 mg/dL  TSH 0.339   TSH with FT4 reflex --  Total T3 --    10:02 - VBG - pH: 7.40  | pCO2: 39    | pO2: 45    | Lactate:                  POCT Blood Glucose.: 193 mg/dL (21 Apr 2023 12:51)  POCT Blood Glucose.: 134 mg/dL (21 Apr 2023 08:29)  POCT Blood Glucose.: 104 mg/dL (20 Apr 2023 23:01)  POCT Blood Glucose.: 168 mg/dL (20 Apr 2023 18:24)            RADIOLOGY & ADDITIONAL TESTS:    Care Discussed with Consultants/Other Providers:   
No distress    Vital Signs Last 24 Hrs  T(C): 36.7 (04-21-23 @ 15:27), Max: 38 (04-20-23 @ 22:31)  T(F): 98.1 (04-21-23 @ 15:27), Max: 100.4 (04-20-23 @ 22:31)  HR: 106 (04-21-23 @ 15:27) (106 - 112)  BP: 148/88 (04-21-23 @ 19:04) (125/84 - 148/88)  RR: 17 (04-21-23 @ 15:27) (16 - 19)  SpO2: 96% (04-21-23 @ 15:27) (91% - 97%)    I&O's Detail    20 Apr 2023 07:01  -  21 Apr 2023 07:00  --------------------------------------------------------  IN:    Heparin Infusion: 130 mL    Oral Fluid: 240 mL    sodium chloride 0.9%: 675 mL  Total IN: 1045 mL    OUT:    Voided (mL): 4150 mL  Total OUT: 4150 mL    Total NET: -3105 mL    s1s2  b/l air entry  soft, ND  + edema                        14.2   5.04  )-----------( 136      ( 21 Apr 2023 06:35 )             44.1     21 Apr 2023 06:35    142    |  104    |  38     ----------------------------<  140    3.9     |  24     |  2.28     Ca    9.0        21 Apr 2023 06:35  Mg     2.1       21 Apr 2023 06:35    TPro  7.5    /  Alb  3.1    /  TBili  0.6    /  DBili  x      /  AST  31     /  ALT  37     /  AlkPhos  68     20 Apr 2023 10:02    LIVER FUNCTIONS - ( 20 Apr 2023 10:02 )  Alb: 3.1 g/dL / Pro: 7.5 g/dL / ALK PHOS: 68 U/L / ALT: 37 U/L / AST: 31 U/L / GGT: x           PT/INR - ( 21 Apr 2023 06:35 )   PT: 14.5 sec;   INR: 1.25 ratio      PTT - ( 21 Apr 2023 11:56 )  PTT:137.8 sec  CAPILLARY BLOOD GLUCOSE    CARDIAC MARKERS ( 20 Apr 2023 10:02 )  x     / x     / 642 U/L / x     / x        acetaminophen     Tablet .. 650 milliGRAM(s) Oral every 6 hours PRN  aluminum hydroxide/magnesium hydroxide/simethicone Suspension 30 milliLiter(s) Oral every 4 hours PRN  apixaban 2.5 milliGRAM(s) Oral two times a day  aspirin  chewable 81 milliGRAM(s) Oral daily  atorvastatin 40 milliGRAM(s) Oral at bedtime  benzonatate 100 milliGRAM(s) Oral three times a day PRN  carvedilol 12.5 milliGRAM(s) Oral every 12 hours  dextrose 5%. 1000 milliLiter(s) IV Continuous <Continuous>  dextrose 5%. 1000 milliLiter(s) IV Continuous <Continuous>  dextrose 50% Injectable 25 Gram(s) IV Push once  dextrose 50% Injectable 12.5 Gram(s) IV Push once  dextrose 50% Injectable 25 Gram(s) IV Push once  dextrose Oral Gel 15 Gram(s) Oral once PRN  divalproex  milliGRAM(s) Oral two times a day  finasteride 5 milliGRAM(s) Oral daily  glucagon  Injectable 1 milliGRAM(s) IntraMuscular once  insulin glargine Injectable (LANTUS) 40 Unit(s) SubCutaneous at bedtime  insulin lispro (ADMELOG) corrective regimen sliding scale   SubCutaneous three times a day before meals  insulin lispro (ADMELOG) corrective regimen sliding scale   SubCutaneous at bedtime  insulin lispro Injectable (ADMELOG) 12 Unit(s) SubCutaneous three times a day before meals  linaclotide 145 MICROGram(s) Oral before breakfast  melatonin 3 milliGRAM(s) Oral at bedtime PRN  ondansetron Injectable 4 milliGRAM(s) IV Push every 8 hours PRN  tamsulosin 0.4 milliGRAM(s) Oral at bedtime  ziprasidone 80 milliGRAM(s) Oral two times a day    A/P:    Adm w/parainfluenza  New L DVT, AC  Hx CKD 3 w/baseline Cr ~ 2  Cr is fairly stable   Would avoid ACE/ARB for now  BMP in am, check UA  Avoid nephrotoxins    [phone]
No distress    Vital Signs Last 24 Hrs  T(C): 36.9 (23 @ 16:02), Max: 36.9 (23 @ 20:55)  T(F): 98.5 (23 @ 16:02), Max: 98.5 (23 @ 16:02)  HR: 95 (23 @ 16:02) (86 - 107)  BP: 140/91 (23 @ 16:02) (128/86 - 140/91)  RR: 19 (23 @ 16:02) (16 - 19)  SpO2: 96% (23 @ 16:02) (95% - 96%)    s1s2  b/l air entry  soft, ND  + edema                                13.3   5.27  )-----------( 137      ( 2023 07:00 )             42.0     2023 07:00    140    |  103    |  36     ----------------------------<  157    4.0     |  26     |  2.20     Ca    9.0        2023 07:00  Mg     2.1       2023 06:35    PT/INR - ( 2023 06:35 )   PT: 14.5 sec;   INR: 1.25 ratio      Urinalysis Basic - ( 2023 06:45 )    Color: Yellow / Appearance: Clear / S.010 / pH: x  Gluc: x / Ketone: Negative  / Bili: Negative / Urobili: Negative   Blood: x / Protein: 15 / Nitrite: Negative   Leuk Esterase: Negative / RBC: 0-4 /HPF / WBC 0-2 /HPF   Sq Epi: x / Non Sq Epi: x / Bacteria: Negative /HPF    acetaminophen     Tablet .. 650 milliGRAM(s) Oral every 6 hours PRN  aluminum hydroxide/magnesium hydroxide/simethicone Suspension 30 milliLiter(s) Oral every 4 hours PRN  apixaban 2.5 milliGRAM(s) Oral two times a day  aspirin  chewable 81 milliGRAM(s) Oral daily  atorvastatin 40 milliGRAM(s) Oral at bedtime  benzonatate 100 milliGRAM(s) Oral three times a day PRN  carvedilol 12.5 milliGRAM(s) Oral every 12 hours  dextrose 5%. 1000 milliLiter(s) IV Continuous <Continuous>  dextrose 5%. 1000 milliLiter(s) IV Continuous <Continuous>  dextrose 50% Injectable 25 Gram(s) IV Push once  dextrose 50% Injectable 12.5 Gram(s) IV Push once  dextrose 50% Injectable 25 Gram(s) IV Push once  dextrose Oral Gel 15 Gram(s) Oral once PRN  divalproex  milliGRAM(s) Oral two times a day  finasteride 5 milliGRAM(s) Oral daily  glucagon  Injectable 1 milliGRAM(s) IntraMuscular once  insulin glargine Injectable (LANTUS) 40 Unit(s) SubCutaneous at bedtime  insulin lispro (ADMELOG) corrective regimen sliding scale   SubCutaneous three times a day before meals  insulin lispro (ADMELOG) corrective regimen sliding scale   SubCutaneous at bedtime  insulin lispro Injectable (ADMELOG) 12 Unit(s) SubCutaneous three times a day before meals  linaclotide 145 MICROGram(s) Oral before breakfast  melatonin 3 milliGRAM(s) Oral at bedtime PRN  ondansetron Injectable 4 milliGRAM(s) IV Push every 8 hours PRN  tamsulosin 0.4 milliGRAM(s) Oral at bedtime  ziprasidone 80 milliGRAM(s) Oral two times a day    A/P:    Adm w/parainfluenza  New L DVT, AC  Hx CKD 3 w/baseline Cr ~ 2  Cr is stable   Avoid nephrotoxins  F/u w/renal as op     [phone]

## 2023-04-22 NOTE — DISCHARGE NOTE PROVIDER - NSDCMRMEDTOKEN_GEN_ALL_CORE_FT
aspirin 81 mg oral tablet, chewable: 1 tab(s) orally once a day  atorvastatin 40 mg oral tablet: 1 tab(s) orally once a day  Calcium 500+D oral tablet, chewable: orally 3 times a day  carvedilol 12.5 mg oral tablet: 1 tab(s) orally 2 times a day  divalproex sodium: 1 tab(s) orally 2 times a day  Eliquis 2.5 mg oral tablet: 1 tab(s) orally 2 times a day  ergocalciferol 1.25 mg (50,000 intl units) oral tablet: orally once a week  finasteride 5 mg oral tablet: 1 tab(s) orally once a day  insulin glargine 100 units/mL subcutaneous solution: 40 unit(s) subcutaneous once a day (at bedtime)  Linzess 145 mcg oral capsule: 1 cap(s) orally once a day  losartan 25 mg oral tablet: 1 tab(s) orally once a day  Ozempic 2 mg/1.5 mL (1 mg dose) subcutaneous solution:   repaglinide 1 mg oral tablet: 2 tab(s) orally 3 times a day (before meals)  sodium bicarbonate 325 mg oral tablet:   tamsulosin 0.4 mg oral capsule: 1 capsule orally once a day  Vascepa 1 g oral capsule: 2 cap(s) orally 2 times a day  Vitamin D3 1250 mcg (50,000 intl units) oral capsule: 1 cap(s) orally once a week  ziprasidone 80 mg oral capsule: 1 cap(s) orally 2 times a day

## 2023-04-22 NOTE — DISCHARGE NOTE NURSING/CASE MANAGEMENT/SOCIAL WORK - PATIENT PORTAL LINK FT
You can access the FollowMyHealth Patient Portal offered by [facility] by registering at the following website: [email or web address]. By joining [employer]’s FollowMyHealth portal, you will also be able to view your health information using other applications (apps) compatible with our system.

## 2023-04-22 NOTE — DISCHARGE NOTE PROVIDER - NSDCFUSCHEDAPPT_GEN_ALL_CORE_FT
Adonay Wen  [facility]  UROLOGY [address]  Scheduled Appointment: 04/24/2023    Mariela Vallejo  [facility]  NEPHRO [address]  Scheduled Appointment: 05/17/2023    Mikey Ferrell  [facility]  ORTHOSURG [address] [facility]  Scheduled Appointment: 06/08/2023    Angelika Todd  [facility]  PULMMED [address]  Scheduled Appointment: 06/08/2023    Elbert Mckenna  [facility]  CARDIOLOGY [address] [facility]   Scheduled Appointment: 07/19/2023

## 2023-04-22 NOTE — DISCHARGE NOTE PROVIDER - NSDCCPCAREPLAN_GEN_ALL_CORE_FT
PRINCIPAL DISCHARGE DIAGNOSIS  Diagnosis: DVT, lower extremity  Assessment and Plan of Treatment: You were admitted for weakness  You were diagnosed with DVT in left leg  You were treated with blood thinners  You were prescribed the following new medications: Eliquis 2.5mg twice daily  You will need to follow up with your primary care physician.      SECONDARY DISCHARGE DIAGNOSES  Diagnosis: Parainfluenza  Assessment and Plan of Treatment: You were noted to be positive for parainfluenza- you are to maintain precaustion until 4/25, wear a mask and monitor respiratory status

## 2023-04-24 ENCOUNTER — EMERGENCY (EMERGENCY)
Facility: HOSPITAL | Age: 63
LOS: 1 days | Discharge: HOME | End: 2023-04-24
Attending: EMERGENCY MEDICINE | Admitting: EMERGENCY MEDICINE
Payer: MEDICARE

## 2023-04-24 ENCOUNTER — APPOINTMENT (OUTPATIENT)
Dept: UROLOGY | Facility: CLINIC | Age: 63
End: 2023-04-24

## 2023-04-24 VITALS
HEART RATE: 100 BPM | HEIGHT: 71 IN | TEMPERATURE: 99 F | RESPIRATION RATE: 18 BRPM | WEIGHT: 315 LBS | DIASTOLIC BLOOD PRESSURE: 89 MMHG | OXYGEN SATURATION: 96 % | SYSTOLIC BLOOD PRESSURE: 135 MMHG

## 2023-04-24 DIAGNOSIS — Z86.718 PERSONAL HISTORY OF OTHER VENOUS THROMBOSIS AND EMBOLISM: ICD-10-CM

## 2023-04-24 DIAGNOSIS — M06.9 RHEUMATOID ARTHRITIS, UNSPECIFIED: ICD-10-CM

## 2023-04-24 DIAGNOSIS — Z79.82 LONG TERM (CURRENT) USE OF ASPIRIN: ICD-10-CM

## 2023-04-24 DIAGNOSIS — Z88.0 ALLERGY STATUS TO PENICILLIN: ICD-10-CM

## 2023-04-24 DIAGNOSIS — F43.21 ADJUSTMENT DISORDER WITH DEPRESSED MOOD: ICD-10-CM

## 2023-04-24 DIAGNOSIS — Z99.89 DEPENDENCE ON OTHER ENABLING MACHINES AND DEVICES: ICD-10-CM

## 2023-04-24 DIAGNOSIS — G47.33 OBSTRUCTIVE SLEEP APNEA (ADULT) (PEDIATRIC): ICD-10-CM

## 2023-04-24 DIAGNOSIS — F32.A DEPRESSION, UNSPECIFIED: ICD-10-CM

## 2023-04-24 DIAGNOSIS — E11.40 TYPE 2 DIABETES MELLITUS WITH DIABETIC NEUROPATHY, UNSPECIFIED: ICD-10-CM

## 2023-04-24 DIAGNOSIS — I12.9 HYPERTENSIVE CHRONIC KIDNEY DISEASE WITH STAGE 1 THROUGH STAGE 4 CHRONIC KIDNEY DISEASE, OR UNSPECIFIED CHRONIC KIDNEY DISEASE: ICD-10-CM

## 2023-04-24 DIAGNOSIS — Z98.890 OTHER SPECIFIED POSTPROCEDURAL STATES: Chronic | ICD-10-CM

## 2023-04-24 DIAGNOSIS — F31.9 BIPOLAR DISORDER, UNSPECIFIED: ICD-10-CM

## 2023-04-24 DIAGNOSIS — H26.9 UNSPECIFIED CATARACT: Chronic | ICD-10-CM

## 2023-04-24 DIAGNOSIS — R09.81 NASAL CONGESTION: ICD-10-CM

## 2023-04-24 DIAGNOSIS — Z79.4 LONG TERM (CURRENT) USE OF INSULIN: ICD-10-CM

## 2023-04-24 DIAGNOSIS — N18.9 CHRONIC KIDNEY DISEASE, UNSPECIFIED: ICD-10-CM

## 2023-04-24 DIAGNOSIS — E11.22 TYPE 2 DIABETES MELLITUS WITH DIABETIC CHRONIC KIDNEY DISEASE: ICD-10-CM

## 2023-04-24 DIAGNOSIS — R45.851 SUICIDAL IDEATIONS: ICD-10-CM

## 2023-04-24 DIAGNOSIS — E78.5 HYPERLIPIDEMIA, UNSPECIFIED: ICD-10-CM

## 2023-04-24 DIAGNOSIS — Z79.899 OTHER LONG TERM (CURRENT) DRUG THERAPY: ICD-10-CM

## 2023-04-24 DIAGNOSIS — Z79.02 LONG TERM (CURRENT) USE OF ANTITHROMBOTICS/ANTIPLATELETS: ICD-10-CM

## 2023-04-24 DIAGNOSIS — Z20.822 CONTACT WITH AND (SUSPECTED) EXPOSURE TO COVID-19: ICD-10-CM

## 2023-04-24 DIAGNOSIS — E66.9 OBESITY, UNSPECIFIED: ICD-10-CM

## 2023-04-24 DIAGNOSIS — F32.9 MAJOR DEPRESSIVE DISORDER, SINGLE EPISODE, UNSPECIFIED: ICD-10-CM

## 2023-04-24 LAB
AMPHET UR-MCNC: NEGATIVE — SIGNIFICANT CHANGE UP
ANION GAP SERPL CALC-SCNC: 10 MMOL/L — SIGNIFICANT CHANGE UP (ref 5–17)
ANISOCYTOSIS BLD QL: SLIGHT — SIGNIFICANT CHANGE UP
APAP SERPL-MCNC: <1 UG/ML — LOW (ref 10–30)
APPEARANCE UR: CLEAR — SIGNIFICANT CHANGE UP
BACTERIA # UR AUTO: NEGATIVE /HPF — SIGNIFICANT CHANGE UP
BARBITURATES UR SCN-MCNC: NEGATIVE — SIGNIFICANT CHANGE UP
BASOPHILS # BLD AUTO: 0.07 K/UL — SIGNIFICANT CHANGE UP (ref 0–0.2)
BASOPHILS NFR BLD AUTO: 1 % — SIGNIFICANT CHANGE UP (ref 0–2)
BENZODIAZ UR-MCNC: NEGATIVE — SIGNIFICANT CHANGE UP
BILIRUB UR-MCNC: NEGATIVE — SIGNIFICANT CHANGE UP
BUN SERPL-MCNC: 38 MG/DL — HIGH (ref 7–23)
CALCIUM SERPL-MCNC: 9.4 MG/DL — SIGNIFICANT CHANGE UP (ref 8.4–10.5)
CHLORIDE SERPL-SCNC: 103 MMOL/L — SIGNIFICANT CHANGE UP (ref 96–108)
CO2 SERPL-SCNC: 28 MMOL/L — SIGNIFICANT CHANGE UP (ref 22–31)
COCAINE METAB.OTHER UR-MCNC: NEGATIVE — SIGNIFICANT CHANGE UP
COLOR SPEC: YELLOW — SIGNIFICANT CHANGE UP
CREAT SERPL-MCNC: 2.01 MG/DL — HIGH (ref 0.5–1.3)
DIFF PNL FLD: NEGATIVE — SIGNIFICANT CHANGE UP
EGFR: 37 ML/MIN/1.73M2 — LOW
EOSINOPHIL # BLD AUTO: 0 K/UL — SIGNIFICANT CHANGE UP (ref 0–0.5)
EOSINOPHIL NFR BLD AUTO: 0 % — SIGNIFICANT CHANGE UP (ref 0–6)
EPI CELLS # UR: SIGNIFICANT CHANGE UP
ETHANOL SERPL-MCNC: <3 MG/DL — SIGNIFICANT CHANGE UP (ref 0–3)
GLUCOSE BLDC GLUCOMTR-MCNC: 208 MG/DL — HIGH (ref 70–99)
GLUCOSE SERPL-MCNC: 206 MG/DL — HIGH (ref 70–99)
GLUCOSE UR QL: 50 MG/DL
HCT VFR BLD CALC: 44.7 % — SIGNIFICANT CHANGE UP (ref 39–50)
HGB BLD-MCNC: 14.2 G/DL — SIGNIFICANT CHANGE UP (ref 13–17)
KETONES UR-MCNC: NEGATIVE — SIGNIFICANT CHANGE UP
LEUKOCYTE ESTERASE UR-ACNC: NEGATIVE — SIGNIFICANT CHANGE UP
LYMPHOCYTES # BLD AUTO: 2.76 K/UL — SIGNIFICANT CHANGE UP (ref 1–3.3)
LYMPHOCYTES # BLD AUTO: 37 % — SIGNIFICANT CHANGE UP (ref 13–44)
MANUAL SMEAR VERIFICATION: SIGNIFICANT CHANGE UP
MCHC RBC-ENTMCNC: 29 PG — SIGNIFICANT CHANGE UP (ref 27–34)
MCHC RBC-ENTMCNC: 31.8 GM/DL — LOW (ref 32–36)
MCV RBC AUTO: 91.2 FL — SIGNIFICANT CHANGE UP (ref 80–100)
METAMYELOCYTES # FLD: 1 % — HIGH (ref 0–0)
METHADONE UR-MCNC: NEGATIVE — SIGNIFICANT CHANGE UP
MONOCYTES # BLD AUTO: 0.67 K/UL — SIGNIFICANT CHANGE UP (ref 0–0.9)
MONOCYTES NFR BLD AUTO: 9 % — SIGNIFICANT CHANGE UP (ref 2–14)
MYELOCYTES NFR BLD: 3 % — HIGH (ref 0–0)
NEUTROPHILS # BLD AUTO: 3.66 K/UL — SIGNIFICANT CHANGE UP (ref 1.8–7.4)
NEUTROPHILS NFR BLD AUTO: 47 % — SIGNIFICANT CHANGE UP (ref 43–77)
NEUTS BAND # BLD: 2 % — SIGNIFICANT CHANGE UP (ref 0–8)
NITRITE UR-MCNC: NEGATIVE — SIGNIFICANT CHANGE UP
NRBC # BLD: 0 /100 — SIGNIFICANT CHANGE UP (ref 0–0)
OPIATES UR-MCNC: NEGATIVE — SIGNIFICANT CHANGE UP
PCP SPEC-MCNC: SIGNIFICANT CHANGE UP
PCP UR-MCNC: NEGATIVE — SIGNIFICANT CHANGE UP
PH UR: 6.5 — SIGNIFICANT CHANGE UP (ref 5–8)
PLAT MORPH BLD: NORMAL — SIGNIFICANT CHANGE UP
PLATELET # BLD AUTO: 171 K/UL — SIGNIFICANT CHANGE UP (ref 150–400)
POTASSIUM SERPL-MCNC: 4.5 MMOL/L — SIGNIFICANT CHANGE UP (ref 3.5–5.3)
POTASSIUM SERPL-SCNC: 4.5 MMOL/L — SIGNIFICANT CHANGE UP (ref 3.5–5.3)
PROT UR-MCNC: 15
RBC # BLD: 4.9 M/UL — SIGNIFICANT CHANGE UP (ref 4.2–5.8)
RBC # FLD: 14.2 % — SIGNIFICANT CHANGE UP (ref 10.3–14.5)
RBC BLD AUTO: ABNORMAL
RBC CASTS # UR COMP ASSIST: NEGATIVE /HPF — SIGNIFICANT CHANGE UP (ref 0–4)
SALICYLATES SERPL-MCNC: 0.5 MG/DL — LOW (ref 3–30)
SARS-COV-2 RNA SPEC QL NAA+PROBE: SIGNIFICANT CHANGE UP
SODIUM SERPL-SCNC: 141 MMOL/L — SIGNIFICANT CHANGE UP (ref 135–145)
SP GR SPEC: 1 — LOW (ref 1.01–1.02)
THC UR QL: NEGATIVE — SIGNIFICANT CHANGE UP
TSH SERPL-MCNC: 0.33 UIU/ML — LOW (ref 0.36–3.74)
UROBILINOGEN FLD QL: NEGATIVE — SIGNIFICANT CHANGE UP
WBC # BLD: 7.46 K/UL — SIGNIFICANT CHANGE UP (ref 3.8–10.5)
WBC # FLD AUTO: 7.46 K/UL — SIGNIFICANT CHANGE UP (ref 3.8–10.5)
WBC UR QL: NEGATIVE /HPF — SIGNIFICANT CHANGE UP (ref 0–5)

## 2023-04-24 PROCEDURE — 90792 PSYCH DIAG EVAL W/MED SRVCS: CPT | Mod: 95,GC

## 2023-04-24 PROCEDURE — 93010 ELECTROCARDIOGRAM REPORT: CPT

## 2023-04-24 PROCEDURE — 99285 EMERGENCY DEPT VISIT HI MDM: CPT

## 2023-04-24 RX ORDER — SODIUM CHLORIDE 9 MG/ML
1000 INJECTION, SOLUTION INTRAVENOUS
Refills: 0 | Status: DISCONTINUED | OUTPATIENT
Start: 2023-04-24 | End: 2023-04-28

## 2023-04-24 RX ORDER — ATORVASTATIN CALCIUM 80 MG/1
40 TABLET, FILM COATED ORAL AT BEDTIME
Refills: 0 | Status: DISCONTINUED | OUTPATIENT
Start: 2023-04-24 | End: 2023-04-28

## 2023-04-24 RX ORDER — INSULIN LISPRO 100/ML
12 VIAL (ML) SUBCUTANEOUS
Refills: 0 | Status: DISCONTINUED | OUTPATIENT
Start: 2023-04-24 | End: 2023-04-28

## 2023-04-24 RX ORDER — DIVALPROEX SODIUM 500 MG/1
500 TABLET, DELAYED RELEASE ORAL
Refills: 0 | Status: DISCONTINUED | OUTPATIENT
Start: 2023-04-24 | End: 2023-04-28

## 2023-04-24 RX ORDER — CARVEDILOL PHOSPHATE 80 MG/1
12.5 CAPSULE, EXTENDED RELEASE ORAL EVERY 12 HOURS
Refills: 0 | Status: DISCONTINUED | OUTPATIENT
Start: 2023-04-24 | End: 2023-04-28

## 2023-04-24 RX ORDER — INSULIN GLARGINE 100 [IU]/ML
40 INJECTION, SOLUTION SUBCUTANEOUS AT BEDTIME
Refills: 0 | Status: DISCONTINUED | OUTPATIENT
Start: 2023-04-24 | End: 2023-04-28

## 2023-04-24 RX ORDER — APIXABAN 2.5 MG/1
2.5 TABLET, FILM COATED ORAL
Refills: 0 | Status: DISCONTINUED | OUTPATIENT
Start: 2023-04-24 | End: 2023-04-25

## 2023-04-24 RX ORDER — DEXTROSE 50 % IN WATER 50 %
12.5 SYRINGE (ML) INTRAVENOUS ONCE
Refills: 0 | Status: DISCONTINUED | OUTPATIENT
Start: 2023-04-24 | End: 2023-04-28

## 2023-04-24 RX ORDER — GLUCAGON INJECTION, SOLUTION 0.5 MG/.1ML
1 INJECTION, SOLUTION SUBCUTANEOUS ONCE
Refills: 0 | Status: DISCONTINUED | OUTPATIENT
Start: 2023-04-24 | End: 2023-04-28

## 2023-04-24 RX ORDER — DEXTROSE 50 % IN WATER 50 %
15 SYRINGE (ML) INTRAVENOUS ONCE
Refills: 0 | Status: DISCONTINUED | OUTPATIENT
Start: 2023-04-24 | End: 2023-04-28

## 2023-04-24 RX ORDER — DEXTROSE 50 % IN WATER 50 %
25 SYRINGE (ML) INTRAVENOUS ONCE
Refills: 0 | Status: DISCONTINUED | OUTPATIENT
Start: 2023-04-24 | End: 2023-04-28

## 2023-04-24 RX ORDER — ZIPRASIDONE HYDROCHLORIDE 20 MG/1
80 CAPSULE ORAL
Refills: 0 | Status: DISCONTINUED | OUTPATIENT
Start: 2023-04-24 | End: 2023-04-28

## 2023-04-24 RX ADMIN — ATORVASTATIN CALCIUM 40 MILLIGRAM(S): 80 TABLET, FILM COATED ORAL at 18:41

## 2023-04-24 RX ADMIN — APIXABAN 2.5 MILLIGRAM(S): 2.5 TABLET, FILM COATED ORAL at 14:36

## 2023-04-24 RX ADMIN — ZIPRASIDONE HYDROCHLORIDE 80 MILLIGRAM(S): 20 CAPSULE ORAL at 21:33

## 2023-04-24 RX ADMIN — INSULIN GLARGINE 40 UNIT(S): 100 INJECTION, SOLUTION SUBCUTANEOUS at 21:34

## 2023-04-24 RX ADMIN — DIVALPROEX SODIUM 500 MILLIGRAM(S): 500 TABLET, DELAYED RELEASE ORAL at 21:34

## 2023-04-24 RX ADMIN — CARVEDILOL PHOSPHATE 12.5 MILLIGRAM(S): 80 CAPSULE, EXTENDED RELEASE ORAL at 18:40

## 2023-04-24 NOTE — ED PROVIDER NOTE - PHYSICAL EXAMINATION
aaox3  neck supple  perrl, eomi  cta b/l, [id number]  abd soft, non tender  ambulatory with steady gait  flat affect

## 2023-04-24 NOTE — ED BEHAVIORAL HEALTH ASSESSMENT NOTE - CURRENT MEDICATION
Home Medications:  aspirin 81 mg oral tablet, chewable: 1 tab(s) orally once a day (20 Apr 2023 10:57)  atorvastatin 40 mg oral tablet: 1 tab(s) orally once a day (20 Apr 2023 10:57)  Calcium 500+D oral tablet, chewable: orally 3 times a day (20 Apr 2023 10:57)  carvedilol 12.5 mg oral tablet: 1 tab(s) orally 2 times a day (21 Apr 2023 15:17)  divalproex sodium: 1 tab(s) orally 2 times a day (20 Apr 2023 10:57)  ergocalciferol 1.25 mg (50,000 intl units) oral tablet: orally once a week (20 Apr 2023 10:57)  finasteride 5 mg oral tablet: 1 tab(s) orally once a day (20 Apr 2023 10:57)  insulin glargine 100 units/mL subcutaneous solution: 40 unit(s) subcutaneous once a day (at bedtime) (22 Apr 2023 14:19)  Linzess 145 mcg oral capsule: 1 cap(s) orally once a day (20 Apr 2023 10:57)  losartan 25 mg oral tablet: 1 tab(s) orally once a day (20 Apr 2023 10:57)  Ozempic 2 mg/1.5 mL (1 mg dose) subcutaneous solution:  (20 Apr 2023 10:57)  repaglinide 1 mg oral tablet: 2 tab(s) orally 3 times a day (before meals) (20 Apr 2023 10:57)  sodium bicarbonate 325 mg oral tablet:  (20 Apr 2023 10:57)  tamsulosin 0.4 mg oral capsule: 1 capsule orally once a day (22 Apr 2023 14:19)  Vascepa 1 g oral capsule: 2 cap(s) orally 2 times a day (20 Apr 2023 10:57)  Vitamin D3 1250 mcg (50,000 intl units) oral capsule: 1 cap(s) orally once a week (20 Apr 2023 10:57)  ziprasidone 80 mg oral capsule: 1 cap(s) orally 2 times a day (20 Apr 2023 10:57)

## 2023-04-24 NOTE — ED BEHAVIORAL HEALTH NOTE - BEHAVIORAL HEALTH NOTE
==================    PRE-HOSPITAL COURSE    ===================    SOURCE:  RN Jennifer and secondhand ED documentation    DETAILS: RN reports pt to be calm and compliant with care - presenting with flat affect with SI without plan.     =========    ED COURSE    =========    SOURCE:  RN and secondhand ED documentation.    ARRIVAL:  Per chart and RN, patient arrived via walk-in, self-presented. Per RN, patient was calm upon arrival, and compliant with triage process.    BELONGINGS:  Per RN, patient arrived with not belongings of note. All belongings were provided to hospital security, and patient currently in a gown with a 1:1 staff member.    BEHAVIOR: RN described patient to be compliant with care, presenting with linear/organized thought process, (AAOx4), presenting with depressed mood and flat affect, remains in appropriate behavioral and impulse control, is not violent/aggressive. RN stated that the patient is endorsing passive SI without plan, and is denying HI/A/VH. RN stated that there are visible bruises on the body from previous hospital visit. RN stated that the patient appears to be well-groomed, maintains good hygiene, and reports IND ADLs, ambulates without assistance.      TREATMENT:  Per chart and RN, patient did not require PRN medications.    VISITORS:  Per RN, there are no visitors at  bedside.
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
## 2023-04-24 NOTE — ED PROVIDER NOTE - PROGRESS NOTE DETAILS
d/w telepsych, will consult patient is medically clear at this time  patient seen by telepsych, patient to be admitted psychiatrically ,holding in ED pending bed  -md edith
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
## 2023-04-24 NOTE — ED ADULT NURSE NOTE - IN THE PAST 12 MONTHS HAVE YOU USED DRUGS OTHER THAN THOSE REQUIRED FOR MEDICAL REASON?
"Anesthesia Transfer of Care Note    Patient: Beth Hernandez    Procedure(s) Performed: Procedure(s) (LRB):  EGD (ESOPHAGOGASTRODUODENOSCOPY) (N/A)  PH MONITORING, ESOPHAGUS, WIRELESS, (OFF REFLUX MEDS) (N/A)    Patient location: PACU    Anesthesia Type: general    Transport from OR: Transported from OR on room air with adequate spontaneous ventilation    Post pain: adequate analgesia    Post assessment: no apparent anesthetic complications and tolerated procedure well    Post vital signs: stable    Level of consciousness: awake    Nausea/Vomiting: no nausea/vomiting    Complications: none    Transfer of care protocol was followed      Last vitals:   Visit Vitals  /70 (BP Location: Right arm, Patient Position: Sitting)   Pulse 80   Temp 36.6 °C (97.8 °F) (Temporal)   Resp 18   Ht 5' 7" (1.702 m)   Wt 79 kg (174 lb 2.6 oz)   LMP 01/04/2023   SpO2 99%   Breastfeeding No   BMI 27.28 kg/m²     " No

## 2023-04-24 NOTE — ED BEHAVIORAL HEALTH ASSESSMENT NOTE - NSBHATTESTCOMMENTATTENDFT_PSY_A_CORE
63M, domiciled alone in [place] through [facility], on SSD, with psych h/o Bipolar disorder, multiple remote psych admissions (last admitted in 1990), no pSA/NSSIB, no known, aggression, denies active substance use, PMH significant for sleep apnea (not using CPAP), HLD, CKD, RA, T2DM, HTN, Arthritis uses walker, and recently admitted to medicine 4/20-4/22/23 for DVT, followed by a therapist, Cole Garcia([phone]), and psych NP, Martin Lau([phone]), at the [facility], who self presents for worsening depressed mood and active SI for the last 2 weeks in the setting of ongoing loneliness and feeling overwhelmed by his numerous medical issues. Despite the fact the above stressors are chronic in nature and the pt denies current or recent HI or SI with plan/intent, he also demonstrates good insight into his mental illness and acknowledges he's recently been self-presenting to the ED multiple time because he's decompensating and warrants admission for stabilization. Given his numerous chronic risk factors (prior psych dx/admissions, multiple medical co-morbidities, personal loss, male gender, age) and modifiable risk factors (depressed mood, SI, loneliness), he presents at an elevated risk of harm and is appropriate for admission. Agree with recs documented above

## 2023-04-24 NOTE — PHARMACOTHERAPY INTERVENTION NOTE - COMMENTS
I spoke with Dr. Rowe and recommended Eliquis 5mg twice a day for DVT/PE treatment. Dr. Rowe wants to keep the dose at 2.5mg twice a day as per previous discharge.

## 2023-04-24 NOTE — ED PROVIDER NOTE - CLINICAL SUMMARY MEDICAL DECISION MAKING FREE TEXT BOX
64 yo male with hx depression, feeling more depressed with SI  no acute medical complaints, although didn't fill rx yet for eliquis  will check labs, ekg, restart eliquis, telepsych 64 yo male with hx depression, feeling more depressed with SI  no acute medical complaints, although didn't fill rx yet for eliquis  will check labs, ekg, restart eliquis, telepsych    [place]: 10AM: Med consult called to review eliquis dosing. Pt to be on eliquis 5mg BID. See note. ; pt informed. Psych cleared this AM for discharge.   SW will talk to pt prior to d/c.   Worsening, continued or ANY new concerning symptoms return to the emergency department. 64 yo male with hx depression, feeling more depressed with SI  no acute medical complaints, although didn't fill rx yet for eliquis  will check labs, ekg, restart eliquis, telepsych    [place]: 10AM: Med consult called to review eliquis dosing. Pt to be on eliquis 5mg BID. See note. ; pt informed. Psych cleared this AM for discharge.   SW will talk to pt prior to d/c.  Pt was + parainfluenza 4/20. Cleared for return to program at this time.   Worsening, continued or ANY new concerning symptoms return to the emergency department.

## 2023-04-24 NOTE — ED BEHAVIORAL HEALTH ASSESSMENT NOTE - SUMMARY
Patient is 62yo  M,  with an estranged adult son, domiciled alone in a supported-living apartment in [facility], independent with ADLs but uses walker for ambulation, currently unemployed on SSD for last 30 years, w/ PMHx of CKD (baseline cr ~2), DVT on Eliquis, DM, HTN, HLD, BPH, sleep apnea, and PPHx of bipolar disorder, hx of several IPP admissions (none within several decades), connected to care via [facility] w/ a therapist and psychiatric nurse NP, no substance use hx, no hx of SA, who brought himself into to ED with complaints of depressed mood and SI.    On assessment the patient presented with depressed affect, emotional lability, and passive SI, in what seems to be an acute depressive episode of his bipolar disorder. And although this patient has no hx of SA and is stating that he would not complete a suicide, it is concerning that he has emotionally decompensated to the point where he is self presenting to the ED repeatedly in the past week seeking psychiatric care after decades of relative stability in outpatient treatment. Patient does have many risk factors for suicide (detailed in risk summary below), as well as many protective factors, but his social isolation, inability to complete ADLs due to his emotional state, and the fact that he has the insight into his illness to recognize that he is decompensating, are all points that are difficult to ignore. At this time we believe that this patient can possibly benefit from inpatient psychiatric treatment as he has an elevated acute risk of suicide and has been recently unable to care for himself. Patient is agreeable and actively seeking treatment.    #Bipolar Disorder  -ziprasidone 80 mg PO BID  -divalproex sodium: 1 tab(s) orally 2 times a day (20 Apr 2023 10:57)    #CKD (baseline cr ~2),   -Calcium 500+D oral tablet, chewable: orally 3 times a day   -ergocalciferol 1.25 mg (50,000 intl units) oral tablet: orally once a week   -sodium bicarbonate 325 mg oral tablet:    -Vitamin D3 1250 mcg (50,000 intl units) oral capsule: 1 cap(s) orally once a week     #DVT on Eliquis,   -aspirin 81 mg oral tablet, chewable: 1 tab(s) orally once a day     #DM,   -insulin glargine 100 units/mL subcutaneous solution: 40 unit(s) subcutaneous once a day (at bedtime)   -Linzess 145 mcg oral capsule: 1 cap(s) orally once a day   -Ozempic 2 mg/1.5 mL (1 mg dose) subcutaneous solution    -repaglinide 1 mg oral tablet: 2 tab(s) orally 3 times a day (before meals)     #HTN  -carvedilol 12.5 mg oral tablet: 1 tab(s) orally 2 times a day   -losartan 25 mg oral tablet: 1 tab(s) orally once a day    #HLD  -atorvastatin 40 mg oral tablet: 1 tab(s) orally once a day   -Vascepa 1 g oral capsule: 2 cap(s) orally 2 times a day    #BPH   -finasteride 5 mg oral tablet: 1 tab(s) orally once a day   -tamsulosin 0.4 mg oral capsule: 1 capsule orally once a day     #sleep apnea  -CPAP at night    - For severe agitation not responding to behavioral intervention, offer haldol 5 mg po q6h prn, ativan 2 mg PO q6h prn, benadryl 50 mg po q6h prn, with escalation to IM if patient refusing PO and remains an imminent danger to self or others. If IM antipsychotic is administered, please perform follow-up ECG for QTc monitoring. Hold antipsychotics if Qtc>500 ms. Patient is 62yo  M,  with an estranged adult son, domiciled alone in a supported-living apartment in [facility], independent with ADLs but uses walker for ambulation, currently unemployed on SSD for last 30 years, w/ PMHx of CKD (baseline cr ~2), DVT on Eliquis, DM, HTN, HLD, BPH, sleep apnea, and PPHx of bipolar disorder, hx of several IPP admissions (none within several decades), connected to care via [facility] w/ a therapist and psychiatric nurse NP, no substance use hx, no hx of SA, who brought himself into to ED with complaints of depressed mood and SI. Psychiatry was consulted for a mental health evaluation.    On assessment the patient presented with depressed affect, emotional lability, and passive SI, in what seems to be an acute depressive episode of his bipolar disorder. And although this patient has no hx of SA and is stating that he would not complete a suicide, it is concerning that he has emotionally decompensated to the point where he is self presenting to the ED repeatedly in the past week seeking psychiatric care after decades of relative stability in outpatient treatment. Patient does have many risk factors for suicide (detailed in risk summary below), as well as many protective factors, but his social isolation, inability to complete ADLs due to his emotional state, and the fact that he has the insight into his illness to recognize that he is decompensating, are all points that are difficult to ignore. At this time we believe that this patient can possibly benefit from inpatient psychiatric treatment as he has an elevated acute risk of suicide and has been recently unable to care for himself. Patient is agreeable and actively seeking treatment.    #Bipolar Disorder  -ziprasidone 80 mg PO BID  -divalproex sodium: 1 tab(s) orally 2 times a day (20 Apr 2023 10:57)    #CKD (baseline cr ~2),   -Calcium 500+D oral tablet, chewable: orally 3 times a day   -ergocalciferol 1.25 mg (50,000 intl units) oral tablet: orally once a week   -sodium bicarbonate 325 mg oral tablet:    -Vitamin D3 1250 mcg (50,000 intl units) oral capsule: 1 cap(s) orally once a week     #DVT on Eliquis,   -aspirin 81 mg oral tablet, chewable: 1 tab(s) orally once a day     #DM,   -insulin glargine 100 units/mL subcutaneous solution: 40 unit(s) subcutaneous once a day (at bedtime)   -Linzess 145 mcg oral capsule: 1 cap(s) orally once a day   -Ozempic 2 mg/1.5 mL (1 mg dose) subcutaneous solution    -repaglinide 1 mg oral tablet: 2 tab(s) orally 3 times a day (before meals)     #HTN  -carvedilol 12.5 mg oral tablet: 1 tab(s) orally 2 times a day   -losartan 25 mg oral tablet: 1 tab(s) orally once a day    #HLD  -atorvastatin 40 mg oral tablet: 1 tab(s) orally once a day   -Vascepa 1 g oral capsule: 2 cap(s) orally 2 times a day    #BPH   -finasteride 5 mg oral tablet: 1 tab(s) orally once a day   -tamsulosin 0.4 mg oral capsule: 1 capsule orally once a day     #sleep apnea  -CPAP at night    - For severe agitation not responding to behavioral intervention, offer haldol 5 mg po q6h prn, ativan 2 mg PO q6h prn, benadryl 50 mg po q6h prn, with escalation to IM if patient refusing PO and remains an imminent danger to self or others. If IM antipsychotic is administered, please perform follow-up ECG for QTc monitoring. Hold antipsychotics if Qtc>500 ms. Patient is 64yo  M,  with an estranged adult son, domiciled alone in a supported-living apartment in [facility], independent with ADLs but uses walker for ambulation, currently unemployed on SSD for last 30 years, w/ PMHx of CKD (baseline cr ~2), DVT on Eliquis, DM, HTN, HLD, BPH, sleep apnea, and PPHx of bipolar disorder, hx of several IPP admissions (none within several decades), connected to care via [facility] w/ a therapist and psychiatric nurse NP, no substance use hx, no hx of SA, who brought himself into to ED with complaints of depressed mood and SI. Psychiatry was consulted for a mental health evaluation.    On assessment the patient presented with depressed affect, emotional lability, and passive SI, in what seems to be an acute depressive episode of his bipolar disorder. And although this patient has no hx of SA and is stating that he would not complete a suicide, it is concerning that he has emotionally decompensated to the point where he is self presenting to the ED repeatedly in the past week seeking psychiatric care after decades of relative stability in outpatient treatment. Patient does have many risk factors for suicide (detailed in risk summary below), as well as many protective factors, but his social isolation, inability to complete ADLs due to his emotional state, and the fact that he has the insight into his illness to recognize that he is decompensating, are all points that are difficult to ignore. At this time we believe that this patient can possibly benefit from inpatient psychiatric treatment as he has an elevated acute risk of suicide and has been recently unable to care for himself. Patient is agreeable and actively seeking treatment.    #Bipolar Disorder  -ziprasidone 80 mg PO BID  -divalproex 500mg DR PO BID    #CKD (baseline cr ~2),   -Calcium 500+D oral tablet TID  -ergocalciferol 1.25 mg PO weekly  -sodium bicarbonate 325 mg PO daily   -Vitamin D3 1250 mcg PO weekly    #DVT on Eliquis,   -apixaban 2.5mg BID for 7 days (start 4/24)  -aspirin 81 mg oral tablet daily    #DM,   -insulin glargine 100 units/mL subcutaneous solution: 40 unit(s) subcutaneous once a day (at bedtime)   -Linzess 145 mcg PO daily  -Ozempic 2 mg/1.5 mL (1 mg dose) subcutaneous solution weekly  -repaglinide 2mg PO TID    #HTN  -carvedilol 12.5 mg PO BID  -losartan 25 mg PO    #HLD  -atorvastatin 40 mg PO BID   -Vascepa 2g PO BID    #BPH   -finasteride 5 mg PO daily  -tamsulosin 0.4 mg PO daily    #sleep apnea  -CPAP at night    - For severe agitation not responding to behavioral intervention, offer haldol 5 mg po q6h prn, ativan 2 mg PO q6h prn, benadryl 50 mg po q6h prn, with escalation to IM if patient refusing PO and remains an imminent danger to self or others. If IM antipsychotic is administered, please perform follow-up ECG for QTc monitoring. Hold antipsychotics if Qtc>500 ms. Patient is 62yo  M,  with an estranged adult son, domiciled alone in a supported-living apartment in [facility], independent with ADLs but uses walker for ambulation, currently unemployed on SSD for last 30 years, w/ PMHx of CKD (baseline cr ~2), DVT on Eliquis, DM, HTN, HLD, BPH, sleep apnea, and PPHx of bipolar disorder, hx of several IPP admissions (none within several decades), connected to care via [facility] w/ a therapist and psychiatric nurse NP, no substance use hx, no hx of SA, who brought himself into to ED with complaints of depressed mood and SI. Psychiatry was consulted for a mental health evaluation.    On assessment the patient presented with depressed affect, emotional lability, and passive SI, in what seems to be an acute depressive episode of his bipolar disorder. And although this patient has no hx of SA and is stating that he would not complete a suicide, it is concerning that he has emotionally decompensated to the point where he is self presenting to the ED repeatedly in the past week seeking psychiatric care after decades of relative stability in outpatient treatment. Patient does have many risk factors for suicide (detailed in risk summary below), as well as many protective factors, but his social isolation, inability to complete ADLs due to his emotional state, and the fact that he has the insight into his illness to recognize that he is decompensating, are all points that are difficult to ignore. At this time we believe that this patient can possibly benefit from inpatient psychiatric treatment as he has an elevated acute risk of suicide and has been recently unable to care for himself. Patient is agreeable and actively seeking treatment.    #Bipolar Disorder  -ziprasidone 80 mg PO BID  -divalproex 500mg DR PO BID    #CKD (baseline cr ~2)   -Calcium 500+D oral tablet TID  -ergocalciferol 1.25 mg PO weekly  -sodium bicarbonate 325 mg PO daily   -Vitamin D3 1250 mcg PO weekly    #DVT on Eliquis   -apixaban 2.5mg BID for 7 days (start 4/24)  -aspirin 81 mg oral tablet daily    #DM   -insulin glargine 100 units/mL subcutaneous solution: 40 unit(s) subcutaneous once a day (at bedtime)   -Linzess 145 mcg PO daily  -Ozempic 2 mg/1.5 mL (1 mg dose) subcutaneous solution weekly  -repaglinide 2mg PO TID (before meals)    #HTN  -carvedilol 12.5 mg PO BID  -losartan 25 mg PO    #HLD  -atorvastatin 40 mg PO BID   -Vascepa 2g PO BID    #BPH   -finasteride 5 mg PO daily  -tamsulosin 0.4 mg PO daily    #sleep apnea  -CPAP at night    - For severe agitation not responding to behavioral intervention, offer haldol 5 mg po q6h prn, ativan 2 mg PO q6h prn, benadryl 50 mg po q6h prn, with escalation to IM if patient refusing PO and remains an imminent danger to self or others. If IM antipsychotic is administered, please perform follow-up ECG for QTc monitoring. Hold antipsychotics if Qtc>500 ms.

## 2023-04-24 NOTE — ED PROVIDER NOTE - OBJECTIVE STATEMENT
64 yo male with hx depression, dm, htn, dvt c/o feeling depressed with suicidal ideations. patient requesting psych admission.   patient states he was recently hospitalized at , but reports symptoms have worsened.   patient states he thinks about his girlfriend every day, and she passed away 2 years ago. patient states he is crying a lot. patient was recently found to have a dvt, but has not yet filled the prescription for eliquis  denies chest pain or sob  denies fever or chills  +nasal congestion, with recent dx parainfluenza

## 2023-04-24 NOTE — ED ADULT TRIAGE NOTE - ARRIVAL INFO ADDITIONAL COMMENTS
Patient BIB EMS from home for increasing depression, denies SI or HI. Denies history of self harm or suicidal attempts. Patient reports that he is depressed, thinking about his girlfriend who passed away years ago, as well as his parents, who have passed. Patient with history of depression and bipolar disorder, followed by Dr Tinajero, at [facility]. Therapist Ed Elianr (). No recent change in bipolar or depression meds.  Patient is diabetic, insulin managed. Flu pos 4/20

## 2023-04-24 NOTE — ED BEHAVIORAL HEALTH ASSESSMENT NOTE - DESCRIPTION
CKD (baseline cr ~2), DVT on Eliquis, DM, HTN, sleep apnea Vital Signs Last 24 Hrs  T(C): 37.2 (24 Apr 2023 13:30), Max: 37.2 (24 Apr 2023 13:30)  T(F): 98.9 (24 Apr 2023 13:30), Max: 98.9 (24 Apr 2023 13:30)  HR: 100 (24 Apr 2023 13:30) (100 - 100)  BP: 135/89 (24 Apr 2023 13:30) (135/89 - 135/89)  BP(mean): --  RR: 18 (24 Apr 2023 13:30) (18 - 18)  SpO2: 96% (24 Apr 2023 13:30) (96% - 96%)  Parameters below as of 24 Apr 2023 13:30  Patient On (Oxygen Delivery Method): room air                          14.2   7.46  )-----------( 171      ( 24 Apr 2023 14:07 )             44.7   04-24    141  |  103  |  38<H>  ----------------------------<  206<H>  4.5   |  28  |  2.01<H>    Ca    9.4      24 Apr 2023 14:07 Patient was born and raised in [place], [place] and had 2 older brothers and 1 older sister; Patient completed HS in with no IEP; Patient has only worked for 6 months in past 30 years as a ; Patient was  for 5 years in his 20's and had a son with his ex wife, but he is now  and estranged from both and has not seen them in ~ 20 years;

## 2023-04-24 NOTE — ED ADULT NURSE NOTE - INTERVENTIONS DEFINITIONS
Room bathroom lighting operational/Provide visual cue, wrist band, yellow gown, etc./Monitor gait and stability

## 2023-04-24 NOTE — ED BEHAVIORAL HEALTH ASSESSMENT NOTE - OTHER PAST PSYCHIATRIC HISTORY (INCLUDE DETAILS REGARDING ONSET, COURSE OF ILLNESS, INPATIENT/OUTPATIENT TREATMENT)
Patient was dx with bipolar disorder around age 19; First admitted to [facility] at age 22, and then several times over the next few decades; For last 17 years patient has been compliant with medications and has been in outpatient treatment via the [facility] in [facility]; Patient also attends therapy 5 days/wk at [facility] in [facility]; No hx of NSSIB or SA;
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
## 2023-04-24 NOTE — ED ADULT NURSE NOTE - NSFALLRSKINDICTYPE_ED_ALL_ED
1. Continue current medications  2. Set up echo. We will call with results  3. Follow up with primary MD regarding weight loss  
Impaired Gait

## 2023-04-24 NOTE — ED ADULT NURSE NOTE - OBJECTIVE STATEMENT
Pt. to ED complaining of depression and SI.  Pt. denies HI.  Pt. states that he has been depressed for two weeks with no plan.  Pt. states he is upset from parents dying 35 years ago and girlfriend dying 3 years ago.  Pt. states he was discharged from the hospital yesterday where he was here for 3 days for a blood clot and the flu.  Pt. denies any pain or discomfort.  Affect flat.  Pt. cooperative.

## 2023-04-24 NOTE — ED PROVIDER NOTE - NSFOLLOWUPINSTRUCTIONS_ED_ALL_ED_FT
Depression    Depression is a mental illness that usually causes feelings of sadness, hopelessness, or helplessness. Some people with this disorder do not feel particularly sad but lose interest in doing things they used to enjoy. Major depressive disorder also can cause physical symptoms. It can interfere with work, school, relationships, and other normal everyday activities. If you were started on a medication, make sure to take exactly as prescribed and follow up with a psychiatrist.    SEEK IMMEDIATE MEDICAL CARE IF YOU HAVE ANY OF THE FOLLOWING SYMPTOMS: thoughts about hurting or killing yourself, thoughts about hurting or killing somebody else, hallucinations, or worsening depression. Take Eliquis (Apixaban) 5mg twice per day.   Note that this dose is increased from what was recommended when you were in the hospital.     Depression    Depression is a mental illness that usually causes feelings of sadness, hopelessness, or helplessness. Some people with this disorder do not feel particularly sad but lose interest in doing things they used to enjoy. Major depressive disorder also can cause physical symptoms. It can interfere with work, school, relationships, and other normal everyday activities. If you were started on a medication, make sure to take exactly as prescribed and follow up with a psychiatrist.    SEEK IMMEDIATE MEDICAL CARE IF YOU HAVE ANY OF THE FOLLOWING SYMPTOMS: thoughts about hurting or killing yourself, thoughts about hurting or killing somebody else, hallucinations, or worsening depression.

## 2023-04-24 NOTE — ED BEHAVIORAL HEALTH ASSESSMENT NOTE - NSSUICPROTFACT_PSY_ALL_CORE
Cultural, spiritual and/or moral attitudes against suicide/Positive therapeutic relationships/Alevism beliefs

## 2023-04-24 NOTE — ED PROVIDER NOTE - PATIENT PORTAL LINK FT
You can access the FollowMyHealth Patient Portal offered by [facility] by registering at the following website: [email or web address]. By joining [facility]’s FollowMyHealth portal, you will also be able to view your health information using other applications (apps) compatible with our system.

## 2023-04-25 VITALS
HEART RATE: 98 BPM | DIASTOLIC BLOOD PRESSURE: 71 MMHG | SYSTOLIC BLOOD PRESSURE: 101 MMHG | OXYGEN SATURATION: 94 % | TEMPERATURE: 98 F | RESPIRATION RATE: 18 BRPM

## 2023-04-25 LAB
GLUCOSE BLDC GLUCOMTR-MCNC: 188 MG/DL — HIGH (ref 70–99)
LITHIUM SERPL-MCNC: <0.2 MMOL/L — LOW (ref 0.6–1.2)
VALPROATE SERPL-MCNC: 52 UG/ML — SIGNIFICANT CHANGE UP (ref 50–100)

## 2023-04-25 PROCEDURE — 36415 COLL VENOUS BLD VENIPUNCTURE: CPT

## 2023-04-25 PROCEDURE — 81001 URINALYSIS AUTO W/SCOPE: CPT

## 2023-04-25 PROCEDURE — 80178 ASSAY OF LITHIUM: CPT

## 2023-04-25 PROCEDURE — 85025 COMPLETE CBC W/AUTO DIFF WBC: CPT

## 2023-04-25 PROCEDURE — 80164 ASSAY DIPROPYLACETIC ACD TOT: CPT

## 2023-04-25 PROCEDURE — 80307 DRUG TEST PRSMV CHEM ANLYZR: CPT

## 2023-04-25 PROCEDURE — 80048 BASIC METABOLIC PNL TOTAL CA: CPT

## 2023-04-25 PROCEDURE — 96372 THER/PROPH/DIAG INJ SC/IM: CPT

## 2023-04-25 PROCEDURE — 82962 GLUCOSE BLOOD TEST: CPT

## 2023-04-25 PROCEDURE — 84443 ASSAY THYROID STIM HORMONE: CPT

## 2023-04-25 PROCEDURE — 99285 EMERGENCY DEPT VISIT HI MDM: CPT

## 2023-04-25 PROCEDURE — 87635 SARS-COV-2 COVID-19 AMP PRB: CPT

## 2023-04-25 PROCEDURE — 93005 ELECTROCARDIOGRAM TRACING: CPT

## 2023-04-25 RX ORDER — DIAZEPAM 5 MG
5 TABLET ORAL ONCE
Refills: 0 | Status: DISCONTINUED | OUTPATIENT
Start: 2023-04-25 | End: 2023-04-25

## 2023-04-25 RX ORDER — APIXABAN 2.5 MG/1
5 TABLET, FILM COATED ORAL EVERY 12 HOURS
Refills: 0 | Status: DISCONTINUED | OUTPATIENT
Start: 2023-04-25 | End: 2023-04-28

## 2023-04-25 RX ORDER — APIXABAN 2.5 MG/1
1 TABLET, FILM COATED ORAL
Qty: 60 | Refills: 0
Start: 2023-04-25 | End: 2023-05-24

## 2023-04-25 RX ADMIN — ZIPRASIDONE HYDROCHLORIDE 80 MILLIGRAM(S): 20 CAPSULE ORAL at 05:22

## 2023-04-25 RX ADMIN — Medication 12 UNIT(S): at 09:02

## 2023-04-25 RX ADMIN — APIXABAN 2.5 MILLIGRAM(S): 2.5 TABLET, FILM COATED ORAL at 05:22

## 2023-04-25 RX ADMIN — Medication 5 MILLIGRAM(S): at 03:07

## 2023-04-25 RX ADMIN — CARVEDILOL PHOSPHATE 12.5 MILLIGRAM(S): 80 CAPSULE, EXTENDED RELEASE ORAL at 05:22

## 2023-04-25 RX ADMIN — DIVALPROEX SODIUM 500 MILLIGRAM(S): 500 TABLET, DELAYED RELEASE ORAL at 05:34

## 2023-04-25 NOTE — ED BEHAVIORAL HEALTH PROGRESS NOTE - CASE SUMMARY/FORMULATION (CLEARLY DOCUMENT RATIONALE FOR DISPOSITION CHANGE)
63M, , living alone in supported living in [facility], independent in ADLs but uses walker, unemployed,  PMH of CKD (baseline Cr 2)DM2, HTN, HLD, sleep apnea, recent med admit for DVT, psych hx of bipolar, multiple prior admissions but none in several decades, in outpt care w dave de santiagos (therapy and meds), no prior SA, no substance, now BIBS for depression and suicide ideation.    Patient reports feeling much better today, attributing this to being able to talk to staff and feel supported, states that he thinks he was just lonely, but he is looking forward to starting a day program tomorrow, talks to his therapist tomorrow, knows he can come back to the ED should he feel worse, and he wants to go home. There does not appear to be an indication to hospitalize him against his will and he is therefore psychiatrically cleared in the interest of patient autonomy

## 2023-04-25 NOTE — CHART NOTE - NSCHARTNOTEFT_GEN_A_CORE
Patients day program requesting medical and psych clearance for patient to return to [facility] Day Program tomorrow 4/26/23.  GERALD faxed over requesting paperwork to Aminata (MELISSA). GERALD confirmed paperwork was received and confirmed patient is able to attend program tomorrow 4/26/23.  Patient made aware.

## 2023-04-25 NOTE — CONSULT NOTE ADULT - ASSESSMENT
62 yo M with PMH of Type 2 Diabetes Mellitus, Hypertension, Hyperlipidemia, Depression, Bipolar d/o, recently found DVT (on Eliquis) who presents to the ER with worsening depression, hopelessness.   Medicine consulted for clarification on Eliquis dosing.     DVT of left tibioperoneal trunk and left gastrocnemius vein. V/Q showing low probability of PE  - found on recent admission, care coordinated with Hematology, Nephrology. Due to finding of below knee DVT and renal dysfunction, patient started on Eliquis 2.5mg BID. He is to have follow up surveillance duplex in 1-2 weeks   - patient has hx of medication non-compliance as well which complicates treatment with other agents (ie coumadin, Lovenox)     Depression/Bipolar d/o  - management per psychiatry     No medical contraindication to dispo to inpatient psychiatry facility 62 yo M with PMH of Type 2 Diabetes Mellitus, Hypertension, Hyperlipidemia, Depression, Bipolar d/o, recently found DVT (on Eliquis) who presents to the ER with worsening depression, hopelessness.   Medicine consulted for clarification on Eliquis dosing.     DVT of left tibioperoneal trunk and left gastrocnemius vein. V/Q showing low probability of PE  - found on recent admission, care coordinated with Hematology, Nephrology. Eliquis dose should be increased to 5mg BID  - patient has hx of medication non-compliance as well which complicates treatment with other agents (ie coumadin, Lovenox)   - outpatient Hematology and PCP follow up    Depression/Bipolar d/o  - management per psychiatry     No medical contraindication to dispo to inpatient psychiatry facility

## 2023-04-25 NOTE — ED BEHAVIORAL HEALTH PROGRESS NOTE - REFERRAL / APPOINTMENT DETAILS
Follow up with therapist Cole Garcia tomorrow at 945am, start day program tomorrow at [facility]

## 2023-04-25 NOTE — ED BEHAVIORAL HEALTH PROGRESS NOTE - RISK ASSESSMENT
Compared to prior, patient now has had cessation of suicide ideation, is future oriented, able to safety plan

## 2023-04-25 NOTE — ED BEHAVIORAL HEALTH PROGRESS NOTE - SUMMARY
risk factors include mood disorder, passive suicide ideation, loneliness, multiple medical comorbidites, prior hospitalizations, family history of suicide, prior trauma. protective factors include lack of prior attempts, lack of psychosis, adherence to treatment, engagement in treatment, treatment seeking behavior, stable housing

## 2023-04-25 NOTE — CONSULT NOTE ADULT - SUBJECTIVE AND OBJECTIVE BOX
Patient is a 63y old  Male who presents with a chief complaint of depression.     HPI:  62 yo M with PMH of Bipolar d/o, DM, Hypertension, Hyperlipidemia, CHAYITO, Chronic Kidney Disease who presents to ER for     Recently admitted to [facility] from - for depression, found to have DVT left tibioperoneal trunk and left gastrocnemius vein. V/Q low probability for PE. Hematology, Nephrology evaluated patient. Due to below knee thrombosis and renal dysfunction, decision made to use Eliquis 2.5mg bid for AC.   At time of evaluation, patient denies pain/discomfort. States he is awaiting psychiatric bed, feels anxious about leaving soon for treatment.     PAST MEDICAL & SURGICAL HISTORY:  Bipolar 1 disorder on Lithium  Hypertension  T2DM (type 2 diabetes mellitus)  HLD (hyperlipidemia)  CHAYITO (obstructive sleep apnea) diagnosed >25 years ago, non-compliant with CPAP  Diabetic neuropathy bilateral feet, ankles  Obesity  Rheumatoid arthritis  History of excision of testicular mass left,   Cataract of left eye    SOCIAL HISTORY: Denies smoking, alcohol or drug use    ALLERGIES:  Tegretol (Hypotension; Anaphylaxis)  penicillin (Hives)    MEDICATIONS  (STANDING):  apixaban 2.5 milliGRAM(s) Oral two times a day  atorvastatin 40 milliGRAM(s) Oral at bedtime  carvedilol 12.5 milliGRAM(s) Oral every 12 hours  dextrose 5%. 1000 milliLiter(s) (100 mL/Hr) IV Continuous <Continuous>  dextrose 5%. 1000 milliLiter(s) (50 mL/Hr) IV Continuous <Continuous>  dextrose 50% Injectable 25 Gram(s) IV Push once  dextrose 50% Injectable 12.5 Gram(s) IV Push once  dextrose 50% Injectable 25 Gram(s) IV Push once  divalproex  milliGRAM(s) Oral two times a day  glucagon  Injectable 1 milliGRAM(s) IntraMuscular once  insulin glargine Injectable (LANTUS) 40 Unit(s) SubCutaneous at bedtime  insulin lispro Injectable (ADMELOG) 12 Unit(s) SubCutaneous three times a day before meals  ziprasidone 80 milliGRAM(s) Oral two times a day    MEDICATIONS  (PRN):  dextrose Oral Gel 15 Gram(s) Oral once PRN Blood Glucose LESS THAN 70 milliGRAM(s)/deciliter    Review of Systems: Refer to HPI for pertinent positives and negatives. All other ROS reviewed and negative except as otherwise stated above.    Vital Signs Last 24 Hrs  T(F): 97.8 (2023 05:09), Max: 98.9 (2023 13:30)  HR: 94 (2023 05:09) (94 - 100)  BP: 111/78 (2023 05:09) (111/78 - 142/80)  RR: 18 (2023 05:09) (18 - 18)  SpO2: 94% (2023 05:09) (94% - 98%)  I&O's Summary    PHYSICAL EXAM:  GENERAL: NAD, well-groomed. Obese  HEAD:  Atraumatic, Normocephalic  EYES: EOMI, PERRL, conjunctiva and sclera clear  ENMT: Moist mucous membranes, Good dentition  NECK: Supple, No JVD  CHEST/LUNG: Clear to auscultation bilaterally, non-labored breathing, good air entry  HEART: RRR; S1/S2, No murmur  ABDOMEN: Soft, Nontender, Nondistended; Bowel sounds present  VASCULAR: Normal pulses, Normal capillary refill  EXTREMITIES: trace edema b/l LE. chronic venous changes - hyperpigmentation of b/l LE  LYMPH: No lymphadenopathy noted  SKIN: Warm, Intact  PSYCH: Normal mood and affect  NERVOUS SYSTEM:  following commands, no facial asymmetry    LABS:                        14.2   7.46  )-----------( 171      ( 2023 14:07 )             44.7     04-24    141  |  103  |  38  ----------------------------<  206  4.5   |  28  |  2.01    Ca    9.4      2023 14:07    TSH 0.329   TSH with FT4 reflex --  Total T3 --    POCT Blood Glucose.: 208 mg/dL (2023 21:25)    Urinalysis Basic - ( 2023 18:30 )    Color: Yellow / Appearance: Clear / S.005 / pH: x  Gluc: x / Ketone: Negative  / Bili: Negative / Urobili: Negative   Blood: x / Protein: 15 / Nitrite: Negative   Leuk Esterase: Negative / RBC: Negative /HPF / WBC Negative /HPF   Sq Epi: x / Non Sq Epi: x / Bacteria: Negative /HPF    COVID-19 PCR: NotDetec (23 @ 14:07)    RADIOLOGY & ADDITIONAL TESTS:    ACC: [id number] EXAM:  US DPLX LWR EXT VEINS COMPL BI   ORDERED BY: KARIN GEORGES     PROCEDURE DATE:  2023          INTERPRETATION:  CLINICAL INFORMATION: Bilateral lower extremity   swelling. Assess for DVT.    COMPARISON: 2020.    TECHNIQUE: Duplex sonography of the BILATERAL LOWER extremity veins with   color and spectral Doppler, with and without compression.    FINDINGS:    RIGHT:  Normal compressibility of the RIGHT common femoral, femoral and popliteal   veins.  Doppler examination shows normal spontaneous and phasic flow.  No RIGHT calf vein thrombosis is detected.    LEFT:  Normal compressibility of the LEFT common femoral, femoral and popliteal   veins.  Doppler examination shows normal spontaneous and phasic flow.  DVTis identified within the tibioperoneal trunk as well as within the   gastrocnemius vein. Note is made of patency of the left peroneal vein.    IMPRESSION:  Acute DVT left tibioperoneal trunk and left gastrocnemius vein.    Findings were discussed with Dr. Felix 2023 1:18 PM by Dr. Tolbert   with read back confirmation.    --- End of Report ---  CYNDIE TOLBERT MD; Attending Radiologist  This document has been electronically signed. 2023  1:24PM    EKG (personally reviewed by me): Sinus tachycardia at 105bpm    Care Discussed with Consultants/Other Providers: Patient is a 63y old  Male who presents with a chief complaint of depression.     HPI:  62 yo M with PMH of Bipolar d/o, DM, Hypertension, Hyperlipidemia, CHAYITO, Chronic Kidney Disease who presents to ER for     Recently admitted to [facility] from - for depression, found to have DVT left tibioperoneal trunk and left gastrocnemius vein. V/Q low probability for PE. Hematology, Nephrology evaluated patient. Was discharged on eliquis 2.5mg bid which patient did not take since discharge.   At time of evaluation, patient denies pain/discomfort. States he is awaiting psychiatric bed, feels anxious about leaving soon for treatment.     PAST MEDICAL & SURGICAL HISTORY:  Bipolar 1 disorder on Lithium  Hypertension  T2DM (type 2 diabetes mellitus)  HLD (hyperlipidemia)  CHAYITO (obstructive sleep apnea) diagnosed >25 years ago, non-compliant with CPAP  Diabetic neuropathy bilateral feet, ankles  Obesity  Rheumatoid arthritis  History of excision of testicular mass left,   Cataract of left eye    SOCIAL HISTORY: Denies smoking, alcohol or drug use    ALLERGIES:  Tegretol (Hypotension; Anaphylaxis)  penicillin (Hives)    MEDICATIONS  (STANDING):  apixaban 2.5 milliGRAM(s) Oral two times a day  atorvastatin 40 milliGRAM(s) Oral at bedtime  carvedilol 12.5 milliGRAM(s) Oral every 12 hours  dextrose 5%. 1000 milliLiter(s) (100 mL/Hr) IV Continuous <Continuous>  dextrose 5%. 1000 milliLiter(s) (50 mL/Hr) IV Continuous <Continuous>  dextrose 50% Injectable 25 Gram(s) IV Push once  dextrose 50% Injectable 12.5 Gram(s) IV Push once  dextrose 50% Injectable 25 Gram(s) IV Push once  divalproex  milliGRAM(s) Oral two times a day  glucagon  Injectable 1 milliGRAM(s) IntraMuscular once  insulin glargine Injectable (LANTUS) 40 Unit(s) SubCutaneous at bedtime  insulin lispro Injectable (ADMELOG) 12 Unit(s) SubCutaneous three times a day before meals  ziprasidone 80 milliGRAM(s) Oral two times a day    MEDICATIONS  (PRN):  dextrose Oral Gel 15 Gram(s) Oral once PRN Blood Glucose LESS THAN 70 milliGRAM(s)/deciliter    Review of Systems: Refer to HPI for pertinent positives and negatives. All other ROS reviewed and negative except as otherwise stated above.    Vital Signs Last 24 Hrs  T(F): 97.8 (2023 05:09), Max: 98.9 (2023 13:30)  HR: 94 (2023 05:09) (94 - 100)  BP: 111/78 (2023 05:09) (111/78 - 142/80)  RR: 18 (2023 05:09) (18 - 18)  SpO2: 94% (2023 05:09) (94% - 98%)  I&O's Summary    PHYSICAL EXAM:  GENERAL: NAD, well-groomed. Obese  HEAD:  Atraumatic, Normocephalic  EYES: EOMI, PERRL, conjunctiva and sclera clear  ENMT: Moist mucous membranes, Good dentition  NECK: Supple, No JVD  CHEST/LUNG: Clear to auscultation bilaterally, non-labored breathing, good air entry  HEART: RRR; S1/S2, No murmur  ABDOMEN: Soft, Nontender, Nondistended; Bowel sounds present  VASCULAR: Normal pulses, Normal capillary refill  EXTREMITIES: trace edema b/l LE. chronic venous changes - hyperpigmentation of b/l LE  LYMPH: No lymphadenopathy noted  SKIN: Warm, Intact  PSYCH: Normal mood and affect  NERVOUS SYSTEM:  following commands, no facial asymmetry    LABS:                        14.2   7.46  )-----------( 171      ( 2023 14:07 )             44.7     04-24    141  |  103  |  38  ----------------------------<  206  4.5   |  28  |  2.01    Ca    9.4      2023 14:07    TSH 0.329   TSH with FT4 reflex --  Total T3 --    POCT Blood Glucose.: 208 mg/dL (2023 21:25)    Urinalysis Basic - ( 2023 18:30 )    Color: Yellow / Appearance: Clear / S.005 / pH: x  Gluc: x / Ketone: Negative  / Bili: Negative / Urobili: Negative   Blood: x / Protein: 15 / Nitrite: Negative   Leuk Esterase: Negative / RBC: Negative /HPF / WBC Negative /HPF   Sq Epi: x / Non Sq Epi: x / Bacteria: Negative /HPF    COVID-19 PCR: NotDetec (23 @ 14:07)    RADIOLOGY & ADDITIONAL TESTS:    ACC: [id number] EXAM:  US DPLX LWR EXT VEINS COMPL BI   ORDERED BY: KARIN   JOSÉ MANUEL     PROCEDURE DATE:  2023    INTERPRETATION:  CLINICAL INFORMATION: Bilateral lower extremity   swelling. Assess for DVT.    COMPARISON: 2020.    TECHNIQUE: Duplex sonography of the BILATERAL LOWER extremity veins with   color and spectral Doppler, with and without compression.    FINDINGS:    RIGHT:  Normal compressibility of the RIGHT common femoral, femoral and popliteal   veins.  Doppler examination shows normal spontaneous and phasic flow.  No RIGHT calf vein thrombosis is detected.    LEFT:  Normal compressibility of the LEFT common femoral, femoral and popliteal   veins.  Doppler examination shows normal spontaneous and phasic flow.  DVTis identified within the tibioperoneal trunk as well as within the   gastrocnemius vein. Note is made of patency of the left peroneal vein.    IMPRESSION:  Acute DVT left tibioperoneal trunk and left gastrocnemius vein.    Findings were discussed with Dr. Felix 2023 1:18 PM by Dr. Tolbert   with read back confirmation.    --- End of Report ---  CYNDIE TOLBERT MD; Attending Radiologist  This document has been electronically signed. 2023  1:24PM    EKG (personally reviewed by me): Sinus tachycardia at 105bpm    Care Discussed with Consultants/Other Providers:

## 2023-04-25 NOTE — ED BEHAVIORAL HEALTH NOTE - BEHAVIORAL HEALTH NOTE
Per RN patient remains gowned, wanded, and in private room on 1:1. No SI/HI/AH/VH elicited; patient has been talkative overnight, cooperative, observed to be drinking/eating, ambulating on his own to the restroom, and resting in bed. Patient did receive Valium 5mg PO. Patient presently unaccompanied by social supports while in ED; pending psychiatric bed placement.

## 2023-04-25 NOTE — ED BEHAVIORAL HEALTH PROGRESS NOTE - DETAILS:
Patient reports that he feels much better today after sleeping well and feeling heard. He states that he would not actually kill himself, thinks he was just lonely, and states that he is looking forward to starting a day program tomorrow and talking to his therapist, knows he can come back to the ED should he feel worse, wants to go home.

## 2023-04-25 NOTE — ED BEHAVIORAL HEALTH PROGRESS NOTE - SAFETY PLAN ADDT'L DETAILS
Safety plan discussed with.../Education provided regarding environmental safety / lethal means restriction/Provision of National Suicide Prevention Lifeline 4-719-104-SMNN (7451)

## 2023-04-26 ENCOUNTER — APPOINTMENT (OUTPATIENT)
Dept: NEUROLOGY | Facility: CLINIC | Age: 63
End: 2023-04-26

## 2023-04-30 ENCOUNTER — EMERGENCY (EMERGENCY)
Facility: HOSPITAL | Age: 63
LOS: 1 days | Discharge: HOME | End: 2023-04-30
Attending: INTERNAL MEDICINE | Admitting: INTERNAL MEDICINE
Payer: MEDICARE

## 2023-04-30 VITALS
WEIGHT: 315 LBS | RESPIRATION RATE: 16 BRPM | OXYGEN SATURATION: 94 % | HEIGHT: 71 IN | SYSTOLIC BLOOD PRESSURE: 110 MMHG | DIASTOLIC BLOOD PRESSURE: 77 MMHG | HEART RATE: 108 BPM

## 2023-04-30 DIAGNOSIS — Z98.890 OTHER SPECIFIED POSTPROCEDURAL STATES: Chronic | ICD-10-CM

## 2023-04-30 DIAGNOSIS — H26.9 UNSPECIFIED CATARACT: Chronic | ICD-10-CM

## 2023-04-30 PROCEDURE — 99282 EMERGENCY DEPT VISIT SF MDM: CPT

## 2023-04-30 PROCEDURE — L9995: CPT

## 2023-04-30 NOTE — ED ADULT NURSE NOTE - SUICIDE SCREENING QUESTION 1
Follow up with PCP  Humidified air, tylenol/motrin as needed  Return to the ED with new or worsening symptoms including but not limited to chest pain, shortness of breath, difficulty breathing, weakness, dizziness, fevers uncontrolled by Tylenol/Motrin 
No

## 2023-04-30 NOTE — ED PROVIDER NOTE - PATIENT PORTAL LINK FT
You can access the FollowMyHealth Patient Portal offered by [facility] by registering at the following website: [email or web address]. By joining [facility]’s FollowMyHealth portal, you will also be able to view your health information using other applications (apps) compatible with our system.

## 2023-04-30 NOTE — ED PROVIDER NOTE - ATTENDING APP SHARED VISIT CONTRIBUTION OF CARE
62 y/o M was BIB EMS from home for a scab on the right great toe that was bleeding today while he was cleaning his apartment barefoot. Reports he wrapped it up and called EMS.  He takes Eliquis for recently dx DVT.  No trauma or direct injury reported. PE as noted above. Will clean with scab and cover with curlex. Pt has a podiatrist he will f/u with  Dr. Felix:  I have reviewed and discussed with the PA/ resident the case specifics, including the history, physical assessment, evaluation, conclusion, laboratory results, and medical plan. I agree with the contents, and conclusions. I have personally examined, and interviewed the patient.

## 2023-04-30 NOTE — ED PROVIDER NOTE - OBJECTIVE STATEMENT
62 y/o M was BIB EMS from home for a scab on the right great toe that was bleeding today while he was cleaning his apartment barefoot. Reports he wrapped it up and called EMS.  He takes Eliquis for recently dx DVT.  No trauma or direct injury reported

## 2023-04-30 NOTE — ED ADULT TRIAGE NOTE - CADM TRG TX PRIOR TO ARRIVAL
Attempted to ambulate pt around unit per MD order. Pt unable to sit upright in stretcher on her own. Son at the bedside. Pt is guided back into the stretcher. Side rails up x 2. MD Carrier notified.      bleeding control

## 2023-04-30 NOTE — ED PROVIDER NOTE - CLINICAL SUMMARY MEDICAL DECISION MAKING FREE TEXT BOX
64 y/o M was BIB EMS from home for a scab on the right great toe that was bleeding today while he was cleaning his apartment barefoot. Reports he wrapped it up and called EMS.  He takes Eliquis for recently dx DVT.  No trauma or direct injury reported. PE as noted above. Will clean with scab and cover with curlex. Pt has a podiatrist he will f/u with

## 2023-04-30 NOTE — ED PROVIDER NOTE - NSFOLLOWUPINSTRUCTIONS_ED_ALL_ED_FT
Follow up with your podiatrist within 2-3 days.     Keep the wound clean and dry     Stay hydrated    Return to the ER if your symptoms worsen or for any other medical emergencies

## 2023-05-17 NOTE — ED PROVIDER NOTE - NS_EDPROVIDERDISPOUSERTYPE_ED_A_ED
[facility] Progress Note           Luigi Parmar Patient Status:  Observation    1961 MRN [id number]   Location [address] SURG Attending Dameon Huggins MD   Hosp Day # 1 PCP [address] CNP     Subjective:      Patient laying comfortably in bed. No acute distress. No new complaints. Denies chest pain, SOB, palpitations and dizziness. S/p angio with no significant findings.     Peripheral angiography results reviewed and discussed with patient today.    Review of Systems    General: No fever or chills, No loss of appetite.    RS: No hemoptysis  GI: No melena or hematochezia  : No urinary disturbance  Derm: No skin disorders   Heme: No blood dyscrasias.  Remainder of 12 point systems reviewed and negative (or as mentioned in HPI)      Objective:     Medications:  Current Facility-Administered Medications   Medication Dose Route Frequency Provider Last Rate Last Admin   • magnesium sulfate 2 g in 50 mL premix IVPB  2 g Intravenous Once Dameon Huggins MD       • Potassium Replacement (Levels 3.6 - 4)   Does not apply See Admin Instructions Dameon Huggins MD       • carvedilol (COREG) tablet 6.25 mg  6.25 mg Oral 2 times per day Esvin Fan MD   6.25 mg at 23   • sodium chloride (PF) 0.9 % injection 2 mL  2 mL Intracatheter 2 times per day Johanna Larkin MD   2 mL at 23   • Potassium Standard Replacement Protocol (Levels 3.5 and lower)   Does not apply See Admin Instructions Johanna Larkin MD       • Magnesium Standard Replacement Protocol   Does not apply See Admin Instructions Johanna Larkin MD       • Phosphorus Standard Replacement Protocol   Does not apply See Admin Instructions Johanna Larkin MD       • heparin (porcine) injection 5,000 Units  5,000 Units Subcutaneous 3 times per day Johanna Larkin MD   5,000 Units at 23   • insulin lispro (ADMELOG,HumaLOG) - Correction Dose   Subcutaneous TID  Johanna Larkin MD   1  Units at 05/15/23 1045   • insulin lispro (ADMELOG,HumaLOG) - Correction Dose   Subcutaneous Nightly Johanna Larkin MD       • atorvastatin (LIPITOR) tablet 40 mg  40 mg Oral Nightly Esvin Fan MD   40 mg at 05/16/23 2044   • ferrous sulfate (65 mg Fe per 325 mg) tablet 325 mg  325 mg Oral Daily with breakfast Melba Ghotra DO   325 mg at 05/16/23 0925   • aspirin (ECOTRIN) enteric coated tablet 81 mg  81 mg Oral Daily Melba Ghotra DO   81 mg at 05/16/23 0925   • insulin glargine (LANTUS) injection 10 Units  10 Units Subcutaneous Nightly Melba Ghotra DO   10 Units at 05/16/23 2048      Current Facility-Administered Medications   Medication Dose Route Frequency Provider Last Rate Last Admin   • dextrose 5 % / sodium chloride 0.9% infusion   Intravenous Continuous Dameon Huggins MD 75 mL/hr at 05/17/23 0421 New Bag at 05/17/23 0421      Current Facility-Administered Medications   Medication Dose Route Frequency Provider Last Rate Last Admin   • hydrALAZINE (APRESOLINE) injection 10 mg  10 mg Intravenous Q4H PRN Dameon Huggins MD       • diphenhydrAMINE (BENADRYL) capsule 25 mg  25 mg Oral Q4H PRN Dameon Huggins MD       • guaiFENesin-DM (ROBITUSSIN DM) 100-10 MG/5ML syrup 10 mL  10 mL Oral Q4H PRN Dameon Huggins MD       • melatonin tablet 3 mg  3 mg Oral QHS PRN Dameon Huggins MD       • sodium chloride 0.9 % flush bag 25 mL  25 mL Intravenous PRN Johanna Larkin MD       • ondansetron (ZOFRAN) injection 4 mg  4 mg Intravenous BID PRN Johanna Larkin MD       • acetaminophen (TYLENOL) tablet 650 mg  650 mg Oral Q4H PRN Johanna Larkin MD       • HYDROcodone-acetaminophen (NORCO) 5-325 MG per tablet 1 tablet  1 tablet Oral Q4H PRN Johanna Larkin MD   1 tablet at 05/15/23 2126   • polyethylene glycol (MIRALAX) packet 17 g  17 g Oral Daily PRN Johanna Larkin MD       • docusate sodium-sennosides (SENOKOT S) 50-8.6 MG 2 tablet  2 tablet Oral Daily PRN Johanna Larkin MD       • bisacodyl (DULCOLAX)  suppository 10 mg  10 mg Rectal Daily PRN Johanna Larkin MD       • magnesium hydroxide (MILK OF MAGNESIA) 400 MG/5ML suspension 30 mL  30 mL Oral Daily PRN Johanna Larkin MD       • sodium chloride 0.9 % flush bag 25 mL  25 mL Intravenous PRN Johanna Larkin MD       • dextrose 50 % injection 25 g  25 g Intravenous PRN Johanna Larkin MD       • dextrose 50 % injection 12.5 g  12.5 g Intravenous PRN Johanna Larkin MD       • glucagon (GLUCAGEN) injection 1 mg  1 mg Intramuscular PRN Johanna Larkin MD       • dextrose (GLUTOSE) 40 % gel 15 g  15 g Oral PRN Johanna Larkin MD       • dextrose (GLUTOSE) 40 % gel 30 g  30 g Oral PRN Johanna Larkin MD       • povidone-iodine (BETADINE) 10 % ointment   Topical PRN Alesha Umaña DPM            Allergies:   ALLERGIES:  No Known Allergies     Physical Exam:  Vital Last Value 24 Hour Range   Temperature 97.7 °F (36.5 °C) (05/17/23 0741) Temp  Min: 97.7 °F (36.5 °C)  Max: 97.9 °F (36.6 °C)   Pulse 63 (05/17/23 0741) Pulse  Min: 63  Max: 74   Respiratory 17 (05/17/23 0741) Resp  Min: 16  Max: 18   Non-Invasive  Blood Pressure (!) 163/87 (05/17/23 0741) BP  Min: 141/74  Max: 174/83   Pulse Oximetry 95 % (05/17/23 0741) SpO2  Min: 95 %  Max: 100 %   Arterial   Blood Pressure   No data recorded     Tele: SR, HR 60s-70s    Intake/Output:     Intake/Output Summary (Last 24 hours) at 5/17/2023 0910  Last data filed at 5/17/2023 0452  Gross per 24 hour   Intake 1038.75 ml   Output 700 ml   Net 338.75 ml       Weight    05/15/23 1059   Weight: 102 kg (224 lb 13.9 oz)        GENERAL: No apparent distress  HEENT: Normocephalic.  Neck:  Supple neck.   Oral mucosa : Pink and moist.    Endocrine: There is no goiter.  CVS: Regular rate and rhythm.  Normal first and second heart tones.   Lung fields: Clear to auscultation bilaterally.   GI: Soft. Nontender, nondistended.    Lower extremity: No cyanosis, clubbing. + B/L LE edema.  Peripheral vascular: Both lower  extremities are warm and well perfused.    Neuro: Awake and alert.  Nonfocal examination.  Psych: Appropriate mood and affect  Integumentary: Warm and Dry      Clinical Data:   (PERSONALLY REVIEWED)    Labs    CBC  Recent Labs   Lab 05/17/23  0615 05/16/23  1446 05/15/23  0906   WBC 5.7 5.6 6.5   HCT 26.0* 22.9* 21.1*   HGB 8.9* 7.7* 7.1*    263 282       CMP  Recent Labs   Lab 05/17/23  0615 05/16/23  1446 05/15/23  0906 05/15/23  0215   SODIUM 140 142 139 134*   POTASSIUM 4.9 5.0 5.1 5.0   CHLORIDE 107 110 109 102   CO2 25 24 23 21   GLUCOSE 82 98 183* 228*   BUN 26* 30* 41* 41*   CREATININE 1.57* 1.67* 2.06* 1.87*   CALCIUM 8.8 8.8 8.6 8.9   TOTPROTEIN  --  7.0 6.7 7.9   ALBUMIN  --  2.3* 2.3* 2.8*   BILIRUBIN  --  0.3 0.3 0.5   AST  --  18 12 13   GPT  --  18 14 16   ALKPT  --  68 71 79     Lipid Panel  Recent Labs   Lab 05/15/23  0906   CHOLESTEROL 100   HDL 41   CALCLDL 47   TRIGLYCERIDE 60       Coags  Recent Labs   Lab 05/15/23  0215   INR 1.0   PTT 26     Imaging    Last 24 hours:    MRI Left Ankle  5/17/2023  pending    ECG:   Encounter Date: 05/15/23   Electrocardiogram 12-Lead   Result Value    Ventricular Rate EKG/Min (BPM) 105    Atrial Rate (BPM) 105    MI-Interval (MSEC) 164    QRS-Interval (MSEC) 80    QT-Interval (MSEC) 332    QTc 439    P Axis (Degrees) 54    R Axis (Degrees) -5    T Axis (Degrees) 42    REPORT TEXT      Sinus tachycardia  Otherwise normal ECG  When compared with ECG of  03-FEB-2023 13:35,  Criteria for  Septal infarct  are no longer  present          Echocardiogram:    Patient: Luigi Parmar     Study Date/Time:        Mar 18 2023 9:07AM    ------------------------------------------------------------------------------  INDICATIONS:   Swelling.    ------------------------------------------------------------------------------  STUDY CONCLUSIONS  SUMMARY:    1. Left ventricle: The cavity size is normal. Wall thickness is moderately  increased. Systolic function is normal.  The ejection fraction was measured  by visual estimation. Wall motion is normal; there are no regional wall  motion abnormalities. Left ventricular diastolic function parameters are  normal. The ejection fraction is 60%.  2. Right ventricle: The cavity size is normal. Systolic function is normal.    ------------------------------------------------------------------------------     Cardiac cath:   Results for orders placed or performed during the hospital encounter of 09/02/22   Cath/PV Case    Narrative    Patent iliofemoral system with three-vessel runoff to the left foot.    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  -5/15/2023: EKG revealed sinus tachycardia with ventricular to 105 bpm.  Otherwise normal EKG.  -3/18/2023: TTE reported normal LV systolic function P LVEF 60%.  Normal LV diastolic function.  Normal RV systolic function.  No major valve disease.  -Check arterial duplex of right lower extremity  -Lipid panel within acceptable range with LDL of 47.  -Continue atorvastatin 40 mg once daily.  -Recommend to avoid using calcium channel blocker therapy due to significant lower extremity edema.  Will not resume nifedipine for now.  -Further management per ID and wound care  -No DVT on venous Doppler  -B/L Lower extremity angio: No PAD  -Aggressive wound care  -Management per primary and wound care.     Chronic bilateral leg swelling  -Patient leg swelling likely multifactorial; nifedipine, venous insufficiency, and immobility  -5/15/2023: EKG read myself reveals sinus tachycardia with ventricular to 105 bpm.  Otherwise normal EKG.  -3/18/2023: TTE reported normal LV systolic function P LVEF 60%.  Normal LV diastolic function.  Normal RV  systolic function.  No major valve disease.  -Lower extremity venous Doppler negative for DVT.  -Recommend to avoid using calcium channel blocker therapy due to significant lower extremity edema.  Will not resume nifedipine for now.     Hypertension  -On nifedipine at home and carvedilol added this admission.  -BP on the higher side  -Increase Coreg 12.5 mg twice daily.  -Titrate up carvedilol as tolerated for blood pressure control.     DM  TAWNY on CKD  -Management per primary service    Cardiology will sign off.  Above plan discussed with patient.  Discussed with hospitalist attending Dr. Huggins.    Thank you for allowing us to participate in this patient's care.  Please do not hesitate to call with any questions or concerns.    On 5/17/2023, ILauryn scribed the services personally performed by Esvin Fan MD    The documentation recorded by the scribe accurately and completely reflects the service(s) I personally performed and the decisions made by me.              Attending Attestation (For Attendings USE Only)...

## 2023-06-01 ENCOUNTER — APPOINTMENT (OUTPATIENT)
Dept: ENDOCRINOLOGY | Facility: CLINIC | Age: 63
End: 2023-06-01

## 2023-06-03 NOTE — ED ADULT NURSE NOTE - PMH
THE PATIENT WAS SEEN AND EXAMINED BY ME WITH THE HOUSESTAFF AND STROKE TEAM DURING MORNING ROUNDS.   HPI:  85y (1937) woman with a PMHx significant for HTN, afib on Xarelto (last dose 6/1 6pm), hepatitis C on antiviral presenting with acute onset of AMS. LKW 10pm. Around 10:30pm patient become acutely altered, slumped to right side, head turned to left, not answering questions properly or moving the right side. NIHSS 22 most notable for decreased arousal, incorrect answers, not following commands, left gaze deviation that could not be overcome, right facial droop, right sided severe weakness compared to left, slurred speech, aphasia. Patient lives at home with daughter who is at bedside. Patient is Portuguese speaking. Daughter reports at baseline she intermittently walks with a cane and needs minor help with ADLs such as getting dressed or running a shower, Daughter looks after her affairs. She denies dementia or cognitive impairment. mRS 3.     LKW 10pm (6/1)  NIHSS 22  pre-mRS 3  Xarelto last taken at 6pm, INR=1.79   (02 Jun 2023 01:43)      ROS: Otherwise negative.    SUBJECTIVE: Extubated overnight.  Increased verbal output today per family at bedside. No new neurologic complaints.      atenolol  Tablet 100 milliGRAM(s) Oral daily  atorvastatin 80 milliGRAM(s) Oral at bedtime  chlorhexidine 4% Liquid 1 Application(s) Topical daily  enoxaparin Injectable 40 milliGRAM(s) SubCutaneous <User Schedule>  entecavir Solution 0.5 milliGRAM(s) Oral <User Schedule>  labetalol Injectable 10 milliGRAM(s) IV Push once  losartan 50 milliGRAM(s) Oral daily  polyethylene glycol 3350 17 Gram(s) Oral daily  senna 2 Tablet(s) Oral at bedtime      PHYSICAL EXAM:   Vital Signs Last 24 Hrs  T(C): 36.8 (03 Jun 2023 15:00), Max: 38.7 (03 Jun 2023 00:00)  T(F): 98.2 (03 Jun 2023 15:00), Max: 101.7 (03 Jun 2023 00:00)  HR: 91 (03 Jun 2023 15:00) (74 - 119)  BP: 168/65 (03 Jun 2023 15:00) (127/71 - 177/88)  BP(mean): 96 (03 Jun 2023 15:00) (78 - 115)  RR: 20 (03 Jun 2023 15:00) (16 - 29)  SpO2: 96% (03 Jun 2023 15:00) (94% - 100%)    Parameters below as of 02 Jun 2023 21:00    O2 Flow (L/min): 2      General: No acute distress  HEENT: EOM intact, visual fields full  Abdomen: Soft, nontender, nondistended   Extremities: No edema    NEUROLOGICAL EXAM:  Mental status: Awake, alert, oriented to self, speech hypophonic, follows commands, no neglect   Cranial Nerves: right facial droop, left gaze deviation, unable to cross midline, no nystagmus, no dysarthria,  tongue midline  Motor exam: Normal tone, 0/5 RUE, 0/5 RLE, 5/5 LUE, 5/5 LLE, normal fine finger movements.  Sensation: Intact to light touch   Coordination/ Gait: No dysmetria, gait not tested    LABS:                        13.3   10.57 )-----------( 219      ( 01 Jun 2023 22:38 )             40.5    06-02    141  |  107  |  14  ----------------------------<  156<H>  3.5   |  20<L>  |  0.75    Ca    8.5      02 Jun 2023 23:23  Phos  2.6     06-02  Mg     1.9     06-02    TPro  7.6  /  Alb  4.6  /  TBili  1.2  /  DBili  x   /  AST  39  /  ALT  26  /  AlkPhos  322<H>  06-01  PT/INR - ( 01 Jun 2023 22:38 )   PT: 20.9 sec;   INR: 1.79 ratio         PTT - ( 01 Jun 2023 22:38 )  PTT:32.5 sec      IMAGING: Reviewed by me.   [facility] 6/2 8:56amL: IMPRESSION: An evolving left MCA distribution infarct is noted as described.   Bipolar 1 disorder  on Lithium  Diabetic neuropathy  bilateral feet, ankles  HLD (hyperlipidemia)    Hypertension    Obesity    CHAYITO (obstructive sleep apnea)  diagnosed >25 years ago, non-compliant with CPAP  Primary osteoarthritis of left knee    T2DM (type 2 diabetes mellitus)

## 2023-06-05 RX ORDER — FINASTERIDE 5 MG/1
5 TABLET, FILM COATED ORAL DAILY
Qty: 90 | Refills: 3 | Status: ACTIVE | COMMUNITY
Start: 2020-06-30 | End: 1900-01-01

## 2023-06-05 RX ORDER — TAMSULOSIN HYDROCHLORIDE 0.4 MG/1
0.4 CAPSULE ORAL
Qty: 90 | Refills: 3 | Status: ACTIVE | COMMUNITY
Start: 2019-01-11 | End: 1900-01-01

## 2023-06-06 ENCOUNTER — RX RENEWAL (OUTPATIENT)
Age: 63
End: 2023-06-06

## 2023-06-06 RX ORDER — LOSARTAN POTASSIUM 25 MG/1
25 TABLET, FILM COATED ORAL DAILY
Qty: 1 | Refills: 5 | Status: ACTIVE | COMMUNITY
Start: 2020-11-17 | End: 1900-01-01

## 2023-06-08 ENCOUNTER — APPOINTMENT (OUTPATIENT)
Dept: ORTHOPEDIC SURGERY | Facility: CLINIC | Age: 63
End: 2023-06-08

## 2023-06-08 ENCOUNTER — APPOINTMENT (OUTPATIENT)
Dept: PULMONOLOGY | Facility: CLINIC | Age: 63
End: 2023-06-08

## 2023-06-09 RX ORDER — SODIUM BICARBONATE 650 MG/1
650 TABLET ORAL
Qty: 180 | Refills: 3 | Status: ACTIVE | COMMUNITY
Start: 2022-01-19 | End: 1900-01-01

## 2023-06-16 NOTE — DISCHARGE NOTE PROVIDER - REASON FOR ADMISSION
LYNDSEY, elevated lithium level Physical Therapy    Visit Type: treatment  SUBJECTIVE  Patient agreed to participate in therapy this date.  Patient verbally agrees to allow the following to be present during session: spouse  I'm doing okay     OBJECTIVE                    Bed Mobility  Patient up in recliner at start/end of session  Transfers  Assistive devices: gait belt, 2-wheeled walker, 1 person  - Sit to stand: minimal assist  - Stand to sit: stand by assist  Hand on gait belt. Steady with no loss of balance, proper sequencing.    Ambulation / Gait  - Assistive device: gait belt, 1 person and two-wheeled walker  - Distance (feet unless otherwise indicated): 80  - Assist Level: minimal assist  - Description: decreased lee/pace, decreased step length left and decreased step length right  Slow pace but steady with no loss of balance. Cues to take bigger steps        Interventions     Seated    Lower Extremity: Bilateral: seated hip flexion, toe raises, heel raises, knee extensions, hip adduction and hip abduction, AROM, 10 reps, 2 sets  Skilled input: Verbal instruction/cues  Verbal Consent: Writer verbally educated and received verbal consent for hand placement, positioning of patient, and techniques to be performed today from patient for clothing adjustments for techniques, therapist position for techniques and hand placement and palpation for techniques as described above and how they are pertinent to the patient's plan of care.         ASSESSMENT   Progress: progressing toward goals  Interferring components: decreased activity tolerance and medical status limitations    Summary of function and discharge needs based on today's assessment:   - Current level of function: significantly below baseline level of function   - Therapy needs at discharge: therapy 5 or more times per week   - Activities of daily living (ADLs) requiring support at discharge: bed mobility, transfers, ambulation and stairs   - Instrumental activities of daily living  (IADLs) requiring support at discharge: home management, community mobility and shopping   - Impairments that require further therapy intervention: activity tolerance and strength    AM-PAC  - Generalized Prior Level of Function: IND/MOD I ([facility] 22-24)       Key: MOD A=moderate assistance, IND/MOD I=independent/modified independent  - Generalized Current Level of Function     - Current Mobility Score: 17       AM-PAC Scoring Key= >21 Modified Independent; 20-21 Supervision; 18-19 Minimal assist; 13-18 Moderate assist; 9-12 Max assist; <9 Total assist        PLAN   Suggestions for next session as indicated: Bed mobility, transfers, increase ambulation distance, lower extremity exercises  PT Frequency: 6-7 x per week      PT/OT Mobility Equipment for Discharge: pt has her 4WW here in room; recommend consistent use of 2ww at this time        GOALS  Review Date: 6/19/2023  Long Term Goals: (to be met by time of discharge from hospital)  Sit to supine: Patient will complete sit to supine modified independent (with log roll).  Status: progressing/ongoing  Supine to sit: Patient will complete supine to sit modified independent (with log roll).  Status: progressing/ongoing  Sit to stand: Patient will complete sit to stand transfer with 4-wheeled walker, modified independent.   Status: progressing/ongoing  Stand to sit: Patient will complete stand to sit transfer with 4-wheeled walker, modified independent.   Status: progressing/ongoing  Stand pivot: Patient will complete stand pivot transfer with 4-wheeled walker, modified independent.   Status: progressing/ongoing  Ambulation (even): Patient will ambulate on even surface for 100 feet with 4-wheeled walker, modified independent.   Status: progressing/ongoing  Stairs: Patient will ambulate 5 steps with gait belt using one rail, supervision.   Status: progressing/ongoing  Exercise home program: patient to demonstrate and state appropriate assist with exercises as instructed.    Status: progressing/ongoing  Home program: patient independent with home program as instructed.   Status: progressing/ongoing    Documented in the chart in the following areas: Assessment/Plan.      Patient at End of Session:   Location: in chair  Safety measures: call light within reach and alarm system in place/re-engaged  Handoff to: nurse      Therapy procedure time and total treatment time can be found documented on the Time Entry flowsheet

## 2023-06-26 ENCOUNTER — RX RENEWAL (OUTPATIENT)
Age: 63
End: 2023-06-26

## 2023-07-05 ENCOUNTER — APPOINTMENT (OUTPATIENT)
Dept: NEPHROLOGY | Facility: CLINIC | Age: 63
End: 2023-07-05
Payer: MEDICARE

## 2023-07-05 ENCOUNTER — RX RENEWAL (OUTPATIENT)
Age: 63
End: 2023-07-05

## 2023-07-05 VITALS
TEMPERATURE: 96.7 F | HEIGHT: 71 IN | BODY MASS INDEX: 44.1 KG/M2 | DIASTOLIC BLOOD PRESSURE: 66 MMHG | WEIGHT: 315 LBS | SYSTOLIC BLOOD PRESSURE: 113 MMHG | OXYGEN SATURATION: 96 %

## 2023-07-05 VITALS — HEART RATE: 114 BPM

## 2023-07-05 DIAGNOSIS — R80.9 PROTEINURIA, UNSPECIFIED: ICD-10-CM

## 2023-07-05 PROCEDURE — 99215 OFFICE O/P EST HI 40 MIN: CPT

## 2023-07-05 RX ORDER — PEN NEEDLE, DIABETIC 33 GX5/32"
33G X 4 MM NEEDLE, DISPOSABLE MISCELLANEOUS
Qty: 100 | Refills: 3 | Status: ACTIVE | COMMUNITY
Start: 2023-03-03 | End: 1900-01-01

## 2023-07-05 NOTE — PHYSICAL EXAM
[General Appearance - Alert] : alert [General Appearance - In No Acute Distress] : in no acute distress [Sclera] : the sclera and conjunctiva were normal [PERRL With Normal Accommodation] : pupils were equal in size, round, and reactive to light [Extraocular Movements] : extraocular movements were intact [Outer Ear] : the ears and nose were normal in appearance [Oropharynx] : the oropharynx was normal [Neck Appearance] : the appearance of the neck was normal [Neck Cervical Mass (___cm)] : no neck mass was observed [Jugular Venous Distention Increased] : there was no jugular-venous distention [Thyroid Diffuse Enlargement] : the thyroid was not enlarged [Thyroid Nodule] : there were no palpable thyroid nodules [Auscultation Breath Sounds / Voice Sounds] : lungs were clear to auscultation bilaterally [Heart Rate And Rhythm] : heart rate was normal and rhythm regular [Heart Sounds] : normal S1 and S2 [Heart Sounds Gallop] : no gallops [Murmurs] : no murmurs [Heart Sounds Pericardial Friction Rub] : no pericardial rub [Full Pulse] : the pedal pulses are present [Edema] : there was no peripheral edema [Bowel Sounds] : normal bowel sounds [Abdomen Soft] : soft [Abdomen Tenderness] : non-tender [Cervical Lymph Nodes Enlarged Posterior Bilaterally] : posterior cervical [Cervical Lymph Nodes Enlarged Anterior Bilaterally] : anterior cervical [Supraclavicular Lymph Nodes Enlarged Bilaterally] : supraclavicular [No CVA Tenderness] : no ~M costovertebral angle tenderness [No Spinal Tenderness] : no spinal tenderness [Nail Clubbing] : no clubbing  or cyanosis of the fingernails [Musculoskeletal - Swelling] : no joint swelling seen [Motor Tone] : muscle strength and tone were normal [Skin Color & Pigmentation] : normal skin color and pigmentation [Skin Turgor] : normal skin turgor [] : no rash [Deep Tendon Reflexes (DTR)] : deep tendon reflexes were 2+ and symmetric [Sensation] : the sensory exam was normal to light touch and pinprick [No Focal Deficits] : no focal deficits [Oriented To Time, Place, And Person] : oriented to person, place, and time [Impaired Insight] : insight and judgment were intact [Affect] : the affect was normal [FreeTextEntry1] : walking with walker

## 2023-07-19 ENCOUNTER — APPOINTMENT (OUTPATIENT)
Dept: CARDIOLOGY | Facility: CLINIC | Age: 63
End: 2023-07-19

## 2023-07-20 ENCOUNTER — INPATIENT (INPATIENT)
Facility: HOSPITAL | Age: 63
LOS: 3 days | Discharge: NURSING, REHAB OR LONG-TERM CARE | DRG: 312 | End: 2023-07-24
Attending: HOSPITALIST | Admitting: INTERNAL MEDICINE
Payer: MEDICARE

## 2023-07-20 VITALS
HEART RATE: 156 BPM | RESPIRATION RATE: 18 BRPM | TEMPERATURE: 98 F | OXYGEN SATURATION: 94 % | DIASTOLIC BLOOD PRESSURE: 63 MMHG | HEIGHT: 71 IN | WEIGHT: 156.09 LBS | SYSTOLIC BLOOD PRESSURE: 93 MMHG

## 2023-07-20 DIAGNOSIS — H26.9 UNSPECIFIED CATARACT: Chronic | ICD-10-CM

## 2023-07-20 DIAGNOSIS — R55 SYNCOPE AND COLLAPSE: ICD-10-CM

## 2023-07-20 DIAGNOSIS — Z98.890 OTHER SPECIFIED POSTPROCEDURAL STATES: Chronic | ICD-10-CM

## 2023-07-20 LAB
ALBUMIN SERPL ELPH-MCNC: 3.1 G/DL — LOW (ref 3.3–5)
ALP SERPL-CCNC: 76 U/L — SIGNIFICANT CHANGE UP (ref 40–120)
ALT FLD-CCNC: 26 U/L — SIGNIFICANT CHANGE UP (ref 10–45)
ANION GAP SERPL CALC-SCNC: 13 MMOL/L — SIGNIFICANT CHANGE UP (ref 5–17)
APPEARANCE UR: CLEAR — SIGNIFICANT CHANGE UP
APTT BLD: 35.2 SEC — SIGNIFICANT CHANGE UP (ref 27.5–35.5)
AST SERPL-CCNC: 18 U/L — SIGNIFICANT CHANGE UP (ref 10–40)
BASOPHILS # BLD AUTO: 0.03 K/UL — SIGNIFICANT CHANGE UP (ref 0–0.2)
BASOPHILS NFR BLD AUTO: 0.4 % — SIGNIFICANT CHANGE UP (ref 0–2)
BILIRUB SERPL-MCNC: 0.5 MG/DL — SIGNIFICANT CHANGE UP (ref 0.2–1.2)
BILIRUB UR-MCNC: NEGATIVE — SIGNIFICANT CHANGE UP
BUN SERPL-MCNC: 52 MG/DL — HIGH (ref 7–23)
CALCIUM SERPL-MCNC: 8.7 MG/DL — SIGNIFICANT CHANGE UP (ref 8.4–10.5)
CHLORIDE SERPL-SCNC: 103 MMOL/L — SIGNIFICANT CHANGE UP (ref 96–108)
CO2 SERPL-SCNC: 21 MMOL/L — LOW (ref 22–31)
COLOR SPEC: YELLOW — SIGNIFICANT CHANGE UP
CREAT SERPL-MCNC: 2.06 MG/DL — HIGH (ref 0.5–1.3)
DIFF PNL FLD: NEGATIVE — SIGNIFICANT CHANGE UP
EGFR: 36 ML/MIN/1.73M2 — LOW
EOSINOPHIL # BLD AUTO: 0.24 K/UL — SIGNIFICANT CHANGE UP (ref 0–0.5)
EOSINOPHIL NFR BLD AUTO: 3.4 % — SIGNIFICANT CHANGE UP (ref 0–6)
GLUCOSE BLDC GLUCOMTR-MCNC: 111 MG/DL — HIGH (ref 70–99)
GLUCOSE BLDC GLUCOMTR-MCNC: 169 MG/DL — HIGH (ref 70–99)
GLUCOSE BLDC GLUCOMTR-MCNC: 184 MG/DL — HIGH (ref 70–99)
GLUCOSE SERPL-MCNC: 229 MG/DL — HIGH (ref 70–99)
GLUCOSE UR QL: NEGATIVE MG/DL — SIGNIFICANT CHANGE UP
HCT VFR BLD CALC: 39.2 % — SIGNIFICANT CHANGE UP (ref 39–50)
HGB BLD-MCNC: 13 G/DL — SIGNIFICANT CHANGE UP (ref 13–17)
IMM GRANULOCYTES NFR BLD AUTO: 1.8 % — HIGH (ref 0–0.9)
INR BLD: 1.23 RATIO — HIGH (ref 0.88–1.16)
KETONES UR-MCNC: NEGATIVE MG/DL — SIGNIFICANT CHANGE UP
LEUKOCYTE ESTERASE UR-ACNC: NEGATIVE — SIGNIFICANT CHANGE UP
LYMPHOCYTES # BLD AUTO: 2.36 K/UL — SIGNIFICANT CHANGE UP (ref 1–3.3)
LYMPHOCYTES # BLD AUTO: 33.5 % — SIGNIFICANT CHANGE UP (ref 13–44)
MAGNESIUM SERPL-MCNC: 1.7 MG/DL — SIGNIFICANT CHANGE UP (ref 1.6–2.6)
MCHC RBC-ENTMCNC: 30 PG — SIGNIFICANT CHANGE UP (ref 27–34)
MCHC RBC-ENTMCNC: 33.2 GM/DL — SIGNIFICANT CHANGE UP (ref 32–36)
MCV RBC AUTO: 90.3 FL — SIGNIFICANT CHANGE UP (ref 80–100)
MONOCYTES # BLD AUTO: 0.81 K/UL — SIGNIFICANT CHANGE UP (ref 0–0.9)
MONOCYTES NFR BLD AUTO: 11.5 % — SIGNIFICANT CHANGE UP (ref 2–14)
NEUTROPHILS # BLD AUTO: 3.47 K/UL — SIGNIFICANT CHANGE UP (ref 1.8–7.4)
NEUTROPHILS NFR BLD AUTO: 49.4 % — SIGNIFICANT CHANGE UP (ref 43–77)
NITRITE UR-MCNC: NEGATIVE — SIGNIFICANT CHANGE UP
NRBC # BLD: 0 /100 WBCS — SIGNIFICANT CHANGE UP (ref 0–0)
NT-PROBNP SERPL-SCNC: 51 PG/ML — SIGNIFICANT CHANGE UP (ref 0–300)
PH UR: 5.5 — SIGNIFICANT CHANGE UP (ref 5–8)
PLATELET # BLD AUTO: 199 K/UL — SIGNIFICANT CHANGE UP (ref 150–400)
POTASSIUM SERPL-MCNC: 3.6 MMOL/L — SIGNIFICANT CHANGE UP (ref 3.5–5.3)
POTASSIUM SERPL-SCNC: 3.6 MMOL/L — SIGNIFICANT CHANGE UP (ref 3.5–5.3)
PROT SERPL-MCNC: 6.9 G/DL — SIGNIFICANT CHANGE UP (ref 6–8.3)
PROT UR-MCNC: NEGATIVE MG/DL — SIGNIFICANT CHANGE UP
PROTHROM AB SERPL-ACNC: 14.3 SEC — HIGH (ref 10.5–13.4)
RBC # BLD: 4.34 M/UL — SIGNIFICANT CHANGE UP (ref 4.2–5.8)
RBC # FLD: 14.6 % — HIGH (ref 10.3–14.5)
RBC CASTS # UR COMP ASSIST: SIGNIFICANT CHANGE UP /HPF
SODIUM SERPL-SCNC: 137 MMOL/L — SIGNIFICANT CHANGE UP (ref 135–145)
SP GR SPEC: 1.01 — SIGNIFICANT CHANGE UP (ref 1–1.03)
TROPONIN I, HIGH SENSITIVITY RESULT: 7.8 NG/L — SIGNIFICANT CHANGE UP
UROBILINOGEN FLD QL: 0.2 MG/DL — SIGNIFICANT CHANGE UP (ref 0.2–1)
VALPROATE SERPL-MCNC: 47 UG/ML — LOW (ref 50–100)
WBC # BLD: 7.04 K/UL — SIGNIFICANT CHANGE UP (ref 3.8–10.5)
WBC # FLD AUTO: 7.04 K/UL — SIGNIFICANT CHANGE UP (ref 3.8–10.5)
WBC UR QL: 0 /HPF — SIGNIFICANT CHANGE UP (ref 0–5)

## 2023-07-20 PROCEDURE — 74176 CT ABD & PELVIS W/O CONTRAST: CPT | Mod: 26,MA

## 2023-07-20 PROCEDURE — 99285 EMERGENCY DEPT VISIT HI MDM: CPT

## 2023-07-20 PROCEDURE — 71045 X-RAY EXAM CHEST 1 VIEW: CPT | Mod: 26

## 2023-07-20 PROCEDURE — 99223 1ST HOSP IP/OBS HIGH 75: CPT

## 2023-07-20 PROCEDURE — 93306 TTE W/DOPPLER COMPLETE: CPT | Mod: 26

## 2023-07-20 RX ORDER — GLUCAGON INJECTION, SOLUTION 0.5 MG/.1ML
1 INJECTION, SOLUTION SUBCUTANEOUS ONCE
Refills: 0 | Status: DISCONTINUED | OUTPATIENT
Start: 2023-07-20 | End: 2023-07-24

## 2023-07-20 RX ORDER — SODIUM CHLORIDE 9 MG/ML
1000 INJECTION INTRAMUSCULAR; INTRAVENOUS; SUBCUTANEOUS ONCE
Refills: 0 | Status: COMPLETED | OUTPATIENT
Start: 2023-07-20 | End: 2023-07-20

## 2023-07-20 RX ORDER — DEXTROSE 50 % IN WATER 50 %
25 SYRINGE (ML) INTRAVENOUS ONCE
Refills: 0 | Status: DISCONTINUED | OUTPATIENT
Start: 2023-07-20 | End: 2023-07-24

## 2023-07-20 RX ORDER — ZIPRASIDONE HYDROCHLORIDE 20 MG/1
80 CAPSULE ORAL
Refills: 0 | Status: DISCONTINUED | OUTPATIENT
Start: 2023-07-20 | End: 2023-07-24

## 2023-07-20 RX ORDER — ASCORBIC ACID 60 MG
1 TABLET,CHEWABLE ORAL
Refills: 0 | DISCHARGE

## 2023-07-20 RX ORDER — HYDROMORPHONE HYDROCHLORIDE 2 MG/ML
2 INJECTION INTRAMUSCULAR; INTRAVENOUS; SUBCUTANEOUS EVERY 4 HOURS
Refills: 0 | Status: DISCONTINUED | OUTPATIENT
Start: 2023-07-20 | End: 2023-07-20

## 2023-07-20 RX ORDER — ASPIRIN/CALCIUM CARB/MAGNESIUM 324 MG
81 TABLET ORAL DAILY
Refills: 0 | Status: DISCONTINUED | OUTPATIENT
Start: 2023-07-20 | End: 2023-07-24

## 2023-07-20 RX ORDER — TAMSULOSIN HYDROCHLORIDE 0.4 MG/1
0.4 CAPSULE ORAL AT BEDTIME
Refills: 0 | Status: DISCONTINUED | OUTPATIENT
Start: 2023-07-20 | End: 2023-07-24

## 2023-07-20 RX ORDER — INSULIN LISPRO 100/ML
12 VIAL (ML) SUBCUTANEOUS
Refills: 0 | Status: DISCONTINUED | OUTPATIENT
Start: 2023-07-20 | End: 2023-07-24

## 2023-07-20 RX ORDER — ACETAMINOPHEN 500 MG
650 TABLET ORAL EVERY 6 HOURS
Refills: 0 | Status: DISCONTINUED | OUTPATIENT
Start: 2023-07-20 | End: 2023-07-24

## 2023-07-20 RX ORDER — ATORVASTATIN CALCIUM 80 MG/1
40 TABLET, FILM COATED ORAL AT BEDTIME
Refills: 0 | Status: DISCONTINUED | OUTPATIENT
Start: 2023-07-20 | End: 2023-07-24

## 2023-07-20 RX ORDER — DEXTROSE 50 % IN WATER 50 %
15 SYRINGE (ML) INTRAVENOUS ONCE
Refills: 0 | Status: DISCONTINUED | OUTPATIENT
Start: 2023-07-20 | End: 2023-07-24

## 2023-07-20 RX ORDER — INSULIN GLARGINE 100 [IU]/ML
40 INJECTION, SOLUTION SUBCUTANEOUS AT BEDTIME
Refills: 0 | Status: DISCONTINUED | OUTPATIENT
Start: 2023-07-20 | End: 2023-07-24

## 2023-07-20 RX ORDER — SODIUM CHLORIDE 9 MG/ML
1000 INJECTION, SOLUTION INTRAVENOUS
Refills: 0 | Status: DISCONTINUED | OUTPATIENT
Start: 2023-07-20 | End: 2023-07-24

## 2023-07-20 RX ORDER — ACETAMINOPHEN 500 MG
325 TABLET ORAL EVERY 6 HOURS
Refills: 0 | Status: DISCONTINUED | OUTPATIENT
Start: 2023-07-20 | End: 2023-07-24

## 2023-07-20 RX ORDER — DIVALPROEX SODIUM 500 MG/1
500 TABLET, DELAYED RELEASE ORAL
Refills: 0 | Status: DISCONTINUED | OUTPATIENT
Start: 2023-07-20 | End: 2023-07-24

## 2023-07-20 RX ORDER — DEXTROSE 50 % IN WATER 50 %
12.5 SYRINGE (ML) INTRAVENOUS ONCE
Refills: 0 | Status: DISCONTINUED | OUTPATIENT
Start: 2023-07-20 | End: 2023-07-24

## 2023-07-20 RX ORDER — SODIUM BICARBONATE 1 MEQ/ML
650 SYRINGE (ML) INTRAVENOUS
Refills: 0 | Status: DISCONTINUED | OUTPATIENT
Start: 2023-07-20 | End: 2023-07-24

## 2023-07-20 RX ORDER — CARVEDILOL PHOSPHATE 80 MG/1
12.5 CAPSULE, EXTENDED RELEASE ORAL EVERY 12 HOURS
Refills: 0 | Status: DISCONTINUED | OUTPATIENT
Start: 2023-07-20 | End: 2023-07-24

## 2023-07-20 RX ORDER — FINASTERIDE 5 MG/1
1 TABLET, FILM COATED ORAL
Qty: 0 | Refills: 0 | DISCHARGE

## 2023-07-20 RX ORDER — ASCORBIC ACID 60 MG
500 TABLET,CHEWABLE ORAL
Refills: 0 | Status: DISCONTINUED | OUTPATIENT
Start: 2023-07-20 | End: 2023-07-24

## 2023-07-20 RX ORDER — LOSARTAN POTASSIUM 100 MG/1
25 TABLET, FILM COATED ORAL DAILY
Refills: 0 | Status: DISCONTINUED | OUTPATIENT
Start: 2023-07-20 | End: 2023-07-24

## 2023-07-20 RX ORDER — APIXABAN 2.5 MG/1
2.5 TABLET, FILM COATED ORAL EVERY 12 HOURS
Refills: 0 | Status: DISCONTINUED | OUTPATIENT
Start: 2023-07-20 | End: 2023-07-24

## 2023-07-20 RX ADMIN — SODIUM CHLORIDE 1000 MILLILITER(S): 9 INJECTION INTRAMUSCULAR; INTRAVENOUS; SUBCUTANEOUS at 12:34

## 2023-07-20 RX ADMIN — Medication 650 MILLIGRAM(S): at 18:18

## 2023-07-20 RX ADMIN — DIVALPROEX SODIUM 500 MILLIGRAM(S): 500 TABLET, DELAYED RELEASE ORAL at 18:18

## 2023-07-20 RX ADMIN — Medication 500 MILLIGRAM(S): at 18:19

## 2023-07-20 RX ADMIN — INSULIN GLARGINE 40 UNIT(S): 100 INJECTION, SOLUTION SUBCUTANEOUS at 22:40

## 2023-07-20 RX ADMIN — ZIPRASIDONE HYDROCHLORIDE 80 MILLIGRAM(S): 20 CAPSULE ORAL at 18:19

## 2023-07-20 RX ADMIN — SODIUM CHLORIDE 1000 MILLILITER(S): 9 INJECTION INTRAMUSCULAR; INTRAVENOUS; SUBCUTANEOUS at 11:34

## 2023-07-20 RX ADMIN — ATORVASTATIN CALCIUM 40 MILLIGRAM(S): 80 TABLET, FILM COATED ORAL at 22:41

## 2023-07-20 RX ADMIN — APIXABAN 2.5 MILLIGRAM(S): 2.5 TABLET, FILM COATED ORAL at 18:17

## 2023-07-20 RX ADMIN — Medication 12 UNIT(S): at 18:16

## 2023-07-20 RX ADMIN — TAMSULOSIN HYDROCHLORIDE 0.4 MILLIGRAM(S): 0.4 CAPSULE ORAL at 22:42

## 2023-07-20 RX ADMIN — CARVEDILOL PHOSPHATE 12.5 MILLIGRAM(S): 80 CAPSULE, EXTENDED RELEASE ORAL at 18:53

## 2023-07-20 NOTE — H&P ADULT - NSHPPHYSICALEXAM_GEN_ALL_CORE
Vital Signs Last 24 Hrs  T(F): 98.1 (20 Jul 2023 10:57), Max: 98.1 (20 Jul 2023 10:57)  HR: 99 (20 Jul 2023 14:15) (99 - 156)  BP: 115/81 (20 Jul 2023 14:15) (93/63 - 115/81)  RR: 19 (20 Jul 2023 14:15) (18 - 19)  SpO2: 94% (20 Jul 2023 14:15) (93% - 94%)    PHYSICAL EXAM:  GENERAL: NAD, well-groomed, well-developed  HEAD:  Atraumatic, Normocephalic  EYES: EOMI, conjunctiva and sclera clear  ENMT: Moist mucous membranes, Good dentition, no thrush  NECK: Supple, No JVD  CHEST/LUNG: Clear to auscultation bilaterally, good air entry, non-labored breathing  HEART: RRR; S1/S2, No murmur  ABDOMEN: Soft, Nontender, Nondistended; Bowel sounds present  VASCULAR: Normal pulses, Normal capillary refill  EXTREMITIES: No calf tenderness, No cyanosis, No edema  LYMPH: Normal; No lymphadenopathy noted  SKIN: Warm, Intact  PSYCH: Normal mood, Normal affect  NERVOUS SYSTEM:  A/O x3, Good concentration; CN 2-12 intact, No focal deficits Vital Signs Last 24 Hrs  T(F): 98.1 (20 Jul 2023 10:57), Max: 98.1 (20 Jul 2023 10:57)  HR: 99 (20 Jul 2023 14:15) (99 - 156)  BP: 115/81 (20 Jul 2023 14:15) (93/63 - 115/81)  RR: 19 (20 Jul 2023 14:15) (18 - 19)  SpO2: 94% (20 Jul 2023 14:15) (93% - 94%)    PHYSICAL EXAM:  GENERAL: NAD, OBese male, sitting up in bed, well-groomed, well-developed  EYES: Left eye deviated to the left, EOMI when asked to follow commands, visual acuity intact  HEAD:  Atraumatic, Normocephalic  ENMT: Moist mucous membranes, Good dentition, no thrush  NECK: Supple, No JVD  CHEST/LUNG: Clear to auscultation bilaterally, good air entry, non-labored breathing  HEART: RRR; S1/S2, No murmur  ABDOMEN: Soft, Nontender, Nondistended; Bowel sounds present  VASCULAR: Normal pulses, Normal capillary refill  EXTREMITIES: No calf tenderness, No cyanosis, No edema  LYMPH: Normal; No lymphadenopathy noted  SKIN: Warm, Intact  PSYCH: Normal mood, Normal affect  NERVOUS SYSTEM:  A/O x3, Good concentration; CN 2-12 intact, No focal deficits

## 2023-07-20 NOTE — ED PROVIDER NOTE - CLINICAL SUMMARY MEDICAL DECISION MAKING FREE TEXT BOX
PMH of depression, DM type 2, HTN, DVT, diabetic neuropathy, bipolar 1 disorder, obesity, CHAYITO, rheumatoid arthritis, cataract of the lt eye, and hx of excision of a testicular mass (2009) ,p/w pre-syncope this morning that resolved. (+) prodromal symptoms.  (+) Positional history, I suspect vasovagal etiology or orthostatic syncope given dehydration. There is NO history to suggest a structural or arrhythmic cardiac etiology: there is no concerning ECG, episode did not occur during exertion or supine, no family history of sudden cardiac death, episode was not preceded by palpitations or chest pain, and there is no new murmur on physical exam. The patient does not have any risk factors (i.e older age, abnormal ECG, Hct <30%, known hx of heart failure, CAD, or structural heart disease) for short term adverse events.  DDX: Low suspicion for structural heart disease (CHF, HOCM) or ACS/NSTEMI  , malignant arrythymia (i.e. Brugada's sign, delta wave, epsilon wave, significantly prolonged QTc (>500msec), STEMI, ischemic changes).  - Low suspicion for GI Bleed as no melena/hematochezia, BUN not elevated, and Hb normal.  - Low suspicion for acute neurological diagnoses such as: SAH, ICH (given lack of trauma and risk factors for bleeding diathesis), seizures (given short time course, no post-ictal state, no seizure activity).   - Low suspicion for vascular diagnosis such as: PE, thoracic aortic dissection, AAA rupture.   PLAN:   - FSG, CBC, CMP, ECG, CXR, cardiac monitor, IVF, troponin.  - Disposition: Anticipate discharge home. PMH of depression, DM type 2, HTN, DVT, diabetic neuropathy, bipolar 1 disorder, obesity, CHAYITO, rheumatoid arthritis, cataract of the lt eye, and hx of excision of a testicular mass (2009) ,p/w pre-syncope this morning that resolved. (+) prodromal symptoms.  (+) Positional history, I suspect vasovagal etiology or orthostatic syncope given dehydration. There is NO history to suggest a structural or arrhythmic cardiac etiology: there is no concerning ECG, episode did not occur during exertion or supine, no family history of sudden cardiac death, episode was not preceded by palpitations or chest pain, and there is no new murmur on physical exam. The patient does not have any risk factors (i.e older age, abnormal ECG, Hct <30%, known hx of heart failure, CAD, or structural heart disease) for short term adverse events.  DDX: Low suspicion for structural heart disease (CHF, HOCM) or ACS/NSTEMI  , malignant arrythymia (i.e. Brugada's sign, delta wave, epsilon wave, significantly prolonged QTc (>500msec), STEMI, ischemic changes).  - Low suspicion for GI Bleed as no melena/hematochezia, BUN not elevated, and Hb normal.  - Low suspicion for acute neurological diagnoses such as: SAH, ICH (given lack of trauma and risk factors for bleeding diathesis), seizures (given short time course, no post-ictal state, no seizure activity).   - Low suspicion for vascular diagnosis such as: PE, thoracic aortic dissection, AAA rupture.   PLAN:   - FSG, CBC, CMP, ECG, CXR, cardiac monitor, trop, held IVF due to CKD and to avoid fluid overload.  - CTAP negative for LLQ abdominal pain, intermittent. No abdominal pain in the ED.  - evaluated by PT, recommend admit and rehab.

## 2023-07-20 NOTE — H&P ADULT - ASSESSMENT
62 y/o M with PMH of depression, DM type 2, HTN, DVT, diabetic neuropathy, bipolar 1 disorder, obesity, CHAYITO, rheumatoid arthritis, cataract of the lt eye, and hx of excision of a testicular mass (2009), presents to the ED complaining of dizziness/pain in the knees     #Dizziness, resolved  - Will admit to telemetry  - TTE ordered  - Will check orthostatics  - Will check B12/folate/TSH/FT4    #Bilateral knee pain related to RA with gait dysfunction secondary to pain  - Will admit to medicine  - Fall precautions, bed alarm, PT/OT evaluation  - For possible nociceptive pain in CKD, will try tylenol 650mg q 6hrs for mild pain, dilaudid 2mg q 4hrs prn severe pain (3 days)    #Hx of DVT 4/2023  - C/w eliquis 5mg BID    #CKD  - Monitor creatinine, noted 2.06 today, around his baseline  - C/w sodium bicarb 650mg BID    #DM-II  #Diabetic neuropathy  - C/w lantus 40units qhs, humalog 12units premeal  - Will check FS q ac and hs, A1c in AM  - Uses ozempic at home weekly    #HTN  #HLD  - C/w coreg 12.5mg BID, losartan 25mg daily  - C/w lipitor 40mg qhs    #CHAYITO  - Uses CPAP machine intermittently at home, non compliant  - Will order nocturnal CPAP here    #BPH  - C/w flomax 0.4mg qhs    #Bipolar/depression  - C/w depakote 500mg BID  - C/w geodon 80mg BID    DVT PPX: Eliquis  AM labs, Full code  DISP: Pending course but would like admission to [facility]  HCP is Cornelia,  at [facility]; patient stated he would update her     64 y/o M with PMH of depression, DM type 2, HTN, DVT, diabetic neuropathy, bipolar 1 disorder, obesity, CHAYITO, rheumatoid arthritis, cataract of the lt eye, and hx of excision of a testicular mass (2009), presents to the ED complaining of dizziness/pain in the knees     #Dizziness, resolved  - Will admit to telemetry  - TTE ordered  - Will check orthostatics  - Will check B12/folate/TSH/FT4  - For now, will hold off on cardiology consult; trop negative, dizziness resolved; if needed, consider cardiology consult    #Bilateral knee pain related to RA with gait dysfunction secondary to pain  - Fall precautions, bed alarm, PT/OT evaluation  - For possible nociceptive pain in CKD, will try tylenol 650mg q 6hrs for mild pain, dilaudid 2mg q 4hrs prn severe pain ( limit to 3 days); may need outpatient pain management  - Will f/u with Dr Ferrell orthopedic surgery as o/p    #Hx of DVT 4/2023  - C/w eliquis 5mg BID    #CKD  - Monitor creatinine, noted 2.06 today, around his baseline  - C/w sodium bicarb 650mg BID    #DM-II  #Diabetic neuropathy  - C/w lantus 40units qhs, humalog 12units premeal  - Will check FS q ac and hs, A1c in AM  - Uses ozempic at home weekly    #HTN  #HLD  - C/w coreg 12.5mg BID, losartan 25mg daily  - C/w lipitor 40mg qhs  - Takes vascepa 2gm BID at home; we do not have it here    #CHAYITO  - Uses CPAP machine intermittently at home, non compliant  - Will order nocturnal CPAP here    #BPH  - C/w flomax 0.4mg qhs    #Bipolar/depression  - C/w depakote 500mg BID  - C/w geodon 80mg BID    #IBS  - Takes linzess 145mcg daily; we do not have it here    DVT PPX: Eliquis  AM labs, Full code  DISP: Pending course but would like admission to [facility]  HCP is Cornelia,  at [facility]; patient stated he would update her

## 2023-07-20 NOTE — ED PROVIDER NOTE - PHYSICAL EXAMINATION
VITAL SIGNS: I have reviewed nursing notes and confirm.   GEN: Well-developed; well-nourished; in no acute distress. Speaking full sentences. (+) obese  SKIN: Warm, pink, no rash, no diaphoresis, no cyanosis, well perfused.   HEAD: Normocephalic; atraumatic. No scalp lacerations, no abrasions.  NECK: Supple; non tender.   EYES: Pupils 3mm equal, round, reactive to light and accomodation, conjunctiva and sclera clear. Extra-ocular movements intact bilaterally.  ENT: No nasal discharge; airway clear. Trachea is midline. ORAL: No oropharyngeal exudates or erythema. Normal dentition.  CV: Regular rate and rhythm. S1, S2 normal; no murmurs, gallops, or rubs. No lower extremity pitting edema bilaterally. Capillary refill < 2 seconds throughout. Distal pulses intact 2+ throughout.  RESP: CTA bilaterally. No wheezes, rales, or rhonchi.   ABD: Normal bowel sounds, soft, non-distended, non-tender, no rebound, no guarding, no rigidity, no hepatosplenomegaly. No CVA tenderness bilaterally.  MSK: Normal range of motion and movement of all 4 extremities. No joint or muscular pain throughout. No clubbing.   BACK: No thoracolumbar midline or paravertebral tenderness. No step-offs or obvious deformities.  NEURO: Alert & oriented x 3, Grossly unremarkable. Sensory and motor intact throughout. No focal deficits.  Normal speech and coordination.   PSYCH: Cooperative, appropriate. VITAL SIGNS: I have reviewed nursing notes and confirm.   GEN: Well-developed; well-nourished; in no acute distress. Speaking full sentences. (+) obese  SKIN: Warm, pink, no rash, no diaphoresis, no cyanosis, well perfused.   HEAD: Normocephalic; atraumatic. No scalp lacerations, no abrasions.  NECK: Supple; non tender.   EYES: Pupils 3mm equal, round, reactive to light and accomodation, conjunctiva and sclera clear. Extra-ocular movements intact bilaterally.  ENT: No nasal discharge; airway clear. Trachea is midline. ORAL: No oropharyngeal exudates or erythema. Normal dentition.  CV: Regular rate and rhythm. S1, S2 normal; no murmurs, gallops, or rubs. (+) b/l lower extremity pitting edema bilaterally up to mid-ankle. Capillary refill < 2 seconds throughout. Distal pulses intact 2+ throughout.  RESP: CTA bilaterally. No wheezes, rales, or rhonchi.   ABD: Normal bowel sounds, soft, non-distended, non-tender, no rebound, no guarding, no rigidity, no hepatosplenomegaly. No CVA tenderness bilaterally.  MSK: Normal range of motion and movement of all 4 extremities. No joint or muscular pain throughout. No clubbing. No ttp to b/l knees. Full active/passive ROM.  BACK: No thoracolumbar midline or paravertebral tenderness. No step-offs or obvious deformities.  NEURO: Alert & oriented x 3, Grossly unremarkable. Sensory and motor intact throughout. No focal deficits.  Normal speech and coordination.   PSYCH: Cooperative, appropriate.

## 2023-07-20 NOTE — ED PROVIDER NOTE - ATTENDING APP SHARED VISIT CONTRIBUTION OF CARE
ABIEL Wen MD: I have reviewed and discussed the medical student’s documentation and findings with the student. After personally examining the patient, my findings have been added to this documentation.

## 2023-07-20 NOTE — H&P ADULT - HISTORY OF PRESENT ILLNESS
62 y/o M with PMH of depression, DM type 2, HTN, DVT, diabetic neuropathy, bipolar 1 disorder, obesity, CHAYITO, rheumatoid arthritis, cataract of the lt eye, and hx of excision of a testicular mass (2009), presents to the ED complaining of dizziness/pain in the knees x 3 hours.  64 y/o M with PMH of depression, DM type 2, HTN, DVT, diabetic neuropathy, bipolar 1 disorder, obesity, CHAYITO, rheumatoid arthritis, cataract of the lt eye, and hx of excision of a testicular mass (2009), presents to the ED complaining of dizziness/pain in the knees x 3 hours.   Patient reports having difficulty ambulating and walking for years; over the past year he has had to use a walker to get around and has difficulty going up and down stairs.  He uses tylenol for pain and has seen Dr Ferrell orthopedic surgery who has recommended bilateral knee replacement surgery once he has lost some weight.  This morning, he woke up from sleep feeling dizzy; he felt the room spinning around him; he sat up in bed and the dizziness resolved after a couple of minutes.  This has never happened to him before.  Further, he denies chest pain, SOB, Travel hx, palpatations.  He went to stand up but the pain in his knees was so bad he could not walk.  He then decided to come to the ER for pain in his knees.  HE is asking for transfer to [facility].  He does not have anyone to help him with his ADL at home.  He attends the day program at [facility].      He states he is not dizzy at the moment

## 2023-07-20 NOTE — ED ADULT TRIAGE NOTE - AS TEMP SITE
SEVERITY:- ABNORMAL ECG -

SINUS RHYTHM

NONSPECIFIC T ABNORMALITIES, ANT-LAT LEADS

:

Confirmed by: Yanira Simmons 17-Nov-2017 09:22:56 forehead

## 2023-07-20 NOTE — ED ADULT NURSE NOTE - FINAL NURSING ELECTRONIC SIGNATURE
[facility]      NEUROENDOCRINE TUMOR MULTIDISCIPLINARY TUMOR BOARD  _____________________________________________________________________    PRESENTER:   DEL Carlos MD    REASON FOR PRESENTATION:  Treatment Plan    ATTENDEES:   Surgery:              MD DELGADO Mc MD T. Ramcharan, MD M. Maluccio, MD  Interventional Radiology - Mitch Lares MD  Nuclear Medicine - Dakota Hinson MD  Pathology - Not Present  Oncology - Artie Sandoval DO  Gastroenterology - Not Present   Palliative Care - Not Present  Research- Alma Mary, phD  Nutritional Support- Tracie Weil-Cavalier, RDN  Nursing    PATIENT STATUS:  Established Patient    PATIENT SUMMARY:  Past Medical History:   Diagnosis Date    Mesenteric mass        Past Surgical History:   Procedure Laterality Date    APPENDECTOMY  7/16/2019    Procedure: APPENDECTOMY;  Surgeon: Kavitha Carlos MD;  Location[facility] OR;  Service: General;;    CHOLECYSTECTOMY  7/16/2019    Procedure: CHOLECYSTECTOMY;  Surgeon: Kavitha Carlos MD;  Location: [facility] OR;  Service: General;;    COLONOSCOPY       ________________________________________________________________    DISCUSSION:  JESSICA.        BOARD RECOMMENDATIONS:   Repeat Gallium 68 & MRI In 6 months  NET Markers every 3 months                                               
Pt has appointments for ct, mri and md domo in November. Discussed with Dr. Plummer.  Will follow TB recommendations of Ga scan and MRI in 6 mos due to the elevation in his pancreastatin.  Lab orders were given to patient on last appointment.  Spoke with pt, informed of new plan.  Instructed him to get tumor markers drawn every 3 mos and to start now at [facility].  Also informed that I cancelled the CT scan and added a Ga 68 PET/CT scan for Nov domo.  He verbalized understanding.  
20-Jul-2023 19:51

## 2023-07-20 NOTE — ED ADULT NURSE NOTE - NSFALLUNIVINTERV_ED_ALL_ED
Bed/Stretcher in lowest position, wheels locked, appropriate side rails in place/Call bell, personal items and telephone in reach/Instruct patient to call for assistance before getting out of bed/chair/stretcher/Non-slip footwear applied when patient is off stretcher/[place] to call system/Physically safe environment - no spills, clutter or unnecessary equipment/Purposeful proactive rounding/Room/bathroom lighting operational, light cord in reach

## 2023-07-20 NOTE — PHYSICAL THERAPY INITIAL EVALUATION ADULT - ADDITIONAL COMMENTS
Pt lives alone, [address] no stairs inside. Pt states he is independent with RW but has felt weaker and has been having more difficulty standing to perform ADL's

## 2023-07-20 NOTE — H&P ADULT - NSHPREVIEWOFSYSTEMS_GEN_ALL_CORE
REVIEW OF SYSTEMS:  CONSTITUTIONAL: No fever, weight loss, or fatigue  EYES: No eye pain, visual disturbances, or discharge  ENMT:  No difficulty hearing, tinnitus, vertigo; No sinus or throat pain  NECK: No pain or stiffness  BREASTS: No pain, masses, or nipple discharge  RESPIRATORY: No cough, wheezing, chills or hemoptysis; No shortness of breath  CARDIOVASCULAR: No chest pain, palpitations, dizziness, or leg swelling  GASTROINTESTINAL: No abdominal or epigastric pain. No nausea, vomiting, or hematemesis; No diarrhea or constipation. No melena or hematochezia.  GENITOURINARY: No dysuria, frequency, hematuria, or incontinence  NEUROLOGICAL: No headaches, memory loss, loss of strength, numbness, or tremors  SKIN: No itching, burning, rashes, or lesions   LYMPH NODES: No enlarged glands  ENDOCRINE: No heat or cold intolerance; No hair loss  MUSCULOSKELETAL: No joint pain or swelling; No muscle, back, or extremity pain  PSYCHIATRIC: No depression, anxiety, mood swings, or difficulty sleeping  HEME/LYMPH: No easy bruising, or bleeding gums  ALLERY AND IMMUNOLOGIC: No hives or eczema    ALL ROS REVIEWED AND NORMAL EXCEPT AS STATED ABOVE REVIEW OF SYSTEMS:  CONSTITUTIONAL: No fever, weight loss, or fatigue  EYES: No eye pain, visual disturbances, or discharge  ENMT:  No difficulty hearing, tinnitus, vertigo; No sinus or throat pain  NECK: No pain or stiffness  BREASTS: No pain, masses, or nipple discharge  RESPIRATORY: No cough, wheezing, chills or hemoptysis; No shortness of breath  CARDIOVASCULAR: No chest pain, palpitations, dizziness, or leg swelling  GASTROINTESTINAL: No abdominal or epigastric pain. No nausea, vomiting, or hematemesis; No diarrhea or constipation. No melena or hematochezia.  GENITOURINARY: No dysuria, frequency, hematuria, or incontinence  NEUROLOGICAL: No headaches, memory loss, loss of strength, numbness, or tremors  SKIN: No itching, burning, rashes, or lesions   LYMPH NODES: No enlarged glands  ENDOCRINE: No heat or cold intolerance; No hair loss  MUSCULOSKELETAL: Severe pain in his knees limiting walking  PSYCHIATRIC: No depression, anxiety, mood swings, or difficulty sleeping  HEME/LYMPH: No easy bruising, or bleeding gums  ALLERY AND IMMUNOLOGIC: No hives or eczema    ALL ROS REVIEWED AND NORMAL EXCEPT AS STATED ABOVE

## 2023-07-20 NOTE — H&P ADULT - NSHPSOCIALHISTORY_GEN_ALL_CORE
Social History:    Marital Status: (  ) , (  ) Single, (  ) , (  ) , (  )   # of Children:   Lives with: (  ) alone, (  ) children, (  ) spouse, (  ) parents, (  ) siblings, (  ) friends, (  ) other:   Occupation:     Substance Use/Illicit Drugs: (  ) never used vs other:   Tobacco Usage: (  ) never smoked, (  ) former smoker, (  ) current smoker and Total Pack-Years:   Last Alcohol Usage/Frequency/Amount/Withdrawal/Hx of Abuse:    Foreign travel:   Animal exposure: Social History:    Marital Status: (  ) , (x  ) Single, (  ) , (  ) , (  )   Lives with: (x  ) alone, (  ) children, (  ) spouse, (  ) parents, (  ) siblings, (  ) friends, (  ) other:   Occupation: Unemployed    Substance Use/Illicit Drugs: ( x ) never used vs other:   Tobacco Usage: ( x ) never smoked, (  ) former smoker, (  ) current smoker and Total Pack-Years:   Last Alcohol Usage/Frequency/Amount/Withdrawal/Hx of Abuse:  Denies  Foreign travel: Denies  Animal exposure:Denies

## 2023-07-20 NOTE — ED ADULT NURSE NOTE - OBJECTIVE STATEMENT
Pt BIB EMS for c/o dizziness Pt BIB EMS for c/o dizziness since this morning. Pt states that he was getting oob this morning and was unable to get up due to dizziness. Pt stating that for "several months" hes noticed that he cant get oob due to knee pain and weakness. Pt also stating that when he got up, he was making breakfast and felt like the room was spinning and held on to the kitchen counter so he wouldn't fall. Pt with hx HTN, DM, obesity and arthritis. Pt denies chest pain, palpitations or sob. On arrival, pt with a BP= 93/63 and HR= 156.

## 2023-07-20 NOTE — ED PROVIDER NOTE - OBJECTIVE STATEMENT
62 y/o M with PMH of depression, DM type 2, HTN, DVT, diabetic neuropathy, bipolar 1 disorder, obesity, CHAYITO, rheumatoid arthritis, cataract of the lt eye, and hx of excision of a testicular mass (2009), presents to the ED complaining of dizziness/pain in the knees x 3 hours. Pt states he was home this morning when he felt as though he could not get out of bed and felt severe weakness/pain. Pt notes he was making breakfast this morning when he felt like the "room was spinning" and he felt as though he was going to fall and needed to hold onto the kitchen counter. Pt explains he has never fallen before, but has a fear of falling. Pt states that pain and weakness have been going on for a few weeks now, but this morning the dizziness mixed with the pain in the knees made him worried. Pt states he is compliant with taking all medications and took his morning medications. Pt notes he has been trying to cut out chinese food, pizza, and soda recently. Pt reports that he knows he needs a home health aid at home because he has been struggling to take care of his home or even walk up the two steps by his home. Pt denies any chest pain, numbness/tingling of the extremities, SOB, fever, body aches, chills, or any other acute complaints.

## 2023-07-20 NOTE — H&P ADULT - NSHPLABSRESULTS_GEN_ALL_CORE
.                            13.0   7.04  )-----------( 199      ( 2023 11:18 )             39.2       07-20    137  |  103  |  52  ----------------------------<  229  3.6   |  21  |  2.06    Ca    8.7      2023 11:18  Mg     1.7     07-20    TPro  6.9  /  Alb  3.1  /  TBili  0.5  /  DBili  x   /  AST  18  /  ALT  26  /  AlkPhos  76  07-20    PT/INR - ( 2023 11:18 )   PT: 14.3 sec;   INR: 1.23 ratio         PTT - ( 2023 11:18 )  PTT:35.2 sec  CARDIAC MARKERS ( 2023 11:18 )  x     / 7.8 ng/L / x     / x     / x                    Urinalysis Basic - ( 2023 14:10 )    Color: Yellow / Appearance: Clear / S.008 / pH: x  Gluc: x / Ketone: Negative mg/dL  / Bili: Negative / Urobili: 0.2 mg/dL   Blood: x / Protein: Negative mg/dL / Nitrite: Negative   Leuk Esterase: Negative / RBC: None Seen /HPF / WBC 0 /HPF   Sq Epi: x / Non Sq Epi: x / Bacteria: x            POCT Blood Glucose.: 169 mg/dL (2023 12:08)          COVID-19 PCR: NotDetec (23 @ 14:07)  COVID-19 PCR: NotDetec (07-10-22 @ 21:25)  COVID-19 PCR: NotDetec (22 @ 19:30)  COVID-19 PCR: NotDetec (22 @ 14:10)  COVID-19 PCR: NotDetec (22 @ 15:00)  COVID-19 PCR: NotDetec (22 @ 09:18)  COVID-19 PCR: NotDetec (22 @ 14:25)    COVID-19 PCR: NotDetec (2023 14:07)  SARS-CoV-2: NotDetec (2023 10:02)              12 Lead ECG:   Ventricular Rate 112 BPM    Atrial Rate 112 BPM    P-R Interval 160 ms    QRS Duration 98 ms    Q-T Interval 332 ms    QTC Calculation(Bazett) 453 ms    P Axis 31 degrees    R Axis -23 degrees    T Axis 32 degrees    Diagnosis Line Sinus tachycardia  Cannot rule out Anterior infarct (cited on or before 2023)  Abnormal ECG  When compared with ECG of 2023 14:33,  No significant change was found  Confirmed by SULTANA THOMASON, LUIS ENRIQUE JARAMILLO () on 2023 1:40:05 PM (23 @ 11:08) 13.0   7.04  )-----------( 199      ( 2023 11:18 )             39.2       07-20    137  |  103  |  52  ----------------------------<  229  3.6   |  21  |  2.06    Ca    8.7      2023 11:18  Mg     1.7     07-20    TPro  6.9  /  Alb  3.1  /  TBili  0.5  /  DBili  x   /  AST  18  /  ALT  26  /  AlkPhos  76  07-20    PT/INR - ( 2023 11:18 )   PT: 14.3 sec;   INR: 1.23 ratio         PTT - ( 2023 11:18 )  PTT:35.2 sec  CARDIAC MARKERS ( 2023 11:18 )  x     / 7.8 ng/L / x     / x     / x        Urinalysis Basic - ( 2023 14:10 )    Color: Yellow / Appearance: Clear / S.008 / pH: x  Gluc: x / Ketone: Negative mg/dL  / Bili: Negative / Urobili: 0.2 mg/dL   Blood: x / Protein: Negative mg/dL / Nitrite: Negative   Leuk Esterase: Negative / RBC: None Seen /HPF / WBC 0 /HPF   Sq Epi: x / Non Sq Epi: x / Bacteria: x    POCT Blood Glucose.: 169 mg/dL (2023 12:08)    COVID-19 PCR: NotDetec (23 @ 14:07)  COVID-19 PCR: NotDetec (07-10-22 @ 21:25)  COVID-19 PCR: NotDetec (22 @ 19:30)  COVID-19 PCR: NotDetec (22 @ 14:10)  COVID-19 PCR: NotDetec (22 @ 15:00)  COVID-19 PCR: NotDetec (22 @ 09:18)  COVID-19 PCR: NotDetec (22 @ 14:25)    COVID-19 PCR: NotDetec (2023 14:07)  SARS-CoV-2: NotDetec (2023 10:02)    12 Lead ECG:   Ventricular Rate 112 BPM    Atrial Rate 112 BPM    P-R Interval 160 ms    QRS Duration 98 ms    Q-T Interval 332 ms    QTC Calculation(Bazett) 453 ms    P Axis 31 degrees    R Axis -23 degrees    T Axis 32 degrees    Diagnosis Line Sinus tachycardia  Cannot rule out Anterior infarct (cited on or before 2023)  Abnormal ECG  When compared with ECG of 2023 14:33,  No significant change was found  Confirmed by SULTANA THOMASON, LUIS ENRIQUE JARAMILLO () on 2023 1:40:05 PM (23 @ 11:08)    RADIOLOGY  CT Abdomen and Pelvis No Cont (23 @ 12:24) >    IMPRESSION:  No acute intra-abdominal pathology.    Xray Chest 1 View- PORTABLE-Urgent (Xray Chest 1 View- PORTABLE-Urgent .) (23 @ 11:44) >    IMPRESSION: No acute finding or change.    US Duplex Venous Lower Ext Complete, Bilateral (23 @ 13:02) >  IMPRESSION:  Acute DVT left tibioperoneal trunk and left gastrocnemius vein.

## 2023-07-21 LAB
A1C WITH ESTIMATED AVERAGE GLUCOSE RESULT: 8 % — HIGH (ref 4–5.6)
ANION GAP SERPL CALC-SCNC: 10 MMOL/L — SIGNIFICANT CHANGE UP (ref 5–17)
BUN SERPL-MCNC: 42 MG/DL — HIGH (ref 7–23)
CALCIUM SERPL-MCNC: 9.5 MG/DL — SIGNIFICANT CHANGE UP (ref 8.4–10.5)
CHLORIDE SERPL-SCNC: 108 MMOL/L — SIGNIFICANT CHANGE UP (ref 96–108)
CO2 SERPL-SCNC: 25 MMOL/L — SIGNIFICANT CHANGE UP (ref 22–31)
CREAT SERPL-MCNC: 2.11 MG/DL — HIGH (ref 0.5–1.3)
CULTURE RESULTS: SIGNIFICANT CHANGE UP
EGFR: 35 ML/MIN/1.73M2 — LOW
ESTIMATED AVERAGE GLUCOSE: 183 MG/DL — HIGH (ref 68–114)
FOLATE SERPL-MCNC: 9.4 NG/ML — SIGNIFICANT CHANGE UP
GLUCOSE BLDC GLUCOMTR-MCNC: 128 MG/DL — HIGH (ref 70–99)
GLUCOSE BLDC GLUCOMTR-MCNC: 134 MG/DL — HIGH (ref 70–99)
GLUCOSE BLDC GLUCOMTR-MCNC: 134 MG/DL — HIGH (ref 70–99)
GLUCOSE BLDC GLUCOMTR-MCNC: 172 MG/DL — HIGH (ref 70–99)
GLUCOSE SERPL-MCNC: 143 MG/DL — HIGH (ref 70–99)
HCT VFR BLD CALC: 40.6 % — SIGNIFICANT CHANGE UP (ref 39–50)
HGB BLD-MCNC: 13 G/DL — SIGNIFICANT CHANGE UP (ref 13–17)
MAGNESIUM SERPL-MCNC: 1.9 MG/DL — SIGNIFICANT CHANGE UP (ref 1.6–2.6)
MCHC RBC-ENTMCNC: 29.7 PG — SIGNIFICANT CHANGE UP (ref 27–34)
MCHC RBC-ENTMCNC: 32 GM/DL — SIGNIFICANT CHANGE UP (ref 32–36)
MCV RBC AUTO: 92.7 FL — SIGNIFICANT CHANGE UP (ref 80–100)
NRBC # BLD: 0 /100 WBCS — SIGNIFICANT CHANGE UP (ref 0–0)
PLATELET # BLD AUTO: 178 K/UL — SIGNIFICANT CHANGE UP (ref 150–400)
POTASSIUM SERPL-MCNC: 4 MMOL/L — SIGNIFICANT CHANGE UP (ref 3.5–5.3)
POTASSIUM SERPL-SCNC: 4 MMOL/L — SIGNIFICANT CHANGE UP (ref 3.5–5.3)
RBC # BLD: 4.38 M/UL — SIGNIFICANT CHANGE UP (ref 4.2–5.8)
RBC # FLD: 14.8 % — HIGH (ref 10.3–14.5)
SODIUM SERPL-SCNC: 143 MMOL/L — SIGNIFICANT CHANGE UP (ref 135–145)
SPECIMEN SOURCE: SIGNIFICANT CHANGE UP
T4 FREE SERPL-MCNC: 1.6 NG/DL — SIGNIFICANT CHANGE UP (ref 0.9–1.8)
TSH SERPL-MCNC: 0.33 UIU/ML — LOW (ref 0.36–3.74)
VIT B12 SERPL-MCNC: 380 PG/ML — SIGNIFICANT CHANGE UP (ref 232–1245)
WBC # BLD: 7.65 K/UL — SIGNIFICANT CHANGE UP (ref 3.8–10.5)
WBC # FLD AUTO: 7.65 K/UL — SIGNIFICANT CHANGE UP (ref 3.8–10.5)

## 2023-07-21 PROCEDURE — 99232 SBSQ HOSP IP/OBS MODERATE 35: CPT

## 2023-07-21 RX ADMIN — APIXABAN 2.5 MILLIGRAM(S): 2.5 TABLET, FILM COATED ORAL at 07:06

## 2023-07-21 RX ADMIN — CARVEDILOL PHOSPHATE 12.5 MILLIGRAM(S): 80 CAPSULE, EXTENDED RELEASE ORAL at 07:03

## 2023-07-21 RX ADMIN — Medication 12 UNIT(S): at 12:36

## 2023-07-21 RX ADMIN — Medication 12 UNIT(S): at 17:15

## 2023-07-21 RX ADMIN — CARVEDILOL PHOSPHATE 12.5 MILLIGRAM(S): 80 CAPSULE, EXTENDED RELEASE ORAL at 17:14

## 2023-07-21 RX ADMIN — ZIPRASIDONE HYDROCHLORIDE 80 MILLIGRAM(S): 20 CAPSULE ORAL at 07:04

## 2023-07-21 RX ADMIN — Medication 650 MILLIGRAM(S): at 07:03

## 2023-07-21 RX ADMIN — DIVALPROEX SODIUM 500 MILLIGRAM(S): 500 TABLET, DELAYED RELEASE ORAL at 07:03

## 2023-07-21 RX ADMIN — INSULIN GLARGINE 40 UNIT(S): 100 INJECTION, SOLUTION SUBCUTANEOUS at 21:51

## 2023-07-21 RX ADMIN — LOSARTAN POTASSIUM 25 MILLIGRAM(S): 100 TABLET, FILM COATED ORAL at 07:03

## 2023-07-21 RX ADMIN — APIXABAN 2.5 MILLIGRAM(S): 2.5 TABLET, FILM COATED ORAL at 17:14

## 2023-07-21 RX ADMIN — ATORVASTATIN CALCIUM 40 MILLIGRAM(S): 80 TABLET, FILM COATED ORAL at 21:52

## 2023-07-21 RX ADMIN — DIVALPROEX SODIUM 500 MILLIGRAM(S): 500 TABLET, DELAYED RELEASE ORAL at 17:14

## 2023-07-21 RX ADMIN — Medication 12 UNIT(S): at 07:25

## 2023-07-21 RX ADMIN — TAMSULOSIN HYDROCHLORIDE 0.4 MILLIGRAM(S): 0.4 CAPSULE ORAL at 21:52

## 2023-07-21 RX ADMIN — Medication 500 MILLIGRAM(S): at 17:14

## 2023-07-21 RX ADMIN — Medication 81 MILLIGRAM(S): at 12:37

## 2023-07-21 RX ADMIN — ZIPRASIDONE HYDROCHLORIDE 80 MILLIGRAM(S): 20 CAPSULE ORAL at 17:14

## 2023-07-21 RX ADMIN — Medication 650 MILLIGRAM(S): at 17:13

## 2023-07-21 RX ADMIN — Medication 500 MILLIGRAM(S): at 07:03

## 2023-07-21 NOTE — OCCUPATIONAL THERAPY INITIAL EVALUATION ADULT - PERTINENT HX OF CURRENT PROBLEM, REHAB EVAL
History of Present Illness:   62 y/o M with PMH of depression, DM type 2, HTN, DVT, diabetic neuropathy, bipolar 1 disorder, obesity, CHAYITO, rheumatoid arthritis, cataract of the lt eye, and hx of excision of a testicular mass (2009), presents to the ED complaining of dizziness/pain in the knees x 3 hours.   Patient reports having difficulty ambulating and walking for years; over the past year he has had to use a walker to get around and has difficulty going up and down stairs.  He uses tylenol for pain and has seen Dr Ferrell orthopedic surgery who has recommended bilateral knee replacement surgery once he has lost some weight.  This morning, he woke up from sleep feeling dizzy; he felt the room spinning around him; he sat up in bed and the dizziness resolved after a couple of minutes.  This has never happened to him before.  Further, he denies chest pain, SOB, Travel hx, palpatations.  He went to stand up but the pain in his knees was so bad he could not walk.  He then decided to come to the ER for pain in his knees.  HE is asking for transfer to [facility].  He does not have anyone to help him with his ADL at home.  He attends the day program at [facility].

## 2023-07-21 NOTE — OCCUPATIONAL THERAPY INITIAL EVALUATION ADULT - ADDITIONAL COMMENTS
Pt resides in apartment (independent living), +2STE, +tub shower. Pt reports independence in all self care ADL's/IADL's and ambulates w/ RW. Pt attends day program (adult day care daily)

## 2023-07-22 LAB
GLUCOSE BLDC GLUCOMTR-MCNC: 113 MG/DL — HIGH (ref 70–99)
GLUCOSE BLDC GLUCOMTR-MCNC: 127 MG/DL — HIGH (ref 70–99)
GLUCOSE BLDC GLUCOMTR-MCNC: 145 MG/DL — HIGH (ref 70–99)
GLUCOSE BLDC GLUCOMTR-MCNC: 166 MG/DL — HIGH (ref 70–99)

## 2023-07-22 PROCEDURE — 99232 SBSQ HOSP IP/OBS MODERATE 35: CPT

## 2023-07-22 RX ADMIN — Medication 650 MILLIGRAM(S): at 17:16

## 2023-07-22 RX ADMIN — DIVALPROEX SODIUM 500 MILLIGRAM(S): 500 TABLET, DELAYED RELEASE ORAL at 06:31

## 2023-07-22 RX ADMIN — ZIPRASIDONE HYDROCHLORIDE 80 MILLIGRAM(S): 20 CAPSULE ORAL at 17:16

## 2023-07-22 RX ADMIN — Medication 12 UNIT(S): at 17:14

## 2023-07-22 RX ADMIN — TAMSULOSIN HYDROCHLORIDE 0.4 MILLIGRAM(S): 0.4 CAPSULE ORAL at 21:41

## 2023-07-22 RX ADMIN — APIXABAN 2.5 MILLIGRAM(S): 2.5 TABLET, FILM COATED ORAL at 06:31

## 2023-07-22 RX ADMIN — Medication 12 UNIT(S): at 13:08

## 2023-07-22 RX ADMIN — ZIPRASIDONE HYDROCHLORIDE 80 MILLIGRAM(S): 20 CAPSULE ORAL at 06:30

## 2023-07-22 RX ADMIN — ATORVASTATIN CALCIUM 40 MILLIGRAM(S): 80 TABLET, FILM COATED ORAL at 21:41

## 2023-07-22 RX ADMIN — Medication 500 MILLIGRAM(S): at 06:31

## 2023-07-22 RX ADMIN — DIVALPROEX SODIUM 500 MILLIGRAM(S): 500 TABLET, DELAYED RELEASE ORAL at 17:15

## 2023-07-22 RX ADMIN — INSULIN GLARGINE 40 UNIT(S): 100 INJECTION, SOLUTION SUBCUTANEOUS at 21:39

## 2023-07-22 RX ADMIN — CARVEDILOL PHOSPHATE 12.5 MILLIGRAM(S): 80 CAPSULE, EXTENDED RELEASE ORAL at 17:16

## 2023-07-22 RX ADMIN — Medication 500 MILLIGRAM(S): at 17:15

## 2023-07-22 RX ADMIN — APIXABAN 2.5 MILLIGRAM(S): 2.5 TABLET, FILM COATED ORAL at 17:16

## 2023-07-22 RX ADMIN — Medication 12 UNIT(S): at 07:56

## 2023-07-22 RX ADMIN — LOSARTAN POTASSIUM 25 MILLIGRAM(S): 100 TABLET, FILM COATED ORAL at 06:30

## 2023-07-22 RX ADMIN — Medication 650 MILLIGRAM(S): at 06:31

## 2023-07-22 RX ADMIN — Medication 81 MILLIGRAM(S): at 13:09

## 2023-07-23 LAB
GLUCOSE BLDC GLUCOMTR-MCNC: 122 MG/DL — HIGH (ref 70–99)
GLUCOSE BLDC GLUCOMTR-MCNC: 139 MG/DL — HIGH (ref 70–99)
GLUCOSE BLDC GLUCOMTR-MCNC: 199 MG/DL — HIGH (ref 70–99)
GLUCOSE BLDC GLUCOMTR-MCNC: 72 MG/DL — SIGNIFICANT CHANGE UP (ref 70–99)

## 2023-07-23 PROCEDURE — 99232 SBSQ HOSP IP/OBS MODERATE 35: CPT

## 2023-07-23 RX ADMIN — INSULIN GLARGINE 40 UNIT(S): 100 INJECTION, SOLUTION SUBCUTANEOUS at 21:22

## 2023-07-23 RX ADMIN — ATORVASTATIN CALCIUM 40 MILLIGRAM(S): 80 TABLET, FILM COATED ORAL at 21:01

## 2023-07-23 RX ADMIN — ZIPRASIDONE HYDROCHLORIDE 80 MILLIGRAM(S): 20 CAPSULE ORAL at 17:22

## 2023-07-23 RX ADMIN — Medication 650 MILLIGRAM(S): at 05:40

## 2023-07-23 RX ADMIN — Medication 12 UNIT(S): at 11:12

## 2023-07-23 RX ADMIN — APIXABAN 2.5 MILLIGRAM(S): 2.5 TABLET, FILM COATED ORAL at 17:22

## 2023-07-23 RX ADMIN — DIVALPROEX SODIUM 500 MILLIGRAM(S): 500 TABLET, DELAYED RELEASE ORAL at 17:22

## 2023-07-23 RX ADMIN — ZIPRASIDONE HYDROCHLORIDE 80 MILLIGRAM(S): 20 CAPSULE ORAL at 05:39

## 2023-07-23 RX ADMIN — Medication 12 UNIT(S): at 07:54

## 2023-07-23 RX ADMIN — Medication 650 MILLIGRAM(S): at 17:23

## 2023-07-23 RX ADMIN — CARVEDILOL PHOSPHATE 12.5 MILLIGRAM(S): 80 CAPSULE, EXTENDED RELEASE ORAL at 05:40

## 2023-07-23 RX ADMIN — TAMSULOSIN HYDROCHLORIDE 0.4 MILLIGRAM(S): 0.4 CAPSULE ORAL at 21:01

## 2023-07-23 RX ADMIN — Medication 81 MILLIGRAM(S): at 11:12

## 2023-07-23 RX ADMIN — APIXABAN 2.5 MILLIGRAM(S): 2.5 TABLET, FILM COATED ORAL at 05:39

## 2023-07-23 RX ADMIN — CARVEDILOL PHOSPHATE 12.5 MILLIGRAM(S): 80 CAPSULE, EXTENDED RELEASE ORAL at 17:22

## 2023-07-23 RX ADMIN — Medication 500 MILLIGRAM(S): at 05:40

## 2023-07-23 RX ADMIN — Medication 500 MILLIGRAM(S): at 17:23

## 2023-07-23 RX ADMIN — LOSARTAN POTASSIUM 25 MILLIGRAM(S): 100 TABLET, FILM COATED ORAL at 05:41

## 2023-07-23 RX ADMIN — DIVALPROEX SODIUM 500 MILLIGRAM(S): 500 TABLET, DELAYED RELEASE ORAL at 05:38

## 2023-07-23 NOTE — DIETITIAN INITIAL EVALUATION ADULT - PERTINENT LABORATORY DATA
POCT Blood Glucose.: 199 mg/dL (07-23-23 @ 11:11)  A1C with Estimated Average Glucose Result: 8.0 % (07-21-23 @ 06:41)  A1C with Estimated Average Glucose Result: 9.4 % (04-21-23 @ 06:35)

## 2023-07-23 NOTE — DIETITIAN INITIAL EVALUATION ADULT - PERTINENT MEDS FT
MEDICATIONS  (STANDING):  apixaban 2.5 milliGRAM(s) Oral every 12 hours  ascorbic acid 500 milliGRAM(s) Oral two times a day  aspirin  chewable 81 milliGRAM(s) Oral daily  atorvastatin 40 milliGRAM(s) Oral at bedtime  carvedilol 12.5 milliGRAM(s) Oral every 12 hours  dextrose 5%. 1000 milliLiter(s) (100 mL/Hr) IV Continuous <Continuous>  dextrose 5%. 1000 milliLiter(s) (50 mL/Hr) IV Continuous <Continuous>  dextrose 50% Injectable 25 Gram(s) IV Push once  dextrose 50% Injectable 12.5 Gram(s) IV Push once  dextrose 50% Injectable 25 Gram(s) IV Push once  divalproex  milliGRAM(s) Oral two times a day  glucagon  Injectable 1 milliGRAM(s) IntraMuscular once  insulin glargine Injectable (LANTUS) 40 Unit(s) SubCutaneous at bedtime  insulin lispro Injectable (ADMELOG) 12 Unit(s) SubCutaneous three times a day before meals  losartan 25 milliGRAM(s) Oral daily  sodium bicarbonate 650 milliGRAM(s) Oral two times a day  tamsulosin 0.4 milliGRAM(s) Oral at bedtime  ziprasidone 80 milliGRAM(s) Oral two times a day    MEDICATIONS  (PRN):  acetaminophen     Tablet .. 325 milliGRAM(s) Oral every 6 hours PRN Mild Pain (1 - 3)  acetaminophen     Tablet .. 650 milliGRAM(s) Oral every 6 hours PRN Moderate Pain (4 - 6)  dextrose Oral Gel 15 Gram(s) Oral once PRN Blood Glucose LESS THAN 70 milliGRAM(s)/deciliter  HYDROmorphone   Tablet 2 milliGRAM(s) Oral every 4 hours PRN Severe Pain (7 - 10)

## 2023-07-23 NOTE — DIETITIAN INITIAL EVALUATION ADULT - ENTER TO (ML/KG)
Quality 431: Preventive Care And Screening: Unhealthy Alcohol Use - Screening: Patient screened for unhealthy alcohol use using a single question and scores less than 2 times per year Quality 402: Tobacco Use And Help With Quitting Among Adolescents: Patient screened for tobacco and never smoked Detail Level: Detailed 30

## 2023-07-23 NOTE — DIETITIAN INITIAL EVALUATION ADULT - ORAL INTAKE PTA/DIET HISTORY
Pt endorses consistently good appetite, states that he changed his diet to a very structured regimen. Gets bfast/lunch from support center 5 days/wk. Otherwise cooks at home- very plain/bland, balanced meals. Drinks a lot of water throughout the day. Pt states that he was eating a lot of fast foods prior to making these changes. Started Ozempic 1 year ago, which has resulted in ~16lbs weight loss. BG also managed with lantus/humalog, pt reports monitoring blood sugars regularly.  NKFA. No chewing/swallowing issues.

## 2023-07-23 NOTE — DIETITIAN INITIAL EVALUATION ADULT - PERSON TAUGHT/METHOD
Consistent carbohydrate, portion control, low Na/verbal instruction/teach back - (Patient repeats in own words)/patient instructed

## 2023-07-23 NOTE — DIETITIAN INITIAL EVALUATION ADULT - OTHER INFO
62 y/o M with PMH of depression, DM type 2, HTN, DVT, diabetic neuropathy, bipolar 1 disorder, obesity, CHAYITO, rheumatoid arthritis, cataract of the lt eye, and hx of excision of a testicular mass (2009), presents to the ED complaining of dizziness/pain in the knees.   Pt tolerating diet with report of good appetite, enjoying meals inhouse. Consuming >75% of meals. Current weight 320lbs with intentional weight loss of ~16lbs oer the last year. A1c 8.0%, down from > 9.0% per pt. Consistent carbohydrate, DASH/TLC diet reviewed with pt & well received. Denies N/V/D, constipation.

## 2023-07-24 ENCOUNTER — TRANSCRIPTION ENCOUNTER (OUTPATIENT)
Age: 63
End: 2023-07-24

## 2023-07-24 VITALS — OXYGEN SATURATION: 99 % | HEART RATE: 93 BPM

## 2023-07-24 LAB
GLUCOSE BLDC GLUCOMTR-MCNC: 132 MG/DL — HIGH (ref 70–99)
GLUCOSE BLDC GLUCOMTR-MCNC: 95 MG/DL — SIGNIFICANT CHANGE UP (ref 70–99)

## 2023-07-24 PROCEDURE — 84484 ASSAY OF TROPONIN QUANT: CPT

## 2023-07-24 PROCEDURE — 80053 COMPREHEN METABOLIC PANEL: CPT

## 2023-07-24 PROCEDURE — 36415 COLL VENOUS BLD VENIPUNCTURE: CPT

## 2023-07-24 PROCEDURE — 82962 GLUCOSE BLOOD TEST: CPT

## 2023-07-24 PROCEDURE — 97162 PT EVAL MOD COMPLEX 30 MIN: CPT

## 2023-07-24 PROCEDURE — 71045 X-RAY EXAM CHEST 1 VIEW: CPT

## 2023-07-24 PROCEDURE — 85730 THROMBOPLASTIN TIME PARTIAL: CPT

## 2023-07-24 PROCEDURE — 93306 TTE W/DOPPLER COMPLETE: CPT

## 2023-07-24 PROCEDURE — 74176 CT ABD & PELVIS W/O CONTRAST: CPT | Mod: MA

## 2023-07-24 PROCEDURE — 84439 ASSAY OF FREE THYROXINE: CPT

## 2023-07-24 PROCEDURE — 82746 ASSAY OF FOLIC ACID SERUM: CPT

## 2023-07-24 PROCEDURE — 85025 COMPLETE CBC W/AUTO DIFF WBC: CPT

## 2023-07-24 PROCEDURE — 80164 ASSAY DIPROPYLACETIC ACD TOT: CPT

## 2023-07-24 PROCEDURE — 81001 URINALYSIS AUTO W/SCOPE: CPT

## 2023-07-24 PROCEDURE — 97166 OT EVAL MOD COMPLEX 45 MIN: CPT

## 2023-07-24 PROCEDURE — 83880 ASSAY OF NATRIURETIC PEPTIDE: CPT

## 2023-07-24 PROCEDURE — 94660 CPAP INITIATION&MGMT: CPT

## 2023-07-24 PROCEDURE — 82607 VITAMIN B-12: CPT

## 2023-07-24 PROCEDURE — 85610 PROTHROMBIN TIME: CPT

## 2023-07-24 PROCEDURE — 84443 ASSAY THYROID STIM HORMONE: CPT

## 2023-07-24 PROCEDURE — 87086 URINE CULTURE/COLONY COUNT: CPT

## 2023-07-24 PROCEDURE — 99239 HOSP IP/OBS DSCHRG MGMT >30: CPT

## 2023-07-24 PROCEDURE — 93005 ELECTROCARDIOGRAM TRACING: CPT

## 2023-07-24 PROCEDURE — 80048 BASIC METABOLIC PNL TOTAL CA: CPT

## 2023-07-24 PROCEDURE — 83735 ASSAY OF MAGNESIUM: CPT

## 2023-07-24 PROCEDURE — 99285 EMERGENCY DEPT VISIT HI MDM: CPT | Mod: 25

## 2023-07-24 PROCEDURE — 85027 COMPLETE CBC AUTOMATED: CPT

## 2023-07-24 PROCEDURE — 83036 HEMOGLOBIN GLYCOSYLATED A1C: CPT

## 2023-07-24 RX ORDER — SODIUM BICARBONATE 1 MEQ/ML
0 SYRINGE (ML) INTRAVENOUS
Qty: 0 | Refills: 0 | DISCHARGE

## 2023-07-24 RX ORDER — SODIUM BICARBONATE 1 MEQ/ML
1 SYRINGE (ML) INTRAVENOUS
Qty: 0 | Refills: 0 | DISCHARGE
Start: 2023-07-24

## 2023-07-24 RX ADMIN — Medication 500 MILLIGRAM(S): at 05:18

## 2023-07-24 RX ADMIN — ZIPRASIDONE HYDROCHLORIDE 80 MILLIGRAM(S): 20 CAPSULE ORAL at 05:18

## 2023-07-24 RX ADMIN — DIVALPROEX SODIUM 500 MILLIGRAM(S): 500 TABLET, DELAYED RELEASE ORAL at 05:19

## 2023-07-24 RX ADMIN — Medication 12 UNIT(S): at 11:37

## 2023-07-24 RX ADMIN — CARVEDILOL PHOSPHATE 12.5 MILLIGRAM(S): 80 CAPSULE, EXTENDED RELEASE ORAL at 05:19

## 2023-07-24 RX ADMIN — LOSARTAN POTASSIUM 25 MILLIGRAM(S): 100 TABLET, FILM COATED ORAL at 05:19

## 2023-07-24 RX ADMIN — APIXABAN 2.5 MILLIGRAM(S): 2.5 TABLET, FILM COATED ORAL at 05:18

## 2023-07-24 RX ADMIN — Medication 12 UNIT(S): at 07:43

## 2023-07-24 RX ADMIN — Medication 650 MILLIGRAM(S): at 05:19

## 2023-07-24 RX ADMIN — Medication 81 MILLIGRAM(S): at 11:36

## 2023-07-24 NOTE — PROGRESS NOTE ADULT - NUTRITIONAL ASSESSMENT
This patient has been assessed with a concern for Malnutrition and has been determined to have a diagnosis/diagnoses of Morbid obesity (BMI > 40).    This patient is being managed with:   Diet Consistent Carbohydrate/No Snacks-  Entered: Jul 20 2023  2:50PM    The following pending diet order is being considered for treatment of Morbid obesity (BMI > 40):  Diet Consistent Carbohydrate w/Evening Snack-  DASH/TLC {Sodium & Cholesterol Restricted}  Entered: Jul 23 2023  2:50PM

## 2023-07-24 NOTE — PROGRESS NOTE ADULT - REASON FOR ADMISSION
Dizziness and difficulty walking

## 2023-07-24 NOTE — DISCHARGE NOTE PROVIDER - NSDCCPCAREPLAN_GEN_ALL_CORE_FT
PRINCIPAL DISCHARGE DIAGNOSIS  Diagnosis: Pre-syncope  Assessment and Plan of Treatment: You were admitted for weakness, difficulty ambulatory.   You were evaluated by Physical therapy who recommended subacute rehabilitation.   You will need to follow up with your primary care physician on discharge from rehab.   Discharging Provider:  Elbert Barahona MD  Contact Info: Cell [phone] - Please call with any questions or concerns.

## 2023-07-24 NOTE — DISCHARGE NOTE PROVIDER - DETAILS OF MALNUTRITION DIAGNOSIS/DIAGNOSES
This patient has been assessed with a concern for Malnutrition and was treated during this hospitalization for the following Nutrition diagnosis/diagnoses:     -  07/23/2023: Morbid obesity (BMI > 40)

## 2023-07-24 NOTE — PROGRESS NOTE ADULT - ASSESSMENT
64 y/o M with PMH of depression, DM type 2, HTN, DVT, diabetic neuropathy, bipolar 1 disorder, obesity, CHAYITO, rheumatoid arthritis, cataract of the lt eye, and hx of excision of a testicular mass (2009), presents to the ED complaining of dizziness/pain in the knees     Syncope  - Will admit to telemetry  - tte without significant issues, grade 1 diastolic   - Will check orthostatics  - Will check B12/folate/TSH/FT4    Bilateral knee pain related to RA with gait dysfunction secondary to pain  - Fall precautions, bed alarm, PT/OT evaluation    Hx of DVT 4/2023  - C/w eliquis 5mg BID    CKD 3  - Monitor creatinine, noted 2.06 today, around his baseline  - C/w sodium bicarb 650mg BID    DM-II  Diabetic neuropathy  - C/w lantus 40units qhs, humalog 12units premeal  - Will check FS q ac and hs, A1c in AM  - Uses ozempic at home weekly    HTN  HLD  - C/w coreg 12.5mg BID, losartan 25mg daily  - C/w lipitor 40mg qhs  - Takes vascepa 2gm BID at home; we do not have it here    CHAYITO  - Uses CPAP machine intermittently at home, non compliant  - nocturnal CPAP here    BPH  - C/w flomax 0.4mg qhs    Bipolar/depression  - C/w depakote 500mg BID  - C/w geodon 80mg BID    IBS  - Takes linzess 145mcg daily; we do not have it here    DVT PPX: Eliquis  AM labs, Full code  DISP: home with PT versus [place]  HCP is Cornelia,  at [facility]; patient stated he would update her    
64 y/o M with PMH of depression, DM type 2, HTN, DVT, diabetic neuropathy, bipolar 1 disorder, obesity, CHAYITO, rheumatoid arthritis, cataract of the lt eye, and hx of excision of a testicular mass (2009), presents to the ED complaining of dizziness/pain in the knees     Syncope  - Will admit to telemetry  - tte without significant issues, grade 1 diastolic   - Will check orthostatics  - Will check B12/folate/TSH/FT4    Bilateral knee pain related to RA with gait dysfunction secondary to pain  - Fall precautions, bed alarm, PT/OT evaluation    Hx of DVT 4/2023  - C/w eliquis 5mg BID    CKD 3  - Monitor creatinine, noted 2.06 today, around his baseline  - C/w sodium bicarb 650mg BID    DM-II  Diabetic neuropathy  - C/w lantus 40units qhs, humalog 12units premeal  - Will check FS q ac and hs, A1c in AM  - Uses ozempic at home weekly    HTN  HLD  - C/w coreg 12.5mg BID, losartan 25mg daily  - C/w lipitor 40mg qhs  - Takes vascepa 2gm BID at home; we do not have it here    CHAYITO  - Uses CPAP machine intermittently at home, non compliant  - Will order nocturnal CPAP here    BPH  - C/w flomax 0.4mg qhs    Bipolar/depression  - C/w depakote 500mg BID  - C/w geodon 80mg BID    IBS  - Takes linzess 145mcg daily; we do not have it here    DVT PPX: Eliquis  AM labs, Full code  DISP: Pending course but would like admission to [facility],   HCP is Cornelia,  at [facility]; patient stated he would update her    
64 y/o M with PMH of depression, DM type 2, HTN, DVT, diabetic neuropathy, bipolar 1 disorder, obesity, CHAYITO, rheumatoid arthritis, cataract of the lt eye, and hx of excision of a testicular mass (2009), presents to the ED complaining of dizziness/pain in the knees     Syncope  - Will admit to telemetry  - follow up TTE   - Will check orthostatics  - Will check B12/folate/TSH/FT4    Bilateral knee pain related to RA with gait dysfunction secondary to pain  - Fall precautions, bed alarm, PT/OT evaluation    Hx of DVT 4/2023  - C/w eliquis 5mg BID    CKD 3  - Monitor creatinine, noted 2.06 today, around his baseline  - C/w sodium bicarb 650mg BID    DM-II  Diabetic neuropathy  - C/w lantus 40units qhs, humalog 12units premeal  - Will check FS q ac and hs, A1c in AM  - Uses ozempic at home weekly    HTN  HLD  - C/w coreg 12.5mg BID, losartan 25mg daily  - C/w lipitor 40mg qhs  - Takes vascepa 2gm BID at home; we do not have it here    CHAYITO  - Uses CPAP machine intermittently at home, non compliant  - Will order nocturnal CPAP here    BPH  - C/w flomax 0.4mg qhs    Bipolar/depression  - C/w depakote 500mg BID  - C/w geodon 80mg BID    IBS  - Takes linzess 145mcg daily; we do not have it here    DVT PPX: Eliquis  AM labs, Full code  DISP: Pending course but would like admission to [facility], social work consult  HCP is Cornelia  at [facility]; patient stated he would update her    
64 y/o M with PMH of depression, DM type 2, HTN, DVT, diabetic neuropathy, bipolar 1 disorder, obesity, CHAYITO, rheumatoid arthritis, cataract of the lt eye, and hx of excision of a testicular mass (2009), presents to the ED complaining of dizziness/pain in the knees     Syncope  - Will admit to telemetry  - follow up TTE   - Will check orthostatics  - Will check B12/folate/TSH/FT4    Bilateral knee pain related to RA with gait dysfunction secondary to pain  - Fall precautions, bed alarm, PT/OT evaluation    Hx of DVT 4/2023  - C/w eliquis 5mg BID    CKD 3  - Monitor creatinine, noted 2.06 today, around his baseline  - C/w sodium bicarb 650mg BID    DM-II  Diabetic neuropathy  - C/w lantus 40units qhs, humalog 12units premeal  - Will check FS q ac and hs, A1c in AM  - Uses ozempic at home weekly    HTN  HLD  - C/w coreg 12.5mg BID, losartan 25mg daily  - C/w lipitor 40mg qhs  - Takes vascepa 2gm BID at home; we do not have it here    CHAYITO  - Uses CPAP machine intermittently at home, non compliant  - Will order nocturnal CPAP here    BPH  - C/w flomax 0.4mg qhs    Bipolar/depression  - C/w depakote 500mg BID  - C/w geodon 80mg BID    IBS  - Takes linzess 145mcg daily; we do not have it here    DVT PPX: Eliquis  AM labs, Full code  DISP: Pending course but would like admission to [facility], social work consult  HCP is Cornelia  at [facility]; patient stated he would update her

## 2023-07-24 NOTE — DISCHARGE NOTE PROVIDER - NSDCMRMEDTOKEN_GEN_ALL_CORE_FT
aspirin 81 mg oral tablet, chewable: 1 tab(s) orally once a day  atorvastatin 40 mg oral tablet: 1 tab(s) orally once a day  Calcium 500+D oral tablet, chewable: orally 3 times a day  carvedilol 12.5 mg oral tablet: 1 tab(s) orally 2 times a day  divalproex sodium: 1 tab(s) orally 2 times a day  Eliquis 2.5 mg oral tablet: 1 tab(s) orally 2 times a day  ergocalciferol 1.25 mg (50,000 intl units) oral tablet: orally once a week  insulin glargine 100 units/mL subcutaneous solution: 40 unit(s) subcutaneous once a day (at bedtime)  Linzess 145 mcg oral capsule: 1 cap(s) orally once a day  losartan 25 mg oral tablet: 1 tab(s) orally once a day  Ozempic 2 mg/1.5 mL (1 mg dose) subcutaneous solution:   repaglinide 1 mg oral tablet: 2 tab(s) orally 3 times a day (before meals)  sodium bicarbonate 650 mg oral tablet: 1 tab(s) orally 2 times a day  tamsulosin 0.4 mg oral capsule: 1 capsule orally once a day  Vascepa 1 g oral capsule: 2 cap(s) orally 2 times a day  Vitamin C 500 mg oral capsule: 1 cap(s) orally 2 times a day  Vitamin D3 1250 mcg (50,000 intl units) oral capsule: 1 cap(s) orally once a week  ziprasidone 80 mg oral capsule: 1 cap(s) orally 2 times a day

## 2023-07-24 NOTE — DISCHARGE NOTE NURSING/CASE MANAGEMENT/SOCIAL WORK - PATIENT PORTAL LINK FT
You can access the FollowMyHealth Patient Portal offered by [facility] by registering at the following website: [email or web address]. By joining [facility]’s FollowMyHealth portal, you will also be able to view your health information using other applications (apps) compatible with our system.

## 2023-07-24 NOTE — DISCHARGE NOTE PROVIDER - HOSPITAL COURSE
Hospital Course  HPI:  62 y/o M with PMH of depression, DM type 2, HTN, DVT, diabetic neuropathy, bipolar 1 disorder, obesity, CHAYITO, rheumatoid arthritis, cataract of the lt eye, and hx of excision of a testicular mass (2009), presents to the ED complaining of dizziness/pain in the knees x 3 hours.   Patient reports having difficulty ambulating and walking for years; over the past year he has had to use a walker to get around and has difficulty going up and down stairs.  He uses tylenol for pain and has seen Dr Ferrell orthopedic surgery who has recommended bilateral knee replacement surgery once he has lost some weight.  This morning, he woke up from sleep feeling dizzy; he felt the room spinning around him; he sat up in bed and the dizziness resolved after a couple of minutes.  This has never happened to him before.  Further, he denies chest pain, SOB, Travel hx, palpatations.  He went to stand up but the pain in his knees was so bad he could not walk.  He then decided to come to the ER for pain in his knees.  HE is asking for transfer to [facility].  He does not have anyone to help him with his ADL at home.  He attends the day program at [facility].      He states he is not dizzy at the moment (20 Jul 2023 14:45)    You were admitted for weakness, difficulty ambulatory.     You were evaluated by Physical therapy who recommended subacute rehabilitation.     You will need to follow up with your primary care physician on discharge from rehab.     Discharging Provider:  Elbert Barahona MD  Contact Info: Cell [phone] - Please call with any questions or concerns.    Outpatient Provider:   Dr. Mckeon

## 2023-07-24 NOTE — DISCHARGE NOTE PROVIDER - NSDCFUSCHEDAPPT_GEN_ALL_CORE_FT
METABOLIC/FLUID AND ELECTROLYTES - ADULT    • Glucose maintained within prescribed range Progressing    • Electrolytes maintained within normal limits Progressing    • Hemodynamic stability and optimal renal function maintained Progressing          PAIN - Brayden Light S  [facility]  ENDOCRIN [address] [facility]  Scheduled Appointment: 07/31/2023    Adonay Wen  [facility]  UROLOGY [address] [facility]  Scheduled Appointment: 08/10/2023    Mariela Vallejo  [facility]  NEPHRO [address]  Scheduled Appointment: 09/19/2023

## 2023-07-24 NOTE — PROGRESS NOTE ADULT - SUBJECTIVE AND OBJECTIVE BOX
Patient is a 63y old  Male who presents with a chief complaint of Dizziness and difficulty walking (20 Jul 2023 14:45)    calm, pleasant.     Patient seen and examined at bedside. No overnight events reported.     ALLERGIES:  Tegretol (Hypotension; Anaphylaxis)  penicillin (Hives)    MEDICATIONS  (STANDING):  apixaban 2.5 milliGRAM(s) Oral every 12 hours  ascorbic acid 500 milliGRAM(s) Oral two times a day  aspirin  chewable 81 milliGRAM(s) Oral daily  atorvastatin 40 milliGRAM(s) Oral at bedtime  carvedilol 12.5 milliGRAM(s) Oral every 12 hours  dextrose 5%. 1000 milliLiter(s) (50 mL/Hr) IV Continuous <Continuous>  dextrose 5%. 1000 milliLiter(s) (100 mL/Hr) IV Continuous <Continuous>  dextrose 50% Injectable 25 Gram(s) IV Push once  dextrose 50% Injectable 12.5 Gram(s) IV Push once  dextrose 50% Injectable 25 Gram(s) IV Push once  divalproex  milliGRAM(s) Oral two times a day  glucagon  Injectable 1 milliGRAM(s) IntraMuscular once  insulin glargine Injectable (LANTUS) 40 Unit(s) SubCutaneous at bedtime  insulin lispro Injectable (ADMELOG) 12 Unit(s) SubCutaneous three times a day before meals  losartan 25 milliGRAM(s) Oral daily  sodium bicarbonate 650 milliGRAM(s) Oral two times a day  tamsulosin 0.4 milliGRAM(s) Oral at bedtime  ziprasidone 80 milliGRAM(s) Oral two times a day    MEDICATIONS  (PRN):  acetaminophen     Tablet .. 650 milliGRAM(s) Oral every 6 hours PRN Moderate Pain (4 - 6)  acetaminophen     Tablet .. 325 milliGRAM(s) Oral every 6 hours PRN Mild Pain (1 - 3)  dextrose Oral Gel 15 Gram(s) Oral once PRN Blood Glucose LESS THAN 70 milliGRAM(s)/deciliter  HYDROmorphone   Tablet 2 milliGRAM(s) Oral every 4 hours PRN Severe Pain (7 - 10)    Vital Signs Last 24 Hrs  T(F): 98.1 (21 Jul 2023 05:38), Max: 98.1 (20 Jul 2023 20:14)  HR: 92 (21 Jul 2023 09:16) (92 - 105)  BP: 149/89 (21 Jul 2023 05:38) (115/68 - 157/89)  RR: 18 (21 Jul 2023 05:38) (18 - 18)  SpO2: 96% (21 Jul 2023 09:16) (96% - 98%)  I&O's Summary    20 Jul 2023 07:01  -  21 Jul 2023 07:00  --------------------------------------------------------  IN: 480 mL / OUT: 0 mL / NET: 480 mL    PHYSICAL EXAM:  General: NAD, A/O x 3  ENT: No gross hearing impairment, Moist mucous membranes, no thrush  Neck: Supple, No JVD  Lungs: Clear to auscultation bilaterally, good air entry, non-labored breathing  Cardio: RRR, S1/S2, No murmur  Abdomen: Soft, Nontender, Nondistended; Bowel sounds present  Extremities: No calf tenderness, No cyanosis, No pitting edema  Psych: Appropriate mood and affect    LABS:             13.0   7.65  )-----------( 178      ( 21 Jul 2023 06:41 )             40.6     07-21  143  |  108  |  42  ----------------------------<  143  4.0   |  25  |  2.11    Ca    9.5      21 Jul 2023 06:41  Mg     1.9     07-21    TPro  6.9  /  Alb  3.1  /  TBili  0.5  /  DBili  x   /  AST  18  /  ALT  26  /  AlkPhos  76  07-20    PT/INR - ( 20 Jul 2023 11:18 )   PT: 14.3 sec;   INR: 1.23 ratio       PTT - ( 20 Jul 2023 11:18 )  PTT:35.2 sec    CARDIAC MARKERS ( 20 Jul 2023 11:18 )  x     / 7.8 ng/L / x     / x     / x        TSH 0.329   TSH with FT4 reflex --  Total T3 --    POCT Blood Glucose.: 172 mg/dL (21 Jul 2023 12:35)  POCT Blood Glucose.: 134 mg/dL (21 Jul 2023 08:08)  POCT Blood Glucose.: 111 mg/dL (20 Jul 2023 22:38)  POCT Blood Glucose.: 184 mg/dL (20 Jul 2023 18:16)    Urinalysis Basic - ( 21 Jul 2023 06:41 )    Color: x / Appearance: x / SG: x / pH: x  Gluc: 143 mg/dL / Ketone: x  / Bili: x / Urobili: x   Blood: x / Protein: x / Nitrite: x   Leuk Esterase: x / RBC: x / WBC x   Sq Epi: x / Non Sq Epi: x / Bacteria: x    Culture - Urine (collected 20 Jul 2023 14:10)  Source: Clean Catch Clean Catch (Midstream)  Final Report (21 Jul 2023 14:28):    <10,000 CFU/mL Normal Urogenital Layne    RADIOLOGY & ADDITIONAL TESTS:    Care Discussed with Consultants/Other Providers:   
Patient is a 63y old  Male who presents with a chief complaint of Dizziness and difficulty walking (21 Jul 2023 08:08)      Patient seen and examined at bedside. No overnight events reported.     ALLERGIES:  Tegretol (Hypotension; Anaphylaxis)  penicillin (Hives)    MEDICATIONS  (STANDING):  apixaban 2.5 milliGRAM(s) Oral every 12 hours  ascorbic acid 500 milliGRAM(s) Oral two times a day  aspirin  chewable 81 milliGRAM(s) Oral daily  atorvastatin 40 milliGRAM(s) Oral at bedtime  carvedilol 12.5 milliGRAM(s) Oral every 12 hours  dextrose 5%. 1000 milliLiter(s) (100 mL/Hr) IV Continuous <Continuous>  dextrose 5%. 1000 milliLiter(s) (50 mL/Hr) IV Continuous <Continuous>  dextrose 50% Injectable 25 Gram(s) IV Push once  dextrose 50% Injectable 12.5 Gram(s) IV Push once  dextrose 50% Injectable 25 Gram(s) IV Push once  divalproex  milliGRAM(s) Oral two times a day  glucagon  Injectable 1 milliGRAM(s) IntraMuscular once  insulin glargine Injectable (LANTUS) 40 Unit(s) SubCutaneous at bedtime  insulin lispro Injectable (ADMELOG) 12 Unit(s) SubCutaneous three times a day before meals  losartan 25 milliGRAM(s) Oral daily  sodium bicarbonate 650 milliGRAM(s) Oral two times a day  tamsulosin 0.4 milliGRAM(s) Oral at bedtime  ziprasidone 80 milliGRAM(s) Oral two times a day    MEDICATIONS  (PRN):  acetaminophen     Tablet .. 325 milliGRAM(s) Oral every 6 hours PRN Mild Pain (1 - 3)  acetaminophen     Tablet .. 650 milliGRAM(s) Oral every 6 hours PRN Moderate Pain (4 - 6)  dextrose Oral Gel 15 Gram(s) Oral once PRN Blood Glucose LESS THAN 70 milliGRAM(s)/deciliter  HYDROmorphone   Tablet 2 milliGRAM(s) Oral every 4 hours PRN Severe Pain (7 - 10)    Vital Signs Last 24 Hrs  T(F): 98 (22 Jul 2023 05:05), Max: 98 (21 Jul 2023 20:10)  HR: 98 (22 Jul 2023 05:05) (90 - 98)  BP: 109/75 (22 Jul 2023 05:05) (109/75 - 128/87)  RR: 18 (22 Jul 2023 05:05) (17 - 18)  SpO2: 96% (22 Jul 2023 05:05) (94% - 96%)  I&O's Summary    21 Jul 2023 07:01  -  22 Jul 2023 07:00  --------------------------------------------------------  IN: 720 mL / OUT: 0 mL / NET: 720 mL      PHYSICAL EXAM:  General: NAD, A/O x 3  ENT: No gross hearing impairment, Moist mucous membranes, no thrush  Neck: Supple, No JVD  Lungs: Clear to auscultation bilaterally, good air entry, non-labored breathing  Cardio: RRR, S1/S2, No murmur  Abdomen: Soft, Nontender, Nondistended; Bowel sounds present  Extremities: No calf tenderness, No cyanosis, No pitting edema  Psych: Appropriate mood and affect    LABS:                        13.0   7.65  )-----------( 178      ( 21 Jul 2023 06:41 )             40.6     07-21    143  |  108  |  42  ----------------------------<  143  4.0   |  25  |  2.11    Ca    9.5      21 Jul 2023 06:41  Mg     1.9     07-21    TPro  6.9  /  Alb  3.1  /  TBili  0.5  /  DBili  x   /  AST  18  /  ALT  26  /  AlkPhos  76  07-20          PT/INR - ( 20 Jul 2023 11:18 )   PT: 14.3 sec;   INR: 1.23 ratio         PTT - ( 20 Jul 2023 11:18 )  PTT:35.2 sec      CARDIAC MARKERS ( 20 Jul 2023 11:18 )  x     / 7.8 ng/L / x     / x     / x            TSH 0.329   TSH with FT4 reflex --  Total T3 --    POCT Blood Glucose.: 113 mg/dL (22 Jul 2023 07:54)  POCT Blood Glucose.: 134 mg/dL (21 Jul 2023 21:50)  POCT Blood Glucose.: 128 mg/dL (21 Jul 2023 17:10)  POCT Blood Glucose.: 172 mg/dL (21 Jul 2023 12:35)    Urinalysis Basic - ( 21 Jul 2023 06:41 )    Color: x / Appearance: x / SG: x / pH: x  Gluc: 143 mg/dL / Ketone: x  / Bili: x / Urobili: x   Blood: x / Protein: x / Nitrite: x   Leuk Esterase: x / RBC: x / WBC x   Sq Epi: x / Non Sq Epi: x / Bacteria: x    Culture - Urine (collected 20 Jul 2023 14:10)  Source: Clean Catch Clean Catch (Midstream)  Final Report (21 Jul 2023 14:28):  <10,000 CFU/mL Normal Urogenital Layne    RADIOLOGY & ADDITIONAL TESTS:    Care Discussed with Consultants/Other Providers: 
Patient is a 63y old  Male who presents with a chief complaint of Dizziness and difficulty walking (22 Jul 2023 08:51)    Good spirit.  No complaints.      Patient seen and examined at bedside. No overnight events reported.     ALLERGIES:  Tegretol (Hypotension; Anaphylaxis)  penicillin (Hives)    MEDICATIONS  (STANDING):  apixaban 2.5 milliGRAM(s) Oral every 12 hours  ascorbic acid 500 milliGRAM(s) Oral two times a day  aspirin  chewable 81 milliGRAM(s) Oral daily  atorvastatin 40 milliGRAM(s) Oral at bedtime  carvedilol 12.5 milliGRAM(s) Oral every 12 hours  dextrose 5%. 1000 milliLiter(s) (100 mL/Hr) IV Continuous <Continuous>  dextrose 5%. 1000 milliLiter(s) (50 mL/Hr) IV Continuous <Continuous>  dextrose 50% Injectable 25 Gram(s) IV Push once  dextrose 50% Injectable 12.5 Gram(s) IV Push once  dextrose 50% Injectable 25 Gram(s) IV Push once  divalproex  milliGRAM(s) Oral two times a day  glucagon  Injectable 1 milliGRAM(s) IntraMuscular once  insulin glargine Injectable (LANTUS) 40 Unit(s) SubCutaneous at bedtime  insulin lispro Injectable (ADMELOG) 12 Unit(s) SubCutaneous three times a day before meals  losartan 25 milliGRAM(s) Oral daily  sodium bicarbonate 650 milliGRAM(s) Oral two times a day  tamsulosin 0.4 milliGRAM(s) Oral at bedtime  ziprasidone 80 milliGRAM(s) Oral two times a day    MEDICATIONS  (PRN):  acetaminophen     Tablet .. 325 milliGRAM(s) Oral every 6 hours PRN Mild Pain (1 - 3)  acetaminophen     Tablet .. 650 milliGRAM(s) Oral every 6 hours PRN Moderate Pain (4 - 6)  dextrose Oral Gel 15 Gram(s) Oral once PRN Blood Glucose LESS THAN 70 milliGRAM(s)/deciliter  HYDROmorphone   Tablet 2 milliGRAM(s) Oral every 4 hours PRN Severe Pain (7 - 10)    Vital Signs Last 24 Hrs  T(F): 97.9 (23 Jul 2023 05:15), Max: 97.9 (22 Jul 2023 20:05)  HR: 100 (23 Jul 2023 05:15) (92 - 100)  BP: 126/77 (23 Jul 2023 05:15) (104/73 - 137/95)  RR: 18 (23 Jul 2023 05:15) (17 - 18)  SpO2: 96% (23 Jul 2023 05:15) (95% - 98%)  I&O's Summary    PHYSICAL EXAM:  General: NAD, A/O x 3  ENT: No gross hearing impairment, Moist mucous membranes, no thrush  Neck: Supple, No JVD  Lungs: Clear to auscultation bilaterally, good air entry, non-labored breathing  Cardio: RRR, S1/S2, No murmur  Abdomen: Soft, Nontender, Nondistended; Bowel sounds present  Extremities: No calf tenderness, No cyanosis, No pitting edema  Psych: Appropriate mood and affect    LABS:                        13.0   7.65  )-----------( 178      ( 21 Jul 2023 06:41 )             40.6     07-21    143  |  108  |  42  ----------------------------<  143  4.0   |  25  |  2.11    Ca    9.5      21 Jul 2023 06:41  Mg     1.9     07-21    TPro  6.9  /  Alb  3.1  /  TBili  0.5  /  DBili  x   /  AST  18  /  ALT  26  /  AlkPhos  76  07-20          PT/INR - ( 20 Jul 2023 11:18 )   PT: 14.3 sec;   INR: 1.23 ratio         PTT - ( 20 Jul 2023 11:18 )  PTT:35.2 sec      CARDIAC MARKERS ( 20 Jul 2023 11:18 )  x     / 7.8 ng/L / x     / x     / x            TSH 0.329   TSH with FT4 reflex --  Total T3 --    POCT Blood Glucose.: 145 mg/dL (22 Jul 2023 21:39)  POCT Blood Glucose.: 127 mg/dL (22 Jul 2023 17:11)  POCT Blood Glucose.: 166 mg/dL (22 Jul 2023 12:21)  POCT Blood Glucose.: 113 mg/dL (22 Jul 2023 07:54)    Urinalysis Basic - ( 21 Jul 2023 06:41 )    Color: x / Appearance: x / SG: x / pH: x  Gluc: 143 mg/dL / Ketone: x  / Bili: x / Urobili: x   Blood: x / Protein: x / Nitrite: x   Leuk Esterase: x / RBC: x / WBC x   Sq Epi: x / Non Sq Epi: x / Bacteria: x    Culture - Urine (collected 20 Jul 2023 14:10)  Source: Clean Catch Clean Catch (Midstream)  Final Report (21 Jul 2023 14:28):    <10,000 CFU/mL Normal Urogenital Layne    RADIOLOGY & ADDITIONAL TESTS:    Care Discussed with Consultants/Other Providers:   
Patient is a 63y old  Male who presents with a chief complaint of Syncope and collapse   (23 Jul 2023 14:36)    Pleasant.  No issues.  Feels well.  Wants to go to [facility].      Patient seen and examined at bedside. No overnight events reported.     ALLERGIES:  Tegretol (Hypotension; Anaphylaxis)  penicillin (Hives)    MEDICATIONS  (STANDING):  apixaban 2.5 milliGRAM(s) Oral every 12 hours  ascorbic acid 500 milliGRAM(s) Oral two times a day  aspirin  chewable 81 milliGRAM(s) Oral daily  atorvastatin 40 milliGRAM(s) Oral at bedtime  carvedilol 12.5 milliGRAM(s) Oral every 12 hours  dextrose 5%. 1000 milliLiter(s) (100 mL/Hr) IV Continuous <Continuous>  dextrose 5%. 1000 milliLiter(s) (50 mL/Hr) IV Continuous <Continuous>  dextrose 50% Injectable 25 Gram(s) IV Push once  dextrose 50% Injectable 12.5 Gram(s) IV Push once  dextrose 50% Injectable 25 Gram(s) IV Push once  divalproex  milliGRAM(s) Oral two times a day  glucagon  Injectable 1 milliGRAM(s) IntraMuscular once  insulin glargine Injectable (LANTUS) 40 Unit(s) SubCutaneous at bedtime  insulin lispro Injectable (ADMELOG) 12 Unit(s) SubCutaneous three times a day before meals  losartan 25 milliGRAM(s) Oral daily  sodium bicarbonate 650 milliGRAM(s) Oral two times a day  tamsulosin 0.4 milliGRAM(s) Oral at bedtime  ziprasidone 80 milliGRAM(s) Oral two times a day    MEDICATIONS  (PRN):  acetaminophen     Tablet .. 650 milliGRAM(s) Oral every 6 hours PRN Moderate Pain (4 - 6)  acetaminophen     Tablet .. 325 milliGRAM(s) Oral every 6 hours PRN Mild Pain (1 - 3)  dextrose Oral Gel 15 Gram(s) Oral once PRN Blood Glucose LESS THAN 70 milliGRAM(s)/deciliter    Vital Signs Last 24 Hrs  T(F): 98.2 (24 Jul 2023 04:55), Max: 98.2 (24 Jul 2023 04:55)  HR: 93 (24 Jul 2023 04:58) (79 - 93)  BP: 140/91 (24 Jul 2023 04:55) (116/74 - 155/96)  RR: 17 (24 Jul 2023 04:55) (16 - 17)  SpO2: 99% (24 Jul 2023 04:58) (96% - 99%)  I&O's Summary    PHYSICAL EXAM:  General: NAD, A/O x 3  ENT: No gross hearing impairment, Moist mucous membranes, no thrush  Neck: Supple, No JVD  Lungs: Clear to auscultation bilaterally, good air entry, non-labored breathing  Cardio: RRR, S1/S2, No murmur  Abdomen: Soft, Nontender, Nondistended; Bowel sounds present  Extremities: No calf tenderness, No cyanosis, No pitting edema  Psych: Appropriate mood and affect    LABS:    POCT Blood Glucose.: 132 mg/dL (24 Jul 2023 07:42)  POCT Blood Glucose.: 139 mg/dL (23 Jul 2023 21:14)  POCT Blood Glucose.: 72 mg/dL (23 Jul 2023 17:20)  POCT Blood Glucose.: 199 mg/dL (23 Jul 2023 11:11)  POCT Blood Glucose.: 122 mg/dL (23 Jul 2023 07:53)    Culture - Urine (collected 20 Jul 2023 14:10)  Source: Clean Catch Clean Catch (Midstream)  Final Report (21 Jul 2023 14:28):  <10,000 CFU/mL Normal Urogenital Layne    RADIOLOGY & ADDITIONAL TESTS:    Care Discussed with Consultants/Other Providers:

## 2023-07-31 ENCOUNTER — APPOINTMENT (OUTPATIENT)
Dept: ENDOCRINOLOGY | Facility: CLINIC | Age: 63
End: 2023-07-31

## 2023-08-07 RX ORDER — INSULIN GLARGINE 100 [IU]/ML
100 INJECTION, SOLUTION SUBCUTANEOUS
Qty: 25 | Refills: 3 | Status: ACTIVE | COMMUNITY
Start: 2022-08-16 | End: 1900-01-01

## 2023-08-10 ENCOUNTER — APPOINTMENT (OUTPATIENT)
Dept: UROLOGY | Facility: CLINIC | Age: 63
End: 2023-08-10

## 2023-08-11 NOTE — ED PROVIDER NOTE - NS_EDPROVIDERDISPOUSERTYPE_ED_A_ED
[FreeTextEntry1] : 2/27/23 B/L sMMG/US (Stephanie): Extremely dense. Indeterminate calcifications in the right upper outer quadrant for which histologic characterization is recommended. In light of the patient's pregnancy, options were discussed. Ultrasound -guided core biopsy of the probable fibrocystic nodule is suggested with placement of a clip and follow-up correlative mammogram. If these do not correlate, stereotactic core biopsy may be considered. Dilated ducts with internal nodular material could be related to lactation (rather than papillomatosis). Management to be determined pending results of the biopsy. BIRADS 4B  3/6/23 R US Biopsy (Stephanie) Right breast 10:00 N+ 5 0.6 x 0.5 x 0.5 cm heterogeneous nodule with punctate echogenic foci, possibly representing calcifications. PATHOLOGY: Lactational hyperplasia, fibrocystic changes and nodular sclerosing adenosis and columnar cell hyperplasia Calcifications associated with benign epithelium. Findings are benign and concordant. Findings and clip placement do not account for the indeterminate calcifications seen mammographically and stereotactic core biopsy is still recommended.   3/14/23 R Stereo Biopsy (Stephanie/H Path): Lobular carcinoma in situ, lactational changes, fibrocystic changes and small fibroadenoma. All associated with calcifications. In light of the high risk lesion, surgical consultation is recommended. 
Attending Attestation (For Attendings USE Only)...

## 2023-08-14 RX ORDER — INSULIN LISPRO 100 [IU]/ML
100 INJECTION, SOLUTION INTRAVENOUS; SUBCUTANEOUS
Qty: 15 | Refills: 1 | Status: ACTIVE | COMMUNITY
Start: 2022-11-07 | End: 1900-01-01

## 2023-08-14 RX ORDER — BLOOD-GLUCOSE METER
W/DEVICE EACH MISCELLANEOUS
Qty: 1 | Refills: 0 | Status: ACTIVE | COMMUNITY
Start: 2019-01-07 | End: 1900-01-01

## 2023-08-14 RX ORDER — SEMAGLUTIDE 2.68 MG/ML
8 INJECTION, SOLUTION SUBCUTANEOUS
Qty: 3 | Refills: 2 | Status: ACTIVE | COMMUNITY
Start: 2022-05-16 | End: 1900-01-01

## 2023-08-14 RX ORDER — BLOOD SUGAR DIAGNOSTIC
STRIP MISCELLANEOUS
Qty: 2 | Refills: 5 | Status: ACTIVE | COMMUNITY
Start: 2019-01-07 | End: 1900-01-01

## 2023-08-15 ENCOUNTER — NON-APPOINTMENT (OUTPATIENT)
Age: 63
End: 2023-08-15

## 2023-08-17 ENCOUNTER — NON-APPOINTMENT (OUTPATIENT)
Age: 63
End: 2023-08-17

## 2023-08-17 ENCOUNTER — APPOINTMENT (OUTPATIENT)
Dept: CARDIOLOGY | Facility: CLINIC | Age: 63
End: 2023-08-17
Payer: MEDICARE

## 2023-08-17 ENCOUNTER — APPOINTMENT (OUTPATIENT)
Dept: CARDIOLOGY | Facility: CLINIC | Age: 63
End: 2023-08-17

## 2023-08-17 VITALS
DIASTOLIC BLOOD PRESSURE: 74 MMHG | BODY MASS INDEX: 44.1 KG/M2 | HEIGHT: 71 IN | SYSTOLIC BLOOD PRESSURE: 110 MMHG | HEART RATE: 102 BPM | RESPIRATION RATE: 17 BRPM | OXYGEN SATURATION: 95 % | WEIGHT: 315 LBS

## 2023-08-17 PROCEDURE — 93000 ELECTROCARDIOGRAM COMPLETE: CPT

## 2023-08-17 PROCEDURE — 99215 OFFICE O/P EST HI 40 MIN: CPT

## 2023-08-17 RX ORDER — APIXABAN 5 MG/1
5 TABLET, FILM COATED ORAL TWICE DAILY
Qty: 180 | Refills: 1 | Status: ACTIVE | COMMUNITY
Start: 2023-08-15

## 2023-08-17 NOTE — CARDIOLOGY SUMMARY
[de-identified] : 8/30/2021, sinus 91 bpm, left atrial enlargement, old inferior MI pattern, poor R-wave progression [de-identified] : 2015, no Ischemia \par  11/21/2019, pharmacologic nuclear stress test, normal myocardial perfusion imaging, LVEF 65%, LVEDV 109 mL. [de-identified] : 3/2015, normal LV function\par  9/13/2019, concentric LVH, grossly normal LV systolic function, LVEF 55-60%\par  8/30/2021, concentric LVH, grossly normal LV systolic function, LVEF 60-65%\par

## 2023-08-17 NOTE — HISTORY OF PRESENT ILLNESS
[FreeTextEntry1] : 63 year-old gentleman with known cardiovascular risk factors including hypertension, well controlled non-insulin dependent diabetes, dyslipidemia, obesity, CHAYITO, bipolar disorder on Lithium and Geodon, lives as a full time resident of the [facility]. He presents today in his usual state of health, having lost approximately 65 lbs over the past year. He had a repeat sleep study performed 8/12/2016 which showed severe CHAYITO. He was formerly followed by a cardiologist in [place], Geovanny Elizabeth MD.  May 10, 2019: Patient presents with complain of sleep apnea. Has concerning sleep study two months ago. He has nasal congestion and cannot tolerate his CPAP. He is also concern about neuropathic pain in the insteps of his feet. He has lost several pounds through diet and exercise that are supervised through his program.  Psychiatrist at [facility] in [place] who manages his Lithium and his antipsychotic medication (second generation, Geodon).  November 2019 - Patient presents today in his usual state of health. He has modified his diet and is losing significant weight. From a peak of 349 lbs in 2012, patient is now 284 lbs. He reports having difficulty walking long distances, but that is because of leg weakness, not because of shortness of breath or chest pain. He is concerned about dyspnea on exertion, but he has not experienced this recently.  June 2020 - Patient has lost 65 lbs by diet and exercise, but when he got the stimulus check, he broke his diet and started eating pizza, and heroes, and potato chips, and he believes he had a stroke that presented with pressured speech. He was scanned at [facility], and he was seen by neurology. They believe he had a TIA and discharged him with atorvastatin 40 mg daily (up from prior 20 mg daily). He continues to take ASA 81 mg daily and an oral diabetes regimen (repaglinide and Tradjenta). Unfortunately, his exercise class was shut down due to Covid pandemic.  October 2020 - Patient has been having difficulty with over-eating. He reports several dietary indiscretions over the past month, including pizza and Chinese food. He has been depressed by his eating, and he is feeling socially isolated. He finds that he runs out of money for food and relies on a local [employer] for meals. He continues to take his medications without issues, but he has not been using his CPAP machine.  March 2021 - Patient returns today for follow-up. He was admitted to [facility] in December 2020 for elevated lithium levels, and he was transitioned off lithium to Depakote. He feels much better now on Depakote. He is also on a new diabetes regimen that includes Rybelsus and it has helped him lose weight.  These medication changes have also helped him financially.  Flomax and finasteride are helping him with BPH. Linzess is improving his constipation.  Arthritis continues to be a problem.  He is sleeping well through the night. He is using his CPAP. He is walking for exercise. He has not yet received Covid-19 vaccine, but he is interested in getting it.   August 2021 - Patient returns today for follow-up and echocardiogram.  He has been working with Dr. Estuardo Dowd on losing weight, and he has dramatically modified his diet. He has reduced bread, pizza, and Chinese food intake.  He has increased eating oatmeal and eggs.  He has received [employer] Covid-19 vaccine.  December 2021 - Patient returns today for follow-up. He is scheduled to have cataract surgery with Dr. Govea in [place] on January 5, 2022. He has no complaints of chest pain or shortness of breath. He received both a [employer] Covid-19 vaccine and [employer] booster.  September 2022 - Patient returns today for follow-up in his usual state of health. He has started singing Tuesday nights at a restaurant in [place] ([email or web address]). He is really enjoying it.  His knees are his chief complaints.  He has no chest pain or shortness of breath recently. He has lost some weight recently by sticking to his diet.   April 2023 - Patient returns today for follow-up in his usual state of health.  He has a current viral illness, including coughing and sneezing.  PMD: Julius Mckeon MD [phone] Ophthalmologist: Patel Govea MD [phone] Sleep Medicine: Angelika Todd MD [phone] Endocrinologist: Rocío Bañuelos MD [phone] Psychiatrist: Anthony Dc, Psychiatric Nurse Practitioner at [facility]

## 2023-09-04 ENCOUNTER — RX RENEWAL (OUTPATIENT)
Age: 63
End: 2023-09-04

## 2023-09-04 RX ORDER — CHOLECALCIFEROL (VITAMIN D3) 10(400)/ML
DROPS ORAL
Qty: 100 | Refills: 0 | Status: ACTIVE | COMMUNITY
Start: 2019-01-15 | End: 1900-01-01

## 2023-09-05 NOTE — ED PROVIDER NOTE - CHPI ED SYMPTOMS POS
Pt has chronic b/l LE diminished sensation 2/2 neuropathy./PAIN/DIFFICULTY BEARING WEIGHT Female Completion Statement: After discussing her treatment course we decided to discontinue isotretinoin therapy at this time. I explained that she would need to continue her birth control methods for at least one month after the last dosage. She should also get a pregnancy test one month after the last dose. She shouldn't donate blood for one month after the last dose. She should call with any new symptoms of depression.

## 2023-09-06 NOTE — ED ADULT TRIAGE NOTE - PAIN: PRESENCE, MLM
Caller: Shanta Liang    Relationship: Self    Best call back number: [phone]     Caller requesting test results: PATIENT    What test was performed: BLOODWORK    When was the test performed: 9.5.23    Where was the test performed: [facility]     Additional notes:            denies pain/discomfort

## 2023-09-13 ENCOUNTER — NON-APPOINTMENT (OUTPATIENT)
Age: 63
End: 2023-09-13

## 2023-09-18 NOTE — ED ADULT NURSE NOTE - NSICDXPASTSURGICALHX_GEN_ALL_CORE_FT
Results reviewed. Unremarkable. Can discuss at next appointment.   
PAST SURGICAL HISTORY:  Cataract of left eye     History of excision of testicular mass left, 2009

## 2023-09-19 ENCOUNTER — APPOINTMENT (OUTPATIENT)
Dept: FAMILY MEDICINE | Facility: CLINIC | Age: 63
End: 2023-09-19
Payer: MEDICARE

## 2023-09-19 ENCOUNTER — NON-APPOINTMENT (OUTPATIENT)
Age: 63
End: 2023-09-19

## 2023-09-19 VITALS
WEIGHT: 315 LBS | BODY MASS INDEX: 44.1 KG/M2 | TEMPERATURE: 97.7 F | HEART RATE: 108 BPM | SYSTOLIC BLOOD PRESSURE: 124 MMHG | RESPIRATION RATE: 16 BRPM | DIASTOLIC BLOOD PRESSURE: 84 MMHG | HEIGHT: 71 IN | OXYGEN SATURATION: 97 %

## 2023-09-19 DIAGNOSIS — Z86.59 PERSONAL HISTORY OF OTHER MENTAL AND BEHAVIORAL DISORDERS: ICD-10-CM

## 2023-09-19 DIAGNOSIS — N18.31 CHRONIC KIDNEY DISEASE, STAGE 3A: ICD-10-CM

## 2023-09-19 DIAGNOSIS — I82.419 ACUTE EMBOLISM AND THROMBOSIS OF UNSPECIFIED FEMORAL VEIN: ICD-10-CM

## 2023-09-19 DIAGNOSIS — I10 ESSENTIAL (PRIMARY) HYPERTENSION: ICD-10-CM

## 2023-09-19 DIAGNOSIS — Z23 ENCOUNTER FOR IMMUNIZATION: ICD-10-CM

## 2023-09-19 DIAGNOSIS — E66.01 MORBID (SEVERE) OBESITY DUE TO EXCESS CALORIES: ICD-10-CM

## 2023-09-19 DIAGNOSIS — N18.32 CHRONIC KIDNEY DISEASE, STAGE 3B: ICD-10-CM

## 2023-09-19 DIAGNOSIS — Z76.89 PERSONS ENCOUNTERING HEALTH SERVICES IN OTHER SPECIFIED CIRCUMSTANCES: ICD-10-CM

## 2023-09-19 DIAGNOSIS — M19.90 UNSPECIFIED OSTEOARTHRITIS, UNSPECIFIED SITE: ICD-10-CM

## 2023-09-19 DIAGNOSIS — Z00.00 ENCOUNTER FOR GENERAL ADULT MEDICAL EXAMINATION W/OUT ABNORMAL FINDINGS: ICD-10-CM

## 2023-09-19 DIAGNOSIS — E11.9 TYPE 2 DIABETES MELLITUS W/OUT COMPLICATIONS: ICD-10-CM

## 2023-09-19 DIAGNOSIS — E78.5 HYPERLIPIDEMIA, UNSPECIFIED: ICD-10-CM

## 2023-09-19 PROCEDURE — G0008: CPT

## 2023-09-19 PROCEDURE — 90686 IIV4 VACC NO PRSV 0.5 ML IM: CPT

## 2023-09-19 PROCEDURE — G0439: CPT

## 2023-09-19 PROCEDURE — 99204 OFFICE O/P NEW MOD 45 MIN: CPT | Mod: 25

## 2023-09-19 RX ORDER — LINACLOTIDE 290 UG/1
290 CAPSULE, GELATIN COATED ORAL
Qty: 30 | Refills: 3 | Status: DISCONTINUED | COMMUNITY
Start: 2020-09-14 | End: 2023-09-19

## 2023-09-19 RX ORDER — INSULIN ASPART 100 [IU]/ML
100 INJECTION, SOLUTION INTRAVENOUS; SUBCUTANEOUS
Qty: 15 | Refills: 5 | Status: DISCONTINUED | COMMUNITY
Start: 2022-11-05 | End: 2023-09-19

## 2023-09-19 RX ORDER — ZIPRASIDONE HYDROCHLORIDE 80 MG/1
80 CAPSULE ORAL TWICE DAILY
Refills: 0 | Status: DISCONTINUED | COMMUNITY
End: 2023-09-19

## 2023-09-19 RX ORDER — POLYETHYLENE GLYCOL 3350 17 G/17G
17 POWDER, FOR SOLUTION ORAL
Qty: 90 | Refills: 0 | Status: DISCONTINUED | COMMUNITY
Start: 2018-03-12 | End: 2023-09-19

## 2023-09-21 ENCOUNTER — APPOINTMENT (OUTPATIENT)
Dept: UROLOGY | Facility: CLINIC | Age: 63
End: 2023-09-21

## 2023-09-22 ENCOUNTER — APPOINTMENT (OUTPATIENT)
Dept: ORTHOPEDIC SURGERY | Facility: CLINIC | Age: 63
End: 2023-09-22

## 2023-09-27 ENCOUNTER — APPOINTMENT (OUTPATIENT)
Dept: NEPHROLOGY | Facility: CLINIC | Age: 63
End: 2023-09-27

## 2023-09-28 ENCOUNTER — APPOINTMENT (OUTPATIENT)
Dept: FAMILY MEDICINE | Facility: CLINIC | Age: 63
End: 2023-09-28

## 2023-09-28 LAB
25(OH)D3 SERPL-MCNC: 61.5 NG/ML
ALBUMIN SERPL ELPH-MCNC: 4.2 G/DL
ALP BLD-CCNC: 94 U/L
ALT SERPL-CCNC: 22 U/L
ANION GAP SERPL CALC-SCNC: 13 MMOL/L
APPEARANCE: CLEAR
AST SERPL-CCNC: 17 U/L
BASOPHILS # BLD AUTO: 0.03 K/UL
BASOPHILS NFR BLD AUTO: 0.5 %
BILIRUB SERPL-MCNC: 0.5 MG/DL
BILIRUBIN URINE: NEGATIVE
BLOOD URINE: NEGATIVE
BUN SERPL-MCNC: 37 MG/DL
CALCIUM SERPL-MCNC: 10.1 MG/DL
CHLORIDE SERPL-SCNC: 107 MMOL/L
CHOLEST SERPL-MCNC: 120 MG/DL
CO2 SERPL-SCNC: 23 MMOL/L
COLOR: YELLOW
CREAT SERPL-MCNC: 2.06 MG/DL
EGFR: 36 ML/MIN/1.73M2
EOSINOPHIL # BLD AUTO: 0.15 K/UL
EOSINOPHIL NFR BLD AUTO: 2.4 %
ESTIMATED AVERAGE GLUCOSE: 194 MG/DL
FOLATE SERPL-MCNC: 9.5 NG/ML
GLUCOSE QUALITATIVE U: NEGATIVE MG/DL
GLUCOSE SERPL-MCNC: 143 MG/DL
HBA1C MFR BLD HPLC: 8.4 %
HCT VFR BLD CALC: 45 %
HCV AB SER QL: NONREACTIVE
HCV S/CO RATIO: 0.13 S/CO
HDLC SERPL-MCNC: 38 MG/DL
HGB BLD-MCNC: 14.4 G/DL
HIV1+2 AB SPEC QL IA.RAPID: NONREACTIVE
IMM GRANULOCYTES NFR BLD AUTO: 1.4 %
KETONES URINE: NEGATIVE MG/DL
LDLC SERPL CALC-MCNC: 58 MG/DL
LEUKOCYTE ESTERASE URINE: NEGATIVE
LYMPHOCYTES # BLD AUTO: 2.1 K/UL
LYMPHOCYTES NFR BLD AUTO: 32.9 %
MAN DIFF?: NORMAL
MCHC RBC-ENTMCNC: 29.1 PG
MCHC RBC-ENTMCNC: 32 GM/DL
MCV RBC AUTO: 90.9 FL
MONOCYTES # BLD AUTO: 0.75 K/UL
MONOCYTES NFR BLD AUTO: 11.8 %
NEUTROPHILS # BLD AUTO: 3.26 K/UL
NEUTROPHILS NFR BLD AUTO: 51 %
NITRITE URINE: NEGATIVE
NONHDLC SERPL-MCNC: 83 MG/DL
PH URINE: 6.5
PLATELET # BLD AUTO: 127 K/UL
POTASSIUM SERPL-SCNC: 4.3 MMOL/L
PROT SERPL-MCNC: 7.3 G/DL
PROTEIN URINE: NEGATIVE MG/DL
PSA SERPL-MCNC: 0.3 NG/ML
RBC # BLD: 4.95 M/UL
RBC # FLD: 14 %
SODIUM SERPL-SCNC: 143 MMOL/L
SPECIFIC GRAVITY URINE: 1.01
TRIGL SERPL-MCNC: 145 MG/DL
TSH SERPL-ACNC: 0.32 UIU/ML
UROBILINOGEN URINE: 0.2 MG/DL
VIT B12 SERPL-MCNC: 580 PG/ML
WBC # FLD AUTO: 6.38 K/UL

## 2023-10-11 DIAGNOSIS — Z86.39 PERSONAL HISTORY OF OTHER ENDOCRINE, NUTRITIONAL AND METABOLIC DISEASE: ICD-10-CM

## 2023-10-11 RX ORDER — UBIDECARENONE/VIT E ACET 100MG-5
25 MCG CAPSULE ORAL
Qty: 30 | Refills: 6 | Status: ACTIVE | COMMUNITY
Start: 2023-10-11 | End: 1900-01-01

## 2023-10-12 ENCOUNTER — APPOINTMENT (OUTPATIENT)
Dept: ORTHOPEDIC SURGERY | Facility: CLINIC | Age: 63
End: 2023-10-12
Payer: MEDICARE

## 2023-10-12 DIAGNOSIS — M17.0 BILATERAL PRIMARY OSTEOARTHRITIS OF KNEE: ICD-10-CM

## 2023-10-12 PROCEDURE — 99441: CPT | Mod: 95

## 2023-10-15 NOTE — ED ADULT NURSE NOTE - INTEGUMENTARY WDL
Goal Outcome Evaluation:  Patient denies pain at rest. Some slight pain with dressing change but declined medication.  Appetite very poor. Declined lunch and supper meal. Decided to have a few bites of sandwich and pudding at bedtime. Supplemental drinks for wound healing.  TL PICC line-IV Flagyl and Zosyn. Afebrile. Monitoring VS. Bps soft at times when patient at rest.   K, Mg, and Phos protocols-replaced and rechecks ordered for am.  Rectal tube and motta catheter in place. Rectal tube leaking small amounts at times.  Dressing change to perineum, buttocks and left leg completed at 1745. Cleansed with Vashe.  Encourage cough and deep breath. IS at bedside-patient able to get to 750. Encourage more frequent use.  Bedrest with repositioning and pillow support.                         Color consistent with ethnicity/race, warm, dry intact, resilient.

## 2023-10-24 NOTE — ED ADULT TRIAGE NOTE - HEART RATE (BEATS/MIN)
Received fax request from [facility] requesting refill(s) for ketamine HCl POWD marcellus    Last refilled on 09/18/23    Pt last seen on 05/18/23  Next appt scheduled for 11/13/23    Will facilitate refill.    
95

## 2023-11-01 ENCOUNTER — APPOINTMENT (OUTPATIENT)
Dept: ENDOCRINOLOGY | Facility: CLINIC | Age: 63
End: 2023-11-01

## 2023-11-10 NOTE — DISCHARGE NOTE NURSING/CASE MANAGEMENT/SOCIAL WORK - NSTRANSFERBELONGINGSDISPO_GEN_A_NUR
From: Santhosh Hunter  To: Lupe Melendez  Sent: 11/10/2023 8:04 AM CST  Subject: Physical     I messed up and never scheduled my wellness exam .. if I don't get one by November 29th my health insurance is going up $70 A month.. can you please get me in ??   
with patient

## 2023-11-14 ENCOUNTER — APPOINTMENT (OUTPATIENT)
Dept: ENDOCRINOLOGY | Facility: CLINIC | Age: 63
End: 2023-11-14

## 2023-11-15 ENCOUNTER — APPOINTMENT (OUTPATIENT)
Dept: NEPHROLOGY | Facility: CLINIC | Age: 63
End: 2023-11-15

## 2023-12-19 ENCOUNTER — APPOINTMENT (OUTPATIENT)
Dept: CARDIOLOGY | Facility: CLINIC | Age: 63
End: 2023-12-19

## 2024-01-05 NOTE — BEHAVIORAL HEALTH ASSESSMENT NOTE - VIOLENCE PROTECTIVE FACTORS:
62 yo M with PMHx of HTN, CAD w/ 1 stent in 2009, ICH (2008) presented on 11/1 with abn ekg. Patient presented to [facility] where he was found to have STEMI, recommended to get cath however patient did not want to get it there so he left and came here.   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  Will need to start on valganciclovir when able X 6 months.    - Toxo -/-: none required  - PJP ppx: will introduce eventually   - Trush ppx: eventual Nystatin.  - donor HCV TIM( -) but HCV antibody +.  -     #pretransplant E faecalis bacteremia in c/o colonoscopy  completed therapy    leukocytosis:  sent blood cultures x2 sets and no growth   Chest CT scan no source of infection  repeat RVP swab tonight or tomorrow  check CMV viral load  trend WBC  monitor off anti infectives at this point            Chao Peters MD  Can be called via Teams  After 5pm/weekends [phone]     60 yo M with PMHx of HTN, CAD w/ 1 stent in 2009, ICH (2008) presented on 11/1 with abn ekg. Patient presented to [facility] where he was found to have STEMI, recommended to get cath however patient did not want to get it there so he left and came here.   EKG here with ST depression in lateral leads and elevation in anterior leads   Prior to C found to be tachycardic, dyspneic, intubated   [facility] 11/1 with chronic total occlusion of LAD and RCA, with elevated RA and PA pressures  TTE 11/1 with severely decreased EF 32%, s/p IABP 11/1        #s/p OHT 12/25/23 CMV D-/R+; Toxo D-/R-  Induction simulect and MPN  Maintenance tacro/MMF  ·  Plan: - CMV -/+: intermediate risk.  Will need to start on valganciclovir when able X 6 months.    - Toxo -/-: none required  - PJP ppx: will introduce eventually   - Trush ppx: eventual Nystatin.  - donor HCV TIM( -) but HCV antibody +.  -     #pretransplant E faecalis bacteremia in c/o colonoscopy  completed therapy    leukocytosis:  sent blood cultures x2 sets and no growth   Chest CT scan no source of infection  repeat RVP swab tonight or tomorrow  check CMV viral load  trend WBC  monitor off anti infectives at this point            Chao Peters MD  Can be called via Teams  After 5pm/weekends [phone]     Residential stability/Sobriety/Engagement in treatment/Affective Stability

## 2024-02-05 NOTE — ED PROVIDER NOTE - IV ALTEPLASE EXCL ABS HIDDEN
[phone]    show

## 2024-02-13 ENCOUNTER — APPOINTMENT (OUTPATIENT)
Age: 64
End: 2024-02-13

## 2024-02-29 ENCOUNTER — APPOINTMENT (OUTPATIENT)
Age: 64
End: 2024-02-29

## 2024-03-01 NOTE — REASON FOR VISIT
2/29/24 2138)                Medical Decision Making  Kyaw Vargas Mikey Presley NP  02/29/24 8000     [CV Risk Factors and Non-Cardiac Disease] : CV risk factors and non-cardiac disease [Other: ____] : [unfilled]

## 2024-03-13 ENCOUNTER — APPOINTMENT (OUTPATIENT)
Age: 64
End: 2024-03-13

## 2024-03-25 NOTE — ED PROVIDER NOTE - MUSCULOSKELETAL, MLM
Rapid Flu negative    Rapid COVID negative    This is likely smoker's cough and acute bronchitis. Will send albuterol inhaler to use every 6 hrs as needed for chest tightness    Smoking Cessation counseling done. Pt not ready to quit. Advised of the Smoking Cessation program    Medrol 4mg dose pack as directed    Tessalon perle cough capsules every 8 hrs as needed for cough  
Spine appears normal, range of motion is not limited, no muscle or joint tenderness

## 2024-04-23 ENCOUNTER — APPOINTMENT (OUTPATIENT)
Age: 64
End: 2024-04-23

## 2024-05-07 NOTE — ED ADULT TRIAGE NOTE - HEIGHT IN FEET
Called the insurance company. This was already put through and it was paid out by insurance on 05/05.   5

## 2024-05-21 ENCOUNTER — OUTPATIENT (OUTPATIENT)
Dept: OUTPATIENT SERVICES | Facility: HOSPITAL | Age: 64
LOS: 1 days | End: 2024-05-21
Payer: MEDICARE

## 2024-05-21 ENCOUNTER — APPOINTMENT (OUTPATIENT)
Age: 64
End: 2024-05-21
Payer: MEDICAID

## 2024-05-21 DIAGNOSIS — Z98.890 OTHER SPECIFIED POSTPROCEDURAL STATES: Chronic | ICD-10-CM

## 2024-05-21 DIAGNOSIS — H26.9 UNSPECIFIED CATARACT: Chronic | ICD-10-CM

## 2024-05-21 PROCEDURE — ZZZZZ: CPT

## 2024-05-21 PROCEDURE — D0210: CPT

## 2024-05-21 PROCEDURE — D1110: CPT

## 2024-05-21 PROCEDURE — D0120: CPT

## 2024-05-23 NOTE — ED PROVIDER NOTE - QRS
On max dose enalapril. Increase coreg to 50mg BID   120 Yes PAST SURGICAL HISTORY:  History of ankle surgery right ankle 2/2020    S/P cholecystectomy     S/P hip replacement left and right    S/P tubal ligation

## 2024-06-11 NOTE — ED ADULT NURSE NOTE - ISOLATION TYPE:

## 2024-06-25 ENCOUNTER — EMERGENCY (EMERGENCY)
Facility: HOSPITAL | Age: 64
LOS: 1 days | Discharge: NURSING, REHAB OR LONG-TERM CARE | End: 2024-06-25
Attending: EMERGENCY MEDICINE | Admitting: EMERGENCY MEDICINE
Payer: MEDICARE

## 2024-06-25 VITALS
RESPIRATION RATE: 18 BRPM | SYSTOLIC BLOOD PRESSURE: 110 MMHG | HEART RATE: 87 BPM | OXYGEN SATURATION: 95 % | TEMPERATURE: 98 F | WEIGHT: 315 LBS | DIASTOLIC BLOOD PRESSURE: 74 MMHG | HEIGHT: 71 IN

## 2024-06-25 VITALS
DIASTOLIC BLOOD PRESSURE: 72 MMHG | HEART RATE: 92 BPM | OXYGEN SATURATION: 97 % | SYSTOLIC BLOOD PRESSURE: 131 MMHG | RESPIRATION RATE: 17 BRPM

## 2024-06-25 DIAGNOSIS — H26.9 UNSPECIFIED CATARACT: Chronic | ICD-10-CM

## 2024-06-25 DIAGNOSIS — F43.20 ADJUSTMENT DISORDER, UNSPECIFIED: ICD-10-CM

## 2024-06-25 DIAGNOSIS — Z98.890 OTHER SPECIFIED POSTPROCEDURAL STATES: Chronic | ICD-10-CM

## 2024-06-25 LAB
ALBUMIN SERPL ELPH-MCNC: 3.2 G/DL — LOW (ref 3.3–5)
ALP SERPL-CCNC: 86 U/L — SIGNIFICANT CHANGE UP (ref 30–120)
ALT FLD-CCNC: 31 U/L — SIGNIFICANT CHANGE UP (ref 10–60)
AMPHET UR-MCNC: NEGATIVE — SIGNIFICANT CHANGE UP
ANION GAP SERPL CALC-SCNC: 12 MMOL/L — SIGNIFICANT CHANGE UP (ref 5–17)
APAP SERPL-MCNC: <1 UG/ML — LOW (ref 10–30)
APPEARANCE UR: CLEAR — SIGNIFICANT CHANGE UP
AST SERPL-CCNC: 17 U/L — SIGNIFICANT CHANGE UP (ref 10–40)
BARBITURATES UR SCN-MCNC: NEGATIVE — SIGNIFICANT CHANGE UP
BASOPHILS # BLD AUTO: 0.03 K/UL — SIGNIFICANT CHANGE UP (ref 0–0.2)
BASOPHILS NFR BLD AUTO: 0.5 % — SIGNIFICANT CHANGE UP (ref 0–2)
BENZODIAZ UR-MCNC: NEGATIVE — SIGNIFICANT CHANGE UP
BILIRUB SERPL-MCNC: 0.4 MG/DL — SIGNIFICANT CHANGE UP (ref 0.2–1.2)
BILIRUB UR-MCNC: NEGATIVE — SIGNIFICANT CHANGE UP
BUN SERPL-MCNC: 32 MG/DL — HIGH (ref 7–23)
CALCIUM SERPL-MCNC: 9.3 MG/DL — SIGNIFICANT CHANGE UP (ref 8.4–10.5)
CHLORIDE SERPL-SCNC: 100 MMOL/L — SIGNIFICANT CHANGE UP (ref 96–108)
CO2 SERPL-SCNC: 26 MMOL/L — SIGNIFICANT CHANGE UP (ref 22–31)
COCAINE METAB.OTHER UR-MCNC: NEGATIVE — SIGNIFICANT CHANGE UP
COLOR SPEC: YELLOW — SIGNIFICANT CHANGE UP
CREAT SERPL-MCNC: 1.97 MG/DL — HIGH (ref 0.5–1.3)
DIFF PNL FLD: NEGATIVE — SIGNIFICANT CHANGE UP
EGFR: 38 ML/MIN/1.73M2 — LOW
EOSINOPHIL # BLD AUTO: 0.53 K/UL — HIGH (ref 0–0.5)
EOSINOPHIL NFR BLD AUTO: 8.8 % — HIGH (ref 0–6)
ETHANOL SERPL-MCNC: <3 MG/DL — SIGNIFICANT CHANGE UP (ref 0–3)
GLUCOSE BLDC GLUCOMTR-MCNC: 230 MG/DL — HIGH (ref 70–99)
GLUCOSE BLDC GLUCOMTR-MCNC: 323 MG/DL — HIGH (ref 70–99)
GLUCOSE BLDC GLUCOMTR-MCNC: 387 MG/DL — HIGH (ref 70–99)
GLUCOSE SERPL-MCNC: 373 MG/DL — HIGH (ref 70–99)
GLUCOSE UR QL: 250 MG/DL
HCT VFR BLD CALC: 39.3 % — SIGNIFICANT CHANGE UP (ref 39–50)
HGB BLD-MCNC: 12.6 G/DL — LOW (ref 13–17)
IMM GRANULOCYTES NFR BLD AUTO: 1.2 % — HIGH (ref 0–0.9)
KETONES UR-MCNC: NEGATIVE MG/DL — SIGNIFICANT CHANGE UP
LEUKOCYTE ESTERASE UR-ACNC: NEGATIVE — SIGNIFICANT CHANGE UP
LYMPHOCYTES # BLD AUTO: 1.77 K/UL — SIGNIFICANT CHANGE UP (ref 1–3.3)
LYMPHOCYTES # BLD AUTO: 29.5 % — SIGNIFICANT CHANGE UP (ref 13–44)
MCHC RBC-ENTMCNC: 28.7 PG — SIGNIFICANT CHANGE UP (ref 27–34)
MCHC RBC-ENTMCNC: 32.1 GM/DL — SIGNIFICANT CHANGE UP (ref 32–36)
MCV RBC AUTO: 89.5 FL — SIGNIFICANT CHANGE UP (ref 80–100)
METHADONE UR-MCNC: NEGATIVE — SIGNIFICANT CHANGE UP
MONOCYTES # BLD AUTO: 0.7 K/UL — SIGNIFICANT CHANGE UP (ref 0–0.9)
MONOCYTES NFR BLD AUTO: 11.7 % — SIGNIFICANT CHANGE UP (ref 2–14)
NEUTROPHILS # BLD AUTO: 2.9 K/UL — SIGNIFICANT CHANGE UP (ref 1.8–7.4)
NEUTROPHILS NFR BLD AUTO: 48.3 % — SIGNIFICANT CHANGE UP (ref 43–77)
NITRITE UR-MCNC: NEGATIVE — SIGNIFICANT CHANGE UP
NRBC # BLD: 0 /100 WBCS — SIGNIFICANT CHANGE UP (ref 0–0)
OPIATES UR-MCNC: NEGATIVE — SIGNIFICANT CHANGE UP
PCP SPEC-MCNC: SIGNIFICANT CHANGE UP
PCP UR-MCNC: NEGATIVE — SIGNIFICANT CHANGE UP
PH UR: 6.5 — SIGNIFICANT CHANGE UP (ref 5–8)
PLATELET # BLD AUTO: 152 K/UL — SIGNIFICANT CHANGE UP (ref 150–400)
POTASSIUM SERPL-MCNC: 4.4 MMOL/L — SIGNIFICANT CHANGE UP (ref 3.5–5.3)
POTASSIUM SERPL-SCNC: 4.4 MMOL/L — SIGNIFICANT CHANGE UP (ref 3.5–5.3)
PROT SERPL-MCNC: 7.2 G/DL — SIGNIFICANT CHANGE UP (ref 6–8.3)
PROT UR-MCNC: NEGATIVE MG/DL — SIGNIFICANT CHANGE UP
RBC # BLD: 4.39 M/UL — SIGNIFICANT CHANGE UP (ref 4.2–5.8)
RBC # FLD: 13.2 % — SIGNIFICANT CHANGE UP (ref 10.3–14.5)
SALICYLATES SERPL-MCNC: 1.3 MG/DL — LOW (ref 2.8–20)
SARS-COV-2 RNA SPEC QL NAA+PROBE: SIGNIFICANT CHANGE UP
SODIUM SERPL-SCNC: 138 MMOL/L — SIGNIFICANT CHANGE UP (ref 135–145)
SP GR SPEC: 1.01 — SIGNIFICANT CHANGE UP (ref 1–1.03)
THC UR QL: NEGATIVE — SIGNIFICANT CHANGE UP
UROBILINOGEN FLD QL: 0.2 MG/DL — SIGNIFICANT CHANGE UP (ref 0.2–1)
WBC # BLD: 6 K/UL — SIGNIFICANT CHANGE UP (ref 3.8–10.5)
WBC # FLD AUTO: 6 K/UL — SIGNIFICANT CHANGE UP (ref 3.8–10.5)

## 2024-06-25 PROCEDURE — 80307 DRUG TEST PRSMV CHEM ANLYZR: CPT

## 2024-06-25 PROCEDURE — 80053 COMPREHEN METABOLIC PANEL: CPT

## 2024-06-25 PROCEDURE — 99285 EMERGENCY DEPT VISIT HI MDM: CPT | Mod: 25

## 2024-06-25 PROCEDURE — 82962 GLUCOSE BLOOD TEST: CPT

## 2024-06-25 PROCEDURE — 85025 COMPLETE CBC W/AUTO DIFF WBC: CPT

## 2024-06-25 PROCEDURE — 93005 ELECTROCARDIOGRAM TRACING: CPT

## 2024-06-25 PROCEDURE — 96360 HYDRATION IV INFUSION INIT: CPT

## 2024-06-25 PROCEDURE — 99285 EMERGENCY DEPT VISIT HI MDM: CPT

## 2024-06-25 PROCEDURE — 87635 SARS-COV-2 COVID-19 AMP PRB: CPT

## 2024-06-25 PROCEDURE — 93010 ELECTROCARDIOGRAM REPORT: CPT

## 2024-06-25 PROCEDURE — 36415 COLL VENOUS BLD VENIPUNCTURE: CPT

## 2024-06-25 PROCEDURE — 81003 URINALYSIS AUTO W/O SCOPE: CPT

## 2024-06-25 PROCEDURE — 96361 HYDRATE IV INFUSION ADD-ON: CPT

## 2024-06-25 RX ORDER — ACETAMINOPHEN 500 MG
650 TABLET ORAL ONCE
Refills: 0 | Status: COMPLETED | OUTPATIENT
Start: 2024-06-25 | End: 2024-06-25

## 2024-06-25 RX ORDER — SODIUM CHLORIDE 9 MG/ML
1000 INJECTION INTRAMUSCULAR; INTRAVENOUS; SUBCUTANEOUS ONCE
Refills: 0 | Status: COMPLETED | OUTPATIENT
Start: 2024-06-25 | End: 2024-06-25

## 2024-06-25 RX ORDER — HUMAN INSULIN 100 [IU]/ML
8 INJECTION, SUSPENSION SUBCUTANEOUS ONCE
Refills: 0 | Status: COMPLETED | OUTPATIENT
Start: 2024-06-25 | End: 2024-06-25

## 2024-06-25 RX ORDER — HUMAN INSULIN 100 [IU]/ML
6 INJECTION, SUSPENSION SUBCUTANEOUS ONCE
Refills: 0 | Status: DISCONTINUED | OUTPATIENT
Start: 2024-06-25 | End: 2024-06-25

## 2024-06-25 RX ADMIN — SODIUM CHLORIDE 1000 MILLILITER(S): 9 INJECTION INTRAMUSCULAR; INTRAVENOUS; SUBCUTANEOUS at 16:04

## 2024-06-25 RX ADMIN — Medication 650 MILLIGRAM(S): at 16:47

## 2024-06-25 RX ADMIN — SODIUM CHLORIDE 1000 MILLILITER(S): 9 INJECTION INTRAMUSCULAR; INTRAVENOUS; SUBCUTANEOUS at 17:04

## 2024-06-25 RX ADMIN — HUMAN INSULIN 8 UNIT(S): 100 INJECTION, SUSPENSION SUBCUTANEOUS at 13:48

## 2024-06-25 RX ADMIN — SODIUM CHLORIDE 1000 MILLILITER(S): 9 INJECTION INTRAMUSCULAR; INTRAVENOUS; SUBCUTANEOUS at 14:45

## 2024-06-25 RX ADMIN — Medication 650 MILLIGRAM(S): at 17:45

## 2024-06-25 RX ADMIN — SODIUM CHLORIDE 1000 MILLILITER(S): 9 INJECTION INTRAMUSCULAR; INTRAVENOUS; SUBCUTANEOUS at 13:45

## 2024-06-25 NOTE — ED ADULT NURSE NOTE - OBJECTIVE STATEMENT
Pt brought in from [facility] for tom cruz, Pt stated he told his psychiatrist he was going to either drink Lye or stab himself. He said he didn't mean it and is just unhappy with his new transition from home to a facility. During assessment pt is pleasant and cooperative and says he denies any suicidal ideation or homicidal ideation and was just unhappy and said it.

## 2024-06-25 NOTE — ED BEHAVIORAL HEALTH ASSESSMENT NOTE - SUMMARY
64M, living in nursing home x 1 year, PMH of DM2, HTN, psych hx of bipolar, multiple prior hospitalizations ([facility] years ago, was in ED two weeks ago for SI), no suicide attempts, no substance use, sees therapist at [facility], now BIBEMS activated by residence after he made suicidal statement to therapist.     While suicidal statement is concerning, he denies any actual plan or intent, reports that he made statement out of frustration. He has no prior attempts, no access to firearms, in the ED was calm and in good behavioral control with no signs of psychosis or impulsivity, requesting discharge. He does not appear to meet criteria for involuntary admission at this time (we have not been able to reach collateral but they did not call back) and he is therefore psychiatrically cleared for discharge

## 2024-06-25 NOTE — ED PROVIDER NOTE - CARE PLAN
Normal vision: sees adequately in most situations; can see medication labels, newsprint 1 Principal Discharge DX:	Suicidal ideation  Secondary Diagnosis:	Type 2 diabetes mellitus

## 2024-06-25 NOTE — ED PROVIDER NOTE - CARE PROVIDER_API CALL
Spoke with danilo's wife, Richelle. Richelle states patient is not available at this time but will take down information. Conveyed information below. Richelle verbalizes understanding. No further questions at this time.     Printed and sent letter with Cholesterol education.    Chas Dawson  Internal Medicine  [address] [place] [id number]  Phone: [phone]  Fax: [phone]  Established Patient  Follow Up Time: 1-3 Days

## 2024-06-25 NOTE — ED BEHAVIORAL HEALTH ASSESSMENT NOTE - HPI (INCLUDE ILLNESS QUALITY, SEVERITY, DURATION, TIMING, CONTEXT, MODIFYING FACTORS, ASSOCIATED SIGNS AND SYMPTOMS)
64M, living in nursing home x 1 year, PMH of DM2, HTN, psych hx of bipolar, multiple prior hospitalizations ([facility] years ago, was in ED two weeks ago for SI), no suicide attempts, no substance use, sees therapist at [facility], now BIBEMS activated by residence after he made suicidal statement to therapist.     Patient was calm and in good behavioral control throughout his ED stay. He states that he had to move into the nursing home a year ago after 20 years of independence because of peripheral neuropathy and pain. He states that he gets frustrated there sometimes because he doesn't have privacy, there are patients with dementia walking into his room, feels like certain staff there are not nice to him. He states that at times he can get frustrated and this happened today when talking to his therapist, so he stated that he wanted to kill himself. However he denies that he actually wanted to kill himself, denies thoughts of not wanting to live anymore, denies thoughts of killing himself, denies any method, plan, or intent, denies any prior suicide attempts, denies having access to firearms. Denies auditory, visual, or tactile hallucinations, denies paranoia. He states that he feels safe at nursing home, wants to go back there, states that he would not kill himself. Denies feeling particularly depressed, denies hopelessness, denies anhedonia, denies changes in sleep or appetite.     BTCM called nursing home but was unable to get in touch with relevant staff, left message but did not receive call back

## 2024-06-25 NOTE — ED PROVIDER NOTE - CLINICAL SUMMARY MEDICAL DECISION MAKING FREE TEXT BOX
Patient brought in by EMS from [facility] for suicidal ideation.  Per EMS report patient was threatening to kill himself by drinking bleach although he had no access to any dangerous substances.  Patient relates he has been living at [facility] for almost a year is very unhappy that he has to live in the facility he relates that patients with dementia wandering in and out of his room there is no privacy he has to use a phone at the nurses station to call people and today he called the therapist that he wanted to kill himself.  Patient relates he had thoughts that he will either drink bleach or stab himself.  Patient reports prior psychiatric admission at [facility].    Plan EKG labs telepsych eval    Differential including but not limited to depression suicidal ideation adjustment disorder

## 2024-06-25 NOTE — ED BEHAVIORAL HEALTH ASSESSMENT NOTE - OTHER PAST PSYCHIATRIC HISTORY (INCLUDE DETAILS REGARDING ONSET, COURSE OF ILLNESS, INPATIENT/OUTPATIENT TREATMENT)
As above. States that 2 weeks ago he made similar statements out of frustration, reports that he was being discharged from ED

## 2024-06-25 NOTE — ED BEHAVIORAL HEALTH ASSESSMENT NOTE - DOMICILED WITH
Other Pt BIBA for CHF exacerbation. Pt on 100% nonrebreather @this time for tachypnea and STILL. Hx of CHF, controlled with PO Lasix at home. Chest XRAY performed.IV20G to RAC flushing well. No CP/fever/nausea. All VSS. Will cont. to monitor.

## 2024-06-25 NOTE — ED PROVIDER NOTE - PATIENT PORTAL LINK FT
You can access the FollowMyHealth Patient Portal offered by [facility] by registering at the following website: [email or web address]. By joining [facility]’s FollowMyHealth portal, you will also be able to view your health information using other applications (apps) compatible with our system.

## 2024-06-25 NOTE — ED PROVIDER NOTE - NSFOLLOWUPINSTRUCTIONS_ED_ALL_ED_FT
SUICIDE PREVENTION - Discharge Care    Suicide Prevention    WHAT YOU NEED TO KNOW:    You may see suicide as the only way to escape emotional or physical pain and suffering. Help is available from people who care about you, and from professionals trained in suicide prevention. Prevention includes everything you and others can do to stop you from taking your life.    DISCHARGE INSTRUCTIONS:    Call your local emergency number (911 in the [place]), or ask someone to call if:    You do something on purpose to hurt yourself.    You make a plan to attempt suicide.  Call your doctor or therapist, or have someone close to you call if:    You act out in anger, are reckless, or are abusing alcohol or drugs.    You have serious thoughts of suicide, even with treatment.    You have more thoughts of suicide when you are alone.    You stop eating, or you begin to smoke cigarettes or drink alcohol.    You have questions or concerns about your condition or care.  Warning signs of suicide: The following can help you and others recognize that you are struggling:    Talking about your plan for attempting suicide, or wanting to read or write about death or suicide    Cutting yourself, burning your skin with cigarettes, or driving recklessly    Drug or alcohol use, not taking your prescribed medicine, or taking too much    Not wanting to spend time with others or doing things you enjoy, feeling bored, or not wanting anyone to praise you    Changes in your appetite, sleep habits, energy levels, or weight    Feeling angry, or lashing out at others    A need to give away or throw away your belongings    Often skipping work    Suddenly not taking medicine for a mental illness without talking to your healthcare provider    Suddenly not going to therapy  The following are always available to help you:    Contact a suicide prevention organization:    For the [facility] Suicide and Crisis Lifeline:  Call or text [facility]    Send a chat on [email or web address]    Call 1-291.967.7556 (1-800-273-TALK)    For the Suicide Hotline, call 1-919.140.6415 (9-085-QOVXBUG)  What to do if you are having thoughts of suicide: Call your local emergency number (911 in the ).    Talk to someone you trust. Be honest about your thoughts and feelings about suicide. You can call a suicide prevention center if you do not want to talk to someone you know.    Contact your therapist. Your therapist will help you create a crisis plan to follow if you have thoughts about hurting yourself. The plan will include the names of people to call during a crisis. Share your plan with friends and family. Ask someone to stay with you if a crisis occurs. Your healthcare provider can give you a list of therapists if you do not have one.    Ask someone to remove items that can be harmful. Do not keep medicines, weapons, or alcohol in your home.  Treatment for suicidal thoughts:    Medicines may be given to prevent mood swings, or to decrease anxiety or depression. You will need to take all medicines as directed. Do not stop taking your medicine without talking to your healthcare provider. A sudden stop can be harmful. It may take 4 to 6 weeks for the medicine to help you feel better.    Suicide risk assessment means healthcare providers will ask questions about your suicide thoughts and plans. They will ask how often you think about suicide and if you have tried it before. They will ask if you have begun to hurt yourself, such as with cutting or reckless driving. They may ask if you have access to weapons or drugs.    A safety plan includes a list of people or groups to contact if you have suicidal feelings again. The list may include friends, family members, a spiritual leader, and others you trust. You may be asked to make a verbal agreement or sign a contract that you will not try to harm yourself.    A therapist can help you identify and change negative feelings or beliefs about yourself. This may help change the way you feel and act. A therapist can also help you find ways to cope with things that cannot be changed. A therapist can also help if you use alcohol or drugs and need help to quit. Drugs and alcohol can make suicidal feelings worse and make you more likely to act on them.  Positive things you can do for yourself:    Exercise as directed. Exercise can lift your mood, give you more energy, and make it easier to sleep.  Black Family Walking for Exercise      Eat a variety of healthy foods. Healthy foods include fruits, vegetables, whole-grain breads, lean meats, fish, low-fat dairy products, and beans. Try to eat regularly even if you do not feel hungry. Depression can increase from a lack of nutrition or if you are hungry for long periods of time.  Healthy Foods      Create a sleep routine. Try to go to bed and wake up at the same time every day. Let your healthcare provider know if you are having trouble sleeping.  Follow up with your doctor or therapist as directed: Write down your questions so you remember to ask them during your visits.    For support and more information:    988 Suicide and Crisis Lifeline  [address]  [place], [place]  Phone: 8-257-205  Web Address: http://www.suicidepreventionlifeline.org OR https://[employer].org/chat/  Suicide Awareness Voices of Education  [address]  [place], [place]  Phone: 1-655.977.2578  Web Address: http://www.save.org or https://save.org/find-help/international-resources/  © [employer] 1973, 2024 SUICIDE PREVENTION - Discharge Care    Suicide Prevention    WHAT YOU NEED TO KNOW:    You may see suicide as the only way to escape emotional or physical pain and suffering. Help is available from people who care about you, and from professionals trained in suicide prevention. Prevention includes everything you and others can do to stop you from taking your life.    DISCHARGE INSTRUCTIONS:    Call your local emergency number (911 in the [place]), or ask someone to call if:    You do something on purpose to hurt yourself.    You make a plan to attempt suicide.  Call your doctor or therapist, or have someone close to you call if:    You act out in anger, are reckless, or are abusing alcohol or drugs.    You have serious thoughts of suicide, even with treatment.    You have more thoughts of suicide when you are alone.    You stop eating, or you begin to smoke cigarettes or drink alcohol.    You have questions or concerns about your condition or care.  Warning signs of suicide: The following can help you and others recognize that you are struggling:    Talking about your plan for attempting suicide, or wanting to read or write about death or suicide    Cutting yourself, burning your skin with cigarettes, or driving recklessly    Drug or alcohol use, not taking your prescribed medicine, or taking too much    Not wanting to spend time with others or doing things you enjoy, feeling bored, or not wanting anyone to praise you    Changes in your appetite, sleep habits, energy levels, or weight    Feeling angry, or lashing out at others    A need to give away or throw away your belongings    Often skipping work    Suddenly not taking medicine for a mental illness without talking to your healthcare provider    Suddenly not going to therapy  The following are always available to help you:    Contact a suicide prevention organization:    For the [employer] Suicide and Crisis Lifeline:  Call or text [facility]    Send a chat on [email or web address]    Call 1-882.563.9173 (1-800-273-TALK)    For the Suicide Hotline, call 1-415.804.3941 (4-765-KAKHHDO)  What to do if you are having thoughts of suicide: Call your local emergency number (911 in the ).    Talk to someone you trust. Be honest about your thoughts and feelings about suicide. You can call a suicide prevention center if you do not want to talk to someone you know.    Contact your therapist. Your therapist will help you create a crisis plan to follow if you have thoughts about hurting yourself. The plan will include the names of people to call during a crisis. Share your plan with friends and family. Ask someone to stay with you if a crisis occurs. Your healthcare provider can give you a list of therapists if you do not have one.    Ask someone to remove items that can be harmful. Do not keep medicines, weapons, or alcohol in your home.  Treatment for suicidal thoughts:    Medicines may be given to prevent mood swings, or to decrease anxiety or depression. You will need to take all medicines as directed. Do not stop taking your medicine without talking to your healthcare provider. A sudden stop can be harmful. It may take 4 to 6 weeks for the medicine to help you feel better.    Suicide risk assessment means healthcare providers will ask questions about your suicide thoughts and plans. They will ask how often you think about suicide and if you have tried it before. They will ask if you have begun to hurt yourself, such as with cutting or reckless driving. They may ask if you have access to weapons or drugs.    A safety plan includes a list of people or groups to contact if you have suicidal feelings again. The list may include friends, family members, a spiritual leader, and others you trust. You may be asked to make a verbal agreement or sign a contract that you will not try to harm yourself.    A therapist can help you identify and change negative feelings or beliefs about yourself. This may help change the way you feel and act. A therapist can also help you find ways to cope with things that cannot be changed. A therapist can also help if you use alcohol or drugs and need help to quit. Drugs and alcohol can make suicidal feelings worse and make you more likely to act on them.  Positive things you can do for yourself:    Exercise as directed. Exercise can lift your mood, give you more energy, and make it easier to sleep.  Black Family Walking for Exercise      Eat a variety of healthy foods. Healthy foods include fruits, vegetables, whole-grain breads, lean meats, fish, low-fat dairy products, and beans. Try to eat regularly even if you do not feel hungry. Depression can increase from a lack of nutrition or if you are hungry for long periods of time.  Healthy Foods      Create a sleep routine. Try to go to bed and wake up at the same time every day. Let your healthcare provider know if you are having trouble sleeping.  Follow up with your doctor or therapist as directed: Write down your questions so you remember to ask them during your visits.    For support and more information:    070 Suicide and Crisis Lifeline  [address]  [place], [place]  Phone: 9-898-716  Web Address: http://www.suicidepreventionlifeline.org OR https://[employer]org/chat/  Suicide Awareness Voices of Education  [address]  [place], [place]  Phone: 1-753.405.2003  Web Address: http://www.save.org or https://save.org/find-help/international-resources/  © [employer] 1973, 2024    [employer]® CareNotes®  :  [facility]        DIABETIC HYPERGLYCEMIA - Discharge Care    Diabetic Hyperglycemia    WHAT YOU NEED TO KNOW:    Diabetic hyperglycemia is a blood glucose (sugar) level that is higher than your diabetes care team provider recommends. You may have more thirst and urinate more often than usual.    DISCHARGE INSTRUCTIONS:    Call your local emergency number (911 in the ) for any of the following:    You have a seizure.    You begin to breathe fast or are short of breath.    You become weak and confused.  Seek care immediately if:    Your blood sugar level is over 240 mg/dL and you have ketones in your urine.    Your breath smells fruity.    You have nausea and are vomiting.    You have symptoms of dehydration, such as dark yellow urine, dry mouth and lips, and dry skin.  Call your diabetes care team provider if:    You continue to have higher blood sugar levels than your provider recommends.    You have questions or concerns about your condition or care.  Medicines:    Medicines, such as insulin and diabetes pills, decrease blood sugar levels.    Take your medicine as directed. Contact your healthcare provider if you think your medicine is not helping or if you have side effects. Tell your provider if you are allergic to any medicine. Keep a list of the medicines, vitamins, and herbs you take. Include the amounts, and when and why you take them. Bring the list or the pill bottles to follow-up visits. Carry your medicine list with you in case of an emergency.  Manage diabetic hyperglycemia:    If you take diabetes medicine or insulin, take it as directed. Missed or wrong doses can cause your blood sugar level to go up.    Tell your diabetes care team provider if you continue to have trouble managing your blood sugar level. He or she may change the type, amount, or timing of your diabetes medicine or insulin. If you do not take diabetes medicine or insulin, you may need to start.    Work with your provider to develop a sick day plan. Illness can cause your blood sugar to rise. A sick day plan helps you control your blood sugar level when you are sick.  Prevent diabetic hyperglycemia:    Check your blood sugar levels regularly. Ask your diabetes care team provider how often to check your blood sugar and what your levels should be.  How to check your blood sugar  Continuous Glucose Monitoring       Follow your meal plan. Your blood sugar can go up if you eat a large meal or you eat more carbohydrates than recommended. Work with a dietitian to develop a meal plan that is right for you.    Exercise as directed. Physical activity, such as exercise, can help lower your blood sugar when it is high. It can also keep your blood sugar levels steady over time. Be active for at least 30 minutes, 5 days a week. Include muscle strengthening activities 2 days each week. Do not sit for longer than 30 minutes at a time. Work with your provider to create an activity plan. Children should get at least 60 minutes of physical activity each day.  Black Family Walking for Exercise      Check your ketones before exercise if your blood sugar level is above 240 mg/dL. Do not exercise if you have ketones in your urine because your blood sugar level may rise even more. Ask your healthcare provider how to lower your blood sugar when you have ketones.  Follow up with your diabetes care team provider as directed: Your provider may refer you to a dietitian. Write down your questions so you remember to ask them during your visits.    © [employer] 1973, 2024    	  Take your metformin and insulin as directed.  Stay hydrated.  Follow up with your doctor this week. Dr. Chas Dawson.  Please return to the Emergency Department immediately for any problems or concerns.

## 2024-06-25 NOTE — ED BEHAVIORAL HEALTH ASSESSMENT NOTE - SAFETY PLAN ADDT'L DETAILS
Safety plan discussed with.../Provision of National Suicide Prevention Lifeline 7-280-805-TALK (0766)

## 2024-06-25 NOTE — ED PROVIDER NOTE - PROGRESS NOTE DETAILS
d/w dr martínez psychiatry. will fax [facility] papers to him d/w dr martínez psychiatry attending who states he will clear patient to return to [facility] d/w pt plan to return to [facility], pt agrees with plan. pt st does not want to hurt himself and feels much better.

## 2024-06-25 NOTE — ED BEHAVIORAL HEALTH ASSESSMENT NOTE - SUBSTANCE USE
No acute event overnight  Patient currently on 4 L nasal cannula, s/p thoracentesis    Assessment and plan-  Pneumonia- left effusion with worsening consolidation, cannot rule out fungal infection/ malignancy, s/p thoracentesis, 1 lit removed ,patient on zosyn and azithromycin,  ID on the case  for possible fungal infection  Patient is currently on 4 L nasal cannula, titrate FiO2 down keep sats greater than 90%  History of colon cancer  History of pulmonary embolism-patient is on Eliquis at home, continue that here, switch to Lovenox since patient is n.p.o. at present  History of hypertension  Old age  Decreased mental status-monitor for now  Kidney functions are normal  Elevated BNP  No significant leukocytosis  Anemia-no signs of obvious bleeding  Patient negative for coronavirus/influenza virus/RSV  We will continue to follow closely,       Chief complaints-fatigue with shortness of breath    History of presenting illness-  Patient is a 89-year-old female past medical history of colon cancer, hypertension, pulmonary embolism who was sent because of worsening fatigue, generalized weakness, and decreased mental status.  Patient symptoms had been worsening for 1 to 2 days prior to coming to the emergency room.  Patient had been mostly bedbound, patient imaging showed pneumonia, patient was admitted and pulmonary was consulted.  Patient is a poor historian, unable to do a good review of system  No obvious nausea vomiting diarrhea constipation.      Review of systems-- all systems negative other than what I enumerated in the hpi     Physical Exam  Vitals:    09/30/22 0016 09/30/22 0413 09/30/22 0805   Temp: 98.2 °F (36.8 °C) 97.7 °F (36.5 °C) 97.5 °F (36.4 °C)   Pulse: 99 (!) 104 96   Resp: 18  18   SpO2: 95% 94% 94%   BP: (!) 146/89 (!) 148/97 (!) 146/87        General: Awake, Alert, NAD.  Head: Normocephalic, Atraumatic,  Neck: Supple, non-tender. No cervical LAD.  ENT: MMM.  Pulm: decreased air entry Bilaterally.  No crackles or wheezing. No rales .  Cardiac: S1 and S2 present. No murmurs or extra heart sounds appreciated.  Abdomen: Soft, non-tender, non-distended abdomen. + BS  Musculoskeletal: Warm, well perfused, non-tender, non-edematous LEs.  Skin: No facial rash.  Neuro: Alert and oriented. No gross motor deficits.   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       Allergies  ALLERGIES:  Patient has no known allergies.        Inpatient Medications  Current Facility-Administered Medications   Medication   • metoPROLOL (LOPRESSOR) injection 2.5 mg   • hydrALAZINE (APRESOLINE) injection 5 mg   • piperacillin-tazobactam (ZOSYN) 3.375 g in sodium chloride 0.9 % 100 mL IVPB   • azithromycin (ZITHROMAX) 500 mg in sodium chloride 0.9 % 250 mL IVPB   • [Held by provider] apixaBAN (ELIQUIS) tablet 2.5 mg   • ipratropium-albuterol (DUONEB) 0.5-2.5 (3) MG/3ML nebulizer solution 3 mL   • meclizine (ANTIVERT) tablet 25 mg   • famotidine (PEPCID) tablet 20 mg   • docusate sodium (COLACE) capsule 100 mg   • sennosides (SENOKOT) 8.8 MG/5ML syrup 5 mL   • psyllium (METAMUCIL MULTIHEALTH FIBER) 58.12 % packet 1 packet   • sodium chloride 0.9% infusion   • dextrose 50 % injection 25 g   • dextrose 50 % injection 12.5 g   • glucagon (GLUCAGEN) injection 1 mg   • dextrose (GLUTOSE) 40 % gel 15 g   • dextrose (GLUTOSE) 40 % gel 30 g   • enoxaparin (LOVENOX) injection 40 mg           Lines/Tubes/Drain      In/Out  No intake or output data in the 24 hours ending 09/30/22 0819         Labs     Recent Results (from the past 72 hour(s))   Comprehensive Metabolic Panel    Collection Time: 09/27/22  2:56 PM   Result Value Ref Range    Fasting Status      Sodium 136 135 - 145 mmol/L    Potassium 4.6 3.4 - 5.1 mmol/L    Chloride 103 97 - 110 mmol/L    Carbon Dioxide 28 21 - 32 mmol/L    Anion Gap 10 7 - 19 mmol/L    Glucose 89 70 - 99 mg/dL    BUN 13 6 - 20 mg/dL    Creatinine 0.39 (L) 0.51 - 0.95 mg/dL    Glomerular Filtration Rate >90 >=60    BUN/ Creatinine Ratio 33 (H) 7 - 25    Calcium 8.6 8.4 - 10.2 mg/dL    Bilirubin, Total 0.6 0.2 - 1.0 mg/dL    GOT/AST 31 <=37 Units/L    GPT/ALT 13 <64 Units/L    Alkaline Phosphatase 49 45 - 117 Units/L    Albumin 2.0 (L) 3.6 - 5.1 g/dL    Protein, Total 7.1 6.4 - 8.2 g/dL    Globulin 5.1 (H) 2.0 - 4.0 g/dL    A/G Ratio 0.4 (L) 1.0 - 2.4   Light Blue Top    Collection Time: 09/27/22  2:56 PM   Result Value Ref Range    Extra Tube Hold for Add Ons    CBC with Automated Differential (performable only)    Collection Time: 09/27/22  2:56 PM   Result Value Ref Range    WBC 7.2 4.2 - 11.0 K/mcL    RBC 3.99 (L) 4.00 - 5.20 mil/mcL    HGB 9.8 (L) 12.0 - 15.5 g/dL    HCT 30.5 (L) 36.0 - 46.5 %    MCV 76.4 (L) 78.0 - 100.0 fl    MCH 24.6 (L) 26.0 - 34.0 pg    MCHC 32.1 32.0 - 36.5 g/dL    RDW-CV 20.8 (H) 11.0 - 15.0 %    RDW-SD 57.3 (H) 39.0 - 50.0 fL     140 - 450 K/mcL    NRBC 0 <=0 /100 WBC    Neutrophil, Percent 75 %    Lymphocytes, Percent 18 %    Mono, Percent 7 %    Eosinophils, Percent 0 %    Basophils, Percent 0 %    Immature Granulocytes 0 %    Absolute Neutrophils 5.3 1.8 - 7.7 K/mcL    Absolute Lymphocytes 1.3 1.0 - 4.0 K/mcL    Absolute Monocytes 0.5 0.3 - 0.9 K/mcL    Absolute Eosinophils  0.0 0.0 - 0.5 K/mcL    Absolute Basophils 0.0 0.0 - 0.3 K/mcL    Absolute Immmature Granulocytes 0.0 0.0 - 0.2 K/mcL   TROPONIN I, HIGH SENSITIVITY     Collection Time: 09/27/22  2:56 PM   Result Value Ref Range    Troponin I, High Sensitivity 17 <52 ng/L   NT proBNP    Collection Time: 09/27/22  2:56 PM   Result Value Ref Range    NT-proBNP 1,270 (H) <=450 pg/mL   Urinalysis & Reflex Microscopy With Culture If Indicated    Collection Time: 09/27/22  4:17 PM   Result Value Ref Range    COLOR, URINALYSIS Yellow     APPEARANCE, URINALYSIS Clear     GLUCOSE, URINALYSIS Negative Negative mg/dL    BILIRUBIN, URINALYSIS Negative Negative    KETONES, URINALYSIS Trace (A) Negative mg/dL    SPECIFIC GRAVITY, URINALYSIS 1.020 1.005 - 1.030    OCCULT BLOOD, URINALYSIS Trace (A) Negative    PH, URINALYSIS 6.0 5.0 - 7.0    PROTEIN, URINALYSIS Trace (A) Negative mg/dL    UROBILINOGEN, URINALYSIS 0.2 0.2, 1.0 mg/dL    NITRITE, URINALYSIS Negative Negative    LEUKOCYTE ESTERASE, URINALYSIS Negative Negative    SQUAMOUS EPITHELIAL, URINALYSIS 1 to 5 None Seen, 1 to 5 /hpf    ERYTHROCYTES, URINALYSIS 3 to 5 (A) None Seen, 1 to 2 /hpf    LEUKOCYTES, URINALYSIS None Seen None Seen, 1 to 5 /hpf    BACTERIA, URINALYSIS Few (A) None Seen /hpf    HYALINE CASTS, URINALYSIS 1 to 5 None Seen, 1 to 5 /lpf    MUCUS Present    Electrocardiogram 12-Lead    Collection Time: 09/27/22  4:54 PM   Result Value Ref Range    Ventricular Rate EKG/Min (BPM) 82     Atrial Rate (BPM) 82     MO-Interval (MSEC) 130     QRS-Interval (MSEC) 74     QT-Interval (MSEC) 386     QTc 451     P Axis (Degrees) 25     R Axis (Degrees) 0     T Axis (Degrees) -29     REPORT TEXT       Sinus rhythm  with  premature supraventricular complexes  Low voltage QRS  Nonspecific T wave abnormality  Confirmed by HORACIO GASPAR MD ([id number]) on 9/28/2022 7:03:48 AM     Blood Culture    Collection Time: 09/27/22  5:50 PM    Specimen: Blood   Result Value Ref Range    Culture, Blood or Bone Marrow No Growth 2 Days.    Blood Culture    Collection Time: 09/27/22  5:55 PM    Specimen: Blood   Result Value Ref Range    Culture, Blood or  Bone Marrow No Growth 2 Days.    COVID/Flu/RSV panel    Collection Time: 09/27/22  6:50 PM   Result Value Ref Range    Rapid SARS-COV-2 by PCR Not Detected Not Detected / Detected / Presumptive Positive / Inhibitors present    Influenza A by PCR Not Detected Not Detected    Influenza B by PCR Not Detected Not Detected    RSV BY PCR Not Detected Not Detected    Isolation Guidelines      Procedural Comment     Comprehensive Metabolic Panel    Collection Time: 09/28/22  8:25 AM   Result Value Ref Range    Fasting Status      Sodium 139 135 - 145 mmol/L    Potassium 4.4 3.4 - 5.1 mmol/L    Chloride 105 97 - 110 mmol/L    Carbon Dioxide 24 21 - 32 mmol/L    Anion Gap 14 7 - 19 mmol/L    Glucose 64 (L) 70 - 99 mg/dL    BUN 14 6 - 20 mg/dL    Creatinine 0.37 (L) 0.51 - 0.95 mg/dL    Glomerular Filtration Rate >90 >=60    BUN/ Creatinine Ratio 38 (H) 7 - 25    Calcium 8.9 8.4 - 10.2 mg/dL    Bilirubin, Total 0.4 0.2 - 1.0 mg/dL    GOT/AST 31 <=37 Units/L    GPT/ALT 11 <64 Units/L    Alkaline Phosphatase 50 45 - 117 Units/L    Albumin 1.9 (L) 3.6 - 5.1 g/dL    Protein, Total 6.8 6.4 - 8.2 g/dL    Globulin 4.9 (H) 2.0 - 4.0 g/dL    A/G Ratio 0.4 (L) 1.0 - 2.4   Magnesium    Collection Time: 09/28/22  8:25 AM   Result Value Ref Range    Magnesium 2.1 1.7 - 2.4 mg/dL   Phosphorus    Collection Time: 09/28/22  8:25 AM   Result Value Ref Range    Phosphorus 3.8 2.4 - 4.7 mg/dL   CBC with Automated Differential (performable only)    Collection Time: 09/28/22  8:57 AM   Result Value Ref Range    WBC 4.3 4.2 - 11.0 K/mcL    RBC 4.37 4.00 - 5.20 mil/mcL    HGB 10.6 (L) 12.0 - 15.5 g/dL    HCT 34.7 (L) 36.0 - 46.5 %    MCV 79.4 78.0 - 100.0 fl    MCH 24.3 (L) 26.0 - 34.0 pg    MCHC 30.5 (L) 32.0 - 36.5 g/dL    RDW-CV 21.1 (H) 11.0 - 15.0 %    RDW-SD 59.7 (H) 39.0 - 50.0 fL     140 - 450 K/mcL    NRBC 0 <=0 /100 WBC    Neutrophil, Percent 71 %    Lymphocytes, Percent 19 %    Mono, Percent 8 %    Eosinophils, Percent 0 %     Basophils, Percent 1 %    Immature Granulocytes 1 %    Absolute Neutrophils 3.1 1.8 - 7.7 K/mcL    Absolute Lymphocytes 0.8 (L) 1.0 - 4.0 K/mcL    Absolute Monocytes 0.4 0.3 - 0.9 K/mcL    Absolute Eosinophils  0.0 0.0 - 0.5 K/mcL    Absolute Basophils 0.0 0.0 - 0.3 K/mcL    Absolute Immmature Granulocytes 0.0 0.0 - 0.2 K/mcL   GLUCOSE, BEDSIDE - POINT OF CARE    Collection Time: 09/28/22  4:59 PM   Result Value Ref Range    GLUCOSE, BEDSIDE - POINT OF CARE 63 (L) 70 - 99 mg/dL   GLUCOSE, BEDSIDE - POINT OF CARE    Collection Time: 09/28/22  5:46 PM   Result Value Ref Range    GLUCOSE, BEDSIDE - POINT OF CARE 97 70 - 99 mg/dL   GLUCOSE, BEDSIDE - POINT OF CARE    Collection Time: 09/28/22  6:54 PM   Result Value Ref Range    GLUCOSE, BEDSIDE - POINT OF CARE 103 (H) 70 - 99 mg/dL   GLUCOSE, BEDSIDE - POINT OF CARE    Collection Time: 09/28/22  8:01 PM   Result Value Ref Range    GLUCOSE, BEDSIDE - POINT OF CARE 94 70 - 99 mg/dL   Comprehensive Metabolic Panel    Collection Time: 09/29/22  4:19 AM   Result Value Ref Range    Fasting Status      Sodium 144 135 - 145 mmol/L    Potassium 3.7 3.4 - 5.1 mmol/L    Chloride 110 97 - 110 mmol/L    Carbon Dioxide 24 21 - 32 mmol/L    Anion Gap 14 7 - 19 mmol/L    Glucose 80 70 - 99 mg/dL    BUN 17 6 - 20 mg/dL    Creatinine 0.37 (L) 0.51 - 0.95 mg/dL    Glomerular Filtration Rate >90 >=60    BUN/ Creatinine Ratio 46 (H) 7 - 25    Calcium 8.8 8.4 - 10.2 mg/dL    Bilirubin, Total 0.3 0.2 - 1.0 mg/dL    GOT/AST 19 <=37 Units/L    GPT/ALT 11 <64 Units/L    Alkaline Phosphatase 43 (L) 45 - 117 Units/L    Albumin 1.8 (L) 3.6 - 5.1 g/dL    Protein, Total 6.2 (L) 6.4 - 8.2 g/dL    Globulin 4.4 (H) 2.0 - 4.0 g/dL    A/G Ratio 0.4 (L) 1.0 - 2.4   GLUCOSE, BEDSIDE - POINT OF CARE    Collection Time: 09/29/22  6:37 AM   Result Value Ref Range    GLUCOSE, BEDSIDE - POINT OF CARE 74 70 - 99 mg/dL   Comprehensive Metabolic Panel    Collection Time: 09/29/22  7:27 AM   Result Value Ref Range     Fasting Status      Sodium 139 135 - 145 mmol/L    Potassium 3.3 (L) 3.4 - 5.1 mmol/L    Chloride 108 97 - 110 mmol/L    Carbon Dioxide 24 21 - 32 mmol/L    Anion Gap 10 7 - 19 mmol/L    Glucose 83 70 - 99 mg/dL    BUN 17 6 - 20 mg/dL    Creatinine 0.36 (L) 0.51 - 0.95 mg/dL    Glomerular Filtration Rate >90 >=60    BUN/ Creatinine Ratio 47 (H) 7 - 25    Calcium 8.6 8.4 - 10.2 mg/dL    Bilirubin, Total 0.3 0.2 - 1.0 mg/dL    GOT/AST 15 <=37 Units/L    GPT/ALT 13 <64 Units/L    Alkaline Phosphatase 44 (L) 45 - 117 Units/L    Albumin 1.8 (L) 3.6 - 5.1 g/dL    Protein, Total 6.1 (L) 6.4 - 8.2 g/dL    Globulin 4.3 (H) 2.0 - 4.0 g/dL    A/G Ratio 0.4 (L) 1.0 - 2.4   Magnesium    Collection Time: 09/29/22  7:27 AM   Result Value Ref Range    Magnesium 1.9 1.7 - 2.4 mg/dL   Phosphorus    Collection Time: 09/29/22  7:27 AM   Result Value Ref Range    Phosphorus 3.0 2.4 - 4.7 mg/dL   CBC with Automated Differential (performable only)    Collection Time: 09/29/22  7:27 AM   Result Value Ref Range    WBC 3.4 (L) 4.2 - 11.0 K/mcL    RBC 3.40 (L) 4.00 - 5.20 mil/mcL    HGB 8.2 (L) 12.0 - 15.5 g/dL    HCT 26.6 (L) 36.0 - 46.5 %    MCV 78.2 78.0 - 100.0 fl    MCH 24.1 (L) 26.0 - 34.0 pg    MCHC 30.8 (L) 32.0 - 36.5 g/dL    RDW-CV 21.0 (H) 11.0 - 15.0 %    RDW-SD 59.7 (H) 39.0 - 50.0 fL     140 - 450 K/mcL    NRBC 0 <=0 /100 WBC    Neutrophil, Percent 77 %    Lymphocytes, Percent 15 %    Mono, Percent 8 %    Eosinophils, Percent 0 %    Basophils, Percent 0 %    Immature Granulocytes 0 %    Absolute Neutrophils 2.6 1.8 - 7.7 K/mcL    Absolute Lymphocytes 0.5 (L) 1.0 - 4.0 K/mcL    Absolute Monocytes 0.3 0.3 - 0.9 K/mcL    Absolute Eosinophils  0.0 0.0 - 0.5 K/mcL    Absolute Basophils 0.0 0.0 - 0.3 K/mcL    Absolute Immmature Granulocytes 0.0 0.0 - 0.2 K/mcL   Prothrombin Time    Collection Time: 09/29/22  8:24 AM   Result Value Ref Range    Prothrombin Time 11.5 9.7 - 11.8 sec    INR 1.1     GLUCOSE, BEDSIDE - POINT OF  CARE    Collection Time: 09/29/22 10:10 AM   Result Value Ref Range    GLUCOSE, BEDSIDE - POINT OF CARE 81 70 - 99 mg/dL   Anaerobe/Aerobe, Bacterial Culture With Gram Stain    Collection Time: 09/29/22 11:52 AM    Specimen: Chest; Pleural (Thoracentesis) Fluid   Result Value Ref Range    CULTURE WITH GRAM STAIN, ANAEROBE/AEROBE Culture in progress.     Gram Stain No organisms seen.     Gram Stain Present Polymorphonuclear cells.     Gram Stain Slide prepared by Cytospin.    LDH, Fluid    Collection Time: 09/29/22 11:52 AM   Result Value Ref Range    LDH, Fluid 161 (H) 40 - 120 Units/L   Fluid Cell Count and Differential (NON SYNOVIAL/NON CSF) (performable only)    Collection Time: 09/29/22 11:52 AM   Result Value Ref Range    Fluid Color Orange     Fluid Clarity Cloudy     Total Nucleated Cells 829 0 - 1,000 /mcL   Differential, Fluid    Collection Time: 09/29/22 11:52 AM   Result Value Ref Range    Neutrophils %, Fluid 30 %    Lymphocytes %, Fluid 56 %    Mono/Macrophages %, Fluid 13 %    Basophils %, Fluid 1 %    Total Cells Counted, Fluid 100    GLUCOSE, BEDSIDE - POINT OF CARE    Collection Time: 09/29/22  3:44 PM   Result Value Ref Range    GLUCOSE, BEDSIDE - POINT OF CARE 186 (H) 70 - 99 mg/dL   Legionella Urine Antigen    Collection Time: 09/29/22  6:37 PM    Specimen: Urine   Result Value Ref Range    LEGIONELLA URINE ANTIGEN Negative for Legionella pneumophila Serogroup 1 Negative for Legionella pneumophila Serogroup 1   Streptococcus Pneumoniae Antigen    Collection Time: 09/29/22  6:37 PM    Specimen: Urine   Result Value Ref Range    STREPTOCOCCUS PNEUMONIAE ANTIGEN Negative for Streptococcus pneumoniae antigen Negative for Streptococcus pneumoniae antigen   GLUCOSE, BEDSIDE - POINT OF CARE    Collection Time: 09/30/22 12:17 AM   Result Value Ref Range    GLUCOSE, BEDSIDE - POINT OF CARE 157 (H) 70 - 99 mg/dL   Comprehensive Metabolic Panel    Collection Time: 09/30/22  4:33 AM   Result Value Ref Range     Fasting Status      Sodium 138 135 - 145 mmol/L    Potassium 3.0 (L) 3.4 - 5.1 mmol/L    Chloride 108 97 - 110 mmol/L    Carbon Dioxide 24 21 - 32 mmol/L    Anion Gap 9 7 - 19 mmol/L    Glucose 125 (H) 70 - 99 mg/dL    BUN 18 6 - 20 mg/dL    Creatinine 0.41 (L) 0.51 - 0.95 mg/dL    Glomerular Filtration Rate 89 >=60    BUN/ Creatinine Ratio 44 (H) 7 - 25    Calcium 9.0 8.4 - 10.2 mg/dL    Bilirubin, Total 0.4 0.2 - 1.0 mg/dL    GOT/AST 17 <=37 Units/L    GPT/ALT 10 <64 Units/L    Alkaline Phosphatase 47 45 - 117 Units/L    Albumin 1.9 (L) 3.6 - 5.1 g/dL    Protein, Total 6.7 6.4 - 8.2 g/dL    Globulin 4.8 (H) 2.0 - 4.0 g/dL    A/G Ratio 0.4 (L) 1.0 - 2.4   Magnesium    Collection Time: 09/30/22  4:33 AM   Result Value Ref Range    Magnesium 1.9 1.7 - 2.4 mg/dL   Phosphorus    Collection Time: 09/30/22  4:33 AM   Result Value Ref Range    Phosphorus 2.3 (L) 2.4 - 4.7 mg/dL   CBC with Automated Differential (performable only)    Collection Time: 09/30/22  4:33 AM   Result Value Ref Range    WBC 5.7 4.2 - 11.0 K/mcL    RBC 3.90 (L) 4.00 - 5.20 mil/mcL    HGB 9.4 (L) 12.0 - 15.5 g/dL    HCT 30.0 (L) 36.0 - 46.5 %    MCV 76.9 (L) 78.0 - 100.0 fl    MCH 24.1 (L) 26.0 - 34.0 pg    MCHC 31.3 (L) 32.0 - 36.5 g/dL    RDW-CV 21.0 (H) 11.0 - 15.0 %    RDW-SD 57.2 (H) 39.0 - 50.0 fL     140 - 450 K/mcL    NRBC 0 <=0 /100 WBC    Neutrophil, Percent 87 %    Lymphocytes, Percent 8 %    Mono, Percent 5 %    Eosinophils, Percent 0 %    Basophils, Percent 0 %    Immature Granulocytes 0 %    Absolute Neutrophils 5.0 1.8 - 7.7 K/mcL    Absolute Lymphocytes 0.5 (L) 1.0 - 4.0 K/mcL    Absolute Monocytes 0.3 0.3 - 0.9 K/mcL    Absolute Eosinophils  0.0 0.0 - 0.5 K/mcL    Absolute Basophils 0.0 0.0 - 0.3 K/mcL    Absolute Immmature Granulocytes 0.0 0.0 - 0.2 K/mcL   GLUCOSE, BEDSIDE - POINT OF CARE    Collection Time: 09/30/22  6:28 AM   Result Value Ref Range    GLUCOSE, BEDSIDE - POINT OF CARE 125 (H) 70 - 99 mg/dL                  Imaging    XR CHEST PA OR AP 1 VIEW   Final Result   FINDINGS/IMPRESSION:    1.    Status post left thoracentesis   2.   There is no identifiable postprocedure pneumothorax/pneumomediastinum   3.   Reduction in the previously observed left pleural fluid density   compared to September 27, 2022 chest x-ray      LUNGS/THORAX:    Redemonstration of small nodular opacities in the right lung    Focal round moderate opacity in the left upper lobe and in the left   midlung.  And likely small residual pleural fluid density      HEART:   Mild cardiomegaly          MEDIASTINUM/HILUM:     Prominent aortic arch and upper mediastinum.             Electronically Signed by: JA LOZANO MD    Signed on: 9/29/2022 12:14 PM          US GUIDED THORACENTESIS   Final Result   Impression: Successful and uneventful ultrasound guided thoracentesis.       Electronically Signed by: RAMON ULLOA MD    Signed on: 9/29/2022 12:58 PM          CT CHEST WO CONTRAST   Final Result      Progression of a pre-existing cavitating lesion in left upper lobe since   May 2022 and development of a s similar lesion in the anterior left upper   lobe.  A long-standing area of consolidation now contains a 3rd cavity in   the same lobe.  Findings favor multifocal necrotizing pneumonia, possibly   fungal infection such as Aspergillus.  Patchy multifocal opacities in the   right lung are favored for multifocal pneumonia.  Malignancy is in the   differential but less likely.        Patient may obtain some relief from left thoracentesis.      Electronically Signed by: ROSA MOTA MD    Signed on: 9/28/2022 10:58 PM          XR CHEST PA OR AP 1 VIEW   Final Result      Left lower lobe consolidation and pleural effusion with patchy airspace   opacities in the left upper and right middle lobes.  Findings are   suspicious for pneumonia versus fluid overload.      Electronically Signed by: MICHELA LOVELL MD    Signed on: 9/27/2022 3:38 PM               Microbiology Results  (Last 10 results in the past 7 days)    Specimen   Gram Smear   Culture Result   Status       09/29/22  1152         No organisms seen.  [P]            Present Polymorphonuclear cells.  [P]            Slide prepared by [facility].  [P]                 [P] - Preliminary Result                          None known

## 2024-06-25 NOTE — ED BEHAVIORAL HEALTH ASSESSMENT NOTE - CURRENT MEDICATION
losartan 25 daily, asa 81, lipitor 40 qHS, depakote 500 ER BID, vascepa 2g BID, tamsulosin 0.4 daily, carvedilol 12.5 BID, finasteride 5 daily, miralax, metformin 500 BID, eliquis 2.5 BID, flonase 50 mcg 2 sprays each nostril, risperdal 2.5 BID, melatonin 5, humalog pen insulin 100units, sliding scale

## 2024-06-25 NOTE — ED ADULT NURSE NOTE - HPI (INCLUDE ILLNESS QUALITY, SEVERITY, DURATION, TIMING, CONTEXT, MODIFYING FACTORS, ASSOCIATED SIGNS AND SYMPTOMS)
Pt brought in from [facility] for tom cruz, Pt stated he told his psychiatrist he was going to either drink Lye or stab himself. He said he didn't mean it and is just unhappy with his new transition from home to a facility. During assessment pt is pleasant and cooperative and says he denies any suicidal ideation or homicidal ideation and was just unhappy and said it. Hx Bipolar disorder

## 2024-06-25 NOTE — ED PROVIDER NOTE - OBJECTIVE STATEMENT
Patient brought in by EMS from [facility] for suicidal ideation.  Per EMS report patient was threatening to kill himself by drinking bleach although he had no access to any dangerous substances.  Patient relates he has been living at [facility] for almost a year is very unhappy that he has to live in the facility he relates that patients with dementia wandering in and out of his room there is no privacy he has to use a phone at the nurses station to call people and today he called the therapist that he wanted to kill himself.  Patient relates he had thoughts that he will either drink bleach or stab himself.  Patient reports prior psychiatric admission at [facility].  GAIL Dawson

## 2024-06-25 NOTE — ED PROVIDER NOTE - DIFFERENTIAL DIAGNOSIS
Differential including but not limited to depression suicidal ideation adjustment disorder Differential Diagnosis

## 2024-06-25 NOTE — ED ADULT NURSE NOTE - NSFALLUNIVINTERV_ED_ALL_ED
Bed/Stretcher in lowest position, wheels locked, appropriate side rails in place/Call bell, personal items and telephone in reach/Instruct patient to call for assistance before getting out of bed/chair/stretcher/Non-slip footwear applied when patient is off stretcher/[place] to call system/Physically safe environment - no spills, clutter or unnecessary equipment/Purposeful proactive rounding/Room/bathroom lighting operational, light cord in reach

## 2024-06-25 NOTE — ED BEHAVIORAL HEALTH NOTE - BEHAVIORAL HEALTH NOTE
========================  FOR EACH COLLATERAL  ========================  Collateral below has requested that the information provided remain confidential: Yes [  ] No [ X ]  Collateral below has provided information that patient is/may be unaware of: Yes [  ] No [  X]  Patient gives permission to obtain collateral from _____:  (  ) Yes  (  X)  No  Rationale for overriding objection            (  ) Lack of capacity. Details: ________            (X  ) Assessing risk of danger to self/others. Details: ________  Rationale for selecting specific collateral source            (  ) Potential to impact risk of danger to self/others and no alternative equivalent. Details: _____  NAME: Carmelo from [facility]  NUMBER:  [phone]   RELATIONSHIP:    RELIABILITY: Reliable   COMMENTS: At first Carmelo transferred BTCM to the floor that the patient lives in. Though, no one answered the call which bounced back to the . Then, she attempted to page staff on the floor but no one responded. Lastly, Carmelo took [facility] contact information and reported that she would send staff a message to call telepsych. No one has called/unable to obtain collateral.

## 2024-06-25 NOTE — ED BEHAVIORAL HEALTH ASSESSMENT NOTE - RISK ASSESSMENT
risk factors include mood disorder, social isolation, prior hospitalizations, report of suicidal statement. protective factors include lack of prior attempts, lack of access to firearms, lack of current suicide ideation, stable housing, sobriety, lack of psychosis, identification of reason to live

## 2024-07-03 ENCOUNTER — EMERGENCY (EMERGENCY)
Facility: HOSPITAL | Age: 64
LOS: 1 days | Discharge: HOME | End: 2024-07-03
Attending: EMERGENCY MEDICINE | Admitting: EMERGENCY MEDICINE
Payer: MEDICARE

## 2024-07-03 VITALS
WEIGHT: 315 LBS | RESPIRATION RATE: 20 BRPM | HEART RATE: 95 BPM | OXYGEN SATURATION: 98 % | DIASTOLIC BLOOD PRESSURE: 83 MMHG | HEIGHT: 71 IN | TEMPERATURE: 98 F | SYSTOLIC BLOOD PRESSURE: 160 MMHG

## 2024-07-03 VITALS
HEART RATE: 104 BPM | TEMPERATURE: 98 F | DIASTOLIC BLOOD PRESSURE: 70 MMHG | RESPIRATION RATE: 20 BRPM | OXYGEN SATURATION: 99 % | SYSTOLIC BLOOD PRESSURE: 105 MMHG

## 2024-07-03 DIAGNOSIS — H26.9 UNSPECIFIED CATARACT: Chronic | ICD-10-CM

## 2024-07-03 DIAGNOSIS — Z98.890 OTHER SPECIFIED POSTPROCEDURAL STATES: Chronic | ICD-10-CM

## 2024-07-03 LAB
ALBUMIN SERPL ELPH-MCNC: 3.2 G/DL — LOW (ref 3.3–5)
ALP SERPL-CCNC: 93 U/L — SIGNIFICANT CHANGE UP (ref 30–120)
ALT FLD-CCNC: 25 U/L — SIGNIFICANT CHANGE UP (ref 10–60)
ANION GAP SERPL CALC-SCNC: 12 MMOL/L — SIGNIFICANT CHANGE UP (ref 5–17)
APAP SERPL-MCNC: <1 UG/ML — LOW (ref 10–30)
APPEARANCE UR: CLEAR — SIGNIFICANT CHANGE UP
AST SERPL-CCNC: 11 U/L — SIGNIFICANT CHANGE UP (ref 10–40)
BASOPHILS # BLD AUTO: 0.02 K/UL — SIGNIFICANT CHANGE UP (ref 0–0.2)
BASOPHILS NFR BLD AUTO: 0.3 % — SIGNIFICANT CHANGE UP (ref 0–2)
BILIRUB SERPL-MCNC: 0.5 MG/DL — SIGNIFICANT CHANGE UP (ref 0.2–1.2)
BILIRUB UR-MCNC: NEGATIVE — SIGNIFICANT CHANGE UP
BUN SERPL-MCNC: 38 MG/DL — HIGH (ref 7–23)
CALCIUM SERPL-MCNC: 9.7 MG/DL — SIGNIFICANT CHANGE UP (ref 8.4–10.5)
CHLORIDE SERPL-SCNC: 102 MMOL/L — SIGNIFICANT CHANGE UP (ref 96–108)
CO2 SERPL-SCNC: 23 MMOL/L — SIGNIFICANT CHANGE UP (ref 22–31)
COLOR SPEC: YELLOW — SIGNIFICANT CHANGE UP
CREAT SERPL-MCNC: 2.04 MG/DL — HIGH (ref 0.5–1.3)
DIFF PNL FLD: ABNORMAL
EGFR: 36 ML/MIN/1.73M2 — LOW
EGFR: 36 ML/MIN/1.73M2 — LOW
EOSINOPHIL # BLD AUTO: 0.13 K/UL — SIGNIFICANT CHANGE UP (ref 0–0.5)
EOSINOPHIL NFR BLD AUTO: 1.7 % — SIGNIFICANT CHANGE UP (ref 0–6)
ETHANOL SERPL-MCNC: <3 MG/DL — SIGNIFICANT CHANGE UP (ref 0–3)
GLUCOSE SERPL-MCNC: 379 MG/DL — HIGH (ref 70–99)
GLUCOSE UR QL: >=1000 MG/DL
HCT VFR BLD CALC: 40.2 % — SIGNIFICANT CHANGE UP (ref 39–50)
HGB BLD-MCNC: 13.3 G/DL — SIGNIFICANT CHANGE UP (ref 13–17)
IMM GRANULOCYTES NFR BLD AUTO: 0.9 % — SIGNIFICANT CHANGE UP (ref 0–0.9)
KETONES UR-MCNC: NEGATIVE MG/DL — SIGNIFICANT CHANGE UP
LEUKOCYTE ESTERASE UR-ACNC: ABNORMAL
LIDOCAIN IGE QN: 43 U/L — SIGNIFICANT CHANGE UP (ref 16–77)
LYMPHOCYTES # BLD AUTO: 0.97 K/UL — LOW (ref 1–3.3)
LYMPHOCYTES # BLD AUTO: 12.5 % — LOW (ref 13–44)
MCHC RBC-ENTMCNC: 29.5 PG — SIGNIFICANT CHANGE UP (ref 27–34)
MCHC RBC-ENTMCNC: 33.1 GM/DL — SIGNIFICANT CHANGE UP (ref 32–36)
MCV RBC AUTO: 89.1 FL — SIGNIFICANT CHANGE UP (ref 80–100)
MONOCYTES # BLD AUTO: 1.09 K/UL — HIGH (ref 0–0.9)
MONOCYTES NFR BLD AUTO: 14 % — SIGNIFICANT CHANGE UP (ref 2–14)
NEUTROPHILS # BLD AUTO: 5.5 K/UL — SIGNIFICANT CHANGE UP (ref 1.8–7.4)
NEUTROPHILS NFR BLD AUTO: 70.6 % — SIGNIFICANT CHANGE UP (ref 43–77)
NITRITE UR-MCNC: NEGATIVE — SIGNIFICANT CHANGE UP
NRBC # BLD: 0 /100 WBCS — SIGNIFICANT CHANGE UP (ref 0–0)
NRBC BLD-RTO: 0 /100 WBCS — SIGNIFICANT CHANGE UP (ref 0–0)
PCP SPEC-MCNC: SIGNIFICANT CHANGE UP
PH UR: 6.5 — SIGNIFICANT CHANGE UP (ref 5–8)
PLATELET # BLD AUTO: 134 K/UL — LOW (ref 150–400)
POTASSIUM SERPL-MCNC: 4.3 MMOL/L — SIGNIFICANT CHANGE UP (ref 3.5–5.3)
POTASSIUM SERPL-SCNC: 4.3 MMOL/L — SIGNIFICANT CHANGE UP (ref 3.5–5.3)
PROT SERPL-MCNC: 7.9 G/DL — SIGNIFICANT CHANGE UP (ref 6–8.3)
PROT UR-MCNC: NEGATIVE MG/DL — SIGNIFICANT CHANGE UP
RBC # BLD: 4.51 M/UL — SIGNIFICANT CHANGE UP (ref 4.2–5.8)
RBC # FLD: 13.2 % — SIGNIFICANT CHANGE UP (ref 10.3–14.5)
SALICYLATES SERPL-MCNC: 1.2 MG/DL — LOW (ref 2.8–20)
SODIUM SERPL-SCNC: 137 MMOL/L — SIGNIFICANT CHANGE UP (ref 135–145)
SP GR SPEC: 1.01 — SIGNIFICANT CHANGE UP (ref 1–1.03)
UROBILINOGEN FLD QL: 0.2 MG/DL — SIGNIFICANT CHANGE UP (ref 0.2–1)
VALPROATE SERPL-MCNC: 46 UG/ML — LOW (ref 50–100)
WBC # BLD: 7.98 K/UL — SIGNIFICANT CHANGE UP (ref 3.8–10.5)
WBC # FLD AUTO: 7.98 K/UL — SIGNIFICANT CHANGE UP (ref 3.8–10.5)

## 2024-07-03 PROCEDURE — 99285 EMERGENCY DEPT VISIT HI MDM: CPT

## 2024-07-04 ENCOUNTER — INPATIENT (INPATIENT)
Facility: HOSPITAL | Age: 64
LOS: 4 days | Discharge: HOME | DRG: 948 | End: 2024-07-09
Attending: INTERNAL MEDICINE | Admitting: INTERNAL MEDICINE
Payer: MEDICARE

## 2024-07-04 VITALS
WEIGHT: 315 LBS | DIASTOLIC BLOOD PRESSURE: 57 MMHG | HEIGHT: 71 IN | OXYGEN SATURATION: 91 % | RESPIRATION RATE: 18 BRPM | HEART RATE: 110 BPM | TEMPERATURE: 101 F | SYSTOLIC BLOOD PRESSURE: 93 MMHG

## 2024-07-04 DIAGNOSIS — H26.9 UNSPECIFIED CATARACT: Chronic | ICD-10-CM

## 2024-07-04 DIAGNOSIS — Z98.890 OTHER SPECIFIED POSTPROCEDURAL STATES: Chronic | ICD-10-CM

## 2024-07-04 DIAGNOSIS — R53.1 WEAKNESS: ICD-10-CM

## 2024-07-04 LAB
ALBUMIN SERPL ELPH-MCNC: 2.8 G/DL — LOW (ref 3.3–5)
ALP SERPL-CCNC: 75 U/L — SIGNIFICANT CHANGE UP (ref 30–120)
ALT FLD-CCNC: 29 U/L — SIGNIFICANT CHANGE UP (ref 10–60)
ANION GAP SERPL CALC-SCNC: 12 MMOL/L — SIGNIFICANT CHANGE UP (ref 5–17)
ANION GAP SERPL CALC-SCNC: 14 MMOL/L — SIGNIFICANT CHANGE UP (ref 5–17)
APPEARANCE UR: CLEAR — SIGNIFICANT CHANGE UP
APTT BLD: 30.3 SEC — SIGNIFICANT CHANGE UP (ref 24.5–35.6)
AST SERPL-CCNC: 50 U/L — HIGH (ref 10–40)
BASOPHILS # BLD AUTO: 0.03 K/UL — SIGNIFICANT CHANGE UP (ref 0–0.2)
BASOPHILS NFR BLD AUTO: 0.4 % — SIGNIFICANT CHANGE UP (ref 0–2)
BILIRUB SERPL-MCNC: 0.4 MG/DL — SIGNIFICANT CHANGE UP (ref 0.2–1.2)
BILIRUB UR-MCNC: NEGATIVE — SIGNIFICANT CHANGE UP
BUN SERPL-MCNC: 46 MG/DL — HIGH (ref 7–23)
BUN SERPL-MCNC: 46 MG/DL — HIGH (ref 7–23)
CALCIUM SERPL-MCNC: 8.4 MG/DL — SIGNIFICANT CHANGE UP (ref 8.4–10.5)
CALCIUM SERPL-MCNC: 8.8 MG/DL — SIGNIFICANT CHANGE UP (ref 8.4–10.5)
CHLORIDE SERPL-SCNC: 101 MMOL/L — SIGNIFICANT CHANGE UP (ref 96–108)
CHLORIDE SERPL-SCNC: 99 MMOL/L — SIGNIFICANT CHANGE UP (ref 96–108)
CK SERPL-CCNC: 3340 U/L — HIGH (ref 39–308)
CO2 SERPL-SCNC: 19 MMOL/L — LOW (ref 22–31)
CO2 SERPL-SCNC: 20 MMOL/L — LOW (ref 22–31)
COLOR SPEC: YELLOW — SIGNIFICANT CHANGE UP
CREAT SERPL-MCNC: 2.27 MG/DL — HIGH (ref 0.5–1.3)
CREAT SERPL-MCNC: 2.38 MG/DL — HIGH (ref 0.5–1.3)
DIFF PNL FLD: ABNORMAL
EGFR: 30 ML/MIN/1.73M2 — LOW
EGFR: 32 ML/MIN/1.73M2 — LOW
EOSINOPHIL # BLD AUTO: 0.01 K/UL — SIGNIFICANT CHANGE UP (ref 0–0.5)
EOSINOPHIL NFR BLD AUTO: 0.1 % — SIGNIFICANT CHANGE UP (ref 0–6)
FLUAV AG NPH QL: SIGNIFICANT CHANGE UP
FLUBV AG NPH QL: SIGNIFICANT CHANGE UP
GLUCOSE BLDC GLUCOMTR-MCNC: 404 MG/DL — HIGH (ref 70–99)
GLUCOSE SERPL-MCNC: 422 MG/DL — HIGH (ref 70–99)
GLUCOSE SERPL-MCNC: 451 MG/DL — CRITICAL HIGH (ref 70–99)
GLUCOSE UR QL: >=1000 MG/DL
HCT VFR BLD CALC: 34.6 % — LOW (ref 39–50)
HGB BLD-MCNC: 11.5 G/DL — LOW (ref 13–17)
IMM GRANULOCYTES NFR BLD AUTO: 0.5 % — SIGNIFICANT CHANGE UP (ref 0–0.9)
INR BLD: 1.26 RATIO — HIGH (ref 0.85–1.18)
KETONES UR-MCNC: NEGATIVE MG/DL — SIGNIFICANT CHANGE UP
LACTATE SERPL-SCNC: 1.9 MMOL/L — SIGNIFICANT CHANGE UP (ref 0.7–2)
LACTATE SERPL-SCNC: 3.3 MMOL/L — HIGH (ref 0.7–2)
LEUKOCYTE ESTERASE UR-ACNC: ABNORMAL
LYMPHOCYTES # BLD AUTO: 1.09 K/UL — SIGNIFICANT CHANGE UP (ref 1–3.3)
LYMPHOCYTES # BLD AUTO: 14.8 % — SIGNIFICANT CHANGE UP (ref 13–44)
MCHC RBC-ENTMCNC: 29.3 PG — SIGNIFICANT CHANGE UP (ref 27–34)
MCHC RBC-ENTMCNC: 33.2 GM/DL — SIGNIFICANT CHANGE UP (ref 32–36)
MCV RBC AUTO: 88 FL — SIGNIFICANT CHANGE UP (ref 80–100)
MONOCYTES # BLD AUTO: 1.21 K/UL — HIGH (ref 0–0.9)
MONOCYTES NFR BLD AUTO: 16.4 % — HIGH (ref 2–14)
NEUTROPHILS # BLD AUTO: 4.98 K/UL — SIGNIFICANT CHANGE UP (ref 1.8–7.4)
NEUTROPHILS NFR BLD AUTO: 67.8 % — SIGNIFICANT CHANGE UP (ref 43–77)
NITRITE UR-MCNC: NEGATIVE — SIGNIFICANT CHANGE UP
NRBC # BLD: 0 /100 WBCS — SIGNIFICANT CHANGE UP (ref 0–0)
NT-PROBNP SERPL-SCNC: 966 PG/ML — HIGH (ref 0–125)
PH UR: 6 — SIGNIFICANT CHANGE UP (ref 5–8)
PLATELET # BLD AUTO: 124 K/UL — LOW (ref 150–400)
POTASSIUM SERPL-MCNC: 4.1 MMOL/L — SIGNIFICANT CHANGE UP (ref 3.5–5.3)
POTASSIUM SERPL-MCNC: 4.2 MMOL/L — SIGNIFICANT CHANGE UP (ref 3.5–5.3)
POTASSIUM SERPL-SCNC: 4.1 MMOL/L — SIGNIFICANT CHANGE UP (ref 3.5–5.3)
POTASSIUM SERPL-SCNC: 4.2 MMOL/L — SIGNIFICANT CHANGE UP (ref 3.5–5.3)
PROT SERPL-MCNC: 7 G/DL — SIGNIFICANT CHANGE UP (ref 6–8.3)
PROT UR-MCNC: 30 MG/DL
PROTHROM AB SERPL-ACNC: 14 SEC — HIGH (ref 9.5–13)
RBC # BLD: 3.93 M/UL — LOW (ref 4.2–5.8)
RBC # FLD: 13.2 % — SIGNIFICANT CHANGE UP (ref 10.3–14.5)
RSV RNA NPH QL NAA+NON-PROBE: SIGNIFICANT CHANGE UP
SARS-COV-2 RNA SPEC QL NAA+PROBE: SIGNIFICANT CHANGE UP
SODIUM SERPL-SCNC: 132 MMOL/L — LOW (ref 135–145)
SODIUM SERPL-SCNC: 133 MMOL/L — LOW (ref 135–145)
SP GR SPEC: 1.01 — SIGNIFICANT CHANGE UP (ref 1–1.03)
TROPONIN I, HIGH SENSITIVITY RESULT: 48.7 NG/L — SIGNIFICANT CHANGE UP
UROBILINOGEN FLD QL: 0.2 MG/DL — SIGNIFICANT CHANGE UP (ref 0.2–1)
WBC # BLD: 7.36 K/UL — SIGNIFICANT CHANGE UP (ref 3.8–10.5)
WBC # FLD AUTO: 7.36 K/UL — SIGNIFICANT CHANGE UP (ref 3.8–10.5)

## 2024-07-04 PROCEDURE — 71045 X-RAY EXAM CHEST 1 VIEW: CPT | Mod: 26

## 2024-07-04 PROCEDURE — 72125 CT NECK SPINE W/O DYE: CPT | Mod: 26,MC

## 2024-07-04 PROCEDURE — 70450 CT HEAD/BRAIN W/O DYE: CPT | Mod: 26,MC

## 2024-07-04 PROCEDURE — 70486 CT MAXILLOFACIAL W/O DYE: CPT | Mod: 26,MC

## 2024-07-04 PROCEDURE — 74176 CT ABD & PELVIS W/O CONTRAST: CPT | Mod: 26,MC

## 2024-07-04 PROCEDURE — 71250 CT THORAX DX C-: CPT | Mod: 26,MC

## 2024-07-04 PROCEDURE — 93010 ELECTROCARDIOGRAM REPORT: CPT

## 2024-07-04 PROCEDURE — 99285 EMERGENCY DEPT VISIT HI MDM: CPT

## 2024-07-04 RX ORDER — RISPERIDONE 0.5 MG/1
1 TABLET ORAL
Refills: 0 | DISCHARGE

## 2024-07-04 RX ORDER — ACETAMINOPHEN 325 MG
1000 TABLET ORAL ONCE
Refills: 0 | Status: COMPLETED | OUTPATIENT
Start: 2024-07-04 | End: 2024-07-04

## 2024-07-04 RX ORDER — DEXTROSE MONOHYDRATE AND SODIUM CHLORIDE 5; .3 G/100ML; G/100ML
1000 INJECTION, SOLUTION INTRAVENOUS
Refills: 0 | Status: DISCONTINUED | OUTPATIENT
Start: 2024-07-04 | End: 2024-07-09

## 2024-07-04 RX ORDER — DIVALPROEX SODIUM 500 MG
1 TABLET, DELAYED RELEASE (ENTERIC COATED) ORAL
Refills: 0 | DISCHARGE

## 2024-07-04 RX ORDER — DEXTROSE 30 % IN WATER 30 %
15 VIAL (ML) INTRAVENOUS ONCE
Refills: 0 | Status: DISCONTINUED | OUTPATIENT
Start: 2024-07-04 | End: 2024-07-09

## 2024-07-04 RX ORDER — AZTREONAM 2 G
1000 VIAL (EA) INJECTION ONCE
Refills: 0 | Status: COMPLETED | OUTPATIENT
Start: 2024-07-04 | End: 2024-07-04

## 2024-07-04 RX ORDER — SODIUM CHLORIDE 0.9 % (FLUSH) 0.9 %
1000 SYRINGE (ML) INJECTION ONCE
Refills: 0 | Status: COMPLETED | OUTPATIENT
Start: 2024-07-04 | End: 2024-07-04

## 2024-07-04 RX ORDER — ASPIRIN 325 MG/1
81 TABLET, FILM COATED ORAL DAILY
Refills: 0 | Status: DISCONTINUED | OUTPATIENT
Start: 2024-07-04 | End: 2024-07-09

## 2024-07-04 RX ORDER — DIVALPROEX SODIUM 500 MG
500 TABLET, DELAYED RELEASE (ENTERIC COATED) ORAL
Refills: 0 | Status: DISCONTINUED | OUTPATIENT
Start: 2024-07-04 | End: 2024-07-09

## 2024-07-04 RX ORDER — ATORVASTATIN CALCIUM 20 MG/1
40 TABLET, FILM COATED ORAL AT BEDTIME
Refills: 0 | Status: DISCONTINUED | OUTPATIENT
Start: 2024-07-04 | End: 2024-07-09

## 2024-07-04 RX ORDER — ATORVASTATIN CALCIUM 20 MG/1
1 TABLET, FILM COATED ORAL
Refills: 0 | DISCHARGE

## 2024-07-04 RX ORDER — AZTREONAM 2 G
1000 VIAL (EA) INJECTION EVERY 8 HOURS
Refills: 0 | Status: DISCONTINUED | OUTPATIENT
Start: 2024-07-05 | End: 2024-07-07

## 2024-07-04 RX ORDER — SODIUM CHLORIDE 0.9 % (FLUSH) 0.9 %
1300 SYRINGE (ML) INJECTION ONCE
Refills: 0 | Status: COMPLETED | OUTPATIENT
Start: 2024-07-04 | End: 2024-07-04

## 2024-07-04 RX ORDER — CARVEDILOL PHOSPHATE 80 MG/1
12.5 CAPSULE, EXTENDED RELEASE ORAL EVERY 12 HOURS
Refills: 0 | Status: DISCONTINUED | OUTPATIENT
Start: 2024-07-04 | End: 2024-07-09

## 2024-07-04 RX ORDER — RISPERIDONE 0.5 MG/1
2 TABLET ORAL
Refills: 0 | Status: DISCONTINUED | OUTPATIENT
Start: 2024-07-04 | End: 2024-07-09

## 2024-07-04 RX ORDER — INSULIN LISPRO 100 [IU]/ML
INJECTION, SOLUTION SUBCUTANEOUS EVERY 4 HOURS
Refills: 0 | Status: DISCONTINUED | OUTPATIENT
Start: 2024-07-04 | End: 2024-07-05

## 2024-07-04 RX ORDER — DEXTROSE MONOHYDRATE 100 MG/ML
125 INJECTION, SOLUTION INTRAVENOUS ONCE
Refills: 0 | Status: DISCONTINUED | OUTPATIENT
Start: 2024-07-04 | End: 2024-07-09

## 2024-07-04 RX ORDER — APIXABAN 5 MG/1
2.5 TABLET, FILM COATED ORAL EVERY 12 HOURS
Refills: 0 | Status: DISCONTINUED | OUTPATIENT
Start: 2024-07-05 | End: 2024-07-09

## 2024-07-04 RX ORDER — DEXTROSE 30 % IN WATER 30 %
25 VIAL (ML) INTRAVENOUS ONCE
Refills: 0 | Status: DISCONTINUED | OUTPATIENT
Start: 2024-07-04 | End: 2024-07-09

## 2024-07-04 RX ORDER — ACETAMINOPHEN 325 MG
650 TABLET ORAL EVERY 6 HOURS
Refills: 0 | Status: DISCONTINUED | OUTPATIENT
Start: 2024-07-04 | End: 2024-07-09

## 2024-07-04 RX ORDER — SODIUM CHLORIDE 0.9 % (FLUSH) 0.9 %
1000 SYRINGE (ML) INJECTION
Refills: 0 | Status: DISCONTINUED | OUTPATIENT
Start: 2024-07-04 | End: 2024-07-09

## 2024-07-04 RX ORDER — GLUCAGON HYDROCHLORIDE 1 MG/ML
1 INJECTION, POWDER, FOR SOLUTION INTRAMUSCULAR; INTRAVENOUS; SUBCUTANEOUS ONCE
Refills: 0 | Status: DISCONTINUED | OUTPATIENT
Start: 2024-07-04 | End: 2024-07-09

## 2024-07-04 RX ORDER — VANCOMYCIN HYDROCHLORIDE 50 MG/ML
1000 KIT ORAL ONCE
Refills: 0 | Status: COMPLETED | OUTPATIENT
Start: 2024-07-04 | End: 2024-07-04

## 2024-07-04 RX ORDER — TAMSULOSIN HYDROCHLORIDE 0.4 MG/1
0.4 CAPSULE ORAL AT BEDTIME
Refills: 0 | Status: DISCONTINUED | OUTPATIENT
Start: 2024-07-04 | End: 2024-07-09

## 2024-07-04 RX ORDER — DEXTROSE 30 % IN WATER 30 %
12.5 VIAL (ML) INTRAVENOUS ONCE
Refills: 0 | Status: DISCONTINUED | OUTPATIENT
Start: 2024-07-04 | End: 2024-07-09

## 2024-07-04 RX ADMIN — VANCOMYCIN HYDROCHLORIDE 250 MILLIGRAM(S): KIT at 18:14

## 2024-07-04 RX ADMIN — ATORVASTATIN CALCIUM 40 MILLIGRAM(S): 20 TABLET, FILM COATED ORAL at 23:25

## 2024-07-04 RX ADMIN — Medication 1300 MILLILITER(S): at 19:10

## 2024-07-04 RX ADMIN — INSULIN LISPRO 12: 100 INJECTION, SOLUTION SUBCUTANEOUS at 23:38

## 2024-07-04 RX ADMIN — Medication 400 MILLIGRAM(S): at 18:32

## 2024-07-04 RX ADMIN — Medication 1300 MILLILITER(S): at 17:22

## 2024-07-04 RX ADMIN — VANCOMYCIN HYDROCHLORIDE 1000 MILLIGRAM(S): KIT at 20:10

## 2024-07-04 RX ADMIN — Medication 650 MILLIGRAM(S): at 23:34

## 2024-07-04 RX ADMIN — TAMSULOSIN HYDROCHLORIDE 0.4 MILLIGRAM(S): 0.4 CAPSULE ORAL at 23:25

## 2024-07-04 RX ADMIN — Medication 1000 MILLILITER(S): at 17:22

## 2024-07-04 RX ADMIN — Medication 1000 MILLIGRAM(S): at 18:58

## 2024-07-04 RX ADMIN — Medication 1000 MILLILITER(S): at 18:58

## 2024-07-04 RX ADMIN — Medication 50 MILLIGRAM(S): at 19:24

## 2024-07-04 NOTE — ED ADULT NURSE NOTE - NSFALLRISKASMTTYPE_ED_ALL_ED
Initial (On Arrival) Continue Regimen: Cosentyx q 4weeks Render In Strict Bullet Format?: No Detail Level: Zone

## 2024-07-04 NOTE — CONSULT NOTE ADULT - ASSESSMENT
Initial evaluation/Pulmonary Critical Care consultation requested by DR MARC  on 7/4/2024 from Dr Juan Uriostegui    Patient examined chart reviewed    HOSPITAL ADMISSION   PATIENT CAME  FROM (if information available)      [id number]  GENERAL DATA .   GOC.    ..    ICU STAY.    .. no   COVID.   ..   ALLGY.   ..    pncl tegretal    WT.   ..  7/4/2024 145   BMI.  .. 7/4/2024 44   [id number]  BEST PRACTICE ISSUES.  . HOB ELEVATN.    .... Yes  . DIET.   .... 7/4/2024 npo pending speech rio;   . IV FLUIDS.  .... 7/4/2024 ns 80   . DVT PPLX.     .... 7/4/2024 apixaba 2.5 x 2 (nv af)   . STRESS ULCER PPLX.   ....     [id number]  VITALS/GAS EXCHANGE/DRIPS    ABGS.   .    VS/ PO/IO/ VENT/ DRIPS.   7/4/2024 100f 90 100/50   7/4/2024 5l 96%       XXXX[id number]  HOSPITAL COURSE.  . CC  .... 7/4/2024 " He is from [facility]  , he is not feeling well - he is lethargic with fever   - He fell and hit his chin  " (+) laceration lower lip   Pt  presented with 101. F oral temp  . HPI.  .... 7/4/2024 64-year-old male presents with generalized weakness today.  Patient was seen in the ED yesterday for a psychiatric evaluation.  The patient was seen and cleared.  The patient's urinalysis was positive, but the patient was not on antibiotics.  The patient is having some urinary symptoms, but is mostly complaining of cough/URI.  No acute chest pain.  No shortness of breath.  Patient was feeling generalized fatigue and weakness today.  The patient actually felt weak and fell slightly hitting his head on the wall.  No loss of consciousness.  Patient denies any extremity or truncal injury.  No acute chest pain. .  No aggravating or alleviating factors otherwise noted.  No other acute complaints.  Patient was found to have a fever this evening, and was sent to the ED for evaluation.    PAST MEDICAL HISTORY:  Bipolar 1 disorder on Lithium  Diabetic neuropathy bilateral feet, ankles  HLD (hyperlipidemia)   Hypertension   Obesity   CHAYITO (obstructive sleep apnea) diagnosed >25 years ago, non-compliant with CPAP  Rheumatoid arthritis   T2DM (type 2 diabetes mellitus).  . HOME MEDS.   .... * Incomplete Medication History as of 04-Jul-2024 17:47 documented in Structured Notes  · Eliquis 2.5 mg oral tablet: Last Dose Taken:  , 1 tab(s) orally 2 times a day  · zinc oxide: Last Dose Taken:  , apply to sacrum after cleanse with soap and water  · Vascepa 1 g oral capsule: Last Dose Taken:  , 2 cap(s) orally 2 times a day  · Polyethylene Glycol 3350: Last Dose Taken:  , once a day  · HumaLOG 100 units/mL injectable solution: Last Dose Taken:  , injectable 3 times a day (before meals) sliding scale with fingerstick  · tamsulosin 0.4 mg oral capsule: Last Dose Taken:  , 1 capsule orally once a day  · atorvastatin 40 mg oral tablet: Last Dose Taken:  , 1 tab(s) orally once a day  · melatonin 5 mg oral tablet: Last Dose Taken:  , 1 tab(s) orally once a day (at bedtime)  · carvedilol 12.5 mg oral tablet: Last Dose Taken:  , 1 tab(s) orally 2 times a day  · losartan 25 mg oral tablet: Last Dose Taken:  , 1 tab(s) orally once a day  · metFORMIN 500 mg oral tablet: Last Dose Taken:  , 1 tab(s) orally 2 times a day  · RisperDAL 0.5 mg oral tablet: Last Dose Taken:  , 1 tab(s) orally 2 times a day give with the 2mg dose twice a day for a total of 2.5mg twice a day  · finasteride 5 mg oral tablet: Last Dose Taken:  , 1 tab(s) orally once a day  · ascorbic acid 500 mg oral tablet: Last Dose Taken:  , 1 tab(s) orally 2 times a day  · aspirin 81 mg oral tablet, chewable: Last Dose Taken:  , 1 tab(s) orally once a day  · Lipitor 40 mg oral tablet: Last Dose Taken:  , 1 tab(s) orally once a day (at bedtime)  · melatonin: Last Dose Taken:    · Depakote 500 mg oral delayed release tablet: Last Dose Taken:  , 1 tab(s) orally 2 times a day  · RisperDAL 2 mg oral tablet: Last Dose Taken:  , 1 tab(s) orally 2 times a day give 2mg by oral route two times a day WITH 0.5mg for total of 2.5mg  · Vitamin C 500 mg oral capsule: Last Dose Taken:  , 1 cap(s) orally 2 times a day   . ER MANAGEMENT .  .... 7/4/2024 7/4/2024 So far given aztreonam Vanco NS 2.3 l      NOTEWORTHY DEVICES/PROCEDURES.     PROBLEM DATA .  . OBESITY   .... 7/4/2024 Check abg     INFECTION.  . ID DATA  .... W 7/4/2024 W 7.3   .... ua 7/4/2024 le mod w 42   .... ct cap 7/4/2024   ........ mild perinephric stranding and thickening of urothelium   .... flu ab 7/4/2024 (-)   .... rsv 7/4/2024 (-)   .... 7/4/2024 Findings suggest UTI     . UTI   .... 7/4/2024 Aztreonam    CARDIAC.  . SYNCOPE   .... Cardiac monitor     HEMAT.  . ANEMIA   .... Hb 7/4/2024 Hb 11.5   .... inr 7/4/2024 inr 126  .... monitor      GI.  RENAL.  . HYPONATREMIA   .... Na 7/4/2024 Na 133   .... K 7/4/2024 K 4.1   .... NA monitor     . LYNDSEY   .... CO2 7/4/2024 co2 20  .... Cr 7/4/2024 cr 2.2   .... Fluid us renal monitor     ENDO.  . DM   .... Insulin     NEURO.  . RO DRUG OD   .... tox scr 7/3 (-)     . FALL FACE INJURY   .... CT head C sp Face (-)   .... No obvious fx     [id number]  OVERALL ASSESSMENT/PLAN  ACTIVE ISSUES.  . OBESITY CHAYITO   .... Check ABG   . FEVER COUGH 7/4/2024  . UTI 7/4/2024   ..... 7/4/2024 started aztreonam   . SYNCOPE 7/4/2024  ..... monitor cardio neuro eval   . HYPONATREMIA 7/4/2024   .... IV NS monitor   . LYNDSEY 7/4/2024 CR 2.2   .... IV fl monitr  . HYPERGLYCEMIA 7/4/2024 G 450   .... insulin monitor     LONG TERM PROBLEMS.  . CHAYITO (obstructive sleep apnea) diagnosed >25 years ago, non-compliant with CPAP  . Rheumatoid arthritis   . T2DM (type 2 diabetes mellitus).  . Bipolar 1 disorder on Lithium    HOME O2 ELIGIBILITY.     .... To be determined at time of discharge   .... Will need home oxygen if ra rest or exertion pulse ox droops below 88    TIME SPENT.  . Over 55  minutes aggregate care time spent on encounter; activities included   direct patient care, counseling and/or coordinating care reviewing notes, lab data/ imaging , discussion with multidisciplinary team/ patient  /family and explaining in detail risks, benefits, alternatives  of the recommendations     PATIENT.  . KAELYN KISER 64 m 7/4/2024 1962 DR WILNER MARCH

## 2024-07-04 NOTE — ED ADULT NURSE NOTE - CHIEF COMPLAINT QUOTE
as per EMS crew  " He is from [facility]  , he is not feeling well - he is lethargic with fever   - He fell and hit his chin  " (+) laceration lower lip   Pt  presented with 101. F oral temp

## 2024-07-04 NOTE — ED ADULT NURSE NOTE - OBJECTIVE STATEMENT
pt came in from nursing home for fall out of wheelchair due to feeling dizzy and overall not feeling good along with a cough. Fell onto face, denies LOC, very small lac to inner lip that bleeding is now stopped. Pt upon arrival tachycardic, tachypneic, diaphoretic. Recent UTI.

## 2024-07-04 NOTE — ED PROVIDER NOTE - DIFFERENTIAL DIAGNOSIS
Rule out acute UTI, electrolyte abnormality, leukocytosis, other acute pathology Differential Diagnosis

## 2024-07-04 NOTE — ED ADULT TRIAGE NOTE - CHIEF COMPLAINT QUOTE
no as per EMS crew  " He is from [facility]  , he is not feeling well - he is lethargic with fever   - He fell and hit his chin  " (+) laceration lower lip   Pt  presented with 101. F oral temp

## 2024-07-04 NOTE — ED PROVIDER NOTE - CARE PLAN
Principal Discharge DX:	Weakness  Secondary Diagnosis:	Acute UTI  Secondary Diagnosis:	Upper respiratory infection  Secondary Diagnosis:	Elevated CPK   1

## 2024-07-04 NOTE — ED ADULT NURSE NOTE - NSFALLRISKINTERV_ED_ALL_ED
Assistance OOB with selected safe patient handling equipment if applicable/Assistance with ambulation/Communicate fall risk and risk factors to all staff, patient, and family/Encourage patient to sit up slowly, dangle for a short time, stand at bedside before walking/Monitor gait and stability/Orthostatic vital signs/Provide patient with walking aids/Provide visual cue: yellow wristband, yellow gown, etc/Reinforce activity limits and safety measures with patient and family/Call bell, personal items and telephone in reach/Instruct patient to call for assistance before getting out of bed/chair/stretcher/Non-slip footwear applied when patient is off stretcher/[place] to call system/Physically safe environment - no spills, clutter or unnecessary equipment/Purposeful Proactive Rounding/Room/bathroom lighting operational, light cord in reach

## 2024-07-04 NOTE — ED PROVIDER NOTE - PHYSICAL EXAMINATION
Positive abrasion to inner upper lip, without significant laceration.  No other facial laceration.  Small contusion.  No other signs of facial or head trauma.

## 2024-07-04 NOTE — CONSULT NOTE ADULT - SUBJECTIVE AND OBJECTIVE BOX
CHIEF COMPLAINT/ REASON FOR VISIT  .. Patient was seen to address the  issue listed under PROBLEM LIST which is located toward bottom of this note     MARGI ART    SY EDIP 14    Allergies    penicillin (Hives)  Tegretol (Hypotension; Anaphylaxis)    Intolerances        PAST MEDICAL & SURGICAL HISTORY:  Bipolar 1 disorder  on Lithium      Hypertension      T2DM (type 2 diabetes mellitus)      HLD (hyperlipidemia)      CHAYITO (obstructive sleep apnea)  diagnosed >25 years ago, non-compliant with CPAP      Diabetic neuropathy  bilateral feet, ankles      Obesity      Rheumatoid arthritis      History of excision of testicular mass  left, 2009      Cataract of left eye          FAMILY HISTORY:  FH: CAD (coronary artery disease) (Mother)        Home Medications:  ascorbic acid 500 mg oral tablet: 1 tab(s) orally 2 times a day (04 Jul 2024 19:20)  aspirin 81 mg oral tablet, chewable: 1 tab(s) orally once a day (04 Jul 2024 17:47)  atorvastatin 40 mg oral tablet: 1 tab(s) orally once a day (04 Jul 2024 17:47)  carvedilol 12.5 mg oral tablet: 1 tab(s) orally 2 times a day (04 Jul 2024 17:47)  Depakote 500 mg oral delayed release tablet: 1 tab(s) orally 2 times a day (04 Jul 2024 19:20)  finasteride 5 mg oral tablet: 1 tab(s) orally once a day (04 Jul 2024 19:20)  HumaLOG 100 units/mL injectable solution: injectable 3 times a day (before meals) sliding scale with fingerstick (04 Jul 2024 19:20)  Lipitor 40 mg oral tablet: 1 tab(s) orally once a day (at bedtime) (04 Jul 2024 19:20)  losartan 25 mg oral tablet: 1 tab(s) orally once a day (04 Jul 2024 19:20)  melatonin:  (04 Jul 2024 19:20)  melatonin 5 mg oral tablet: 1 tab(s) orally once a day (at bedtime) (04 Jul 2024 19:20)  metFORMIN 500 mg oral tablet: 1 tab(s) orally 2 times a day (04 Jul 2024 19:20)  Polyethylene Glycol 3350: once a day (04 Jul 2024 19:20)  RisperDAL 0.5 mg oral tablet: 1 tab(s) orally 2 times a day give with the 2mg dose twice a day for a total of 2.5mg twice a day (04 Jul 2024 19:20)  RisperDAL 2 mg oral tablet: 1 tab(s) orally 2 times a day give 2mg by oral route two times a day WITH 0.5mg for total of 2.5mg (04 Jul 2024 19:20)  tamsulosin 0.4 mg oral capsule: 1 capsule orally once a day (04 Jul 2024 17:47)  Vascepa 1 g oral capsule: 2 cap(s) orally 2 times a day (04 Jul 2024 17:47)  Vitamin C 500 mg oral capsule: 1 cap(s) orally 2 times a day (04 Jul 2024 17:47)  zinc oxide: apply to sacrum after cleanse with soap and water (04 Jul 2024 19:20)      MEDICATIONS  (STANDING):    MEDICATIONS  (PRN):              Vital Signs Last 24 Hrs  T(C): 38.1 (04 Jul 2024 20:47), Max: 38.3 (04 Jul 2024 16:45)  T(F): 100.5 (04 Jul 2024 20:47), Max: 101 (04 Jul 2024 16:45)  HR: 93 (04 Jul 2024 20:47) (93 - 110)  BP: 102/55 (04 Jul 2024 20:47) (93/57 - 109/53)  BP(mean): --  RR: 20 (04 Jul 2024 20:47) (18 - 20)  SpO2: 96% (04 Jul 2024 20:47) (88% - 96%)    Parameters below as of 04 Jul 2024 20:47  Patient On (Oxygen Delivery Method): nasal cannula  O2 Flow (L/min): 5                LABS:                        11.5   7.36  )-----------( 124      ( 04 Jul 2024 17:10 )             34.6     07-04    133<L>  |  101  |  46<H>  ----------------------------<  422<H>  4.1   |  20<L>  |  2.27<H>    Ca    8.4      04 Jul 2024 19:13    TPro  7.0  /  Alb  2.8<L>  /  TBili  0.4  /  DBili  x   /  AST  50<H>  /  ALT  29  /  AlkPhos  75  07-04    PT/INR - ( 04 Jul 2024 17:10 )   PT: 14.0 sec;   INR: 1.26 ratio         PTT - ( 04 Jul 2024 17:10 )  PTT:30.3 sec  Urinalysis Basic - ( 04 Jul 2024 19:13 )    Color: x / Appearance: x / SG: x / pH: x  Gluc: 422 mg/dL / Ketone: x  / Bili: x / Urobili: x   Blood: x / Protein: x / Nitrite: x   Leuk Esterase: x / RBC: x / WBC x   Sq Epi: x / Non Sq Epi: x / Bacteria: x            WBC:  WBC Count: 7.36 K/uL (07-04 @ 17:10)  WBC Count: 7.98 K/uL (07-03 @ 16:40)      MICROBIOLOGY:  RECENT CULTURES:        CARDIAC MARKERS ( 04 Jul 2024 17:10 )  x     / x     / 3340 U/L / x     / x            PT/INR - ( 04 Jul 2024 17:10 )   PT: 14.0 sec;   INR: 1.26 ratio         PTT - ( 04 Jul 2024 17:10 )  PTT:30.3 sec    Sodium:  Sodium: 133 mmol/L (07-04 @ 19:13)  Sodium: 132 mmol/L (07-04 @ 17:10)  Sodium: 137 mmol/L (07-03 @ 16:40)      2.27 mg/dL 07-04 @ 19:13  2.38 mg/dL 07-04 @ 17:10  2.04 mg/dL 07-03 @ 16:40      Hemoglobin:  Hemoglobin: 11.5 g/dL (07-04 @ 17:10)  Hemoglobin: 13.3 g/dL (07-03 @ 16:40)      Platelets: Platelet Count - Automated: 124 K/uL (07-04 @ 17:10)  Platelet Count - Automated: 134 K/uL (07-03 @ 16:40)      LIVER FUNCTIONS - ( 04 Jul 2024 17:10 )  Alb: 2.8 g/dL / Pro: 7.0 g/dL / ALK PHOS: 75 U/L / ALT: 29 U/L / AST: 50 U/L / GGT: x             Urinalysis Basic - ( 04 Jul 2024 19:13 )    Color: x / Appearance: x / SG: x / pH: x  Gluc: 422 mg/dL / Ketone: x  / Bili: x / Urobili: x   Blood: x / Protein: x / Nitrite: x   Leuk Esterase: x / RBC: x / WBC x   Sq Epi: x / Non Sq Epi: x / Bacteria: x        RADIOLOGY & ADDITIONAL STUDIES:      MICROBIOLOGY:  RECENT CULTURES:

## 2024-07-04 NOTE — ED PROVIDER NOTE - CLINICAL SUMMARY MEDICAL DECISION MAKING FREE TEXT BOX
Acute fever today, generalized weakness with a fall, hitting his head.  Patient with positive UTI.  Will check labs, x-ray, RVP, IV fluids, IV antibiotics, admission

## 2024-07-04 NOTE — ED PROVIDER NOTE - OBJECTIVE STATEMENT
64-year-old male presents with generalized weakness today.  Patient was seen in the ED yesterday for a psychiatric evaluation.  The patient was seen and cleared.  The patient's urinalysis was positive, but the patient was not on antibiotics.  The patient is having some urinary symptoms, but is mostly complaining of cough/URI.  No acute chest pain.  No shortness of breath.  Patient was feeling generalized fatigue and weakness today.  The patient actually felt weak and fell slightly hitting his head on the wall.  No loss of consciousness.  Patient denies any extremity or truncal injury.  No acute chest pain. .  No aggravating or alleviating factors otherwise noted.  No other acute complaints.  Patient was found to have a fever this evening, and was sent to the ED for evaluation.

## 2024-07-05 ENCOUNTER — RESULT REVIEW (OUTPATIENT)
Age: 64
End: 2024-07-05

## 2024-07-05 DIAGNOSIS — E87.1 HYPO-OSMOLALITY AND HYPONATREMIA: ICD-10-CM

## 2024-07-05 DIAGNOSIS — E11.9 TYPE 2 DIABETES MELLITUS WITHOUT COMPLICATIONS: ICD-10-CM

## 2024-07-05 DIAGNOSIS — N17.9 ACUTE KIDNEY FAILURE, UNSPECIFIED: ICD-10-CM

## 2024-07-05 DIAGNOSIS — M06.9 RHEUMATOID ARTHRITIS, UNSPECIFIED: ICD-10-CM

## 2024-07-05 DIAGNOSIS — R55 SYNCOPE AND COLLAPSE: ICD-10-CM

## 2024-07-05 DIAGNOSIS — M62.82 RHABDOMYOLYSIS: ICD-10-CM

## 2024-07-05 DIAGNOSIS — Z29.9 ENCOUNTER FOR PROPHYLACTIC MEASURES, UNSPECIFIED: ICD-10-CM

## 2024-07-05 DIAGNOSIS — G93.41 METABOLIC ENCEPHALOPATHY: ICD-10-CM

## 2024-07-05 DIAGNOSIS — R50.9 FEVER, UNSPECIFIED: ICD-10-CM

## 2024-07-05 DIAGNOSIS — G47.33 OBSTRUCTIVE SLEEP APNEA (ADULT) (PEDIATRIC): ICD-10-CM

## 2024-07-05 DIAGNOSIS — N39.0 URINARY TRACT INFECTION, SITE NOT SPECIFIED: ICD-10-CM

## 2024-07-05 LAB
ALBUMIN SERPL ELPH-MCNC: 2.8 G/DL — LOW (ref 3.3–5)
ALP SERPL-CCNC: 71 U/L — SIGNIFICANT CHANGE UP (ref 30–120)
ALT FLD-CCNC: 47 U/L — SIGNIFICANT CHANGE UP (ref 10–60)
ANION GAP SERPL CALC-SCNC: 11 MMOL/L — SIGNIFICANT CHANGE UP (ref 5–17)
AST SERPL-CCNC: 98 U/L — HIGH (ref 10–40)
BILIRUB SERPL-MCNC: 0.5 MG/DL — SIGNIFICANT CHANGE UP (ref 0.2–1.2)
BUN SERPL-MCNC: 42 MG/DL — HIGH (ref 7–23)
CALCIUM SERPL-MCNC: 9.1 MG/DL — SIGNIFICANT CHANGE UP (ref 8.4–10.5)
CHLORIDE SERPL-SCNC: 106 MMOL/L — SIGNIFICANT CHANGE UP (ref 96–108)
CO2 SERPL-SCNC: 22 MMOL/L — SIGNIFICANT CHANGE UP (ref 22–31)
CREAT SERPL-MCNC: 2.31 MG/DL — HIGH (ref 0.5–1.3)
E COLI DNA BLD POS QL NAA+NON-PROBE: SIGNIFICANT CHANGE UP
EGFR: 31 ML/MIN/1.73M2 — LOW
GLUCOSE BLDC GLUCOMTR-MCNC: 216 MG/DL — HIGH (ref 70–99)
GLUCOSE BLDC GLUCOMTR-MCNC: 242 MG/DL — HIGH (ref 70–99)
GLUCOSE BLDC GLUCOMTR-MCNC: 303 MG/DL — HIGH (ref 70–99)
GLUCOSE BLDC GLUCOMTR-MCNC: 309 MG/DL — HIGH (ref 70–99)
GLUCOSE BLDC GLUCOMTR-MCNC: 317 MG/DL — HIGH (ref 70–99)
GLUCOSE BLDC GLUCOMTR-MCNC: 336 MG/DL — HIGH (ref 70–99)
GLUCOSE SERPL-MCNC: 243 MG/DL — HIGH (ref 70–99)
GRAM STN FLD: ABNORMAL
HCT VFR BLD CALC: 36.3 % — LOW (ref 39–50)
HGB BLD-MCNC: 11.8 G/DL — LOW (ref 13–17)
INR BLD: 1.21 RATIO — HIGH (ref 0.85–1.18)
LACTATE SERPL-SCNC: 1.6 MMOL/L — SIGNIFICANT CHANGE UP (ref 0.7–2)
MCHC RBC-ENTMCNC: 28.9 PG — SIGNIFICANT CHANGE UP (ref 27–34)
MCHC RBC-ENTMCNC: 32.5 GM/DL — SIGNIFICANT CHANGE UP (ref 32–36)
MCV RBC AUTO: 88.8 FL — SIGNIFICANT CHANGE UP (ref 80–100)
METHOD TYPE: SIGNIFICANT CHANGE UP
MRSA PCR RESULT.: SIGNIFICANT CHANGE UP
NRBC # BLD: 0 /100 WBCS — SIGNIFICANT CHANGE UP (ref 0–0)
OSMOLALITY SERPL: 310 MOSMOL/KG — HIGH (ref 280–301)
OSMOLALITY UR: 142 MOSM/KG — SIGNIFICANT CHANGE UP (ref 50–1200)
PHOSPHATE SERPL-MCNC: 2.8 MG/DL — SIGNIFICANT CHANGE UP (ref 2.5–4.5)
PLATELET # BLD AUTO: 106 K/UL — LOW (ref 150–400)
POTASSIUM SERPL-MCNC: 4 MMOL/L — SIGNIFICANT CHANGE UP (ref 3.5–5.3)
POTASSIUM SERPL-SCNC: 4 MMOL/L — SIGNIFICANT CHANGE UP (ref 3.5–5.3)
PROCALCITONIN SERPL-MCNC: 16.1 NG/ML — HIGH (ref 0.02–0.1)
PROT SERPL-MCNC: 7.3 G/DL — SIGNIFICANT CHANGE UP (ref 6–8.3)
PROTHROM AB SERPL-ACNC: 13.5 SEC — HIGH (ref 9.5–13)
RBC # BLD: 4.09 M/UL — LOW (ref 4.2–5.8)
RBC # FLD: 13.3 % — SIGNIFICANT CHANGE UP (ref 10.3–14.5)
S AUREUS DNA NOSE QL NAA+PROBE: SIGNIFICANT CHANGE UP
SODIUM SERPL-SCNC: 139 MMOL/L — SIGNIFICANT CHANGE UP (ref 135–145)
SPECIMEN SOURCE: SIGNIFICANT CHANGE UP
TROPONIN I, HIGH SENSITIVITY RESULT: 57.9 NG/L — SIGNIFICANT CHANGE UP
WBC # BLD: 6.29 K/UL — SIGNIFICANT CHANGE UP (ref 3.8–10.5)
WBC # FLD AUTO: 6.29 K/UL — SIGNIFICANT CHANGE UP (ref 3.8–10.5)

## 2024-07-05 PROCEDURE — 93306 TTE W/DOPPLER COMPLETE: CPT | Mod: 26

## 2024-07-05 PROCEDURE — 76775 US EXAM ABDO BACK WALL LIM: CPT | Mod: 26

## 2024-07-05 RX ORDER — ONDANSETRON HYDROCHLORIDE 2 MG/ML
4 INJECTION INTRAMUSCULAR; INTRAVENOUS EVERY 8 HOURS
Refills: 0 | Status: DISCONTINUED | OUTPATIENT
Start: 2024-07-05 | End: 2024-07-09

## 2024-07-05 RX ORDER — INSULIN LISPRO 100 [IU]/ML
INJECTION, SOLUTION SUBCUTANEOUS AT BEDTIME
Refills: 0 | Status: DISCONTINUED | OUTPATIENT
Start: 2024-07-05 | End: 2024-07-09

## 2024-07-05 RX ORDER — MAGNESIUM, ALUMINUM HYDROXIDE 400-400
30 TABLET,CHEWABLE ORAL EVERY 4 HOURS
Refills: 0 | Status: DISCONTINUED | OUTPATIENT
Start: 2024-07-05 | End: 2024-07-09

## 2024-07-05 RX ORDER — INSULIN LISPRO 100 [IU]/ML
INJECTION, SOLUTION SUBCUTANEOUS
Refills: 0 | Status: DISCONTINUED | OUTPATIENT
Start: 2024-07-05 | End: 2024-07-09

## 2024-07-05 RX ADMIN — Medication 500 MILLIGRAM(S): at 17:22

## 2024-07-05 RX ADMIN — Medication 50 MILLIGRAM(S): at 02:16

## 2024-07-05 RX ADMIN — Medication 650 MILLIGRAM(S): at 14:42

## 2024-07-05 RX ADMIN — Medication 650 MILLIGRAM(S): at 14:18

## 2024-07-05 RX ADMIN — INSULIN LISPRO 4: 100 INJECTION, SOLUTION SUBCUTANEOUS at 05:45

## 2024-07-05 RX ADMIN — RISPERIDONE 2 MILLIGRAM(S): 0.5 TABLET ORAL at 05:45

## 2024-07-05 RX ADMIN — Medication 5 MILLIGRAM(S): at 12:03

## 2024-07-05 RX ADMIN — INSULIN LISPRO 2: 100 INJECTION, SOLUTION SUBCUTANEOUS at 23:30

## 2024-07-05 RX ADMIN — Medication 500 MILLIGRAM(S): at 05:44

## 2024-07-05 RX ADMIN — RISPERIDONE 2 MILLIGRAM(S): 0.5 TABLET ORAL at 17:22

## 2024-07-05 RX ADMIN — Medication 650 MILLIGRAM(S): at 00:04

## 2024-07-05 RX ADMIN — Medication 50 MILLIGRAM(S): at 18:09

## 2024-07-05 RX ADMIN — Medication 80 MILLILITER(S): at 10:58

## 2024-07-05 RX ADMIN — INSULIN LISPRO 8: 100 INJECTION, SOLUTION SUBCUTANEOUS at 17:21

## 2024-07-05 RX ADMIN — Medication 650 MILLIGRAM(S): at 06:13

## 2024-07-05 RX ADMIN — Medication 50 MILLIGRAM(S): at 10:58

## 2024-07-05 RX ADMIN — INSULIN LISPRO 8: 100 INJECTION, SOLUTION SUBCUTANEOUS at 02:17

## 2024-07-05 RX ADMIN — APIXABAN 2.5 MILLIGRAM(S): 5 TABLET, FILM COATED ORAL at 05:45

## 2024-07-05 RX ADMIN — INSULIN LISPRO 4: 100 INJECTION, SOLUTION SUBCUTANEOUS at 10:58

## 2024-07-05 RX ADMIN — Medication 650 MILLIGRAM(S): at 05:43

## 2024-07-05 RX ADMIN — TAMSULOSIN HYDROCHLORIDE 0.4 MILLIGRAM(S): 0.4 CAPSULE ORAL at 21:48

## 2024-07-05 RX ADMIN — APIXABAN 2.5 MILLIGRAM(S): 5 TABLET, FILM COATED ORAL at 17:22

## 2024-07-05 RX ADMIN — ASPIRIN 81 MILLIGRAM(S): 325 TABLET, FILM COATED ORAL at 12:03

## 2024-07-05 RX ADMIN — ATORVASTATIN CALCIUM 40 MILLIGRAM(S): 20 TABLET, FILM COATED ORAL at 21:48

## 2024-07-05 RX ADMIN — CARVEDILOL PHOSPHATE 12.5 MILLIGRAM(S): 80 CAPSULE, EXTENDED RELEASE ORAL at 05:45

## 2024-07-05 NOTE — CONSULT NOTE ADULT - ASSESSMENT
Patient is a 64 year old male presents with generalized weakness, patient with fever to 103.1F with tachycardia and positive UA; also noted with cough/URI symptoms.     Sepsis GNR bacteremia - possible  etiology  H/o PCN allergy -rash  - UA with pyuria  - Bcx with GNR -- noted E.coli per PCR   - CTAP with mild perinephric stranding bilaterally and thickening of urothelium, no hydronephrosis   - renal ultrasound with no hydronephrosis   - COVID/Flu/RSV negative   - Chest imaging with no pneumonia   - started on aztreonam     Recommendations:   Follow Bcx for E.coli sensitivities  Repeat Bcx in the morning x2, ordered  Follow urine culture   Continue on aztreonam for now   Monitor temps/CBC  Continue rest of care per primary team.       Jerrod Barkley M.D.  [facility], Division of Infectious Diseases  [phone]  After 5pm on weekdays and all day on weekends - please call [phone]  Available on [employer] TEAMS

## 2024-07-05 NOTE — SWALLOW BEDSIDE ASSESSMENT ADULT - SWALLOW EVAL: BUCCAL STRENGTH AND MOBILITY
Problem:  CARE  Goal: Vital signs are medically acceptable  Outcome: Met This Shift  Vitals stable  Goal: Thermoregulation maintained greater than 97/less than 99.4 Ax  Outcome: Met This Shift  Vitals stable  Goal: Infant exhibits minimal/reduced signs of pain/discomfort  Outcome: Met This Shift  Infant content  Goal: Infant is maintained in safe environment  Outcome: Met This Shift  Infant security HUGS band and ID bands in place. Encouraged to room in with mother.   Goal: Baby is with Mother and family  Outcome: Met This Shift  Mother and father bonding with infant    Problem: Discharge Planning:  Goal: Discharged to appropriate level of care  Discharged to appropriate level of care   Outcome: Met This Shift  Discharge order written    Problem: Infant Care:  Goal: Will show no infection signs and symptoms  Will show no infection signs and symptoms   Outcome: Met This Shift  No signs of infection    Problem:  Screening:  Goal: Serum bilirubin within specified parameters  Serum bilirubin within specified parameters   Outcome: Met This Shift  TCB = 75%  Goal: Neurodevelopmental maturation within specified parameters  Neurodevelopmental maturation within specified parameters   Outcome: Completed Date Met: 10/11/17    Goal: Circulatory function within specified parameters  Circulatory function within specified parameters   Outcome: Met This Shift  Passed [facility] screen    Problem: Parent-Infant Attachment - Impaired:  Goal: Ability to interact appropriately with  will improve  Ability to interact appropriately with  will improve   Outcome: Met This Shift  Mother and father bonding and caring for infant    Problem: Nutritional:  Goal: Knowledge of adequate nutritional intake and output  Knowledge of adequate nutritional intake and output   Outcome: Met This Shift  mother knowledgeable of intake and output  Goal: Knowledge of infant formula  Knowledge of infant formula   Outcome: Met This Shift  Mother knowledgeable of infant formula    Comments: Care plan reviewed with parents. Parents verbalize understanding of the plan of care and contribute to goal setting. intact

## 2024-07-05 NOTE — SWALLOW BEDSIDE ASSESSMENT ADULT - ADDITIONAL RECOMMENDATIONS
This dept to continue to f/u as schedule permits to ensure diet tolerance x1. MD advised to reconsult this service should concern re: diet management and/or change in status arise.

## 2024-07-05 NOTE — CONSULT NOTE ADULT - SUBJECTIVE AND OBJECTIVE BOX
Altered in mental status,   For Altered in mental status and lethargy, suspect most likely this is metabolic encephalopathy, which is multifactorial secondary to underlying infection with weakness   For history of sepsis, antibiotics as needed.

## 2024-07-05 NOTE — H&P ADULT - NSHPLABSRESULTS_GEN_ALL_CORE
< from: CT Abdomen and Pelvis No Cont (07.04.24 @ 18:05) >      FINDINGS:  CHEST:  LUNGS AND LARGE AIRWAYS: Patent central airways. No pulmonary nodules.   Subpleural atelectatic changes, lower lobe motion artifact.  PLEURA: No pleural effusion.  VESSELS: Normal caliber of the thoracic aorta and pulmonary trunk.   Contour irregularity at the pulmonary trunk origin is likely due to   motion artifact. Mild coronary artery calcifications.  HEART: Heart size is normal. No pericardial effusion.  MEDIASTINUM AND LAXMI: No mediastinal or hilar lymphadenopathy.No   supraclavicular, axillary adenopathy.  CHEST WALL AND LOWER NECK: Heterogeneous thyroid gland.    ABDOMEN AND PELVIS:  LIVER: Borderline hepatomegaly at 19.4 cm.  BILE DUCTS: Normal caliber.  GALLBLADDER: Within normal limits.  SPLEEN: Mild splenomegaly at 13.5 cm.  PANCREAS: Within normal limits.  ADRENALS: Within normal limits.  KIDNEYS/URETERS: Bilateral perinephric stranding. Thickening of the   urothelial wall of both renal pelvises. No hydronephrosis or hydroureter.   No nephroureterolithiasis.    BLADDER: Mild wall thickening of the distended urinary bladder.  REPRODUCTIVE ORGANS: Prostate within normal limits.    BOWEL: No bowel obstruction. Appendix is normal  PERITONEUM/RETROPERITONEUM: No pneumoperitoneum or ascites. No abscess.  VESSELS: Within normal limits.  LYMPH NODES: No lymphadenopathy. Subcentimeter retroperitoneal and   mesenteric lymph nodes.  ABDOMINAL WALL: Within normal limits.  BONES: Degenerative changes of the spine.    IMPRESSION:  Mild perinephric stranding bilaterally and thickening of the urothelium   is nonspecific but can be seen with urinary tract infection. Correlate   with urinalysis.    Otherwise, no CT morphologically evident source of infection in the   chest, abdomen or pelvis.    < end of copied text >    < from: CT Head No Cont (07.04.24 @ 18:04) >      IMPRESSION:  HEAD CT:  No evidence of an acute traumatic intracranial injury.  CERVICAL SPINE CT: Limited by motion and body habitus. No evidence of an   acute cervical spine fracture.  FACIAL CT: Limited by motion. No evidence of an acute facial fracture.    < end of copied text >

## 2024-07-05 NOTE — SWALLOW BEDSIDE ASSESSMENT ADULT - DIET PRIOR TO ADMI
regular/solids with thin liquids, per pt report. paper chart not available on unit at time of evaluation.

## 2024-07-05 NOTE — H&P ADULT - TIME BILLING
75minutes spent on this visit, 50% visit time spent in care co-ordination with other attendings and counselling patient ,writing admission orders ( see complete and current orders and order section) ,requesting necessary consults ,informing family about status & plan of care .I have discussed care plan with [facility] wellness/admitting /nursing   department ,outpatient PCP , hospital consultants , ER physician & med staff .

## 2024-07-05 NOTE — H&P ADULT - ASSESSMENT
. OBESITY CHAYITO   .... Check ABG   . FEVER COUGH 7/4/2024  . UTI 7/4/2024   ..... 7/4/2024 started aztreonam   . SYNCOPE 7/4/2024  ..... monitor cardio neuro eval   . HYPONATREMIA 7/4/2024   .... IV NS monitor   . LYNDSEY 7/4/2024 CR 2.2   .... IV fl monitr  . HYPERGLYCEMIA 7/4/2024 G 450   .... insulin monitor   . CHAYITO (obstructive sleep apnea) diagnosed >25 years ago, non-compliant with CPAP  . Rheumatoid arthritis   . T2DM (type 2 diabetes mellitus).

## 2024-07-05 NOTE — PROVIDER CONTACT NOTE (OTHER) - SITUATION
pt here with URI & UTI. Had temp of 101F and was given ofirmev at 1830 and temp still 100.5F; Patients blood glucose was 451, after 2.3L IVF patient blood glucose still 422
[facility] lab called. Pt has a preliminary growth on aerobic bottle gram negative rods

## 2024-07-05 NOTE — H&P ADULT - HISTORY OF PRESENT ILLNESS
64-year-old male presents with generalized weakness today.  Patient was seen in the ED yesterday for a psychiatric evaluation.  The patient was seen and cleared.  The patient's urinalysis was positive, but the patient was not on antibiotics.  The patient is having some urinary symptoms, but is mostly complaining of cough/URI.  No acute chest pain.  No shortness of breath.  Patient was feeling generalized fatigue and weakness today.  The patient actually felt weak and fell slightly hitting his head on the wall.  No loss of consciousness.  Patient denies any extremity or truncal injury.  No acute chest pain. .  No aggravating or alleviating factors otherwise noted.  No other acute complaints.  Patient was found to have a fever this evening, and was sent to the ED for evaluation. 64-year-old male presents with generalized weakness today.  Patient was seen in the ED yesterday for a psychiatric evaluation.  The patient was seen and cleared.  The patient's urinalysis was positive, but the patient was not on antibiotics.  The patient is having some urinary symptoms, but is mostly complaining of cough/URI.  No acute chest pain.  No shortness of breath.  Patient was feeling generalized fatigue and weakness today.  The patient actually felt weak and fell slightly hitting his head on the wall.  No loss of consciousness.  Patient denies any extremity or truncal injury.  No acute chest pain. .  No aggravating or alleviating factors otherwise noted.  No other acute complaints.  Patient was found to have a fever this evening, and was sent to the ED for evaluation. Admitted for neurology eval , ID cons requetsed , septic workup sent

## 2024-07-05 NOTE — H&P ADULT - PROBLEM SELECTOR PLAN 4
neurology and card eval - ECG with no evidence of ischemia or infarction  -   - Cardiac enzymes negative  - CPK mildly elevated at 3340  - CT chest with atelectasis  - Continue aspirin 81 mg daily  - Continue atorvastatin 40 mg daily  - Continue carvedilol 12.5 mg bid  - Continue apixaban 2.5 mg bid for prior DVT  - Hold losartan  - Continue IV fluids

## 2024-07-05 NOTE — CONSULT NOTE ADULT - ASSESSMENT
The patient is a 64 year old male with a history of HTN, HL, DM, DVT, CHAYITO, RA, bipolar disorder who presents with weakness and possible syncope.    Plan:  - ECG with no evidence of ischemia or infarction  -   - Cardiac enzymes negative  - CPK mildly elevated at 3340  - CT chest with atelectasis  - Continue aspirin 81 mg daily  - Continue atorvastatin 40 mg daily  - Continue carvedilol 12.5 mg bid  - Continue apixaban 2.5 mg bid for prior DVT  - Hold losartan  - Continue IV fluids  - Check echo

## 2024-07-05 NOTE — CONSULT NOTE ADULT - ASSESSMENT
64-year-old male presents with generalized weakness today.  Patient was seen in the ED yesterday for a psychiatric evaluation.  The patient was seen and cleared.  The patient's urinalysis was positive, but the patient was not on antibiotics.  The patient is having some urinary symptoms, but is mostly complaining of cough/URI.  No acute chest pain.  No shortness of breath.  Patient was feeling generalized fatigue and weakness today.  The patient actually felt weak and fell slightly hitting his head on the wall.  No loss of consciousness.  Patient denies any extremity or truncal injury.  No acute chest pain. .  No aggravating or alleviating factors otherwise noted.  No other acute complaints. Patient was found to have a fever this evening, and was sent to the ED for evaluation. Admitted for neurology eval , ID cons requetsed , septic workup       ACUTE RENAL FAILURE: sodium chloride 0.9%. 1000 milliLiter(s) (80 mL/Hr) IV Continuous   Serum creatinine is  at  2.3    , approximating GFR at   ml/min.   There is no progression . No uremic symptoms  No evidence of anemia .  Fluid status stable.  Will continue to avoid nephrotoxic drugs.      BP monitoring,continue current antihypertensive meds, low salt diet,followup with PMD in 1-2 weeks  carvedilol 12.5 milliGRAM(s) Oral every 12 hours        Patient remains asymptomatic.   Continue current therapy.  hold  diuretic.  hold   ACE inhibitor.  hold   ARB.  Additional evaluation:   ECG,    echocardiogram,     CXR,  will obtained recent   renal ultrasound to evalaute kidney size and possible stones ,      Admit for septic workup and ID evaluation,send blood and urine cx,serial lactate levels,monitor vitals closley,ivfs hydration,monitor urine output and renal profile,iv abx as per id cons  aztreonam  IVPB 1000 milliGRAM(s) IV Intermittent every 8 hours     64-year-old male presents with generalized weakness today.  Patient was seen in the ED yesterday for a psychiatric evaluation.  The patient was seen and cleared.  The patient's urinalysis was positive, but the patient was not on antibiotics.  The patient is having some urinary symptoms, but is mostly complaining of cough/URI.  No acute chest pain.  No shortness of breath.  Patient was feeling generalized fatigue and weakness today.  The patient actually felt weak and fell slightly hitting his head on the wall.  No loss of consciousness.  Patient denies any extremity or truncal injury.  No acute chest pain. .  No aggravating or alleviating factors otherwise noted.  No other acute complaints. Patient was found to have a fever this evening, and was sent to the ED for evaluation. Admitted for neurology eval , ID cons requetsed , septic workup       ACUTE RENAL FAILURE: sodium chloride 0.9%. 1000 milliLiter(s) (80 mL/Hr) IV Continuous   Serum creatinine is  at  2.3    , approximating GFR at   ml/min.   There is no progression . No uremic symptoms  No evidence of anemia .  Fluid status stable..  Will continue to avoid nephrotoxic drugs.  .;    BP monitoring,continue current antihypertensive meds, low salt diet,followup with PMD in 1-2 weeks  carvedilol 12.5 milliGRAM(s) Oral every 12 hours        Patient remains asymptomatic.   Continue current therapy.  hold  diuretic.  hold   ACE inhibitor.  hold   ARB.  Additional evaluation:   ECG,    echocardiogram,     CXR,  will obtained recent   renal ultrasound to evalaute kidney size and possible stones ,      Admit for septic workup and ID evaluation,send blood and urine cx,serial lactate levels,monitor vitals closley,ivfs hydration,monitor urine output and renal profile,iv abx as per id cons  aztreonam  IVPB 1000 milliGRAM(s) IV Intermittent every 8 hours

## 2024-07-05 NOTE — SWALLOW BEDSIDE ASSESSMENT ADULT - SWALLOW EVAL: DIAGNOSIS
functional oral-pharyngeal management given regular/solid, puree and thin liquid textures marked by adequate bolus collection, transfer and transport with subsequently timely and complete pharyngeal swallow trigger and hyolaryngeal excursion without overt s/s of penetration/aspiration noted.

## 2024-07-05 NOTE — PROGRESS NOTE ADULT - SUBJECTIVE AND OBJECTIVE BOX
CHIEF COMPLAINT/ REASON FOR VISIT  .. Patient was seen to address the  issue listed under PROBLEM LIST which is located toward bottom of this note     MARGI ART    SY EDIP 14    Allergies    penicillin (Hives)  Tegretol (Hypotension; Anaphylaxis)    Intolerances        PAST MEDICAL & SURGICAL HISTORY:  Bipolar 1 disorder  on Lithium      Hypertension      T2DM (type 2 diabetes mellitus)      HLD (hyperlipidemia)      CHAYITO (obstructive sleep apnea)  diagnosed >25 years ago, non-compliant with CPAP      Diabetic neuropathy  bilateral feet, ankles      Obesity      Rheumatoid arthritis      History of excision of testicular mass  left, 2009      Cataract of left eye          FAMILY HISTORY:  FH: CAD (coronary artery disease) (Mother)        Home Medications:  ascorbic acid 500 mg oral tablet: 1 tab(s) orally 2 times a day (04 Jul 2024 19:20)  aspirin 81 mg oral tablet, chewable: 1 tab(s) orally once a day (04 Jul 2024 17:47)  atorvastatin 40 mg oral tablet: 1 tab(s) orally once a day (04 Jul 2024 17:47)  carvedilol 12.5 mg oral tablet: 1 tab(s) orally 2 times a day (04 Jul 2024 17:47)  Depakote 500 mg oral delayed release tablet: 1 tab(s) orally 2 times a day (04 Jul 2024 19:20)  finasteride 5 mg oral tablet: 1 tab(s) orally once a day (04 Jul 2024 19:20)  HumaLOG 100 units/mL injectable solution: injectable 3 times a day (before meals) sliding scale with fingerstick (04 Jul 2024 19:20)  Lipitor 40 mg oral tablet: 1 tab(s) orally once a day (at bedtime) (04 Jul 2024 19:20)  losartan 25 mg oral tablet: 1 tab(s) orally once a day (04 Jul 2024 19:20)  melatonin:  (04 Jul 2024 19:20)  melatonin 5 mg oral tablet: 1 tab(s) orally once a day (at bedtime) (04 Jul 2024 19:20)  metFORMIN 500 mg oral tablet: 1 tab(s) orally 2 times a day (04 Jul 2024 19:20)  Polyethylene Glycol 3350: once a day (04 Jul 2024 19:20)  RisperDAL 0.5 mg oral tablet: 1 tab(s) orally 2 times a day give with the 2mg dose twice a day for a total of 2.5mg twice a day (04 Jul 2024 19:20)  RisperDAL 2 mg oral tablet: 1 tab(s) orally 2 times a day give 2mg by oral route two times a day WITH 0.5mg for total of 2.5mg (04 Jul 2024 19:20)  tamsulosin 0.4 mg oral capsule: 1 capsule orally once a day (04 Jul 2024 17:47)  Vascepa 1 g oral capsule: 2 cap(s) orally 2 times a day (04 Jul 2024 17:47)  Vitamin C 500 mg oral capsule: 1 cap(s) orally 2 times a day (04 Jul 2024 17:47)  zinc oxide: apply to sacrum after cleanse with soap and water (04 Jul 2024 19:20)      MEDICATIONS  (STANDING):  apixaban 2.5 milliGRAM(s) Oral every 12 hours  aspirin  chewable 81 milliGRAM(s) Oral daily  atorvastatin 40 milliGRAM(s) Oral at bedtime  aztreonam  IVPB 1000 milliGRAM(s) IV Intermittent every 8 hours  carvedilol 12.5 milliGRAM(s) Oral every 12 hours  dextrose 10% Bolus 125 milliLiter(s) IV Bolus once  dextrose 5%. 1000 milliLiter(s) (100 mL/Hr) IV Continuous <Continuous>  dextrose 5%. 1000 milliLiter(s) (50 mL/Hr) IV Continuous <Continuous>  dextrose 50% Injectable 25 Gram(s) IV Push once  dextrose 50% Injectable 12.5 Gram(s) IV Push once  divalproex  milliGRAM(s) Oral two times a day  finasteride 5 milliGRAM(s) Oral daily  glucagon  Injectable 1 milliGRAM(s) IntraMuscular once  insulin lispro (ADMELOG) corrective regimen sliding scale   SubCutaneous every 4 hours  risperiDONE   Tablet 2 milliGRAM(s) Oral two times a day  sodium chloride 0.9%. 1000 milliLiter(s) (80 mL/Hr) IV Continuous <Continuous>  tamsulosin 0.4 milliGRAM(s) Oral at bedtime    MEDICATIONS  (PRN):  acetaminophen     Tablet .. 650 milliGRAM(s) Oral every 6 hours PRN Temp greater or equal to 38C (100.4F), Mild Pain (1 - 3)  aluminum hydroxide/magnesium hydroxide/simethicone Suspension 30 milliLiter(s) Oral every 4 hours PRN Dyspepsia  dextrose Oral Gel 15 Gram(s) Oral once PRN Blood Glucose LESS THAN 70 milliGRAM(s)/deciliter  melatonin 3 milliGRAM(s) Oral at bedtime PRN Insomnia  ondansetron Injectable 4 milliGRAM(s) IV Push every 8 hours PRN Nausea and/or Vomiting      Diet, NPO:   Except Medications (07-04-24 @ 21:41) [Active]          Vital Signs Last 24 Hrs  T(C): 37.8 (05 Jul 2024 07:30), Max: 39.5 (05 Jul 2024 05:30)  T(F): 100 (05 Jul 2024 07:30), Max: 103.1 (05 Jul 2024 05:30)  HR: 97 (05 Jul 2024 07:30) (93 - 110)  BP: 106/61 (05 Jul 2024 07:30) (93/57 - 110/68)  BP(mean): --  RR: 18 (05 Jul 2024 07:30) (18 - 20)  SpO2: 92% (05 Jul 2024 07:30) (88% - 96%)    Parameters below as of 05 Jul 2024 07:30  Patient On (Oxygen Delivery Method): nasal cannula  O2 Flow (L/min): 3                LABS:                        11.8   6.29  )-----------( 106      ( 05 Jul 2024 05:30 )             36.3     07-05    139  |  106  |  42<H>  ----------------------------<  243<H>  4.0   |  22  |  2.31<H>    Ca    9.1      05 Jul 2024 05:30  Phos  2.8     07-05    TPro  7.3  /  Alb  2.8<L>  /  TBili  0.5  /  DBili  x   /  AST  98<H>  /  ALT  47  /  AlkPhos  71  07-05    PT/INR - ( 05 Jul 2024 05:30 )   PT: 13.5 sec;   INR: 1.21 ratio         PTT - ( 04 Jul 2024 17:10 )  PTT:30.3 sec  Urinalysis Basic - ( 05 Jul 2024 05:30 )    Color: x / Appearance: x / SG: x / pH: x  Gluc: 243 mg/dL / Ketone: x  / Bili: x / Urobili: x   Blood: x / Protein: x / Nitrite: x   Leuk Esterase: x / RBC: x / WBC x   Sq Epi: x / Non Sq Epi: x / Bacteria: x            WBC:  WBC Count: 6.29 K/uL (07-05 @ 05:30)  WBC Count: 7.36 K/uL (07-04 @ 17:10)  WBC Count: 7.98 K/uL (07-03 @ 16:40)      MICROBIOLOGY:  RECENT CULTURES:        CARDIAC MARKERS ( 04 Jul 2024 17:10 )  x     / x     / 3340 U/L / x     / x            PT/INR - ( 05 Jul 2024 05:30 )   PT: 13.5 sec;   INR: 1.21 ratio         PTT - ( 04 Jul 2024 17:10 )  PTT:30.3 sec    Sodium:  Sodium: 139 mmol/L (07-05 @ 05:30)  Sodium: 133 mmol/L (07-04 @ 19:13)  Sodium: 132 mmol/L (07-04 @ 17:10)  Sodium: 137 mmol/L (07-03 @ 16:40)      2.31 mg/dL 07-05 @ 05:30  2.27 mg/dL 07-04 @ 19:13  2.38 mg/dL 07-04 @ 17:10  2.04 mg/dL 07-03 @ 16:40      Hemoglobin:  Hemoglobin: 11.8 g/dL (07-05 @ 05:30)  Hemoglobin: 11.5 g/dL (07-04 @ 17:10)  Hemoglobin: 13.3 g/dL (07-03 @ 16:40)      Platelets: Platelet Count - Automated: 106 K/uL (07-05 @ 05:30)  Platelet Count - Automated: 124 K/uL (07-04 @ 17:10)  Platelet Count - Automated: 134 K/uL (07-03 @ 16:40)      LIVER FUNCTIONS - ( 05 Jul 2024 05:30 )  Alb: 2.8 g/dL / Pro: 7.3 g/dL / ALK PHOS: 71 U/L / ALT: 47 U/L / AST: 98 U/L / GGT: x             Urinalysis Basic - ( 05 Jul 2024 05:30 )    Color: x / Appearance: x / SG: x / pH: x  Gluc: 243 mg/dL / Ketone: x  / Bili: x / Urobili: x   Blood: x / Protein: x / Nitrite: x   Leuk Esterase: x / RBC: x / WBC x   Sq Epi: x / Non Sq Epi: x / Bacteria: x        RADIOLOGY & ADDITIONAL STUDIES:      MICROBIOLOGY:  RECENT CULTURES:

## 2024-07-05 NOTE — SWALLOW BEDSIDE ASSESSMENT ADULT - SWALLOW EVAL: CURRENT DIET
NPO pending speech and swallow evaluation per MD order
NPO pending speech and swallow evaluation, per MD order

## 2024-07-05 NOTE — CONSULT NOTE ADULT - SUBJECTIVE AND OBJECTIVE BOX
Patient is a 64y Male whom presented to the hospital with raymond     PAST MEDICAL & SURGICAL HISTORY:  Bipolar 1 disorder  on Lithium      Hypertension      T2DM (type 2 diabetes mellitus)      HLD (hyperlipidemia)      CHAYITO (obstructive sleep apnea)  diagnosed >25 years ago, non-compliant with CPAP      Diabetic neuropathy  bilateral feet, ankles      Obesity      Rheumatoid arthritis      History of excision of testicular mass  left, 2009      Cataract of left eye          MEDICATIONS  (STANDING):  apixaban 2.5 milliGRAM(s) Oral every 12 hours  aspirin  chewable 81 milliGRAM(s) Oral daily  atorvastatin 40 milliGRAM(s) Oral at bedtime  aztreonam  IVPB 1000 milliGRAM(s) IV Intermittent every 8 hours  carvedilol 12.5 milliGRAM(s) Oral every 12 hours  dextrose 10% Bolus 125 milliLiter(s) IV Bolus once  dextrose 5%. 1000 milliLiter(s) (100 mL/Hr) IV Continuous <Continuous>  dextrose 5%. 1000 milliLiter(s) (50 mL/Hr) IV Continuous <Continuous>  dextrose 50% Injectable 25 Gram(s) IV Push once  dextrose 50% Injectable 12.5 Gram(s) IV Push once  divalproex  milliGRAM(s) Oral two times a day  finasteride 5 milliGRAM(s) Oral daily  glucagon  Injectable 1 milliGRAM(s) IntraMuscular once  insulin lispro (ADMELOG) corrective regimen sliding scale   SubCutaneous three times a day before meals  risperiDONE   Tablet 2 milliGRAM(s) Oral two times a day  sodium chloride 0.9%. 1000 milliLiter(s) (80 mL/Hr) IV Continuous <Continuous>  tamsulosin 0.4 milliGRAM(s) Oral at bedtime      Allergies    penicillin (Hives)  Tegretol (Hypotension; Anaphylaxis)    Intolerances        SOCIAL HISTORY:  Denies ETOh,Smoking,     FAMILY HISTORY:  FH: CAD (coronary artery disease) (Mother)        REVIEW OF SYSTEMS:    CONSTITUTIONAL: No weakness, fevers or chills  RESPIRATORY: No cough, wheezing, hemoptysis; No shortness of breath  CARDIOVASCULAR: No chest pain or palpitations  GASTROINTESTINAL: No abdominal or epigastric pain. No nausea, vomiting,     No diarrhea or constipation. No melena   SKIN: dry      VITAL:  T(C): , Max: 39.5 (07-05-24 @ 05:30)  T(F): , Max: 103.1 (07-05-24 @ 05:30)  HR: 97 (07-05-24 @ 07:30)  BP: 106/61 (07-05-24 @ 07:30)  BP(mean): --  RR: 18 (07-05-24 @ 07:30)  SpO2: 92% (07-05-24 @ 07:30)  Wt(kg): --    I and O's:    Height (cm): 180.3 (07-04 @ 16:45)  Weight (kg): 145.1 (07-04 @ 16:45)  BMI (kg/m2): 44.6 (07-04 @ 16:45)  BSA (m2): 2.57 (07-04 @ 16:45)    PHYSICAL EXAM:    Constitutional: NAD  HEENT: conjunctive   clear   Neck:  No JVD  Respiratory: CTAB  Cardiovascular: S1 and S2  Gastrointestinal: BS+, soft, NT/ND  Extremities: No peripheral edema  Skin: dry   Access: Not applicable    LABS:                        11.8   6.29  )-----------( 106      ( 05 Jul 2024 05:30 )             36.3     07-05    139  |  106  |  42<H>  ----------------------------<  243<H>  4.0   |  22  |  2.31<H>    Ca    9.1      05 Jul 2024 05:30  Phos  2.8     07-05    TPro  7.3  /  Alb  2.8<L>  /  TBili  0.5  /  DBili  x   /  AST  98<H>  /  ALT  47  /  AlkPhos  71  07-05      Urine Studies:  Urinalysis Basic - ( 05 Jul 2024 05:30 )    Color: x / Appearance: x / SG: x / pH: x  Gluc: 243 mg/dL / Ketone: x  / Bili: x / Urobili: x   Blood: x / Protein: x / Nitrite: x   Leuk Esterase: x / RBC: x / WBC x   Sq Epi: x / Non Sq Epi: x / Bacteria: x            RADIOLOGY & ADDITIONAL STUDIES:      MEDICATIONS  (STANDING):  apixaban 2.5 milliGRAM(s) Oral every 12 hours  aspirin  chewable 81 milliGRAM(s) Oral daily  atorvastatin 40 milliGRAM(s) Oral at bedtime  aztreonam  IVPB 1000 milliGRAM(s) IV Intermittent every 8 hours  carvedilol 12.5 milliGRAM(s) Oral every 12 hours  dextrose 10% Bolus 125 milliLiter(s) IV Bolus once  dextrose 5%. 1000 milliLiter(s) (100 mL/Hr) IV Continuous <Continuous>  dextrose 5%. 1000 milliLiter(s) (50 mL/Hr) IV Continuous <Continuous>  dextrose 50% Injectable 25 Gram(s) IV Push once  dextrose 50% Injectable 12.5 Gram(s) IV Push once  divalproex  milliGRAM(s) Oral two times a day  finasteride 5 milliGRAM(s) Oral daily  glucagon  Injectable 1 milliGRAM(s) IntraMuscular once  insulin lispro (ADMELOG) corrective regimen sliding scale   SubCutaneous three times a day before meals  risperiDONE   Tablet 2 milliGRAM(s) Oral two times a day  sodium chloride 0.9%. 1000 milliLiter(s) (80 mL/Hr) IV Continuous <Continuous>  tamsulosin 0.4 milliGRAM(s) Oral at bedtime

## 2024-07-05 NOTE — SWALLOW BEDSIDE ASSESSMENT ADULT - COMMENTS
MD orders received. Chart reviewed. SLP arrived to unit to complete a clinical swallow evaluation at which time pt is receiving bedside echo. This dept to f/u as schedule permits for completion of bedside swallow evaluation.
Pt was alert, cooperative and positioned upright in bed for a clinical assessment of swallow function this AM. Per charting, pt is a "64-year-old male presents with generalized weakness today.  Patient was seen in the ED yesterday for a psychiatric evaluation.  The patient was seen and cleared.  The patient's urinalysis was positive, but the patient was not on antibiotics.  The patient is having some urinary symptoms, but is mostly complaining of cough/URI.  No acute chest pain.  No shortness of breath.  Patient was feeling generalized fatigue and weakness today.  The patient actually felt weak and fell slightly hitting his head on the wall.  No loss of consciousness.  Patient denies any extremity or truncal injury.  No acute chest pain. .  No aggravating or alleviating factors otherwise noted.  No other acute complaints. Pt was found to have a fever this evening, and was sent to the ED for evaluation."     Recent chest CT revealed "LUNGS AND LARGE AIRWAYS: Patent central airways. No pulmonary nodules. Subpleural atelectatic changes, lower lobe motion artifact."    At the time of today's assessment, pt is A&Ox3. He denies dysphagia prior to admission. Oral facial examination revealed scattered dentition, however, pt states this is his baseline and that he is able to masticate dense solids in absence of dentition.

## 2024-07-05 NOTE — CONSULT NOTE ADULT - SUBJECTIVE AND OBJECTIVE BOX
History of Present Illness: The patient is a 64 year old male with a history of HTN, HL, DM, DVT, CHAYITO, RA, bipolar disorder who presents with weakness. He states has had a cough over the last month which has worsened. He also notes feeling lightheaded yesterday and thinks he briefly passed out and fell.     Past Medical/Surgical History:    Medications:  Home Medications:  ascorbic acid 500 mg oral tablet: 1 tab(s) orally 2 times a day (04 Jul 2024 19:20)  aspirin 81 mg oral tablet, chewable: 1 tab(s) orally once a day (04 Jul 2024 17:47)  atorvastatin 40 mg oral tablet: 1 tab(s) orally once a day (04 Jul 2024 17:47)  carvedilol 12.5 mg oral tablet: 1 tab(s) orally 2 times a day (04 Jul 2024 17:47)  Depakote 500 mg oral delayed release tablet: 1 tab(s) orally 2 times a day (04 Jul 2024 19:20)  finasteride 5 mg oral tablet: 1 tab(s) orally once a day (04 Jul 2024 19:20)  HumaLOG 100 units/mL injectable solution: injectable 3 times a day (before meals) sliding scale with fingerstick (04 Jul 2024 19:20)  Lipitor 40 mg oral tablet: 1 tab(s) orally once a day (at bedtime) (04 Jul 2024 19:20)  losartan 25 mg oral tablet: 1 tab(s) orally once a day (04 Jul 2024 19:20)  melatonin:  (04 Jul 2024 19:20)  melatonin 5 mg oral tablet: 1 tab(s) orally once a day (at bedtime) (04 Jul 2024 19:20)  metFORMIN 500 mg oral tablet: 1 tab(s) orally 2 times a day (04 Jul 2024 19:20)  Polyethylene Glycol 3350: once a day (04 Jul 2024 19:20)  RisperDAL 0.5 mg oral tablet: 1 tab(s) orally 2 times a day give with the 2mg dose twice a day for a total of 2.5mg twice a day (04 Jul 2024 19:20)  RisperDAL 2 mg oral tablet: 1 tab(s) orally 2 times a day give 2mg by oral route two times a day WITH 0.5mg for total of 2.5mg (04 Jul 2024 19:20)  tamsulosin 0.4 mg oral capsule: 1 capsule orally once a day (04 Jul 2024 17:47)  Vascepa 1 g oral capsule: 2 cap(s) orally 2 times a day (04 Jul 2024 17:47)  Vitamin C 500 mg oral capsule: 1 cap(s) orally 2 times a day (04 Jul 2024 17:47)  zinc oxide: apply to sacrum after cleanse with soap and water (04 Jul 2024 19:20)      Family History: Non-contributory family history of premature cardiovascular atherosclerotic disease    Social History: No tobacco, alcohol or drug use    Review of Systems:  General: No fevers, chills, weight gain  Skin: No rashes, color changes  Cardiovascular: No chest pain, orthopnea  Respiratory: No shortness of breath, cough  Gastrointestinal: No nausea, abdominal pain  Genitourinary: No incontinence, pain with urination  Musculoskeletal: No pain, swelling, decreased range of motion  Neurological: No headache, weakness  Psychiatric: No depression, anxiety  Endocrine: No weight gain, increased thirst  All other systems are comprehensively negative.    Physical Exam:  Vitals:        Vital Signs Last 24 Hrs  T(C): 37.8 (05 Jul 2024 07:30), Max: 39.5 (05 Jul 2024 05:30)  T(F): 100 (05 Jul 2024 07:30), Max: 103.1 (05 Jul 2024 05:30)  HR: 97 (05 Jul 2024 07:30) (93 - 110)  BP: 106/61 (05 Jul 2024 07:30) (93/57 - 110/68)  BP(mean): --  RR: 18 (05 Jul 2024 07:30) (18 - 20)  SpO2: 92% (05 Jul 2024 07:30) (88% - 96%)    Parameters below as of 05 Jul 2024 07:30  Patient On (Oxygen Delivery Method): nasal cannula  O2 Flow (L/min): 3    General: NAD  HEENT: MMM  Neck: No JVD, no carotid bruit  Lungs: CTAB  CV: RRR, nl S1/S2, no M/R/G  Abdomen: S/NT/ND, +BS  Extremities: No LE edema, no cyanosis  Neuro: AAOx3, non-focal  Skin: No rash    Labs:                        11.8   6.29  )-----------( 106      ( 05 Jul 2024 05:30 )             36.3     07-05    139  |  106  |  42<H>  ----------------------------<  243<H>  4.0   |  22  |  2.31<H>    Ca    9.1      05 Jul 2024 05:30  Phos  2.8     07-05    TPro  7.3  /  Alb  2.8<L>  /  TBili  0.5  /  DBili  x   /  AST  98<H>  /  ALT  47  /  AlkPhos  71  07-05    CARDIAC MARKERS ( 04 Jul 2024 17:10 )  x     / x     / 3340 U/L / x     / x          PT/INR - ( 05 Jul 2024 05:30 )   PT: 13.5 sec;   INR: 1.21 ratio         PTT - ( 04 Jul 2024 17:10 )  PTT:30.3 sec    ECG/Telemetry:      History of Present Illness: The patient is a 64 year old male with a history of HTN, HL, DM, DVT, CHAYITO, RA, bipolar disorder who presents with weakness. He states has had a cough over the last month which has worsened. He has been short of breath at times. He also notes feeling lightheaded yesterday and thinks he briefly passed out and fell.     Past Medical/Surgical History:  HTN, HL, DM, DVT, CHAYITO, RA, bipolar disorder     Medications:  Home Medications:  ascorbic acid 500 mg oral tablet: 1 tab(s) orally 2 times a day (04 Jul 2024 19:20)  aspirin 81 mg oral tablet, chewable: 1 tab(s) orally once a day (04 Jul 2024 17:47)  atorvastatin 40 mg oral tablet: 1 tab(s) orally once a day (04 Jul 2024 17:47)  carvedilol 12.5 mg oral tablet: 1 tab(s) orally 2 times a day (04 Jul 2024 17:47)  Depakote 500 mg oral delayed release tablet: 1 tab(s) orally 2 times a day (04 Jul 2024 19:20)  finasteride 5 mg oral tablet: 1 tab(s) orally once a day (04 Jul 2024 19:20)  HumaLOG 100 units/mL injectable solution: injectable 3 times a day (before meals) sliding scale with fingerstick (04 Jul 2024 19:20)  Lipitor 40 mg oral tablet: 1 tab(s) orally once a day (at bedtime) (04 Jul 2024 19:20)  losartan 25 mg oral tablet: 1 tab(s) orally once a day (04 Jul 2024 19:20)  melatonin:  (04 Jul 2024 19:20)  melatonin 5 mg oral tablet: 1 tab(s) orally once a day (at bedtime) (04 Jul 2024 19:20)  metFORMIN 500 mg oral tablet: 1 tab(s) orally 2 times a day (04 Jul 2024 19:20)  Polyethylene Glycol 3350: once a day (04 Jul 2024 19:20)  RisperDAL 0.5 mg oral tablet: 1 tab(s) orally 2 times a day give with the 2mg dose twice a day for a total of 2.5mg twice a day (04 Jul 2024 19:20)  RisperDAL 2 mg oral tablet: 1 tab(s) orally 2 times a day give 2mg by oral route two times a day WITH 0.5mg for total of 2.5mg (04 Jul 2024 19:20)  tamsulosin 0.4 mg oral capsule: 1 capsule orally once a day (04 Jul 2024 17:47)  Vascepa 1 g oral capsule: 2 cap(s) orally 2 times a day (04 Jul 2024 17:47)  Vitamin C 500 mg oral capsule: 1 cap(s) orally 2 times a day (04 Jul 2024 17:47)  zinc oxide: apply to sacrum after cleanse with soap and water (04 Jul 2024 19:20)      Family History: Non-contributory family history of premature cardiovascular atherosclerotic disease    Social History: No tobacco, alcohol or drug use    Review of Systems:  General: No fevers, chills, weight gain  Skin: No rashes, color changes  Cardiovascular: No chest pain, orthopnea  Respiratory: No shortness of breath, cough  Gastrointestinal: No nausea, abdominal pain  Genitourinary: No incontinence, pain with urination  Musculoskeletal: No pain, swelling, decreased range of motion  Neurological: No headache, weakness  Psychiatric: No depression, anxiety  Endocrine: No weight gain, increased thirst  All other systems are comprehensively negative.    Physical Exam:  Vitals:        Vital Signs Last 24 Hrs  T(C): 37.8 (05 Jul 2024 07:30), Max: 39.5 (05 Jul 2024 05:30)  T(F): 100 (05 Jul 2024 07:30), Max: 103.1 (05 Jul 2024 05:30)  HR: 97 (05 Jul 2024 07:30) (93 - 110)  BP: 106/61 (05 Jul 2024 07:30) (93/57 - 110/68)  BP(mean): --  RR: 18 (05 Jul 2024 07:30) (18 - 20)  SpO2: 92% (05 Jul 2024 07:30) (88% - 96%)  Parameters below as of 05 Jul 2024 07:30  Patient On (Oxygen Delivery Method): nasal cannula  O2 Flow (L/min): 3  General: NAD  HEENT: MMM  Neck: No JVD, no carotid bruit  Lungs: CTAB  CV: RRR, nl S1/S2, no M/R/G  Abdomen: S/NT/ND, +BS  Extremities: No LE edema, no cyanosis  Neuro: AAOx3, non-focal  Skin: No rash    Labs:                        11.8   6.29  )-----------( 106      ( 05 Jul 2024 05:30 )             36.3     07-05    139  |  106  |  42<H>  ----------------------------<  243<H>  4.0   |  22  |  2.31<H>    Ca    9.1      05 Jul 2024 05:30  Phos  2.8     07-05    TPro  7.3  /  Alb  2.8<L>  /  TBili  0.5  /  DBili  x   /  AST  98<H>  /  ALT  47  /  AlkPhos  71  07-05    CARDIAC MARKERS ( 04 Jul 2024 17:10 )  x     / x     / 3340 U/L / x     / x          PT/INR - ( 05 Jul 2024 05:30 )   PT: 13.5 sec;   INR: 1.21 ratio         PTT - ( 04 Jul 2024 17:10 )  PTT:30.3 sec    ECG/Telemetry: NSR, LPFB, no ST abnormality

## 2024-07-05 NOTE — CONSULT NOTE ADULT - SUBJECTIVE AND OBJECTIVE BOX
[facility]ES  JORDY Vo S. Shah, Y. Patel, G. [facility]  [phone]  ([phone] - weekdays after 5pm and weekends)    MARGI ART  64y, Male  [id number]    HPI:  Patient is a 64 year old male presents with generalized weakness.  Patient was seen in the ED yesterday for a psychiatric evaluation.  The patient was seen and cleared.  The patient's urinalysis was positive, but the patient was not on antibiotics.  The patient is having some urinary symptoms, but is mostly complaining of cough/URI.  No acute chest pain.  No shortness of breath.  Patient was feeling generalized fatigue and weakness today.  The patient actually felt weak and fell slightly hitting his head on the wall.  No loss of consciousness.  Patient denies any extremity or truncal injury.  No acute chest pain. .  No aggravating or alleviating factors otherwise noted.  No other acute complaints. Patient was found to have a fever this evening, and was sent to the ED for evaluation.   ROS: 14 point review of systems completed, pertinent positives and negatives as per HPI.    Allergies: penicillin (Hives)  Tegretol (Hypotension; Anaphylaxis)    PMH -- Bipolar 1 disorder  Diabetes  BPH (benign prostatic hyperplasia)  Hypertension  Kidney stone  T2DM (type 2 diabetes mellitus)  HLD (hyperlipidemia)  CHAYITO (obstructive sleep apnea)  Diabetic neuropathy  Obesity  Primary osteoarthritis of left knee  Rheumatoid arthritis    PSH -- History of excision of testicular mass  Cataract of left eye    FH -- FH: CAD (coronary artery disease) (Mother)  Social History -- denies tobacco, alcohol or illicit drug use    Physical Exam--  Vital Signs Last 24 Hrs  T(F): 100.2 (05 Jul 2024 16:33), Max: 103.1 (05 Jul 2024 05:30)  HR: 96 (05 Jul 2024 17:15) (93 - 109)  BP: 101/57 (05 Jul 2024 17:15) (100/59 - 110/68)  RR: 18 (05 Jul 2024 16:33) (18 - 20)  SpO2: 95% (05 Jul 2024 16:33) (92% - 96%)  General: no acute distress  HEENT: NC/AT, EOMI, anicteric  Lungs: clear to auscultation bilaterally  Heart: S1, S2 present, normal rate/rhythm  Abdomen: Soft. ND. NT. BS present.   Neuro: AAOx3, no obvious focal deficits   Extremities: No cyanosis. No edema.   Skin: Warm. Dry. No visible rash.   Lines: PIV    Laboratory & Imaging Data--  CBC:                       11.8   6.29  )-----------( 106      ( 05 Jul 2024 05:30 )             36.3     WBC Count: 6.29 K/uL (07-05-24 @ 05:30)  WBC Count: 7.36 K/uL (07-04-24 @ 17:10)  WBC Count: 7.98 K/uL (07-03-24 @ 16:40)    CMP: 07-05    139  |  106  |  42<H>  ----------------------------<  243<H>  4.0   |  22  |  2.31<H>    Ca    9.1      05 Jul 2024 05:30  Phos  2.8     07-05    TPro  7.3  /  Alb  2.8<L>  /  TBili  0.5  /  DBili  x   /  AST  98<H>  /  ALT  47  /  AlkPhos  71  07-05    LIVER FUNCTIONS - ( 05 Jul 2024 05:30 )  Alb: 2.8 g/dL / Pro: 7.3 g/dL / ALK PHOS: 71 U/L / ALT: 47 U/L / AST: 98 U/L / GGT: x           Urinalysis (07.04.24 @ 18:11)    Blood, Urine: Moderate   pH Urine: 6.0   Glucose Qualitative, Urine: >=1000 mg/dL   Color: Yellow   Urine Appearance: Clear   Bilirubin: Negative   Ketone - Urine: Negative mg/dL   Specific Gravity: 1.010   Protein, Urine: 30 mg/dL   Urobilinogen: 0.2 mg/dL   Nitrite: Negative   Leukocyte Esterase Concentration: Moderate  Urine Microscopic-Add On (NC) (07.04.24 @ 18:11)    Bacteria: Few /HPF   Squamous Epithelial Cells: Present   Red Blood Cell - Urine: 2 /HPF   White Blood Cell - Urine: 42 /HPF    Microbiology: reviewed  Culture - Blood (collected 07-04-24 @ 17:10)  Source: .Blood Blood-Peripheral  Gram Stain (07-05-24 @ 13:59):    Growth in aerobic bottle: Gram Negative Rods  Preliminary Report (07-05-24 @ 13:59):    Growth in aerobic bottle: Gram Negative Rods    Direct identification is available within approximately 3-5    hours either by Blood Panel Multiplexed PCR or Direct    MALDI-TOF. Details: [email or web address]  Organism: Blood Culture PCR (07-05-24 @ 16:30)  Organism: Blood Culture PCR (07-05-24 @ 16:30)      -  Escherichia coli: Detec      Method Type: PCR    Urinalysis with Rflx Culture (collected 06-25-24 @ 13:16)    SARS-CoV-2 Result: NotDete (04 Jul 2024 17:10)    Radiology--reviewed  < from: US Renal (07.05.24 @ 07:19) >  IMPRESSION:  No hydronephrosis. Increased cortical echogenicity suggestive of renal   parenchymal disease.    < end of copied text >  < from: CT Chest Abdomen and Pelvis No Cont (07.04.24 @ 18:05) >    IMPRESSION:  Mild perinephric stranding bilaterally and thickening of the urothelium   is nonspecific but can be seen with urinary tract infection. Correlate   with urinalysis.    Otherwise, no CT morphologically evident source of infection in the   chest, abdomen or pelvis.    < end of copied text >    < from: CT Head No Cont (07.04.24 @ 18:04) >  IMPRESSION:  HEAD CT:  No evidence of an acute traumatic intracranial injury.  CERVICAL SPINE CT: Limited by motion and body habitus. No evidence of an   acute cervical spine fracture.  FACIAL CT: Limited by motion. No evidence of an acute facial fracture.    < end of copied text >  < from: Xray Chest 1 View- PORTABLE-Urgent (07.04.24 @ 17:33) >  IMPRESSION:  No active parenchymal disease in the chest.    < end of copied text >    Active Medications--  acetaminophen     Tablet .. 650 milliGRAM(s) Oral every 6 hours PRN  aluminum hydroxide/magnesium hydroxide/simethicone Suspension 30 milliLiter(s) Oral every 4 hours PRN  apixaban 2.5 milliGRAM(s) Oral every 12 hours  aspirin  chewable 81 milliGRAM(s) Oral daily  atorvastatin 40 milliGRAM(s) Oral at bedtime  aztreonam  IVPB 1000 milliGRAM(s) IV Intermittent every 8 hours  carvedilol 12.5 milliGRAM(s) Oral every 12 hours  dextrose 10% Bolus 125 milliLiter(s) IV Bolus once  dextrose 5%. 1000 milliLiter(s) IV Continuous <Continuous>  dextrose 5%. 1000 milliLiter(s) IV Continuous <Continuous>  dextrose 50% Injectable 25 Gram(s) IV Push once  dextrose 50% Injectable 12.5 Gram(s) IV Push once  dextrose Oral Gel 15 Gram(s) Oral once PRN  divalproex  milliGRAM(s) Oral two times a day  finasteride 5 milliGRAM(s) Oral daily  glucagon  Injectable 1 milliGRAM(s) IntraMuscular once  insulin lispro (ADMELOG) corrective regimen sliding scale   SubCutaneous three times a day before meals  melatonin 3 milliGRAM(s) Oral at bedtime PRN  ondansetron Injectable 4 milliGRAM(s) IV Push every 8 hours PRN  risperiDONE   Tablet 2 milliGRAM(s) Oral two times a day  sodium chloride 0.9%. 1000 milliLiter(s) IV Continuous <Continuous>  tamsulosin 0.4 milliGRAM(s) Oral at bedtime    Current Antimicrobials:   aztreonam  IVPB 1000 milliGRAM(s) IV Intermittent every 8 hours    Prior/Completed Antimicrobials:  aztreonam  IVPB  vancomycin  IVPB.

## 2024-07-06 LAB
A1C WITH ESTIMATED AVERAGE GLUCOSE RESULT: 10.4 % — HIGH (ref 4–5.6)
ALBUMIN SERPL ELPH-MCNC: 2.5 G/DL — LOW (ref 3.3–5)
ALP SERPL-CCNC: 65 U/L — SIGNIFICANT CHANGE UP (ref 30–120)
ALT FLD-CCNC: 137 U/L — HIGH (ref 10–60)
ANION GAP SERPL CALC-SCNC: 13 MMOL/L — SIGNIFICANT CHANGE UP (ref 5–17)
AST SERPL-CCNC: 137 U/L — HIGH (ref 10–40)
BASOPHILS # BLD AUTO: 0.03 K/UL — SIGNIFICANT CHANGE UP (ref 0–0.2)
BASOPHILS NFR BLD AUTO: 0.6 % — SIGNIFICANT CHANGE UP (ref 0–2)
BILIRUB SERPL-MCNC: 0.4 MG/DL — SIGNIFICANT CHANGE UP (ref 0.2–1.2)
BUN SERPL-MCNC: 40 MG/DL — HIGH (ref 7–23)
CALCIUM SERPL-MCNC: 9.3 MG/DL — SIGNIFICANT CHANGE UP (ref 8.4–10.5)
CHLORIDE SERPL-SCNC: 106 MMOL/L — SIGNIFICANT CHANGE UP (ref 96–108)
CK SERPL-CCNC: 4613 U/L — HIGH (ref 39–308)
CO2 SERPL-SCNC: 20 MMOL/L — LOW (ref 22–31)
CREAT SERPL-MCNC: 2.24 MG/DL — HIGH (ref 0.5–1.3)
EGFR: 32 ML/MIN/1.73M2 — LOW
EOSINOPHIL # BLD AUTO: 0.06 K/UL — SIGNIFICANT CHANGE UP (ref 0–0.5)
EOSINOPHIL NFR BLD AUTO: 1.1 % — SIGNIFICANT CHANGE UP (ref 0–6)
ESTIMATED AVERAGE GLUCOSE: 252 MG/DL — HIGH (ref 68–114)
GLUCOSE BLDC GLUCOMTR-MCNC: 291 MG/DL — HIGH (ref 70–99)
GLUCOSE BLDC GLUCOMTR-MCNC: 295 MG/DL — HIGH (ref 70–99)
GLUCOSE BLDC GLUCOMTR-MCNC: 313 MG/DL — HIGH (ref 70–99)
GLUCOSE BLDC GLUCOMTR-MCNC: 413 MG/DL — HIGH (ref 70–99)
GLUCOSE BLDC GLUCOMTR-MCNC: 438 MG/DL — HIGH (ref 70–99)
GLUCOSE BLDC GLUCOMTR-MCNC: 466 MG/DL — CRITICAL HIGH (ref 70–99)
GLUCOSE BLDC GLUCOMTR-MCNC: 521 MG/DL — CRITICAL HIGH (ref 70–99)
GLUCOSE SERPL-MCNC: 326 MG/DL — HIGH (ref 70–99)
HCT VFR BLD CALC: 34.1 % — LOW (ref 39–50)
HGB BLD-MCNC: 11.1 G/DL — LOW (ref 13–17)
IMM GRANULOCYTES NFR BLD AUTO: 1.3 % — HIGH (ref 0–0.9)
LYMPHOCYTES # BLD AUTO: 0.83 K/UL — LOW (ref 1–3.3)
LYMPHOCYTES # BLD AUTO: 15.9 % — SIGNIFICANT CHANGE UP (ref 13–44)
MCHC RBC-ENTMCNC: 29.3 PG — SIGNIFICANT CHANGE UP (ref 27–34)
MCHC RBC-ENTMCNC: 32.6 GM/DL — SIGNIFICANT CHANGE UP (ref 32–36)
MCV RBC AUTO: 90 FL — SIGNIFICANT CHANGE UP (ref 80–100)
MONOCYTES # BLD AUTO: 1.21 K/UL — HIGH (ref 0–0.9)
MONOCYTES NFR BLD AUTO: 23.2 % — HIGH (ref 2–14)
NEUTROPHILS # BLD AUTO: 3.02 K/UL — SIGNIFICANT CHANGE UP (ref 1.8–7.4)
NEUTROPHILS NFR BLD AUTO: 57.9 % — SIGNIFICANT CHANGE UP (ref 43–77)
NRBC # BLD: 0 /100 WBCS — SIGNIFICANT CHANGE UP (ref 0–0)
PLATELET # BLD AUTO: 103 K/UL — LOW (ref 150–400)
POTASSIUM SERPL-MCNC: 4.1 MMOL/L — SIGNIFICANT CHANGE UP (ref 3.5–5.3)
POTASSIUM SERPL-SCNC: 4.1 MMOL/L — SIGNIFICANT CHANGE UP (ref 3.5–5.3)
PROT SERPL-MCNC: 6.8 G/DL — SIGNIFICANT CHANGE UP (ref 6–8.3)
RBC # BLD: 3.79 M/UL — LOW (ref 4.2–5.8)
RBC # FLD: 13.6 % — SIGNIFICANT CHANGE UP (ref 10.3–14.5)
SODIUM SERPL-SCNC: 139 MMOL/L — SIGNIFICANT CHANGE UP (ref 135–145)
SODIUM UR-SCNC: <20 MMOL/L — SIGNIFICANT CHANGE UP
WBC # BLD: 5.22 K/UL — SIGNIFICANT CHANGE UP (ref 3.8–10.5)
WBC # FLD AUTO: 5.22 K/UL — SIGNIFICANT CHANGE UP (ref 3.8–10.5)

## 2024-07-06 RX ORDER — INSULIN GLARGINE 100 [IU]/ML
5 INJECTION, SOLUTION SUBCUTANEOUS EVERY MORNING
Refills: 0 | Status: DISCONTINUED | OUTPATIENT
Start: 2024-07-06 | End: 2024-07-08

## 2024-07-06 RX ORDER — INSULIN LISPRO 100 [IU]/ML
12 INJECTION, SOLUTION SUBCUTANEOUS ONCE
Refills: 0 | Status: COMPLETED | OUTPATIENT
Start: 2024-07-06 | End: 2024-07-06

## 2024-07-06 RX ORDER — INSULIN GLARGINE 100 [IU]/ML
5 INJECTION, SOLUTION SUBCUTANEOUS AT BEDTIME
Refills: 0 | Status: DISCONTINUED | OUTPATIENT
Start: 2024-07-06 | End: 2024-07-08

## 2024-07-06 RX ORDER — INSULIN LISPRO 100 [IU]/ML
15 INJECTION, SOLUTION SUBCUTANEOUS ONCE
Refills: 0 | Status: COMPLETED | OUTPATIENT
Start: 2024-07-06 | End: 2024-07-06

## 2024-07-06 RX ADMIN — Medication 50 MILLIGRAM(S): at 03:20

## 2024-07-06 RX ADMIN — INSULIN GLARGINE 5 UNIT(S): 100 INJECTION, SOLUTION SUBCUTANEOUS at 21:23

## 2024-07-06 RX ADMIN — INSULIN LISPRO 15 UNIT(S): 100 INJECTION, SOLUTION SUBCUTANEOUS at 13:51

## 2024-07-06 RX ADMIN — Medication 5 MILLIGRAM(S): at 17:44

## 2024-07-06 RX ADMIN — CARVEDILOL PHOSPHATE 12.5 MILLIGRAM(S): 80 CAPSULE, EXTENDED RELEASE ORAL at 17:44

## 2024-07-06 RX ADMIN — INSULIN LISPRO 6: 100 INJECTION, SOLUTION SUBCUTANEOUS at 08:16

## 2024-07-06 RX ADMIN — INSULIN LISPRO 1: 100 INJECTION, SOLUTION SUBCUTANEOUS at 21:22

## 2024-07-06 RX ADMIN — Medication 500 MILLIGRAM(S): at 17:45

## 2024-07-06 RX ADMIN — ASPIRIN 81 MILLIGRAM(S): 325 TABLET, FILM COATED ORAL at 13:55

## 2024-07-06 RX ADMIN — APIXABAN 2.5 MILLIGRAM(S): 5 TABLET, FILM COATED ORAL at 05:22

## 2024-07-06 RX ADMIN — RISPERIDONE 2 MILLIGRAM(S): 0.5 TABLET ORAL at 17:44

## 2024-07-06 RX ADMIN — TAMSULOSIN HYDROCHLORIDE 0.4 MILLIGRAM(S): 0.4 CAPSULE ORAL at 21:11

## 2024-07-06 RX ADMIN — ATORVASTATIN CALCIUM 40 MILLIGRAM(S): 20 TABLET, FILM COATED ORAL at 21:11

## 2024-07-06 RX ADMIN — Medication 50 MILLIGRAM(S): at 11:38

## 2024-07-06 RX ADMIN — APIXABAN 2.5 MILLIGRAM(S): 5 TABLET, FILM COATED ORAL at 17:44

## 2024-07-06 RX ADMIN — Medication 500 MILLIGRAM(S): at 05:22

## 2024-07-06 RX ADMIN — RISPERIDONE 2 MILLIGRAM(S): 0.5 TABLET ORAL at 05:22

## 2024-07-06 RX ADMIN — INSULIN LISPRO 12 UNIT(S): 100 INJECTION, SOLUTION SUBCUTANEOUS at 14:43

## 2024-07-06 RX ADMIN — INSULIN LISPRO 12: 100 INJECTION, SOLUTION SUBCUTANEOUS at 14:41

## 2024-07-06 RX ADMIN — CARVEDILOL PHOSPHATE 12.5 MILLIGRAM(S): 80 CAPSULE, EXTENDED RELEASE ORAL at 05:22

## 2024-07-06 RX ADMIN — INSULIN LISPRO 8: 100 INJECTION, SOLUTION SUBCUTANEOUS at 17:45

## 2024-07-06 RX ADMIN — Medication 50 MILLIGRAM(S): at 20:20

## 2024-07-06 RX ADMIN — Medication 650 MILLIGRAM(S): at 07:26

## 2024-07-06 RX ADMIN — Medication 650 MILLIGRAM(S): at 06:56

## 2024-07-06 NOTE — PROVIDER CONTACT NOTE (MEDICATION) - RECOMMENDATIONS
Dr. Uriostegui made aware, order for single dose of Admelog 12 Units now then follow sliding scale orders. Dr. Uriostegui will readjust meds and scale.
Give Admelog 15 Units recheck accucheck in 2  hours

## 2024-07-06 NOTE — DIETITIAN INITIAL EVALUATION ADULT - PERTINENT MEDS FT
MEDICATIONS  (STANDING):  apixaban 2.5 milliGRAM(s) Oral every 12 hours  aspirin  chewable 81 milliGRAM(s) Oral daily  atorvastatin 40 milliGRAM(s) Oral at bedtime  aztreonam  IVPB 1000 milliGRAM(s) IV Intermittent every 8 hours  carvedilol 12.5 milliGRAM(s) Oral every 12 hours  dextrose 10% Bolus 125 milliLiter(s) IV Bolus once  dextrose 5%. 1000 milliLiter(s) (100 mL/Hr) IV Continuous <Continuous>  dextrose 5%. 1000 milliLiter(s) (50 mL/Hr) IV Continuous <Continuous>  dextrose 50% Injectable 25 Gram(s) IV Push once  dextrose 50% Injectable 12.5 Gram(s) IV Push once  divalproex  milliGRAM(s) Oral two times a day  finasteride 5 milliGRAM(s) Oral daily  glucagon  Injectable 1 milliGRAM(s) IntraMuscular once  insulin lispro (ADMELOG) corrective regimen sliding scale   SubCutaneous three times a day before meals  insulin lispro (ADMELOG) corrective regimen sliding scale   SubCutaneous at bedtime  risperiDONE   Tablet 2 milliGRAM(s) Oral two times a day  sodium chloride 0.9%. 1000 milliLiter(s) (80 mL/Hr) IV Continuous <Continuous>  tamsulosin 0.4 milliGRAM(s) Oral at bedtime    MEDICATIONS  (PRN):  acetaminophen     Tablet .. 650 milliGRAM(s) Oral every 6 hours PRN Temp greater or equal to 38C (100.4F), Mild Pain (1 - 3)  aluminum hydroxide/magnesium hydroxide/simethicone Suspension 30 milliLiter(s) Oral every 4 hours PRN Dyspepsia  dextrose Oral Gel 15 Gram(s) Oral once PRN Blood Glucose LESS THAN 70 milliGRAM(s)/deciliter  melatonin 3 milliGRAM(s) Oral at bedtime PRN Insomnia  ondansetron Injectable 4 milliGRAM(s) IV Push every 8 hours PRN Nausea and/or Vomiting

## 2024-07-06 NOTE — DIETITIAN INITIAL EVALUATION ADULT - ADD RECOMMEND
1. Continue with current diet order  2. Reinforce diet edu as needed  3. Encourage po intake as needed

## 2024-07-06 NOTE — DIETITIAN INITIAL EVALUATION ADULT - REASON FOR ADMISSION
HPI:  64-year-old male presents with generalized weakness today.  Patient was seen in the ED yesterday for a psychiatric evaluation.  The patient was seen and cleared.  The patient's urinalysis was positive, but the patient was not on antibiotics.  The patient is having some urinary symptoms, but is mostly complaining of cough/URI.  No acute chest pain.  No shortness of breath.  Patient was feeling generalized fatigue and weakness today.  The patient actually felt weak and fell slightly hitting his head on the wall.  No loss of consciousness.  Patient denies any extremity or truncal injury.  No acute chest pain. .  No aggravating or alleviating factors otherwise noted.  No other acute complaints.  Patient was found to have a fever this evening, and was sent to the ED for evaluation. Admitted for neurology eval , ID cons requetsed , septic workup sent  (05 Jul 2024 08:14)

## 2024-07-06 NOTE — PROGRESS NOTE ADULT - SUBJECTIVE AND OBJECTIVE BOX
Patient is a 64y Male whom presented to the hospital with raymond     PAST MEDICAL & SURGICAL HISTORY:  Bipolar 1 disorder  on Lithium      Hypertension      T2DM (type 2 diabetes mellitus)      HLD (hyperlipidemia)      CHAYITO (obstructive sleep apnea)  diagnosed >25 years ago, non-compliant with CPAP      Diabetic neuropathy  bilateral feet, ankles      Obesity      Rheumatoid arthritis      History of excision of testicular mass  left, 2009      Cataract of left eye          MEDICATIONS  (STANDING):  apixaban 2.5 milliGRAM(s) Oral every 12 hours  aspirin  chewable 81 milliGRAM(s) Oral daily  atorvastatin 40 milliGRAM(s) Oral at bedtime  aztreonam  IVPB 1000 milliGRAM(s) IV Intermittent every 8 hours  carvedilol 12.5 milliGRAM(s) Oral every 12 hours  dextrose 10% Bolus 125 milliLiter(s) IV Bolus once  dextrose 5%. 1000 milliLiter(s) (100 mL/Hr) IV Continuous <Continuous>  dextrose 5%. 1000 milliLiter(s) (50 mL/Hr) IV Continuous <Continuous>  dextrose 50% Injectable 25 Gram(s) IV Push once  dextrose 50% Injectable 12.5 Gram(s) IV Push once  divalproex  milliGRAM(s) Oral two times a day  finasteride 5 milliGRAM(s) Oral daily  glucagon  Injectable 1 milliGRAM(s) IntraMuscular once  insulin lispro (ADMELOG) corrective regimen sliding scale   SubCutaneous three times a day before meals  risperiDONE   Tablet 2 milliGRAM(s) Oral two times a day  sodium chloride 0.9%. 1000 milliLiter(s) (80 mL/Hr) IV Continuous <Continuous>  tamsulosin 0.4 milliGRAM(s) Oral at bedtime      Allergies    penicillin (Hives)  Tegretol (Hypotension; Anaphylaxis)    Intolerances        SOCIAL HISTORY:  Denies ETOh,Smoking,     FAMILY HISTORY:  FH: CAD (coronary artery disease) (Mother)        REVIEW OF SYSTEMS:    CONSTITUTIONAL: No weakness, fevers or chills  RESPIRATORY: No cough, wheezing, hemoptysis; No shortness of breath  CARDIOVASCULAR: No chest pain or palpitations  GASTROINTESTINAL: No abdominal or epigastric pain. No nausea, vomiting,     No diarrhea or constipation. No melena   SKIN: dry                            11.1   5.22  )-----------( 103      ( 06 Jul 2024 06:00 )             34.1       CBC Full  -  ( 06 Jul 2024 06:00 )  WBC Count : 5.22 K/uL  RBC Count : 3.79 M/uL  Hemoglobin : 11.1 g/dL  Hematocrit : 34.1 %  Platelet Count - Automated : 103 K/uL  Mean Cell Volume : 90.0 fl  Mean Cell Hemoglobin : 29.3 pg  Mean Cell Hemoglobin Concentration : 32.6 gm/dL  Auto Neutrophil # : 3.02 K/uL  Auto Lymphocyte # : 0.83 K/uL  Auto Monocyte # : 1.21 K/uL  Auto Eosinophil # : 0.06 K/uL  Auto Basophil # : 0.03 K/uL  Auto Neutrophil % : 57.9 %  Auto Lymphocyte % : 15.9 %  Auto Monocyte % : 23.2 %  Auto Eosinophil % : 1.1 %  Auto Basophil % : 0.6 %      07-06    139  |  106  |  40<H>  ----------------------------<  326<H>  4.1   |  20<L>  |  2.24<H>    Ca    9.3      06 Jul 2024 06:00  Phos  2.8     07-05    TPro  6.8  /  Alb  2.5<L>  /  TBili  0.4  /  DBili  x   /  AST  137<H>  /  ALT  137<H>  /  AlkPhos  65  07-06      CAPILLARY BLOOD GLUCOSE      POCT Blood Glucose.: 521 mg/dL (06 Jul 2024 11:42)  POCT Blood Glucose.: 466 mg/dL (06 Jul 2024 11:40)  POCT Blood Glucose.: 295 mg/dL (06 Jul 2024 08:03)  POCT Blood Glucose.: 317 mg/dL (05 Jul 2024 23:29)  POCT Blood Glucose.: 309 mg/dL (05 Jul 2024 21:20)  POCT Blood Glucose.: 336 mg/dL (05 Jul 2024 17:15)      Vital Signs Last 24 Hrs  T(C): 37.6 (06 Jul 2024 05:12), Max: 38.9 (05 Jul 2024 14:37)  T(F): 99.7 (06 Jul 2024 05:12), Max: 102 (05 Jul 2024 14:37)  HR: 95 (06 Jul 2024 05:12) (87 - 100)  BP: 130/76 (06 Jul 2024 05:12) (100/59 - 130/76)  BP(mean): --  RR: 17 (06 Jul 2024 05:12) (17 - 18)  SpO2: 91% (06 Jul 2024 05:12) (91% - 95%)    Parameters below as of 06 Jul 2024 05:12  Patient On (Oxygen Delivery Method): room air        Urinalysis Basic - ( 06 Jul 2024 06:00 )    Color: x / Appearance: x / SG: x / pH: x  Gluc: 326 mg/dL / Ketone: x  / Bili: x / Urobili: x   Blood: x / Protein: x / Nitrite: x   Leuk Esterase: x / RBC: x / WBC x   Sq Epi: x / Non Sq Epi: x / Bacteria: x        PT/INR - ( 05 Jul 2024 05:30 )   PT: 13.5 sec;   INR: 1.21 ratio         PTT - ( 04 Jul 2024 17:10 )  PTT:30.3 sec      PHYSICAL EXAM:    Constitutional: NAD  HEENT: conjunctive   clear   Neck:  No JVD  Respiratory: CTAB  Cardiovascular: S1 and S2  Gastrointestinal: BS+, soft, NT/ND  Extremities: No peripheral edema  Skin: dry   Access: Not applicable

## 2024-07-06 NOTE — PROGRESS NOTE ADULT - SUBJECTIVE AND OBJECTIVE BOX
Neurology follow up note    MARGI [id number]      Interval History:    Patient feels ok no new complaints.    Allergies    penicillin (Hives)  Tegretol (Hypotension; Anaphylaxis)    Intolerances        MEDICATIONS    acetaminophen     Tablet .. 650 milliGRAM(s) Oral every 6 hours PRN  aluminum hydroxide/magnesium hydroxide/simethicone Suspension 30 milliLiter(s) Oral every 4 hours PRN  apixaban 2.5 milliGRAM(s) Oral every 12 hours  aspirin  chewable 81 milliGRAM(s) Oral daily  atorvastatin 40 milliGRAM(s) Oral at bedtime  aztreonam  IVPB 1000 milliGRAM(s) IV Intermittent every 8 hours  carvedilol 12.5 milliGRAM(s) Oral every 12 hours  dextrose 10% Bolus 125 milliLiter(s) IV Bolus once  dextrose 5%. 1000 milliLiter(s) IV Continuous <Continuous>  dextrose 5%. 1000 milliLiter(s) IV Continuous <Continuous>  dextrose 50% Injectable 25 Gram(s) IV Push once  dextrose 50% Injectable 12.5 Gram(s) IV Push once  dextrose Oral Gel 15 Gram(s) Oral once PRN  divalproex  milliGRAM(s) Oral two times a day  finasteride 5 milliGRAM(s) Oral daily  glucagon  Injectable 1 milliGRAM(s) IntraMuscular once  insulin lispro (ADMELOG) corrective regimen sliding scale   SubCutaneous three times a day before meals  insulin lispro (ADMELOG) corrective regimen sliding scale   SubCutaneous at bedtime  melatonin 3 milliGRAM(s) Oral at bedtime PRN  ondansetron Injectable 4 milliGRAM(s) IV Push every 8 hours PRN  risperiDONE   Tablet 2 milliGRAM(s) Oral two times a day  sodium chloride 0.9%. 1000 milliLiter(s) IV Continuous <Continuous>  tamsulosin 0.4 milliGRAM(s) Oral at bedtime              Vital Signs Last 24 Hrs  T(C): 37.6 (06 Jul 2024 05:12), Max: 38.9 (05 Jul 2024 14:37)  T(F): 99.7 (06 Jul 2024 05:12), Max: 102 (05 Jul 2024 14:37)  HR: 95 (06 Jul 2024 05:12) (87 - 100)  BP: 130/76 (06 Jul 2024 05:12) (100/59 - 130/76)  BP(mean): --  RR: 17 (06 Jul 2024 05:12) (17 - 18)  SpO2: 91% (06 Jul 2024 05:12) (91% - 95%)    Parameters below as of 06 Jul 2024 05:12  Patient On (Oxygen Delivery Method): room air                      LABS:  CBC Full  -  ( 06 Jul 2024 06:00 )  WBC Count : 5.22 K/uL  RBC Count : 3.79 M/uL  Hemoglobin : 11.1 g/dL  Hematocrit : 34.1 %  Platelet Count - Automated : 103 K/uL  Mean Cell Volume : 90.0 fl  Mean Cell Hemoglobin : 29.3 pg  Mean Cell Hemoglobin Concentration : 32.6 gm/dL  Auto Neutrophil # : 3.02 K/uL  Auto Lymphocyte # : 0.83 K/uL  Auto Monocyte # : 1.21 K/uL  Auto Eosinophil # : 0.06 K/uL  Auto Basophil # : 0.03 K/uL  Auto Neutrophil % : 57.9 %  Auto Lymphocyte % : 15.9 %  Auto Monocyte % : 23.2 %  Auto Eosinophil % : 1.1 %  Auto Basophil % : 0.6 %    Urinalysis Basic - ( 06 Jul 2024 06:00 )    Color: x / Appearance: x / SG: x / pH: x  Gluc: 326 mg/dL / Ketone: x  / Bili: x / Urobili: x   Blood: x / Protein: x / Nitrite: x   Leuk Esterase: x / RBC: x / WBC x   Sq Epi: x / Non Sq Epi: x / Bacteria: x      07-06    139  |  106  |  40<H>  ----------------------------<  326<H>  4.1   |  20<L>  |  2.24<H>    Ca    9.3      06 Jul 2024 06:00  Phos  2.8     07-05    TPro  6.8  /  Alb  2.5<L>  /  TBili  0.4  /  DBili  x   /  AST  137<H>  /  ALT  137<H>  /  AlkPhos  65  07-06    Hemoglobin A1C:     LIVER FUNCTIONS - ( 06 Jul 2024 06:00 )  Alb: 2.5 g/dL / Pro: 6.8 g/dL / ALK PHOS: 65 U/L / ALT: 137 U/L / AST: 137 U/L / GGT: x           Vitamin B12   PT/INR - ( 05 Jul 2024 05:30 )   PT: 13.5 sec;   INR: 1.21 ratio         PTT - ( 04 Jul 2024 17:10 )  PTT:30.3 sec      RADIOLOGY    PATIENT HAS NOT YET BEEN SEEN AND EXAMINED TODAY. NOTE AND CHART REVIEWED IN AM AND EXAM FORM PREVIOUS.  ONCE PATIENT SEEN, CHART WILL BE UPDATE AT PRESENT NOTE IS INCOMPLETE     Neurology follow up note    MARGI [id number]      Interval History:    Patient feels ok no new complaints.    MEDICATIONS    acetaminophen     Tablet .. 650 milliGRAM(s) Oral every 6 hours PRN  aluminum hydroxide/magnesium hydroxide/simethicone Suspension 30 milliLiter(s) Oral every 4 hours PRN  apixaban 2.5 milliGRAM(s) Oral every 12 hours  aspirin  chewable 81 milliGRAM(s) Oral daily  atorvastatin 40 milliGRAM(s) Oral at bedtime  aztreonam  IVPB 1000 milliGRAM(s) IV Intermittent every 8 hours  carvedilol 12.5 milliGRAM(s) Oral every 12 hours  dextrose 10% Bolus 125 milliLiter(s) IV Bolus once  dextrose 5%. 1000 milliLiter(s) IV Continuous <Continuous>  dextrose 5%. 1000 milliLiter(s) IV Continuous <Continuous>  dextrose 50% Injectable 25 Gram(s) IV Push once  dextrose 50% Injectable 12.5 Gram(s) IV Push once  dextrose Oral Gel 15 Gram(s) Oral once PRN  divalproex  milliGRAM(s) Oral two times a day  finasteride 5 milliGRAM(s) Oral daily  glucagon  Injectable 1 milliGRAM(s) IntraMuscular once  insulin lispro (ADMELOG) corrective regimen sliding scale   SubCutaneous three times a day before meals  insulin lispro (ADMELOG) corrective regimen sliding scale   SubCutaneous at bedtime  melatonin 3 milliGRAM(s) Oral at bedtime PRN  ondansetron Injectable 4 milliGRAM(s) IV Push every 8 hours PRN  risperiDONE   Tablet 2 milliGRAM(s) Oral two times a day  sodium chloride 0.9%. 1000 milliLiter(s) IV Continuous <Continuous>  tamsulosin 0.4 milliGRAM(s) Oral at bedtime      Allergies    penicillin (Hives)  Tegretol (Hypotension; Anaphylaxis)    Intolerances            Vital Signs Last 24 Hrs  T(C): 37.6 (06 Jul 2024 05:12), Max: 38.9 (05 Jul 2024 14:37)  T(F): 99.7 (06 Jul 2024 05:12), Max: 102 (05 Jul 2024 14:37)  HR: 95 (06 Jul 2024 05:12) (87 - 100)  BP: 130/76 (06 Jul 2024 05:12) (100/59 - 130/76)  BP(mean): --  RR: 17 (06 Jul 2024 05:12) (17 - 18)  SpO2: 91% (06 Jul 2024 05:12) (91% - 95%)    Parameters below as of 06 Jul 2024 05:12  Patient On (Oxygen Delivery Method): room air          REVIEW OF SYSTEMS:    Constitutional: No fever, chills, fatigue, weakness  Eyes: no eye pain, visual disturbances, or discharge  ENT:  No difficulty hearing, tinnitus, vertigo; No sinus or throat pain  Neck: No pain or stiffness  Respiratory: No cough, dyspnea, wheezing   Cardiovascular: No chest pain, palpitations,   Gastrointestinal: No abdominal or epigastric pain. No nausea, vomiting  No diarrhea or constipation.   Genitourinary: No dysuria, frequency, hematuria or incontinence  Neurological: No headaches, lightheadedness, vertigo, numbness or tremors  Psychiatric: No depression, anxiety, mood swings or difficulty sleeping  Musculoskeletal: No joint pain or swelling; No muscle, back or extremity pain  Skin: No itching, burning, rashes or lesions   Lymph Nodes: No enlarged glands  Endocrine: No heat or cold intolerance; No hair loss   Allergy and Immunologic: No hives or eczema    On Neurological Examination:    Mental Status - Patient is alert, awake, o      Follow simple commands      Speech -   Fluent                  Cranial Nerves - Pupils 3 mm equal and reactive to light,   extraocular eye movements intact.   smile symmetric  intact bilateral NLF    Motor Exam -     With stimuli positive movement of all 4 extremities    Muscle tone - is normal all over.  No asymmetry is seen.      Sensory    Bilateral intact to light touch    GENERAL Exam: Nontoxic , No Acute Distress   	  HEENT:  normocephalic, atraumatic  		  LUNGS:  Decreased bilaterally  	  HEART: Normal S1S2   No murmur RRR        	  GI/ ABDOMEN:  Soft  Non tender    EXTREMITIES:   No Edema  No Clubbing  No Cyanosis     SKIN: Normal  No Ecchymosis               LABS:  CBC Full  -  ( 06 Jul 2024 06:00 )  WBC Count : 5.22 K/uL  RBC Count : 3.79 M/uL  Hemoglobin : 11.1 g/dL  Hematocrit : 34.1 %  Platelet Count - Automated : 103 K/uL  Mean Cell Volume : 90.0 fl  Mean Cell Hemoglobin : 29.3 pg  Mean Cell Hemoglobin Concentration : 32.6 gm/dL  Auto Neutrophil # : 3.02 K/uL  Auto Lymphocyte # : 0.83 K/uL  Auto Monocyte # : 1.21 K/uL  Auto Eosinophil # : 0.06 K/uL  Auto Basophil # : 0.03 K/uL  Auto Neutrophil % : 57.9 %  Auto Lymphocyte % : 15.9 %  Auto Monocyte % : 23.2 %  Auto Eosinophil % : 1.1 %  Auto Basophil % : 0.6 %    Urinalysis Basic - ( 06 Jul 2024 06:00 )    Color: x / Appearance: x / SG: x / pH: x  Gluc: 326 mg/dL / Ketone: x  / Bili: x / Urobili: x   Blood: x / Protein: x / Nitrite: x   Leuk Esterase: x / RBC: x / WBC x   Sq Epi: x / Non Sq Epi: x / Bacteria: x      07-06    139  |  106  |  40<H>  ----------------------------<  326<H>  4.1   |  20<L>  |  2.24<H>    Ca    9.3      06 Jul 2024 06:00  Phos  2.8     07-05    TPro  6.8  /  Alb  2.5<L>  /  TBili  0.4  /  DBili  x   /  AST  137<H>  /  ALT  137<H>  /  AlkPhos  65  07-06    Hemoglobin A1C:     LIVER FUNCTIONS - ( 06 Jul 2024 06:00 )  Alb: 2.5 g/dL / Pro: 6.8 g/dL / ALK PHOS: 65 U/L / ALT: 137 U/L / AST: 137 U/L / GGT: x           Vitamin B12   PT/INR - ( 05 Jul 2024 05:30 )   PT: 13.5 sec;   INR: 1.21 ratio         PTT - ( 04 Jul 2024 17:10 )  PTT:30.3 sec      RADIOLOGY  ASSESSMENT AND PLAN   Altered in mental status,   For Altered in mental status and lethargy, suspect most likely this is metabolic encephalopathy, which is multifactorial secondary to underlying infection with weakness   For history of sepsis, antibiotics as needed.  diabetes  monitor blood sugars as needed   hypertension monitor sbp as needed   49 minutes of time was spent with the patient, plan of care, reviewing data, with greater than  50% of the visit was spent counseling and/or coordinating care with multidisciplinary healthcare team

## 2024-07-06 NOTE — DIETITIAN INITIAL EVALUATION ADULT - OTHER INFO
Visited patient in room, presents with good appetite/po intake, consuming >75% of meals. Denies n/v/d/c, last BM 7/5. No reported difficulty chewing or swallowing. NKFA. Reported #, current adm weight 320#, weight appears to be stable, will continue to monitor weight trends as able.     Pertinent medications/nutrition labs reviewed; -336 x24hr, a1c 10.4% with hx of DM indicating poor glycemic control; In house ordered for lispro sliding scale to aid in glucose management.     Discussed the importance of consistent carbohydrate intake with pt. Educated on avoidance of concentrated sweets/beverages. Pt had DM class before, aware of diet but not ready to adhere. Will reinforce edu as needed. RD to remain available as needed.

## 2024-07-06 NOTE — PROVIDER CONTACT NOTE (MEDICATION) - SITUATION
As per order repeat accucheck 2 hours after single dose of 15U given
Elevated blood sugar at 1130 accucheck, pt asymptomatic

## 2024-07-06 NOTE — DIETITIAN INITIAL EVALUATION ADULT - ORAL INTAKE PTA/DIET HISTORY
Per transfer document, pt from [facility] was on a no concentrated sweets diet. Pt reported ordering foods from outside like chinese foods, pizza. Also drinks juice x3 meals a day (states RD/MD at NH approved).

## 2024-07-06 NOTE — PROGRESS NOTE ADULT - SUBJECTIVE AND OBJECTIVE BOX
[facility]IOUS DISEASES  JORDY Vo Y. Patel, S. Shah, G. Ethan  [phone]  ([phone] - weekdays after 5pm and weekends)    Name: MARGI ART  Age/Gender: 64y Male  MRN: [id number]    Interval History:  Patient seen and examined.  Feels better, no new complaints.   Notes reviewed  No concerning overnight events  Afebrile   Allergies: penicillin (Hives)  Tegretol (Hypotension; Anaphylaxis)      Objective:  Vitals:   T(F): 97.8 (07-06-24 @ 14:01), Max: 99.7 (07-06-24 @ 05:12)  HR: 75 (07-06-24 @ 14:01) (75 - 95)  BP: 143/81 (07-06-24 @ 14:01) (130/76 - 143/81)  RR: 18 (07-06-24 @ 14:01) (17 - 18)  SpO2: 95% (07-06-24 @ 14:01) (91% - 95%)  Physical Examination:  General: no acute distress, obese, nontoxic   HEENT: NC/AT, anicteric, EOMI  Respiratory: no acc muscle use, breathing comfortably  Cardiovascular: S1 and S2 present  Gastrointestinal: normal appearing, nondistended  Extremities: no edema, no cyanosis  Skin: no visible rash    Laboratory Studies:  CBC:                       11.1   5.22  )-----------( 103      ( 06 Jul 2024 06:00 )             34.1     WBC Trend:  5.22 07-06-24 @ 06:00  6.29 07-05-24 @ 05:30  7.36 07-04-24 @ 17:10  7.98 07-03-24 @ 16:40    CMP: 07-06    139  |  106  |  40<H>  ----------------------------<  326<H>  4.1   |  20<L>  |  2.24<H>    Ca    9.3      06 Jul 2024 06:00  Phos  2.8     07-05    TPro  6.8  /  Alb  2.5<L>  /  TBili  0.4  /  DBili  x   /  AST  137<H>  /  ALT  137<H>  /  AlkPhos  65  07-06    Creatinine: 2.24 mg/dL (07-06-24 @ 06:00)  Creatinine: 2.31 mg/dL (07-05-24 @ 05:30)  Creatinine: 2.27 mg/dL (07-04-24 @ 19:13)  Creatinine: 2.38 mg/dL (07-04-24 @ 17:10)  Creatinine: 2.04 mg/dL (07-03-24 @ 16:40)      LIVER FUNCTIONS - ( 06 Jul 2024 06:00 )  Alb: 2.5 g/dL / Pro: 6.8 g/dL / ALK PHOS: 65 U/L / ALT: 137 U/L / AST: 137 U/L / GGT: x           Microbiology: reviewed   Culture - Urine (collected 07-04-24 @ 18:11)  Source: Clean Catch Clean Catch (Midstream)  Preliminary Report (07-06-24 @ 07:08):    >100,000 CFU/ml Escherichia coli    Culture - Blood (collected 07-04-24 @ 17:10)  Source: .Blood Blood-Peripheral  Preliminary Report (07-06-24 @ 06:02):    No growth at 24 hours    Culture - Blood (collected 07-04-24 @ 17:10)  Source: .Blood Blood-Peripheral  Gram Stain (07-05-24 @ 13:59):    Growth in aerobic bottle: Gram Negative Rods  Preliminary Report (07-06-24 @ 08:35):    Growth in aerobic bottle: Escherichia coli    Direct identification is available within approximately 3-5    hours either by Blood Panel Multiplexed PCR or Direct    MALDI-TOF. Details: [email or web address]  Organism: Blood Culture PCR (07-05-24 @ 16:30)  Organism: Blood Culture PCR (07-05-24 @ 16:30)      Method Type: PCR      -  Escherichia coli: Detec    Culture - Urine (collected 07-03-24 @ 23:45)  Source: Clean Catch Clean Catch (Midstream)  Preliminary Report (07-06-24 @ 08:59):    50,000 - 99,000 CFU/mL Escherichia coli    Urinalysis with Rflx Culture (collected 06-25-24 @ 13:16)    SARS-CoV-2 Result: NotDetec (04 Jul 2024 17:10)    Radiology: reviewed     Medications:  acetaminophen     Tablet .. 650 milliGRAM(s) Oral every 6 hours PRN  aluminum hydroxide/magnesium hydroxide/simethicone Suspension 30 milliLiter(s) Oral every 4 hours PRN  apixaban 2.5 milliGRAM(s) Oral every 12 hours  aspirin  chewable 81 milliGRAM(s) Oral daily  atorvastatin 40 milliGRAM(s) Oral at bedtime  aztreonam  IVPB 1000 milliGRAM(s) IV Intermittent every 8 hours  carvedilol 12.5 milliGRAM(s) Oral every 12 hours  dextrose 10% Bolus 125 milliLiter(s) IV Bolus once  dextrose 5%. 1000 milliLiter(s) IV Continuous <Continuous>  dextrose 5%. 1000 milliLiter(s) IV Continuous <Continuous>  dextrose 50% Injectable 25 Gram(s) IV Push once  dextrose 50% Injectable 12.5 Gram(s) IV Push once  dextrose Oral Gel 15 Gram(s) Oral once PRN  divalproex  milliGRAM(s) Oral two times a day  finasteride 5 milliGRAM(s) Oral daily  glucagon  Injectable 1 milliGRAM(s) IntraMuscular once  insulin glargine Injectable (LANTUS) 5 Unit(s) SubCutaneous at bedtime  insulin glargine Injectable (LANTUS) 5 Unit(s) SubCutaneous every morning  insulin lispro (ADMELOG) corrective regimen sliding scale   SubCutaneous three times a day before meals  insulin lispro (ADMELOG) corrective regimen sliding scale   SubCutaneous at bedtime  melatonin 3 milliGRAM(s) Oral at bedtime PRN  ondansetron Injectable 4 milliGRAM(s) IV Push every 8 hours PRN  risperiDONE   Tablet 2 milliGRAM(s) Oral two times a day  sodium chloride 0.9%. 1000 milliLiter(s) IV Continuous <Continuous>  tamsulosin 0.4 milliGRAM(s) Oral at bedtime    Current Antimicrobials:  aztreonam  IVPB 1000 milliGRAM(s) IV Intermittent every 8 hours    Prior/Completed Antimicrobials:  aztreonam  IVPB  vancomycin  IVPB.

## 2024-07-06 NOTE — DIETITIAN INITIAL EVALUATION ADULT - PERTINENT LABORATORY DATA
07-06    139  |  106  |  40<H>  ----------------------------<  326<H>  4.1   |  20<L>  |  2.24<H>    Ca    9.3      06 Jul 2024 06:00  Phos  2.8     07-05    TPro  6.8  /  Alb  2.5<L>  /  TBili  0.4  /  DBili  x   /  AST  137<H>  /  ALT  137<H>  /  AlkPhos  65  07-06  POCT Blood Glucose.: 413 mg/dL (07-06-24 @ 13:59)  A1C with Estimated Average Glucose Result: 10.4 % (07-05-24 @ 05:30)  A1C with Estimated Average Glucose Result: 8.0 % (07-21-23 @ 06:41)

## 2024-07-06 NOTE — PROGRESS NOTE ADULT - SUBJECTIVE AND OBJECTIVE BOX
CHIEF COMPLAINT/ REASON FOR VISIT  .. Patient was seen to address the  issue listed under PROBLEM LIST which is located toward bottom of this note     MARGI LIAOERIC    SY 1EST [address]    Allergies    penicillin (Hives)  Tegretol (Hypotension; Anaphylaxis)    Intolerances        PAST MEDICAL & SURGICAL HISTORY:  Bipolar 1 disorder  on Lithium      Hypertension      T2DM (type 2 diabetes mellitus)      HLD (hyperlipidemia)      CHAYITO (obstructive sleep apnea)  diagnosed >25 years ago, non-compliant with CPAP      Diabetic neuropathy  bilateral feet, ankles      Obesity      Rheumatoid arthritis      History of excision of testicular mass  left, 2009      Cataract of left eye          FAMILY HISTORY:  FH: CAD (coronary artery disease) (Mother)        Home Medications:  ascorbic acid 500 mg oral tablet: 1 tab(s) orally 2 times a day (04 Jul 2024 19:20)  aspirin 81 mg oral tablet, chewable: 1 tab(s) orally once a day (04 Jul 2024 17:47)  atorvastatin 40 mg oral tablet: 1 tab(s) orally once a day (04 Jul 2024 17:47)  carvedilol 12.5 mg oral tablet: 1 tab(s) orally 2 times a day (04 Jul 2024 17:47)  Depakote 500 mg oral delayed release tablet: 1 tab(s) orally 2 times a day (04 Jul 2024 19:20)  finasteride 5 mg oral tablet: 1 tab(s) orally once a day (04 Jul 2024 19:20)  HumaLOG 100 units/mL injectable solution: injectable 3 times a day (before meals) sliding scale with fingerstick (04 Jul 2024 19:20)  Lipitor 40 mg oral tablet: 1 tab(s) orally once a day (at bedtime) (04 Jul 2024 19:20)  losartan 25 mg oral tablet: 1 tab(s) orally once a day (04 Jul 2024 19:20)  melatonin:  (04 Jul 2024 19:20)  melatonin 5 mg oral tablet: 1 tab(s) orally once a day (at bedtime) (04 Jul 2024 19:20)  metFORMIN 500 mg oral tablet: 1 tab(s) orally 2 times a day (04 Jul 2024 19:20)  Polyethylene Glycol 3350: once a day (04 Jul 2024 19:20)  RisperDAL 0.5 mg oral tablet: 1 tab(s) orally 2 times a day give with the 2mg dose twice a day for a total of 2.5mg twice a day (04 Jul 2024 19:20)  RisperDAL 2 mg oral tablet: 1 tab(s) orally 2 times a day give 2mg by oral route two times a day WITH 0.5mg for total of 2.5mg (04 Jul 2024 19:20)  tamsulosin 0.4 mg oral capsule: 1 capsule orally once a day (04 Jul 2024 17:47)  Vascepa 1 g oral capsule: 2 cap(s) orally 2 times a day (04 Jul 2024 17:47)  Vitamin C 500 mg oral capsule: 1 cap(s) orally 2 times a day (04 Jul 2024 17:47)  zinc oxide: apply to sacrum after cleanse with soap and water (04 Jul 2024 19:20)      MEDICATIONS  (STANDING):  apixaban 2.5 milliGRAM(s) Oral every 12 hours  aspirin  chewable 81 milliGRAM(s) Oral daily  atorvastatin 40 milliGRAM(s) Oral at bedtime  aztreonam  IVPB 1000 milliGRAM(s) IV Intermittent every 8 hours  carvedilol 12.5 milliGRAM(s) Oral every 12 hours  dextrose 10% Bolus 125 milliLiter(s) IV Bolus once  dextrose 5%. 1000 milliLiter(s) (100 mL/Hr) IV Continuous <Continuous>  dextrose 5%. 1000 milliLiter(s) (50 mL/Hr) IV Continuous <Continuous>  dextrose 50% Injectable 25 Gram(s) IV Push once  dextrose 50% Injectable 12.5 Gram(s) IV Push once  divalproex  milliGRAM(s) Oral two times a day  finasteride 5 milliGRAM(s) Oral daily  glucagon  Injectable 1 milliGRAM(s) IntraMuscular once  insulin lispro (ADMELOG) corrective regimen sliding scale   SubCutaneous three times a day before meals  insulin lispro (ADMELOG) corrective regimen sliding scale   SubCutaneous at bedtime  risperiDONE   Tablet 2 milliGRAM(s) Oral two times a day  sodium chloride 0.9%. 1000 milliLiter(s) (80 mL/Hr) IV Continuous <Continuous>  tamsulosin 0.4 milliGRAM(s) Oral at bedtime    MEDICATIONS  (PRN):  acetaminophen     Tablet .. 650 milliGRAM(s) Oral every 6 hours PRN Temp greater or equal to 38C (100.4F), Mild Pain (1 - 3)  aluminum hydroxide/magnesium hydroxide/simethicone Suspension 30 milliLiter(s) Oral every 4 hours PRN Dyspepsia  dextrose Oral Gel 15 Gram(s) Oral once PRN Blood Glucose LESS THAN 70 milliGRAM(s)/deciliter  melatonin 3 milliGRAM(s) Oral at bedtime PRN Insomnia  ondansetron Injectable 4 milliGRAM(s) IV Push every 8 hours PRN Nausea and/or Vomiting      Diet, Regular:   Consistent Carbohydrate Evening Snack (07-05-24 @ 12:28) [Active]          Vital Signs Last 24 Hrs  T(C): 37.6 (06 Jul 2024 05:12), Max: 38.9 (05 Jul 2024 14:37)  T(F): 99.7 (06 Jul 2024 05:12), Max: 102 (05 Jul 2024 14:37)  HR: 95 (06 Jul 2024 05:12) (87 - 100)  BP: 130/76 (06 Jul 2024 05:12) (100/59 - 130/76)  BP(mean): --  RR: 17 (06 Jul 2024 05:12) (17 - 18)  SpO2: 91% (06 Jul 2024 05:12) (91% - 95%)    Parameters below as of 06 Jul 2024 05:12  Patient On (Oxygen Delivery Method): room air          07-05-24 @ 07:01  -  07-06-24 @ 07:00  --------------------------------------------------------  IN: 0 mL / OUT: 05194 mL / NET: -26443 mL              LABS:                        11.8   6.29  )-----------( 106      ( 05 Jul 2024 05:30 )             36.3     07-05    139  |  106  |  42<H>  ----------------------------<  243<H>  4.0   |  22  |  2.31<H>    Ca    9.1      05 Jul 2024 05:30  Phos  2.8     07-05    TPro  7.3  /  Alb  2.8<L>  /  TBili  0.5  /  DBili  x   /  AST  98<H>  /  ALT  47  /  AlkPhos  71  07-05    PT/INR - ( 05 Jul 2024 05:30 )   PT: 13.5 sec;   INR: 1.21 ratio         PTT - ( 04 Jul 2024 17:10 )  PTT:30.3 sec  Urinalysis Basic - ( 05 Jul 2024 05:30 )    Color: x / Appearance: x / SG: x / pH: x  Gluc: 243 mg/dL / Ketone: x  / Bili: x / Urobili: x   Blood: x / Protein: x / Nitrite: x   Leuk Esterase: x / RBC: x / WBC x   Sq Epi: x / Non Sq Epi: x / Bacteria: x            WBC:  WBC Count: 6.29 K/uL (07-05 @ 05:30)  WBC Count: 7.36 K/uL (07-04 @ 17:10)  WBC Count: 7.98 K/uL (07-03 @ 16:40)      MICROBIOLOGY:  RECENT CULTURES:  07-04 Clean Catch Clean Catch (Midstream) XXXX XXXX   >100,000 CFU/ml Escherichia coli    07-04 .Blood Blood-Peripheral Blood Culture PCR   Growth in aerobic bottle: Gram Negative Rods   Growth in aerobic bottle: Gram Negative Rods  Direct identification is available within approximately 3-5  hours either by Blood Panel Multiplexed PCR or Direct  MALDI-TOF. Details: [email or web address]          CARDIAC MARKERS ( 04 Jul 2024 17:10 )  x     / x     / 3340 U/L / x     / x            PT/INR - ( 05 Jul 2024 05:30 )   PT: 13.5 sec;   INR: 1.21 ratio         PTT - ( 04 Jul 2024 17:10 )  PTT:30.3 sec    Sodium:  Sodium: 139 mmol/L (07-05 @ 05:30)  Sodium: 133 mmol/L (07-04 @ 19:13)  Sodium: 132 mmol/L (07-04 @ 17:10)  Sodium: 137 mmol/L (07-03 @ 16:40)      2.31 mg/dL 07-05 @ 05:30  2.27 mg/dL 07-04 @ 19:13  2.38 mg/dL 07-04 @ 17:10  2.04 mg/dL 07-03 @ 16:40      Hemoglobin:  Hemoglobin: 11.8 g/dL (07-05 @ 05:30)  Hemoglobin: 11.5 g/dL (07-04 @ 17:10)  Hemoglobin: 13.3 g/dL (07-03 @ 16:40)      Platelets: Platelet Count - Automated: 106 K/uL (07-05 @ 05:30)  Platelet Count - Automated: 124 K/uL (07-04 @ 17:10)  Platelet Count - Automated: 134 K/uL (07-03 @ 16:40)      LIVER FUNCTIONS - ( 05 Jul 2024 05:30 )  Alb: 2.8 g/dL / Pro: 7.3 g/dL / ALK PHOS: 71 U/L / ALT: 47 U/L / AST: 98 U/L / GGT: x             Urinalysis Basic - ( 05 Jul 2024 05:30 )    Color: x / Appearance: x / SG: x / pH: x  Gluc: 243 mg/dL / Ketone: x  / Bili: x / Urobili: x   Blood: x / Protein: x / Nitrite: x   Leuk Esterase: x / RBC: x / WBC x   Sq Epi: x / Non Sq Epi: x / Bacteria: x        RADIOLOGY & ADDITIONAL STUDIES:      MICROBIOLOGY:  RECENT CULTURES:  07-04 Clean Catch Clean Catch (Midstream) XXXX XXXX   >100,000 CFU/ml Escherichia coli    07-04 .Blood Blood-Peripheral Blood Culture PCR   Growth in aerobic bottle: Gram Negative Rods   Growth in aerobic bottle: Gram Negative Rods  Direct identification is available within approximately 3-5  hours either by Blood Panel Multiplexed PCR or Direct  MALDI-TOF. Details: [email or web address]

## 2024-07-06 NOTE — PROGRESS NOTE ADULT - SUBJECTIVE AND OBJECTIVE BOX
Chief Complaint: Weakness, possible syncope    Interval Events: No events overnight.    Review of Systems:  General: No fevers, chills, weight gain  Skin: No rashes, color changes  Cardiovascular: No chest pain, orthopnea  Respiratory: No shortness of breath, cough  Gastrointestinal: No nausea, abdominal pain  Genitourinary: No incontinence, pain with urination  Musculoskeletal: No pain, swelling, decreased range of motion  Neurological: No headache, weakness  Psychiatric: No depression, anxiety  Endocrine: No weight gain, increased thirst  All other systems are comprehensively negative.    Physical Exam:  Vitals:        Vital Signs Last 24 Hrs  T(C): 37.6 (06 Jul 2024 05:12), Max: 38.9 (05 Jul 2024 14:37)  T(F): 99.7 (06 Jul 2024 05:12), Max: 102 (05 Jul 2024 14:37)  HR: 95 (06 Jul 2024 05:12) (87 - 100)  BP: 130/76 (06 Jul 2024 05:12) (100/59 - 130/76)  BP(mean): --  RR: 17 (06 Jul 2024 05:12) (17 - 18)  SpO2: 91% (06 Jul 2024 05:12) (91% - 95%)  Parameters below as of 06 Jul 2024 05:12  Patient On (Oxygen Delivery Method): room air  General: NAD  HEENT: MMM  Neck: No JVD, no carotid bruit  Lungs: CTAB  CV: RRR, nl S1/S2, no M/R/G  Abdomen: S/NT/ND, +BS  Extremities: No LE edema, no cyanosis  Neuro: AAOx3, non-focal  Skin: No rash    Labs:                        11.1   5.22  )-----------( 103      ( 06 Jul 2024 06:00 )             34.1     07-06    139  |  106  |  40<H>  ----------------------------<  326<H>  4.1   |  20<L>  |  2.24<H>    Ca    9.3      06 Jul 2024 06:00  Phos  2.8     07-05    TPro  6.8  /  Alb  2.5<L>  /  TBili  0.4  /  DBili  x   /  AST  137<H>  /  ALT  137<H>  /  AlkPhos  65  07-06    CARDIAC MARKERS ( 06 Jul 2024 06:00 )  x     / x     / 4613 U/L / x     / x      CARDIAC MARKERS ( 04 Jul 2024 17:10 )  x     / x     / 3340 U/L / x     / x          PT/INR - ( 05 Jul 2024 05:30 )   PT: 13.5 sec;   INR: 1.21 ratio         PTT - ( 04 Jul 2024 17:10 )  PTT:30.3 sec    ECG/Telemetry: Sinus rhythm

## 2024-07-07 LAB
-  AMPICILLIN/SULBACTAM: SIGNIFICANT CHANGE UP
-  AMPICILLIN/SULBACTAM: SIGNIFICANT CHANGE UP
-  AMPICILLIN: SIGNIFICANT CHANGE UP
-  AMPICILLIN: SIGNIFICANT CHANGE UP
-  AZTREONAM: SIGNIFICANT CHANGE UP
-  AZTREONAM: SIGNIFICANT CHANGE UP
-  CEFAZOLIN: SIGNIFICANT CHANGE UP
-  CEFAZOLIN: SIGNIFICANT CHANGE UP
-  CEFEPIME: SIGNIFICANT CHANGE UP
-  CEFEPIME: SIGNIFICANT CHANGE UP
-  CEFOXITIN: SIGNIFICANT CHANGE UP
-  CEFTRIAXONE: SIGNIFICANT CHANGE UP
-  CEFTRIAXONE: SIGNIFICANT CHANGE UP
-  CEFUROXIME: SIGNIFICANT CHANGE UP
-  CIPROFLOXACIN: SIGNIFICANT CHANGE UP
-  CIPROFLOXACIN: SIGNIFICANT CHANGE UP
-  ERTAPENEM: SIGNIFICANT CHANGE UP
-  ERTAPENEM: SIGNIFICANT CHANGE UP
-  GENTAMICIN: SIGNIFICANT CHANGE UP
-  GENTAMICIN: SIGNIFICANT CHANGE UP
-  IMIPENEM: SIGNIFICANT CHANGE UP
-  IMIPENEM: SIGNIFICANT CHANGE UP
-  LEVOFLOXACIN: SIGNIFICANT CHANGE UP
-  LEVOFLOXACIN: SIGNIFICANT CHANGE UP
-  MEROPENEM: SIGNIFICANT CHANGE UP
-  MEROPENEM: SIGNIFICANT CHANGE UP
-  NITROFURANTOIN: SIGNIFICANT CHANGE UP
-  PIPERACILLIN/TAZOBACTAM: SIGNIFICANT CHANGE UP
-  PIPERACILLIN/TAZOBACTAM: SIGNIFICANT CHANGE UP
-  TOBRAMYCIN: SIGNIFICANT CHANGE UP
-  TOBRAMYCIN: SIGNIFICANT CHANGE UP
-  TRIMETHOPRIM/SULFAMETHOXAZOLE: SIGNIFICANT CHANGE UP
-  TRIMETHOPRIM/SULFAMETHOXAZOLE: SIGNIFICANT CHANGE UP
ALBUMIN SERPL ELPH-MCNC: 2.6 G/DL — LOW (ref 3.3–5)
ALP SERPL-CCNC: 71 U/L — SIGNIFICANT CHANGE UP (ref 30–120)
ALT FLD-CCNC: 240 U/L — HIGH (ref 10–60)
ANION GAP SERPL CALC-SCNC: 10 MMOL/L — SIGNIFICANT CHANGE UP (ref 5–17)
AST SERPL-CCNC: 167 U/L — HIGH (ref 10–40)
BILIRUB SERPL-MCNC: 0.4 MG/DL — SIGNIFICANT CHANGE UP (ref 0.2–1.2)
BUN SERPL-MCNC: 38 MG/DL — HIGH (ref 7–23)
CALCIUM SERPL-MCNC: 9.9 MG/DL — SIGNIFICANT CHANGE UP (ref 8.4–10.5)
CHLORIDE SERPL-SCNC: 107 MMOL/L — SIGNIFICANT CHANGE UP (ref 96–108)
CK SERPL-CCNC: 3196 U/L — HIGH (ref 39–308)
CO2 SERPL-SCNC: 24 MMOL/L — SIGNIFICANT CHANGE UP (ref 22–31)
CREAT SERPL-MCNC: 2.09 MG/DL — HIGH (ref 0.5–1.3)
CULTURE RESULTS: ABNORMAL
CULTURE RESULTS: ABNORMAL
EGFR: 35 ML/MIN/1.73M2 — LOW
GLUCOSE BLDC GLUCOMTR-MCNC: 314 MG/DL — HIGH (ref 70–99)
GLUCOSE BLDC GLUCOMTR-MCNC: 335 MG/DL — HIGH (ref 70–99)
GLUCOSE BLDC GLUCOMTR-MCNC: 339 MG/DL — HIGH (ref 70–99)
GLUCOSE BLDC GLUCOMTR-MCNC: 367 MG/DL — HIGH (ref 70–99)
GLUCOSE SERPL-MCNC: 342 MG/DL — HIGH (ref 70–99)
HCT VFR BLD CALC: 37.1 % — LOW (ref 39–50)
HGB BLD-MCNC: 12.1 G/DL — LOW (ref 13–17)
LACTATE SERPL-SCNC: 1.4 MMOL/L — SIGNIFICANT CHANGE UP (ref 0.7–2)
MCHC RBC-ENTMCNC: 29.7 PG — SIGNIFICANT CHANGE UP (ref 27–34)
MCHC RBC-ENTMCNC: 32.6 GM/DL — SIGNIFICANT CHANGE UP (ref 32–36)
MCV RBC AUTO: 90.9 FL — SIGNIFICANT CHANGE UP (ref 80–100)
METHOD TYPE: SIGNIFICANT CHANGE UP
METHOD TYPE: SIGNIFICANT CHANGE UP
NRBC # BLD: 0 /100 WBCS — SIGNIFICANT CHANGE UP (ref 0–0)
NT-PROBNP SERPL-SCNC: 202 PG/ML — HIGH (ref 0–125)
ORGANISM # SPEC MICROSCOPIC CNT: ABNORMAL
PHOSPHATE SERPL-MCNC: 3.9 MG/DL — SIGNIFICANT CHANGE UP (ref 2.5–4.5)
PLATELET # BLD AUTO: 127 K/UL — LOW (ref 150–400)
POTASSIUM SERPL-MCNC: 4.2 MMOL/L — SIGNIFICANT CHANGE UP (ref 3.5–5.3)
POTASSIUM SERPL-SCNC: 4.2 MMOL/L — SIGNIFICANT CHANGE UP (ref 3.5–5.3)
PROCALCITONIN SERPL-MCNC: 5.65 NG/ML — HIGH (ref 0.02–0.1)
PROT SERPL-MCNC: 7.7 G/DL — SIGNIFICANT CHANGE UP (ref 6–8.3)
RBC # BLD: 4.08 M/UL — LOW (ref 4.2–5.8)
RBC # FLD: 13.7 % — SIGNIFICANT CHANGE UP (ref 10.3–14.5)
SODIUM SERPL-SCNC: 141 MMOL/L — SIGNIFICANT CHANGE UP (ref 135–145)
SPECIMEN SOURCE: SIGNIFICANT CHANGE UP
SPECIMEN SOURCE: SIGNIFICANT CHANGE UP
WBC # BLD: 6.66 K/UL — SIGNIFICANT CHANGE UP (ref 3.8–10.5)
WBC # FLD AUTO: 6.66 K/UL — SIGNIFICANT CHANGE UP (ref 3.8–10.5)

## 2024-07-07 PROCEDURE — 76705 ECHO EXAM OF ABDOMEN: CPT | Mod: 26

## 2024-07-07 RX ORDER — CIPROFLOXACIN HCL 500 MG
500 TABLET ORAL EVERY 12 HOURS
Refills: 0 | Status: DISCONTINUED | OUTPATIENT
Start: 2024-07-07 | End: 2024-07-09

## 2024-07-07 RX ADMIN — Medication 650 MILLIGRAM(S): at 21:12

## 2024-07-07 RX ADMIN — INSULIN LISPRO 8: 100 INJECTION, SOLUTION SUBCUTANEOUS at 16:58

## 2024-07-07 RX ADMIN — Medication 650 MILLIGRAM(S): at 05:46

## 2024-07-07 RX ADMIN — Medication 500 MILLIGRAM(S): at 05:44

## 2024-07-07 RX ADMIN — Medication 80 MILLILITER(S): at 08:10

## 2024-07-07 RX ADMIN — INSULIN LISPRO 2: 100 INJECTION, SOLUTION SUBCUTANEOUS at 21:12

## 2024-07-07 RX ADMIN — INSULIN GLARGINE 5 UNIT(S): 100 INJECTION, SOLUTION SUBCUTANEOUS at 08:11

## 2024-07-07 RX ADMIN — Medication 80 MILLILITER(S): at 22:28

## 2024-07-07 RX ADMIN — ATORVASTATIN CALCIUM 40 MILLIGRAM(S): 20 TABLET, FILM COATED ORAL at 21:12

## 2024-07-07 RX ADMIN — RISPERIDONE 2 MILLIGRAM(S): 0.5 TABLET ORAL at 17:46

## 2024-07-07 RX ADMIN — Medication 500 MILLIGRAM(S): at 18:01

## 2024-07-07 RX ADMIN — Medication 500 MILLIGRAM(S): at 17:45

## 2024-07-07 RX ADMIN — APIXABAN 2.5 MILLIGRAM(S): 5 TABLET, FILM COATED ORAL at 17:46

## 2024-07-07 RX ADMIN — Medication 650 MILLIGRAM(S): at 06:16

## 2024-07-07 RX ADMIN — ASPIRIN 81 MILLIGRAM(S): 325 TABLET, FILM COATED ORAL at 11:27

## 2024-07-07 RX ADMIN — APIXABAN 2.5 MILLIGRAM(S): 5 TABLET, FILM COATED ORAL at 05:44

## 2024-07-07 RX ADMIN — Medication 5 MILLIGRAM(S): at 11:27

## 2024-07-07 RX ADMIN — INSULIN LISPRO 10: 100 INJECTION, SOLUTION SUBCUTANEOUS at 11:57

## 2024-07-07 RX ADMIN — Medication 650 MILLIGRAM(S): at 22:12

## 2024-07-07 RX ADMIN — INSULIN LISPRO 8: 100 INJECTION, SOLUTION SUBCUTANEOUS at 08:11

## 2024-07-07 RX ADMIN — CARVEDILOL PHOSPHATE 12.5 MILLIGRAM(S): 80 CAPSULE, EXTENDED RELEASE ORAL at 17:45

## 2024-07-07 RX ADMIN — RISPERIDONE 2 MILLIGRAM(S): 0.5 TABLET ORAL at 05:44

## 2024-07-07 RX ADMIN — Medication 50 MILLIGRAM(S): at 03:23

## 2024-07-07 RX ADMIN — TAMSULOSIN HYDROCHLORIDE 0.4 MILLIGRAM(S): 0.4 CAPSULE ORAL at 21:12

## 2024-07-07 RX ADMIN — Medication 50 MILLIGRAM(S): at 11:27

## 2024-07-07 RX ADMIN — CARVEDILOL PHOSPHATE 12.5 MILLIGRAM(S): 80 CAPSULE, EXTENDED RELEASE ORAL at 05:44

## 2024-07-07 RX ADMIN — INSULIN GLARGINE 5 UNIT(S): 100 INJECTION, SOLUTION SUBCUTANEOUS at 21:12

## 2024-07-07 NOTE — PROGRESS NOTE ADULT - SUBJECTIVE AND OBJECTIVE BOX
[facility]  JORDY Vo Y. Patel, S. Shah, G. Casimir  [phone]  ([phone] - weekdays after 5pm and weekends)    Name: MARGI ART  Age/Gender: 64y Male  MRN: [id number]    Interval History:  Patient seen and examined.  Feels better, no fever or new complaints.   Notes reviewed  No concerning overnight events  Afebrile   Allergies: penicillin (Hives)  Tegretol (Hypotension; Anaphylaxis)      Objective:  Vitals:   T(F): 97.9 (07-07-24 @ 13:34), Max: 99.9 (07-06-24 @ 20:52)  HR: 77 (07-07-24 @ 13:34) (77 - 95)  BP: 119/80 (07-07-24 @ 13:34) (105/70 - 120/77)  RR: 17 (07-07-24 @ 13:34) (17 - 18)  SpO2: 95% (07-07-24 @ 13:34) (91% - 95%)  Physical Examination:  General: no acute distress, nontoxic   HEENT: NC/AT, anicteric, EOMI  Respiratory: no acc muscle use, breathing comfortably  Cardiovascular: S1 and S2 present  Gastrointestinal: normal appearing, nondistended  Extremities: no edema, no cyanosis  Skin: no visible rash    Laboratory Studies:  CBC:                       12.1   6.66  )-----------( 127      ( 07 Jul 2024 06:00 )             37.1     WBC Trend:  6.66 07-07-24 @ 06:00  5.22 07-06-24 @ 06:00  6.29 07-05-24 @ 05:30  7.36 07-04-24 @ 17:10  7.98 07-03-24 @ 16:40    CMP: 07-07    141  |  107  |  38<H>  ----------------------------<  342<H>  4.2   |  24  |  2.09<H>    Ca    9.9      07 Jul 2024 06:00  Phos  3.9     07-07    TPro  7.7  /  Alb  2.6<L>  /  TBili  0.4  /  DBili  x   /  AST  167<H>  /  ALT  240<H>  /  AlkPhos  71  07-07    Creatinine: 2.09 mg/dL (07-07-24 @ 06:00)  Creatinine: 2.24 mg/dL (07-06-24 @ 06:00)  Creatinine: 2.31 mg/dL (07-05-24 @ 05:30)  Creatinine: 2.27 mg/dL (07-04-24 @ 19:13)  Creatinine: 2.38 mg/dL (07-04-24 @ 17:10)  Creatinine: 2.04 mg/dL (07-03-24 @ 16:40)    LIVER FUNCTIONS - ( 07 Jul 2024 06:00 )  Alb: 2.6 g/dL / Pro: 7.7 g/dL / ALK PHOS: 71 U/L / ALT: 240 U/L / AST: 167 U/L / GGT: x           Microbiology: reviewed   Culture - Blood (collected 07-06-24 @ 06:00)  Source: .Blood Blood-Peripheral  Preliminary Report (07-07-24 @ 13:02):    No growth at 24 hours    Culture - Blood (collected 07-06-24 @ 06:00)  Source: .Blood Blood-Peripheral  Preliminary Report (07-07-24 @ 13:02):    No growth at 24 hours    Culture - Urine (collected 07-04-24 @ 18:11)  Source: Clean Catch Clean Catch (Midstream)  Preliminary Report (07-06-24 @ 07:08):    >100,000 CFU/ml Escherichia coli    Culture - Blood (collected 07-04-24 @ 17:10)  Source: .Blood Blood-Peripheral  Preliminary Report (07-07-24 @ 06:01):    No growth at 48 Hours    Culture - Blood (collected 07-04-24 @ 17:10)  Source: .Blood Blood-Peripheral  Gram Stain (07-05-24 @ 13:59):    Growth in aerobic bottle: Gram Negative Rods  Final Report (07-07-24 @ 08:55):    Growth in aerobic bottle: Escherichia coli    Direct identification is available within approximately 3-5    hours either by Blood Panel Multiplexed PCR or Direct    MALDI-TOF. Details: [email or web address]  Organism: Blood Culture PCR  Escherichia coli (07-07-24 @ 08:55)  Organism: Escherichia coli (07-07-24 @ 08:55)      Method Type: ANDREW      -  Ampicillin: S <=8 These ampicillin results predict results for amoxicillin      -  Ampicillin/Sulbactam: S <=4/2      -  Aztreonam: S <=4      -  Cefazolin: S <=2      -  Cefepime: S <=2      -  Cefoxitin: S <=8      -  Ceftriaxone: S <=1      -  Ciprofloxacin: S <=0.25      -  Ertapenem: S <=0.5      -  Gentamicin: S <=2      -  Imipenem: S <=1      -  Levofloxacin: S <=0.5      -  Meropenem: S <=1      -  Piperacillin/Tazobactam: S <=8      -  Tobramycin: S <=2      -  Trimethoprim/Sulfamethoxazole: S <=0.5/9.5  Organism: Blood Culture PCR (07-07-24 @ 08:55)      Method Type: PCR      -  Escherichia coli: Detec    Culture - Urine (collected 07-03-24 @ 23:45)  Source: Clean Catch Clean Catch (Midstream)  Final Report (07-07-24 @ 11:25):    50,000 - 99,000 CFU/mL Escherichia coli ESBL  Organism: Escherichia coli ESBL (07-07-24 @ 11:25)  Organism: Escherichia coli ESBL (07-07-24 @ 11:25)      Method Type: ANDREW      -  Ampicillin: R 16 These ampicillin results predict results for amoxicillin      -  Ampicillin/Sulbactam: I 16/8      -  Aztreonam: R 8      -  Cefazolin: R >16 For uncomplicated UTI with K. pneumoniae, E. coli, or P. mirablis: ANDREW <=16 is sensitive and ANDREW >=32 is resistant. This also predicts results for oral agents cefaclor, cefdinir, cefpodoxime, cefprozil, cefuroxime axetil, cephalexin and locarbef for uncomplicated UTI. Note that some isolates may be susceptible to these agents while testing resistant to cefazolin.      -  Cefepime: R <=2      -  Ceftriaxone: R 32      -  Cefuroxime: R >16      -  Ciprofloxacin: S <=0.25      -  Ertapenem: S <=0.5      -  Gentamicin: S <=2      -  Imipenem: S <=1      -  Levofloxacin: S <=0.5      -  Meropenem: S <=1      -  Nitrofurantoin: S <=32 Should not be used to treat pyelonephritis      -  Piperacillin/Tazobactam: SDD 16      -  Tobramycin: S <=2      -  Trimethoprim/Sulfamethoxazole: S <=0.5/9.5    Urinalysis with Rflx Culture (collected 06-25-24 @ 13:16)    SARS-CoV-2 Result: NotDetec (04 Jul 2024 17:10)    Radiology: reviewed     Medications:  acetaminophen     Tablet .. 650 milliGRAM(s) Oral every 6 hours PRN  aluminum hydroxide/magnesium hydroxide/simethicone Suspension 30 milliLiter(s) Oral every 4 hours PRN  apixaban 2.5 milliGRAM(s) Oral every 12 hours  aspirin  chewable 81 milliGRAM(s) Oral daily  atorvastatin 40 milliGRAM(s) Oral at bedtime  aztreonam  IVPB 1000 milliGRAM(s) IV Intermittent every 8 hours  carvedilol 12.5 milliGRAM(s) Oral every 12 hours  dextrose 10% Bolus 125 milliLiter(s) IV Bolus once  dextrose 5%. 1000 milliLiter(s) IV Continuous <Continuous>  dextrose 5%. 1000 milliLiter(s) IV Continuous <Continuous>  dextrose 50% Injectable 25 Gram(s) IV Push once  dextrose 50% Injectable 12.5 Gram(s) IV Push once  dextrose Oral Gel 15 Gram(s) Oral once PRN  divalproex  milliGRAM(s) Oral two times a day  finasteride 5 milliGRAM(s) Oral daily  glucagon  Injectable 1 milliGRAM(s) IntraMuscular once  insulin glargine Injectable (LANTUS) 5 Unit(s) SubCutaneous at bedtime  insulin glargine Injectable (LANTUS) 5 Unit(s) SubCutaneous every morning  insulin lispro (ADMELOG) corrective regimen sliding scale   SubCutaneous three times a day before meals  insulin lispro (ADMELOG) corrective regimen sliding scale   SubCutaneous at bedtime  melatonin 3 milliGRAM(s) Oral at bedtime PRN  ondansetron Injectable 4 milliGRAM(s) IV Push every 8 hours PRN  risperiDONE   Tablet 2 milliGRAM(s) Oral two times a day  sodium chloride 0.9%. 1000 milliLiter(s) IV Continuous <Continuous>  tamsulosin 0.4 milliGRAM(s) Oral at bedtime    Current Antimicrobials:  aztreonam  IVPB 1000 milliGRAM(s) IV Intermittent every 8 hours    Prior/Completed Antimicrobials:  aztreonam  IVPB  vancomycin  IVPB.

## 2024-07-07 NOTE — PROGRESS NOTE ADULT - SUBJECTIVE AND OBJECTIVE BOX
Patient is a 64y Male whom presented to the hospital with raymond     PAST MEDICAL & SURGICAL HISTORY:  Bipolar 1 disorder  on Lithium      Hypertension      T2DM (type 2 diabetes mellitus)      HLD (hyperlipidemia)      CHAYITO (obstructive sleep apnea)  diagnosed >25 years ago, non-compliant with CPAP      Diabetic neuropathy  bilateral feet, ankles      Obesity      Rheumatoid arthritis      History of excision of testicular mass  left, 2009      Cataract of left eye          MEDICATIONS  (STANDING):  apixaban 2.5 milliGRAM(s) Oral every 12 hours  aspirin  chewable 81 milliGRAM(s) Oral daily  atorvastatin 40 milliGRAM(s) Oral at bedtime  aztreonam  IVPB 1000 milliGRAM(s) IV Intermittent every 8 hours  carvedilol 12.5 milliGRAM(s) Oral every 12 hours  dextrose 10% Bolus 125 milliLiter(s) IV Bolus once  dextrose 5%. 1000 milliLiter(s) (100 mL/Hr) IV Continuous <Continuous>  dextrose 5%. 1000 milliLiter(s) (50 mL/Hr) IV Continuous <Continuous>  dextrose 50% Injectable 25 Gram(s) IV Push once  dextrose 50% Injectable 12.5 Gram(s) IV Push once  divalproex  milliGRAM(s) Oral two times a day  finasteride 5 milliGRAM(s) Oral daily  glucagon  Injectable 1 milliGRAM(s) IntraMuscular once  insulin lispro (ADMELOG) corrective regimen sliding scale   SubCutaneous three times a day before meals  risperiDONE   Tablet 2 milliGRAM(s) Oral two times a day  sodium chloride 0.9%. 1000 milliLiter(s) (80 mL/Hr) IV Continuous <Continuous>  tamsulosin 0.4 milliGRAM(s) Oral at bedtime      Allergies    penicillin (Hives)  Tegretol (Hypotension; Anaphylaxis)    Intolerances        SOCIAL HISTORY:  Denies ETOh,Smoking,     FAMILY HISTORY:  FH: CAD (coronary artery disease) (Mother)        REVIEW OF SYSTEMS:    CONSTITUTIONAL: No weakness, fevers or chills  RESPIRATORY: No cough, wheezing, hemoptysis; No shortness of breath  CARDIOVASCULAR: No chest pain or palpitations  GASTROINTESTINAL: No abdominal or epigastric pain. No nausea, vomiting,     No diarrhea or constipation. No melena   SKIN: dry                                     12.1   6.66  )-----------( 127      ( 07 Jul 2024 06:00 )             37.1       CBC Full  -  ( 07 Jul 2024 06:00 )  WBC Count : 6.66 K/uL  RBC Count : 4.08 M/uL  Hemoglobin : 12.1 g/dL  Hematocrit : 37.1 %  Platelet Count - Automated : 127 K/uL  Mean Cell Volume : 90.9 fl  Mean Cell Hemoglobin : 29.7 pg  Mean Cell Hemoglobin Concentration : 32.6 gm/dL  Auto Neutrophil # : x  Auto Lymphocyte # : x  Auto Monocyte # : x  Auto Eosinophil # : x  Auto Basophil # : x  Auto Neutrophil % : x  Auto Lymphocyte % : x  Auto Monocyte % : x  Auto Eosinophil % : x  Auto Basophil % : x      07-07    141  |  107  |  38<H>  ----------------------------<  342<H>  4.2   |  24  |  2.09<H>    Ca    9.9      07 Jul 2024 06:00  Phos  3.9     07-07    TPro  7.7  /  Alb  2.6<L>  /  TBili  0.4  /  DBili  x   /  AST  167<H>  /  ALT  240<H>  /  AlkPhos  71  07-07      CAPILLARY BLOOD GLUCOSE      POCT Blood Glucose.: 367 mg/dL (07 Jul 2024 11:53)  POCT Blood Glucose.: 339 mg/dL (07 Jul 2024 07:42)  POCT Blood Glucose.: 291 mg/dL (06 Jul 2024 21:06)  POCT Blood Glucose.: 313 mg/dL (06 Jul 2024 16:39)  POCT Blood Glucose.: 413 mg/dL (06 Jul 2024 13:59)  POCT Blood Glucose.: 438 mg/dL (06 Jul 2024 13:57)      Vital Signs Last 24 Hrs  T(C): 37.2 (07 Jul 2024 05:13), Max: 37.7 (06 Jul 2024 20:52)  T(F): 99 (07 Jul 2024 05:13), Max: 99.9 (06 Jul 2024 20:52)  HR: 88 (07 Jul 2024 05:13) (75 - 95)  BP: 120/77 (07 Jul 2024 05:13) (105/70 - 143/81)  BP(mean): --  RR: 18 (07 Jul 2024 05:13) (18 - 18)  SpO2: 93% (07 Jul 2024 05:13) (91% - 95%)    Parameters below as of 07 Jul 2024 05:13  Patient On (Oxygen Delivery Method): room air        Urinalysis Basic - ( 07 Jul 2024 06:00 )    Color: x / Appearance: x / SG: x / pH: x  Gluc: 342 mg/dL / Ketone: x  / Bili: x / Urobili: x   Blood: x / Protein: x / Nitrite: x   Leuk Esterase: x / RBC: x / WBC x   Sq Epi: x / Non Sq Epi: x / Bacteria: x                             PHYSICAL EXAM:    Constitutional: NAD  HEENT: conjunctive   clear   Neck:  No JVD  Respiratory: CTAB  Cardiovascular: S1 and S2  Gastrointestinal: BS+, soft, NT/ND  Extremities: No peripheral edema  Skin: dry   Access: Not applicable

## 2024-07-07 NOTE — PROGRESS NOTE ADULT - SUBJECTIVE AND OBJECTIVE BOX
Neurology follow up note    MARGI ART64yMale      Interval History:    Patient feels ok no new complaints.    Allergies    penicillin (Hives)  Tegretol (Hypotension; Anaphylaxis)    Intolerances        MEDICATIONS    acetaminophen     Tablet .. 650 milliGRAM(s) Oral every 6 hours PRN  aluminum hydroxide/magnesium hydroxide/simethicone Suspension 30 milliLiter(s) Oral every 4 hours PRN  apixaban 2.5 milliGRAM(s) Oral every 12 hours  aspirin  chewable 81 milliGRAM(s) Oral daily  atorvastatin 40 milliGRAM(s) Oral at bedtime  aztreonam  IVPB 1000 milliGRAM(s) IV Intermittent every 8 hours  carvedilol 12.5 milliGRAM(s) Oral every 12 hours  dextrose 10% Bolus 125 milliLiter(s) IV Bolus once  dextrose 5%. 1000 milliLiter(s) IV Continuous <Continuous>  dextrose 5%. 1000 milliLiter(s) IV Continuous <Continuous>  dextrose 50% Injectable 25 Gram(s) IV Push once  dextrose 50% Injectable 12.5 Gram(s) IV Push once  dextrose Oral Gel 15 Gram(s) Oral once PRN  divalproex  milliGRAM(s) Oral two times a day  finasteride 5 milliGRAM(s) Oral daily  glucagon  Injectable 1 milliGRAM(s) IntraMuscular once  insulin glargine Injectable (LANTUS) 5 Unit(s) SubCutaneous every morning  insulin glargine Injectable (LANTUS) 5 Unit(s) SubCutaneous at bedtime  insulin lispro (ADMELOG) corrective regimen sliding scale   SubCutaneous three times a day before meals  insulin lispro (ADMELOG) corrective regimen sliding scale   SubCutaneous at bedtime  melatonin 3 milliGRAM(s) Oral at bedtime PRN  ondansetron Injectable 4 milliGRAM(s) IV Push every 8 hours PRN  risperiDONE   Tablet 2 milliGRAM(s) Oral two times a day  sodium chloride 0.9%. 1000 milliLiter(s) IV Continuous <Continuous>  tamsulosin 0.4 milliGRAM(s) Oral at bedtime              Vital Signs Last 24 Hrs  T(C): 37.2 (07 Jul 2024 05:13), Max: 37.7 (06 Jul 2024 20:52)  T(F): 99 (07 Jul 2024 05:13), Max: 99.9 (06 Jul 2024 20:52)  HR: 88 (07 Jul 2024 05:13) (75 - 95)  BP: 120/77 (07 Jul 2024 05:13) (105/70 - 143/81)  BP(mean): --  RR: 18 (07 Jul 2024 05:13) (18 - 18)  SpO2: 93% (07 Jul 2024 05:13) (91% - 95%)    Parameters below as of 07 Jul 2024 05:13  Patient On (Oxygen Delivery Method): room air        REVIEW OF SYSTEMS:    Constitutional: No fever, chills, fatigue, weakness  Eyes: no eye pain, visual disturbances, or discharge  ENT:  No difficulty hearing, tinnitus, vertigo; No sinus or throat pain  Neck: No pain or stiffness  Respiratory: No cough, dyspnea, wheezing   Cardiovascular: No chest pain, palpitations,   Gastrointestinal: No abdominal or epigastric pain. No nausea, vomiting  No diarrhea or constipation.   Genitourinary: No dysuria, frequency, hematuria or incontinence  Neurological: No headaches, lightheadedness, vertigo, numbness or tremors  Psychiatric: No depression, anxiety, mood swings or difficulty sleeping  Musculoskeletal: No joint pain or swelling; No muscle, back or extremity pain  Skin: No itching, burning, rashes or lesions   Lymph Nodes: No enlarged glands  Endocrine: No heat or cold intolerance; No hair loss   Allergy and Immunologic: No hives or eczema    On Neurological Examination:    Mental Status - Patient is alert, awake, o      Follow simple commands      Speech -   Fluent                  Cranial Nerves - Pupils 3 mm equal and reactive to light,   extraocular eye movements intact.   smile symmetric  intact bilateral NLF    Motor Exam -     With stimuli positive movement of all 4 extremities    Muscle tone - is normal all over.  No asymmetry is seen.      Sensory    Bilateral intact to light touch    GENERAL Exam: Nontoxic , No Acute Distress   	  HEENT:  normocephalic, atraumatic  		  LUNGS:  Decreased bilaterally  	  HEART: Normal S1S2   No murmur RRR        	  GI/ ABDOMEN:  Soft  Non tender    EXTREMITIES:   No Edema  No Clubbing  No Cyanosis     SKIN: Normal  No Ecchymosis        LABS:  CBC Full  -  ( 07 Jul 2024 06:00 )  WBC Count : 6.66 K/uL  RBC Count : 4.08 M/uL  Hemoglobin : 12.1 g/dL  Hematocrit : 37.1 %  Platelet Count - Automated : 127 K/uL  Mean Cell Volume : 90.9 fl  Mean Cell Hemoglobin : 29.7 pg  Mean Cell Hemoglobin Concentration : 32.6 gm/dL  Auto Neutrophil # : x  Auto Lymphocyte # : x  Auto Monocyte # : x  Auto Eosinophil # : x  Auto Basophil # : x  Auto Neutrophil % : x  Auto Lymphocyte % : x  Auto Monocyte % : x  Auto Eosinophil % : x  Auto Basophil % : x    Urinalysis Basic - ( 07 Jul 2024 06:00 )    Color: x / Appearance: x / SG: x / pH: x  Gluc: 342 mg/dL / Ketone: x  / Bili: x / Urobili: x   Blood: x / Protein: x / Nitrite: x   Leuk Esterase: x / RBC: x / WBC x   Sq Epi: x / Non Sq Epi: x / Bacteria: x      07-07    141  |  107  |  38<H>  ----------------------------<  342<H>  4.2   |  24  |  2.09<H>    Ca    9.9      07 Jul 2024 06:00  Phos  3.9     07-07    TPro  7.7  /  Alb  2.6<L>  /  TBili  0.4  /  DBili  x   /  AST  167<H>  /  ALT  240<H>  /  AlkPhos  71  07-07    Hemoglobin A1C:     LIVER FUNCTIONS - ( 07 Jul 2024 06:00 )  Alb: 2.6 g/dL / Pro: 7.7 g/dL / ALK PHOS: 71 U/L / ALT: 240 U/L / AST: 167 U/L / GGT: x           Vitamin B12         RADIOLOGY      ASSESSMENT AND PLAN   Altered in mental status,   For Altered in mental status and lethargy, suspect most likely this is metabolic encephalopathy, which is multifactorial secondary to underlying infection with weakness   For history of sepsis, antibiotics as needed.  diabetes  monitor blood sugars as needed   hypertension monitor sbp as needed   49 minutes of time was spent with the patient, plan of care, reviewing data, with greater than  50% of the visit was spent counseling and/or coordinating care with multidisciplinary healthcare team

## 2024-07-07 NOTE — PROGRESS NOTE ADULT - SUBJECTIVE AND OBJECTIVE BOX
CHIEF COMPLAINT/ REASON FOR VISIT  .. Patient was seen to address the  issue listed under PROBLEM LIST which is located toward bottom of this note     MARGI LIAOERIC    SY 1EST 115 W1    Allergies    penicillin (Hives)  Tegretol (Hypotension; Anaphylaxis)    Intolerances        PAST MEDICAL & SURGICAL HISTORY:  Bipolar 1 disorder  on Lithium      Hypertension      T2DM (type 2 diabetes mellitus)      HLD (hyperlipidemia)      CHAYITO (obstructive sleep apnea)  diagnosed >25 years ago, non-compliant with CPAP      Diabetic neuropathy  bilateral feet, ankles      Obesity      Rheumatoid arthritis      History of excision of testicular mass  left, 2009      Cataract of left eye          FAMILY HISTORY:  FH: CAD (coronary artery disease) (Mother)        Home Medications:  ascorbic acid 500 mg oral tablet: 1 tab(s) orally 2 times a day (04 Jul 2024 19:20)  aspirin 81 mg oral tablet, chewable: 1 tab(s) orally once a day (04 Jul 2024 17:47)  atorvastatin 40 mg oral tablet: 1 tab(s) orally once a day (04 Jul 2024 17:47)  carvedilol 12.5 mg oral tablet: 1 tab(s) orally 2 times a day (04 Jul 2024 17:47)  Depakote 500 mg oral delayed release tablet: 1 tab(s) orally 2 times a day (04 Jul 2024 19:20)  finasteride 5 mg oral tablet: 1 tab(s) orally once a day (04 Jul 2024 19:20)  HumaLOG 100 units/mL injectable solution: injectable 3 times a day (before meals) sliding scale with fingerstick (04 Jul 2024 19:20)  Lipitor 40 mg oral tablet: 1 tab(s) orally once a day (at bedtime) (04 Jul 2024 19:20)  losartan 25 mg oral tablet: 1 tab(s) orally once a day (04 Jul 2024 19:20)  melatonin:  (04 Jul 2024 19:20)  melatonin 5 mg oral tablet: 1 tab(s) orally once a day (at bedtime) (04 Jul 2024 19:20)  metFORMIN 500 mg oral tablet: 1 tab(s) orally 2 times a day (04 Jul 2024 19:20)  Polyethylene Glycol 3350: once a day (04 Jul 2024 19:20)  RisperDAL 0.5 mg oral tablet: 1 tab(s) orally 2 times a day give with the 2mg dose twice a day for a total of 2.5mg twice a day (04 Jul 2024 19:20)  RisperDAL 2 mg oral tablet: 1 tab(s) orally 2 times a day give 2mg by oral route two times a day WITH 0.5mg for total of 2.5mg (04 Jul 2024 19:20)  tamsulosin 0.4 mg oral capsule: 1 capsule orally once a day (04 Jul 2024 17:47)  Vascepa 1 g oral capsule: 2 cap(s) orally 2 times a day (04 Jul 2024 17:47)  Vitamin C 500 mg oral capsule: 1 cap(s) orally 2 times a day (04 Jul 2024 17:47)  zinc oxide: apply to sacrum after cleanse with soap and water (04 Jul 2024 19:20)      MEDICATIONS  (STANDING):  apixaban 2.5 milliGRAM(s) Oral every 12 hours  aspirin  chewable 81 milliGRAM(s) Oral daily  atorvastatin 40 milliGRAM(s) Oral at bedtime  aztreonam  IVPB 1000 milliGRAM(s) IV Intermittent every 8 hours  carvedilol 12.5 milliGRAM(s) Oral every 12 hours  dextrose 10% Bolus 125 milliLiter(s) IV Bolus once  dextrose 5%. 1000 milliLiter(s) (100 mL/Hr) IV Continuous <Continuous>  dextrose 5%. 1000 milliLiter(s) (50 mL/Hr) IV Continuous <Continuous>  dextrose 50% Injectable 25 Gram(s) IV Push once  dextrose 50% Injectable 12.5 Gram(s) IV Push once  divalproex  milliGRAM(s) Oral two times a day  finasteride 5 milliGRAM(s) Oral daily  glucagon  Injectable 1 milliGRAM(s) IntraMuscular once  insulin glargine Injectable (LANTUS) 5 Unit(s) SubCutaneous at bedtime  insulin glargine Injectable (LANTUS) 5 Unit(s) SubCutaneous every morning  insulin lispro (ADMELOG) corrective regimen sliding scale   SubCutaneous three times a day before meals  insulin lispro (ADMELOG) corrective regimen sliding scale   SubCutaneous at bedtime  risperiDONE   Tablet 2 milliGRAM(s) Oral two times a day  sodium chloride 0.9%. 1000 milliLiter(s) (80 mL/Hr) IV Continuous <Continuous>  tamsulosin 0.4 milliGRAM(s) Oral at bedtime    MEDICATIONS  (PRN):  acetaminophen     Tablet .. 650 milliGRAM(s) Oral every 6 hours PRN Temp greater or equal to 38C (100.4F), Mild Pain (1 - 3)  aluminum hydroxide/magnesium hydroxide/simethicone Suspension 30 milliLiter(s) Oral every 4 hours PRN Dyspepsia  dextrose Oral Gel 15 Gram(s) Oral once PRN Blood Glucose LESS THAN 70 milliGRAM(s)/deciliter  melatonin 3 milliGRAM(s) Oral at bedtime PRN Insomnia  ondansetron Injectable 4 milliGRAM(s) IV Push every 8 hours PRN Nausea and/or Vomiting      Diet, Regular:   Consistent Carbohydrate Evening Snack (07-05-24 @ 12:28) [Active]          Vital Signs Last 24 Hrs  T(C): 37.2 (07 Jul 2024 05:13), Max: 37.7 (06 Jul 2024 20:52)  T(F): 99 (07 Jul 2024 05:13), Max: 99.9 (06 Jul 2024 20:52)  HR: 88 (07 Jul 2024 05:13) (75 - 95)  BP: 120/77 (07 Jul 2024 05:13) (105/70 - 143/81)  BP(mean): --  RR: 18 (07 Jul 2024 05:13) (18 - 18)  SpO2: 93% (07 Jul 2024 05:13) (91% - 95%)    Parameters below as of 07 Jul 2024 05:13  Patient On (Oxygen Delivery Method): room air          07-06-24 @ 07:01  -  07-07-24 @ 07:00  --------------------------------------------------------  IN: 100 mL / OUT: 8500 mL / NET: -8400 mL              LABS:                        11.1   5.22  )-----------( 103      ( 06 Jul 2024 06:00 )             34.1     07-06    139  |  106  |  40<H>  ----------------------------<  326<H>  4.1   |  20<L>  |  2.24<H>    Ca    9.3      06 Jul 2024 06:00    TPro  6.8  /  Alb  2.5<L>  /  TBili  0.4  /  DBili  x   /  AST  137<H>  /  ALT  137<H>  /  AlkPhos  65  07-06      Urinalysis Basic - ( 06 Jul 2024 06:00 )    Color: x / Appearance: x / SG: x / pH: x  Gluc: 326 mg/dL / Ketone: x  / Bili: x / Urobili: x   Blood: x / Protein: x / Nitrite: x   Leuk Esterase: x / RBC: x / WBC x   Sq Epi: x / Non Sq Epi: x / Bacteria: x            WBC:  WBC Count: 5.22 K/uL (07-06 @ 06:00)  WBC Count: 6.29 K/uL (07-05 @ 05:30)  WBC Count: 7.36 K/uL (07-04 @ 17:10)  WBC Count: 7.98 K/uL (07-03 @ 16:40)      MICROBIOLOGY:  RECENT CULTURES:  07-04 Clean Catch Clean Catch (Midstream) XXXX XXXX   >100,000 CFU/ml Escherichia coli    07-04 .Blood Blood-Peripheral Blood Culture PCR   Growth in aerobic bottle: Gram Negative Rods   Growth in aerobic bottle: Escherichia coli  Direct identification is available within approximately 3-5  hours either by Blood Panel Multiplexed PCR or Direct  MALDI-TOF. Details: [email or web address]    07-03 Clean Catch Clean Catch (Midstream) XXXX XXXX   50,000 - 99,000 CFU/mL Escherichia coli          CARDIAC MARKERS ( 06 Jul 2024 06:00 )  x     / x     / 4613 U/L / x     / x                Sodium:  Sodium: 139 mmol/L (07-06 @ 06:00)  Sodium: 139 mmol/L (07-05 @ 05:30)  Sodium: 133 mmol/L (07-04 @ 19:13)  Sodium: 132 mmol/L (07-04 @ 17:10)  Sodium: 137 mmol/L (07-03 @ 16:40)      2.24 mg/dL 07-06 @ 06:00  2.31 mg/dL 07-05 @ 05:30  2.27 mg/dL 07-04 @ 19:13  2.38 mg/dL 07-04 @ 17:10  2.04 mg/dL 07-03 @ 16:40      Hemoglobin:  Hemoglobin: 11.1 g/dL (07-06 @ 06:00)  Hemoglobin: 11.8 g/dL (07-05 @ 05:30)  Hemoglobin: 11.5 g/dL (07-04 @ 17:10)  Hemoglobin: 13.3 g/dL (07-03 @ 16:40)      Platelets: Platelet Count - Automated: 103 K/uL (07-06 @ 06:00)  Platelet Count - Automated: 106 K/uL (07-05 @ 05:30)  Platelet Count - Automated: 124 K/uL (07-04 @ 17:10)  Platelet Count - Automated: 134 K/uL (07-03 @ 16:40)      LIVER FUNCTIONS - ( 06 Jul 2024 06:00 )  Alb: 2.5 g/dL / Pro: 6.8 g/dL / ALK PHOS: 65 U/L / ALT: 137 U/L / AST: 137 U/L / GGT: x             Urinalysis Basic - ( 06 Jul 2024 06:00 )    Color: x / Appearance: x / SG: x / pH: x  Gluc: 326 mg/dL / Ketone: x  / Bili: x / Urobili: x   Blood: x / Protein: x / Nitrite: x   Leuk Esterase: x / RBC: x / WBC x   Sq Epi: x / Non Sq Epi: x / Bacteria: x        RADIOLOGY & ADDITIONAL STUDIES:      MICROBIOLOGY:  RECENT CULTURES:  07-04 Clean Catch Clean Catch (Midstream) XXXX XXXX   >100,000 CFU/ml Escherichia coli    07-04 .Blood Blood-Peripheral Blood Culture PCR   Growth in aerobic bottle: Gram Negative Rods   Growth in aerobic bottle: Escherichia coli  Direct identification is available within approximately 3-5  hours either by Blood Panel Multiplexed PCR or Direct  MALDI-TOF. Details: [email or web address]    07-03 Clean Catch Clean Catch (Midstream) XXXX XXXX   50,000 - 99,000 CFU/mL Escherichia coli

## 2024-07-07 NOTE — PROGRESS NOTE ADULT - SUBJECTIVE AND OBJECTIVE BOX
Chief Complaint: Weakness, possible syncope    Interval Events: No events overnight.    Review of Systems:  General: No fevers, chills, weight gain  Skin: No rashes, color changes  Cardiovascular: No chest pain, orthopnea  Respiratory: No shortness of breath, cough  Gastrointestinal: No nausea, abdominal pain  Genitourinary: No incontinence, pain with urination  Musculoskeletal: No pain, swelling, decreased range of motion  Neurological: No headache, weakness  Psychiatric: No depression, anxiety  Endocrine: No weight gain, increased thirst  All other systems are comprehensively negative.    Physical Exam:  Vital Signs Last 24 Hrs  T(C): 37.2 (07 Jul 2024 05:13), Max: 37.7 (06 Jul 2024 20:52)  T(F): 99 (07 Jul 2024 05:13), Max: 99.9 (06 Jul 2024 20:52)  HR: 88 (07 Jul 2024 05:13) (75 - 95)  BP: 120/77 (07 Jul 2024 05:13) (105/70 - 143/81)  BP(mean): --  RR: 18 (07 Jul 2024 05:13) (18 - 18)  SpO2: 93% (07 Jul 2024 05:13) (91% - 95%)  Parameters below as of 07 Jul 2024 05:13  Patient On (Oxygen Delivery Method): room air  General: NAD  HEENT: MMM  Neck: No JVD, no carotid bruit  Lungs: CTAB  CV: RRR, nl S1/S2, no M/R/G  Abdomen: S/NT/ND, +BS  Extremities: No LE edema, no cyanosis  Neuro: AAOx3, non-focal  Skin: No rash    Labs:             07-07    141  |  107  |  38<H>  ----------------------------<  342<H>  4.2   |  24  |  2.09<H>    Ca    9.9      07 Jul 2024 06:00  Phos  3.9     07-07    TPro  7.7  /  Alb  2.6<L>  /  TBili  0.4  /  DBili  x   /  AST  167<H>  /  ALT  240<H>  /  AlkPhos  71  07-07                        12.1   6.66  )-----------( 127      ( 07 Jul 2024 06:00 )             37.1       ECG/Telemetry: Sinus rhythm

## 2024-07-08 DIAGNOSIS — E11.65 TYPE 2 DIABETES MELLITUS WITH HYPERGLYCEMIA: ICD-10-CM

## 2024-07-08 LAB
-  AMOXICILLIN/CLAVULANIC ACID: SIGNIFICANT CHANGE UP
-  AMPICILLIN/SULBACTAM: SIGNIFICANT CHANGE UP
-  AMPICILLIN: SIGNIFICANT CHANGE UP
-  AZTREONAM: SIGNIFICANT CHANGE UP
-  CEFAZOLIN: SIGNIFICANT CHANGE UP
-  CEFEPIME: SIGNIFICANT CHANGE UP
-  CEFOXITIN: SIGNIFICANT CHANGE UP
-  CEFTRIAXONE: SIGNIFICANT CHANGE UP
-  CEFUROXIME: SIGNIFICANT CHANGE UP
-  CIPROFLOXACIN: SIGNIFICANT CHANGE UP
-  ERTAPENEM: SIGNIFICANT CHANGE UP
-  GENTAMICIN: SIGNIFICANT CHANGE UP
-  IMIPENEM: SIGNIFICANT CHANGE UP
-  LEVOFLOXACIN: SIGNIFICANT CHANGE UP
-  MEROPENEM: SIGNIFICANT CHANGE UP
-  NITROFURANTOIN: SIGNIFICANT CHANGE UP
-  PIPERACILLIN/TAZOBACTAM: SIGNIFICANT CHANGE UP
-  TOBRAMYCIN: SIGNIFICANT CHANGE UP
-  TRIMETHOPRIM/SULFAMETHOXAZOLE: SIGNIFICANT CHANGE UP
ALBUMIN SERPL ELPH-MCNC: 2.8 G/DL — LOW (ref 3.3–5)
ALP SERPL-CCNC: 80 U/L — SIGNIFICANT CHANGE UP (ref 30–120)
ALT FLD-CCNC: 235 U/L — HIGH (ref 10–60)
ANION GAP SERPL CALC-SCNC: 13 MMOL/L — SIGNIFICANT CHANGE UP (ref 5–17)
AST SERPL-CCNC: 106 U/L — HIGH (ref 10–40)
BILIRUB DIRECT SERPL-MCNC: 0.1 MG/DL — SIGNIFICANT CHANGE UP (ref 0–0.3)
BILIRUB INDIRECT FLD-MCNC: 0.3 MG/DL — SIGNIFICANT CHANGE UP (ref 0.2–1)
BILIRUB SERPL-MCNC: 0.4 MG/DL — SIGNIFICANT CHANGE UP (ref 0.2–1.2)
BUN SERPL-MCNC: 40 MG/DL — HIGH (ref 7–23)
CALCIUM SERPL-MCNC: 10.3 MG/DL — SIGNIFICANT CHANGE UP (ref 8.4–10.5)
CHLORIDE SERPL-SCNC: 110 MMOL/L — HIGH (ref 96–108)
CK SERPL-CCNC: 1833 U/L — HIGH (ref 39–308)
CO2 SERPL-SCNC: 22 MMOL/L — SIGNIFICANT CHANGE UP (ref 22–31)
CREAT SERPL-MCNC: 1.95 MG/DL — HIGH (ref 0.5–1.3)
CULTURE RESULTS: ABNORMAL
EGFR: 38 ML/MIN/1.73M2 — LOW
GLUCOSE BLDC GLUCOMTR-MCNC: 282 MG/DL — HIGH (ref 70–99)
GLUCOSE BLDC GLUCOMTR-MCNC: 287 MG/DL — HIGH (ref 70–99)
GLUCOSE BLDC GLUCOMTR-MCNC: 331 MG/DL — HIGH (ref 70–99)
GLUCOSE BLDC GLUCOMTR-MCNC: 380 MG/DL — HIGH (ref 70–99)
GLUCOSE SERPL-MCNC: 334 MG/DL — HIGH (ref 70–99)
HAV IGM SER-ACNC: SIGNIFICANT CHANGE UP
HBV CORE IGM SER-ACNC: SIGNIFICANT CHANGE UP
HBV SURFACE AG SER-ACNC: SIGNIFICANT CHANGE UP
HCT VFR BLD CALC: 39.2 % — SIGNIFICANT CHANGE UP (ref 39–50)
HCV AB S/CO SERPL IA: 0.11 S/CO — SIGNIFICANT CHANGE UP (ref 0–0.99)
HCV AB SERPL-IMP: SIGNIFICANT CHANGE UP
HGB BLD-MCNC: 12.5 G/DL — LOW (ref 13–17)
INR BLD: 1.24 RATIO — HIGH (ref 0.85–1.18)
LACTATE SERPL-SCNC: 1.4 MMOL/L — SIGNIFICANT CHANGE UP (ref 0.7–2)
MCHC RBC-ENTMCNC: 29 PG — SIGNIFICANT CHANGE UP (ref 27–34)
MCHC RBC-ENTMCNC: 31.9 GM/DL — LOW (ref 32–36)
MCV RBC AUTO: 91 FL — SIGNIFICANT CHANGE UP (ref 80–100)
METHOD TYPE: SIGNIFICANT CHANGE UP
NRBC # BLD: 0 /100 WBCS — SIGNIFICANT CHANGE UP (ref 0–0)
NT-PROBNP SERPL-SCNC: 203 PG/ML — HIGH (ref 0–125)
ORGANISM # SPEC MICROSCOPIC CNT: ABNORMAL
ORGANISM # SPEC MICROSCOPIC CNT: ABNORMAL
PHOSPHATE SERPL-MCNC: 4.4 MG/DL — SIGNIFICANT CHANGE UP (ref 2.5–4.5)
PLATELET # BLD AUTO: 160 K/UL — SIGNIFICANT CHANGE UP (ref 150–400)
POTASSIUM SERPL-MCNC: 4.6 MMOL/L — SIGNIFICANT CHANGE UP (ref 3.5–5.3)
POTASSIUM SERPL-SCNC: 4.6 MMOL/L — SIGNIFICANT CHANGE UP (ref 3.5–5.3)
PROCALCITONIN SERPL-MCNC: 3.33 NG/ML — HIGH (ref 0.02–0.1)
PROT SERPL-MCNC: 8.1 G/DL — SIGNIFICANT CHANGE UP (ref 6–8.3)
PROTHROM AB SERPL-ACNC: 13.8 SEC — HIGH (ref 9.5–13)
RBC # BLD: 4.31 M/UL — SIGNIFICANT CHANGE UP (ref 4.2–5.8)
RBC # FLD: 13.6 % — SIGNIFICANT CHANGE UP (ref 10.3–14.5)
SODIUM SERPL-SCNC: 145 MMOL/L — SIGNIFICANT CHANGE UP (ref 135–145)
SPECIMEN SOURCE: SIGNIFICANT CHANGE UP
WBC # BLD: 7.73 K/UL — SIGNIFICANT CHANGE UP (ref 3.8–10.5)
WBC # FLD AUTO: 7.73 K/UL — SIGNIFICANT CHANGE UP (ref 3.8–10.5)

## 2024-07-08 PROCEDURE — 99222 1ST HOSP IP/OBS MODERATE 55: CPT

## 2024-07-08 RX ORDER — INSULIN LISPRO 100 [IU]/ML
5 INJECTION, SOLUTION SUBCUTANEOUS
Refills: 0 | Status: DISCONTINUED | OUTPATIENT
Start: 2024-07-08 | End: 2024-07-09

## 2024-07-08 RX ORDER — INSULIN GLARGINE 100 [IU]/ML
10 INJECTION, SOLUTION SUBCUTANEOUS AT BEDTIME
Refills: 0 | Status: DISCONTINUED | OUTPATIENT
Start: 2024-07-08 | End: 2024-07-08

## 2024-07-08 RX ORDER — INSULIN GLARGINE 100 [IU]/ML
15 INJECTION, SOLUTION SUBCUTANEOUS AT BEDTIME
Refills: 0 | Status: DISCONTINUED | OUTPATIENT
Start: 2024-07-08 | End: 2024-07-09

## 2024-07-08 RX ADMIN — ATORVASTATIN CALCIUM 40 MILLIGRAM(S): 20 TABLET, FILM COATED ORAL at 21:13

## 2024-07-08 RX ADMIN — Medication 500 MILLIGRAM(S): at 18:16

## 2024-07-08 RX ADMIN — Medication 500 MILLIGRAM(S): at 18:15

## 2024-07-08 RX ADMIN — APIXABAN 2.5 MILLIGRAM(S): 5 TABLET, FILM COATED ORAL at 18:20

## 2024-07-08 RX ADMIN — INSULIN LISPRO 5 UNIT(S): 100 INJECTION, SOLUTION SUBCUTANEOUS at 16:46

## 2024-07-08 RX ADMIN — ASPIRIN 81 MILLIGRAM(S): 325 TABLET, FILM COATED ORAL at 12:02

## 2024-07-08 RX ADMIN — INSULIN GLARGINE 15 UNIT(S): 100 INJECTION, SOLUTION SUBCUTANEOUS at 21:13

## 2024-07-08 RX ADMIN — RISPERIDONE 2 MILLIGRAM(S): 0.5 TABLET ORAL at 05:52

## 2024-07-08 RX ADMIN — INSULIN GLARGINE 5 UNIT(S): 100 INJECTION, SOLUTION SUBCUTANEOUS at 07:44

## 2024-07-08 RX ADMIN — INSULIN LISPRO 8: 100 INJECTION, SOLUTION SUBCUTANEOUS at 16:46

## 2024-07-08 RX ADMIN — Medication 500 MILLIGRAM(S): at 05:52

## 2024-07-08 RX ADMIN — APIXABAN 2.5 MILLIGRAM(S): 5 TABLET, FILM COATED ORAL at 05:52

## 2024-07-08 RX ADMIN — INSULIN LISPRO 1: 100 INJECTION, SOLUTION SUBCUTANEOUS at 21:13

## 2024-07-08 RX ADMIN — TAMSULOSIN HYDROCHLORIDE 0.4 MILLIGRAM(S): 0.4 CAPSULE ORAL at 21:13

## 2024-07-08 RX ADMIN — RISPERIDONE 2 MILLIGRAM(S): 0.5 TABLET ORAL at 18:16

## 2024-07-08 RX ADMIN — CARVEDILOL PHOSPHATE 12.5 MILLIGRAM(S): 80 CAPSULE, EXTENDED RELEASE ORAL at 05:52

## 2024-07-08 RX ADMIN — Medication 500 MILLIGRAM(S): at 05:51

## 2024-07-08 RX ADMIN — CARVEDILOL PHOSPHATE 12.5 MILLIGRAM(S): 80 CAPSULE, EXTENDED RELEASE ORAL at 18:15

## 2024-07-08 RX ADMIN — INSULIN LISPRO 6: 100 INJECTION, SOLUTION SUBCUTANEOUS at 07:44

## 2024-07-08 RX ADMIN — INSULIN LISPRO 10: 100 INJECTION, SOLUTION SUBCUTANEOUS at 12:08

## 2024-07-08 RX ADMIN — Medication 5 MILLIGRAM(S): at 12:02

## 2024-07-08 NOTE — PROGRESS NOTE ADULT - SUBJECTIVE AND OBJECTIVE BOX
Neurology follow up note    MARGI [id number]      Interval History:    Patient feels ok no new complaints.    Allergies    penicillin (Hives)  Tegretol (Hypotension; Anaphylaxis)    Intolerances        MEDICATIONS    acetaminophen     Tablet .. 650 milliGRAM(s) Oral every 6 hours PRN  aluminum hydroxide/magnesium hydroxide/simethicone Suspension 30 milliLiter(s) Oral every 4 hours PRN  apixaban 2.5 milliGRAM(s) Oral every 12 hours  aspirin  chewable 81 milliGRAM(s) Oral daily  atorvastatin 40 milliGRAM(s) Oral at bedtime  carvedilol 12.5 milliGRAM(s) Oral every 12 hours  ciprofloxacin     Tablet 500 milliGRAM(s) Oral every 12 hours  dextrose 10% Bolus 125 milliLiter(s) IV Bolus once  dextrose 5%. 1000 milliLiter(s) IV Continuous <Continuous>  dextrose 5%. 1000 milliLiter(s) IV Continuous <Continuous>  dextrose 50% Injectable 25 Gram(s) IV Push once  dextrose 50% Injectable 12.5 Gram(s) IV Push once  dextrose Oral Gel 15 Gram(s) Oral once PRN  divalproex  milliGRAM(s) Oral two times a day  finasteride 5 milliGRAM(s) Oral daily  glucagon  Injectable 1 milliGRAM(s) IntraMuscular once  insulin glargine Injectable (LANTUS) 5 Unit(s) SubCutaneous every morning  insulin glargine Injectable (LANTUS) 5 Unit(s) SubCutaneous at bedtime  insulin lispro (ADMELOG) corrective regimen sliding scale   SubCutaneous three times a day before meals  insulin lispro (ADMELOG) corrective regimen sliding scale   SubCutaneous at bedtime  melatonin 3 milliGRAM(s) Oral at bedtime PRN  ondansetron Injectable 4 milliGRAM(s) IV Push every 8 hours PRN  risperiDONE   Tablet 2 milliGRAM(s) Oral two times a day  sodium chloride 0.9%. 1000 milliLiter(s) IV Continuous <Continuous>  tamsulosin 0.4 milliGRAM(s) Oral at bedtime              Vital Signs Last 24 Hrs  T(C): 37 (08 Jul 2024 05:07), Max: 37.2 (07 Jul 2024 20:39)  T(F): 98.6 (08 Jul 2024 05:07), Max: 99 (07 Jul 2024 20:39)  HR: 85 (08 Jul 2024 05:07) (77 - 89)  BP: 142/80 (08 Jul 2024 05:07) (119/80 - 142/80)  BP(mean): --  RR: 18 (08 Jul 2024 05:07) (17 - 18)  SpO2: 92% (08 Jul 2024 05:07) (92% - 95%)    Parameters below as of 08 Jul 2024 05:07  Patient On (Oxygen Delivery Method): room air      REVIEW OF SYSTEMS:    Constitutional: No fever, chills, fatigue, weakness  Eyes: no eye pain, visual disturbances, or discharge  ENT:  No difficulty hearing, tinnitus, vertigo; No sinus or throat pain  Neck: No pain or stiffness  Respiratory: No cough, dyspnea, wheezing   Cardiovascular: No chest pain, palpitations,   Gastrointestinal: No abdominal or epigastric pain. No nausea, vomiting  No diarrhea or constipation.   Genitourinary: No dysuria, frequency, hematuria or incontinence  Neurological: No headaches, lightheadedness, vertigo, numbness or tremors  Psychiatric: No depression, anxiety, mood swings or difficulty sleeping  Musculoskeletal: No joint pain or swelling; No muscle, back or extremity pain  Skin: No itching, burning, rashes or lesions   Lymph Nodes: No enlarged glands  Endocrine: No heat or cold intolerance; No hair loss   Allergy and Immunologic: No hives or eczema    On Neurological Examination:    Mental Status - Patient is alert, awake, o      Follow simple commands      Speech -   Fluent                  Cranial Nerves - Pupils 3 mm equal and reactive to light,   extraocular eye movements intact.   smile symmetric  intact bilateral NLF    Motor Exam -     With stimuli positive movement of all 4 extremities    Muscle tone - is normal all over.  No asymmetry is seen.      Sensory    Bilateral intact to light touch    GENERAL Exam: Nontoxic , No Acute Distress   	  HEENT:  normocephalic, atraumatic  		  LUNGS:  Decreased bilaterally  	  HEART: Normal S1S2   No murmur RRR        	  GI/ ABDOMEN:  Soft  Non tender    EXTREMITIES:   No Edema  No Clubbing  No Cyanosis     SKIN: Normal  No Ecchymosis                     LABS:  CBC Full  -  ( 08 Jul 2024 07:43 )  WBC Count : 7.73 K/uL  RBC Count : 4.31 M/uL  Hemoglobin : 12.5 g/dL  Hematocrit : 39.2 %  Platelet Count - Automated : 160 K/uL  Mean Cell Volume : 91.0 fl  Mean Cell Hemoglobin : 29.0 pg  Mean Cell Hemoglobin Concentration : 31.9 gm/dL  Auto Neutrophil # : x  Auto Lymphocyte # : x  Auto Monocyte # : x  Auto Eosinophil # : x  Auto Basophil # : x  Auto Neutrophil % : x  Auto Lymphocyte % : x  Auto Monocyte % : x  Auto Eosinophil % : x  Auto Basophil % : x    Urinalysis Basic - ( 08 Jul 2024 07:43 )    Color: x / Appearance: x / SG: x / pH: x  Gluc: 334 mg/dL / Ketone: x  / Bili: x / Urobili: x   Blood: x / Protein: x / Nitrite: x   Leuk Esterase: x / RBC: x / WBC x   Sq Epi: x / Non Sq Epi: x / Bacteria: x      07-08    145  |  110<H>  |  40<H>  ----------------------------<  334<H>  4.6   |  22  |  1.95<H>    Ca    10.3      08 Jul 2024 07:43  Phos  4.4     07-08    TPro  8.1  /  Alb  2.8<L>  /  TBili  0.4  /  DBili  0.1  /  AST  106<H>  /  ALT  235<H>  /  AlkPhos  80  07-08    Hemoglobin A1C:     LIVER FUNCTIONS - ( 08 Jul 2024 07:43 )  Alb: 2.8 g/dL / Pro: 8.1 g/dL / ALK PHOS: 80 U/L / ALT: 235 U/L / AST: 106 U/L / GGT: x           Vitamin B12   PT/INR - ( 08 Jul 2024 07:43 )   PT: 13.8 sec;   INR: 1.24 ratio               RADIOLOGY      ASSESSMENT AND PLAN   Altered in mental status,   For Altered in mental status and lethargy, suspect most likely this is metabolic encephalopathy, which is multifactorial secondary to underlying infection with weakness   For history of sepsis, antibiotics as needed.  diabetes  monitor blood sugars as needed   hypertension monitor sbp as needed   fall precautions   47 minutes of time was spent with the patient, plan of care, reviewing data, with greater than  50% of the visit was spent counseling and/or coordinating care with multidisciplinary healthcare team

## 2024-07-08 NOTE — PROGRESS NOTE ADULT - SUBJECTIVE AND OBJECTIVE BOX
CHIEF COMPLAINT/ REASON FOR VISIT  .. Patient was seen to address the  issue listed under PROBLEM LIST which is located toward bottom of this note     MARGI LIAOERIC    SY 1EST 115 W1    Allergies    penicillin (Hives)  Tegretol (Hypotension; Anaphylaxis)    Intolerances        PAST MEDICAL & SURGICAL HISTORY:  Bipolar 1 disorder  on Lithium      Hypertension      T2DM (type 2 diabetes mellitus)      HLD (hyperlipidemia)      CHAYITO (obstructive sleep apnea)  diagnosed >25 years ago, non-compliant with CPAP      Diabetic neuropathy  bilateral feet, ankles      Obesity      Rheumatoid arthritis      History of excision of testicular mass  left, 2009      Cataract of left eye          FAMILY HISTORY:  FH: CAD (coronary artery disease) (Mother)        Home Medications:  ascorbic acid 500 mg oral tablet: 1 tab(s) orally 2 times a day (04 Jul 2024 19:20)  aspirin 81 mg oral tablet, chewable: 1 tab(s) orally once a day (04 Jul 2024 17:47)  atorvastatin 40 mg oral tablet: 1 tab(s) orally once a day (04 Jul 2024 17:47)  carvedilol 12.5 mg oral tablet: 1 tab(s) orally 2 times a day (04 Jul 2024 17:47)  Depakote 500 mg oral delayed release tablet: 1 tab(s) orally 2 times a day (04 Jul 2024 19:20)  finasteride 5 mg oral tablet: 1 tab(s) orally once a day (04 Jul 2024 19:20)  HumaLOG 100 units/mL injectable solution: injectable 3 times a day (before meals) sliding scale with fingerstick (04 Jul 2024 19:20)  Lipitor 40 mg oral tablet: 1 tab(s) orally once a day (at bedtime) (04 Jul 2024 19:20)  losartan 25 mg oral tablet: 1 tab(s) orally once a day (04 Jul 2024 19:20)  melatonin:  (04 Jul 2024 19:20)  melatonin 5 mg oral tablet: 1 tab(s) orally once a day (at bedtime) (04 Jul 2024 19:20)  metFORMIN 500 mg oral tablet: 1 tab(s) orally 2 times a day (04 Jul 2024 19:20)  Polyethylene Glycol 3350: once a day (04 Jul 2024 19:20)  RisperDAL 0.5 mg oral tablet: 1 tab(s) orally 2 times a day give with the 2mg dose twice a day for a total of 2.5mg twice a day (04 Jul 2024 19:20)  RisperDAL 2 mg oral tablet: 1 tab(s) orally 2 times a day give 2mg by oral route two times a day WITH 0.5mg for total of 2.5mg (04 Jul 2024 19:20)  tamsulosin 0.4 mg oral capsule: 1 capsule orally once a day (04 Jul 2024 17:47)  Vascepa 1 g oral capsule: 2 cap(s) orally 2 times a day (04 Jul 2024 17:47)  Vitamin C 500 mg oral capsule: 1 cap(s) orally 2 times a day (04 Jul 2024 17:47)  zinc oxide: apply to sacrum after cleanse with soap and water (04 Jul 2024 19:20)      MEDICATIONS  (STANDING):  apixaban 2.5 milliGRAM(s) Oral every 12 hours  aspirin  chewable 81 milliGRAM(s) Oral daily  atorvastatin 40 milliGRAM(s) Oral at bedtime  carvedilol 12.5 milliGRAM(s) Oral every 12 hours  ciprofloxacin     Tablet 500 milliGRAM(s) Oral every 12 hours  dextrose 10% Bolus 125 milliLiter(s) IV Bolus once  dextrose 5%. 1000 milliLiter(s) (100 mL/Hr) IV Continuous <Continuous>  dextrose 5%. 1000 milliLiter(s) (50 mL/Hr) IV Continuous <Continuous>  dextrose 50% Injectable 12.5 Gram(s) IV Push once  dextrose 50% Injectable 25 Gram(s) IV Push once  divalproex  milliGRAM(s) Oral two times a day  finasteride 5 milliGRAM(s) Oral daily  glucagon  Injectable 1 milliGRAM(s) IntraMuscular once  insulin glargine Injectable (LANTUS) 5 Unit(s) SubCutaneous every morning  insulin glargine Injectable (LANTUS) 5 Unit(s) SubCutaneous at bedtime  insulin lispro (ADMELOG) corrective regimen sliding scale   SubCutaneous three times a day before meals  insulin lispro (ADMELOG) corrective regimen sliding scale   SubCutaneous at bedtime  risperiDONE   Tablet 2 milliGRAM(s) Oral two times a day  sodium chloride 0.9%. 1000 milliLiter(s) (80 mL/Hr) IV Continuous <Continuous>  tamsulosin 0.4 milliGRAM(s) Oral at bedtime    MEDICATIONS  (PRN):  acetaminophen     Tablet .. 650 milliGRAM(s) Oral every 6 hours PRN Temp greater or equal to 38C (100.4F), Mild Pain (1 - 3)  aluminum hydroxide/magnesium hydroxide/simethicone Suspension 30 milliLiter(s) Oral every 4 hours PRN Dyspepsia  dextrose Oral Gel 15 Gram(s) Oral once PRN Blood Glucose LESS THAN 70 milliGRAM(s)/deciliter  melatonin 3 milliGRAM(s) Oral at bedtime PRN Insomnia  ondansetron Injectable 4 milliGRAM(s) IV Push every 8 hours PRN Nausea and/or Vomiting      Diet, Regular:   Consistent Carbohydrate Evening Snack (07-05-24 @ 12:28) [Active]          Vital Signs Last 24 Hrs  T(C): 37 (08 Jul 2024 05:07), Max: 37.2 (07 Jul 2024 20:39)  T(F): 98.6 (08 Jul 2024 05:07), Max: 99 (07 Jul 2024 20:39)  HR: 85 (08 Jul 2024 05:07) (77 - 89)  BP: 142/80 (08 Jul 2024 05:07) (119/80 - 142/80)  BP(mean): --  RR: 18 (08 Jul 2024 05:07) (17 - 18)  SpO2: 92% (08 Jul 2024 05:07) (92% - 95%)    Parameters below as of 08 Jul 2024 05:07  Patient On (Oxygen Delivery Method): room air          07-06-24 @ 07:01  -  07-07-24 @ 07:00  --------------------------------------------------------  IN: 100 mL / OUT: 8500 mL / NET: -8400 mL    07-07-24 @ 07:01  -  07-08-24 @ 06:27  --------------------------------------------------------  IN: 0 mL / OUT: 5890 mL / NET: -5890 mL              LABS:                        12.1   6.66  )-----------( 127      ( 07 Jul 2024 06:00 )             37.1     07-07    141  |  107  |  38<H>  ----------------------------<  342<H>  4.2   |  24  |  2.09<H>    Ca    9.9      07 Jul 2024 06:00  Phos  3.9     07-07    TPro  7.7  /  Alb  2.6<L>  /  TBili  0.4  /  DBili  x   /  AST  167<H>  /  ALT  240<H>  /  AlkPhos  71  07-07      Urinalysis Basic - ( 07 Jul 2024 06:00 )    Color: x / Appearance: x / SG: x / pH: x  Gluc: 342 mg/dL / Ketone: x  / Bili: x / Urobili: x   Blood: x / Protein: x / Nitrite: x   Leuk Esterase: x / RBC: x / WBC x   Sq Epi: x / Non Sq Epi: x / Bacteria: x            WBC:  WBC Count: 6.66 K/uL (07-07 @ 06:00)  WBC Count: 5.22 K/uL (07-06 @ 06:00)  WBC Count: 6.29 K/uL (07-05 @ 05:30)  WBC Count: 7.36 K/uL (07-04 @ 17:10)      MICROBIOLOGY:  RECENT CULTURES:  07-06 .Blood Blood-Peripheral XXXX XXXX   No growth at 24 hours    07-04 Clean Catch Clean Catch (Midstream) XXXX XXXX   >100,000 CFU/ml Escherichia coli    07-04 .Blood Blood-Peripheral Blood Culture PCR  Escherichia coli   Growth in aerobic bottle: Gram Negative Rods   Growth in aerobic bottle: Escherichia coli  Direct identification is available within approximately 3-5  hours either by Blood Panel Multiplexed PCR or Direct  MALDI-TOF. Details: [email or web address]    07-03 Clean Catch Clean Catch (Midstream) Escherichia coli ESBL XXXX   50,000 - 99,000 CFU/mL Escherichia coli ESBL          CARDIAC MARKERS ( 07 Jul 2024 06:00 )  x     / x     / 3196 U/L / x     / x                Sodium:  Sodium: 141 mmol/L (07-07 @ 06:00)  Sodium: 139 mmol/L (07-06 @ 06:00)  Sodium: 139 mmol/L (07-05 @ 05:30)  Sodium: 133 mmol/L (07-04 @ 19:13)  Sodium: 132 mmol/L (07-04 @ 17:10)      2.09 mg/dL 07-07 @ 06:00  2.24 mg/dL 07-06 @ 06:00  2.31 mg/dL 07-05 @ 05:30  2.27 mg/dL 07-04 @ 19:13  2.38 mg/dL 07-04 @ 17:10      Hemoglobin:  Hemoglobin: 12.1 g/dL (07-07 @ 06:00)  Hemoglobin: 11.1 g/dL (07-06 @ 06:00)  Hemoglobin: 11.8 g/dL (07-05 @ 05:30)  Hemoglobin: 11.5 g/dL (07-04 @ 17:10)      Platelets: Platelet Count - Automated: 127 K/uL (07-07 @ 06:00)  Platelet Count - Automated: 103 K/uL (07-06 @ 06:00)  Platelet Count - Automated: 106 K/uL (07-05 @ 05:30)  Platelet Count - Automated: 124 K/uL (07-04 @ 17:10)      LIVER FUNCTIONS - ( 07 Jul 2024 06:00 )  Alb: 2.6 g/dL / Pro: 7.7 g/dL / ALK PHOS: 71 U/L / ALT: 240 U/L / AST: 167 U/L / GGT: x             Urinalysis Basic - ( 07 Jul 2024 06:00 )    Color: x / Appearance: x / SG: x / pH: x  Gluc: 342 mg/dL / Ketone: x  / Bili: x / Urobili: x   Blood: x / Protein: x / Nitrite: x   Leuk Esterase: x / RBC: x / WBC x   Sq Epi: x / Non Sq Epi: x / Bacteria: x        RADIOLOGY & ADDITIONAL STUDIES:      MICROBIOLOGY:  RECENT CULTURES:  07-06 .Blood Blood-Peripheral XXXX XXXX   No growth at 24 hours    07-04 Clean Catch Clean Catch (Midstream) XXXX XXXX   >100,000 CFU/ml Escherichia coli    07-04 .Blood Blood-Peripheral Blood Culture PCR  Escherichia coli   Growth in aerobic bottle: Gram Negative Rods   Growth in aerobic bottle: Escherichia coli  Direct identification is available within approximately 3-5  hours either by Blood Panel Multiplexed PCR or Direct  MALDI-TOF. Details: [email or web address]    07-03 Clean Catch Clean Catch (Midstream) Escherichia coli ESBL XXXX   50,000 - 99,000 CFU/mL Escherichia coli ESBL

## 2024-07-08 NOTE — CARE COORDINATION ASSESSMENT. - FACILITY OF RESIDENCE YN
[facility] @ [address] [place] [id number], phone [phone]/Yes

## 2024-07-08 NOTE — CARE COORDINATION ASSESSMENT. - NSADDITIONAL INFORMATION_FT
Mr. Valentin Pruitt is a 65y/o , domiciled white male, w/ PMHx & PPHx as indicated below, who presents to [facility] secondary to UTI.  met w/ patient @ bedside on [address] for completion of care coordination assessment, @ which time patient appeared alert & oriented x4 @ time of interview, as well as calm, pleasant, and fully able to engage in discussion. Patient identified that he has been a long-term care resident [facility] in [place] for about a year; he had been living in an apartment program in [place] through his mental health clinic and attending [facility]'s adult day program but began losing his independence due to diabetic neuropathy, so he spoke to the day program staff who suggested maybe becoming a long-term resident there. Mr. Pruitt reported that the transition was hard @ first since he was so independent, but he's adjusting well and is getting along w/ the other residents. He is able to transfer himself independently from the hospital bed to the wheelchair and self-propel the wheelchair to the bathroom and around the courtyard @ the assisted living facility. Patient confirmed that he would like to return back to [facility] on hospital discharge.    Of note, Social Work Consult was received in [place] electronic health record indicating (+) PHQ-2 (depression screen) and life challenges; patient identified that he is not in contact w/ any of his family members due to psychosocial issues but has established friendships @ his skilled nursing facility which are comforting for him. Additionally, he identified that he has been diagnosed w/ bipolar d/o and does experience depression but overall tries to maintain a positive outlook; he stated that he was feeling depressed when he first got to the hospital but is feeling better now and knows how to access help if he needs it.

## 2024-07-08 NOTE — PROGRESS NOTE ADULT - SUBJECTIVE AND OBJECTIVE BOX
[facility], Division of Infectious Diseases  JORDY Vo Y. Patel, S. Shah, G. Ethan  [phone]  after hours and weekends [phone]    Name: MARGI ART  Age: 64y  Gender: Male  MRN: [id number]    Interval History--  Notes reviewed  states he feels stronger and appetite has returned      Allergies    penicillin (Hives)  Tegretol (Hypotension; Anaphylaxis)    Intolerances        Medications--  Antibiotics:  ciprofloxacin     Tablet 500 milliGRAM(s) Oral every 12 hours    Immunologic:    Other:  acetaminophen     Tablet .. PRN  aluminum hydroxide/magnesium hydroxide/simethicone Suspension PRN  apixaban  aspirin  chewable  atorvastatin  carvedilol  dextrose 10% Bolus  dextrose 5%.  dextrose 5%.  dextrose 50% Injectable  dextrose 50% Injectable  dextrose Oral Gel PRN  divalproex DR  finasteride  glucagon  Injectable  insulin glargine Injectable (LANTUS)  insulin lispro (ADMELOG) corrective regimen sliding scale  insulin lispro (ADMELOG) corrective regimen sliding scale  melatonin PRN  ondansetron Injectable PRN  risperiDONE   Tablet  sodium chloride 0.9%.  tamsulosin      Review of Systems--  A 10-point review of systems was obtained.     Pertinent positives and negatives--  Constitutional: No fevers. No Chills. No Rigors.   Cardiovascular: No chest pain. No palpitations.  Respiratory: No shortness of breath. No cough.  Gastrointestinal: No nausea or vomiting. No diarrhea or constipation.   Psychiatric: Pleasant. Appropriate affect.    Review of systems otherwise negative except as previously noted.    Physical Examination--  Vital Signs: T(F): 97.8 (07-08-24 @ 13:09), Max: 99 (07-07-24 @ 20:39)  HR: 79 (07-08-24 @ 13:09)  BP: 127/78 (07-08-24 @ 13:09)  RR: 18 (07-08-24 @ 13:09)  SpO2: 94% (07-08-24 @ 13:09)  Wt(kg): --  General: Nontoxic-appearing Male in no acute distress.  HEENT: AT/NC.  Neck: Not rigid. No sense of mass.  Nodes: None palpable.  Lungs: Clear bilaterally without rales, wheezing or rhonchi  Heart: Regular rate and rhythm  Abdomen: Bowel sounds present and normoactive. Soft. Nondistended. Nontender.   Back: No spinal tenderness. No costovertebral angle tenderness.   Extremities: No cyanosis or clubbing. + edema.   Skin: Warm. Dry. Good turgor. No rash. No vasculitic stigmata.  Psychiatric: Appropriate affect and mood for situation.         Laboratory Studies--  CBC                        12.5   7.73  )-----------( 160      ( 08 Jul 2024 07:43 )             39.2       Chemistries  07-08    145  |  110<H>  |  40<H>  ----------------------------<  334<H>  4.6   |  22  |  1.95<H>    Ca    10.3      08 Jul 2024 07:43  Phos  4.4     07-08    TPro  8.1  /  Alb  2.8<L>  /  TBili  0.4  /  DBili  0.1  /  AST  106<H>  /  ALT  235<H>  /  AlkPhos  80  07-08      Culture Data    Culture - Blood (collected 06 Jul 2024 06:00)  Source: .Blood Blood-Peripheral  Preliminary Report (08 Jul 2024 13:01):    No growth at 48 Hours    Culture - Blood (collected 06 Jul 2024 06:00)  Source: .Blood Blood-Peripheral  Preliminary Report (08 Jul 2024 13:01):    No growth at 48 Hours    Culture - Urine (collected 04 Jul 2024 18:11)  Source: Clean Catch Clean Catch (Midstream)  Final Report (08 Jul 2024 13:17):    >100,000 CFU/ml Escherichia coli  Organism: Escherichia coli (08 Jul 2024 13:17)  Organism: Escherichia coli (08 Jul 2024 13:17)    Culture - Blood (collected 04 Jul 2024 17:10)  Source: .Blood Blood-Peripheral  Preliminary Report (08 Jul 2024 06:01):    No growth at 72 Hours    Culture - Blood (collected 04 Jul 2024 17:10)  Source: .Blood Blood-Peripheral  Gram Stain (05 Jul 2024 13:59):    Growth in aerobic bottle: Gram Negative Rods  Final Report (07 Jul 2024 08:55):    Growth in aerobic bottle: Escherichia coli    Direct identification is available within approximately 3-5    hours either by Blood Panel Multiplexed PCR or Direct    MALDI-TOF. Details: [email or web address]  Organism: Blood Culture PCR  Escherichia coli (07 Jul 2024 08:55)  Organism: Escherichia coli (07 Jul 2024 08:55)  Organism: Blood Culture PCR (07 Jul 2024 08:55)    Culture - Urine (collected 03 Jul 2024 23:45)  Source: Clean Catch Clean Catch (Midstream)  Final Report (07 Jul 2024 11:25):    50,000 - 99,000 CFU/mL Escherichia coli ESBL  Organism: Escherichia coli ESBL (07 Jul 2024 11:25)  Organism: Escherichia coli ESBL (07 Jul 2024 11:25)

## 2024-07-08 NOTE — CAREGIVER ENGAGEMENT NOTE - CAREGIVER EDUCATION - TYPES DISCUSSED
Behavioral Health Services/Discharge plan/DME/Insurance benefits/Post-acute care agency contact/Post-discharge escalation process/SNF placement process/Transportation coordination/Transportation letter provided

## 2024-07-08 NOTE — CARE COORDINATION ASSESSMENT. - NSPASTMEDSURGHISTORY_GEN_ALL_CORE_FT
PAST MEDICAL & SURGICAL HISTORY:  Hypertension      Bipolar 1 disorder  on Lithium      Obesity      Diabetic neuropathy  bilateral feet, ankles      CHAYITO (obstructive sleep apnea)  diagnosed >25 years ago, non-compliant with CPAP      HLD (hyperlipidemia)      T2DM (type 2 diabetes mellitus)      History of excision of testicular mass  left, 2009      Rheumatoid arthritis      Cataract of left eye

## 2024-07-08 NOTE — CARE COORDINATION ASSESSMENT. - NSCAREPROVIDERS_GEN_ALL_CORE_FT
CARE PROVIDERS:  Accepting Physician: Phoebe Woodard  Access Services: Suresh Sharp  Administration: Ijeoma Gates  Admitting: Phoebe Woodard  Attending: Phoebe Woodard  Consultant: Juan Uriostegui  Consultant: Adarsh Mayfield  Consultant: Marsha Wen  Consultant: Rick Vargas  Consultant: Robbin Medeiros  Consultant: Jerrod Barkley  Covering Team: BRAULIO Galvan  ED Attending: Missael Oliveros  ED Nurse: Adam, Merline  Nurse: Nneka Grigsby  Ordered: Doctor, Unknown  Ordered: ServiceAccount, [facility]  Outpatient Provider: Ike Salguero  Outpatient Provider: Elbert Mckenna  Outpatient Provider: Adarsh Mayfield  Outpatient Provider: Reggie Bryant  Outpatient Provider: Pahlavan, Mohsen  PCA/Nursing Assistant: Daphne Martinez  PCA/Nursing Assistant: Monica Garcia  : Mayra Augustin// Supp. Assoc.: Rosa Elena Acevedo

## 2024-07-08 NOTE — PROGRESS NOTE ADULT - SUBJECTIVE AND OBJECTIVE BOX
PROGRESS NOTE  Patient is a 64y old  Male who presents with a chief complaint of fever , s/p fall (08 Jul 2024 13:23)  Chart and available morning labs /imaging are reviewed electronically , urgent issues addressed . More information  is being added upon completion of rounds , when more information is collected and management discussed with consultants , medical staff and social service/case management on the floor     OVERNIGHT    Elevated BG  reported by medical staff ,diabetic team input requested . All above noted Patient is resting in a bed comfortably . .No distress noted CPK remains elevated but improving   HPI:  64-year-old male presents with generalized weakness today.  Patient was seen in the ED yesterday for a psychiatric evaluation.  The patient was seen and cleared.  The patient's urinalysis was positive, but the patient was not on antibiotics.  The patient is having some urinary symptoms, but is mostly complaining of cough/URI.  No acute chest pain.  No shortness of breath.  Patient was feeling generalized fatigue and weakness today.  The patient actually felt weak and fell slightly hitting his head on the wall.  No loss of consciousness.  Patient denies any extremity or truncal injury.  No acute chest pain. .  No aggravating or alleviating factors otherwise noted.  No other acute complaints.  Patient was found to have a fever this evening, and was sent to the ED for evaluation. Admitted for neurology eval , ID cons requetsed , septic workup sent  (05 Jul 2024 08:14)    PAST MEDICAL & SURGICAL HISTORY:  Bipolar 1 disorder  on Lithium      Hypertension      T2DM (type 2 diabetes mellitus)      HLD (hyperlipidemia)      CHAYITO (obstructive sleep apnea)  diagnosed >25 years ago, non-compliant with CPAP      Diabetic neuropathy  bilateral feet, ankles      Obesity      Rheumatoid arthritis      History of excision of testicular mass  left, 2009      Cataract of left eye          MEDICATIONS  (STANDING):  apixaban 2.5 milliGRAM(s) Oral every 12 hours  aspirin  chewable 81 milliGRAM(s) Oral daily  atorvastatin 40 milliGRAM(s) Oral at bedtime  carvedilol 12.5 milliGRAM(s) Oral every 12 hours  ciprofloxacin     Tablet 500 milliGRAM(s) Oral every 12 hours  dextrose 10% Bolus 125 milliLiter(s) IV Bolus once  dextrose 5%. 1000 milliLiter(s) (100 mL/Hr) IV Continuous <Continuous>  dextrose 5%. 1000 milliLiter(s) (50 mL/Hr) IV Continuous <Continuous>  dextrose 50% Injectable 25 Gram(s) IV Push once  dextrose 50% Injectable 12.5 Gram(s) IV Push once  divalproex  milliGRAM(s) Oral two times a day  finasteride 5 milliGRAM(s) Oral daily  glucagon  Injectable 1 milliGRAM(s) IntraMuscular once  insulin glargine Injectable (LANTUS) 10 Unit(s) SubCutaneous at bedtime  insulin lispro (ADMELOG) corrective regimen sliding scale   SubCutaneous three times a day before meals  insulin lispro (ADMELOG) corrective regimen sliding scale   SubCutaneous at bedtime  risperiDONE   Tablet 2 milliGRAM(s) Oral two times a day  sodium chloride 0.9%. 1000 milliLiter(s) (80 mL/Hr) IV Continuous <Continuous>  tamsulosin 0.4 milliGRAM(s) Oral at bedtime    MEDICATIONS  (PRN):  acetaminophen     Tablet .. 650 milliGRAM(s) Oral every 6 hours PRN Temp greater or equal to 38C (100.4F), Mild Pain (1 - 3)  aluminum hydroxide/magnesium hydroxide/simethicone Suspension 30 milliLiter(s) Oral every 4 hours PRN Dyspepsia  dextrose Oral Gel 15 Gram(s) Oral once PRN Blood Glucose LESS THAN 70 milliGRAM(s)/deciliter  melatonin 3 milliGRAM(s) Oral at bedtime PRN Insomnia  ondansetron Injectable 4 milliGRAM(s) IV Push every 8 hours PRN Nausea and/or Vomiting      OBJECTIVE    T(C): 37 (07-08-24 @ 05:07), Max: 37.2 (07-07-24 @ 20:39)  HR: 85 (07-08-24 @ 05:07) (85 - 89)  BP: 142/80 (07-08-24 @ 05:07) (130/75 - 142/80)  RR: 18 (07-08-24 @ 05:07) (18 - 18)  SpO2: 92% (07-08-24 @ 05:07) (92% - 92%)  Wt(kg): --  I&O's Summary    07 Jul 2024 07:01  -  08 Jul 2024 07:00  --------------------------------------------------------  IN: 0 mL / OUT: 5890 mL / NET: -5890 mL          REVIEW OF SYSTEMS:  CONSTITUTIONAL: No fever, weight loss, or fatigue  EYES: No eye pain, visual disturbances, or discharge  ENMT:   No sinus or throat pain  NECK: No pain or stiffness  RESPIRATORY: No cough, wheezing, chills or hemoptysis; No shortness of breath  CARDIOVASCULAR: No chest pain, palpitations, dizziness, or leg swelling  GASTROINTESTINAL: No abdominal pain. No nausea, vomiting; No diarrhea or constipation. No melena or hematochezia.  GENITOURINARY: No dysuria, frequency, hematuria, or incontinence  NEUROLOGICAL: No headaches, memory loss, loss of strength, numbness, or tremors  SKIN: No itching, burning, rashes, or lesions   MUSCULOSKELETAL: No joint pain or swelling; No muscle, back, or extremity pain    PHYSICAL EXAM:  Appearance: NAD. VS past 24 hrs -as above   HEENT:   Moist oral mucosa. Conjunctiva clear b/l.   Neck : supple  Respiratory: Lungs CTAB.  Gastrointestinal:  Soft, nontender. No rebound. No rigidity. BS present	  Cardiovascular: RRR ,S1S2 present  Neurologic: Non-focal. Moving all extremities.  Extremities: No edema. No erythema. No calf tenderness.  Skin: No rashes, No ecchymoses, No cyanosis.	  wounds ,skin lesions-See skin assesment flow sheet   LABS:                        12.5   7.73  )-----------( 160      ( 08 Jul 2024 07:43 )             39.2     07-08    145  |  110<H>  |  40<H>  ----------------------------<  334<H>  4.6   |  22  |  1.95<H>    Ca    10.3      08 Jul 2024 07:43  Phos  4.4     07-08    TPro  8.1  /  Alb  2.8<L>  /  TBili  0.4  /  DBili  0.1  /  AST  106<H>  /  ALT  235<H>  /  AlkPhos  80  07-08    CAPILLARY BLOOD GLUCOSE      POCT Blood Glucose.: 380 mg/dL (08 Jul 2024 12:04)  POCT Blood Glucose.: 282 mg/dL (08 Jul 2024 07:39)  POCT Blood Glucose.: 314 mg/dL (07 Jul 2024 21:01)  POCT Blood Glucose.: 335 mg/dL (07 Jul 2024 16:42)    PT/INR - ( 08 Jul 2024 07:43 )   PT: 13.8 sec;   INR: 1.24 ratio           Urinalysis Basic - ( 08 Jul 2024 07:43 )    Color: x / Appearance: x / SG: x / pH: x  Gluc: 334 mg/dL / Ketone: x  / Bili: x / Urobili: x   Blood: x / Protein: x / Nitrite: x   Leuk Esterase: x / RBC: x / WBC x   Sq Epi: x / Non Sq Epi: x / Bacteria: x        Culture - Blood (collected 06 Jul 2024 06:00)  Source: .Blood Blood-Peripheral  Preliminary Report (08 Jul 2024 13:01):    No growth at 48 Hours    Culture - Blood (collected 06 Jul 2024 06:00)  Source: .Blood Blood-Peripheral  Preliminary Report (08 Jul 2024 13:01):    No growth at 48 Hours    Culture - Urine (collected 04 Jul 2024 18:11)  Source: Clean Catch Clean Catch (Midstream)  Final Report (08 Jul 2024 13:17):    >100,000 CFU/ml Escherichia coli  Organism: Escherichia coli (08 Jul 2024 13:17)  Organism: Escherichia coli (08 Jul 2024 13:17)    Culture - Blood (collected 04 Jul 2024 17:10)  Source: .Blood Blood-Peripheral  Preliminary Report (08 Jul 2024 06:01):    No growth at 72 Hours    Culture - Blood (collected 04 Jul 2024 17:10)  Source: .Blood Blood-Peripheral  Gram Stain (05 Jul 2024 13:59):    Growth in aerobic bottle: Gram Negative Rods  Final Report (07 Jul 2024 08:55):    Growth in aerobic bottle: Escherichia coli    Direct identification is available within approximately 3-5    hours either by Blood Panel Multiplexed PCR or Direct    MALDI-TOF. Details: [email or web address]  Organism: Blood Culture PCR  Escherichia coli (07 Jul 2024 08:55)  Organism: Escherichia coli (07 Jul 2024 08:55)  Organism: Blood Culture PCR (07 Jul 2024 08:55)    Culture - Urine (collected 03 Jul 2024 23:45)  Source: Clean Catch Clean Catch (Midstream)  Final Report (07 Jul 2024 11:25):    50,000 - 99,000 CFU/mL Escherichia coli ESBL  Organism: Escherichia coli ESBL (07 Jul 2024 11:25)  Organism: Escherichia coli ESBL (07 Jul 2024 11:25)      RADIOLOGY & ADDITIONAL TESTS:   reviewed elctronically  ASSESSMENT/PLAN: 	    25 minutes aggregate time was spent on this visit, 50% visit time spent in care co-ordination with other attendings and counselling patient .I have discussed care plan with patient / HCP/family member ,who expressed understanding of problems treatment and their effect and side effects, alternatives in details. I have asked if they have any questions and concerns and appropriately addressed them to best of my ability.

## 2024-07-08 NOTE — CARE COORDINATION ASSESSMENT. - EMPLOYMENT/FINANCIAL CONCERNS
Called patient regarding open access colonoscopy referral. Reviewed assessment and per protocol schedule procedure. December 2, 2024    Instructions sent via mail   No

## 2024-07-08 NOTE — CAREGIVER ENGAGEMENT NOTE - CAREGIVER EDUCATION NOTES - FREE TEXT
Mr. Valentin Pruitt is a 63y/o , domiciled white male, w/ PMHx & PPHx as indicated below, who presents to [facility] secondary to UTI.  met w/ patient @ bedside on [address] for completion of care coordination assessment, @ which time patient appeared alert & oriented x4 @ time of interview, as well as calm, pleasant, and fully able to engage in discussion. Patient identified that he has been a long-term care resident [facility] in [place] for about a year; he had been living in an apartment program in [place] through his mental health clinic and attending [facility]'s adult day program but began losing his independence due to diabetic neuropathy, so he spoke to the day program staff who suggested maybe becoming a long-term resident there. Mr. Pruitt reported that the transition was hard @ first since he was so independent, but he's adjusting well and is getting along w/ the other residents. He is able to transfer himself independently from the hospital bed to the wheelchair and self-propel the wheelchair to the bathroom and around the courtyard @ the assisted living facility. Patient confirmed that he would like to return back to [facility] on hospital discharge.    Of note, Social Work Consult was received in [facility] electronic health record indicating (+) PHQ-2 (depression screen) and life challenges; patient identified that he is not in contact w/ any of his family members due to psychosocial issues but has established friendships @ his skilled nursing facility which are comforting for him. Additionally, he identified that he has been diagnosed w/ bipolar d/o and does experience depression but overall tries to maintain a positive outlook; he stated that he was feeling depressed when he first got to the hospital but is feeling better now and knows how to access help if he needs it.

## 2024-07-08 NOTE — PATIENT CHOICE NOTE. - NSPTCHOICESTATE_GEN_ALL_CORE

## 2024-07-08 NOTE — CONSULT NOTE ADULT - PROBLEM SELECTOR RECOMMENDATION 9
Type 2 A1c 10.4% adm weakness  start 15 units Lantus @ HS  5 units admelog TIDAC  c/w mod ISS ACHS  CC diet and accucheck ACHS  Recommend endocrine-Perlman onconsult  DSC recommendations: return to SNF with Lantus 15 units @ HS and insulin lispro 5 units TIDAC+ISS and glucose monitoring, lifestyle and diet modifications  diabetes education provided as documented above  Diabetes support info and cell # [phone] given   Goal 100-180 mg/dL; 140-180 mg/dL in critical care areas

## 2024-07-08 NOTE — CONSULT NOTE ADULT - ASSESSMENT
Physical Exam:   Vital Signs Last 24 Hrs  T(C): 36.6 (08 Jul 2024 13:09), Max: 37.2 (07 Jul 2024 20:39)  T(F): 97.8 (08 Jul 2024 13:09), Max: 99 (07 Jul 2024 20:39)  HR: 79 (08 Jul 2024 13:09) (79 - 89)  BP: 127/78 (08 Jul 2024 13:09) (127/78 - 142/80)  BP(mean): --  RR: 18 (08 Jul 2024 13:09) (18 - 18)  SpO2: 94% (08 Jul 2024 13:09) (92% - 94%)    Parameters below as of 08 Jul 2024 05:07  Patient On (Oxygen Delivery Method): room air     CAPILLARY BLOOD GLUCOSE      POCT Blood Glucose.: 380 mg/dL (08 Jul 2024 12:04)  POCT Blood Glucose.: 282 mg/dL (08 Jul 2024 07:39)  POCT Blood Glucose.: 314 mg/dL (07 Jul 2024 21:01)  POCT Blood Glucose.: 335 mg/dL (07 Jul 2024 16:42)      Cholesterol, Serum: 113 mg/dL (05.19.21 @ 08:36)     HDL Cholesterol, Serum: 22 mg/dL (05.19.21 @ 08:36)     LDL Cholesterol Calculated: 66 mg/dL (05.19.21 @ 08:36)     DIET: CC  >50%

## 2024-07-08 NOTE — PROGRESS NOTE ADULT - SUBJECTIVE AND OBJECTIVE BOX
Patient is a 64y Male whom presented to the hospital with raymond     PAST MEDICAL & SURGICAL HISTORY:  Bipolar 1 disorder  on Lithium      Hypertension      T2DM (type 2 diabetes mellitus)      HLD (hyperlipidemia)      CHAYITO (obstructive sleep apnea)  diagnosed >25 years ago, non-compliant with CPAP      Diabetic neuropathy  bilateral feet, ankles      Obesity      Rheumatoid arthritis      History of excision of testicular mass  left, 2009      Cataract of left eye          MEDICATIONS  (STANDING):  apixaban 2.5 milliGRAM(s) Oral every 12 hours  aspirin  chewable 81 milliGRAM(s) Oral daily  atorvastatin 40 milliGRAM(s) Oral at bedtime  aztreonam  IVPB 1000 milliGRAM(s) IV Intermittent every 8 hours  carvedilol 12.5 milliGRAM(s) Oral every 12 hours  dextrose 10% Bolus 125 milliLiter(s) IV Bolus once  dextrose 5%. 1000 milliLiter(s) (100 mL/Hr) IV Continuous <Continuous>  dextrose 5%. 1000 milliLiter(s) (50 mL/Hr) IV Continuous <Continuous>  dextrose 50% Injectable 25 Gram(s) IV Push once  dextrose 50% Injectable 12.5 Gram(s) IV Push once  divalproex  milliGRAM(s) Oral two times a day  finasteride 5 milliGRAM(s) Oral daily  glucagon  Injectable 1 milliGRAM(s) IntraMuscular once  insulin lispro (ADMELOG) corrective regimen sliding scale   SubCutaneous three times a day before meals  risperiDONE   Tablet 2 milliGRAM(s) Oral two times a day  sodium chloride 0.9%. 1000 milliLiter(s) (80 mL/Hr) IV Continuous <Continuous>  tamsulosin 0.4 milliGRAM(s) Oral at bedtime      Allergies    penicillin (Hives)  Tegretol (Hypotension; Anaphylaxis)    Intolerances        SOCIAL HISTORY:  Denies ETOh,Smoking,     FAMILY HISTORY:  FH: CAD (coronary artery disease) (Mother)        REVIEW OF SYSTEMS:    CONSTITUTIONAL: No weakness, fevers or chills  RESPIRATORY: No cough, wheezing, hemoptysis; No shortness of breath  CARDIOVASCULAR: No chest pain or palpitations  GASTROINTESTINAL: No abdominal or epigastric pain. No nausea, vomiting,     No diarrhea or constipation. No melena   SKIN: dry                                                        12.5   7.73  )-----------( 160      ( 08 Jul 2024 07:43 )             39.2       CBC Full  -  ( 08 Jul 2024 07:43 )  WBC Count : 7.73 K/uL  RBC Count : 4.31 M/uL  Hemoglobin : 12.5 g/dL  Hematocrit : 39.2 %  Platelet Count - Automated : 160 K/uL  Mean Cell Volume : 91.0 fl  Mean Cell Hemoglobin : 29.0 pg  Mean Cell Hemoglobin Concentration : 31.9 gm/dL  Auto Neutrophil # : x  Auto Lymphocyte # : x  Auto Monocyte # : x  Auto Eosinophil # : x  Auto Basophil # : x  Auto Neutrophil % : x  Auto Lymphocyte % : x  Auto Monocyte % : x  Auto Eosinophil % : x  Auto Basophil % : x      07-08    145  |  110<H>  |  40<H>  ----------------------------<  334<H>  4.6   |  22  |  1.95<H>    Ca    10.3      08 Jul 2024 07:43  Phos  4.4     07-08    TPro  8.1  /  Alb  2.8<L>  /  TBili  0.4  /  DBili  0.1  /  AST  106<H>  /  ALT  235<H>  /  AlkPhos  80  07-08      CAPILLARY BLOOD GLUCOSE      POCT Blood Glucose.: 282 mg/dL (08 Jul 2024 07:39)  POCT Blood Glucose.: 314 mg/dL (07 Jul 2024 21:01)  POCT Blood Glucose.: 335 mg/dL (07 Jul 2024 16:42)  POCT Blood Glucose.: 367 mg/dL (07 Jul 2024 11:53)      Vital Signs Last 24 Hrs  T(C): 37 (08 Jul 2024 05:07), Max: 37.2 (07 Jul 2024 20:39)  T(F): 98.6 (08 Jul 2024 05:07), Max: 99 (07 Jul 2024 20:39)  HR: 85 (08 Jul 2024 05:07) (77 - 89)  BP: 142/80 (08 Jul 2024 05:07) (119/80 - 142/80)  BP(mean): --  RR: 18 (08 Jul 2024 05:07) (17 - 18)  SpO2: 92% (08 Jul 2024 05:07) (92% - 95%)    Parameters below as of 08 Jul 2024 05:07  Patient On (Oxygen Delivery Method): room air        Urinalysis Basic - ( 08 Jul 2024 07:43 )    Color: x / Appearance: x / SG: x / pH: x  Gluc: 334 mg/dL / Ketone: x  / Bili: x / Urobili: x   Blood: x / Protein: x / Nitrite: x   Leuk Esterase: x / RBC: x / WBC x   Sq Epi: x / Non Sq Epi: x / Bacteria: x        PT/INR - ( 08 Jul 2024 07:43 )   PT: 13.8 sec;   INR: 1.24 ratio                            PHYSICAL EXAM:    Constitutional: NAD  HEENT: conjunctive   clear   Neck:  No JVD  Respiratory: CTAB  Cardiovascular: S1 and S2  Gastrointestinal: BS+, soft, NT/ND  Extremities: No peripheral edema  Skin: dry   Access: Not applicable

## 2024-07-08 NOTE — CONSULT NOTE ADULT - SUBJECTIVE AND OBJECTIVE BOX
Patient is a 64y old  Male who presents with a chief complaint of fever , s/p fall (08 Jul 2024 14:31)    pt A&Ox4, resides @ [facility]  Type: 2 DM DX 5 years known complications b/l neuropathy managed by facility PCP Dr Samir Doherty, current a1c 10.4%, Rx humalog ISS TID w/ meals, F/S TID all done by staff, pt usually getting insulin injection if f/s >200mg/dL. discussed pathophsyiology of T2DM and complications w/ persistent hyperglycemia, discussed need for insulin for glycemic control, reccomend starting basal insulin Lantus 15 units @ HS and premeal bolus admleog 5 units TIDAC + mod ISS ACHS. explained risk of hypoglycemia w/ insulin s/s and checking f/s, treating w/ 15/15 rule until f/s >100mg/dL reviewed CC diet and carb identificaion/portion control, prioritizing nonstarchy vegetables and lean protein, pt getting outside food, chinese food, pizza, discussed carb choices and scheduled meal times w/ premeal insulin, provided [employer] daily meal planning guide. explained maximizing daily physical activity 150min/week as tolerated. verbal education and handouts provided   diabetes education provided- A1c measure and BG targets  fasting, <180 2 hours postmeal. insulin type, onset and duration, inhospital BGM frequency and insulin administration, s/s of hyperglycemia/hypoglycemia and management, glycemic control and preventing complications, consistent carb diet, balanced plate method, consistent meal planning. sick day management, provider f/u  Hx CKD, HTN, HLD    HPI:  64-year-old male presents with generalized weakness today.  Patient was seen in the ED yesterday for a psychiatric evaluation.  The patient was seen and cleared.  The patient's urinalysis was positive, but the patient was not on antibiotics.  The patient is having some urinary symptoms, but is mostly complaining of cough/URI.  No acute chest pain.  No shortness of breath.  Patient was feeling generalized fatigue and weakness today.  The patient actually felt weak and fell slightly hitting his head on the wall.  No loss of consciousness.  Patient denies any extremity or truncal injury.  No acute chest pain. .  No aggravating or alleviating factors otherwise noted.  No other acute complaints.  Patient was found to have a fever this evening, and was sent to the ED for evaluation. Admitted for neurology eval , ID cons requetsed , septic workup sent  (05 Jul 2024 08:14)      PAST MEDICAL & SURGICAL HISTORY:  Bipolar 1 disorder  on Lithium      Hypertension      T2DM (type 2 diabetes mellitus)      HLD (hyperlipidemia)      CHAYITO (obstructive sleep apnea)  diagnosed >25 years ago, non-compliant with CPAP      Diabetic neuropathy  bilateral feet, ankles      Obesity      Rheumatoid arthritis      History of excision of testicular mass  left, 2009      Cataract of left eye    Allergies    penicillin (Hives)  Tegretol (Hypotension; Anaphylaxis)    Intolerances        MEDICATIONS  (STANDING):  apixaban 2.5 milliGRAM(s) Oral every 12 hours  aspirin  chewable 81 milliGRAM(s) Oral daily  atorvastatin 40 milliGRAM(s) Oral at bedtime  carvedilol 12.5 milliGRAM(s) Oral every 12 hours  ciprofloxacin     Tablet 500 milliGRAM(s) Oral every 12 hours  dextrose 10% Bolus 125 milliLiter(s) IV Bolus once  dextrose 5%. 1000 milliLiter(s) (100 mL/Hr) IV Continuous <Continuous>  dextrose 5%. 1000 milliLiter(s) (50 mL/Hr) IV Continuous <Continuous>  dextrose 50% Injectable 25 Gram(s) IV Push once  dextrose 50% Injectable 12.5 Gram(s) IV Push once  divalproex  milliGRAM(s) Oral two times a day  finasteride 5 milliGRAM(s) Oral daily  glucagon  Injectable 1 milliGRAM(s) IntraMuscular once  insulin glargine Injectable (LANTUS) 10 Unit(s) SubCutaneous at bedtime  insulin lispro (ADMELOG) corrective regimen sliding scale   SubCutaneous three times a day before meals  insulin lispro (ADMELOG) corrective regimen sliding scale   SubCutaneous at bedtime  risperiDONE   Tablet 2 milliGRAM(s) Oral two times a day  sodium chloride 0.9%. 1000 milliLiter(s) (80 mL/Hr) IV Continuous <Continuous>  tamsulosin 0.4 milliGRAM(s) Oral at bedtime

## 2024-07-08 NOTE — CAREGIVER ENGAGEMENT NOTE - CAREGIVER EDUCATION - EFFECTIVENESS
Bedside shift change report given to Mary Luque RN (oncoming nurse) by Renzo Prieto RN (offgoing nurse). Report included the following information SBAR, Kardex, ED Summary, Intake/Output, MAR and Recent Results.
Verbalization

## 2024-07-08 NOTE — CONSULT NOTE ADULT - CONSULT REASON
Syncope, HTN
fever
.
lopez
ckd and raymond
64y A1C with Estimated Average Glucose Result: 10.4 % (07-05-24 @ 05:30)   diabetes mellitus uncontrolled type 2

## 2024-07-08 NOTE — CONSULT NOTE ADULT - CONSULT REQUESTED DATE/TIME
05-Jul-2024 12:19
05-Jul-2024 15:00
05-Jul-2024 15:57
08-Jul-2024 15:00
05-Jul-2024 09:58
04-Jul-2024

## 2024-07-08 NOTE — PROGRESS NOTE ADULT - SUBJECTIVE AND OBJECTIVE BOX
Chief Complaint: Weakness, possible syncope    Interval Events: No events overnight.    Review of Systems:  General: No fevers, chills, weight gain  Skin: No rashes, color changes  Cardiovascular: No chest pain, orthopnea  Respiratory: No shortness of breath, cough  Gastrointestinal: No nausea, abdominal pain  Genitourinary: No incontinence, pain with urination  Musculoskeletal: No pain, swelling, decreased range of motion  Neurological: No headache, weakness  Psychiatric: No depression, anxiety  Endocrine: No weight gain, increased thirst  All other systems are comprehensively negative.    Physical Exam:  Vital Signs Last 24 Hrs  T(C): 37 (08 Jul 2024 05:07), Max: 37.2 (07 Jul 2024 20:39)  T(F): 98.6 (08 Jul 2024 05:07), Max: 99 (07 Jul 2024 20:39)  HR: 85 (08 Jul 2024 05:07) (77 - 89)  BP: 142/80 (08 Jul 2024 05:07) (119/80 - 142/80)  BP(mean): --  RR: 18 (08 Jul 2024 05:07) (17 - 18)  SpO2: 92% (08 Jul 2024 05:07) (92% - 95%)  Parameters below as of 08 Jul 2024 05:07  Patient On (Oxygen Delivery Method): room air  General: NAD  HEENT: MMM  Neck: No JVD, no carotid bruit  Lungs: CTAB  CV: RRR, nl S1/S2, no M/R/G  Abdomen: S/NT/ND, +BS  Extremities: No LE edema, no cyanosis  Neuro: AAOx3, non-focal  Skin: No rash    Labs:             07-08    145  |  110<H>  |  40<H>  ----------------------------<  334<H>  4.6   |  22  |  1.95<H>    Ca    10.3      08 Jul 2024 07:43  Phos  4.4     07-08    TPro  8.1  /  Alb  2.8<L>  /  TBili  0.4  /  DBili  0.1  /  AST  106<H>  /  ALT  235<H>  /  AlkPhos  80  07-08                        12.5   7.73  )-----------( 160      ( 08 Jul 2024 07:43 )             39.2       ECG/Telemetry: Sinus rhythm

## 2024-07-09 ENCOUNTER — TRANSCRIPTION ENCOUNTER (OUTPATIENT)
Age: 64
End: 2024-07-09

## 2024-07-09 VITALS
HEART RATE: 82 BPM | OXYGEN SATURATION: 92 % | RESPIRATION RATE: 18 BRPM | SYSTOLIC BLOOD PRESSURE: 158 MMHG | TEMPERATURE: 98 F | DIASTOLIC BLOOD PRESSURE: 85 MMHG

## 2024-07-09 LAB
ANION GAP SERPL CALC-SCNC: 13 MMOL/L — SIGNIFICANT CHANGE UP (ref 5–17)
BUN SERPL-MCNC: 37 MG/DL — HIGH (ref 7–23)
CALCIUM SERPL-MCNC: 9.3 MG/DL — SIGNIFICANT CHANGE UP (ref 8.4–10.5)
CHLORIDE SERPL-SCNC: 105 MMOL/L — SIGNIFICANT CHANGE UP (ref 96–108)
CK SERPL-CCNC: 1086 U/L — HIGH (ref 39–308)
CO2 SERPL-SCNC: 22 MMOL/L — SIGNIFICANT CHANGE UP (ref 22–31)
CREAT SERPL-MCNC: 1.98 MG/DL — HIGH (ref 0.5–1.3)
EGFR: 37 ML/MIN/1.73M2 — LOW
GLUCOSE BLDC GLUCOMTR-MCNC: 295 MG/DL — HIGH (ref 70–99)
GLUCOSE BLDC GLUCOMTR-MCNC: 322 MG/DL — HIGH (ref 70–99)
GLUCOSE BLDC GLUCOMTR-MCNC: 336 MG/DL — HIGH (ref 70–99)
GLUCOSE BLDC GLUCOMTR-MCNC: 357 MG/DL — HIGH (ref 70–99)
GLUCOSE SERPL-MCNC: 364 MG/DL — HIGH (ref 70–99)
HCT VFR BLD CALC: 36.3 % — LOW (ref 39–50)
HGB BLD-MCNC: 11.5 G/DL — LOW (ref 13–17)
MCHC RBC-ENTMCNC: 28.6 PG — SIGNIFICANT CHANGE UP (ref 27–34)
MCHC RBC-ENTMCNC: 31.7 GM/DL — LOW (ref 32–36)
MCV RBC AUTO: 90.3 FL — SIGNIFICANT CHANGE UP (ref 80–100)
NRBC # BLD: 0 /100 WBCS — SIGNIFICANT CHANGE UP (ref 0–0)
PLATELET # BLD AUTO: 180 K/UL — SIGNIFICANT CHANGE UP (ref 150–400)
POTASSIUM SERPL-MCNC: 4 MMOL/L — SIGNIFICANT CHANGE UP (ref 3.5–5.3)
POTASSIUM SERPL-SCNC: 4 MMOL/L — SIGNIFICANT CHANGE UP (ref 3.5–5.3)
RBC # BLD: 4.02 M/UL — LOW (ref 4.2–5.8)
RBC # FLD: 13.7 % — SIGNIFICANT CHANGE UP (ref 10.3–14.5)
SODIUM SERPL-SCNC: 140 MMOL/L — SIGNIFICANT CHANGE UP (ref 135–145)
WBC # BLD: 7.69 K/UL — SIGNIFICANT CHANGE UP (ref 3.8–10.5)
WBC # FLD AUTO: 7.69 K/UL — SIGNIFICANT CHANGE UP (ref 3.8–10.5)

## 2024-07-09 RX ORDER — CARVEDILOL PHOSPHATE 80 MG/1
1 CAPSULE, EXTENDED RELEASE ORAL
Qty: 0 | Refills: 0 | DISCHARGE
Start: 2024-07-09

## 2024-07-09 RX ORDER — INSULIN GLARGINE 100 [IU]/ML
20 INJECTION, SOLUTION SUBCUTANEOUS AT BEDTIME
Refills: 0 | Status: DISCONTINUED | OUTPATIENT
Start: 2024-07-09 | End: 2024-07-09

## 2024-07-09 RX ORDER — INSULIN LISPRO 100 [IU]/ML
0 INJECTION, SOLUTION SUBCUTANEOUS
Qty: 0 | Refills: 0 | DISCHARGE
Start: 2024-07-09

## 2024-07-09 RX ORDER — METFORMIN HYDROCHLORIDE 850 MG/1
1 TABLET, FILM COATED ORAL
Refills: 0 | DISCHARGE

## 2024-07-09 RX ORDER — SODIUM CHLORIDE 0.9 % (FLUSH) 0.9 %
0 SYRINGE (ML) INJECTION
Qty: 0 | Refills: 0 | DISCHARGE
Start: 2024-07-09

## 2024-07-09 RX ORDER — POLYETHYLENE GLYCOL 3350 1 G/G
17 POWDER ORAL
Qty: 0 | Refills: 0 | DISCHARGE

## 2024-07-09 RX ORDER — APIXABAN 5 MG/1
1 TABLET, FILM COATED ORAL
Qty: 0 | Refills: 0 | DISCHARGE
Start: 2024-07-09

## 2024-07-09 RX ORDER — MAGNESIUM, ALUMINUM HYDROXIDE 400-400
30 TABLET,CHEWABLE ORAL
Qty: 0 | Refills: 0 | DISCHARGE
Start: 2024-07-09

## 2024-07-09 RX ORDER — INSULIN LISPRO 100 [IU]/ML
0 INJECTION, SOLUTION SUBCUTANEOUS
Refills: 0 | DISCHARGE

## 2024-07-09 RX ORDER — ACETAMINOPHEN 325 MG
2 TABLET ORAL
Qty: 0 | Refills: 0 | DISCHARGE
Start: 2024-07-09

## 2024-07-09 RX ORDER — INSULIN LISPRO 100 [IU]/ML
7 INJECTION, SOLUTION SUBCUTANEOUS
Refills: 0 | Status: DISCONTINUED | OUTPATIENT
Start: 2024-07-09 | End: 2024-07-09

## 2024-07-09 RX ORDER — LOSARTAN POTASSIUM 100 MG/1
1 TABLET, FILM COATED ORAL
Refills: 0 | DISCHARGE

## 2024-07-09 RX ORDER — INSULIN GLARGINE 100 [IU]/ML
20 INJECTION, SOLUTION SUBCUTANEOUS
Qty: 0 | Refills: 0 | DISCHARGE
Start: 2024-07-09

## 2024-07-09 RX ORDER — CIPROFLOXACIN HCL 500 MG
1 TABLET ORAL
Qty: 0 | Refills: 0 | DISCHARGE
Start: 2024-07-09

## 2024-07-09 RX ADMIN — INSULIN LISPRO 8: 100 INJECTION, SOLUTION SUBCUTANEOUS at 17:07

## 2024-07-09 RX ADMIN — RISPERIDONE 2 MILLIGRAM(S): 0.5 TABLET ORAL at 17:09

## 2024-07-09 RX ADMIN — INSULIN LISPRO 10: 100 INJECTION, SOLUTION SUBCUTANEOUS at 11:58

## 2024-07-09 RX ADMIN — Medication 500 MILLIGRAM(S): at 05:50

## 2024-07-09 RX ADMIN — CARVEDILOL PHOSPHATE 12.5 MILLIGRAM(S): 80 CAPSULE, EXTENDED RELEASE ORAL at 05:51

## 2024-07-09 RX ADMIN — RISPERIDONE 2 MILLIGRAM(S): 0.5 TABLET ORAL at 05:50

## 2024-07-09 RX ADMIN — APIXABAN 2.5 MILLIGRAM(S): 5 TABLET, FILM COATED ORAL at 05:50

## 2024-07-09 RX ADMIN — INSULIN GLARGINE 20 UNIT(S): 100 INJECTION, SOLUTION SUBCUTANEOUS at 21:10

## 2024-07-09 RX ADMIN — INSULIN LISPRO 5 UNIT(S): 100 INJECTION, SOLUTION SUBCUTANEOUS at 08:10

## 2024-07-09 RX ADMIN — Medication 80 MILLILITER(S): at 08:24

## 2024-07-09 RX ADMIN — INSULIN LISPRO 7 UNIT(S): 100 INJECTION, SOLUTION SUBCUTANEOUS at 17:08

## 2024-07-09 RX ADMIN — INSULIN LISPRO 7 UNIT(S): 100 INJECTION, SOLUTION SUBCUTANEOUS at 11:59

## 2024-07-09 RX ADMIN — Medication 100 MILLILITER(S): at 13:04

## 2024-07-09 RX ADMIN — ASPIRIN 81 MILLIGRAM(S): 325 TABLET, FILM COATED ORAL at 11:59

## 2024-07-09 RX ADMIN — APIXABAN 2.5 MILLIGRAM(S): 5 TABLET, FILM COATED ORAL at 17:10

## 2024-07-09 RX ADMIN — Medication 500 MILLIGRAM(S): at 17:08

## 2024-07-09 RX ADMIN — CARVEDILOL PHOSPHATE 12.5 MILLIGRAM(S): 80 CAPSULE, EXTENDED RELEASE ORAL at 17:09

## 2024-07-09 RX ADMIN — ATORVASTATIN CALCIUM 40 MILLIGRAM(S): 20 TABLET, FILM COATED ORAL at 21:07

## 2024-07-09 RX ADMIN — Medication 500 MILLIGRAM(S): at 17:09

## 2024-07-09 RX ADMIN — INSULIN LISPRO 6: 100 INJECTION, SOLUTION SUBCUTANEOUS at 08:09

## 2024-07-09 RX ADMIN — Medication 5 MILLIGRAM(S): at 11:59

## 2024-07-09 RX ADMIN — TAMSULOSIN HYDROCHLORIDE 0.4 MILLIGRAM(S): 0.4 CAPSULE ORAL at 21:07

## 2024-07-09 RX ADMIN — INSULIN LISPRO 2: 100 INJECTION, SOLUTION SUBCUTANEOUS at 21:09

## 2024-07-09 NOTE — DISCHARGE NOTE PROVIDER - DETAILS OF MALNUTRITION DIAGNOSIS/DIAGNOSES
This patient has been assessed with a concern for Malnutrition and was treated during this hospitalization for the following Nutrition diagnosis/diagnoses:     -  07/06/2024: Morbid obesity (BMI > 40)

## 2024-07-09 NOTE — PROGRESS NOTE ADULT - SUBJECTIVE AND OBJECTIVE BOX
PROGRESS NOTE  Patient is a 64y old  Male who presents with a chief complaint of fever , s/p fall (09 Jul 2024 17:02)    Chart and available morning labs /imaging are reviewed electronically , urgent issues addressed . More information  is being added upon completion of rounds , when more information is collected and management discussed with consultants , medical staff and social service/case management on the floor   OVERNIGHT    No new issues reported by medical staff . All above noted Patient is resting in a bed comfortably .Asking to discharge asap back to [facility]  .No distress noted   HPI:  64-year-old male presents with generalized weakness today.  Patient was seen in the ED yesterday for a psychiatric evaluation.  The patient was seen and cleared.  The patient's urinalysis was positive, but the patient was not on antibiotics.  The patient is having some urinary symptoms, but is mostly complaining of cough/URI.  No acute chest pain.  No shortness of breath.  Patient was feeling generalized fatigue and weakness today.  The patient actually felt weak and fell slightly hitting his head on the wall.  No loss of consciousness.  Patient denies any extremity or truncal injury.  No acute chest pain. .  No aggravating or alleviating factors otherwise noted.  No other acute complaints.  Patient was found to have a fever this evening, and was sent to the ED for evaluation. Admitted for neurology eval , ID cons requetsed , septic workup sent  (05 Jul 2024 08:14)    PAST MEDICAL & SURGICAL HISTORY:  Bipolar 1 disorder  on Lithium      Hypertension      T2DM (type 2 diabetes mellitus)      HLD (hyperlipidemia)      CHAYITO (obstructive sleep apnea)  diagnosed >25 years ago, non-compliant with CPAP      Diabetic neuropathy  bilateral feet, ankles      Obesity      Rheumatoid arthritis      History of excision of testicular mass  left, 2009      Cataract of left eye          MEDICATIONS  (STANDING):  apixaban 2.5 milliGRAM(s) Oral every 12 hours  aspirin  chewable 81 milliGRAM(s) Oral daily  atorvastatin 40 milliGRAM(s) Oral at bedtime  carvedilol 12.5 milliGRAM(s) Oral every 12 hours  ciprofloxacin     Tablet 500 milliGRAM(s) Oral every 12 hours  dextrose 10% Bolus 125 milliLiter(s) IV Bolus once  dextrose 5%. 1000 milliLiter(s) (100 mL/Hr) IV Continuous <Continuous>  dextrose 5%. 1000 milliLiter(s) (50 mL/Hr) IV Continuous <Continuous>  dextrose 50% Injectable 25 Gram(s) IV Push once  dextrose 50% Injectable 12.5 Gram(s) IV Push once  divalproex  milliGRAM(s) Oral two times a day  finasteride 5 milliGRAM(s) Oral daily  glucagon  Injectable 1 milliGRAM(s) IntraMuscular once  insulin glargine Injectable (LANTUS) 20 Unit(s) SubCutaneous at bedtime  insulin lispro (ADMELOG) corrective regimen sliding scale   SubCutaneous three times a day before meals  insulin lispro (ADMELOG) corrective regimen sliding scale   SubCutaneous at bedtime  insulin lispro Injectable (ADMELOG) 7 Unit(s) SubCutaneous three times a day before meals  risperiDONE   Tablet 2 milliGRAM(s) Oral two times a day  sodium chloride 0.9%. 1000 milliLiter(s) (100 mL/Hr) IV Continuous <Continuous>  tamsulosin 0.4 milliGRAM(s) Oral at bedtime    MEDICATIONS  (PRN):  acetaminophen     Tablet .. 650 milliGRAM(s) Oral every 6 hours PRN Temp greater or equal to 38C (100.4F), Mild Pain (1 - 3)  aluminum hydroxide/magnesium hydroxide/simethicone Suspension 30 milliLiter(s) Oral every 4 hours PRN Dyspepsia  dextrose Oral Gel 15 Gram(s) Oral once PRN Blood Glucose LESS THAN 70 milliGRAM(s)/deciliter  melatonin 3 milliGRAM(s) Oral at bedtime PRN Insomnia  ondansetron Injectable 4 milliGRAM(s) IV Push every 8 hours PRN Nausea and/or Vomiting      OBJECTIVE    T(C): 36.9 (07-09-24 @ 13:19), Max: 37.3 (07-08-24 @ 20:55)  HR: 90 (07-09-24 @ 13:19) (80 - 90)  BP: 111/81 (07-09-24 @ 13:19) (111/81 - 157/80)  RR: 17 (07-09-24 @ 13:19) (17 - 18)  SpO2: 95% (07-09-24 @ 13:19) (93% - 95%)  Wt(kg): --  I&O's Summary    08 Jul 2024 07:01  -  09 Jul 2024 07:00  --------------------------------------------------------  IN: 0 mL / OUT: 2750 mL / NET: -2750 mL          REVIEW OF SYSTEMS:  CONSTITUTIONAL: No fever, weight loss, or fatigue  EYES: No eye pain, visual disturbances, or discharge  ENMT:   No sinus or throat pain  NECK: No pain or stiffness  RESPIRATORY: No cough, wheezing, chills or hemoptysis; No shortness of breath  CARDIOVASCULAR: No chest pain, palpitations, dizziness, or leg swelling  GASTROINTESTINAL: No abdominal pain. No nausea, vomiting; No diarrhea or constipation. No melena or hematochezia.  GENITOURINARY: No dysuria, frequency, hematuria, or incontinence  NEUROLOGICAL: No headaches, memory loss, loss of strength, numbness, or tremors  SKIN: No itching, burning, rashes, or lesions   MUSCULOSKELETAL: No joint pain or swelling; No muscle, back, or extremity pain    PHYSICAL EXAM:  Appearance: NAD. VS past 24 hrs -as above   HEENT:   Moist oral mucosa. Conjunctiva clear b/l.   Neck : supple  Respiratory: Lungs CTAB.  Gastrointestinal:  Soft, nontender. No rebound. No rigidity. BS present	  Cardiovascular: RRR ,S1S2 present  Neurologic: Non-focal. Moving all extremities.  Extremities: No edema. No erythema. No calf tenderness.  Skin: No rashes, No ecchymoses, No cyanosis.	  wounds ,skin lesions-See skin assesment flow sheet   LABS:                        11.5   7.69  )-----------( 180      ( 09 Jul 2024 12:00 )             36.3     07-09    140  |  105  |  37<H>  ----------------------------<  364<H>  4.0   |  22  |  1.98<H>    Ca    9.3      09 Jul 2024 12:00  Phos  4.4     07-08    TPro  8.1  /  Alb  2.8<L>  /  TBili  0.4  /  DBili  0.1  /  AST  106<H>  /  ALT  235<H>  /  AlkPhos  80  07-08    CAPILLARY BLOOD GLUCOSE      POCT Blood Glucose.: 322 mg/dL (09 Jul 2024 16:39)  POCT Blood Glucose.: 357 mg/dL (09 Jul 2024 11:40)  POCT Blood Glucose.: 295 mg/dL (09 Jul 2024 07:49)  POCT Blood Glucose.: 287 mg/dL (08 Jul 2024 21:05)    PT/INR - ( 08 Jul 2024 07:43 )   PT: 13.8 sec;   INR: 1.24 ratio           Urinalysis Basic - ( 09 Jul 2024 12:00 )    Color: x / Appearance: x / SG: x / pH: x  Gluc: 364 mg/dL / Ketone: x  / Bili: x / Urobili: x   Blood: x / Protein: x / Nitrite: x   Leuk Esterase: x / RBC: x / WBC x   Sq Epi: x / Non Sq Epi: x / Bacteria: x        Culture - Blood (collected 06 Jul 2024 06:00)  Source: .Blood Blood-Peripheral  Preliminary Report (09 Jul 2024 13:02):    No growth at 72 Hours    Culture - Blood (collected 06 Jul 2024 06:00)  Source: .Blood Blood-Peripheral  Preliminary Report (09 Jul 2024 13:02):    No growth at 72 Hours    Culture - Urine (collected 04 Jul 2024 18:11)  Source: Clean Catch Clean Catch (Midstream)  Final Report (08 Jul 2024 13:17):    >100,000 CFU/ml Escherichia coli  Organism: Escherichia coli (08 Jul 2024 13:17)  Organism: Escherichia coli (08 Jul 2024 13:17)    Culture - Blood (collected 04 Jul 2024 17:10)  Source: .Blood Blood-Peripheral  Preliminary Report (09 Jul 2024 06:01):    No growth at 4 days    Culture - Blood (collected 04 Jul 2024 17:10)  Source: .Blood Blood-Peripheral  Gram Stain (05 Jul 2024 13:59):    Growth in aerobic bottle: Gram Negative Rods  Final Report (07 Jul 2024 08:55):    Growth in aerobic bottle: Escherichia coli    Direct identification is available within approximately 3-5    hours either by Blood Panel Multiplexed PCR or Direct    MALDI-TOF. Details: [email or web address]  Organism: Blood Culture PCR  Escherichia coli (07 Jul 2024 08:55)  Organism: Escherichia coli (07 Jul 2024 08:55)  Organism: Blood Culture PCR (07 Jul 2024 08:55)    Culture - Urine (collected 03 Jul 2024 23:45)  Source: Clean Catch Clean Catch (Midstream)  Final Report (07 Jul 2024 11:25):    50,000 - 99,000 CFU/mL Escherichia coli ESBL  Organism: Escherichia coli ESBL (07 Jul 2024 11:25)  Organism: Escherichia coli ESBL (07 Jul 2024 11:25)      RADIOLOGY & ADDITIONAL TESTS:   reviewed elctronically  ASSESSMENT/PLAN: 	    Patient was seen and examined on a day of discharge . Plan of care , discharge medications and recommendations discussed with consultants and clearance for discharge obtained .Social service , case management  and medical staff are aware of plan. Family is notified. Discharge summary  is  prepared electronically-see separate document prepared by me .75minutes spent on this visit, 50% visit time spent in care co-ordination with other attendings and counselling patient  I have discussed care plan with patient and HCP,expressed understanding of problems treatment and their effect and side effects, alternatives in detail,I have asked if they have any questions and concerns and appropriately addressed them to best of my ability

## 2024-07-09 NOTE — DISCHARGE NOTE PROVIDER - NSDCCPCAREPLAN_GEN_ALL_CORE_FT
PRINCIPAL DISCHARGE DIAGNOSIS  Diagnosis: Acute UTI  Assessment and Plan of Treatment:       SECONDARY DISCHARGE DIAGNOSES  Diagnosis: Sepsis due to Escherichia coli  Assessment and Plan of Treatment: 2/2 to ACUTE UTI POA    Diagnosis: Upper respiratory infection  Assessment and Plan of Treatment:     Diagnosis: Elevated CPK  Assessment and Plan of Treatment:     Diagnosis: Acute metabolic encephalopathy  Assessment and Plan of Treatment:     Diagnosis: Vasovagal syncope  Assessment and Plan of Treatment:     Diagnosis: Hyponatremia  Assessment and Plan of Treatment:     Diagnosis: Rhabdomyolysis  Assessment and Plan of Treatment:     Diagnosis: LYNDSEY (acute kidney injury)  Assessment and Plan of Treatment:     Diagnosis: CHAYITO (obstructive sleep apnea)  Assessment and Plan of Treatment:     Diagnosis: Rheumatoid arthritis  Assessment and Plan of Treatment:     Diagnosis: DM (diabetes mellitus), type 2  Assessment and Plan of Treatment:     Diagnosis: Acute UTI  Assessment and Plan of Treatment:

## 2024-07-09 NOTE — DISCHARGE NOTE PROVIDER - NSDCCAREPROVSEEN_GEN_ALL_CORE_FT
Adarsh Mayfield TANVEER-E-FATEMA Kahn, Robbin Wen, Marsha Vargas, Rick Barkley, Jerrod Woodard, Phoebe Uriostegui, [facility]

## 2024-07-09 NOTE — PROGRESS NOTE ADULT - ASSESSMENT
64-year-old male presents with generalized weakness today.  Patient was seen in the ED yesterday for a psychiatric evaluation.  The patient was seen and cleared.  The patient's urinalysis was positive, but the patient was not on antibiotics.  The patient is having some urinary symptoms, but is mostly complaining of cough/URI.  No acute chest pain.  No shortness of breath.  Patient was feeling generalized fatigue and weakness today.  The patient actually felt weak and fell slightly hitting his head on the wall.  No loss of consciousness.  Patient denies any extremity or truncal injury.  No acute chest pain. .  No aggravating or alleviating factors otherwise noted.  No other acute complaints. Patient was found to have a fever this evening, and was sent to the ED for evaluation. Admitted for neurology eval , ID cons requetsed , septic workup       ACUTE RENAL FAILURE: sodium chloride 0.9%. 1000 milliLiter(s) (80 mL/Hr) IV Continuous   Serum creatinine is  improving   There is no progression . No uremic symptoms    tamsulosin 0.4 milliGRAM(s) Oral at bedtime    No evidence of anemia .  Fluid status stable..  Will continue to avoid nephrotoxic drugs.    BP monitoring,continue current antihypertensive meds, low salt diet,followup with PMD in 1-2 weeks  carvedilol 12.5 milliGRAM(s) Oral every 12 hours        Patient remains asymptomatic.   Continue current therapy.  hold  diuretic.  hold   ACE inhibitor.  hold   ARB.  Additional evaluation:   ECG,    echocardiogram,     CXR,  will obtained recent   renal ultrasound to evalaute kidney size and possible stones ,      Admit for septic workup and ID evaluation,send blood and urine cx,serial lactate levels,monitor vitals closley,ivfs hydration,monitor urine output and renal profile,iv abx as per id cons  aztreonam  IVPB 1000 milliGRAM(s) IV Intermittent every 8 hours    
The patient is a 64 year old male with a history of HTN, HL, DM, DVT, CHAYITO, RA, bipolar disorder who presents with weakness and possible syncope.    Plan:  - ECG with no evidence of ischemia or infarction  - Echo with normal LV systolic function, no significant valve issues  -   - Cardiac enzymes negative  - CPK mildly elevated at 3340  - CT chest with atelectasis  - Continue aspirin 81 mg daily  - Continue atorvastatin 40 mg daily  - Continue carvedilol 12.5 mg bid  - Continue apixaban 2.5 mg bid for prior DVT  - Hold losartan  - E. coli bacteremia - IV antibiotics
The patient is a 64 year old male with a history of HTN, HL, DM, DVT, CHAYITO, RA, bipolar disorder who presents with weakness and possible syncope.    Plan:  - ECG with no evidence of ischemia or infarction  - Echo with normal LV systolic function, no significant valve issues  -   - Cardiac enzymes negative  - CPK mildly elevated at 3340  - CT chest with atelectasis  - Continue aspirin 81 mg daily  - Continue atorvastatin 40 mg daily  - Continue carvedilol 12.5 mg bid  - Continue apixaban 2.5 mg bid for prior DVT  - Hold losartan  - E. coli bacteremia - IV antibiotics
Patient is a 64 year old male presents with generalized weakness, patient with fever to 103.1F with tachycardia and positive UA; also noted with cough/URI symptoms.     Sepsis E.coli bacteremia due to  source  H/o PCN allergy -rash  - UA with pyuria, Ucx with E.coli  - Bcx with E.coli - sensitivities reviewed   - CTAP with mild perinephric stranding bilaterally and thickening of urothelium, no hydronephrosis   - renal ultrasound with no hydronephrosis   - COVID/Flu/RSV negative   - Chest imaging with no pneumonia   - EKG reviewed, Qtc ok    Recommendations:   Follow Ucx for E.coli sensitivities  Follow 7/6 repeat Bcx- NGTD x2   Discontinued aztreonam  Started on ciprofloxacin 500mg PO BID to complete total 10d course on 7/13  Continue rest of care per primary team.   Stable from ID standpoint at this time.     Dr. Wen to follow from tomorrow.   Jerrod Barkley M.D.  [facility], Division of Infectious Diseases  [phone]  After 5pm on weekdays and all day on weekends - please call [phone]  Available on [employer] TEAMS   
Patient is a 64 year old male presents with generalized weakness, patient with fever to 103.1F with tachycardia and positive UA; also noted with cough/URI symptoms.     Sepsis E.coli bacteremia due to  source  H/o PCN allergy -rash  - UA with pyuria, Ucx with E.coli  - Bcx with E.coli per PCR   - CTAP with mild perinephric stranding bilaterally and thickening of urothelium, no hydronephrosis   - renal ultrasound with no hydronephrosis   - COVID/Flu/RSV negative   - Chest imaging with no pneumonia     Recommendations:   Follow Bcx/Ucx for E.coli sensitivities  Follow repeat Bcx from am  Continue on aztreonam for now   Monitor temps/CBC  Continue rest of care per primary team.       Jerrod Barkley M.D.  [facility], Division of Infectious Diseases  [phone]  After 5pm on weekdays and all day on weekends - please call [phone]  Available on [employer] TEAMS   
The patient is a 64 year old male with a history of HTN, HL, DM, DVT, CHAYITO, RA, bipolar disorder who presents with weakness and possible syncope.    Plan:  - ECG with no evidence of ischemia or infarction  - Echo with normal LV systolic function, no significant valve issues  -   - Cardiac enzymes negative  - CPK mildly elevated at 3340  - CT chest with atelectasis  - Continue aspirin 81 mg daily  - Continue atorvastatin 40 mg daily  - Continue carvedilol 12.5 mg bid  - Continue apixaban 2.5 mg bid for prior DVT  - Hold losartan  - E. coli bacteremia - IV antibiotics
The patient is a 64 year old male with a history of HTN, HL, DM, DVT, CHAYITO, RA, bipolar disorder who presents with weakness and possible syncope.    Plan:  - ECG with no evidence of ischemia or infarction  - Echo with normal LV systolic function, no significant valve issues  -   - Cardiac enzymes negative  - CPK mildly elevated at 3340  - CT chest with atelectasis  - Continue aspirin 81 mg daily  - Continue atorvastatin 40 mg daily  - Continue carvedilol 12.5 mg bid  - Continue apixaban 2.5 mg bid for prior DVT  - Hold losartan  - E. coli bacteremia - on oral antibiotics
   REASON FOR VISIT  .. Management of problems listed below        REVIEW OF SYMPTOMS   Able to give ROS  Yes     RELIABILITY +/-   CONSTITUTIONAL Weakness Yes    ENDOCRINE  No heat or cold intolerance    ALLERGY No hives  Sore throat No stridor  RESP Shortness of breath YES   NEURO New weakness No   CARDIAC   Palpitations No         PHYSICAL EXAM    HEENT Unremarkable  atraumatic   RESP Fair air entry  Harsh breath sound   CARDIAC S1 S2 No S3     NO JVD    ABDOMEN No hepatosplenomegaly   PEDAL EDEMA present No calf tenderness  NO rash       XXXX[id number]  GENERAL DATA .   GOC.    ..    ICU STAY.    .. no   COVID.   ..   ALLGY.   ..    pncl tegretal    WT.   ..  7/4/2024 145   BMI.  .. 7/4/2024 44   XXXXXXXXXXXXXXXXXXXXXXXXXXXXX  BEST PRACTICE ISSUES.  . HOB ELEVATN.    .... Yes  . DIET.   .... 7/5/2024 cons carb   .... 7/4/2024 npo pending speech rio;   . SPEECH   .... 7/5/2024 speech recommends reg thin   . IV FLUIDS.  .... 7/4/2024 ns 80   . DVT PPLX.     .... 7/4/2024 apixaba 2.5 x 2 (nv af)     XXXXXXXXXXXXXXXXXXXXXXX  VITALS/GAS EXCHANGE/DRIPS    ABGS.   .  VS/ PO/IO/ VENT/ DRIPS.   7/5/2024 103 109 110/60   7/5/2024 5l 96%     XXXXXXXXXXXXXXXXXXXXXXXXXXXXXXXXX  HOSPITAL COURSE.  . CC  .... 7/4/2024 " He is from [facility]  , he is not feeling well - he is lethargic with fever   - He fell and hit his chin  " (+) laceration lower lip   Pt  presented with 101. F oral temp  . HPI.  .... 7/4/2024 64-year-old male presents with generalized weakness today.  Patient was seen in the ED yesterday for a psychiatric evaluation.  The patient was seen and cleared.  The patient's urinalysis was positive, but the patient was not on antibiotics.  The patient is having some urinary symptoms, but is mostly complaining of cough/URI.  No acute chest pain.  No shortness of breath.  Patient was feeling generalized fatigue and weakness today.  The patient actually felt weak and fell slightly hitting his head on the wall.  No loss of consciousness.  Patient denies any extremity or truncal injury.  No acute chest pain. .  No aggravating or alleviating factors otherwise noted.  No other acute complaints.  Patient was found to have a fever this evening, and was sent to the ED for evaluation.    PAST MEDICAL HISTORY:  Bipolar 1 disorder on Lithium  Diabetic neuropathy bilateral feet, ankles  HLD (hyperlipidemia)   Hypertension   Obesity   CHAYITO (obstructive sleep apnea) diagnosed >25 years ago, non-compliant with CPAP  Rheumatoid arthritis   T2DM (type 2 diabetes mellitus).  . HOME MEDS.   .... * Incomplete Medication History as of 04-Jul-2024 17:47 documented in Structured Notes  · Eliquis 2.5 mg oral tablet: Last Dose Taken:  , 1 tab(s) orally 2 times a day  · zinc oxide: Last Dose Taken:  , apply to sacrum after cleanse with soap and water  · Vascepa 1 g oral capsule: Last Dose Taken:  , 2 cap(s) orally 2 times a day  · Polyethylene Glycol 3350: Last Dose Taken:  , once a day  · HumaLOG 100 units/mL injectable solution: Last Dose Taken:  , injectable 3 times a day (before meals) sliding scale with fingerstick  · tamsulosin 0.4 mg oral capsule: Last Dose Taken:  , 1 capsule orally once a day  · atorvastatin 40 mg oral tablet: Last Dose Taken:  , 1 tab(s) orally once a day  · melatonin 5 mg oral tablet: Last Dose Taken:  , 1 tab(s) orally once a day (at bedtime)  · carvedilol 12.5 mg oral tablet: Last Dose Taken:  , 1 tab(s) orally 2 times a day  · losartan 25 mg oral tablet: Last Dose Taken:  , 1 tab(s) orally once a day  · metFORMIN 500 mg oral tablet: Last Dose Taken:  , 1 tab(s) orally 2 times a day  · RisperDAL 0.5 mg oral tablet: Last Dose Taken:  , 1 tab(s) orally 2 times a day give with the 2mg dose twice a day for a total of 2.5mg twice a day  · finasteride 5 mg oral tablet: Last Dose Taken:  , 1 tab(s) orally once a day  · ascorbic acid 500 mg oral tablet: Last Dose Taken:  , 1 tab(s) orally 2 times a day  · aspirin 81 mg oral tablet, chewable: Last Dose Taken:  , 1 tab(s) orally once a day  · Lipitor 40 mg oral tablet: Last Dose Taken:  , 1 tab(s) orally once a day (at bedtime)  · melatonin: Last Dose Taken:    · Depakote 500 mg oral delayed release tablet: Last Dose Taken:  , 1 tab(s) orally 2 times a day  · RisperDAL 2 mg oral tablet: Last Dose Taken:  , 1 tab(s) orally 2 times a day give 2mg by oral route two times a day WITH 0.5mg for total of 2.5mg  · Vitamin C 500 mg oral capsule: Last Dose Taken:  , 1 cap(s) orally 2 times a day   . ER MANAGEMENT .  .... 7/4/2024 7/4/2024 So far given aztreonam Vanco NS 2.3 l      NOTEWORTHY DEVICES/PROCEDURES.     PROBLEM DATA .  . OBESITY   .... 7/4/2024 Check abg     INFECTION.  . ID DATA  .... W 7/4-7/5/2024 W 7.3 - 7.2   .... Pr 7/5/2024 16   .... ua 7/4/2024 le mod w 42   .... ct cap 7/4/2024   ........ mild perinephric stranding and thickening of urothelium   .... cxr 7/5/2024 napd   .... flu ab 7/4/2024 (-)   .... rsv 7/4/2024 (-)   .... 7/4/2024 Aztreonam  .... 7/4/2024 Findings suggest UTI     . UTI   .... 7/4/2024 Aztreonam    CARDIAC.  . LACTICEMIA   .... la 7/5/2024 la 1.6    . SYNCOPE   .... Cardiac monitor     . CAD  .... tr 7/5/2024 tr 51   .... 7/4/2024 atorvastat 40     . A fib on APIXABA  .... 7/4/2024 carvedilol 12.5 x 2   .... 7/4/2024 apixaba 2.5 x 2     HEMAT.  . ANEMIA   .... Hb 7/4-7/5/2024 Hb 11.5 - 11.8   .... inr 7/4-7/5/2024 inr 126- 121   .... monitor      GI.  . LFTS   ..... LFTS 7/5/2024   ........ ap 71  ........ ast 98  ........ alt 47     RENAL.  . HYPONATREMIA   .... Na 7/4-7/5/2024 Na 133 - 139   .... SOSM 7/5/2024 310   .... K 7/4/2024 K 4.1   .... NA monitor       . BPH  .... 7/4/2024 tamsulosin .4  .... 7/4/2024 finasteride 5    . LYNDSEY   .... CO2 7/4/2024 co2 20  .... Cr 7/4-7/5/2024 cr 2.2 - 2.3   .... us renal 7/5/2024 (-)   .... 7/4/2024 losartan 25 held   .... Fluid us renal monitor     ENDO.  . DM   .... Insulin     NEURO.  . RO DRUG OD   .... tox scr 7/3 (-)     . FALL FACE INJURY   .... CT head C sp Face (-)   .... No obvious fx     . PSYCH  .... 7/4/2024 depakote 500.2     XXXXXXXXXXXXXXXXXXXXXXXXXXXXXXXXXX  OVERALL ASSESSMENT/PLAN  ACTIVE ISSUES.  . OBESITY CHAYITO   .... Check ABG   . FEVER COUGH 7/4/2024  . UTI 7/4/2024   ..... 7/4/2024 started aztreonam   . SYNCOPE 7/4/2024  ..... monitor cardio neuro eval   . HYPONATREMIA 7/4/2024   .... IV NS monitor   . LYNDSEY 7/4/2024 CR 2.2   .... IV fl monitr  . HYPERGLYCEMIA 7/4/2024 G 450   .... insulin monitor     LONG TERM PROBLEMS.  . CHAYITO (obstructive sleep apnea) diagnosed >25 years ago, non-compliant with CPAP  . Rheumatoid arthritis   . T2DM (type 2 diabetes mellitus).  . Bipolar 1 disorder on Lithium    HOME O2 ELIGIBILITY.     .... To be determined at time of discharge   .... Will need home oxygen if ra rest or exertion pulse ox droops below 88    TIME SPENT.  . Over 36 minutes aggregate care time spent on encounter; activities included   direct patient care, counseling and/or coordinating care reviewing notes, lab data/ imaging , discussion with multidisciplinary team/ patient  /family and explaining in detail risks, benefits, alternatives  of the recommendations     PATIENT.  . KAELYN KISER 64 m 7/4/2024 1962 DR WILNER MARCH   
64-year-old male presents with generalized weakness today.  Patient was seen in the ED yesterday for a psychiatric evaluation.  The patient was seen and cleared.  The patient's urinalysis was positive, but the patient was not on antibiotics.  The patient is having some urinary symptoms, but is mostly complaining of cough/URI.  No acute chest pain.  No shortness of breath.  Patient was feeling generalized fatigue and weakness today.  The patient actually felt weak and fell slightly hitting his head on the wall.  No loss of consciousness.  Patient denies any extremity or truncal injury.  No acute chest pain. .  No aggravating or alleviating factors otherwise noted.  No other acute complaints. Patient was found to have a fever this evening, and was sent to the ED for evaluation. Admitted for neurology eval , ID cons requetsed , septic workup       ACUTE RENAL FAILURE: sodium chloride 0.9%. 1000 milliLiter(s) (80 mL/Hr) IV Continuous   Serum creatinine is  improving   There is no progression . No uremic symptoms    tamsulosin 0.4 milliGRAM(s) Oral at bedtime    No evidence of anemia .  Fluid status stable..  Will continue to avoid nephrotoxic drugs.    BP monitoring,continue current antihypertensive meds, low salt diet,followup with PMD in 1-2 weeks  carvedilol 12.5 milliGRAM(s) Oral every 12 hours        Patient remains asymptomatic.   Continue current therapy.  hold  diuretic.  hold   ACE inhibitor.  hold   ARB.  Additional evaluation:   ECG,    echocardiogram,     CXR,  will obtained recent   renal ultrasound to evalaute kidney size and possible stones ,      Admit for septic workup and ID evaluation,send blood and urine cx,serial lactate levels,monitor vitals closley,ivfs hydration,monitor urine output and renal profile,iv abx as per id cons  aztreonam  IVPB 1000 milliGRAM(s) IV Intermittent every 8 hours    
Patient is a 64 year old male presents with generalized weakness, patient with fever to 103.1F with tachycardia and positive UA; also noted with cough/URI symptoms.     Sepsis E.coli bacteremia due to  source  H/o PCN allergy -rash  - UA with pyuria, Ucx with E.coli  - Bcx with E.coli - sensitivities reviewed   - EKG reviewed, Qtc ok    Recommendations:   Follow 7/6 repeat Bcx- NGTD x2    ciprofloxacin 500mg PO BID to complete total 10d course on 7/13  side effects reviewed with pt   chst palpitations, tendonitis with joint pain- stop  rash diarrhea - call   Continue rest of care per primary team.   Stable from ID standpoint at this time.        
   REASON FOR VISIT  .. Management of problems listed below        REVIEW OF SYMPTOMS   Able to give ROS  Yes     RELIABILITY +/-   CONSTITUTIONAL Weakness Yes    ENDOCRINE  No heat or cold intolerance    ALLERGY No hives  Sore throat No stridor  RESP Shortness of breath YES   NEURO New weakness No   CARDIAC   Palpitations No         PHYSICAL EXAM    HEENT Unremarkable  atraumatic   RESP Fair air entry  Harsh breath sound   CARDIAC S1 S2 No S3     NO JVD    ABDOMEN No hepatosplenomegaly   PEDAL EDEMA present No calf tenderness  NO rash       XXXX[id number]  GENERAL DATA .   GOC.    ..  7/6/2024 full code   ICU STAY.    .. no   COVID.   ..   ALLGY.   ..    pncl tegretal    WT.   ..  7/4/2024 145   BMI.  .. 7/4/2024 44   XXXXXXXXXXXXXXXXXXXXXXXXXXXXX  BEST PRACTICE ISSUES.  . HOB ELEVATN.    .... Yes  . DIET.   .... 7/5/2024 cons carb   .... 7/4/2024 npo pending speech rio;   . SPEECH   .... 7/5/2024 speech recommends reg thin   . IV FLUIDS.  .... 7/4/2024 ns 80   . DVT PPLX.     .... 7/4/2024 apixaba 2.5 x 2 (nv af)   . STRESS ULCER PPLX.   ....     XXXXXXXXXXXXXXXXXXXXXXX  VITALS/GAS EXCHANGE/DRIPS    ABGS.   .  VS/ PO/IO/ VENT/ DRIPS.   7/8/2024 afeb 80 120/70   7/8/2024 ra 93%    XXXXXXXXXXXXXXXXXXXXXXXXXXXXXXXXX  HOSPITAL COURSE.  . CC  .... 7/4/2024 " He is from [facility]  , he is not feeling well - he is lethargic with fever   - He fell and hit his chin  " (+) laceration lower lip   Pt  presented with 101. F oral temp  . HPI.  .... 7/4/2024 64-year-old male presents with generalized weakness today.  Patient was seen in the ED yesterday for a psychiatric evaluation.  The patient was seen and cleared.  The patient's urinalysis was positive, but the patient was not on antibiotics.  The patient is having some urinary symptoms, but is mostly complaining of cough/URI.  No acute chest pain.  No shortness of breath.  Patient was feeling generalized fatigue and weakness today.  The patient actually felt weak and fell slightly hitting his head on the wall.  No loss of consciousness.  Patient denies any extremity or truncal injury.  No acute chest pain. .  No aggravating or alleviating factors otherwise noted.  No other acute complaints.  Patient was found to have a fever this evening, and was sent to the ED for evaluation.    PAST MEDICAL HISTORY:  Bipolar 1 disorder on Lithium  Diabetic neuropathy bilateral feet, ankles  HLD (hyperlipidemia)   Hypertension   Obesity   CHAYITO (obstructive sleep apnea) diagnosed >25 years ago, non-compliant with CPAP  Rheumatoid arthritis   T2DM (type 2 diabetes mellitus).  . HOME MEDS.   .... * Incomplete Medication History as of 04-Jul-2024 17:47 documented in Structured Notes  · Eliquis 2.5 mg oral tablet: Last Dose Taken:  , 1 tab(s) orally 2 times a day  · zinc oxide: Last Dose Taken:  , apply to sacrum after cleanse with soap and water  · Vascepa 1 g oral capsule: Last Dose Taken:  , 2 cap(s) orally 2 times a day  · Polyethylene Glycol 3350: Last Dose Taken:  , once a day  · HumaLOG 100 units/mL injectable solution: Last Dose Taken:  , injectable 3 times a day (before meals) sliding scale with fingerstick  · tamsulosin 0.4 mg oral capsule: Last Dose Taken:  , 1 capsule orally once a day  · atorvastatin 40 mg oral tablet: Last Dose Taken:  , 1 tab(s) orally once a day  · melatonin 5 mg oral tablet: Last Dose Taken:  , 1 tab(s) orally once a day (at bedtime)  · carvedilol 12.5 mg oral tablet: Last Dose Taken:  , 1 tab(s) orally 2 times a day  · losartan 25 mg oral tablet: Last Dose Taken:  , 1 tab(s) orally once a day  · metFORMIN 500 mg oral tablet: Last Dose Taken:  , 1 tab(s) orally 2 times a day  · RisperDAL 0.5 mg oral tablet: Last Dose Taken:  , 1 tab(s) orally 2 times a day give with the 2mg dose twice a day for a total of 2.5mg twice a day  · finasteride 5 mg oral tablet: Last Dose Taken:  , 1 tab(s) orally once a day  · ascorbic acid 500 mg oral tablet: Last Dose Taken:  , 1 tab(s) orally 2 times a day  · aspirin 81 mg oral tablet, chewable: Last Dose Taken:  , 1 tab(s) orally once a day  · Lipitor 40 mg oral tablet: Last Dose Taken:  , 1 tab(s) orally once a day (at bedtime)  · melatonin: Last Dose Taken:    · Depakote 500 mg oral delayed release tablet: Last Dose Taken:  , 1 tab(s) orally 2 times a day  · RisperDAL 2 mg oral tablet: Last Dose Taken:  , 1 tab(s) orally 2 times a day give 2mg by oral route two times a day WITH 0.5mg for total of 2.5mg  · Vitamin C 500 mg oral capsule: Last Dose Taken:  , 1 cap(s) orally 2 times a day   . ER MANAGEMENT .  .... 7/4/2024 7/4/2024 So far given aztreonam Vanco NS 2.3 l      NOTEWORTHY DEVICES/PROCEDURES.     XXXXXXXXXXXXXXXXXXXXXXXXXXXXXXXXXXX  PROBLEM ASSESSMENT/RECOMMN/PLAN.  RESP.   . OBESITY   .... 7/4/2024 Check abg     INFECTION.  . ID DATA  .... W 7/4-7/5-7/8/2024 W 7.3 - 7.2 - 7.7   .... Pr 7/5-7/7-7/8/2024 16 - 5.6  -  3.3    .... ua 7/4/2024 le mod w 42   .... ct cap 7/4/2024   ........ mild perinephric stranding and thickening of urothelium   .... cxr 7/5/2024 napd   .... bc 7/3 E coli   .... uc 7/4 100K E coli  .... bc 7/6 (-)  .... flu ab 7/4/2024 (-)   .... rsv 7/4/2024 (-)   .... mrsa 7/5 (-)   .... 7/4/2024 Aztreonam DCED  .... 7/7 cipro  .... 7/4/2024 Findings suggest UTI     . E coli BACTEREMIA.   . UTI   .... 7/4/2024 AztreonamDCed  .... 7/7/2024 cipro 500.2 Dr FERGUSON     CARDIAC.  . LACTICEMIA   .... la 7/5/2024 la 1.6    . SYNCOPE   .... Cardiac monitor     . CAD  .... tr 7/5/2024 tr 51   .... 7/4/2024 atorvastat 40   .... 7/4 asa 81     . A fib on APIXABA  .... 7/4/2024 carvedilol 12.5 x 2   .... 7/4/2024 apixaba 2.5 x 2     . CHF  .... echo 7/5  ........ dd1  n rvsf     HEMAT.  . ANEMIA   .... Hb 7/4-7/5/2024 Hb 11.5 - 11.8   .... inr 7/4-7/5/2024 inr 126- 121   .... monitor      GI.  . LFTS   ..... LFTS 7/5-7/6-7/7-7/8/2024   ........ ap 71-65-71-80  ........ ast 98- 137- 167 - 106  ........ alt 47 - 137- 240 - 235   .... 5/8 hep panel (-)  .... 7/7/2024 chevk us ruq     RENAL.  . HYPONATREMIA   .... Na 7/4-7/5-7/7/2024 Na 133 - 139 - 141  .... SOSM 7/5/2024 310  HENNY BELO 20 UOSM 142   .... K 7/4/2024 K 4.1   .... NA monitor       . BPH  .... 7/4/2024 tamsulosin .4  .... 7/4/2024 finasteride 5    . LYNDSEY   .... CO2 7/4/2024 co2 20  .... Cr 7/4-7/5-7/6-7/7-7/8/2024  ....  cr 2.2 - 2.3 - 2.2 - 2- 1.9    .... us renal 7/5/2024 (-)   .... 7/4/2024 losartan 25 held   .... Fluid us renal monitor     . RHABDO   .... CK 7/6-7/7-7/8/2024 CK 1961- 2718 - 9353    ENDO.  . DM   .... Insulin     NEURO.  . RO DRUG OD   .... tox scr 7/3 (-)     . FALL FACE INJURY   .... CT head C sp Face (-)   .... No obvious fx     . PSYCH  .... 7/4/2024 depakote 500.2     XXXXXXXXXXXXXXXXXXXXXXXXXXXXXXXXXX  OVERALL ASSESSMENT/PLAN  ACTIVE ISSUES.  . OBESITY CHAYITO   .... Check ABG   . FEVER COUGH 7/4/2024  . E COLI BACTEREMIA 7/3  . UTI 7/4/2024   .... 7/3 BC E coli  .... 7/3 uc 100K E coli   ..... 7/4/2024 started aztreonam   ....  7/7/2024 Changed tpo cipro 500.2 by id on case   . SYNCOPE 7/4/2024  ..... monitor cardio neuro eval   . ELEVATED LFTS   .... 7/7/2024 US abd  was technically suboptimal  . LYNDSEY 7/4/2024 CR 2.2   .... IV fl monitr  . RHABDO   .... CK 7/6-7/7/2024 CK 4932- 6688  . HYPERGLYCEMIA 7/4/2024 G 450   .... 7/6/2024 started lantus 5.2  .... insulin monitor     LONG TERM PROBLEMS.  . CHAYITO (obstructive sleep apnea) diagnosed >25 years ago, non-compliant with CPAP  . Rheumatoid arthritis   . T2DM (type 2 diabetes mellitus).  . Bipolar 1 disorder on Lithium  HOME O2 ELIGIBILITY.     .... To be determined at time of discharge   .... Will need home oxygen if ra rest or exertion pulse ox droops below 88    TIME SPENT.  . Over 36 minutes aggregate care time spent on encounter; activities included   direct patient care, counseling and/or coordinating care reviewing notes, lab data/ imaging , discussion with multidisciplinary team/ patient  /family and explaining in detail risks, benefits, alternatives  of the recommendations     PATIENT.  . KAELYN KISER 64 m 7/4/2024 1962 DR WILNER MARCH  
   REASON FOR VISIT  .. Management of problems listed below        REVIEW OF SYMPTOMS   Able to give ROS  Yes     RELIABILITY +/-   CONSTITUTIONAL Weakness Yes    ENDOCRINE  No heat or cold intolerance    ALLERGY No hives  Sore throat No stridor  RESP Shortness of breath YES   NEURO New weakness No   CARDIAC   Palpitations No         PHYSICAL EXAM    HEENT Unremarkable  atraumatic   RESP Fair air entry  Harsh breath sound   CARDIAC S1 S2 No S3     NO JVD    ABDOMEN No hepatosplenomegaly   PEDAL EDEMA present No calf tenderness  NO rash       XXXX[id number]  GENERAL DATA .   GOC.    ..  7/6/2024 full code   ICU STAY.    .. no   COVID.   ..   ALLGY.   ..    pncl tegretal    WT.   ..  7/4/2024 145   BMI.  .. 7/4/2024 44   XXXXXXXXXXXXXXXXXXXXXXXXXXXXX  BEST PRACTICE ISSUES.  . HOB ELEVATN.    .... Yes  . DIET.   .... 7/5/2024 cons carb   .... 7/4/2024 npo pending speech rio;   . SPEECH   .... 7/5/2024 speech recommends reg thin   . IV FLUIDS.  .... 7/4/2024 ns 80   . DVT PPLX.     .... 7/4/2024 apixaba 2.5 x 2 (nv af)   . STRESS ULCER PPLX.   ....     XXXXXXXXXXXXXXXXXXXXXXX  VITALS/GAS EXCHANGE/DRIPS    ABGS.   .  VS/ PO/IO/ VENT/ DRIPS.   7/9/2024 afeb 90 100/80   7/9/2024 ra 92%     XXXXXXXXXXXXXXXXXXXXXXXXXXXXXXXXX  HOSPITAL COURSE.  . CC  .... 7/4/2024 " He is from [facility]  , he is not feeling well - he is lethargic with fever   - He fell and hit his chin  " (+) laceration lower lip   Pt  presented with 101. F oral temp  . HPI.  .... 7/4/2024 64-year-old male presents with generalized weakness today.  Patient was seen in the ED yesterday for a psychiatric evaluation.  The patient was seen and cleared.  The patient's urinalysis was positive, but the patient was not on antibiotics.  The patient is having some urinary symptoms, but is mostly complaining of cough/URI.  No acute chest pain.  No shortness of breath.  Patient was feeling generalized fatigue and weakness today.  The patient actually felt weak and fell slightly hitting his head on the wall.  No loss of consciousness.  Patient denies any extremity or truncal injury.  No acute chest pain. .  No aggravating or alleviating factors otherwise noted.  No other acute complaints.  Patient was found to have a fever this evening, and was sent to the ED for evaluation.    PAST MEDICAL HISTORY:  Bipolar 1 disorder on Lithium  Diabetic neuropathy bilateral feet, ankles  HLD (hyperlipidemia)   Hypertension   Obesity   CHAYITO (obstructive sleep apnea) diagnosed >25 years ago, non-compliant with CPAP  Rheumatoid arthritis   T2DM (type 2 diabetes mellitus).  . HOME MEDS.   .... * Incomplete Medication History as of 04-Jul-2024 17:47 documented in Structured Notes  · Eliquis 2.5 mg oral tablet: Last Dose Taken:  , 1 tab(s) orally 2 times a day  · zinc oxide: Last Dose Taken:  , apply to sacrum after cleanse with soap and water  · Vascepa 1 g oral capsule: Last Dose Taken:  , 2 cap(s) orally 2 times a day  · Polyethylene Glycol 3350: Last Dose Taken:  , once a day  · HumaLOG 100 units/mL injectable solution: Last Dose Taken:  , injectable 3 times a day (before meals) sliding scale with fingerstick  · tamsulosin 0.4 mg oral capsule: Last Dose Taken:  , 1 capsule orally once a day  · atorvastatin 40 mg oral tablet: Last Dose Taken:  , 1 tab(s) orally once a day  · melatonin 5 mg oral tablet: Last Dose Taken:  , 1 tab(s) orally once a day (at bedtime)  · carvedilol 12.5 mg oral tablet: Last Dose Taken:  , 1 tab(s) orally 2 times a day  · losartan 25 mg oral tablet: Last Dose Taken:  , 1 tab(s) orally once a day  · metFORMIN 500 mg oral tablet: Last Dose Taken:  , 1 tab(s) orally 2 times a day  · RisperDAL 0.5 mg oral tablet: Last Dose Taken:  , 1 tab(s) orally 2 times a day give with the 2mg dose twice a day for a total of 2.5mg twice a day  · finasteride 5 mg oral tablet: Last Dose Taken:  , 1 tab(s) orally once a day  · ascorbic acid 500 mg oral tablet: Last Dose Taken:  , 1 tab(s) orally 2 times a day  · aspirin 81 mg oral tablet, chewable: Last Dose Taken:  , 1 tab(s) orally once a day  · Lipitor 40 mg oral tablet: Last Dose Taken:  , 1 tab(s) orally once a day (at bedtime)  · melatonin: Last Dose Taken:    · Depakote 500 mg oral delayed release tablet: Last Dose Taken:  , 1 tab(s) orally 2 times a day  · RisperDAL 2 mg oral tablet: Last Dose Taken:  , 1 tab(s) orally 2 times a day give 2mg by oral route two times a day WITH 0.5mg for total of 2.5mg  · Vitamin C 500 mg oral capsule: Last Dose Taken:  , 1 cap(s) orally 2 times a day   . ER MANAGEMENT .  .... 7/4/2024 7/4/2024 So far given aztreonam Vanco NS 2.3 l      NOTEWORTHY DEVICES/PROCEDURES.     XXXXXXXXXXXXXXXXXXXXXXXXXXXXXXXXXX  OVERALL ASSESSMENT/PLAN  ACTIVE ISSUES.  . OBESITY CHAYITO   .... Check ABG     . FEVER COUGH 7/4/2024  . E COLI BACTEREMIA 7/3  . UTI 7/4/2024   ..... W 7/4-7/5-7/8-7/9/2024 W 7.3 - 7.2 - 7.7- 7.6   .... Pr 7/5-7/7-7/8/2024 16 - 5.6  -  3.3    .... ua 7/4/2024 le mod w 42   .... ct cap 7/4/2024   ........ mild perinephric stranding and thickening of urothelium   .... cxr 7/5/2024 napd   .... bc 7/3 E coli   .... uc 7/4 100K E coli  .... bc 7/6 (-)  .... 7/7 cipro    . SYNCOPE 7/4/2024  ..... monitor cardio neuro eval     . CAD  .... tr 7/5/2024 tr 51   .... 7/4/2024 atorvastat 40   .... 7/4 asa 81     . A fib on APIXABA  .... 7/4/2024 carvedilol 12.5 x 2   .... 7/4/2024 apixaba 2.5 x 2     . CHF  .... echo 7/5  ........ dd1  n rvsf     . ELEVATED LFTS   .... 7/7/2024 US abd  was technically suboptimal    . LYNDSEY   .... CO2 7/4/2024 co2 20  .... Cr 7/4-7/5-7/6-7/7-7/8-7/9/2024  ....  cr 2.2 - 2.3 - 2.2 - 2- 1.9 - 1.9    .... us renal 7/5/2024 (-)   .... 7/4/2024 losartan 25 held   .... Fluid us renal monitor     . RHABDO   .... CK 7/6-7/7-7/8-7/9/2024 CK 4613- 3195 - 1833- 1086   .... on iv fl     . HYPERGLYCEMIA 7/4/2024 G 450   .... 7/6/2024 started lantus 5.2  .... insulin monitor     LONG TERM PROBLEMS.  . CHAYITO (obstructive sleep apnea) diagnosed >25 years ago, non-compliant with CPAP  . Rheumatoid arthritis   . T2DM (type 2 diabetes mellitus).  . Bipolar 1 disorder on Lithium    HOME O2 ELIGIBILITY.     .... To be determined at time of discharge   .... Will need home oxygen if ra rest or exertion pulse ox droops below 88    TIME SPENT.  . Over 36 minutes aggregate care time spent on encounter; activities included   direct patient care, counseling and/or coordinating care reviewing notes, lab data/ imaging , discussion with multidisciplinary team/ patient  /family and explaining in detail risks, benefits, alternatives  of the recommendations     PATIENT.  . KAELYN KISER 64 m 7/4/2024 1962 DR WILNER MARCH   
64-year-old male presents with generalized weakness today.  Patient was seen in the ED yesterday for a psychiatric evaluation.  The patient was seen and cleared.  The patient's urinalysis was positive, but the patient was not on antibiotics.  The patient is having some urinary symptoms, but is mostly complaining of cough/URI.  No acute chest pain.  No shortness of breath.  Patient was feeling generalized fatigue and weakness today.  The patient actually felt weak and fell slightly hitting his head on the wall.  No loss of consciousness.  Patient denies any extremity or truncal injury.  No acute chest pain. .  No aggravating or alleviating factors otherwise noted.  No other acute complaints. Patient was found to have a fever this evening, and was sent to the ED for evaluation. Admitted for neurology eval , ID cons requetsed , septic workup       ACUTE RENAL FAILURE: sodium chloride 0.9%. 1000 milliLiter(s) (80 mL/Hr) IV Continuous   Serum creatinine is  improving   There is no progression . No uremic symptoms    tamsulosin 0.4 milliGRAM(s) Oral at bedtime    No evidence of anemia .  Fluid status stable..  Will continue to avoid nephrotoxic drugs.    BP monitoring,continue current antihypertensive meds, low salt diet,followup with PMD in 1-2 weeks  carvedilol 12.5 milliGRAM(s) Oral every 12 hours        Patient remains asymptomatic.   Continue current therapy.  hold  diuretic.  hold   ACE inhibitor.  hold   ARB.  Additional evaluation:   ECG,    echocardiogram,     CXR,  will obtained recent   renal ultrasound to evalaute kidney size and possible stones ,      Admit for septic workup and ID evaluation,send blood and urine cx,serial lactate levels,monitor vitals closley,ivfs hydration,monitor urine output and renal profile,iv abx as per id cons  aztreonam  IVPB 1000 milliGRAM(s) IV Intermittent every 8 hours    
   REASON FOR VISIT  .. Management of problems listed below        REVIEW OF SYMPTOMS   Able to give ROS  Yes     RELIABILITY +/-   CONSTITUTIONAL Weakness Yes    ENDOCRINE  No heat or cold intolerance    ALLERGY No hives  Sore throat No stridor  RESP Shortness of breath YES   NEURO New weakness No   CARDIAC   Palpitations No         PHYSICAL EXAM    HEENT Unremarkable  atraumatic   RESP Fair air entry  Harsh breath sound   CARDIAC S1 S2 No S3     NO JVD    ABDOMEN No hepatosplenomegaly   PEDAL EDEMA present No calf tenderness  NO rash       XXXXXXXXXXXXXXXXXXXXXXXXXXXXXXX  GENERAL DATA .   GOC.    ..  7/6/2024 full code   ICU STAY.    .. no   COVID.   ..   ALLGY.   ..    pncl tegretal    WT.   ..  7/4/2024 145   BMI.  .. 7/4/2024 44   XXXXXXXXXXXXXXXXXXXXXXXXXXXXX  BEST PRACTICE ISSUES.  . HOB ELEVATN.    .... Yes  . DIET.   .... 7/5/2024 cons carb   .... 7/4/2024 npo pending speech rio;   . SPEECH   .... 7/5/2024 speech recommends reg thin   . IV FLUIDS.  .... 7/4/2024 ns 80   . DVT PPLX.     .... 7/4/2024 apixaba 2.5 x 2 (nv af)   . STRESS ULCER PPLX.   ....     XXXXXXXXXXXXXXXXXXXXXXX  VITALS/GAS EXCHANGE/DRIPS    ABGS.   .  VS/ PO/IO/ VENT/ DRIPS.   7/6/2024 afeb 95 130/70   7/6/2024 ra 91%     XXXXXXXXXXXXXXXXXXXXXXXXXXXXXXXXX  HOSPITAL COURSE.  . CC  .... 7/4/2024 " He is from [facility]  , he is not feeling well - he is lethargic with fever   - He fell and hit his chin  " (+) laceration lower lip   Pt  presented with 101. F oral temp  . HPI.  .... 7/4/2024 64-year-old male presents with generalized weakness today.  Patient was seen in the ED yesterday for a psychiatric evaluation.  The patient was seen and cleared.  The patient's urinalysis was positive, but the patient was not on antibiotics.  The patient is having some urinary symptoms, but is mostly complaining of cough/URI.  No acute chest pain.  No shortness of breath.  Patient was feeling generalized fatigue and weakness today.  The patient actually felt weak and fell slightly hitting his head on the wall.  No loss of consciousness.  Patient denies any extremity or truncal injury.  No acute chest pain. .  No aggravating or alleviating factors otherwise noted.  No other acute complaints.  Patient was found to have a fever this evening, and was sent to the ED for evaluation.    PAST MEDICAL HISTORY:  Bipolar 1 disorder on Lithium  Diabetic neuropathy bilateral feet, ankles  HLD (hyperlipidemia)   Hypertension   Obesity   CHAYITO (obstructive sleep apnea) diagnosed >25 years ago, non-compliant with CPAP  Rheumatoid arthritis   T2DM (type 2 diabetes mellitus).  . HOME MEDS.   .... * Incomplete Medication History as of 04-Jul-2024 17:47 documented in Structured Notes  · Eliquis 2.5 mg oral tablet: Last Dose Taken:  , 1 tab(s) orally 2 times a day  · zinc oxide: Last Dose Taken:  , apply to sacrum after cleanse with soap and water  · Vascepa 1 g oral capsule: Last Dose Taken:  , 2 cap(s) orally 2 times a day  · Polyethylene Glycol 3350: Last Dose Taken:  , once a day  · HumaLOG 100 units/mL injectable solution: Last Dose Taken:  , injectable 3 times a day (before meals) sliding scale with fingerstick  · tamsulosin 0.4 mg oral capsule: Last Dose Taken:  , 1 capsule orally once a day  · atorvastatin 40 mg oral tablet: Last Dose Taken:  , 1 tab(s) orally once a day  · melatonin 5 mg oral tablet: Last Dose Taken:  , 1 tab(s) orally once a day (at bedtime)  · carvedilol 12.5 mg oral tablet: Last Dose Taken:  , 1 tab(s) orally 2 times a day  · losartan 25 mg oral tablet: Last Dose Taken:  , 1 tab(s) orally once a day  · metFORMIN 500 mg oral tablet: Last Dose Taken:  , 1 tab(s) orally 2 times a day  · RisperDAL 0.5 mg oral tablet: Last Dose Taken:  , 1 tab(s) orally 2 times a day give with the 2mg dose twice a day for a total of 2.5mg twice a day  · finasteride 5 mg oral tablet: Last Dose Taken:  , 1 tab(s) orally once a day  · ascorbic acid 500 mg oral tablet: Last Dose Taken:  , 1 tab(s) orally 2 times a day  · aspirin 81 mg oral tablet, chewable: Last Dose Taken:  , 1 tab(s) orally once a day  · Lipitor 40 mg oral tablet: Last Dose Taken:  , 1 tab(s) orally once a day (at bedtime)  · melatonin: Last Dose Taken:    · Depakote 500 mg oral delayed release tablet: Last Dose Taken:  , 1 tab(s) orally 2 times a day  · RisperDAL 2 mg oral tablet: Last Dose Taken:  , 1 tab(s) orally 2 times a day give 2mg by oral route two times a day WITH 0.5mg for total of 2.5mg  · Vitamin C 500 mg oral capsule: Last Dose Taken:  , 1 cap(s) orally 2 times a day   . ER MANAGEMENT .  .... 7/4/2024 7/4/2024 So far given aztreonam Vanco NS 2.3 l      NOTEWORTHY DEVICES/PROCEDURES.     PROBLEM DATA .  . OBESITY   .... 7/4/2024 Check abg     INFECTION.  . ID DATA  .... W 7/4-7/5/2024 W 7.3 - 7.2   .... Pr 7/5/2024 16   .... ua 7/4/2024 le mod w 42   .... ct cap 7/4/2024   ........ mild perinephric stranding and thickening of urothelium   .... cxr 7/5/2024 napd   .... flu ab 7/4/2024 (-)   .... rsv 7/4/2024 (-)   .... 7/4/2024 Aztreonam  .... 7/4/2024 Findings suggest UTI     . UTI   .... 7/4/2024 Aztreonam    CARDIAC.  . LACTICEMIA   .... la 7/5/2024 la 1.6    . SYNCOPE   .... Cardiac monitor     . CAD  .... tr 7/5/2024 tr 51   .... 7/4/2024 atorvastat 40     . A fib on APIXABA  .... 7/4/2024 carvedilol 12.5 x 2   .... 7/4/2024 apixaba 2.5 x 2     HEMAT.  . ANEMIA   .... Hb 7/4-7/5/2024 Hb 11.5 - 11.8   .... inr 7/4-7/5/2024 inr 126- 121   .... monitor      GI.  . LFTS   ..... LFTS 7/5/2024   ........ ap 71  ........ ast 98  ........ alt 47     RENAL.  . HYPONATREMIA   .... Na 7/4-7/5/2024 Na 133 - 139   .... SOSM 7/5/2024 310   .... K 7/4/2024 K 4.1   .... NA monitor       . BPH  .... 7/4/2024 tamsulosin .4  .... 7/4/2024 finasteride 5    . LYNDSEY   .... CO2 7/4/2024 co2 20  .... Cr 7/4-7/5/2024 cr 2.2 - 2.3   .... us renal 7/5/2024 (-)   .... 7/4/2024 losartan 25 held   .... Fluid us renal monitor     ENDO.  . DM   .... Insulin     NEURO.  . RO DRUG OD   .... tox scr 7/3 (-)     . FALL FACE INJURY   .... CT head C sp Face (-)   .... No obvious fx     . PSYCH  .... 7/4/2024 depakote 500.2     XXXXXXXXXXXXXXXXXXXXXXXXXXXXXXXXXX  OVERALL ASSESSMENT/PLAN  ACTIVE ISSUES.  . OBESITY CHAYITO   .... Check ABG   . FEVER COUGH 7/4/2024  . E COLI BACTEREMIA 7/3  . UTI 7/4/2024   .... 7/3 BC E coli  .... 7/3 uc 100K E coli   ..... 7/4/2024 started aztreonam   .... id on case   . SYNCOPE 7/4/2024  ..... monitor cardio neuro eval   . HYPONATREMIA 7/4/2024   .... IV NS monitor   . LYNDSEY 7/4/2024 CR 2.2   .... IV fl monitr  . RHABDO   .... CK 7/6/2024 CK 4613  . HYPERGLYCEMIA 7/4/2024 G 450   .... 7/6/2024 started lantus 5.2  .... insulin monitor     LONG TERM PROBLEMS.  . CHAYITO (obstructive sleep apnea) diagnosed >25 years ago, non-compliant with CPAP  . Rheumatoid arthritis   . T2DM (type 2 diabetes mellitus).  . Bipolar 1 disorder on Lithium    HOME O2 ELIGIBILITY.     .... To be determined at time of discharge   .... Will need home oxygen if ra rest or exertion pulse ox droops below 88    TIME SPENT.  . Over 36 minutes aggregate care time spent on encounter; activities included   direct patient care, counseling and/or coordinating care reviewing notes, lab data/ imaging , discussion with multidisciplinary team/ patient  /family and explaining in detail risks, benefits, alternatives  of the recommendations     PATIENT.  . KAELYN KISER 64 m 7/4/2024 1960 DR WILNER MARCH   
   REASON FOR VISIT  .. Management of problems listed below        REVIEW OF SYMPTOMS   Able to give ROS  Yes     RELIABILITY +/-   CONSTITUTIONAL Weakness Yes    ENDOCRINE  No heat or cold intolerance    ALLERGY No hives  Sore throat No stridor  RESP Shortness of breath YES   NEURO New weakness No   CARDIAC   Palpitations No         PHYSICAL EXAM    HEENT Unremarkable  atraumatic   RESP Fair air entry  Harsh breath sound   CARDIAC S1 S2 No S3     NO JVD    ABDOMEN No hepatosplenomegaly   PEDAL EDEMA present No calf tenderness  NO rash       XXXXXXXXXXXXXXXXXXXXXXXXXXXXXXX  GENERAL DATA .   GOC.    ..  7/6/2024 full code   ICU STAY.    .. no   COVID.   ..   ALLGY.   ..    pncl tegretal    WT.   ..  7/4/2024 145   BMI.  .. 7/4/2024 44   XXXXXXXXXXXXXXXXXXXXXXXXXXXXX  BEST PRACTICE ISSUES.  . HOB ELEVATN.    .... Yes  . DIET.   .... 7/5/2024 cons carb   .... 7/4/2024 npo pending speech rio;   . SPEECH   .... 7/5/2024 speech recommends reg thin   . IV FLUIDS.  .... 7/4/2024 ns 80   . DVT PPLX.     .... 7/4/2024 apixaba 2.5 x 2 (nv af)   . STRESS ULCER PPLX.   ....     XXXXXXXXXXXXXXXXXXXXXXX  VITALS/GAS EXCHANGE/DRIPS    ABGS.   .  VS/ PO/IO/ VENT/ DRIPS.   7/7/2024 afeb 88 120/70   7/7/2024 ra 93%    XXXXXXXXXXXXXXXXXXXXXXXXXXXXXXXXX  HOSPITAL COURSE.  . CC  .... 7/4/2024 " He is from [facility]  , he is not feeling well - he is lethargic with fever   - He fell and hit his chin  " (+) laceration lower lip   Pt  presented with 101. F oral temp  . HPI.  .... 7/4/2024 64-year-old male presents with generalized weakness today.  Patient was seen in the ED yesterday for a psychiatric evaluation.  The patient was seen and cleared.  The patient's urinalysis was positive, but the patient was not on antibiotics.  The patient is having some urinary symptoms, but is mostly complaining of cough/URI.  No acute chest pain.  No shortness of breath.  Patient was feeling generalized fatigue and weakness today.  The patient actually felt weak and fell slightly hitting his head on the wall.  No loss of consciousness.  Patient denies any extremity or truncal injury.  No acute chest pain. .  No aggravating or alleviating factors otherwise noted.  No other acute complaints.  Patient was found to have a fever this evening, and was sent to the ED for evaluation.    PAST MEDICAL HISTORY:  Bipolar 1 disorder on Lithium  Diabetic neuropathy bilateral feet, ankles  HLD (hyperlipidemia)   Hypertension   Obesity   CHAYITO (obstructive sleep apnea) diagnosed >25 years ago, non-compliant with CPAP  Rheumatoid arthritis   T2DM (type 2 diabetes mellitus).  . HOME MEDS.   .... * Incomplete Medication History as of 04-Jul-2024 17:47 documented in Structured Notes  · Eliquis 2.5 mg oral tablet: Last Dose Taken:  , 1 tab(s) orally 2 times a day  · zinc oxide: Last Dose Taken:  , apply to sacrum after cleanse with soap and water  · Vascepa 1 g oral capsule: Last Dose Taken:  , 2 cap(s) orally 2 times a day  · Polyethylene Glycol 3350: Last Dose Taken:  , once a day  · HumaLOG 100 units/mL injectable solution: Last Dose Taken:  , injectable 3 times a day (before meals) sliding scale with fingerstick  · tamsulosin 0.4 mg oral capsule: Last Dose Taken:  , 1 capsule orally once a day  · atorvastatin 40 mg oral tablet: Last Dose Taken:  , 1 tab(s) orally once a day  · melatonin 5 mg oral tablet: Last Dose Taken:  , 1 tab(s) orally once a day (at bedtime)  · carvedilol 12.5 mg oral tablet: Last Dose Taken:  , 1 tab(s) orally 2 times a day  · losartan 25 mg oral tablet: Last Dose Taken:  , 1 tab(s) orally once a day  · metFORMIN 500 mg oral tablet: Last Dose Taken:  , 1 tab(s) orally 2 times a day  · RisperDAL 0.5 mg oral tablet: Last Dose Taken:  , 1 tab(s) orally 2 times a day give with the 2mg dose twice a day for a total of 2.5mg twice a day  · finasteride 5 mg oral tablet: Last Dose Taken:  , 1 tab(s) orally once a day  · ascorbic acid 500 mg oral tablet: Last Dose Taken:  , 1 tab(s) orally 2 times a day  · aspirin 81 mg oral tablet, chewable: Last Dose Taken:  , 1 tab(s) orally once a day  · Lipitor 40 mg oral tablet: Last Dose Taken:  , 1 tab(s) orally once a day (at bedtime)  · melatonin: Last Dose Taken:    · Depakote 500 mg oral delayed release tablet: Last Dose Taken:  , 1 tab(s) orally 2 times a day  · RisperDAL 2 mg oral tablet: Last Dose Taken:  , 1 tab(s) orally 2 times a day give 2mg by oral route two times a day WITH 0.5mg for total of 2.5mg  · Vitamin C 500 mg oral capsule: Last Dose Taken:  , 1 cap(s) orally 2 times a day   . ER MANAGEMENT .  .... 7/4/2024 7/4/2024 So far given aztreonam Vanco NS 2.3 l      NOTEWORTHY DEVICES/PROCEDURES.     PROBLEM DATA .  . OBESITY   .... 7/4/2024 Check abg     INFECTION.  . ID DATA  .... W 7/4-7/5/2024 W 7.3 - 7.2   .... Pr 7/5-7/7/2024 16 - 5.6    .... ua 7/4/2024 le mod w 42   .... ct cap 7/4/2024   ........ mild perinephric stranding and thickening of urothelium   .... cxr 7/5/2024 napd   .... bc 7/3 E coli   .... uc 7/4 100K E coli  .... flu ab 7/4/2024 (-)   .... rsv 7/4/2024 (-)   .... mrsa 7/5 (-)   .... 7/4/2024 Aztreonam DCED  .... 7/7 cipro  .... 7/4/2024 Findings suggest UTI     . UTI   .... 7/4/2024 AztreonamDCed  .... 7/7/2024 cipro 500.2 Dr FERGUSON     CARDIAC.  . LACTICEMIA   .... la 7/5/2024 la 1.6    . SYNCOPE   .... Cardiac monitor     . CAD  .... tr 7/5/2024 tr 51   .... 7/4/2024 atorvastat 40   .... 7/4 asa 81     . A fib on APIXABA  .... 7/4/2024 carvedilol 12.5 x 2   .... 7/4/2024 apixaba 2.5 x 2     . CHF  .... echo 7/5  ........ dd1  n rvsf     HEMAT.  . ANEMIA   .... Hb 7/4-7/5/2024 Hb 11.5 - 11.8   .... inr 7/4-7/5/2024 inr 126- 121   .... monitor      GI.  . LFTS   ..... LFTS 7/5-7/6-7/7/2024   ........ ap 71-65-71  ........ ast 98- 137- 167   ........ alt 47 - 137- 240   .... 7/7/2024 chevk us ruq     RENAL.  . HYPONATREMIA   .... Na 7/4-7/5-7/7/2024 Na 133 - 139 - 141  .... SOSM 7/5/2024 310  HENNY BELO 20 UOSM 142   .... K 7/4/2024 K 4.1   .... NA monitor       . BPH  .... 7/4/2024 tamsulosin .4  .... 7/4/2024 finasteride 5    . LYNDSEY   .... CO2 7/4/2024 co2 20  .... Cr 7/4-7/5-7/6-7/7/2024 cr 2.2 - 2.3 - 2.2 - 2    .... us renal 7/5/2024 (-)   .... 7/4/2024 losartan 25 held   .... Fluid us renal monitor     . RHABDO   .... CK 7/6-7/7/2024 CK 4682- 8255    ENDO.  . DM   .... Insulin     NEURO.  . RO DRUG OD   .... tox scr 7/3 (-)     . FALL FACE INJURY   .... CT head C sp Face (-)   .... No obvious fx     . PSYCH  .... 7/4/2024 depakote 500.2     XXXXXXXXXXXXXXXXXXXXXXXXXXXXXXXXXX  OVERALL ASSESSMENT/PLAN  ACTIVE ISSUES.  . OBESITY CHAYITO   .... Check ABG   . FEVER COUGH 7/4/2024  . E COLI BACTEREMIA 7/3  . UTI 7/4/2024   .... 7/3 BC E coli  .... 7/3 uc 100K E coli   ..... 7/4/2024 started aztreonam   ....  7/7/2024 Changed tpo cipro 500.2 by id on case   . SYNCOPE 7/4/2024  ..... monitor cardio neuro eval   . ELEVATED LFTS   .... 7/7/2024 US abd orderd  . LYNDSEY 7/4/2024 CR 2.2   .... IV fl monitr  . RHABDO   .... CK 7/6-7/7/2024 CK 4624- 9585  . HYPERGLYCEMIA 7/4/2024 G 450   .... 7/6/2024 started lantus 5.2  .... insulin monitor  HOME O2 ELIGIBILITY.     .... To be determined at time of discharge   .... Will need home oxygen if ra rest or exertion pulse ox droops below 88    TIME SPENT.  . Over 36 minutes aggregate care time spent on encounter; activities included   direct patient care, counseling and/or coordinating care reviewing notes, lab data/ imaging , discussion with multidisciplinary team/ patient  /family and explaining in detail risks, benefits, alternatives  of the recommendations     PATIENT.  . KAELYN KISER 64 m 7/4/2024 1962 DR WILNER MARCH   
64-year-old male presents with generalized weakness today.  Patient was seen in the ED yesterday for a psychiatric evaluation.  The patient was seen and cleared.  The patient's urinalysis was positive, but the patient was not on antibiotics.  The patient is having some urinary symptoms, but is mostly complaining of cough/URI.  No acute chest pain.  No shortness of breath.  Patient was feeling generalized fatigue and weakness today.  The patient actually felt weak and fell slightly hitting his head on the wall.  No loss of consciousness.  Patient denies any extremity or truncal injury.  No acute chest pain. .  No aggravating or alleviating factors otherwise noted.  No other acute complaints. Patient was found to have a fever this evening, and was sent to the ED for evaluation. Admitted for neurology eval , ID cons requetsed , septic workup       ACUTE RENAL FAILURE: sodium chloride 0.9%. 1000 milliLiter(s) (80 mL/Hr) IV Continuous   Serum creatinine is  at  2.3    , approximating GFR at   ml/min.   There is no progression . No uremic symptoms    tamsulosin 0.4 milliGRAM(s) Oral at bedtime    No evidence of anemia .  Fluid status stable..  Will continue to avoid nephrotoxic drugs.    BP monitoring,continue current antihypertensive meds, low salt diet,followup with PMD in 1-2 weeks  carvedilol 12.5 milliGRAM(s) Oral every 12 hours        Patient remains asymptomatic.   Continue current therapy.  hold  diuretic.  hold   ACE inhibitor.  hold   ARB.  Additional evaluation:   ECG,    echocardiogram,     CXR,  will obtained recent   renal ultrasound to evalaute kidney size and possible stones ,      Admit for septic workup and ID evaluation,send blood and urine cx,serial lactate levels,monitor vitals rex [place] hydration,monitor urine output and renal profile,iv abx as per id cons  aztreonam  IVPB 1000 milliGRAM(s) IV Intermittent every 8 hours    
. OBESITY CHAYITO   .... Check ABG   . FEVER COUGH 7/4/2024  . UTI 7/4/2024   ..... 7/4/2024 started aztreonam   . SYNCOPE 7/4/2024  ..... monitor cardio neuro eval   . HYPONATREMIA 7/4/2024   .... IV NS monitor   . LYNDSEY 7/4/2024 CR 2.2   .... IV fl monitr  . HYPERGLYCEMIA 7/4/2024 G 450   .... insulin monitor   . CHAYITO (obstructive sleep apnea) diagnosed >25 years ago, non-compliant with CPAP  . Rheumatoid arthritis   . T2DM (type 2 diabetes mellitus).
. OBESITY CHAYITO   .... Check ABG   . FEVER COUGH 7/4/2024  . UTI 7/4/2024   ..... 7/4/2024 started aztreonam   . SYNCOPE 7/4/2024  ..... monitor cardio neuro eval   . HYPONATREMIA 7/4/2024   .... IV NS monitor   . LYNDSEY 7/4/2024 CR 2.2   .... IV fl monitr  . HYPERGLYCEMIA 7/4/2024 G 450   .... insulin monitor   . CHAYITO (obstructive sleep apnea) diagnosed >25 years ago, non-compliant with CPAP  . Rheumatoid arthritis   . T2DM (type 2 diabetes mellitus).

## 2024-07-09 NOTE — DISCHARGE NOTE NURSING/CASE MANAGEMENT/SOCIAL WORK - PATIENT PORTAL LINK FT
You can access the FollowMyHealth Patient Portal offered by [facility] by registering at the following website: [email or web address]. By joining [employer]’s FollowMyHealth portal, you will also be able to view your health information using other applications (apps) compatible with our system.

## 2024-07-09 NOTE — OCCUPATIONAL THERAPY INITIAL EVALUATION ADULT - PERTINENT HX OF CURRENT PROBLEM, REHAB EVAL
64-year-old male presents with generalized weakness today.  Patient was seen in the ED yesterday for a psychiatric evaluation.  The patient was seen and cleared.  The patient's urinalysis was positive, but the patient was not on antibiotics.  The patient is having some urinary symptoms, but is mostly complaining of cough/URI.  No acute chest pain.  No shortness of breath.  Patient was feeling generalized fatigue and weakness today.  The patient actually felt weak and fell slightly hitting his head on the wall.  No loss of consciousness.  Patient denies any extremity or truncal injury.  No acute chest pain. .  No aggravating or alleviating factors otherwise noted.  No other acute complaints.  Patient was found to have a fever this evening, and was sent to the ED for evaluation. Admitted for neurology eval , ID cons requested , septic workup sent

## 2024-07-09 NOTE — DISCHARGE NOTE PROVIDER - NSDCMRMEDTOKEN_GEN_ALL_CORE_FT
acetaminophen 325 mg oral tablet: 2 tab(s) orally every 6 hours As needed Temp greater or equal to 38C (100.4F), Mild Pain (1 - 3)  aluminum hydroxide-magnesium hydroxide 200 mg-200 mg/5 mL oral suspension: 30 milliliter(s) orally every 4 hours As needed Dyspepsia  apixaban 2.5 mg oral tablet: 1 tab(s) orally every 12 hours  aspirin 81 mg oral tablet, chewable: 1 tab(s) orally once a day  Bacid (LAC) oral tablet: 2 tab(s) orally once a day  carvedilol 12.5 mg oral tablet: 1 tab(s) orally every 12 hours  ciprofloxacin 500 mg oral tablet: 1 tab(s) orally every 12 hours LAST DAY 07/13/2024  Depakote 500 mg oral delayed release tablet: 1 tab(s) orally 2 times a day  finasteride 5 mg oral tablet: 1 tab(s) orally once a day  insulin glargine 100 units/mL subcutaneous solution: 20 unit(s) subcutaneous once a day (at bedtime)  insulin lispro 100 units/mL injectable solution: injectable 3 times a day (before meals) Sliding scale  Lipitor 40 mg oral tablet: 1 tab(s) orally once a day (at bedtime)  melatonin 3 mg oral tablet: 1 tab(s) orally once a day (at bedtime) As needed Insomnia  Polyethylene Glycol 3350: 17 gram(s) orally once a day  RisperDAL 0.5 mg oral tablet: 1 tab(s) orally 2 times a day give with the 2mg dose twice a day for a total of 2.5mg twice a day  RisperDAL 2 mg oral tablet: 1 tab(s) orally 2 times a day give 2mg by oral route two times a day WITH 0.5mg for total of 2.5mg  sodium chloride 0.9% injectable solution: intravenous every minute CONTINUOUS INFUSION AT  75 CC/ HR X 48 HOURS , CHECK BMP AND CPK LEVEL 07/10 AND 07/11 AND FOLLOWUP WITH PCP  tamsulosin 0.4 mg oral capsule: 1 capsule orally once a day  Vitamin C 500 mg oral capsule: 1 cap(s) orally 2 times a day

## 2024-07-09 NOTE — DISCHARGE NOTE PROVIDER - PROVIDER TOKENS
PROVIDER:[TOKEN:[[id number]],FOLLOWUP:[1 week]],PROVIDER:[TOKEN:[[id number]],FOLLOWUP:[2 weeks]]

## 2024-07-09 NOTE — DISCHARGE NOTE PROVIDER - NSDCFUSCHEDAPPT_GEN_ALL_CORE_FT
[facility]  ORTHOSURG [address] [facility]  Scheduled Appointment: 07/12/2024

## 2024-07-09 NOTE — DISCHARGE NOTE PROVIDER - HOSPITAL COURSE
Problem/Plan - 1:  ·  Problem: Acute metabolic encephalopathy.   ·  Plan: 2/2 to UTI and LYNDSEY , poa.    Problem/Plan - 2:  ·  Problem: Acute febrile illness.   ·  Plan: septic workup ,ID cons.    Problem/Plan - 3:  ·  Problem: Acute UTI.   ·  Plan: septic workup , iv abx , serial cbc.    Problem/Plan - 4:  ·  Problem: Vasovagal syncope.   ·  Plan: neurology and card eval - ECG with no evidence of ischemia or infarction  -   - Cardiac enzymes negative  - CPK mildly elevated at 3340  - CT chest with atelectasis  - Continue aspirin 81 mg daily  - Continue atorvastatin 40 mg daily  - Continue carvedilol 12.5 mg bid  - Continue apixaban 2.5 mg bid for prior DVT  - Hold losartan  - Continue IV fluids.    Problem/Plan - 5:  ·  Problem: LYNDSEY (acute kidney injury).   ·  Plan: serial bmp ,iv fluids ,nephrology eval.    Problem/Plan - 6:  ·  Problem: Hyponatremia.   ·  Plan: serial bmp , ins/outs.    Problem/Plan - 7:  ·  Problem: Rhabdomyolysis.   ·  Plan: serial cpk ,monitor renal profile closely.    Problem/Plan - 8:  ·  Problem: CHAYITO (obstructive sleep apnea).   ·  Plan: as per pulm cons Dr Uriostegui.    Problem/Plan - 9:  ·  Problem: DM (diabetes mellitus), type 2.   ·  Plan: Accuchecks monitoring and insulin corrective regimen  sliding scale coverage with short acting inslulin, add longacting insulin as needed ,no concentrated sweets diet, serial labs ,HbA1C,education.    Problem/Plan - 10:  ·  Problem: Rheumatoid arthritis.   ·  Plan; continue home medications , PT/OT.    Problem/Plan - 11:  ·  Problem: Prophylactic measure.   ·  Plan: Gastrointestinal stress ulcer prophylaxis and DVT prophylaxis administered.

## 2024-07-09 NOTE — PHYSICAL THERAPY INITIAL EVALUATION ADULT - PHYSICAL ASSIST/NONPHYSICAL ASSIST: SIT/STAND, REHAB EVAL
"Sonia Burtona"  was seen and treated in our emergency department on 11/20/2023.  She may return to work on 11/21/2023.       If you have any questions or concerns, please don't hesitate to call.      Bee CALVO    "
verbal cues

## 2024-07-09 NOTE — PROGRESS NOTE ADULT - NUTRITIONAL ASSESSMENT
MEDICATIONS  (STANDING):  apixaban 2.5 milliGRAM(s) Oral every 12 hours  aspirin  chewable 81 milliGRAM(s) Oral daily  atorvastatin 40 milliGRAM(s) Oral at bedtime  carvedilol 12.5 milliGRAM(s) Oral every 12 hours  ciprofloxacin     Tablet 500 milliGRAM(s) Oral every 12 hours  dextrose 10% Bolus 125 milliLiter(s) IV Bolus once  dextrose 5%. 1000 milliLiter(s) (100 mL/Hr) IV Continuous <Continuous>  dextrose 5%. 1000 milliLiter(s) (50 mL/Hr) IV Continuous <Continuous>  dextrose 50% Injectable 25 Gram(s) IV Push once  dextrose 50% Injectable 12.5 Gram(s) IV Push once  divalproex  milliGRAM(s) Oral two times a day  finasteride 5 milliGRAM(s) Oral daily  glucagon  Injectable 1 milliGRAM(s) IntraMuscular once  insulin glargine Injectable (LANTUS) 5 Unit(s) SubCutaneous at bedtime  insulin glargine Injectable (LANTUS) 5 Unit(s) SubCutaneous every morning  insulin lispro (ADMELOG) corrective regimen sliding scale   SubCutaneous three times a day before meals  insulin lispro (ADMELOG) corrective regimen sliding scale   SubCutaneous at bedtime  risperiDONE   Tablet 2 milliGRAM(s) Oral two times a day  sodium chloride 0.9%. 1000 milliLiter(s) (80 mL/Hr) IV Continuous <Continuous>  tamsulosin 0.4 milliGRAM(s) Oral at bedtime
MEDICATIONS  (STANDING):  apixaban 2.5 milliGRAM(s) Oral every 12 hours  aspirin  chewable 81 milliGRAM(s) Oral daily  atorvastatin 40 milliGRAM(s) Oral at bedtime  aztreonam  IVPB 1000 milliGRAM(s) IV Intermittent every 8 hours  carvedilol 12.5 milliGRAM(s) Oral every 12 hours  divalproex  milliGRAM(s) Oral two times a day  finasteride 5 milliGRAM(s) Oral daily  glucagon  Injectable 1 milliGRAM(s) IntraMuscular once  insulin lispro (ADMELOG) corrective regimen sliding scale   SubCutaneous three times a day before meals  insulin lispro (ADMELOG) corrective regimen sliding scale   SubCutaneous at bedtime  risperiDONE   Tablet 2 milliGRAM(s) Oral two times a day  sodium chloride 0.9%. 1000 milliLiter(s) (80 mL/Hr) IV Continuous <Continuous>  tamsulosin 0.4 milliGRAM(s) Oral at bedtime
MEDICATIONS  (STANDING):  apixaban 2.5 milliGRAM(s) Oral every 12 hours  aspirin  chewable 81 milliGRAM(s) Oral daily  atorvastatin 40 milliGRAM(s) Oral at bedtime  aztreonam  IVPB 1000 milliGRAM(s) IV Intermittent every 8 hours  carvedilol 12.5 milliGRAM(s) Oral every 12 hours  divalproex  milliGRAM(s) Oral two times a day  finasteride 5 milliGRAM(s) Oral daily  glucagon  Injectable 1 milliGRAM(s) IntraMuscular once  insulin lispro (ADMELOG) corrective regimen sliding scale   SubCutaneous three times a day before meals  insulin lispro (ADMELOG) corrective regimen sliding scale   SubCutaneous at bedtime  risperiDONE   Tablet 2 milliGRAM(s) Oral two times a day  sodium chloride 0.9%. 1000 milliLiter(s) (80 mL/Hr) IV Continuous <Continuous>  tamsulosin 0.4 milliGRAM(s) Oral at bedtime
MEDICATIONS  (STANDING):  apixaban 2.5 milliGRAM(s) Oral every 12 hours  aspirin  chewable 81 milliGRAM(s) Oral daily  atorvastatin 40 milliGRAM(s) Oral at bedtime  carvedilol 12.5 milliGRAM(s) Oral every 12 hours  ciprofloxacin     Tablet 500 milliGRAM(s) Oral every 12 hours  dextrose 10% Bolus 125 milliLiter(s) IV Bolus once  dextrose 5%. 1000 milliLiter(s) (100 mL/Hr) IV Continuous <Continuous>  dextrose 5%. 1000 milliLiter(s) (50 mL/Hr) IV Continuous <Continuous>  dextrose 50% Injectable 25 Gram(s) IV Push once  dextrose 50% Injectable 12.5 Gram(s) IV Push once  divalproex  milliGRAM(s) Oral two times a day  finasteride 5 milliGRAM(s) Oral daily  glucagon  Injectable 1 milliGRAM(s) IntraMuscular once  insulin glargine Injectable (LANTUS) 5 Unit(s) SubCutaneous at bedtime  insulin glargine Injectable (LANTUS) 5 Unit(s) SubCutaneous every morning  insulin lispro (ADMELOG) corrective regimen sliding scale   SubCutaneous three times a day before meals  insulin lispro (ADMELOG) corrective regimen sliding scale   SubCutaneous at bedtime  risperiDONE   Tablet 2 milliGRAM(s) Oral two times a day  sodium chloride 0.9%. 1000 milliLiter(s) (80 mL/Hr) IV Continuous <Continuous>  tamsulosin 0.4 milliGRAM(s) Oral at bedtime
This patient has been assessed with a concern for Malnutrition and has been determined to have a diagnosis/diagnoses of Morbid obesity (BMI > 40).    This patient is being managed with:   Diet Regular-  Consistent Carbohydrate {Evening Snack}  Entered: Jul 5 2024 12:28PM  
This patient has been assessed with a concern for Malnutrition and has been determined to have a diagnosis/diagnoses of Morbid obesity (BMI > 40).    This patient is being managed with:   Diet Regular-  Consistent Carbohydrate {Evening Snack}  Entered: Jul 5 2024 12:28PM

## 2024-07-09 NOTE — DISCHARGE NOTE PROVIDER - NSDCFUADDAPPT_GEN_ALL_CORE_FT
FOLLOWUP WITH UROLOGIST IN HOUSE UPON READMISSION TO [facility] Postop Diagnosis: same

## 2024-07-09 NOTE — DISCHARGE NOTE NURSING/CASE MANAGEMENT/SOCIAL WORK - NSDCPEELIQUISDIET_GEN_ALL_CORE
[FreeTextEntry1] : -Medrol Dosepak as directed, potential side effects discussed in length\par -Muscle relaxers help decrease muscle spasms and back pain, \par -spine center referral \par -Apply ice to affected area for 15 to 20 minutes every hour or as directed. Use an ice pack, or put crushed ice in a plastic bag. Cover it with a towel before you apply it to your skin. Ice helps prevent tissue damage and decreases pain.\par -Apply heat to affected area for 20 to 30 minutes every 2 hours for as many days as directed. Heat helps decrease pain and muscle spasms.\par -Stay active as much as you can without causing more pain. Bed rest could make your back pain worse. Avoid heavy lifting until your pain is gone.\par \par -Follow-up in 4-6 weeks if symptoms persist, sooner if symptoms worsen.\par \par \par Please note that 33 minutes time spent in care with this patient which includes pre-visit preparation such as reviewed pertinent labs, imaging, and specialists consultation, in-person visit, post-visit documentation and discussion.\par \par \par  Eat healthy foods you enjoy. Apixaban/Eliquis DOES NOT have a special diet. Limit your alcohol intake.

## 2024-07-09 NOTE — PROGRESS NOTE ADULT - PROBLEM/PLAN-2
DOCUMENTATION ONLY:   Prior Authorization for Synagis submitted to patient's insurance company.  
DISPLAY PLAN FREE TEXT
DISPLAY PLAN FREE TEXT

## 2024-07-09 NOTE — PROGRESS NOTE ADULT - REASON FOR ADMISSION
fever , s/p fall

## 2024-07-09 NOTE — PHYSICAL THERAPY INITIAL EVALUATION ADULT - ADDITIONAL COMMENTS
Patient reports being IND with limited ambulation with a RW. He is mostly WC bound secondary to increased bilateral knee pain. He is able to transfer IND sit to stand and bed to . He is IND with ADLs.

## 2024-07-09 NOTE — PROGRESS NOTE ADULT - PROBLEM SELECTOR PLAN 4
neurology and card eval - ECG with no evidence of ischemia or infarction  -   - Cardiac enzymes negative  - CPK mildly elevated at 3340  - CT chest with atelectasis  - Continue aspirin 81 mg daily  - Continue atorvastatin 40 mg daily  - Continue carvedilol 12.5 mg bid  - Continue apixaban 2.5 mg bid for prior DVT  - Hold losartan  - Continue IV fluids
neurology and card eval - ECG with no evidence of ischemia or infarction  -   - Cardiac enzymes negative  - CPK mildly elevated at 3340  - CT chest with atelectasis  - Continue aspirin 81 mg daily  - Continue atorvastatin 40 mg daily  - Continue carvedilol 12.5 mg bid  - Continue apixaban 2.5 mg bid for prior DVT  - Hold losartan  - Continue IV fluids

## 2024-07-09 NOTE — PROGRESS NOTE ADULT - SUBJECTIVE AND OBJECTIVE BOX
Neurology follow up note    MARGI [id number]      Interval History:    Patient feels ok no new complaints.    Allergies    penicillin (Hives)  Tegretol (Hypotension; Anaphylaxis)    Intolerances        MEDICATIONS    acetaminophen     Tablet .. 650 milliGRAM(s) Oral every 6 hours PRN  aluminum hydroxide/magnesium hydroxide/simethicone Suspension 30 milliLiter(s) Oral every 4 hours PRN  apixaban 2.5 milliGRAM(s) Oral every 12 hours  aspirin  chewable 81 milliGRAM(s) Oral daily  atorvastatin 40 milliGRAM(s) Oral at bedtime  carvedilol 12.5 milliGRAM(s) Oral every 12 hours  ciprofloxacin     Tablet 500 milliGRAM(s) Oral every 12 hours  dextrose 10% Bolus 125 milliLiter(s) IV Bolus once  dextrose 5%. 1000 milliLiter(s) IV Continuous <Continuous>  dextrose 5%. 1000 milliLiter(s) IV Continuous <Continuous>  dextrose 50% Injectable 25 Gram(s) IV Push once  dextrose 50% Injectable 12.5 Gram(s) IV Push once  dextrose Oral Gel 15 Gram(s) Oral once PRN  divalproex  milliGRAM(s) Oral two times a day  finasteride 5 milliGRAM(s) Oral daily  glucagon  Injectable 1 milliGRAM(s) IntraMuscular once  insulin glargine Injectable (LANTUS) 20 Unit(s) SubCutaneous at bedtime  insulin lispro (ADMELOG) corrective regimen sliding scale   SubCutaneous three times a day before meals  insulin lispro (ADMELOG) corrective regimen sliding scale   SubCutaneous at bedtime  insulin lispro Injectable (ADMELOG) 7 Unit(s) SubCutaneous three times a day before meals  melatonin 3 milliGRAM(s) Oral at bedtime PRN  ondansetron Injectable 4 milliGRAM(s) IV Push every 8 hours PRN  risperiDONE   Tablet 2 milliGRAM(s) Oral two times a day  sodium chloride 0.9%. 1000 milliLiter(s) IV Continuous <Continuous>  tamsulosin 0.4 milliGRAM(s) Oral at bedtime              Vital Signs Last 24 Hrs  T(C): 36.8 (09 Jul 2024 05:11), Max: 37.3 (08 Jul 2024 20:55)  T(F): 98.3 (09 Jul 2024 05:11), Max: 99.2 (08 Jul 2024 20:55)  HR: 80 (09 Jul 2024 05:11) (79 - 80)  BP: 130/81 (09 Jul 2024 05:11) (127/78 - 157/80)  BP(mean): --  RR: 18 (09 Jul 2024 05:11) (18 - 18)  SpO2: 93% (09 Jul 2024 05:11) (93% - 94%)    Parameters below as of 09 Jul 2024 05:11  Patient On (Oxygen Delivery Method): room air        REVIEW OF SYSTEMS:    Constitutional: No fever, chills, fatigue, weakness  Eyes: no eye pain, visual disturbances, or discharge  ENT:  No difficulty hearing, tinnitus, vertigo; No sinus or throat pain  Neck: No pain or stiffness  Respiratory: No cough, dyspnea, wheezing   Cardiovascular: No chest pain, palpitations,   Gastrointestinal: No abdominal or epigastric pain. No nausea, vomiting  No diarrhea or constipation.   Genitourinary: No dysuria, frequency, hematuria or incontinence  Neurological: No headaches, lightheadedness, vertigo, numbness or tremors  Psychiatric: No depression, anxiety, mood swings or difficulty sleeping  Musculoskeletal: No joint pain or swelling; No muscle, back or extremity pain  Skin: No itching, burning, rashes or lesions   Lymph Nodes: No enlarged glands  Endocrine: No heat or cold intolerance; No hair loss   Allergy and Immunologic: No hives or eczema    On Neurological Examination:    Mental Status - Patient is alert, awake, o      Follow simple commands      Speech -   Fluent                  Cranial Nerves - Pupils 3 mm equal and reactive to light,   extraocular eye movements intact.   smile symmetric  intact bilateral NLF    Motor Exam -     With stimuli positive movement of all 4 extremities    Muscle tone - is normal all over.  No asymmetry is seen.      Sensory    Bilateral intact to light touch    GENERAL Exam: Nontoxic , No Acute Distress   	  HEENT:  normocephalic, atraumatic  		  LUNGS:  Decreased bilaterally  	  HEART: Normal S1S2   No murmur RRR        	  GI/ ABDOMEN:  Soft  Non tender    EXTREMITIES:   No Edema  No Clubbing  No Cyanosis     SKIN: Normal  No Ecchymosis               LABS:  CBC Full  -  ( 08 Jul 2024 07:43 )  WBC Count : 7.73 K/uL  RBC Count : 4.31 M/uL  Hemoglobin : 12.5 g/dL  Hematocrit : 39.2 %  Platelet Count - Automated : 160 K/uL  Mean Cell Volume : 91.0 fl  Mean Cell Hemoglobin : 29.0 pg  Mean Cell Hemoglobin Concentration : 31.9 gm/dL  Auto Neutrophil # : x  Auto Lymphocyte # : x  Auto Monocyte # : x  Auto Eosinophil # : x  Auto Basophil # : x  Auto Neutrophil % : x  Auto Lymphocyte % : x  Auto Monocyte % : x  Auto Eosinophil % : x  Auto Basophil % : x    Urinalysis Basic - ( 08 Jul 2024 07:43 )    Color: x / Appearance: x / SG: x / pH: x  Gluc: 334 mg/dL / Ketone: x  / Bili: x / Urobili: x   Blood: x / Protein: x / Nitrite: x   Leuk Esterase: x / RBC: x / WBC x   Sq Epi: x / Non Sq Epi: x / Bacteria: x      07-08    145  |  110<H>  |  40<H>  ----------------------------<  334<H>  4.6   |  22  |  1.95<H>    Ca    10.3      08 Jul 2024 07:43  Phos  4.4     07-08    TPro  8.1  /  Alb  2.8<L>  /  TBili  0.4  /  DBili  0.1  /  AST  106<H>  /  ALT  235<H>  /  AlkPhos  80  07-08    Hemoglobin A1C:     LIVER FUNCTIONS - ( 08 Jul 2024 07:43 )  Alb: 2.8 g/dL / Pro: 8.1 g/dL / ALK PHOS: 80 U/L / ALT: 235 U/L / AST: 106 U/L / GGT: x           Vitamin B12   PT/INR - ( 08 Jul 2024 07:43 )   PT: 13.8 sec;   INR: 1.24 ratio               RADIOLOGY      ASSESSMENT AND PLAN   Altered in mental status,   For Altered in mental status and lethargy, suspect most likely this is metabolic encephalopathy, which is multifactorial secondary to underlying infection with weakness   For history of sepsis, antibiotics as needed.  diabetes  monitor blood sugars as needed   hypertension monitor sbp as needed   fall precautions   47 minutes of time was spent with the patient, plan of care, reviewing data, with greater than  50% of the visit was spent counseling and/or coordinating care with multidisciplinary healthcare team

## 2024-07-09 NOTE — DISCHARGE NOTE NURSING/CASE MANAGEMENT/SOCIAL WORK - NSDPFAC_GEN_ALL_CORE
[facility] @ [address] [place] [id number], phone [phone]

## 2024-07-09 NOTE — PROGRESS NOTE ADULT - SUBJECTIVE AND OBJECTIVE BOX
CHIEF COMPLAINT/ REASON FOR VISIT  .. Patient was seen to address the  issue listed under PROBLEM LIST which is located toward bottom of this note     MARGI LIAOERIC    SY 1EST 115 W1    Allergies    penicillin (Hives)  Tegretol (Hypotension; Anaphylaxis)    Intolerances        PAST MEDICAL & SURGICAL HISTORY:  Bipolar 1 disorder  on Lithium      Hypertension      T2DM (type 2 diabetes mellitus)      HLD (hyperlipidemia)      CHAYITO (obstructive sleep apnea)  diagnosed >25 years ago, non-compliant with CPAP      Diabetic neuropathy  bilateral feet, ankles      Obesity      Rheumatoid arthritis      History of excision of testicular mass  left, 2009      Cataract of left eye          FAMILY HISTORY:  FH: CAD (coronary artery disease) (Mother)        Home Medications:  ascorbic acid 500 mg oral tablet: 1 tab(s) orally 2 times a day (04 Jul 2024 19:20)  aspirin 81 mg oral tablet, chewable: 1 tab(s) orally once a day (04 Jul 2024 17:47)  atorvastatin 40 mg oral tablet: 1 tab(s) orally once a day (04 Jul 2024 17:47)  carvedilol 12.5 mg oral tablet: 1 tab(s) orally 2 times a day (04 Jul 2024 17:47)  Depakote 500 mg oral delayed release tablet: 1 tab(s) orally 2 times a day (04 Jul 2024 19:20)  finasteride 5 mg oral tablet: 1 tab(s) orally once a day (04 Jul 2024 19:20)  HumaLOG 100 units/mL injectable solution: injectable 3 times a day (before meals) sliding scale with fingerstick (04 Jul 2024 19:20)  Lipitor 40 mg oral tablet: 1 tab(s) orally once a day (at bedtime) (04 Jul 2024 19:20)  losartan 25 mg oral tablet: 1 tab(s) orally once a day (04 Jul 2024 19:20)  melatonin:  (04 Jul 2024 19:20)  melatonin 5 mg oral tablet: 1 tab(s) orally once a day (at bedtime) (04 Jul 2024 19:20)  metFORMIN 500 mg oral tablet: 1 tab(s) orally 2 times a day (04 Jul 2024 19:20)  Polyethylene Glycol 3350: once a day (04 Jul 2024 19:20)  RisperDAL 0.5 mg oral tablet: 1 tab(s) orally 2 times a day give with the 2mg dose twice a day for a total of 2.5mg twice a day (04 Jul 2024 19:20)  RisperDAL 2 mg oral tablet: 1 tab(s) orally 2 times a day give 2mg by oral route two times a day WITH 0.5mg for total of 2.5mg (04 Jul 2024 19:20)  tamsulosin 0.4 mg oral capsule: 1 capsule orally once a day (04 Jul 2024 17:47)  Vascepa 1 g oral capsule: 2 cap(s) orally 2 times a day (04 Jul 2024 17:47)  Vitamin C 500 mg oral capsule: 1 cap(s) orally 2 times a day (04 Jul 2024 17:47)  zinc oxide: apply to sacrum after cleanse with soap and water (04 Jul 2024 19:20)      MEDICATIONS  (STANDING):  apixaban 2.5 milliGRAM(s) Oral every 12 hours  aspirin  chewable 81 milliGRAM(s) Oral daily  atorvastatin 40 milliGRAM(s) Oral at bedtime  carvedilol 12.5 milliGRAM(s) Oral every 12 hours  ciprofloxacin     Tablet 500 milliGRAM(s) Oral every 12 hours  dextrose 10% Bolus 125 milliLiter(s) IV Bolus once  dextrose 5%. 1000 milliLiter(s) (100 mL/Hr) IV Continuous <Continuous>  dextrose 5%. 1000 milliLiter(s) (50 mL/Hr) IV Continuous <Continuous>  dextrose 50% Injectable 25 Gram(s) IV Push once  dextrose 50% Injectable 12.5 Gram(s) IV Push once  divalproex  milliGRAM(s) Oral two times a day  finasteride 5 milliGRAM(s) Oral daily  glucagon  Injectable 1 milliGRAM(s) IntraMuscular once  insulin glargine Injectable (LANTUS) 15 Unit(s) SubCutaneous at bedtime  insulin lispro (ADMELOG) corrective regimen sliding scale   SubCutaneous three times a day before meals  insulin lispro (ADMELOG) corrective regimen sliding scale   SubCutaneous at bedtime  insulin lispro Injectable (ADMELOG) 5 Unit(s) SubCutaneous three times a day before meals  risperiDONE   Tablet 2 milliGRAM(s) Oral two times a day  sodium chloride 0.9%. 1000 milliLiter(s) (80 mL/Hr) IV Continuous <Continuous>  tamsulosin 0.4 milliGRAM(s) Oral at bedtime    MEDICATIONS  (PRN):  acetaminophen     Tablet .. 650 milliGRAM(s) Oral every 6 hours PRN Temp greater or equal to 38C (100.4F), Mild Pain (1 - 3)  aluminum hydroxide/magnesium hydroxide/simethicone Suspension 30 milliLiter(s) Oral every 4 hours PRN Dyspepsia  dextrose Oral Gel 15 Gram(s) Oral once PRN Blood Glucose LESS THAN 70 milliGRAM(s)/deciliter  melatonin 3 milliGRAM(s) Oral at bedtime PRN Insomnia  ondansetron Injectable 4 milliGRAM(s) IV Push every 8 hours PRN Nausea and/or Vomiting      Diet, Regular:   Consistent Carbohydrate Evening Snack (07-05-24 @ 12:28) [Active]          Vital Signs Last 24 Hrs  T(C): 36.8 (09 Jul 2024 05:11), Max: 37.3 (08 Jul 2024 20:55)  T(F): 98.3 (09 Jul 2024 05:11), Max: 99.2 (08 Jul 2024 20:55)  HR: 80 (09 Jul 2024 05:11) (79 - 80)  BP: 130/81 (09 Jul 2024 05:11) (127/78 - 157/80)  BP(mean): --  RR: 18 (09 Jul 2024 05:11) (18 - 18)  SpO2: 93% (09 Jul 2024 05:11) (93% - 94%)    Parameters below as of 09 Jul 2024 05:11  Patient On (Oxygen Delivery Method): room air          07-08-24 @ 07:01  -  07-09-24 @ 07:00  --------------------------------------------------------  IN: 0 mL / OUT: 2750 mL / NET: -2750 mL              LABS:                        12.5   7.73  )-----------( 160      ( 08 Jul 2024 07:43 )             39.2     07-08    145  |  110<H>  |  40<H>  ----------------------------<  334<H>  4.6   |  22  |  1.95<H>    Ca    10.3      08 Jul 2024 07:43  Phos  4.4     07-08    TPro  8.1  /  Alb  2.8<L>  /  TBili  0.4  /  DBili  0.1  /  AST  106<H>  /  ALT  235<H>  /  AlkPhos  80  07-08    PT/INR - ( 08 Jul 2024 07:43 )   PT: 13.8 sec;   INR: 1.24 ratio           Urinalysis Basic - ( 08 Jul 2024 07:43 )    Color: x / Appearance: x / SG: x / pH: x  Gluc: 334 mg/dL / Ketone: x  / Bili: x / Urobili: x   Blood: x / Protein: x / Nitrite: x   Leuk Esterase: x / RBC: x / WBC x   Sq Epi: x / Non Sq Epi: x / Bacteria: x            WBC:  WBC Count: 7.73 K/uL (07-08 @ 07:43)  WBC Count: 6.66 K/uL (07-07 @ 06:00)  WBC Count: 5.22 K/uL (07-06 @ 06:00)      MICROBIOLOGY:  RECENT CULTURES:  07-06 .Blood Blood-Peripheral XXXX XXXX   No growth at 48 Hours    07-04 Clean Catch Clean Catch (Midstream) Escherichia coli XXXX   >100,000 CFU/ml Escherichia coli    07-04 .Blood Blood-Peripheral Blood Culture PCR  Escherichia coli   Growth in aerobic bottle: Gram Negative Rods   Growth in aerobic bottle: Escherichia coli  Direct identification is available within approximately 3-5  hours either by Blood Panel Multiplexed PCR or Direct  MALDI-TOF. Details: [email or web address]    07-03 Clean Catch Clean Catch (Midstream) Escherichia coli ESBL XXXX   50,000 - 99,000 CFU/mL Escherichia coli ESBL          CARDIAC MARKERS ( 08 Jul 2024 07:43 )  x     / x     / 1833 U/L / x     / x            PT/INR - ( 08 Jul 2024 07:43 )   PT: 13.8 sec;   INR: 1.24 ratio             Sodium:  Sodium: 145 mmol/L (07-08 @ 07:43)  Sodium: 141 mmol/L (07-07 @ 06:00)  Sodium: 139 mmol/L (07-06 @ 06:00)      1.95 mg/dL 07-08 @ 07:43  2.09 mg/dL 07-07 @ 06:00  2.24 mg/dL 07-06 @ 06:00      Hemoglobin:  Hemoglobin: 12.5 g/dL (07-08 @ 07:43)  Hemoglobin: 12.1 g/dL (07-07 @ 06:00)  Hemoglobin: 11.1 g/dL (07-06 @ 06:00)      Platelets: Platelet Count - Automated: 160 K/uL (07-08 @ 07:43)  Platelet Count - Automated: 127 K/uL (07-07 @ 06:00)  Platelet Count - Automated: 103 K/uL (07-06 @ 06:00)      LIVER FUNCTIONS - ( 08 Jul 2024 07:43 )  Alb: 2.8 g/dL / Pro: 8.1 g/dL / ALK PHOS: 80 U/L / ALT: 235 U/L / AST: 106 U/L / GGT: x             Urinalysis Basic - ( 08 Jul 2024 07:43 )    Color: x / Appearance: x / SG: x / pH: x  Gluc: 334 mg/dL / Ketone: x  / Bili: x / Urobili: x   Blood: x / Protein: x / Nitrite: x   Leuk Esterase: x / RBC: x / WBC x   Sq Epi: x / Non Sq Epi: x / Bacteria: x        RADIOLOGY & ADDITIONAL STUDIES:      MICROBIOLOGY:  RECENT CULTURES:  07-06 .Blood Blood-Peripheral XXXX XXXX   No growth at 48 Hours    07-04 Clean Catch Clean Catch (Midstream) Escherichia coli XXXX   >100,000 CFU/ml Escherichia coli    07-04 .Blood Blood-Peripheral Blood Culture PCR  Escherichia coli   Growth in aerobic bottle: Gram Negative Rods   Growth in aerobic bottle: Escherichia coli  Direct identification is available within approximately 3-5  hours either by Blood Panel Multiplexed PCR or Direct  MALDI-TOF. Details: [email or web address]    07-03 Clean Catch Clean Catch (Midstream) Escherichia coli ESBL XXXX   50,000 - 99,000 CFU/mL Escherichia coli ESBL

## 2024-07-09 NOTE — PROGRESS NOTE ADULT - SUBJECTIVE AND OBJECTIVE BOX
Chief Complaint: Weakness, possible syncope    Interval Events: No events overnight.    Review of Systems:  General: No fevers, chills, weight gain  Skin: No rashes, color changes  Cardiovascular: No chest pain, orthopnea  Respiratory: No shortness of breath, cough  Gastrointestinal: No nausea, abdominal pain  Genitourinary: No incontinence, pain with urination  Musculoskeletal: No pain, swelling, decreased range of motion  Neurological: No headache, weakness  Psychiatric: No depression, anxiety  Endocrine: No weight gain, increased thirst  All other systems are comprehensively negative.    Physical Exam:  Vital Signs Last 24 Hrs  T(C): 36.8 (09 Jul 2024 05:11), Max: 37.3 (08 Jul 2024 20:55)  T(F): 98.3 (09 Jul 2024 05:11), Max: 99.2 (08 Jul 2024 20:55)  HR: 80 (09 Jul 2024 05:11) (79 - 80)  BP: 130/81 (09 Jul 2024 05:11) (127/78 - 157/80)  BP(mean): --  RR: 18 (09 Jul 2024 05:11) (18 - 18)  SpO2: 93% (09 Jul 2024 05:11) (93% - 94%)  Parameters below as of 09 Jul 2024 05:11  Patient On (Oxygen Delivery Method): room air  General: NAD  HEENT: MMM  Neck: No JVD, no carotid bruit  Lungs: CTAB  CV: RRR, nl S1/S2, no M/R/G  Abdomen: S/NT/ND, +BS  Extremities: No LE edema, no cyanosis  Neuro: AAOx3, non-focal  Skin: No rash    Labs:             07-08    145  |  110<H>  |  40<H>  ----------------------------<  334<H>  4.6   |  22  |  1.95<H>    Ca    10.3      08 Jul 2024 07:43  Phos  4.4     07-08    TPro  8.1  /  Alb  2.8<L>  /  TBili  0.4  /  DBili  0.1  /  AST  106<H>  /  ALT  235<H>  /  AlkPhos  80  07-08                        12.5   7.73  )-----------( 160      ( 08 Jul 2024 07:43 )             39.2       ECG/Telemetry: Sinus rhythm

## 2024-07-09 NOTE — PHYSICAL THERAPY INITIAL EVALUATION ADULT - PERTINENT HX OF CURRENT PROBLEM, REHAB EVAL
64-year-old male presents with generalized weakness today.  Patient was seen in the ED yesterday for a psychiatric evaluation.  The patient was seen and cleared.  The patient's urinalysis was positive, but the patient was not on antibiotics.  The patient is having some urinary symptoms, but is mostly complaining of cough/URI.  No acute chest pain.  No shortness of breath.  Patient was feeling generalized fatigue and weakness today.  The patient actually felt weak and fell slightly hitting his head on the wall.  No loss of consciousness.  Patient denies any extremity or truncal injury.  No acute chest pain. .  No aggravating or alleviating factors otherwise noted.  No other acute complaints.  Patient was found to have a fever this evening, and was sent to the ED for evaluation. Admitted for neurology eval , ID cons requested , septic workup sent.

## 2024-07-09 NOTE — SOCIAL WORK PROGRESS NOTE - NSSWPROGRESSNOTE_GEN_ALL_CORE
Per discussion w/ inpatient interdisciplinary treatment team this AM, patient is not yet medically cleared for transition to next level of care but is anticipated to be cleared as early as tomorrow, 07/10/2024. Discharge plan remains for return to same long-term care skilled nursing facility as on admission ([facility] in [place].)  to continue to follow patient's care & progress throughout hospitalization and will arrange after-care accordingly.

## 2024-07-09 NOTE — DISCHARGE NOTE PROVIDER - CARE PROVIDER_API CALL
Pahlavan, Mohsen  Nephrology  [address], [address]  [place] [address]  Phone: [phone]  Fax: [phone]  Follow Up Time: 1 week    Anthony Trimble  Infectious Disease  [address] [place] [id number]  Phone: [phone]  Fax: [phone]  Follow Up Time: 2 weeks

## 2024-07-09 NOTE — PROGRESS NOTE ADULT - SUBJECTIVE AND OBJECTIVE BOX
Patient is a 64y Male whom presented to the hospital with raymond     PAST MEDICAL & SURGICAL HISTORY:  Bipolar 1 disorder  on Lithium      Hypertension      T2DM (type 2 diabetes mellitus)      HLD (hyperlipidemia)      CHAYITO (obstructive sleep apnea)  diagnosed >25 years ago, non-compliant with CPAP      Diabetic neuropathy  bilateral feet, ankles      Obesity      Rheumatoid arthritis      History of excision of testicular mass  left, 2009      Cataract of left eye          MEDICATIONS  (STANDING):  apixaban 2.5 milliGRAM(s) Oral every 12 hours  aspirin  chewable 81 milliGRAM(s) Oral daily  atorvastatin 40 milliGRAM(s) Oral at bedtime  aztreonam  IVPB 1000 milliGRAM(s) IV Intermittent every 8 hours  carvedilol 12.5 milliGRAM(s) Oral every 12 hours  dextrose 10% Bolus 125 milliLiter(s) IV Bolus once  dextrose 5%. 1000 milliLiter(s) (100 mL/Hr) IV Continuous <Continuous>  dextrose 5%. 1000 milliLiter(s) (50 mL/Hr) IV Continuous <Continuous>  dextrose 50% Injectable 25 Gram(s) IV Push once  dextrose 50% Injectable 12.5 Gram(s) IV Push once  divalproex  milliGRAM(s) Oral two times a day  finasteride 5 milliGRAM(s) Oral daily  glucagon  Injectable 1 milliGRAM(s) IntraMuscular once  insulin lispro (ADMELOG) corrective regimen sliding scale   SubCutaneous three times a day before meals  risperiDONE   Tablet 2 milliGRAM(s) Oral two times a day  sodium chloride 0.9%. 1000 milliLiter(s) (80 mL/Hr) IV Continuous <Continuous>  tamsulosin 0.4 milliGRAM(s) Oral at bedtime      Allergies    penicillin (Hives)  Tegretol (Hypotension; Anaphylaxis)    Intolerances        SOCIAL HISTORY:  Denies ETOh,Smoking,     FAMILY HISTORY:  FH: CAD (coronary artery disease) (Mother)        REVIEW OF SYSTEMS:    CONSTITUTIONAL: No weakness, fevers or chills  RESPIRATORY: No cough, wheezing, hemoptysis; No shortness of breath  CARDIOVASCULAR: No chest pain or palpitations  GASTROINTESTINAL: No abdominal or epigastric pain. No nausea, vomiting,     No diarrhea or constipation. No melena   SKIN: dry                                                                                           12.5   7.73  )-----------( 160      ( 08 Jul 2024 07:43 )             39.2       CBC Full  -  ( 08 Jul 2024 07:43 )  WBC Count : 7.73 K/uL  RBC Count : 4.31 M/uL  Hemoglobin : 12.5 g/dL  Hematocrit : 39.2 %  Platelet Count - Automated : 160 K/uL  Mean Cell Volume : 91.0 fl  Mean Cell Hemoglobin : 29.0 pg  Mean Cell Hemoglobin Concentration : 31.9 gm/dL  Auto Neutrophil # : x  Auto Lymphocyte # : x  Auto Monocyte # : x  Auto Eosinophil # : x  Auto Basophil # : x  Auto Neutrophil % : x  Auto Lymphocyte % : x  Auto Monocyte % : x  Auto Eosinophil % : x  Auto Basophil % : x      07-08    145  |  110<H>  |  40<H>  ----------------------------<  334<H>  4.6   |  22  |  1.95<H>    Ca    10.3      08 Jul 2024 07:43  Phos  4.4     07-08    TPro  8.1  /  Alb  2.8<L>  /  TBili  0.4  /  DBili  0.1  /  AST  106<H>  /  ALT  235<H>  /  AlkPhos  80  07-08      CAPILLARY BLOOD GLUCOSE      POCT Blood Glucose.: 295 mg/dL (09 Jul 2024 07:49)  POCT Blood Glucose.: 287 mg/dL (08 Jul 2024 21:05)  POCT Blood Glucose.: 331 mg/dL (08 Jul 2024 16:28)  POCT Blood Glucose.: 380 mg/dL (08 Jul 2024 12:04)      Vital Signs Last 24 Hrs  T(C): 36.8 (09 Jul 2024 05:11), Max: 37.3 (08 Jul 2024 20:55)  T(F): 98.3 (09 Jul 2024 05:11), Max: 99.2 (08 Jul 2024 20:55)  HR: 80 (09 Jul 2024 05:11) (79 - 80)  BP: 130/81 (09 Jul 2024 05:11) (127/78 - 157/80)  BP(mean): --  RR: 18 (09 Jul 2024 05:11) (18 - 18)  SpO2: 93% (09 Jul 2024 05:11) (93% - 94%)    Parameters below as of 09 Jul 2024 05:11  Patient On (Oxygen Delivery Method): room air        Urinalysis Basic - ( 08 Jul 2024 07:43 )    Color: x / Appearance: x / SG: x / pH: x  Gluc: 334 mg/dL / Ketone: x  / Bili: x / Urobili: x   Blood: x / Protein: x / Nitrite: x   Leuk Esterase: x / RBC: x / WBC x   Sq Epi: x / Non Sq Epi: x / Bacteria: x        PT/INR - ( 08 Jul 2024 07:43 )   PT: 13.8 sec;   INR: 1.24 ratio         PHYSICAL EXAM:    Constitutional: NAD  HEENT: conjunctive   clear   Neck:  No JVD  Respiratory: CTAB  Cardiovascular: S1 and S2  Gastrointestinal: BS+, soft, NT/ND  Extremities: No peripheral edema  Skin: dry   Access: Not applicable     No

## 2024-07-10 LAB
CULTURE RESULTS: SIGNIFICANT CHANGE UP
SPECIMEN SOURCE: SIGNIFICANT CHANGE UP

## 2024-07-12 ENCOUNTER — APPOINTMENT (OUTPATIENT)
Dept: ORTHOPEDIC SURGERY | Facility: CLINIC | Age: 64
End: 2024-07-12
Payer: MEDICARE

## 2024-07-12 DIAGNOSIS — M17.0 BILATERAL PRIMARY OSTEOARTHRITIS OF KNEE: ICD-10-CM

## 2024-07-12 PROCEDURE — 99215 OFFICE O/P EST HI 40 MIN: CPT

## 2024-07-19 DIAGNOSIS — Z01.20 ENCOUNTER FOR DENTAL EXAMINATION AND CLEANING WITHOUT ABNORMAL FINDINGS: ICD-10-CM

## 2024-09-20 NOTE — ED ADULT NURSE NOTE - NSFALLRSKPASTHIST_ED_ALL_ED
Billie from [employer] contacted the office in regards to pt's return to work date. Writer stated a message would be sent to Dr. Sutton and his team to review and assist.    Please contact Billie back at [phone] for any further questions.   no

## 2024-09-29 PROCEDURE — 82962 GLUCOSE BLOOD TEST: CPT

## 2024-09-29 PROCEDURE — 96375 TX/PRO/DX INJ NEW DRUG ADDON: CPT

## 2024-09-29 PROCEDURE — 36415 COLL VENOUS BLD VENIPUNCTURE: CPT

## 2024-09-29 PROCEDURE — 97161 PT EVAL LOW COMPLEX 20 MIN: CPT

## 2024-09-29 PROCEDURE — 70450 CT HEAD/BRAIN W/O DYE: CPT | Mod: MC

## 2024-09-29 PROCEDURE — 83036 HEMOGLOBIN GLYCOSYLATED A1C: CPT

## 2024-09-29 PROCEDURE — 87641 MR-STAPH DNA AMP PROBE: CPT

## 2024-09-29 PROCEDURE — 83880 ASSAY OF NATRIURETIC PEPTIDE: CPT

## 2024-09-29 PROCEDURE — 82248 BILIRUBIN DIRECT: CPT

## 2024-09-29 PROCEDURE — 96365 THER/PROPH/DIAG IV INF INIT: CPT

## 2024-09-29 PROCEDURE — 87640 STAPH A DNA AMP PROBE: CPT

## 2024-09-29 PROCEDURE — 80074 ACUTE HEPATITIS PANEL: CPT

## 2024-09-29 PROCEDURE — 80164 ASSAY DIPROPYLACETIC ACD TOT: CPT

## 2024-09-29 PROCEDURE — 76705 ECHO EXAM OF ABDOMEN: CPT

## 2024-09-29 PROCEDURE — 87150 DNA/RNA AMPLIFIED PROBE: CPT

## 2024-09-29 PROCEDURE — 80307 DRUG TEST PRSMV CHEM ANLYZR: CPT

## 2024-09-29 PROCEDURE — 82550 ASSAY OF CK (CPK): CPT

## 2024-09-29 PROCEDURE — 85610 PROTHROMBIN TIME: CPT

## 2024-09-29 PROCEDURE — 74176 CT ABD & PELVIS W/O CONTRAST: CPT | Mod: MC

## 2024-09-29 PROCEDURE — 76775 US EXAM ABDO BACK WALL LIM: CPT

## 2024-09-29 PROCEDURE — 99285 EMERGENCY DEPT VISIT HI MDM: CPT

## 2024-09-29 PROCEDURE — 71045 X-RAY EXAM CHEST 1 VIEW: CPT

## 2024-09-29 PROCEDURE — 85730 THROMBOPLASTIN TIME PARTIAL: CPT

## 2024-09-29 PROCEDURE — 93005 ELECTROCARDIOGRAM TRACING: CPT

## 2024-09-29 PROCEDURE — 70486 CT MAXILLOFACIAL W/O DYE: CPT | Mod: MC

## 2024-09-29 PROCEDURE — 87637 SARSCOV2&INF A&B&RSV AMP PRB: CPT

## 2024-09-29 PROCEDURE — 84145 PROCALCITONIN (PCT): CPT

## 2024-09-29 PROCEDURE — 83690 ASSAY OF LIPASE: CPT

## 2024-09-29 PROCEDURE — 81001 URINALYSIS AUTO W/SCOPE: CPT

## 2024-09-29 PROCEDURE — 87040 BLOOD CULTURE FOR BACTERIA: CPT

## 2024-09-29 PROCEDURE — 83935 ASSAY OF URINE OSMOLALITY: CPT

## 2024-09-29 PROCEDURE — 83930 ASSAY OF BLOOD OSMOLALITY: CPT

## 2024-09-29 PROCEDURE — 84100 ASSAY OF PHOSPHORUS: CPT

## 2024-09-29 PROCEDURE — 72125 CT NECK SPINE W/O DYE: CPT | Mod: MC

## 2024-09-29 PROCEDURE — 85027 COMPLETE CBC AUTOMATED: CPT

## 2024-09-29 PROCEDURE — 97165 OT EVAL LOW COMPLEX 30 MIN: CPT

## 2024-09-29 PROCEDURE — 87186 SC STD MICRODIL/AGAR DIL: CPT

## 2024-09-29 PROCEDURE — 71250 CT THORAX DX C-: CPT | Mod: MC

## 2024-09-29 PROCEDURE — 80048 BASIC METABOLIC PNL TOTAL CA: CPT

## 2024-09-29 PROCEDURE — 87086 URINE CULTURE/COLONY COUNT: CPT

## 2024-09-29 PROCEDURE — 93306 TTE W/DOPPLER COMPLETE: CPT

## 2024-09-29 PROCEDURE — 84300 ASSAY OF URINE SODIUM: CPT

## 2024-09-29 PROCEDURE — 80053 COMPREHEN METABOLIC PANEL: CPT

## 2024-09-29 PROCEDURE — 84484 ASSAY OF TROPONIN QUANT: CPT

## 2024-09-29 PROCEDURE — 83605 ASSAY OF LACTIC ACID: CPT

## 2024-09-29 PROCEDURE — 85025 COMPLETE CBC W/AUTO DIFF WBC: CPT

## 2024-10-29 NOTE — ED ADULT NURSE NOTE - ALCOHOL PRE SCREEN (AUDIT - C)
[FreeTextEntry1] : Mini presents today reporting blood in urine noted last Wednesday 10/23/24. She notes she saw red blood in the toilet bowl and then went to urgent care. She was treated with macrobid x5 days and notes completing dose yesterday. She notes bleeding became lighter that day and was noticeable for first 3-4 voids and then resolved. Denies any interval bleeding with urination. Denies any pain with urination, urgency, frequency, fever, chills, n/v. Does note that she has intermittent left sided pelvic pain that occasionally radiates to her back this last week. Denies any vaginal bleeding, pain or pressure. Does note she has POP and trialed pessary and unsuccessful treatment option for her and would like to further discuss possible surgical correction with Dr. Williamson. 
Statement Selected

## 2024-11-25 ENCOUNTER — APPOINTMENT (OUTPATIENT)
Age: 64
End: 2024-11-25

## 2024-12-11 NOTE — BEHAVIORAL HEALTH ASSESSMENT NOTE - SUMMARY
for adenopathy. Does not bruise/bleed easily.   Psychiatric/Behavioral:  Negative for behavioral problems and sleep disturbance. The patient is not nervous/anxious.         /78 (Site: Right Upper Arm, Position: Sitting, Cuff Size: Medium Adult)   Pulse 78   Resp 18   Ht 1.626 m (5' 4\")   Wt 81.7 kg (180 lb 2 oz)   BMI 30.92 kg/m²    Objective   Physical Exam  Vitals and nursing note reviewed.   Constitutional:       Appearance: She is well-developed.   HENT:      Head: Normocephalic and atraumatic.      Right Ear: External ear normal.      Left Ear: External ear normal.      Nose: Nose normal.   Eyes:      General: No scleral icterus.     Conjunctiva/sclera: Conjunctivae normal.      Pupils: Pupils are equal, round, and reactive to light.   Neck:      Thyroid: No thyromegaly.      Vascular: No JVD.   Cardiovascular:      Rate and Rhythm: Normal rate and regular rhythm.      Heart sounds: Normal heart sounds.   Pulmonary:      Effort: Pulmonary effort is normal.      Breath sounds: Normal breath sounds. No wheezing or rales.   Abdominal:      General: Bowel sounds are normal. There is no distension.      Palpations: Abdomen is soft. There is no mass.      Tenderness: There is no abdominal tenderness.   Musculoskeletal:         General: No tenderness. Normal range of motion.      Cervical back: Normal range of motion and neck supple.   Lymphadenopathy:      Cervical: No cervical adenopathy.   Skin:     General: Skin is warm and dry.      Findings: No rash.   Neurological:      Mental Status: She is alert and oriented to person, place, and time.      Cranial Nerves: No cranial nerve deficit.      Deep Tendon Reflexes: Reflexes are normal and symmetric.            Assessment     ICD-10-CM    1. Acute non-recurrent ethmoidal sinusitis  J01.20       2. Chronic seasonal allergic rhinitis due to pollen  J30.1 fexofenadine-pseudoephedrine (ALLEGRA-D ALLERGY [employer] CONGESTION) 180-240 MG per extended release tablet     Pt is a 59 y/o male, past psychiatric hx of bipolar, four psychiatric hospitalizations (most recently in 1990 at [facility]), no suicide attempts, no SIB, single, unemployed, no substance use, no legal issues, PMHx DM2 w/peripheral neuropathy, HTN, CHAYITO, bipolar 1 on lithium p/w left arm pain. Pt is domiciled in apartment through Independent living ([facility]). pt was sent in from nephrology appointment due to shoulder pain. Consult placed for med management in context of Lithium toxicity      pt was calm and cooperative. thought process was linear, denies psychiatric symptoms and is currently stable. pt is found to have elevated lithium at 1.8    discussed elevated lithium level and concerns of its effect on kidney function. discussed alternatives to lithium such as depakote. pt agreed with plan. He denied changes in meds recently or any addition medical medications. He denies polyuria/polydypsia. Pt is a 61 y/o single man domiciled, with help from a supportive apartment program, past psychiatric hx of bipolar dx with four past psychiatric hospitalizations (most recently in 1990 at [facility]), no suicide attempts, no SIB, single, unemployed, no substance use, no legal issues, PMHx DM2 w/peripheral neuropathy, HTN, CHAYITO, bipolar 1 on lithium p/w left arm pain. Pt is domiciled in apartment through Independent living ([facility]). pt was sent in from nephrology appointment due to shoulder pain. Consult placed for med management in context of Lithium toxicity      pt was calm and cooperative. thought process was linear, denies psychiatric symptoms and is currently stable. pt is found to have elevated lithium at 1.8 and worsening renal function.     Discussed elevated lithium level and concerns of its effect on kidney function with patient and also talked about alternatives to lithium such as Depakote. pt agreed with plan. He denied changes in meds recently or any addition medical medications.  He is motivated to continue with his outpt psychiatric treatment after discharge.

## 2024-12-13 NOTE — ED PROVIDER NOTE - CPE EDP SKIN NORM
Problem: Infection - Adult  Goal: Absence of infection at discharge  Outcome: Not Progressing  Flowsheets (Taken 12/13/2024 3314)  Absence of infection at discharge: Assess and monitor for signs and symptoms of infection      normal...

## 2024-12-27 NOTE — ED ADULT TRIAGE NOTE - STATUS:
Department of Gynecology  Operative Report        Pre-operative Diagnosis: Dysfunctional uterine bleeding and menorrhagia    Post-operative Diagnosis: Same.  Endometrial hyperplasia on hysteroscopy    Procedure: Dilation curettage.  Hysteroscopy.  NovaSure ablation.    Surgeon:  Slick Montemayor MD FACOG     Assistant(Applied

## 2025-01-20 NOTE — ED ADULT NURSE NOTE - NS ED NURSE LEVEL OF CONSCIOUSNESS AFFECT
Caller: CatherineclaudiaDarleen    Relationship: Self    Best call back number:     [phone]        Requested Prescriptions:   Requested Prescriptions     Pending Prescriptions Disp Refills    empagliflozin (Jardiance) 25 MG tablet tablet 30 tablet 0     Sig: Take 1 tablet by mouth Daily.        Pharmacy where request should be sent: [id number] - [facility] [address] - [phone]  - [phone] FX     Last office visit with prescribing clinician: 1/17/2025   Last telemedicine visit with prescribing clinician: Visit date not found   Next office visit with prescribing clinician: 7/17/2025     Additional details provided by patient: OUT OF MEDICATION    Does the patient have less than a 3 day supply:  [x] Yes  [] No    Would you like a call back once the refill request has been completed: [] Yes [] No    If the office needs to give you a call back, can they leave a voicemail: [] Yes [] No    Anjel Morris Rep   01/20/25 14:20 EST          Calm

## 2025-02-24 NOTE — ED ADULT NURSE NOTE - SUICIDE SCREENING QUESTION 3
Lot/Batch Number (Optional): [id number] Expiration Date (Optional): 06/30/26 Detail Level: Zone Samples Given: Otezla starter pack (x3) No

## 2025-03-10 NOTE — ED ADULT NURSE NOTE - TEMPLATE LIST FOR HEAD TO TOE ASSESSMENT
Received New Start  request via [employer]  for Lacosamide 50 MG/5ML Solution. (Quantity:670, Day Supply:37)     Insurance: [employer]  Member ID:  [id number]  BIN: [id number]  PCN: [id number]  Group: [facility]     Ran Test claim via [employer] & medication Pays for a $523.35 copay. Will outreach to patient to offer specialty pharmacy services and or release to preferred pharmacy    Copay is more that the cash price at the [facility] pharmacy. Will release to pt's preferred pharmacy on file.  
Cold/Sinus

## 2025-04-04 NOTE — ED ADULT NURSE NOTE - NSIMPLEMENTINTERV_GEN_ALL_ED
No Implemented All Universal Safety Interventions:  [place] to call system. Call bell, personal items and telephone within reach. Instruct patient to call for assistance. Room bathroom lighting operational. Non-slip footwear when patient is off stretcher. Physically safe environment: no spills, clutter or unnecessary equipment. Stretcher in lowest position, wheels locked, appropriate side rails in place.

## 2025-04-11 NOTE — ED ADULT TRIAGE NOTE - PRO INTERPRETER NEED 2
Post-Care Instructions: I reviewed with the patient in detail post-care instructions. Patient is to wear sunprotection, and avoid picking at any of the treated lesions. Pt may apply Vaseline to crusted or scabbing areas. Detail Level: Detailed Render Post-Care Instructions In Note?: no Show Applicator Variable?: Yes Number Of Freeze-Thaw Cycles: 1 freeze-thaw cycle Duration Of Freeze Thaw-Cycle (Seconds): 3 Consent: The patient's consent was obtained including but not limited to risks of crusting, scabbing, blistering, scarring, darker or lighter pigmentary change, recurrence, incomplete removal and infection. English

## 2025-04-26 NOTE — ED ADULT NURSE NOTE - SUICIDE SCREENING QUESTION 3
Tbili 3.8 --> 1.1  LFTs wnl   Possibly Gilbert's or other benign process  RESOLVED    Plan:   - Outpatient f/u No

## 2025-05-31 NOTE — ED ADULT NURSE NOTE - SUICIDE SCREENING QUESTION 1
[facility]    History and Physical - Hospitalist Service       Date of Admission:  5/31/2025    Assessment & Plan      Dionicio Doyle is a 74 year old male with PMH significant for T2DM, HTN, HLD, multiple sclerosis, BPH, MDD admitted on 5/31/2025 with chest pain.       Chest discomfort  Suspected NSTEMI:  Patient was seen on 5/28 for evaluation of back and chest pain.  Was described as central mid back pain traveled up the spine into the neck and radiating to the chest.  Troponin was checked and was normal.  D-dimer normal.  He was discharged home with ibuprofen, Tylenol, Flexeril.  He was seen by his chiropractor and received adjustment with resolution of back pain.  Unfortunately, the next day he had increased fatigue, generalized weakness, heaviness in the head, neck, shoulder pain, and hypertension.  Pain worse with inspiration.  Repeat workup in the ED shows troponins now at 987.  Recheck 917.  EKG shows bifascicular block that is chronic.    Known history of CAD and is s/p stenting.  Prior symptoms different.  Chest pain free on admission after oxycodone.  Of note, patient stopped his prescribed  last year due to tinnitus and has not been taking ASA 81 mg.  Differential includes NSTEMI, myocarditis (COVID dx last month).    -Cardiology was consulted from ED   -Heparin gtt   -Anticipate coronary angiogram on Monday  -Continue ASA 81 mg daily  -Stating intolerant -continue pta zetia  -PRN nitroglycerin  -Follow-up TTE  -Cardiac monitoring    T2DM:  Takes metformin and glypizide.  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  Ongoing balance issues but remains independent.       HTN:  Continue PTA amlodipine, metoprolol, HTCZ    HLD: PTA zetia    MS:  Remote history.  No ongoing symptoms.  Per patient, his primary neurologist told him to no longer worry about this dx.             Diet:  Cardiac diet  DVT Prophylaxis: Heparin 
Patient Instructions by Lindsey Valladares MA at 07/28/17 10:04 AM     Author:  Lindsey Valladares MA Service:  (none) Author Type:  Medical Assistant     Filed:  07/28/17 10:04 AM Encounter Date:  7/28/2017 Status:  Signed     :  Lindsey Valladares MA (Medical Assistant)            Follow up as needed    Additional Educational Resources:  For additional resources regarding your symptoms, diagnosis, or further health information, please visit the Health Resources section on [email or web address] or the Online Health Resources section in [employer].          Revision History        User Key Date/Time User Provider Type Action    > [N/A] 07/28/17 10:04 AM Lindsey Valladares MA Medical Assistant Sign            
"gtt  Alford Catheter: Not present  Lines: None     Cardiac Monitoring: None  Code Status:  FULL    Clinically Significant Risk Factors Present on Admission         # Hyponatremia: Lowest Na = 132 mmol/L in last 2 days, will monitor as appropriate  # Hypochloremia: Lowest Cl = 94 mmol/L in last 2 days, will monitor as appropriate          # Hypertension: Noted on problem list          # DMII: A1C = N/A within past 6 months    # Overweight: Estimated body mass index is 28.1 kg/m  as calculated from the following:    Height as of 5/28/25: 1.753 m (5' 9\").    Weight as of this encounter: 86.3 kg (190 lb 4.1 oz).              Disposition Plan     Medically Ready for Discharge: Anticipated in 2-4 Days           Edwardo Vaughan DO  Hospitalist Service  [facility]  Securely message with [employer] (more info)  Text page via [facility] Paging/Directory     ______________________________________________________________________    Chief Complaint   Chest pain    History is obtained from the patient    History of Present Illness   Dionicio Doyle is a 74 year old male with PMH significant for T2DM, HTN, HLD, multiple sclerosis, BPH, MDD admitted on 5/31/2025 with chest pain.     Patient was seen on 5/28 for evaluation of back and chest pain.  Was described as central mid back pain traveled up the spine into the neck and radiating to the chest.  Troponin was checked and was normal.  D-dimer normal.  He was discharged home with ibuprofen, Tylenol, Flexeril.  He was seen by his chiropractor and received adjustment with resolution of back pain.    Unfortunately, the next day he had increased fatigue, generalized weakness, heaviness in the head, neck, shoulder pain, and hypertension.  Pain worse with inspiration.        Past Medical History    Past Medical History:   Diagnosis Date    Alopecia     Walsh's palsy     BPH (benign prostatic hyperplasia) 1/12/2006    Chronic sinusitis     Depression 2004    Hemorrhoids     "
Hyperlipidemia     Hypertension     Intestinal disaccharidase deficiencies and disaccharide malabsorption     Multiple sclerosis (H)     Osteoporosis     left shoulder, right hip    Sleep disturbance, unspecified     pulmonologist recommended CPAP, pt declines    Testicular hypofunction     TMJ dysfunction     Type 2 diabetes mellitus (H) 2004       Past Surgical History   Past Surgical History:   Procedure Laterality Date    COLONOSCOPY  07/01/2008    Deviated septum repair      HERNIA REPAIR      RECESSION RESECTION (REPAIR STRABISMUS) Right 12/11/2024    Procedure: RIGHT STRABISMUS REPAIR;  Surgeon: Lillian Irene MD;  Location: [facility] OR    STENT,CORONARY, S660 24/30 2017    Distal RCA stent in 10/2017.  Attempted PCI of D1  was unsuccessful.    [place] Tooth Extraction         Prior to Admission Medications   Prior to Admission Medications   Prescriptions Last Dose Informant Patient Reported? Taking?   Flaxseed, Linseed, 1000 MG CAPS  Self Yes No   Sig: Take 2,000 mg by mouth daily    Multiple Vitamin (MULTIVITAMIN) per tablet  Self Yes No   Sig: Take 1 tablet by mouth daily.   Probiotic Product (PROBIOTIC DAILY PO)  Self Yes No   Sig: Take 1 capsule by mouth daily Garden of Life product.   amLODIPine (NORVASC) 10 MG tablet   No No   Sig: Take 1 tablet (10 mg) by mouth daily   blood glucose (NO BRAND SPECIFIED) lancets standard   No No   Sig: Use to test blood sugar 1 times daily or as directed.   blood glucose (NO BRAND SPECIFIED) test strip   No No   Sig: Use to test blood sugar 1 times daily or as directed.   blood glucose monitoring (NO BRAND SPECIFIED) meter device kit   No No   Sig: Use to test blood sugar 1 times daily or as directed.   cholecalciferol (VITAMIN D3) 5000 units TABS tablet  Self Yes No   Sig: Take 5,000 Units by mouth daily    cyclobenzaprine (FLEXERIL) 10 MG tablet   No No   Sig: Take 1 tablet (10 mg) by mouth 3 times daily as needed for muscle spasms or other (back pain). 
  ezetimibe (ZETIA) 10 MG tablet   No No   Sig: Take 1 tablet (10 mg) by mouth daily.   fluticasone (FLONASE) 50 MCG/ACT nasal spray   No No   Sig: Spray 1 spray into both nostrils daily   glipiZIDE (GLUCOTROL XL) 2.5 MG 24 hr tablet   No No   Sig: Take 4 daily   Patient taking differently: 2.5 mg daily. Take 3 daily   hydroCHLOROthiazide 12.5 MG tablet   No No   Sig: Take 1 tablet (12.5 mg) by mouth daily   losartan (COZAAR) 100 MG tablet   No No   Sig: Take 1 tablet (100 mg) by mouth every morning   metFORMIN (GLUCOPHAGE) 500 MG tablet   No No   Sig: TAKE 2 TABLETS (1,000 MG) BY MOUTH 2 TIMES DAILY (WITH MEALS) SCHEDULE CLINIC VISIT WITH DR. MULLINS   metoprolol succinate ER (TOPROL XL) 50 MG 24 hr tablet   No No   Sig: Take 2 daily   sildenafil (VIAGRA) 100 MG tablet   No No   Sig: Take 1 tablet (100 mg) by mouth daily as needed (ED)      Facility-Administered Medications: None        Review of Systems    The 10 point Review of Systems is negative other than noted in the HPI or here.     Social History   I have reviewed this patient's social history and updated it with pertinent information if needed.  Social History     Tobacco Use    Smoking status: Never    Smokeless tobacco: Never   Vaping Use    Vaping status: Never Used   Substance Use Topics    Alcohol use: Yes     Comment: occasional glas of wine    Drug use: No         Family History   I have reviewed this patient's family history and updated it with pertinent information if needed.  Family History   Problem Relation Age of Onset    Cerebrovascular Disease Father     Hypertension Mother     Thyroid Disease Mother     Cancer - colorectal Paternal Grandmother         age 80    C.A.D. Maternal Grandfather         ASCVD  and  of MI age 80    Musculoskeletal Disorder Brother         ankylosing spondylitis    Diabetes Brother     Cancer Other         cousin had kidney cancer age47    C.A.D. Brother         age 58   PTCA with stents         Allergies 
"  Allergies   Allergen Reactions    Atorvastatin Calcium      myalgias    Latex      ?-had catheter inserted, and developed burning sensation-catheter was removed immediately with improvement in symptoms    Penicillins      hives and \"body was swollen\"    Zocor [Statins]      myalgia        Physical Exam   Vital Signs: Temp: 98.3  F (36.8  C) Temp src: Temporal BP: 124/77 (left arm) Pulse: 54   Resp: 18 SpO2: 99 % O2 Device: None (Room air)    Weight: 190 lbs 4.11 oz    General Appearance: Patient awake & alert.  No apparent distress.   Respiratory: Lungs are CTAB.  Work of breathing appears normal on room air.  Cardiovascular: Regular rate and rhythm.  No murmurs rubs or gallops.  There is no edema present.  GI: Benign.  Soft.  Non-tender.  Bowel sounds active.   Skin: No rashes or lesions exposed skin.  Neuro:  The patient is moving all extremities.  No obvious focal asymmetries.   Other: Patient is appropriate and oriented x3.     Medical Decision Making       77 MINUTES SPENT BY ME on the date of service doing chart review, history, exam, documentation & further activities per the note.      Data   ------------------------- PAST 24 HR DATA REVIEWED -----------------------------------------------    I have personally reviewed the following data over the past 24 hrs:    12.9 (H)  \   13.9   / 267     132 (L) 94 (L) 16.0 /  292 (H)   4.3 24 0.87 \     Trop: 917 (HH) BNP: 1,075 (H)     INR:  N/A PTT:  N/A   D-dimer:  <0.27 Fibrinogen:  N/A       Imaging results reviewed over the past 24 hrs:   Recent Results (from the past 24 hours)   CT Aortic Survey w Contrast    Narrative    EXAM: CT AORTIC SURVEY W CONTRAST  LOCATION: [facility]  DATE: 5/31/2025    INDICATION: Chest pain, back pain.  COMPARISON: Chest CT on 4/26/2023 and CT abdomen and pelvis on 7/28/2021.  TECHNIQUE: CT angiogram chest abdomen pelvis during arterial phase of injection of IV contrast. 2D and 3D MIP reconstructions were "
performed by the CT technologist. Dose reduction techniques were used.   CONTRAST: 72 mL Isovue 370    FINDINGS:   CT ANGIOGRAM CHEST, ABDOMEN, AND PELVIS: Mild dilatation of the ascending thoracic aorta measuring 4 cm in diameter. Moderate atherosclerotic vascular calcification of the thoracoabdominal aorta. The major branches of the thoracoabdominal aorta appear   patent.    MEDIASTINUM/AXILLAE: No cardiomegaly. Small pericardial effusion. Multiple prominent mesenteric lymph nodes, indeterminate, could be reactive.    LUNGS AND PLEURA: No pleural effusion or pneumothorax. Bibasilar pulmonary opacities, likely atelectasis. A few scattered calcified pulmonary granulomas.    CORONARY ARTERY CALCIFICATION: Extensive.    ABDOMEN/PELVIS:    HEPATOBILIARY: No suspicious focal hepatic lesion. The gallbladder is unremarkable.    PANCREAS: No main pancreatic ductal dilatation or definite solid pancreatic mass.    SPLEEN: No splenomegaly.    ADRENAL GLANDS: No adrenal nodules.    KIDNEYS/BLADDER: No radiodense kidney/ureteral stones or hydronephrosis in either kidney. 2.5 cm renal cyst at the interpolar region of the right kidney.    BOWEL: No abnormally dilated bowel loops. Large amount of stool in the colon. Appendix is visualized and appears normal.    PERITONEUM: No evidence of free fluid in the abdomen and pelvis. No free peritoneal or portal venous gas.    PELVIC ORGANS: The prostate gland is enlarged measuring 6.2 cm in transverse diameter.    VASCULATURE: Moderate atherosclerotic vascular calcification of the abdominal aorta and iliac arteries.    LYMPH NODES: No significant abdominopelvic lymphadenopathy.    MUSCULOSKELETAL: Multilevel degenerative changes of the spine. Multiple small sclerotic foci most prominently in the pelvic bones and femurs, could represent small bone islands.      Impression    IMPRESSION:  1.  No evidence of thoracoabdominal aortic aneurysm or dissection. Mild dilatation of the ascending 
thoracic aorta measuring 4 cm in diameter.  2.  Extensive atherosclerotic vascular calcification of the coronary arteries.  3.  Prostatomegaly.       Echocardiogram Complete   Result Value    LVEF  55-60%    [facility]    [id number]  [id number]  [id number]  [id number]^DRAKE MORALEZ^CT     [facility]  Echocardiography Laboratory  [address] [place] [address]     Name: JOSE MANUEL GONZALEZ  MRN: [id number]  : 1950  Study Date: 2025 03:00 PM  Age: 74 yrs  Gender: Male  Patient Location: [facility]  Reason For Study: Chest Pain  Ordering Physician: CT GOINS  Performed By: James Hansen RDCS     BSA: 2.0 m2  Height: 69 in  Weight: 190 lb  HR: 78  BP: 132/97 mmHg  ______________________________________________________________________________  Procedure  Echocardiogram with two-dimensional, color and spectral Doppler.  ______________________________________________________________________________  Interpretation Summary     1. The left ventricle is normal in size. Proximal septal thickening is noted.  Left ventricular systolic function is normal. The visual ejection fraction is  55-60%. Grade I or early diastolic dysfunction. Diastolic Doppler findings  (E/E' ratio and/or other parameters) suggest left ventricular filling  pressures are increased. There is mild hypokinesis of the mid to basal  inferolateral wall.  2. The right ventricle is normal size. The right ventricular systolic function  is normal.  3. Trace to mild mitral and tricuspid regurgitation.  4. The aortic Sinus(es) of Valsalva are mildly dilated. Ascending aorta  dilatation is present. (4.3 cm).  5. No pericardial effusion.  6. In direct comparison to the previous study dated 2024, the  inferolateral wall motion abnormality is a new finding.  ______________________________________________________________________________  Left Ventricle  The left ventricle is normal in size. Proximal septal thickening is 
noted.  Left ventricular systolic function is normal. The visual ejection fraction is  55-60%. Grade I or early diastolic dysfunction. Diastolic Doppler findings  (E/E' ratio and/or other parameters) suggest left ventricular filling  pressures are increased. There is mild hypokinesis of the mid to basal  inferolateral wall.     Right Ventricle  The right ventricle is normal size. The right ventricular systolic function is  normal.     Atria  Normal left atrial size. Right atrial size is normal. There is no color  Doppler evidence of an atrial shunt.     Mitral Valve  There is trace mitral regurgitation.     Tricuspid Valve  There is mild (1+) tricuspid regurgitation. The right ventricular systolic  pressure is approximated at 30.5 mmHg plus the right atrial pressure.     Aortic Valve  There is mild trileaflet aortic sclerosis. There is mild (1+) aortic  regurgitation. No aortic stenosis is present.     Pulmonic Valve  There is trace pulmonic valvular regurgitation. There is no pulmonic valvular  stenosis.     Vessels  The aortic Sinus(es) of Valsalva are mildly dilated. Ascending aorta  dilatation is present. (4.3 cm).     Pericardium  There is no pericardial effusion.     Rhythm  Sinus rhythm was noted.  ______________________________________________________________________________  MMode/2D Measurements & Calculations     IVSd: 1.4 cm  LVIDd: 4.7 cm  LVIDs: 2.9 cm  LVPWd: 1.0 cm  FS: 38.4 %  LV mass(C)d: 215.7 grams  LV mass(C)dI: 106.7 grams/m2  Ao root diam: 4.3 cm  LA dimension: 4.9 cm  asc Aorta Diam: 4.3 cm  LA/Ao: 1.1  Ao root diam index Ht(cm/m): 2.4  Ao root diam index BSA (cm/m2): 2.1  Asc Ao diam index BSA (cm/m2): 2.1  Asc Ao diam index Ht(cm/m): 2.5  LA Volume (BP): 62.5 ml     LA Volume Index (BP): 30.9 ml/m2  RV Base: 4.4 cm  RWT: 0.44  TAPSE: 3.1 cm     Doppler Measurements & Calculations  MV E max joyce: 97.3 cm/sec  MV A max joyce: 110.8 cm/sec  MV E/A: 0.88  MV dec slope: 395.0 cm/sec2  MV dec time: 
0.25 sec  PA acc time: 0.13 sec  TR max severino: 272.5 cm/sec  TR max P.5 mmHg  E/E' av.4  Lateral E/e': 9.3  Medial E/e': 17.6  RV S Severino: 15.5 cm/sec     ______________________________________________________________________________  Report approved by: Solo Washington MD on 2025 03:46 PM           
No

## 2025-06-30 NOTE — PROGRESS NOTE BEHAVIORAL HEALTH - NSBHLOC_PSY_A_CORE
Davidr contacted [facility], [phone] as a potential therapy resource for pt's follow up care after discharge.  Davidr spoke with Anuradha, , who also speaks Kiswahili and verified being in network with [facility].  Anuradha indicated they have immediate availability for pt in their [place] location, and also offer telehealth services.    Davidr contacted [facility] in [place], [phone] as an additional in-network therapy resource for pt's follow up.  Walk-in intake assessments are available Mondays through Thursdays, 12 PM to 4 PM, after which they will assist in connecting pt with a therapist.     Both resources offer services in Kiswahili and English.  Davidr provided pt's mother with contact information for the above resources during family session today.  Encouraged pt's mother to set up a therapy appointment for pt and to reach out to davidr with any needs.   Alert

## 2025-07-19 NOTE — ED ADULT NURSE NOTE - SUICIDE SCREENING QUESTION 2
No
Gastroenterology at [facility]  Gastroenterology  [address] [place] [id number]  Phone: [phone]  Fax:

## (undated) DEVICE — NDL HYPO SAFE 18G X 1.5"

## (undated) DEVICE — CANNULA HYDRODISCT NUC 27GX22MM

## (undated) DEVICE — GLV 8 ULTRAFREE MAX

## (undated) DEVICE — SOL IRR POUR H2O 1000ML

## (undated) DEVICE — CAPSULE POLISHER 23GX7/8"

## (undated) DEVICE — Device

## (undated) DEVICE — SYR LUER LOK 20CC

## (undated) DEVICE — AID SURG OPTH OCUCOAT 20MG/ML

## (undated) DEVICE — SUT VICRYL 10-0 12" CS160-8 DA

## (undated) DEVICE — KNIFE SIDEPORT ANG W/ SAFETY 20G

## (undated) DEVICE — DRSG TEGADERM 2.5X3"

## (undated) DEVICE — ALCON MONARCH II IOL DELIVERY SYSTEM B

## (undated) DEVICE — PACK CATARACT

## (undated) DEVICE — KNIFE FULL HANDLE ANGLE 2.75MM

## (undated) DEVICE — GLV 6.5 PROTEXIS

## (undated) DEVICE — KNIFE OPTH SHARPOINT CRESCENT 2MM

## (undated) DEVICE — DRSG STERISTRIPS 0.5X4"

## (undated) DEVICE — CANNULA BD & CO SUBTENONS